# Patient Record
Sex: FEMALE | Race: BLACK OR AFRICAN AMERICAN | NOT HISPANIC OR LATINO | Employment: FULL TIME | URBAN - METROPOLITAN AREA
[De-identification: names, ages, dates, MRNs, and addresses within clinical notes are randomized per-mention and may not be internally consistent; named-entity substitution may affect disease eponyms.]

---

## 2017-04-27 ENCOUNTER — OFFICE VISIT (OUTPATIENT)
Dept: BARIATRICS | Facility: CLINIC | Age: 47
End: 2017-04-27
Payer: COMMERCIAL

## 2017-04-27 VITALS
SYSTOLIC BLOOD PRESSURE: 128 MMHG | HEIGHT: 64 IN | WEIGHT: 221.56 LBS | BODY MASS INDEX: 37.83 KG/M2 | DIASTOLIC BLOOD PRESSURE: 74 MMHG | HEART RATE: 76 BPM

## 2017-04-27 DIAGNOSIS — K21.9 GASTROESOPHAGEAL REFLUX DISEASE, ESOPHAGITIS PRESENCE NOT SPECIFIED: ICD-10-CM

## 2017-04-27 DIAGNOSIS — Z98.84 S/P LAPAROSCOPIC SLEEVE GASTRECTOMY: ICD-10-CM

## 2017-04-27 DIAGNOSIS — E66.01 SEVERE OBESITY (BMI 35.0-39.9) WITH COMORBIDITY: ICD-10-CM

## 2017-04-27 DIAGNOSIS — I10 ESSENTIAL HYPERTENSION: Primary | ICD-10-CM

## 2017-04-27 DIAGNOSIS — E51.9 VITAMIN B1 DEFICIENCY: ICD-10-CM

## 2017-04-27 PROCEDURE — 99215 OFFICE O/P EST HI 40 MIN: CPT | Mod: S$GLB,,, | Performed by: INTERNAL MEDICINE

## 2017-04-27 PROCEDURE — 99999 PR PBB SHADOW E&M-EST. PATIENT-LVL III: CPT | Mod: PBBFAC,,, | Performed by: INTERNAL MEDICINE

## 2017-04-27 RX ORDER — DIETHYLPROPION HYDROCHLORIDE 75 MG/1
75 TABLET, EXTENDED RELEASE ORAL DAILY
Qty: 31 TABLET | Refills: 0 | Status: SHIPPED | OUTPATIENT
Start: 2017-04-27 | End: 2017-05-26 | Stop reason: SDUPTHER

## 2017-04-27 NOTE — PROGRESS NOTES
Subjective:       Patient ID: Renea Mac is a 47 y.o. female.    Chief Complaint: Consult    HPI Comments: Current attempts at weight loss: New pt to me with Patient Active Problem List:     Acute pain of right knee     Chronic right shoulder pain     Tail bone pain     S/P gastric surgery- [facility].      Essential hypertension     Osteoarthritis of right knee     Incomplete tear of right rotator cuff     Sciatica (2002), HTN, syncope (06/2016), and GERD.  PSH includes gastric sleeve (11/2014), hysterectomy (04/2011), and hernia repair.    Has gained over 20 lbs in a little less than a year. Is walking 1.5-2 miles x 3 days. Trying to increase. Some exercise for shoulder labs.         Heaviest weight before surgery: 241#    Lightest weight since surgery: 197# with in 1 year.     Goal weight: 175#    History of medication for loss: Phentermine in 90s. States did well.         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  trying to lose weight.   Breakfast: ensure (8g prot, 26 g sugar)    Lunch: Leftovers    Dinner: Red beans and rice, chicken. Roast with rice and gravy. Spaghetti with meat sauce.     Snacks: Chips, Belvita cookies,     Beverages: Wine, martini- flavored- 3-4 times a week x 2/day. Lemon water, Arnold romero. Does not drink while eating. Smooth move tea, ACV with honey  Willingness to change: 7/10        Review of Systems   Constitutional: Negative for chills and fever.   Respiratory: Negative for apnea and shortness of breath.    Cardiovascular: Negative for chest pain and leg swelling.   Gastrointestinal: Positive for constipation. Negative for diarrhea.        + GERD   Genitourinary: Negative for difficulty urinating.        S/p hyst   Musculoskeletal: Positive for arthralgias and back pain.   Neurological: Negative for dizziness and light-headedness.   Psychiatric/Behavioral: Negative for dysphoric mood. The patient is not nervous/anxious.       "  Objective:     /74  Pulse 76  Ht 5' 4" (1.626 m)  Wt 100.5 kg (221 lb 9 oz)  BMI 38.03 kg/m2    Physical Exam   Constitutional: She is oriented to person, place, and time. She appears well-developed and well-nourished. No distress.   Obese     HENT:   Head: Normocephalic and atraumatic.   Eyes: EOM are normal. Pupils are equal, round, and reactive to light. No scleral icterus.   Neck: Normal range of motion. Neck supple. No thyromegaly present.   Cardiovascular: Normal rate and normal heart sounds.  Exam reveals no gallop and no friction rub.    No murmur heard.  Pulmonary/Chest: Effort normal and breath sounds normal. No respiratory distress. She has no wheezes.   Abdominal: Soft. Bowel sounds are normal. She exhibits no distension. There is no tenderness.   Musculoskeletal: Normal range of motion. She exhibits no edema.   Neurological: She is alert and oriented to person, place, and time. No cranial nerve deficit.   Skin: Skin is warm and dry. No erythema.   Psychiatric: She has a normal mood and affect. Her behavior is normal. Judgment normal.   Vitals reviewed.      Assessment:       1. Essential hypertension    2. Gastroesophageal reflux disease, esophagitis presence not specified    3. S/P laparoscopic sleeve gastrectomy    4. Severe obesity (BMI 35.0-39.9) with comorbidity    5. Vitamin B1 deficiency        Plan:         1. Essential hypertension  The current medical regimen is effective;  continue present plan and medications.   - TSH; Future    2. Gastroesophageal reflux disease, esophagitis presence not specified  Expect improvement with weight loss  Attempt to wean PPI once 10% TBW lost.       3. S/P laparoscopic sleeve gastrectomy  Update labs. DB updated. Nutrition hand book reviewed, If she is not making progress at follow-up with refer to RD  - CBC auto differential; Future  - Comprehensive metabolic panel; Future  - Iron and TIBC; Future  - Vitamin B1; Future  - Vitamin B12; Future  - " Vitamin B6; Future  - Vitamin D; Future  - Ambulatory consult to Ophthalmology    4. Severe obesity (BMI 35.0-39.9) with comorbidity    - diethylpropion 75 mg TbSR; Take 75 mg by mouth once daily.  Dispense: 31 tablet; Refill: 0    5. Vitamin B1 deficiency    - Ambulatory consult to Ophthalmology      Patient warned of common side effects of diethylpropion including anxiety, insomnia, palpitations and increased blood pressure. It was also explained that it is for short-term usage along with diet and exercise, and that stopping the medication without making lifestyle changes will result in regain of weight. Patient states understanding.    Return in about 4 weeks (around 5/25/2017).

## 2017-04-27 NOTE — MR AVS SNAPSHOT
[facility]  [address]  [facility] [address]  Phone: [phone]  Fax: [phone]                  Renea Mac   2017 11:15 AM   Office Visit    Description:  Female : 1970   Provider:  Kimberlee Washburn MD   Department:  [facility]           Reason for Visit     Consult           Diagnoses this Visit        Comments    Essential hypertension    -  Primary     Gastroesophageal reflux disease, esophagitis presence not specified         S/P laparoscopic sleeve gastrectomy         Severe obesity (BMI 35.0-39.9) with comorbidity         Vitamin B1 deficiency                To Do List           Goals (5 Years of Data)     None      Follow-Up and Disposition     Return in about 4 weeks (around 2017).       These Medications        Disp Refills Start End    diethylpropion 75 mg TbSR 31 tablet 0 2017     Take 75 mg by mouth once daily. - Oral    Pharmacy: [facility] [address] [place] [address] [place] [address] [address] AT [facility] [place] Ph #: [phone]         [facility]     [facility] On Call Nurse Care Line -  Assistance  Unless otherwise directed by your provider, please contact [facility], our nurse care line that is available for  assistance.     Registered nurses in the [facility] provide: appointment scheduling, clinical advisement, health education, and other advisory services.  Call: [phone] (toll free)               Medications           Message regarding Medications     Verify the changes and/or additions to your medication regime listed below are the same as discussed with your clinician today.  If any of these changes or additions are incorrect, please notify your healthcare provider.        START taking these NEW medications        Refills    diethylpropion 75 mg TbSR 0    Sig: Take 75 mg by mouth once daily.    Class: Print    Route: Oral           Verify that  "the below list of medications is an accurate representation of the medications you are currently taking.  If none reported, the list may be blank. If incorrect, please contact your healthcare provider. Carry this list with you in case of emergency.           Current Medications     hydrochlorothiazide (HYDRODIURIL) 25 MG tablet Take 12.5 mg by mouth.    losartan (COZAAR) 100 MG tablet     omeprazole (PRILOSEC) 40 MG capsule Take 1 capsule (40 mg total) by mouth once daily.    diethylpropion 75 mg TbSR Take 75 mg by mouth once daily.           Clinical Reference Information           Your Vitals Were     BP Pulse Height Weight BMI    128/74 76 5' 4" (1.626 m) 100.5 kg (221 lb 9 oz) 38.03 kg/m2      Blood Pressure          Most Recent Value    BP  128/74      Allergies as of 4/27/2017     No Known Allergies      Immunizations Administered on Date of Encounter - 4/27/2017     None      Orders Placed During Today's Visit      Normal Orders This Visit    Ambulatory consult to Ophthalmology     Future Labs/Procedures Expected by Expires    CBC auto differential  4/27/2017 6/26/2018    Comprehensive metabolic panel  4/27/2017 6/26/2018    Iron and TIBC  4/27/2017 6/26/2018    TSH  4/27/2017 6/26/2018    Vitamin B12  4/27/2017 6/26/2018    Vitamin B1  4/27/2017 6/26/2018    Vitamin B6  4/27/2017 6/26/2018    Vitamin D  4/27/2017 6/26/2018      Instructions        Sample meal plan  80-120g protein; 2150-8338 calories  Protein drinks and bars: 0-4 grams sugar  Drink lots of water throughout the day and exercise!  MENU # 1  Breakfast: 2 eggs, 1 turkey sausage tere  Lunch: 2-3 roll-ups (sliced turkey, low-fat slice cheese), baby carrots dipped in 1 Tbsp natural peanut butter  Mid-Day Snack: ¼ cup unsalted almonds, ½ cup fruit  Supper: 1 chicken thigh simmered in tomato sauce + 2 Tbsp mozzarella cheese, ½ cup black beans, 1/2 cup steamed veggies  Evening Snack: 1/2 cup grapes with 4 cubes low-fat cheddar cheese "   ___________________________________________________  MENU # 2  Breakfast: protein drink  Mid-morning snack : ¼ cup unsalted nuts  Lunch: 1 cup tuna or chicken salad made with light herron, over salad.   Supper: hamburger tere, slice of cheese, 1 cup steamed veggies.   Snack: light yogurt      Menu #3  Breakfast: 6oz plain Greek yogurt + fruit (½ banana, ½ cup fruit - pineapple, blueberries, strawberries, peach), may add Splenda to manny.  Lunch: ½  chicken breast w/ slice pepper ana maria cheese, 1/2 cup steamed veggies and small salad with Salad Spritzer.    Mid-Day snack: protein drink   Supper: 4oz Grilled fish, grilled veggie kabob ( mushrooms, onion, bell pepper, yellow squash, zucchini, cherry tomatoes)  Evening Snack: fudgsicle no-sugar-added    Menu # 4  Breakfast: vanilla iced coffee: 1 scoop vanilla protein powder + 4oz skim milk + 4oz coffee   Snack: protein bar  Lunch: 2 Lettuce tacos: ¼ cup seasoned ground turkey wrapped in a Waldemar lettuce leaf with 1 Tbsp shredded cheese and dollop low-fat sour cream  Dinner: Shrimp omelet: 2 eggs, ½ cup shrimp, green onions, and shredded cheese        Menu #5  Breakfast: 1 cup low-fat cottage cheese, ½ cup peaches (no sugar added)  Lunch: 2 oz baked pork chop, 1 cup okra and tomato stew  Snack: 1 cup black beans + salsa + dollop sour cream  Dinner: Caprese chicken salad: 2 oz chicken, 1oz fresh mozzarella, sliced tomato, 1 Tbsp olive oil, basil  Snack: sugar-free pudding cup      Menu #6  Breakfast: ½ cup part-skim ricotta cheese, 2 Tbsp sugar-free strawberry preserves, ¼ cup slivered almonds  Lunch: 2 oz canned chicken, 1oz shredded cheddar cheese, ½ cup black beans, 2 Tbsp salsa  Snack: Protein drink  Dinner: 4 oz grilled salmon steak, over mixed green salad with light dressing      Bariatric Diet Grocery List      High in Protein:   ? Canned tuna or chicken (packed in water)  ? Lean ground turkey breast or ground round  ? Turkey or chicken (no skin)  ? Lean pork or  beef   ? Scrambled, poached, or boiled eggs  ? Baked or broiled fish and seafood (not fried!)  ? Beans and lentils  ? Low fat deli meats: turkey, chicken, ham, roast beef  ? 1% or Skim Milk, Lactaid, or Soymilk  ? Low-fat cheese, cottage cheese, mozzarella string cheese, ricotta  ? Light yogurt, FF/SF frozen yogurt, custard, SF pudding  ? Protein drinks and protein bars with 0-4 grams sugar     Fruits, Vegetables and Snacks   - Green beans, broccoli, cauliflower, spinach, asparagus, carrots, lima beans, yellow squash, zucchini, etc.  - Apple, pineapple, peach, grapes, banana, watermelon, oranges, etc.  - Fruit canned in its own juice or in water (not in syrup)  - Raw veggies  - Lettuce: dark greens like spinach and Waldemar  - Unsalted Nuts  - Light or Air-popped Popcorn  - Shaun Links beef jerky     Fluids:   Skim/1% milk, Lactaid, Soymilk  Sugar-free beverages  (decaf and non-carbonated)  Decaf coffee & decaf tea   Water       Low Sodium Snack Ideas: (less than 170 mg sodium)    - 1 low-fat cheese stick with 8 cherry tomatoes or 1 serving fresh fruit  - 1 oz low-sodium turkey breast with 1 serv fresh fruit  - 1/4 cup unsalted nuts with ½ piece of fruit  - 6-oz container Dannon Light n Fit vanilla yogurt, topped with ½ oz unsalted nuts   - ½ apple (peeled if necessary) with 1 Tbsp natural peanut butter or almond butter   - Pop Secrets microwave light popcorn   - [place] Chocolate Mint snack bar, 2 Tablespoons Cool Whip Free  - Slimfast low-carb snack bar: coconut almond      Takeout Options: No more than twice/week  Deli - Salads (no pasta or rice), meats, cheeses. Roasted chicken. Lox (salmon)  Mexican - Platters which don't include tortillas, chips, or rice. Go easy on the beans. Example: Fajitas without the tortillas. Ask the  not to bring chips to the table if they are too tempting.  Greek - Meat or fish and vegetable, but no bread or rice. Including hummus, baba ganoush, etc, is OK. Most sit-down  Yi restaurants can provide you with cucumber slices for dipping instead of maria luisa bread.  Fast Food (Avoid as much as possible) - Salads (no croutons and limit salad dressing to 2 tbsp), grilled chicken sandwich without the bun and ask for no herron. Keys low fat chili or Taco Bell pintos and cheese.  BBQ - The meats are fine if you ask for sauces on the side, but most of the traditional side dishes are loaded with carbs. Efraín slaw, baked beans and BBQ sauce are typically made with sugar.  Chinese - Nothing deep-fried, no rice or noodles. Many Chinese sauces have starch and sugar in them, so you'll have to use your judgement. If you find that these sauces trigger cravings, or cause Dumping, you can ask for the sauce to be made without sugar or just use soy sauce.    Meal Ideas for Regular Bariatric Diet  *Recipes and products available at www.bariatriceating.com      Breakfast: (15-20g protein)    - Egg white omelet: 2 egg whites or ½ cup Egg Beaters. (Optional proteins: cheese, shrimp, black beans, chicken, sliced turkey) (Optional veggies: tomatoes, salsa, spinach, mushrooms, onions, green peppers, or small slice avocado)     - Egg and sausage: 1 egg or ¼ cup Egg Beaters (any variety), with 1 tere or 2 links of Turkey sausage or Veggie breakfast sausage ([employer] or [employer])    - Crust-less breakfast quiche: To make a glass pie dish, mix 4oz part skim Ricotta, 1 cup skim milk, and 2 eggs as your base. Add protein: shredded cheese, sliced lean ham or turkey, turkey reeder/sausage. Add veggies: tomato, onion, green onion, mushroom, green pepper, spinach, etc.    - Yogurt parfait: Mix 1 - 6oz container Dannon Light N Fit vanilla yogurt, with ¼ cup Kashi Go Lean cereal    - Cottage cheese and fruit: ½ cup part-skim cottage cheese or ricotta cheese topped with fresh fruit or sugar free preserves     - Brittnee Zelaya's Vanilla Egg custard* (add 2 Tbsp instant coffee granules to make Cappuccino Custard*)    -  Hi-Protein café latte (skim milk, decaf coffee, 1 scoop protein powder). Optional to add Sugar free syrup or extract flavoring.    Lunch: (20-30g protein)    - ½ cup Black bean soup (Homemade or Progresso), with ¼ cup shredded low-fat cheese. Top with chopped tomato or fresh salsa.     - Lean deli turkey breast and low-fat sliced cheese, mustard or light herron to moisten, rolled up together, or wrapped in a Waldemar lettuce leaf    - Chicken salad made from dinner leftovers, moisten with low-fat salad dressing or light herron. Also try leftover salmon, shrimp, tuna or boiled eggs. Serve ½ cup over dark green salad    - Fat-free canned refried beans, topped with ¼ cup shredded low-fat cheese. Top with chopped tomato or fresh salsa.     - Greek salad: Top mixed greens with 1-2oz grilled chicken, tomatoes, red onions, 2-3 kalamata olives, and sprinkle lightly with feta cheese. Spritz with Balsamic vinegar to taste.     - Crust-less lunch quiche: To make a glass pie dish, mix 4oz part skim Ricotta, 1 cup skim milk, and 2 eggs as your base. Add protein: shredded cheese, sliced lean ham or turkey, shrimp, chicken. Add veggies: tomato, onion, green onion, mushroom, green pepper, spinach, artichoke, broccoli, etc.    - Pizza bake: tomato sauce, low-fat shredded mozzarella and turkey pepperoni or Wilbarger reeder. Add any veggies.    - Cucumber crab bites: Spread ¼ cup crab dip (lump crabmeat + light cream cheese and green onions) over sliced cucumber.     - Chicken with light spinach and artichoke dip*: Puree in : 6oz cooked and drained spinach, 2 cloves garlic, 1 can cannelloni beans, ½ cup chopped green onions, 1 can drained artichoke hearts (not marinated in oil), lemon juice and basil. Mix in 2oz chopped up chicken.    Supper: (20-30g protein)    - Serve grilled fish over dark green salad tossed with low-fat dressing, served with grilled asparagus berry     - Rotisserie chicken salad: served with sliced  strawberries, walnuts, fat-free feta cheese crumbles and 1 tbsp Zambranos Own Light Raspberry Norman Vinaigrette    - Shrimp cocktail: Dip cold boiled shrimp in homemade low-sugar cocktail sauce (1/2 cup Brandie One Carb ketchup, 2 tbsp horseradish, 1/4 tsp hot sauce, 1 tsp Worcestershire sauce, 1 tbsp freshly-squeezed lemon juice). Serve with dark green salad, walnuts, and crumbled blue cheese drizzled with olive oil and Balsamic vinegar    - Tuna Melt: Spread tuna salad onto 2 thick slices of tomato. Top with low-fat cheese and broil until cheese is melted. May also be made with chicken salad of shrimp salad. [place] with different types of cheeses.    - Homemade low-fat Chili using extra lean ground beef or ground turkey. Top with shredded cheese and salsa as desired. May add dollop fat-free sour cream if desired    - Dinner Omelet with shrimp or chicken and onion, green peppers and chives.    - No noodle lasagna: Use sliced zucchini or eggplant in place of noodles.  Layer with part skim ricotta cheese and low sugar meat sauce (use very lean ground beef or ground turkey).    - Mexican chicken bake: Bake chunks of chicken breast or thigh with taco seasoning, Pace brand enchilada sauce, green onions and low-fat cheese. Serve with ¼ cup black beans or fat free refried beans topped with chopped tomatoes or salsa.    - Vin frozen meatballs, simmered in Classico Marinara sauce. Different flavors of salsa or spaghetti sauce create different dishes! Sprinkle with parmesan cheese. Serve with grilled or steamed veggies, or a dark green salad.    - Simmer boneless skinless chicken thigh chunks in Classico Marinara sauce or roasted salsa until tender with chopped onion, bell pepper, garlic, mushrooms, spinach, etc.     - Hamburger, without the bun, dressed the way you like. Served with grilled or steamed veggies.    - Eggplant parmesan: Bake slices of eggplant at 350 degrees for 15 minutes. Layer tomato sauce, sliced  eggplant and low-fat mozzarella cheese in a baking dish and cover with foil. Bake 30-40 more minutes or until bubbly. Uncover and bake at 400 degrees for about 15 more minutes, or until top is slightly crisp.    - Fish tacos: grilled/baked white fish, wrapped in Waldemar lettuce leaf, topped with salsa, shredded low-fat cheese, and light coleslaw.    Snacks: (100-200 calories; >5g protein)    - 1 low-fat cheese stick with 8 cherry tomatoes or 1 serving fresh fruit  - 4 thin slices fat-free turkey breast and 1 slice low-fat cheese  - 4 thin slices fat-free honey ham with wedge of melon  - 1/4 cup unsalted nuts with ½ cup fruit  - 6-oz container Dannon Light n Fit vanilla yogurt, topped with 1oz unsalted nuts         - apple, celery or baby carrots spread with 2 Tbsp natural peanut butter or almond butter   - apple slices with 1 oz slice low-fat cheese  - celery, cucumber, bell pepper or baby carrots dipped in ¼ cup hummus bean spread or light spinach and artichoke dip (*recipe in lunch section)  - 100 calorie bag microwave light popcorn with 3 tbsp grated parmesan cheese  - Shaun Links Beef Steak - 14g protein! (similar to beef jerky)  - 2 wedges Laughing Cow - Light Herb & Garlic Cheese with sliced cucumber or green bell pepper  - 1/2 cup low-fat cottage cheese with ¼ cup fruit or ¼ cup salsa  - RTD Protein drinks: Atkins, Low Carb Slim Fast, EAS light, Muscle Milk Light, etc.  - Homemade Protein drinks: GNC Soy95, Isopure, Nectar, UNJURY, Whey Gourmet, etc. Mix 1 scoop powder with 8oz skim/1% milk or light soymilk.  - Protein bars: Atkins, EAS, Pure Protein, Think Thin, Detour, etc. Must have 0-4 grams sugar - Read the label.    Takeout Options: No more than twice/week  Deli - Salads (no pasta or rice), meats, cheeses. Roasted chicken. Lox (salmon)    Mexican - Platters which don't include tortillas, chips, or rice. Go easy on the beans. Example: Fajitas without the tortillas. Ask the  not to bring chips to  the table if they are too tempting.    Greek - Meat or fish and vegetable, but no bread or rice. Including hummus, baba ganoush, etc, is OK. Most sit-down Greek restaurants can provide you with cucumber slices for dipping instead of maria luisa bread.    Fast Food (Avoid as much as possible) - Salads (no croutons and limit salad dressing to 2 tbsp), grilled chicken sandwich without the bun and ask for no herron. Keys low fat chili or Taco Bell pintos and cheese.    BBQ - The meats are fine if you ask for sauces on the side, but most of the traditional side dishes are loaded with carbs. Efraín slaw, baked beans and BBQ sauce are typically made with sugar.    Chinese - Nothing deep-fried, no rice or noodles. Many Chinese sauces have starch and sugar in them, so you'll have to use your judgement. If you find that these sauces trigger cravings, or cause Dumping, you can ask for the sauce to be made without sugar or just use soy sauce.      Patient warned of common side effects of diethylpropion including anxiety, insomnia, palpitations and increased blood pressure. It was also explained that it is for short-term usage along with diet and exercise, and that stopping the medication without making lifestyle changes will result in regain of weight. Patient states understanding.    Continue to increase exercise.     Weight loss medications are controlled substances.  They require routine follow up. Prescription or pills that are lost or destroyed will not be replaced.     Return in about 4 weeks (around 5/25/2017).         Language Assistance Services     ATTENTION: Language assistance services are available, free of charge. Please call [phone].      ATENCIÓN: Si sussy gill, tiene a barnes disposición servicios gratuitos de asistencia lingüística. Llame al [phone].     DAYNA Ý: N?u b?n nói Ti?ng Vi?t, có các d?ch v? h? tr? ngôn ng? mi?n phí dành cho b?n. G?i s? [phone].         [facility] complies  with applicable Federal civil rights laws and does not discriminate on the basis of race, color, national origin, age, disability, or sex.

## 2017-04-27 NOTE — PATIENT INSTRUCTIONS
Sample meal plan  80-120g protein; 4177-6124 calories  Protein drinks and bars: 0-4 grams sugar  Drink lots of water throughout the day and exercise!  MENU # 1  Breakfast: 2 eggs, 1 turkey sausage tere  Lunch: 2-3 roll-ups (sliced turkey, low-fat slice cheese), baby carrots dipped in 1 Tbsp natural peanut butter  Mid-Day Snack: ¼ cup unsalted almonds, ½ cup fruit  Supper: 1 chicken thigh simmered in tomato sauce + 2 Tbsp mozzarella cheese, ½ cup black beans, 1/2 cup steamed veggies  Evening Snack: 1/2 cup grapes with 4 cubes low-fat cheddar cheese   ___________________________________________________  MENU # 2  Breakfast: protein drink  Mid-morning snack : ¼ cup unsalted nuts  Lunch: 1 cup tuna or chicken salad made with light herron, over salad.   Supper: hamburger tere, slice of cheese, 1 cup steamed veggies.   Snack: light yogurt      Menu #3  Breakfast: 6oz plain Greek yogurt + fruit (½ banana, ½ cup fruit - pineapple, blueberries, strawberries, peach), may add Splenda to manny.  Lunch: ½  chicken breast w/ slice pepper ana maria cheese, 1/2 cup steamed veggies and small salad with Salad Spritzer.    Mid-Day snack: protein drink   Supper: 4oz Grilled fish, grilled veggie kabob ( mushrooms, onion, bell pepper, yellow squash, zucchini, cherry tomatoes)  Evening Snack: fudgsicle no-sugar-added    Menu # 4  Breakfast: vanilla iced coffee: 1 scoop vanilla protein powder + 4oz skim milk + 4oz coffee   Snack: protein bar  Lunch: 2 Lettuce tacos: ¼ cup seasoned ground turkey wrapped in a Waldemar lettuce leaf with 1 Tbsp shredded cheese and dollop low-fat sour cream  Dinner: Shrimp omelet: 2 eggs, ½ cup shrimp, green onions, and shredded cheese        Menu #5  Breakfast: 1 cup low-fat cottage cheese, ½ cup peaches (no sugar added)  Lunch: 2 oz baked pork chop, 1 cup okra and tomato stew  Snack: 1 cup black beans + salsa + dollop sour cream  Dinner: Caprese chicken salad: 2 oz chicken, 1oz fresh mozzarella, sliced  tomato, 1 Tbsp olive oil, basil  Snack: sugar-free pudding cup      Menu #6  Breakfast: ½ cup part-skim ricotta cheese, 2 Tbsp sugar-free strawberry preserves, ¼ cup slivered almonds  Lunch: 2 oz canned chicken, 1oz shredded cheddar cheese, ½ cup black beans, 2 Tbsp salsa  Snack: Protein drink  Dinner: 4 oz grilled salmon steak, over mixed green salad with light dressing      Bariatric Diet Grocery List      High in Protein:   ? Canned tuna or chicken (packed in water)  ? Lean ground turkey breast or ground round  ? Turkey or chicken (no skin)  ? Lean pork or beef   ? Scrambled, poached, or boiled eggs  ? Baked or broiled fish and seafood (not fried!)  ? Beans and lentils  ? Low fat deli meats: turkey, chicken, ham, roast beef  ? 1% or Skim Milk, Lactaid, or Soymilk  ? Low-fat cheese, cottage cheese, mozzarella string cheese, ricotta  ? Light yogurt, FF/SF frozen yogurt, custard, SF pudding  ? Protein drinks and protein bars with 0-4 grams sugar     Fruits, Vegetables and Snacks   - Green beans, broccoli, cauliflower, spinach, asparagus, carrots, lima beans, yellow squash, zucchini, etc.  - Apple, pineapple, peach, grapes, banana, watermelon, oranges, etc.  - Fruit canned in its own juice or in water (not in syrup)  - Raw veggies  - Lettuce: dark greens like spinach and Waldemar  - Unsalted Nuts  - Light or Air-popped Popcorn  - Shaun Links beef jerky     Fluids:   Skim/1% milk, Lactaid, Soymilk  Sugar-free beverages  (decaf and non-carbonated)  Decaf coffee & decaf tea   Water       Low Sodium Snack Ideas: (less than 170 mg sodium)    - 1 low-fat cheese stick with 8 cherry tomatoes or 1 serving fresh fruit  - 1 oz low-sodium turkey breast with 1 serv fresh fruit  - 1/4 cup unsalted nuts with ½ piece of fruit  - 6-oz container Dannon Light n Fit vanilla yogurt, topped with ½ oz unsalted nuts   - ½ apple (peeled if necessary) with 1 Tbsp natural peanut butter or almond butter   - Pop Secrets microwave light popcorn    - [place] Chocolate Mint snack bar, 2 Tablespoons Cool Whip Free  - Slimfast low-carb snack bar: coconut almond      Takeout Options: No more than twice/week  Deli - Salads (no pasta or rice), meats, cheeses. Roasted chicken. Lox (salmon)  Mexican - Platters which don't include tortillas, chips, or rice. Go easy on the beans. Example: Fajitas without the tortillas. Ask the  not to bring chips to the table if they are too tempting.  Greek - Meat or fish and vegetable, but no bread or rice. Including hummus, baba ganoush, etc, is OK. Most sit-down Greek restaurants can provide you with cucumber slices for dipping instead of maria luisa bread.  Fast Food (Avoid as much as possible) - Salads (no croutons and limit salad dressing to 2 tbsp), grilled chicken sandwich without the bun and ask for no herron. Keys low fat chili or Taco Bell pintos and cheese.  BBQ - The meats are fine if you ask for sauces on the side, but most of the traditional side dishes are loaded with carbs. Efraín slaw, baked beans and BBQ sauce are typically made with sugar.  Chinese - Nothing deep-fried, no rice or noodles. Many Chinese sauces have starch and sugar in them, so you'll have to use your judgement. If you find that these sauces trigger cravings, or cause Dumping, you can ask for the sauce to be made without sugar or just use soy sauce.    Meal Ideas for Regular Bariatric Diet  *Recipes and products available at [email or web address]      Breakfast: (15-20g protein)    - Egg white omelet: 2 egg whites or ½ cup Egg Beaters. (Optional proteins: cheese, shrimp, black beans, chicken, sliced turkey) (Optional veggies: tomatoes, salsa, spinach, mushrooms, onions, green peppers, or small slice avocado)     - Egg and sausage: 1 egg or ¼ cup Egg Beaters (any variety), with 1 tere or 2 links of Turkey sausage or Veggie breakfast sausage (Mailpile or Boca)    - Crust-less breakfast quiche: To make a glass pie dish, mix 4oz part skim  Ricotta, 1 cup skim milk, and 2 eggs as your base. Add protein: shredded cheese, sliced lean ham or turkey, turkey reeder/sausage. Add veggies: tomato, onion, green onion, mushroom, green pepper, spinach, etc.    - Yogurt parfait: Mix 1 - 6oz container Dannon Light N Fit vanilla yogurt, with ¼ cup Kashi Go Lean cereal    - Cottage cheese and fruit: ½ cup part-skim cottage cheese or ricotta cheese topped with fresh fruit or sugar free preserves     - Brittnee Zelaya's Vanilla Egg custard* (add 2 Tbsp instant coffee granules to make Cappuccino Custard*)    - Hi-Protein café latte (skim milk, decaf coffee, 1 scoop protein powder). Optional to add Sugar free syrup or extract flavoring.    Lunch: (20-30g protein)    - ½ cup Black bean soup (Homemade or Progresso), with ¼ cup shredded low-fat cheese. Top with chopped tomato or fresh salsa.     - Lean deli turkey breast and low-fat sliced cheese, mustard or light herron to moisten, rolled up together, or wrapped in a Waldemar lettuce leaf    - Chicken salad made from dinner leftovers, moisten with low-fat salad dressing or light herron. Also try leftover salmon, shrimp, tuna or boiled eggs. Serve ½ cup over dark green salad    - Fat-free canned refried beans, topped with ¼ cup shredded low-fat cheese. Top with chopped tomato or fresh salsa.     - Greek salad: Top mixed greens with 1-2oz grilled chicken, tomatoes, red onions, 2-3 kalamata olives, and sprinkle lightly with feta cheese. Spritz with Balsamic vinegar to taste.     - Crust-less lunch quiche: To make a glass pie dish, mix 4oz part skim Ricotta, 1 cup skim milk, and 2 eggs as your base. Add protein: shredded cheese, sliced lean ham or turkey, shrimp, chicken. Add veggies: tomato, onion, green onion, mushroom, green pepper, spinach, artichoke, broccoli, etc.    - Pizza bake: tomato sauce, low-fat shredded mozzarella and turkey pepperoni or Maybee reeder. Add any veggies.    - Cucumber crab bites: Spread ¼ cup crab dip  (lump crabmeat + light cream cheese and green onions) over sliced cucumber.     - Chicken with light spinach and artichoke dip*: Puree in : 6oz cooked and drained spinach, 2 cloves garlic, 1 can cannelloni beans, ½ cup chopped green onions, 1 can drained artichoke hearts (not marinated in oil), lemon juice and basil. Mix in 2oz chopped up chicken.    Supper: (20-30g protein)    - Serve grilled fish over dark green salad tossed with low-fat dressing, served with grilled asparagus berry     - Rotisserie chicken salad: served with sliced strawberries, walnuts, fat-free feta cheese crumbles and 1 tbsp Zambranos Own Light Raspberry [place] Vinaigrette    - Shrimp cocktail: Dip cold boiled shrimp in homemade low-sugar cocktail sauce (1/2 cup Brandie One Carb ketchup, 2 tbsp horseradish, 1/4 tsp hot sauce, 1 tsp Worcestershire sauce, 1 tbsp freshly-squeezed lemon juice). Serve with dark green salad, walnuts, and crumbled blue cheese drizzled with olive oil and Balsamic vinegar    - Tuna Melt: Spread tuna salad onto 2 thick slices of tomato. Top with low-fat cheese and broil until cheese is melted. May also be made with chicken salad of shrimp salad. [place] with different types of cheeses.    - Homemade low-fat Chili using extra lean ground beef or ground turkey. Top with shredded cheese and salsa as desired. May add dollop fat-free sour cream if desired    - Dinner Omelet with shrimp or chicken and onion, green peppers and chives.    - No noodle lasagna: Use sliced zucchini or eggplant in place of noodles.  Layer with part skim ricotta cheese and low sugar meat sauce (use very lean ground beef or ground turkey).    - Mexican chicken bake: Bake chunks of chicken breast or thigh with taco seasoning, Pace brand enchilada sauce, green onions and low-fat cheese. Serve with ¼ cup black beans or fat free refried beans topped with chopped tomatoes or salsa.    - Vin frozen meatballs, simmered in [place]  Marinara sauce. Different flavors of salsa or spaghetti sauce create different dishes! Sprinkle with parmesan cheese. Serve with grilled or steamed veggies, or a dark green salad.    - Simmer boneless skinless chicken thigh chunks in Classico Marinara sauce or roasted salsa until tender with chopped onion, bell pepper, garlic, mushrooms, spinach, etc.     - Hamburger, without the bun, dressed the way you like. Served with grilled or steamed veggies.    - Eggplant parmesan: Bake slices of eggplant at 350 degrees for 15 minutes. Layer tomato sauce, sliced eggplant and low-fat mozzarella cheese in a baking dish and cover with foil. Bake 30-40 more minutes or until bubbly. Uncover and bake at 400 degrees for about 15 more minutes, or until top is slightly crisp.    - Fish tacos: grilled/baked white fish, wrapped in Waldemar lettuce leaf, topped with salsa, shredded low-fat cheese, and light coleslaw.    Snacks: (100-200 calories; >5g protein)    - 1 low-fat cheese stick with 8 cherry tomatoes or 1 serving fresh fruit  - 4 thin slices fat-free turkey breast and 1 slice low-fat cheese  - 4 thin slices fat-free honey ham with wedge of melon  - 1/4 cup unsalted nuts with ½ cup fruit  - 6-oz container Dannon Light n Fit vanilla yogurt, topped with 1oz unsalted nuts         - apple, celery or baby carrots spread with 2 Tbsp natural peanut butter or almond butter   - apple slices with 1 oz slice low-fat cheese  - celery, cucumber, bell pepper or baby carrots dipped in ¼ cup hummus bean spread or light spinach and artichoke dip (*recipe in lunch section)  - 100 calorie bag microwave light popcorn with 3 tbsp grated parmesan cheese  - Shaun Links Beef Steak - 14g protein! (similar to beef jerky)  - 2 wedges [place] - Light Herb & Garlic Cheese with sliced cucumber or green bell pepper  - 1/2 cup low-fat cottage cheese with ¼ cup fruit or ¼ cup salsa  - RTD Protein drinks: Atkins, Low Carb Slim Fast, EAS light, Muscle Milk  Light, etc.  - Homemade Protein drinks: [facility] Soy95, Isopure, Nectar, UNJURY, Whey Gourmet, etc. Mix 1 scoop powder with 8oz skim/1% milk or light soymilk.  - Protein bars: Atkins, EAS, Pure Protein, Think Thin, Detour, etc. Must have 0-4 grams sugar - Read the label.    Takeout Options: No more than twice/week  Deli - Salads (no pasta or rice), meats, cheeses. Roasted chicken. Lox (salmon)    Mexican - Platters which don't include tortillas, chips, or rice. Go easy on the beans. Example: Fajitas without the tortillas. Ask the  not to bring chips to the table if they are too tempting.    Greek - Meat or fish and vegetable, but no bread or rice. Including hummus, baba ganoush, etc, is OK. Most sit-down Greek restaurants can provide you with cucumber slices for dipping instead of maria luisa bread.    Fast Food (Avoid as much as possible) - Salads (no croutons and limit salad dressing to 2 tbsp), grilled chicken sandwich without the bun and ask for no herron. Keys low fat chili or Taco Bell pintos and cheese.    BBQ - The meats are fine if you ask for sauces on the side, but most of the traditional side dishes are loaded with carbs. Efraín slaw, baked beans and BBQ sauce are typically made with sugar.    Chinese - Nothing deep-fried, no rice or noodles. Many Chinese sauces have starch and sugar in them, so you'll have to use your judgement. If you find that these sauces trigger cravings, or cause Dumping, you can ask for the sauce to be made without sugar or just use soy sauce.      Patient warned of common side effects of diethylpropion including anxiety, insomnia, palpitations and increased blood pressure. It was also explained that it is for short-term usage along with diet and exercise, and that stopping the medication without making lifestyle changes will result in regain of weight. Patient states understanding.    Continue to increase exercise.     Weight loss medications are controlled substances.  They require  routine follow up. Prescription or pills that are lost or destroyed will not be replaced.     Return in about 4 weeks (around 5/25/2017).

## 2017-05-26 ENCOUNTER — OFFICE VISIT (OUTPATIENT)
Dept: BARIATRICS | Facility: CLINIC | Age: 47
End: 2017-05-26
Payer: COMMERCIAL

## 2017-05-26 VITALS
SYSTOLIC BLOOD PRESSURE: 118 MMHG | HEART RATE: 89 BPM | DIASTOLIC BLOOD PRESSURE: 76 MMHG | HEIGHT: 64 IN | BODY MASS INDEX: 36.99 KG/M2 | WEIGHT: 216.69 LBS

## 2017-05-26 DIAGNOSIS — E66.01 SEVERE OBESITY (BMI 35.0-39.9) WITH COMORBIDITY: Primary | ICD-10-CM

## 2017-05-26 DIAGNOSIS — I10 ESSENTIAL HYPERTENSION: ICD-10-CM

## 2017-05-26 PROCEDURE — 99999 PR PBB SHADOW E&M-EST. PATIENT-LVL III: CPT | Mod: PBBFAC,,, | Performed by: INTERNAL MEDICINE

## 2017-05-26 PROCEDURE — 99213 OFFICE O/P EST LOW 20 MIN: CPT | Mod: PBBFAC | Performed by: INTERNAL MEDICINE

## 2017-05-26 PROCEDURE — 99213 OFFICE O/P EST LOW 20 MIN: CPT | Mod: S$GLB,,, | Performed by: INTERNAL MEDICINE

## 2017-05-26 RX ORDER — DIETHYLPROPION HYDROCHLORIDE 75 MG/1
75 TABLET, EXTENDED RELEASE ORAL DAILY
Qty: 31 TABLET | Refills: 0 | Status: SHIPPED | OUTPATIENT
Start: 2017-05-26 | End: 2017-06-19

## 2017-05-26 NOTE — PROGRESS NOTES
"Subjective:       Patient ID: Renea Mac is a 47 y.o. female.    Chief Complaint: Follow-up    Pt here today for follow-up. Has lost 5 lbs. She had been doing well up until the past week.  Has been on diethylpropion. Has some dry mouth. She has had some constipation. Took miralax a couple of times.  Has been a little off with exercise.         Review of Systems   Constitutional: Negative for chills and fever.   Respiratory: Negative for apnea and shortness of breath.    Cardiovascular: Negative for chest pain and leg swelling.   Gastrointestinal: Positive for constipation. Negative for diarrhea.        + GERD   Genitourinary: Negative for difficulty urinating.        S/p hyst   Musculoskeletal: Positive for arthralgias and back pain.   Neurological: Negative for dizziness and light-headedness.   Psychiatric/Behavioral: Negative for dysphoric mood. The patient is not nervous/anxious.        Objective:     /76   Pulse 89   Ht 5' 4" (1.626 m)   Wt 98.3 kg (216 lb 11.4 oz)   BMI 37.20 kg/m²     Physical Exam   Constitutional: She is oriented to person, place, and time. She appears well-developed and well-nourished. No distress.   Obese     HENT:   Head: Normocephalic and atraumatic.   Eyes: EOM are normal. Pupils are equal, round, and reactive to light. No scleral icterus.   Neck: Normal range of motion. Neck supple. No thyromegaly present.   Cardiovascular: Normal rate and normal heart sounds.  Exam reveals no gallop and no friction rub.    No murmur heard.  Pulmonary/Chest: Effort normal and breath sounds normal. No respiratory distress. She has no wheezes.   Abdominal: Soft. Bowel sounds are normal. She exhibits no distension. There is no tenderness.   Musculoskeletal: Normal range of motion. She exhibits no edema.   Neurological: She is alert and oriented to person, place, and time. No cranial nerve deficit.   Skin: Skin is warm and dry. No erythema.   Psychiatric: She has a normal mood and affect. " Her behavior is normal. Judgment normal.   Vitals reviewed.      Assessment:       1. Severe obesity (BMI 35.0-39.9) with comorbidity    2. Essential hypertension        Plan:           1. Severe obesity (BMI 35.0-39.9) with comorbidity  Discussed a few things she can improve upon.   - diethylpropion 75 mg TbSR; Take 75 mg by mouth once daily.  Dispense: 31 tablet; Refill: 0   meal ideas given.    2. Essential hypertension   The current medical regimen is effective;  continue present plan and medications.       Patient warned of common side effects of diethylpropion including anxiety, insomnia, palpitations and increased blood pressure. It was also explained that it is for short-term usage along with diet and exercise, and that stopping the medication without making lifestyle changes will result in regain of weight. Patient states understanding.    No Follow-up on file.

## 2017-05-26 NOTE — PATIENT INSTRUCTIONS
Patient warned of common side effects of diethylpropion including anxiety, insomnia, palpitations and increased blood pressure. It was also explained that it is for short-term usage along with diet and exercise, and that stopping the medication without making lifestyle changes will result in regain of weight. Patient states understanding.      Meal Ideas for Regular Bariatric Diet  *Recipes and products available at [email or web address]      Breakfast: (15-20g protein)    - Egg white omelet: 2 egg whites or ½ cup Egg Beaters. (Optional proteins: cheese, shrimp, black beans, chicken, sliced turkey) (Optional veggies: tomatoes, salsa, spinach, mushrooms, onions, green peppers, or small slice avocado)     - Egg and sausage: 1 egg or ¼ cup Egg Beaters (any variety), with 1 tere or 2 links of Turkey sausage or Veggie breakfast sausage ([employer] or Datal[employer]ix)    - Crust-less breakfast quiche: To make a glass pie dish, mix 4oz part skim Ricotta, 1 cup skim milk, and 2 eggs as your base. Add protein: shredded cheese, sliced lean ham or turkey, turkey reeder/sausage. Add veggies: tomato, onion, green onion, mushroom, green pepper, spinach, etc.    - Yogurt parfait: Mix 1 - 6oz container Dannon Light N Fit vanilla yogurt, with ¼ cup Kashi Go Lean cereal    - Cottage cheese and fruit: ½ cup part-skim cottage cheese or ricotta cheese topped with fresh fruit or sugar free preserves     - Brittnee Zelaya's Vanilla Egg custard* (add 2 Tbsp instant coffee granules to make Cappuccino Custard*)    - Hi-Protein café latte (skim milk, decaf coffee, 1 scoop protein powder). Optional to add Sugar free syrup or extract flavoring.    Lunch: (20-30g protein)    - ½ cup Black bean soup (Homemade or Progresso), with ¼ cup shredded low-fat cheese. Top with chopped tomato or fresh salsa.     - Lean deli turkey breast and low-fat sliced cheese, mustard or light herron to moisten, rolled up together, or wrapped in a Waldemar lettuce leaf    -  Chicken salad made from dinner leftovers, moisten with low-fat salad dressing or light herron. Also try leftover salmon, shrimp, tuna or boiled eggs. Serve ½ cup over dark green salad    - Fat-free canned refried beans, topped with ¼ cup shredded low-fat cheese. Top with chopped tomato or fresh salsa.     - Greek salad: Top mixed greens with 1-2oz grilled chicken, tomatoes, red onions, 2-3 kalamata olives, and sprinkle lightly with feta cheese. Spritz with Balsamic vinegar to taste.     - Crust-less lunch quiche: To make a glass pie dish, mix 4oz part skim Ricotta, 1 cup skim milk, and 2 eggs as your base. Add protein: shredded cheese, sliced lean ham or turkey, shrimp, chicken. Add veggies: tomato, onion, green onion, mushroom, green pepper, spinach, artichoke, broccoli, etc.    - Pizza bake: tomato sauce, low-fat shredded mozzarella and turkey pepperoni or Qatari reeder. Add any veggies.    - Cucumber crab bites: Spread ¼ cup crab dip (lump crabmeat + light cream cheese and green onions) over sliced cucumber.     - Chicken with light spinach and artichoke dip*: Puree in : 6oz cooked and drained spinach, 2 cloves garlic, 1 can cannelloni beans, ½ cup chopped green onions, 1 can drained artichoke hearts (not marinated in oil), lemon juice and basil. Mix in 2oz chopped up chicken.    Supper: (20-30g protein)    - Serve grilled fish over dark green salad tossed with low-fat dressing, served with grilled asparagus berry     - Rotisserie chicken salad: served with sliced strawberries, walnuts, fat-free feta cheese crumbles and 1 tbsp Zambranos Own Light Raspberry French Gulch Vinaigrette    - Shrimp cocktail: Dip cold boiled shrimp in homemade low-sugar cocktail sauce (1/2 cup Brandie One Carb ketchup, 2 tbsp horseradish, 1/4 tsp hot sauce, 1 tsp Worcestershire sauce, 1 tbsp freshly-squeezed lemon juice). Serve with dark green salad, walnuts, and crumbled blue cheese drizzled with olive oil and Balsamic  vinegar    - Tuna Melt: Spread tuna salad onto 2 thick slices of tomato. Top with low-fat cheese and broil until cheese is melted. May also be made with chicken salad of shrimp salad. Alba with different types of cheeses.    - Homemade low-fat Chili using extra lean ground beef or ground turkey. Top with shredded cheese and salsa as desired. May add dollop fat-free sour cream if desired    - Dinner Omelet with shrimp or chicken and onion, green peppers and chives.    - No noodle lasagna: Use sliced zucchini or eggplant in place of noodles.  Layer with part skim ricotta cheese and low sugar meat sauce (use very lean ground beef or ground turkey).    - Mexican chicken bake: Bake chunks of chicken breast or thigh with taco seasoning, Pace brand enchilada sauce, green onions and low-fat cheese. Serve with ¼ cup black beans or fat free refried beans topped with chopped tomatoes or salsa.    - Vin frozen meatballs, simmered in Classico Marinara sauce. Different flavors of salsa or spaghetti sauce create different dishes! Sprinkle with parmesan cheese. Serve with grilled or steamed veggies, or a dark green salad.    - Simmer boneless skinless chicken thigh chunks in Classico Marinara sauce or roasted salsa until tender with chopped onion, bell pepper, garlic, mushrooms, spinach, etc.     - Hamburger, without the bun, dressed the way you like. Served with grilled or steamed veggies.    - Eggplant parmesan: Bake slices of eggplant at 350 degrees for 15 minutes. Layer tomato sauce, sliced eggplant and low-fat mozzarella cheese in a baking dish and cover with foil. Bake 30-40 more minutes or until bubbly. Uncover and bake at 400 degrees for about 15 more minutes, or until top is slightly crisp.    - Fish tacos: grilled/baked white fish, wrapped in Waldemar lettuce leaf, topped with salsa, shredded low-fat cheese, and light coleslaw.    Snacks: (100-200 calories; >5g protein)    - 1 low-fat cheese stick with 8 cherry  tomatoes or 1 serving fresh fruit  - 4 thin slices fat-free turkey breast and 1 slice low-fat cheese  - 4 thin slices fat-free honey ham with wedge of melon  - 1/4 cup unsalted nuts with ½ cup fruit  - 6-oz container Dannon Light n Fit vanilla yogurt, topped with 1oz unsalted nuts         - apple, celery or baby carrots spread with 2 Tbsp natural peanut butter or almond butter   - apple slices with 1 oz slice low-fat cheese  - celery, cucumber, bell pepper or baby carrots dipped in ¼ cup hummus bean spread or light spinach and artichoke dip (*recipe in lunch section)  - 100 calorie bag microwave light popcorn with 3 tbsp grated parmesan cheese  - Shaun Links Beef Steak - 14g protein! (similar to beef jerky)  - 2 wedges Laughing Cow - Light Herb & Garlic Cheese with sliced cucumber or green bell pepper  - 1/2 cup low-fat cottage cheese with ¼ cup fruit or ¼ cup salsa  - RTD Protein drinks: Atkins, Low Carb Slim Fast, EAS light, Muscle Milk Light, etc.  - Homemade Protein drinks: GNC Soy95, Isopure, Nectar, UNJURY, Whey Gourmet, etc. Mix 1 scoop powder with 8oz skim/1% milk or light soymilk.  - Protein bars: Atkins, EAS, Pure Protein, Think Thin, Detour, etc. Must have 0-4 grams sugar - Read the label.    Takeout Options: No more than twice/week  Deli - Salads (no pasta or rice), meats, cheeses. Roasted chicken. Lox (salmon)    Mexican - Platters which don't include tortillas, chips, or rice. Go easy on the beans. Example: Fajitas without the tortillas. Ask the  not to bring chips to the table if they are too tempting.    Greek - Meat or fish and vegetable, but no bread or rice. Including hummus, baba ganoush, etc, is OK. Most sit-down Greek restaurants can provide you with cucumber slices for dipping instead of maria luisa bread.    Fast Food (Avoid as much as possible) - Salads (no croutons and limit salad dressing to 2 tbsp), grilled chicken sandwich without the bun and ask for no herron. Keys low fat chili or  Taco Bell pintos and cheese.    BBQ - The meats are fine if you ask for sauces on the side, but most of the traditional side dishes are loaded with carbs. Efraín slaw, baked beans and BBQ sauce are typically made with sugar.    Chinese - Nothing deep-fried, no rice or noodles. Many Chinese sauces have starch and sugar in them, so you'll have to use your judgement. If you find that these sauces trigger cravings, or cause Dumping, you can ask for the sauce to be made without sugar or just use soy sauce.

## 2017-06-01 ENCOUNTER — LAB VISIT (OUTPATIENT)
Dept: LAB | Facility: HOSPITAL | Age: 47
End: 2017-06-01
Attending: INTERNAL MEDICINE
Payer: COMMERCIAL

## 2017-06-01 DIAGNOSIS — I10 ESSENTIAL HYPERTENSION: ICD-10-CM

## 2017-06-01 DIAGNOSIS — Z98.84 S/P LAPAROSCOPIC SLEEVE GASTRECTOMY: ICD-10-CM

## 2017-06-01 LAB
25(OH)D3+25(OH)D2 SERPL-MCNC: 33 NG/ML
ALBUMIN SERPL BCP-MCNC: 3.2 G/DL
ALP SERPL-CCNC: 76 U/L
ALT SERPL W/O P-5'-P-CCNC: 8 U/L
ANION GAP SERPL CALC-SCNC: 9 MMOL/L
AST SERPL-CCNC: 12 U/L
BASOPHILS # BLD AUTO: 0.03 K/UL
BASOPHILS NFR BLD: 0.4 %
BILIRUB SERPL-MCNC: 0.3 MG/DL
BUN SERPL-MCNC: 16 MG/DL
CALCIUM SERPL-MCNC: 9.4 MG/DL
CHLORIDE SERPL-SCNC: 104 MMOL/L
CO2 SERPL-SCNC: 27 MMOL/L
CREAT SERPL-MCNC: 0.8 MG/DL
DIFFERENTIAL METHOD: ABNORMAL
EOSINOPHIL # BLD AUTO: 0.1 K/UL
EOSINOPHIL NFR BLD: 1.3 %
ERYTHROCYTE [DISTWIDTH] IN BLOOD BY AUTOMATED COUNT: 13.4 %
EST. GFR  (AFRICAN AMERICAN): >60 ML/MIN/1.73 M^2
EST. GFR  (NON AFRICAN AMERICAN): >60 ML/MIN/1.73 M^2
GLUCOSE SERPL-MCNC: 91 MG/DL
HCT VFR BLD AUTO: 40.1 %
HGB BLD-MCNC: 13.2 G/DL
IRON SERPL-MCNC: 41 UG/DL
LYMPHOCYTES # BLD AUTO: 3.7 K/UL
LYMPHOCYTES NFR BLD: 48.5 %
MCH RBC QN AUTO: 26.7 PG
MCHC RBC AUTO-ENTMCNC: 32.9 %
MCV RBC AUTO: 81 FL
MONOCYTES # BLD AUTO: 0.4 K/UL
MONOCYTES NFR BLD: 5.3 %
NEUTROPHILS # BLD AUTO: 3.3 K/UL
NEUTROPHILS NFR BLD: 44.4 %
PLATELET # BLD AUTO: 211 K/UL
PMV BLD AUTO: 11.6 FL
POTASSIUM SERPL-SCNC: 3.4 MMOL/L
PROT SERPL-MCNC: 7.3 G/DL
RBC # BLD AUTO: 4.94 M/UL
SATURATED IRON: 14 %
SODIUM SERPL-SCNC: 140 MMOL/L
TOTAL IRON BINDING CAPACITY: 293 UG/DL
TRANSFERRIN SERPL-MCNC: 198 MG/DL
TSH SERPL DL<=0.005 MIU/L-ACNC: 3.01 UIU/ML
VIT B12 SERPL-MCNC: 1834 PG/ML
WBC # BLD AUTO: 7.52 K/UL

## 2017-06-01 PROCEDURE — 84466 ASSAY OF TRANSFERRIN: CPT

## 2017-06-01 PROCEDURE — 84207 ASSAY OF VITAMIN B-6: CPT

## 2017-06-01 PROCEDURE — 82607 VITAMIN B-12: CPT

## 2017-06-01 PROCEDURE — 83540 ASSAY OF IRON: CPT

## 2017-06-01 PROCEDURE — 36415 COLL VENOUS BLD VENIPUNCTURE: CPT

## 2017-06-01 PROCEDURE — 85025 COMPLETE CBC W/AUTO DIFF WBC: CPT

## 2017-06-01 PROCEDURE — 84443 ASSAY THYROID STIM HORMONE: CPT

## 2017-06-01 PROCEDURE — 84425 ASSAY OF VITAMIN B-1: CPT

## 2017-06-01 PROCEDURE — 80053 COMPREHEN METABOLIC PANEL: CPT

## 2017-06-01 PROCEDURE — 82306 VITAMIN D 25 HYDROXY: CPT

## 2017-06-07 LAB — VIT B1 SERPL-MCNC: 68 UG/L (ref 38–122)

## 2017-06-08 ENCOUNTER — PATIENT MESSAGE (OUTPATIENT)
Dept: BARIATRICS | Facility: CLINIC | Age: 47
End: 2017-06-08

## 2017-06-08 LAB — PYRIDOXAL SERPL-MCNC: 84 UG/L (ref 5–50)

## 2017-06-13 ENCOUNTER — HOSPITAL ENCOUNTER (OUTPATIENT)
Facility: OTHER | Age: 47
Discharge: HOME | End: 2017-06-14
Attending: EMERGENCY MEDICINE
Payer: COMMERCIAL

## 2017-06-13 DIAGNOSIS — Z98.890 S/P GASTRIC SURGERY: ICD-10-CM

## 2017-06-13 DIAGNOSIS — R32 URINARY INCONTINENCE, UNSPECIFIED TYPE: Primary | ICD-10-CM

## 2017-06-13 DIAGNOSIS — R41.82 ALTERED MENTAL STATUS, UNSPECIFIED ALTERED MENTAL STATUS TYPE: ICD-10-CM

## 2017-06-13 DIAGNOSIS — R55 SYNCOPE: ICD-10-CM

## 2017-06-13 DIAGNOSIS — R55 SYNCOPE, UNSPECIFIED SYNCOPE TYPE: ICD-10-CM

## 2017-06-13 DIAGNOSIS — I10 ESSENTIAL HYPERTENSION: ICD-10-CM

## 2017-06-13 DIAGNOSIS — E87.6 HYPOKALEMIA: ICD-10-CM

## 2017-06-13 DIAGNOSIS — R40.20 LOSS OF CONSCIOUSNESS: ICD-10-CM

## 2017-06-13 LAB
ALBUMIN SERPL BCP-MCNC: 3.5 G/DL
ALP SERPL-CCNC: 77 U/L
ALT SERPL W/O P-5'-P-CCNC: 10 U/L
ANION GAP SERPL CALC-SCNC: 13 MMOL/L
AST SERPL-CCNC: 15 U/L
B-HCG UR QL: NEGATIVE
BASOPHILS # BLD AUTO: 0.01 K/UL
BASOPHILS NFR BLD: 0.1 %
BILIRUB SERPL-MCNC: 0.3 MG/DL
BUN SERPL-MCNC: 15 MG/DL
CALCIUM SERPL-MCNC: 9.2 MG/DL
CHLORIDE SERPL-SCNC: 105 MMOL/L
CO2 SERPL-SCNC: 21 MMOL/L
CREAT SERPL-MCNC: 0.8 MG/DL
CTP QC/QA: YES
DIFFERENTIAL METHOD: ABNORMAL
EOSINOPHIL # BLD AUTO: 0.1 K/UL
EOSINOPHIL NFR BLD: 0.7 %
ERYTHROCYTE [DISTWIDTH] IN BLOOD BY AUTOMATED COUNT: 13.5 %
EST. GFR  (AFRICAN AMERICAN): >60 ML/MIN/1.73 M^2
EST. GFR  (NON AFRICAN AMERICAN): >60 ML/MIN/1.73 M^2
GLUCOSE SERPL-MCNC: 129 MG/DL
HCT VFR BLD AUTO: 37.9 %
HGB BLD-MCNC: 12.5 G/DL
LYMPHOCYTES # BLD AUTO: 3.7 K/UL
LYMPHOCYTES NFR BLD: 40.5 %
MCH RBC QN AUTO: 26.5 PG
MCHC RBC AUTO-ENTMCNC: 33 %
MCV RBC AUTO: 81 FL
MONOCYTES # BLD AUTO: 0.6 K/UL
MONOCYTES NFR BLD: 6.6 %
NEUTROPHILS # BLD AUTO: 4.7 K/UL
NEUTROPHILS NFR BLD: 51.9 %
PLATELET # BLD AUTO: 203 K/UL
PMV BLD AUTO: 12 FL
POTASSIUM SERPL-SCNC: 2.9 MMOL/L
PROT SERPL-MCNC: 7.4 G/DL
RBC # BLD AUTO: 4.71 M/UL
SODIUM SERPL-SCNC: 139 MMOL/L
TSH SERPL DL<=0.005 MIU/L-ACNC: 3.57 UIU/ML
WBC # BLD AUTO: 9.06 K/UL

## 2017-06-13 PROCEDURE — 81025 URINE PREGNANCY TEST: CPT | Performed by: EMERGENCY MEDICINE

## 2017-06-13 PROCEDURE — 96365 THER/PROPH/DIAG IV INF INIT: CPT

## 2017-06-13 PROCEDURE — 80053 COMPREHEN METABOLIC PANEL: CPT

## 2017-06-13 PROCEDURE — 96361 HYDRATE IV INFUSION ADD-ON: CPT

## 2017-06-13 PROCEDURE — 93005 ELECTROCARDIOGRAM TRACING: CPT

## 2017-06-13 PROCEDURE — 96366 THER/PROPH/DIAG IV INF ADDON: CPT

## 2017-06-13 PROCEDURE — 84443 ASSAY THYROID STIM HORMONE: CPT

## 2017-06-13 PROCEDURE — 93010 ELECTROCARDIOGRAM REPORT: CPT | Mod: ,,, | Performed by: INTERNAL MEDICINE

## 2017-06-13 PROCEDURE — 25000003 PHARM REV CODE 250: Performed by: EMERGENCY MEDICINE

## 2017-06-13 PROCEDURE — 99285 EMERGENCY DEPT VISIT HI MDM: CPT | Mod: 25

## 2017-06-13 PROCEDURE — 81003 URINALYSIS AUTO W/O SCOPE: CPT

## 2017-06-13 PROCEDURE — 85025 COMPLETE CBC W/AUTO DIFF WBC: CPT

## 2017-06-13 RX ORDER — SODIUM CHLORIDE 9 MG/ML
500 INJECTION, SOLUTION INTRAVENOUS
Status: COMPLETED | OUTPATIENT
Start: 2017-06-13 | End: 2017-06-13

## 2017-06-13 RX ADMIN — SODIUM CHLORIDE 500 ML: 0.9 INJECTION, SOLUTION INTRAVENOUS at 10:06

## 2017-06-14 VITALS
TEMPERATURE: 98 F | HEART RATE: 68 BPM | HEIGHT: 64 IN | SYSTOLIC BLOOD PRESSURE: 129 MMHG | RESPIRATION RATE: 16 BRPM | OXYGEN SATURATION: 96 % | DIASTOLIC BLOOD PRESSURE: 76 MMHG | BODY MASS INDEX: 35.85 KG/M2 | WEIGHT: 210 LBS

## 2017-06-14 PROBLEM — R32 URINARY INCONTINENCE: Status: RESOLVED | Noted: 2017-06-14 | Resolved: 2017-06-14

## 2017-06-14 PROBLEM — R40.20 LOSS OF CONSCIOUSNESS: Status: ACTIVE | Noted: 2017-06-14

## 2017-06-14 PROBLEM — R55 SYNCOPE: Status: ACTIVE | Noted: 2017-06-14

## 2017-06-14 PROBLEM — E87.6 HYPOKALEMIA: Status: ACTIVE | Noted: 2017-06-14

## 2017-06-14 PROBLEM — R32 URINARY INCONTINENCE: Status: ACTIVE | Noted: 2017-06-14

## 2017-06-14 LAB
ANION GAP SERPL CALC-SCNC: 11 MMOL/L
BILIRUB UR QL STRIP: NEGATIVE
BUN SERPL-MCNC: 11 MG/DL
CALCIUM SERPL-MCNC: 8.9 MG/DL
CHLORIDE SERPL-SCNC: 107 MMOL/L
CLARITY UR: CLEAR
CO2 SERPL-SCNC: 22 MMOL/L
COLOR UR: YELLOW
CREAT SERPL-MCNC: 0.7 MG/DL
EST. GFR  (AFRICAN AMERICAN): >60 ML/MIN/1.73 M^2
EST. GFR  (NON AFRICAN AMERICAN): >60 ML/MIN/1.73 M^2
GLUCOSE SERPL-MCNC: 81 MG/DL
GLUCOSE UR QL STRIP: NEGATIVE
HGB UR QL STRIP: NEGATIVE
KETONES UR QL STRIP: NEGATIVE
LEUKOCYTE ESTERASE UR QL STRIP: NEGATIVE
NITRITE UR QL STRIP: NEGATIVE
PH UR STRIP: 5 [PH] (ref 5–8)
POTASSIUM SERPL-SCNC: 3.5 MMOL/L
PROT UR QL STRIP: NEGATIVE
SODIUM SERPL-SCNC: 140 MMOL/L
SP GR UR STRIP: <=1.005 (ref 1–1.03)
URN SPEC COLLECT METH UR: ABNORMAL
UROBILINOGEN UR STRIP-ACNC: NEGATIVE EU/DL

## 2017-06-14 PROCEDURE — 80048 BASIC METABOLIC PNL TOTAL CA: CPT

## 2017-06-14 PROCEDURE — 25500020 PHARM REV CODE 255: Performed by: INTERNAL MEDICINE

## 2017-06-14 PROCEDURE — 25000003 PHARM REV CODE 250: Performed by: INTERNAL MEDICINE

## 2017-06-14 PROCEDURE — 25000003 PHARM REV CODE 250: Performed by: EMERGENCY MEDICINE

## 2017-06-14 PROCEDURE — A9585 GADOBUTROL INJECTION: HCPCS | Performed by: INTERNAL MEDICINE

## 2017-06-14 PROCEDURE — 93306 TTE W/DOPPLER COMPLETE: CPT

## 2017-06-14 PROCEDURE — G0378 HOSPITAL OBSERVATION PER HR: HCPCS

## 2017-06-14 PROCEDURE — 99223 1ST HOSP IP/OBS HIGH 75: CPT | Mod: ,,, | Performed by: PSYCHIATRY & NEUROLOGY

## 2017-06-14 PROCEDURE — 99220 PR INITIAL OBSERVATION CARE,LEVL III: CPT | Mod: ,,, | Performed by: INTERNAL MEDICINE

## 2017-06-14 RX ORDER — ACETAMINOPHEN 325 MG/1
650 TABLET ORAL
Status: COMPLETED | OUTPATIENT
Start: 2017-06-14 | End: 2017-06-14

## 2017-06-14 RX ORDER — GLUCAGON 1 MG
1 KIT INJECTION
Status: DISCONTINUED | OUTPATIENT
Start: 2017-06-14 | End: 2017-06-14 | Stop reason: HOSPADM

## 2017-06-14 RX ORDER — IBUPROFEN 200 MG
16 TABLET ORAL
Status: DISCONTINUED | OUTPATIENT
Start: 2017-06-14 | End: 2017-06-14 | Stop reason: HOSPADM

## 2017-06-14 RX ORDER — ACETAMINOPHEN 500 MG
500 TABLET ORAL EVERY 6 HOURS PRN
COMMUNITY
End: 2021-10-10

## 2017-06-14 RX ORDER — POTASSIUM CHLORIDE 20 MEQ/15ML
60 SOLUTION ORAL
Status: DISCONTINUED | OUTPATIENT
Start: 2017-06-14 | End: 2017-06-14 | Stop reason: HOSPADM

## 2017-06-14 RX ORDER — ENOXAPARIN SODIUM 100 MG/ML
40 INJECTION SUBCUTANEOUS EVERY 24 HOURS
Status: DISCONTINUED | OUTPATIENT
Start: 2017-06-14 | End: 2017-06-14 | Stop reason: HOSPADM

## 2017-06-14 RX ORDER — IBUPROFEN 200 MG
24 TABLET ORAL
Status: DISCONTINUED | OUTPATIENT
Start: 2017-06-14 | End: 2017-06-14 | Stop reason: HOSPADM

## 2017-06-14 RX ORDER — PANTOPRAZOLE SODIUM 40 MG/1
40 TABLET, DELAYED RELEASE ORAL DAILY
Status: DISCONTINUED | OUTPATIENT
Start: 2017-06-14 | End: 2017-06-14 | Stop reason: HOSPADM

## 2017-06-14 RX ORDER — LOSARTAN POTASSIUM 50 MG/1
100 TABLET ORAL DAILY
Status: DISCONTINUED | OUTPATIENT
Start: 2017-06-14 | End: 2017-06-14 | Stop reason: HOSPADM

## 2017-06-14 RX ORDER — POTASSIUM CHLORIDE 20 MEQ/1
60 TABLET, EXTENDED RELEASE ORAL
Status: COMPLETED | OUTPATIENT
Start: 2017-06-14 | End: 2017-06-14

## 2017-06-14 RX ORDER — GADOBUTROL 604.72 MG/ML
9.5 INJECTION INTRAVENOUS
Status: COMPLETED | OUTPATIENT
Start: 2017-06-14 | End: 2017-06-14

## 2017-06-14 RX ADMIN — POTASSIUM CHLORIDE 60 MEQ: 1500 TABLET, EXTENDED RELEASE ORAL at 12:06

## 2017-06-14 RX ADMIN — ACETAMINOPHEN 650 MG: 325 TABLET ORAL at 02:06

## 2017-06-14 RX ADMIN — LOSARTAN POTASSIUM 100 MG: 50 TABLET, FILM COATED ORAL at 08:06

## 2017-06-14 RX ADMIN — GADOBUTROL 9.5 ML: 604.72 INJECTION INTRAVENOUS at 11:06

## 2017-06-14 RX ADMIN — PANTOPRAZOLE SODIUM 40 MG: 40 TABLET, DELAYED RELEASE ORAL at 08:06

## 2017-06-14 NOTE — SUBJECTIVE & OBJECTIVE
Past Medical History:   Diagnosis Date    Arthritis     GERD (gastroesophageal reflux disease)     Hypertension     Rotator cuff tear     Sciatica     Syncope 2016       Past Surgical History:   Procedure Laterality Date     SECTION      HERNIA REPAIR      HYSTERECTOMY      SLEEVE GASTROPLASTY  2014    TUBAL LIGATION         Review of patient's allergies indicates:  No Known Allergies    No current facility-administered medications on file prior to encounter.      Current Outpatient Prescriptions on File Prior to Encounter   Medication Sig    diethylpropion 75 mg TbSR Take 75 mg by mouth once daily.    hydrochlorothiazide (HYDRODIURIL) 25 MG tablet Take 12.5 mg by mouth.    losartan (COZAAR) 100 MG tablet     omeprazole (PRILOSEC) 40 MG capsule Take 1 capsule (40 mg total) by mouth once daily. (Patient taking differently: Take 40 mg by mouth daily as needed. )     Family History     Problem Relation (Age of Onset)    Cancer Mother, Brother    Heart disease Mother, Father    Hypertension Mother, Father, Sister, Brother    No Known Problems Daughter, Son    Stroke Father        Social History Main Topics    Smoking status: Never Smoker    Smokeless tobacco: Not on file    Alcohol use Yes    Drug use: No    Sexual activity: Yes     Partners: Male     Review of Systems   Constitutional: Negative for activity change, appetite change and fever.   HENT: Negative.    Eyes: Negative.    Respiratory: Negative for chest tightness, shortness of breath and wheezing.    Cardiovascular: Negative for chest pain, palpitations and leg swelling.   Gastrointestinal: Negative for abdominal distention, abdominal pain, blood in stool, diarrhea and vomiting.   Genitourinary: Negative for dysuria and hematuria.   Neurological: Positive for dizziness and numbness. Negative for headaches.   Hematological: Negative for adenopathy.   Psychiatric/Behavioral: Negative for confusion.     Objective:     Vital Signs  (Most Recent):  Temp: 98.2 °F (36.8 °C) (06/13/17 2207)  Pulse: 76 (06/13/17 2314)  Resp: 18 (06/13/17 2245)  BP: 116/86 (06/13/17 2314)  SpO2: 99 % (06/13/17 2314) Vital Signs (24h Range):  Temp:  [98.2 °F (36.8 °C)] 98.2 °F (36.8 °C)  Pulse:  [76-86] 76  Resp:  [18] 18  SpO2:  [95 %-99 %] 99 %  BP: (116-140)/(65-86) 116/86     Weight: 95.3 kg (210 lb)  Body mass index is 36.05 kg/m².    Physical Exam   Constitutional: She is oriented to person, place, and time. She appears well-developed and well-nourished. No distress.   HENT:   Head: Normocephalic and atraumatic.   Eyes: Pupils are equal, round, and reactive to light.   Neck: Neck supple. No thyromegaly present.   Cardiovascular: Normal rate and regular rhythm.  Exam reveals no gallop and no friction rub.    No murmur heard.  Pulmonary/Chest: Effort normal and breath sounds normal. No respiratory distress. She has no wheezes.   Abdominal: Soft. Bowel sounds are normal. She exhibits no distension. There is no tenderness. There is no guarding.   Musculoskeletal: Normal range of motion. She exhibits no edema.   Neurological: She is alert and oriented to person, place, and time.   Skin: Skin is warm and dry. No erythema.   Psychiatric: She has a normal mood and affect.        Significant Labs:   CBC:   Recent Labs  Lab 06/13/17 2241   WBC 9.06   HGB 12.5   HCT 37.9          Significant Imaging: I have reviewed all pertinent imaging results/findings within the past 24 hours.

## 2017-06-14 NOTE — H&P
[facility] Hospital Medicine  History & Physical    Patient Name: Renea Mac  MRN: [id number]  Admission Date: 2017  Attending Physician: Jerzy Galvan MD   Primary Care Provider: Primary Doctor No         Patient information was obtained from patient and ER records.     Subjective:     Principal Problem:<principal problem not specified>    Chief Complaint:   Chief Complaint   Patient presents with    Fatigue     generalized weakness and dizziness x 30 minutes        HPI: This is a 47 year old female with past medical history of bariatric surgery and HTN who while eating today had an episode of dizziness followed by a loss of consciousness and urinary incontinence.  She states that while she had an episode of dizziness around one year ago, something like this has never happened.  She states that after she regained consciousness (which occurred within seconds) she did have numbness in her bilateral hands.  She denies any recent illness, chest pain, shortness of breath, headache, peripheral weakness, or any other associated complaints.  She denies any new medications other that a diet pill, diethylproprion.  No other complaints and she remains HD stable. Of note, CT done shows a focus concerning for possible vascular vs. Demyelinating process.     Past Medical History:   Diagnosis Date    Arthritis     GERD (gastroesophageal reflux disease)     Hypertension     Rotator cuff tear     Sciatica     Syncope 2016       Past Surgical History:   Procedure Laterality Date     SECTION      HERNIA REPAIR      HYSTERECTOMY      SLEEVE GASTROPLASTY  2014    TUBAL LIGATION         Review of patient's allergies indicates:  No Known Allergies    No current facility-administered medications on file prior to encounter.      Current Outpatient Prescriptions on File Prior to Encounter   Medication Sig    diethylpropion 75 mg TbSR Take 75 mg by mouth once daily.     hydrochlorothiazide (HYDRODIURIL) 25 MG tablet Take 12.5 mg by mouth.    losartan (COZAAR) 100 MG tablet     omeprazole (PRILOSEC) 40 MG capsule Take 1 capsule (40 mg total) by mouth once daily. (Patient taking differently: Take 40 mg by mouth daily as needed. )     Family History     Problem Relation (Age of Onset)    Cancer Mother, Brother    Heart disease Mother, Father    Hypertension Mother, Father, Sister, Brother    No Known Problems Daughter, Son    Stroke Father        Social History Main Topics    Smoking status: Never Smoker    Smokeless tobacco: Not on file    Alcohol use Yes    Drug use: No    Sexual activity: Yes     Partners: Male     Review of Systems   Constitutional: Negative for activity change, appetite change and fever.   HENT: Negative.    Eyes: Negative.    Respiratory: Negative for chest tightness, shortness of breath and wheezing.    Cardiovascular: Negative for chest pain, palpitations and leg swelling.   Gastrointestinal: Negative for abdominal distention, abdominal pain, blood in stool, diarrhea and vomiting.   Genitourinary: Negative for dysuria and hematuria.   Neurological: Positive for dizziness and numbness. Negative for headaches.   Hematological: Negative for adenopathy.   Psychiatric/Behavioral: Negative for confusion.     Objective:     Vital Signs (Most Recent):  Temp: 98.2 °F (36.8 °C) (06/13/17 2207)  Pulse: 76 (06/13/17 2314)  Resp: 18 (06/13/17 2245)  BP: 116/86 (06/13/17 2314)  SpO2: 99 % (06/13/17 2314) Vital Signs (24h Range):  Temp:  [98.2 °F (36.8 °C)] 98.2 °F (36.8 °C)  Pulse:  [76-86] 76  Resp:  [18] 18  SpO2:  [95 %-99 %] 99 %  BP: (116-140)/(65-86) 116/86     Weight: 95.3 kg (210 lb)  Body mass index is 36.05 kg/m².    Physical Exam   Constitutional: She is oriented to person, place, and time. She appears well-developed and well-nourished. No distress.   HENT:   Head: Normocephalic and atraumatic.   Eyes: Pupils are equal, round, and reactive to light.    Neck: Neck supple. No thyromegaly present.   Cardiovascular: Normal rate and regular rhythm.  Exam reveals no gallop and no friction rub.    No murmur heard.  Pulmonary/Chest: Effort normal and breath sounds normal. No respiratory distress. She has no wheezes.   Abdominal: Soft. Bowel sounds are normal. She exhibits no distension. There is no tenderness. There is no guarding.   Musculoskeletal: Normal range of motion. She exhibits no edema.   Neurological: She is alert and oriented to person, place, and time.   Skin: Skin is warm and dry. No erythema.   Psychiatric: She has a normal mood and affect.        Significant Labs:   CBC:   Recent Labs  Lab 06/13/17  2241   WBC 9.06   HGB 12.5   HCT 37.9          Significant Imaging: I have reviewed all pertinent imaging results/findings within the past 24 hours.    Assessment/Plan:     Hypokalemia    - replace          Loss of consciousness    - unclear etiology  - no concern for CVA but vasculitis vs. Demyelinating condition cannot be excluded, especially considering CT findings  - MRI in the morning  - ECHO          Essential hypertension    - continue losartan  - hold HCTZ          S/P gastric surgery    - no issues but on new diet pill given by MD at [facility].  - unlikely to be a culprit although dizziness is listed as an adverse reaction            VTE Risk Mitigation         Ordered     enoxaparin injection 40 mg  Daily     Route:  Subcutaneous        06/14/17 0042     Medium Risk of VTE  Once      06/14/17 0042     Place sequential compression device  Until discontinued      06/14/17 0042        Piotr Stern MD  Department of Hospital Medicine   [facility]

## 2017-06-14 NOTE — PLAN OF CARE
** CASE MANAGEMENT/ DISCHARGE NEEDS **     ** D/C ASSESSMENT **  Assessment complete. No discharge needs present at this time. No discharge needs verbalized by patient SPOUSE. All questions and concerns with admission were answered. Informed patient that case management would assist with any needs that arise.    CASE MANAGEMENT: PLEASE F/U AND REMAIN SUPPORTIVE IF NEEDS ARISE.        06/14/17 1100   Discharge Assessment   Assessment Type Discharge Planning Assessment   Confirmed/corrected address and phone number on facesheet? Yes   Assessment information obtained from? Caregiver  (patient in ECHO and MRI )   Expected Length of Stay (days) 1   Communicated expected length of stay with patient/caregiver yes   Prior to hospitilization cognitive status: Alert/Oriented   Prior to hospitalization functional status: Independent   Current cognitive status: Alert/Oriented   Current Functional Status: Independent   Arrived From home or self-care   Lives With spouse   Able to Return to Prior Arrangements yes   Is patient able to care for self after discharge? Yes   How many people do you have in your home that can help with your care after discharge? 1   Patient's perception of discharge disposition home or selfcare   Readmission Within The Last 30 Days no previous admission in last 30 days   Patient currently being followed by outpatient case management? No   Patient currently receives home health services? No   Does the patient currently use HME? No   Patient currently receives private duty nursing? No   Patient currently receives any other outside agency services? No   Equipment Currently Used at Home none   Do you have any problems affording any of your prescribed medications? No   Is the patient taking medications as prescribed? yes   Do you have any financial concerns preventing you from receiving the healthcare you need? No   Does the patient have transportation to healthcare appointments? Yes   Transportation  Available family or friend will provide   On Dialysis? No   Does the patient receive services at the [facility] Clinic? No   Are there any open cases? No   Discharge Plan A Home with family;Home   Patient/Family In Agreement With Plan yes

## 2017-06-14 NOTE — HPI
This is a 47 year old female with past medical history of bariatric surgery and HTN who while eating today had an episode of dizziness followed by a loss of consciousness and urinary incontinence.  She states that while she had an episode of dizziness around one year ago, something like this has never happened.  She states that after she regained consciousness (which occurred within seconds) she did have numbness in her bilateral hands.  She denies any recent illness, chest pain, shortness of breath, headache, peripheral weakness, or any other associated complaints.  She denies any new medications other that a diet pill, diethylproprion.  No other complaints and she remains HD stable. Of note, CT done shows a focus concerning for possible vascular vs. Demyelinating process.

## 2017-06-14 NOTE — ASSESSMENT & PLAN NOTE
The patient's symptoms of transient alteration in consciousness most likely represent a near syncope/syncope, most likely related to orthostatic hypotension.  Her neurological examination is otherwise intact and MRI scan of the brain done was normal.      Recommendations: Discussed with patient and her  regarding the MRI scan results.  She is neurologically stable and is advised follow-up with her primary care physician in a week or 2.  She may be discharged to home if medically stable.

## 2017-06-14 NOTE — ED NOTES
Pt resting comfortably on stretcher, respirations even and unlabored, in no acute distress. Bed locked in lowest position, side rails up x2, call light within reach.  at bedside. Will continue to monitor.

## 2017-06-14 NOTE — ED NOTES
Pt at iv site . No  swelling or redness to site. Iv removed and site changes. Pt aaox3 skin warm and dry.  equal.  at bedside.

## 2017-06-14 NOTE — SUBJECTIVE & OBJECTIVE
Past Medical History:   Diagnosis Date    Arthritis     GERD (gastroesophageal reflux disease)     Hypertension     Rotator cuff tear     Sciatica     Syncope 2016       Past Surgical History:   Procedure Laterality Date     SECTION      HERNIA REPAIR      HYSTERECTOMY      SLEEVE GASTROPLASTY  2014    TUBAL LIGATION         Review of patient's allergies indicates:  No Known Allergies    Current Neurological Medications: none     No current facility-administered medications on file prior to encounter.      Current Outpatient Prescriptions on File Prior to Encounter   Medication Sig    diethylpropion 75 mg TbSR Take 75 mg by mouth once daily.    hydrochlorothiazide (HYDRODIURIL) 25 MG tablet Take 12.5 mg by mouth once daily.     losartan (COZAAR) 100 MG tablet Take 100 mg by mouth once daily.     omeprazole (PRILOSEC) 40 MG capsule Take 1 capsule (40 mg total) by mouth once daily. (Patient taking differently: Take 40 mg by mouth daily as needed. )     Family History     Problem Relation (Age of Onset)    Cancer Mother, Brother    Heart disease Mother, Father    Hypertension Mother, Father, Sister, Brother    No Known Problems Daughter, Son    Stroke Father        Social History Main Topics    Smoking status: Never Smoker    Smokeless tobacco: Not on file    Alcohol use Yes    Drug use: No    Sexual activity: Yes     Partners: Male     Review of Systems   Constitutional: Negative.    HENT: Negative.  Negative for hearing loss.    Eyes: Negative.  Negative for visual disturbance.   Respiratory: Negative.  Negative for shortness of breath.    Cardiovascular: Negative.  Negative for chest pain and palpitations.   Gastrointestinal: Negative.    Genitourinary: Negative.    Musculoskeletal: Negative.  Negative for back pain, gait problem and neck pain.   Skin: Negative.    Neurological: Positive for syncope (Near-syncope). Negative for tremors, seizures, speech difficulty, weakness,  numbness and headaches.   Psychiatric/Behavioral: Negative.  Negative for confusion and decreased concentration.     Objective:     Vital Signs (Most Recent):  Temp: 98.1 °F (36.7 °C) (06/14/17 1300)  Pulse: 68 (06/14/17 1450)  Resp: 16 (06/14/17 1300)  BP: 129/76 (06/14/17 1450)  SpO2: 96 % (06/14/17 1450) Vital Signs (24h Range):  Temp:  [98.1 °F (36.7 °C)-98.3 °F (36.8 °C)] 98.1 °F (36.7 °C)  Pulse:  [62-86] 68  Resp:  [16-18] 16  SpO2:  [95 %-99 %] 96 %  BP: (114-140)/(59-86) 129/76     Weight: 95.3 kg (210 lb)  Body mass index is 36.05 kg/m².    Physical Exam   Constitutional: She appears well-developed and well-nourished.   HENT:   Head: Normocephalic and atraumatic.   Right Ear: Hearing normal.   Left Ear: Hearing normal.   Eyes: EOM are normal. Pupils are equal, round, and reactive to light.   Fundus examination showed sharp disc margins.   Neck: Normal range of motion. Neck supple. Carotid bruit is not present.   Neurological: She is alert. She has normal strength and normal reflexes. She displays no atrophy. No cranial nerve deficit (Visual fields at bedside testing essentially normal.  No facial asymmetry noted with facial movements and sensory exam being normal/symmetrical.  Corneals/gag reflexes normal.  Tongue & palate movements normal.  Shoulder shrug was normal.) or sensory deficit. She exhibits normal muscle tone. Coordination and gait normal. She displays no Babinski's sign on the right side. She displays no Babinski's sign on the left side.   Reflex Scores:       Tricep reflexes are 2+ on the right side and 2+ on the left side.       Bicep reflexes are 2+ on the right side and 2+ on the left side.       Brachioradialis reflexes are 2+ on the right side and 2+ on the left side.       Patellar reflexes are 2+ on the right side and 2+ on the left side.       Achilles reflexes are 2+ on the right side and 2+ on the left side.  Mental status examination: Patient is fully oriented and able to give an  adequate history.  Recall of recent and past information is good.  Immediate recall is normal.  Attention span and concentration was normal.  Judgment and insight is normal.  Language functions are intact with no evidence of aphasia or dysarthria.  Comprehension is unimpaired.  Affect is appropriate, mood was even.  No thought disorder is noted.   Vitals reviewed.      NEUROLOGICAL EXAMINATION:     CRANIAL NERVES     CN III, IV, VI   Pupils are equal, round, and reactive to light.  Extraocular motions are normal.     MOTOR EXAM     Strength   Strength 5/5 throughout.     REFLEXES     Reflexes   Right brachioradialis: 2+  Left brachioradialis: 2+  Right biceps: 2+  Left biceps: 2+  Right triceps: 2+  Left triceps: 2+  Right patellar: 2+  Left patellar: 2+  Right achilles: 2+  Left achilles: 2+      Significant Labs: All pertinent lab results from the past 24 hours have been reviewed.    Significant Imaging: I have reviewed all pertinent imaging results/findings within the past 24 hours.

## 2017-06-14 NOTE — DISCHARGE SUMMARY
Discharge Summary  Hospital Medicine      Admit Date: 6/13/2017    Discharge Date and Time: 6/14/2017 2:24 PM    Discharge Attending Physician: Sanjay Srivastava MD     Diagnoses:  Active Hospital Problems    Diagnosis  POA    *Loss of consciousness [R40.20]  No    Hypokalemia [E87.6]  No    S/P gastric surgery [Z98.890]  Not Applicable    Essential hypertension [I10]  Yes      Resolved Hospital Problems    Diagnosis Date Resolved POA    Urinary incontinence [R32] 06/14/2017 Yes       Discharged Condition: stable    Hospital Course: Renea Mac is a nice 48 yo lady with a history of gastric sleeve surgery and HTN who presents with an episode of near fainting. She was sitting kitchen table after a birthday party during which she had sweets and cake. She suddenly felt dizzy and called her . He reports she slumped over on the kitchen table and was non responsive for about 2-3 minutes. She lost bladder control and vomited. She never went limb or had any seizure like activity. Once the episode resolved the pt. Was responsive and oriented.   EMS was called and the pt. Brought to the ED.     1: Near Syncope  - etiology unclear   - vasovagal vs. Dumping syndrome possibility (less likely with gastric sleeve).    - CT showed abnormal findings, however MRI of the brain did not confirm and is normal.    - Pt. Sx have resolved and are stable since admission   - pt. Safe to discharge home   - have suggested to schedule follow up with Dr. Alonso and she will make an appt.     Consults: None    Significant Diagnostic Studies:   CBC:     Recent Labs  Lab 06/13/17  2241   WBC 9.06   RBC 4.71   HGB 12.5   HCT 37.9      MCV 81*   MCH 26.5*   MCHC 33.0       CMP:     Recent Labs  Lab 06/13/17  2241 06/14/17  0527   * 81   CALCIUM 9.2 8.9   ALBUMIN 3.5  --    PROT 7.4  --     140   K 2.9* 3.5   CO2 21* 22*    107   BUN 15 11   CREATININE 0.8 0.7   ALKPHOS 77  --    ALT 10  --    AST 15  --     BILITOT 0.3  --        Special Treatments/Procedures: none    Disposition: Home or Self Care    Diet: low carb    Activity: as tolerated    Patient Instructions:   Reconciled Home Medications:   Current Discharge Medication List      CONTINUE these medications which have NOT CHANGED    Details   acetaminophen (TYLENOL) 500 MG tablet Take 500 mg by mouth every 6 (six) hours as needed for Pain.      diethylpropion 75 mg TbSR Take 75 mg by mouth once daily.  Qty: 31 tablet, Refills: 0    Associated Diagnoses: Severe obesity (BMI 35.0-39.9) with comorbidity      hydrochlorothiazide (HYDRODIURIL) 25 MG tablet Take 12.5 mg by mouth once daily.       losartan (COZAAR) 100 MG tablet Take 100 mg by mouth once daily.       omeprazole (PRILOSEC) 40 MG capsule Take 1 capsule (40 mg total) by mouth once daily.  Qty: 30 capsule, Refills: 2               Discharge Procedure Orders  Diet general     Activity as tolerated     Call MD for:  temperature >100.4     Call MD for:  severe uncontrolled pain     Call MD for:  difficulty breathing or increased cough     Call MD for:  severe persistent headache     Call MD for:  increased confusion or weakness     No dressing needed         Follow-up Information     Primary Doctor No.    Why:  make an appointment with a PCP ASAP           Schedule an appointment as soon as possible for a visit with Sterling Alonso MD.    Specialty:  Neurology  Why:  As needed  Contact information:  [address]  [address]  [facility] [address] [phone]

## 2017-06-14 NOTE — CONSULTS
[facility]  Neurology  Consult Note    Patient Name: Renea Mac  MRN: [id number]  Admission Date: 2017  Hospital Length of Stay: 0 days  Code Status: Full Code   Attending Provider: Sanjay Srivastava MD   Consulting Provider: Sterling Alonso MD  Primary Care Physician: Primary Doctor No  Principal Problem:Loss of consciousness    Consults   Subjective:     Chief Complaint:  Transient loss of consciousness     HPI:   This is a 47-year-old -American female who presented to the emergency room after having had an episode of transient loss of consciousness last night.  The patients  was present today.  It is reported that she was seated at the kitchen table and then stood up and became briefly unresponsive as if in a daze, though she did not fall to the ground.  She had incontinence of urine and then subsequently she threw up following which she recovered spontaneously and was back to her usual baseline.  Initially when seen at the emergency room she was complaining of tingling affecting all fingers but this has resolved.  She denies any abdominal pain, chest pain, headache or visual difficulties.  She does admit being under stress.  The patient is status post a gastric sleeve surgery in  and did report having had a syncopal episode about a year ago related to low blood pressure.  At this time she has no neurological symptoms.  An MRI scan of the brain done following admission was normal.       Past Medical History:   Diagnosis Date    Arthritis     GERD (gastroesophageal reflux disease)     Hypertension     Rotator cuff tear     Sciatica     Syncope 2016       Past Surgical History:   Procedure Laterality Date     SECTION      HERNIA REPAIR      HYSTERECTOMY      SLEEVE GASTROPLASTY  2014    TUBAL LIGATION         Review of patient's allergies indicates:  No Known Allergies    Current Neurological Medications: none     No current  facility-administered medications on file prior to encounter.      Current Outpatient Prescriptions on File Prior to Encounter   Medication Sig    diethylpropion 75 mg TbSR Take 75 mg by mouth once daily.    hydrochlorothiazide (HYDRODIURIL) 25 MG tablet Take 12.5 mg by mouth once daily.     losartan (COZAAR) 100 MG tablet Take 100 mg by mouth once daily.     omeprazole (PRILOSEC) 40 MG capsule Take 1 capsule (40 mg total) by mouth once daily. (Patient taking differently: Take 40 mg by mouth daily as needed. )     Family History     Problem Relation (Age of Onset)    Cancer Mother, Brother    Heart disease Mother, Father    Hypertension Mother, Father, Sister, Brother    No Known Problems Daughter, Son    Stroke Father        Social History Main Topics    Smoking status: Never Smoker    Smokeless tobacco: Not on file    Alcohol use Yes    Drug use: No    Sexual activity: Yes     Partners: Male     Review of Systems   Constitutional: Negative.    HENT: Negative.  Negative for hearing loss.    Eyes: Negative.  Negative for visual disturbance.   Respiratory: Negative.  Negative for shortness of breath.    Cardiovascular: Negative.  Negative for chest pain and palpitations.   Gastrointestinal: Negative.    Genitourinary: Negative.    Musculoskeletal: Negative.  Negative for back pain, gait problem and neck pain.   Skin: Negative.    Neurological: Positive for syncope (Near-syncope). Negative for tremors, seizures, speech difficulty, weakness, numbness and headaches.   Psychiatric/Behavioral: Negative.  Negative for confusion and decreased concentration.     Objective:     Vital Signs (Most Recent):  Temp: 98.1 °F (36.7 °C) (06/14/17 1300)  Pulse: 68 (06/14/17 1450)  Resp: 16 (06/14/17 1300)  BP: 129/76 (06/14/17 1450)  SpO2: 96 % (06/14/17 1450) Vital Signs (24h Range):  Temp:  [98.1 °F (36.7 °C)-98.3 °F (36.8 °C)] 98.1 °F (36.7 °C)  Pulse:  [62-86] 68  Resp:  [16-18] 16  SpO2:  [95 %-99 %] 96 %  BP:  (114-140)/(59-86) 129/76     Weight: 95.3 kg (210 lb)  Body mass index is 36.05 kg/m².    Physical Exam   Constitutional: She appears well-developed and well-nourished.   HENT:   Head: Normocephalic and atraumatic.   Right Ear: Hearing normal.   Left Ear: Hearing normal.   Eyes: EOM are normal. Pupils are equal, round, and reactive to light.   Fundus examination showed sharp disc margins.   Neck: Normal range of motion. Neck supple. Carotid bruit is not present.   Neurological: She is alert. She has normal strength and normal reflexes. She displays no atrophy. No cranial nerve deficit (Visual fields at bedside testing essentially normal.  No facial asymmetry noted with facial movements and sensory exam being normal/symmetrical.  Corneals/gag reflexes normal.  Tongue & palate movements normal.  Shoulder shrug was normal.) or sensory deficit. She exhibits normal muscle tone. Coordination and gait normal. She displays no Babinski's sign on the right side. She displays no Babinski's sign on the left side.   Reflex Scores:       Tricep reflexes are 2+ on the right side and 2+ on the left side.       Bicep reflexes are 2+ on the right side and 2+ on the left side.       Brachioradialis reflexes are 2+ on the right side and 2+ on the left side.       Patellar reflexes are 2+ on the right side and 2+ on the left side.       Achilles reflexes are 2+ on the right side and 2+ on the left side.  Mental status examination: Patient is fully oriented and able to give an adequate history.  Recall of recent and past information is good.  Immediate recall is normal.  Attention span and concentration was normal.  Judgment and insight is normal.  Language functions are intact with no evidence of aphasia or dysarthria.  Comprehension is unimpaired.  Affect is appropriate, mood was even.  No thought disorder is noted.   Vitals reviewed.      NEUROLOGICAL EXAMINATION:     CRANIAL NERVES     CN III, IV, VI   Pupils are equal, round, and  reactive to light.  Extraocular motions are normal.     MOTOR EXAM     Strength   Strength 5/5 throughout.     REFLEXES     Reflexes   Right brachioradialis: 2+  Left brachioradialis: 2+  Right biceps: 2+  Left biceps: 2+  Right triceps: 2+  Left triceps: 2+  Right patellar: 2+  Left patellar: 2+  Right achilles: 2+  Left achilles: 2+      Significant Labs: All pertinent lab results from the past 24 hours have been reviewed.    Significant Imaging: I have reviewed all pertinent imaging results/findings within the past 24 hours.    Assessment and Plan:     * Loss of consciousness    The patient's symptoms of transient alteration in consciousness most likely represent a near syncope/syncope, most likely related to orthostatic hypotension.  Her neurological examination is otherwise intact and MRI scan of the brain done was normal.      Recommendations: Discussed with patient and her  regarding the MRI scan results.  She is neurologically stable and is advised follow-up with her primary care physician in a week or 2.  She may be discharged to home if medically stable.                VTE Risk Mitigation         Ordered     enoxaparin injection 40 mg  Daily     Route:  Subcutaneous        06/14/17 0042     Medium Risk of VTE  Once      06/14/17 0042     Place sequential compression device  Until discontinued      06/14/17 0042          Thank you for your consult. I will sign off. Please contact us if you have any additional questions.    Sterling Alonso MD  Neurology  [facility]

## 2017-06-14 NOTE — HOSPITAL COURSE
Since being in the emergency room she has been asymptomatic and is back to her usual baseline.  Results of the MRI scan were reviewed with patient and .

## 2017-06-14 NOTE — ED TRIAGE NOTES
"Pt presents to ED with c/o episode of dizziness, n/v, "zone out" PTA and  reports pt was not responding to him and incontinent of urine.  denies LOC, states "she was just zoned out". Pt reports Hx of HTN and GERD. Pt reports she was started on weight loss medication aprox 2 months, Hx of gastric sleeve. Pt reports pt has similar situation when her BP was low. Pt AAO x3, answering questions appropriately.   "

## 2017-06-14 NOTE — PLAN OF CARE
06/14/17 1631   Final Note   Assessment Type Final Discharge Note   Discharge Disposition Home     PATIENT CLEAR FOR D/C PER NEURO AND HOSPITAL MEDICINE MD.

## 2017-06-14 NOTE — ASSESSMENT & PLAN NOTE
- no issues but on new diet pill given by MD at main [place].  - unlikely to be a culprit although dizziness is listed as an adverse reaction

## 2017-06-14 NOTE — ED NOTES
Pt back from echo and mri. Pt states iv  Sore.  No redness swelling noted. Pt will try iv fluid for few min.  To see if  Clears.

## 2017-06-14 NOTE — HPI
This is a 47-year-old -American female who presented to the emergency room after having had an episode of transient loss of consciousness last night.  The patients  was present today.  It is reported that she was seated at the kitchen table and then stood up and became briefly unresponsive as if in a daze, though she did not fall to the ground.  She had incontinence of urine and then subsequently she threw up following which she recovered spontaneously and was back to her usual baseline.  Initially when seen at the emergency room she was complaining of tingling affecting all fingers but this has resolved.  She denies any abdominal pain, chest pain, headache or visual difficulties.  She does admit being under stress.  The patient is status post a gastric sleeve surgery in 2014 and did report having had a syncopal episode about a year ago related to low blood pressure.  At this time she has no neurological symptoms.  An MRI scan of the brain done following admission was normal.

## 2017-06-14 NOTE — ED PROVIDER NOTES
"Encounter Date: 2017    SCRIBE #1 NOTE: I, Blanca Patiño , am scribing for, and in the presence of, Dr. Galvan.       History     Chief Complaint   Patient presents with    Fatigue     generalized weakness and dizziness x 30 minutes     Review of patient's allergies indicates:  No Known Allergies  Time seen by provider: 10:19 PM    This is a 47 y.o. female, with history of HTN and GERD, who presents with complaint of near-syncope. Symptoms began PTA. Abrupt onset of dizziness occurred while the patient was sitting at a table. She experienced urinary incontinence and emesis shortly after. Spouse notes the patient "stared blankly" and was unable to move during the episode. He denies LOC. She currently complains of fatigue and "tingling" that affects all fingers, but denies fever, chills, nausea, abdominal pain, chest pain, head trauma, SOB, myalgias, or headache. She reports an increased amount of stress. Pt is compliant with HTN medication, and denies recent medication changes. She compares symptoms as similar to an syncopal episode that occurred one year ago. She denies use of alcohol or illicit drugs.       The history is provided by the patient and the spouse.     Past Medical History:   Diagnosis Date    Arthritis     GERD (gastroesophageal reflux disease)     Hypertension     Rotator cuff tear     Sciatica     Syncope 2016     Past Surgical History:   Procedure Laterality Date     SECTION      HERNIA REPAIR      HYSTERECTOMY      SLEEVE GASTROPLASTY  2014    TUBAL LIGATION       Family History   Problem Relation Age of Onset    Cancer Mother     Hypertension Mother     Heart disease Mother     Hypertension Father     Heart disease Father     Stroke Father     Hypertension Sister     Hypertension Brother     Cancer Brother     No Known Problems Daughter     No Known Problems Son      Social History   Substance Use Topics    Smoking status: Never Smoker    Smokeless " "tobacco: Not on file    Alcohol use Yes     Review of Systems   Constitutional: Positive for fatigue. Negative for chills and fever.   HENT: Negative for sore throat.    Respiratory: Negative for shortness of breath.    Cardiovascular: Negative for chest pain.   Gastrointestinal: Positive for vomiting. Negative for abdominal pain and nausea.   Genitourinary: Negative for dysuria.        Positive for incontinence.    Musculoskeletal: Negative for back pain and myalgias.        Negative for head trauma.    Skin: Negative for rash.   Neurological: Positive for dizziness. Negative for weakness and headaches.        Positive for near-syncope. Positive for "tingling" sensation that affects all fingers.    Hematological: Does not bruise/bleed easily.       Physical Exam     Initial Vitals [06/13/17 2207]   BP Pulse Resp Temp SpO2   (!) 140/65 86 18 98.2 °F (36.8 °C) 95 %     Physical Exam    Nursing note and vitals reviewed.  Constitutional: She appears well-developed and well-nourished. She is not diaphoretic. No distress.   HENT:   Head: Normocephalic and atraumatic.   Right Ear: External ear normal.   Left Ear: External ear normal.   Eyes: EOM are normal. Pupils are equal, round, and reactive to light. Right eye exhibits no discharge. Left eye exhibits no discharge.   Neck: Normal range of motion.   Cardiovascular: Normal rate, regular rhythm and normal heart sounds. Exam reveals no gallop and no friction rub.    No murmur heard.  Pulmonary/Chest: Breath sounds normal. No respiratory distress. She has no wheezes. She has no rhonchi. She has no rales.   Abdominal: Soft. There is no tenderness. There is no rebound and no guarding.   Musculoskeletal: Normal range of motion. She exhibits no edema or tenderness.   Neurological: She is alert and oriented to person, place, and time.   Cranial nerves II through XII grossly intact.  5/5 motor strength all 4 extremities.  Sensation is normal.  Finger to nose normal.  Gait " normal.  Speech and cognition is normal.  No focal neurologic deficit.   Skin: Skin is warm and dry. No rash and no abscess noted. No erythema. No pallor.   Psychiatric: She has a normal mood and affect. Her behavior is normal. Judgment and thought content normal.         ED Course   Procedures  Labs Reviewed   COMPREHENSIVE METABOLIC PANEL - Abnormal; Notable for the following:        Result Value    Potassium 2.9 (*)     CO2 21 (*)     Glucose 129 (*)     All other components within normal limits   CBC W/ AUTO DIFFERENTIAL - Abnormal; Notable for the following:     MCV 81 (*)     MCH 26.5 (*)     All other components within normal limits   URINALYSIS - Abnormal; Notable for the following:     Specific Gravity, UA <=1.005 (*)     All other components within normal limits   TSH   BASIC METABOLIC PANEL   POCT URINE PREGNANCY     Imaging Results          CT Head Without Contrast (Final result)  Result time 06/13/17 23:33:08    Final result by Karyn Hoskins MD (06/13/17 23:33:08)                 Impression:        No acute large vascular territory infarct or intracranial hemorrhage identified.      Right frontal lobe subcortical white matter nonspecific small low attenuation focus, with differential considerations above.      Electronically signed by: KARYN HOSKINS MD, MD  Date:     06/13/17  Time:    23:33              Narrative:    COMPARISON: None    FINDINGS: The brain appears normally formed without evidence of hemorrhage, mass, midline shift or acute major vascular territory infarct.  Gray-white interface appears intact.  Small focus of nonspecific low attenuation within the inferior right frontal lobe subcortical white matter. Differential considerations could include sequela of demyelinating disease, chronic migraines, vasculitis, infection including Lyme disease, or less likely chronic small vessel ischemic change in this age group. The ventricular system is normal in size for age and demonstrates no  distortion by mass effect.      No extra-axial hemorrhage or mass. The extracranial structures are within normal limits.  No displaced calvarial fracture.  Imaged portions of the paranasal sinuses and mastoid air cells are clear. Imaged portions of the orbits are within normal limits.                              EKG Readings: (Independently Interpreted)   Initial Reading: No STEMI.   Normal sinus rhythm at a rate of 69 bpm.           Medical Decision Making:   Clinical Tests:   Lab Tests: Ordered and Reviewed  Radiological Study: Ordered and Reviewed  Medical Tests: Ordered and Reviewed  ED Management:  Patient with abnormal altered mental status event including symptoms of urinary incontinence.  No obvious inciting factors.  Reports one prior syncopal event, but this was different.  EKG has no signs of arrhythmia, no long QT, no WPW, no CMT, no ischemia, no Brugada.  Blood work reveals significant hypokalemia at 2.9, however this does not appear consistent with a periodic paralysis hypokalemia event.  I will replace with by mouth potassium here.  Patient's CAT scan is not entirely normal, has a focus, concerning for possible demyelinating disease.  Presentation is not entirely fit MS, however the urinary symptoms, visual disturbances are concerning.  I will admit for further workup to include MRI.    BASSEM Galvan M.D.  06/14/2017  4:24 AM      11:49 PM- Discussed and consulted case with mary jane, who will admit the patient.             Scribe Attestation:   Scribe #1: I performed the above scribed service and the documentation accurately describes the services I performed. I attest to the accuracy of the note.    Attending Attestation:           Physician Attestation for Scribe:  Physician Attestation Statement for Scribe #1: I, Dr. Galvan, reviewed documentation, as scribed by Blanca Patiño  in my presence, and it is both accurate and complete.                 ED Course   Value Comment By Time    Potassium: (!) 2.9 (Reviewed) Jerzy Galvan MD 06/13 5021     Clinical Impression:     1. Urinary incontinence, unspecified type    2. Altered mental status, unspecified altered mental status type    3. Syncope                Jerzy Galvan MD  06/14/17 1765

## 2017-06-15 ENCOUNTER — OFFICE VISIT (OUTPATIENT)
Dept: INTERNAL MEDICINE | Facility: CLINIC | Age: 47
End: 2017-06-15
Payer: COMMERCIAL

## 2017-06-15 VITALS
HEIGHT: 64 IN | DIASTOLIC BLOOD PRESSURE: 66 MMHG | SYSTOLIC BLOOD PRESSURE: 110 MMHG | HEART RATE: 83 BPM | BODY MASS INDEX: 36.55 KG/M2 | WEIGHT: 214.06 LBS | OXYGEN SATURATION: 96 %

## 2017-06-15 DIAGNOSIS — R55 SYNCOPE, UNSPECIFIED SYNCOPE TYPE: Primary | ICD-10-CM

## 2017-06-15 DIAGNOSIS — Z98.890 S/P GASTRIC SURGERY: ICD-10-CM

## 2017-06-15 DIAGNOSIS — I10 ESSENTIAL HYPERTENSION: ICD-10-CM

## 2017-06-15 DIAGNOSIS — E87.6 HYPOKALEMIA: ICD-10-CM

## 2017-06-15 PROCEDURE — 99214 OFFICE O/P EST MOD 30 MIN: CPT | Mod: S$GLB,,, | Performed by: NURSE PRACTITIONER

## 2017-06-15 PROCEDURE — 99999 PR PBB SHADOW E&M-EST. PATIENT-LVL V: CPT | Mod: PBBFAC,,, | Performed by: NURSE PRACTITIONER

## 2017-06-15 NOTE — PROGRESS NOTES
"INTERNAL MEDICINE PROGRESS/URGENT CARE NOTE    CHIEF COMPLAINT     Chief Complaint   Patient presents with    Hospital Follow Up     ED visit 6/13; dizzy, bladder incont., vomitting       HPI     Renea Mac is a 47 y.o. female who presents for an ER follow up visit today.    Pt had an episode on Tuesday evening (2 days ago) where she was sitting at a table with family when she had sudden onset of blurry vision, dizziness, muffled hearing, hot flash, and loss of consciousness. Family reports that during this time her eyes remained open, but she was unresponsive and rigidly sitting at the table. The patient then began to urinate and vomited. Upon vomiting, she regained consciousness and realized she was urinating but was unable to stop it. After the episode she had tingling in both of her hands. She denies dizziness upon regaining consciousness and denies fatigue but does endorse feeling weak. Reports eating cake and ice cream prior to episode. Denies change in dietary habits. Had taken her appetite suppressant Tuesday morning and not since. Patient reports having a similar episode approximately one year ago but without the urination and emesis, she was not taking appetite suppressant at the time but did have a change in diet. Patient has history of gastric sleeve surgery in 2014.     Patient was taken to the ER where she had a thorough work up. EKG was normal, ECHO is still pending, CT scan had shown one small area of concern which was ruled out by neurology after MRI was normal.     Was dx home and told to f/u with PCP     Since d/c pt reports fatigue and "weird feeling". Not presyncopal. No chest pain heart palpitations. No dizziness or lightheadedness.     Past Medical History:  Past Medical History:   Diagnosis Date    Arthritis     GERD (gastroesophageal reflux disease)     Hypertension     Rotator cuff tear     Sciatica     Syncope 06/2016       Home Medications:  Prior to Admission medications  " "  Medication Sig Start Date End Date Taking? Authorizing Provider   hydrochlorothiazide (HYDRODIURIL) 25 MG tablet Take 12.5 mg by mouth once daily.    Yes Historical Provider, MD   losartan (COZAAR) 100 MG tablet Take 100 mg by mouth once daily.  9/4/16  Yes Historical Provider, MD   acetaminophen (TYLENOL) 500 MG tablet Take 500 mg by mouth every 6 (six) hours as needed for Pain.    Historical Provider, MD   diethylpropion 75 mg TbSR Take 75 mg by mouth once daily. 5/26/17   Kimberlee Washburn MD   omeprazole (PRILOSEC) 40 MG capsule Take 1 capsule (40 mg total) by mouth once daily.  Patient taking differently: Take 40 mg by mouth daily as needed.  11/4/16 6/14/17  Quintin Rodgers MD       Review of Systems:  Review of Systems   Constitutional: Positive for fatigue. Negative for chills, diaphoresis and fever.        A small increase in fatigue   HENT: Negative for congestion, facial swelling and sinus pressure.    Eyes: Negative for pain and visual disturbance.   Respiratory: Negative for chest tightness, shortness of breath and wheezing.    Cardiovascular: Negative for chest pain and palpitations.   Gastrointestinal: Negative for nausea.   Endocrine: Positive for cold intolerance.   Genitourinary: Negative for difficulty urinating.   Musculoskeletal: Negative for gait problem.   Neurological: Positive for dizziness. Negative for numbness.        Slight dizziness, feels "off"   Psychiatric/Behavioral: Negative for confusion.    Health Maintainence:   Immunizations:  Health Maintenance       Date Due Completion Date    Lipid Panel 1972 ---    TETANUS VACCINE 03/28/1990 ---    Mammogram 03/28/2012 ---    Influenza Vaccine 08/01/2017 ---           PHYSICAL EXAM     /66 (BP Location: Right arm, Patient Position: Standing, BP Method: Manual)   Pulse 83   Ht 5' 4" (1.626 m)   Wt 97.1 kg (214 lb 1.1 oz)   SpO2 96%   BMI 36.74 kg/m²     Physical Exam   Constitutional: She is oriented to person, " place, and time. She appears well-developed and well-nourished. No distress.   HENT:   Head: Normocephalic.   Right Ear: Hearing, tympanic membrane and external ear normal.   Left Ear: Hearing, tympanic membrane and external ear normal.   Mouth/Throat: Oropharynx is clear and moist and mucous membranes are normal.   Eyes: Conjunctivae, EOM and lids are normal. Pupils are equal, round, and reactive to light.   Neck: Normal range of motion. Neck supple. No JVD present.   Cardiovascular: Normal rate, regular rhythm, normal heart sounds and intact distal pulses.    Pulses:       Carotid pulses are 2+ on the right side, and 2+ on the left side.       Radial pulses are 2+ on the right side, and 2+ on the left side.        Dorsalis pedis pulses are 2+ on the right side, and 2+ on the left side.   Pulmonary/Chest: Effort normal and breath sounds normal.   Abdominal: Soft. Bowel sounds are normal.   Musculoskeletal: Normal range of motion.   Lymphadenopathy:     She has no cervical adenopathy.   Neurological: She is alert and oriented to person, place, and time. She has normal strength. No cranial nerve deficit or sensory deficit. She displays a negative Romberg sign.   Skin: Skin is warm and dry. Capillary refill takes less than 2 seconds. She is not diaphoretic.   Psychiatric: She has a normal mood and affect. Her behavior is normal. Thought content normal.     Orthostatics WNL, in chart     LABS     No results found for: LABA1C, HGBA1C  CMP  Sodium   Date Value Ref Range Status   06/14/2017 140 136 - 145 mmol/L Final     Potassium   Date Value Ref Range Status   06/14/2017 3.5 3.5 - 5.1 mmol/L Final     Chloride   Date Value Ref Range Status   06/14/2017 107 95 - 110 mmol/L Final     CO2   Date Value Ref Range Status   06/14/2017 22 (L) 23 - 29 mmol/L Final     Glucose   Date Value Ref Range Status   06/14/2017 81 70 - 110 mg/dL Final     BUN, Bld   Date Value Ref Range Status   06/14/2017 11 6 - 20 mg/dL Final      Creatinine   Date Value Ref Range Status   06/14/2017 0.7 0.5 - 1.4 mg/dL Final     Calcium   Date Value Ref Range Status   06/14/2017 8.9 8.7 - 10.5 mg/dL Final     Total Protein   Date Value Ref Range Status   06/13/2017 7.4 6.0 - 8.4 g/dL Final     Albumin   Date Value Ref Range Status   06/13/2017 3.5 3.5 - 5.2 g/dL Final     Total Bilirubin   Date Value Ref Range Status   06/13/2017 0.3 0.1 - 1.0 mg/dL Final     Comment:     For infants and newborns, interpretation of results should be based  on gestational age, weight and in agreement with clinical  observations.  Premature Infant recommended reference ranges:  Up to 24 hours.............<8.0 mg/dL  Up to 48 hours............<12.0 mg/dL  3-5 days..................<15.0 mg/dL  6-29 days.................<15.0 mg/dL       Alkaline Phosphatase   Date Value Ref Range Status   06/13/2017 77 55 - 135 U/L Final     AST   Date Value Ref Range Status   06/13/2017 15 10 - 40 U/L Final     ALT   Date Value Ref Range Status   06/13/2017 10 10 - 44 U/L Final     Anion Gap   Date Value Ref Range Status   06/14/2017 11 8 - 16 mmol/L Final     eGFR if    Date Value Ref Range Status   06/14/2017 >60 >60 mL/min/1.73 m^2 Final     eGFR if non    Date Value Ref Range Status   06/14/2017 >60 >60 mL/min/1.73 m^2 Final     Comment:     Calculation used to obtain the estimated glomerular filtration  rate (eGFR) is the CKD-EPI equation. Since race is unknown   in our information system, the eGFR values for   -American and Non--American patients are given   for each creatinine result.       Lab Results   Component Value Date    WBC 9.06 06/13/2017    HGB 12.5 06/13/2017    HCT 37.9 06/13/2017    MCV 81 (L) 06/13/2017     06/13/2017     No results found for: CHOL  No results found for: HDL  No results found for: LDLCALC  No results found for: TRIG  No results found for: CHOLHDL  Lab Results   Component Value Date    TSH 3.573  "06/13/2017       ASSESSMENT/PLAN     Renea Mac is a 47 y.o. female with  Past Medical History:   Diagnosis Date    Arthritis     GERD (gastroesophageal reflux disease)     Hypertension     Rotator cuff tear     Sciatica     Syncope 06/2016     Syncope, unspecified syncope type- Awaiting echo results. ECG normal. Orthostatics normal. MRI and CT normal. Will refer to cardiology for possible holter monitor. Check Mag and K+ today. Refer to neuro for possible EEG to r/o absence seizure. Stop diethylpropion.   Advised to eat small protein rich meals.   -     Magnesium; Future; Expected date: 06/15/2017  -     Ambulatory Referral to Cardiology  -     Ambulatory Referral to Neurology    Hypokalemia- Recheck K+ today   -     Basic metabolic panel; Future; Expected date: 06/15/2017  -     Magnesium; Future; Expected date: 06/15/2017  -     Ambulatory Referral to Cardiology    S/P gastric surgery- f/u with bariatrics. Stop stimulant. Small frequent protein rich meals.     Essential hypertension- at goal. Cont losartan and HCTZ.     At end of visit reports increased stress with new job. Feels like she is failing and unable to "get it". This is causing considerable stress, with greatest stress on Tuesday (the day of the incident)       Follow up as needed.     Patient education provided from [facility]. Patient was counseled on when and how to seek emergent care.       Magdalena HERNANDEZ, AKIRA, FNP-c   Department of Internal Medicine - [facility]  4:22 PM  "

## 2017-06-15 NOTE — LETTER
Denise 15, 2017      [facility]  [address]  [facility] [address]  Phone: [phone]  Fax: [phone]       Patient: Renea Mac   YOB: 1970  Date of Visit: 06/15/2017    To Whom It May Concern:    Renea Ricketts was at [facility] on 06/15/2017. She may return to work on Monday, June 19, 2017 with no restrictions. If you have any questions or concerns, or if I can be of further assistance, please do not hesitate to contact me.    Sincerely,    Magdalena Davila NP

## 2017-06-15 NOTE — MEDICAL/APP STUDENT
Subjective:       Patient ID: Renea Mac is a 47 y.o. female.    Chief Complaint: Hospital Follow Up (ED visit 6/13; dizzy, bladder incont., vomitting)    Ms. Mac present to the clinic for an ED follow up after an episode on Tuesday evening (2 days ago) where she was sitting at a table with family when she had sudden onset of blurry vision, dizziness, muffled hearing, heat flash, and loss of consciousness. Family reports that during this time her eyes remained open, but she was unresponsive and rigidly sitting at the table. The patient then began to urinate and vomited. Upon vomiting, she regained consciousness and realized she was urinating but was unable to stop it. After the episode she had tingling in both of her hands. She denies dizziness upon regaining consciousness and denies fatigue but does endorse feeling weak. Reports eating cake and ice cream prior to episode. Denies change in dietary habits. Had taken her appetite suppressant Tuesday morning and not since. Patient reports having a similar episode approximately one year ago but without the urination and emesis, she was not taking appetite suppressant at the time but did have a change in diet. Patient has history of gastric sleeve surgery in 2014.    Patient was taken to the ER where she had a thorough work up. EKG was normal, ECHO is still pending, CT scan had shown one small area of concern which was ruled out by neurology after MRI was normal.       Review of Systems   Constitutional: Positive for fatigue. Negative for chills, diaphoresis and fever.        A small increase in fatigue   HENT: Negative for congestion, facial swelling and sinus pressure.    Eyes: Negative for pain and visual disturbance.   Respiratory: Negative for chest tightness, shortness of breath and wheezing.    Cardiovascular: Negative for chest pain and palpitations.   Gastrointestinal: Negative for nausea.   Endocrine: Positive for cold intolerance.   Genitourinary:  "Negative for difficulty urinating.   Musculoskeletal: Negative for gait problem.   Neurological: Positive for dizziness. Negative for numbness.        Slight dizziness, feels "off"   Psychiatric/Behavioral: Negative for confusion.       Objective:      Physical Exam   Constitutional: She is oriented to person, place, and time. She appears well-developed and well-nourished. No distress.   HENT:   Head: Normocephalic.   Right Ear: Hearing, tympanic membrane and external ear normal.   Left Ear: Hearing, tympanic membrane and external ear normal.   Mouth/Throat: Oropharynx is clear and moist and mucous membranes are normal.   Eyes: Conjunctivae, EOM and lids are normal. Pupils are equal, round, and reactive to light.   Neck: Normal range of motion. Neck supple. No JVD present.   Cardiovascular: Normal rate, regular rhythm, normal heart sounds and intact distal pulses.    Pulses:       Carotid pulses are 2+ on the right side, and 2+ on the left side.       Radial pulses are 2+ on the right side, and 2+ on the left side.        Dorsalis pedis pulses are 2+ on the right side, and 2+ on the left side.   Pulmonary/Chest: Effort normal and breath sounds normal.   Abdominal: Soft. Bowel sounds are normal.   Musculoskeletal: Normal range of motion.   Lymphadenopathy:     She has no cervical adenopathy.   Neurological: She is alert and oriented to person, place, and time. She has normal strength. No cranial nerve deficit or sensory deficit. She displays a negative Romberg sign.   Skin: Skin is warm and dry. Capillary refill takes less than 2 seconds. She is not diaphoretic.   Psychiatric: She has a normal mood and affect. Her behavior is normal. Thought content normal.       Assessment/Plan:       Renea was seen today for hospital follow up.    Diagnoses and all orders for this visit:    S/P gastric surgery  - hold diethylpropion until further notice  - Small, frequent, bland meals    Essential hypertension  - Continue to take " medication as directed    Syncope, unspecified syncope type  -     Magnesium; Future  -     Ambulatory Referral to Cardiology  -     Ambulatory Referral to Neurology    Hypokalemia  -     Basic metabolic panel; Future  -     Magnesium; Future  -     Ambulatory Referral to Cardiology    Return to clinic as needed. Will notify with results of blood work and follow ECHO results. Educated on reasons to seek emergency care. Advised not to drive or return to work until cleared by cardiology and neurology.

## 2017-06-16 ENCOUNTER — OFFICE VISIT (OUTPATIENT)
Dept: CARDIOLOGY | Facility: CLINIC | Age: 47
End: 2017-06-16
Payer: COMMERCIAL

## 2017-06-16 ENCOUNTER — TELEPHONE (OUTPATIENT)
Dept: INTERNAL MEDICINE | Facility: CLINIC | Age: 47
End: 2017-06-16

## 2017-06-16 VITALS
SYSTOLIC BLOOD PRESSURE: 124 MMHG | WEIGHT: 214.94 LBS | HEART RATE: 79 BPM | BODY MASS INDEX: 36.7 KG/M2 | DIASTOLIC BLOOD PRESSURE: 79 MMHG | HEIGHT: 64 IN

## 2017-06-16 DIAGNOSIS — R55 SYNCOPE, UNSPECIFIED SYNCOPE TYPE: Primary | ICD-10-CM

## 2017-06-16 DIAGNOSIS — I10 ESSENTIAL HYPERTENSION: ICD-10-CM

## 2017-06-16 LAB
DIASTOLIC DYSFUNCTION: NO
RETIRED EF AND QEF - SEE NOTES: 60 (ref 55–65)

## 2017-06-16 PROCEDURE — 99203 OFFICE O/P NEW LOW 30 MIN: CPT | Mod: S$GLB,,, | Performed by: INTERNAL MEDICINE

## 2017-06-16 PROCEDURE — 99999 PR PBB SHADOW E&M-EST. PATIENT-LVL III: CPT | Mod: PBBFAC,,, | Performed by: INTERNAL MEDICINE

## 2017-06-16 NOTE — LETTER
June 16, 2017      Magdalena Davila, NP  [address]  [facility] [address]           [facility]  [address]  [facility] [address]  Phone: [phone]          Patient: Renea Mac   MR Number: [id number]   YOB: 1970   Date of Visit: 6/16/2017       Dear Magdalena Davila:    Thank you for referring Renea Mac to me for evaluation. Attached you will find relevant portions of my assessment and plan of care.    If you have questions, please do not hesitate to call me. I look forward to following Renea Mac along with you.    Sincerely,    Delta Card MD    Enclosure  CC:  No Recipients    If you would like to receive this communication electronically, please contact [email or web address] or [phone] to request more information on [employer] access.    For providers and/or their staff who would like to refer a patient to [facility], please contact us through our one-stop-shop provider referral line, Melina Guerra, at [phone].    If you feel you have received this communication in error or would no longer like to receive these types of communications, please e-mail [email or web address]

## 2017-06-16 NOTE — PROGRESS NOTES
"Chart has been dictated using voice recognition software.  It is not been reviewed carefully for any transcriptional errors due to this technology.   Subjective:   Patient ID:  Renea Mac is a 47 y.o. female who presents for evaluation of Syncope, unspecified syncope type and Hypokalemia      HPI: patient with history of syncope associated with urinary incontinence and vomiting and clonus presents for further cardiac evaluation.  Three nights ago was sitting at table and felt hot, "dizzy", blurring, decrease hearing, became clonic, not responsive.  Woke up and started vomiting.  Prior to that had urinary incontinence. When became responsive was still in a fog with tingling fingertips.  Became incontinent a second time, but was awake with that episode.  No palpitations or chest discomfort, no real dyspnea. Patient denies any chest discomfort on exertion or at rest.  Patient denies any dyspnea at rest or on exertion, orthopnea, PND, or edema.  Had "fluttering" in chest years ago but not recently.  Had a syncopal episode 1 year ago and fell over into son's lap at restaurant.  BP was very low at that time.    Preliminary echo results from 2017 at [facility] show normal cardiac size and function and normal valvular anatomy and function.    Cardiac risk factors: hypertension, obesity, positive family history    Past Medical History:   Diagnosis Date    Arthritis     GERD (gastroesophageal reflux disease)     Hypertension     Rotator cuff tear     Sciatica     Syncope 2016       Past Surgical History:   Procedure Laterality Date     SECTION      HERNIA REPAIR      HYSTERECTOMY      SLEEVE GASTROPLASTY  2014    TUBAL LIGATION         Social History   Substance Use Topics    Smoking status: Never Smoker    Smokeless tobacco: Not on file    Alcohol use Yes       Outpatient Medications Prior to Visit   Medication Sig Dispense Refill    acetaminophen (TYLENOL) 500 MG tablet Take " "500 mg by mouth every 6 (six) hours as needed for Pain.      diethylpropion 75 mg TbSR Take 75 mg by mouth once daily. 31 tablet 0    hydrochlorothiazide (HYDRODIURIL) 25 MG tablet Take 12.5 mg by mouth once daily.       losartan (COZAAR) 100 MG tablet Take 100 mg by mouth once daily.       omeprazole (PRILOSEC) 40 MG capsule Take 1 capsule (40 mg total) by mouth once daily. (Patient taking differently: Take 40 mg by mouth daily as needed. ) 30 capsule 2     No facility-administered medications prior to visit.        Review of patient's allergies indicates:  No Known Allergies    Review of Systems   Constitution: Negative for weight gain and weight loss.   HENT: Negative for nosebleeds.    Eyes: Negative for vision loss in left eye and vision loss in right eye.   Cardiovascular: Negative for claudication.        As above   Respiratory: Negative for hemoptysis, shortness of breath, snoring, sputum production and wheezing.    Endocrine: Negative for polydipsia and polyuria.   Hematologic/Lymphatic: Does not bruise/bleed easily.   Musculoskeletal: Negative for myalgias.   Gastrointestinal: Negative for change in bowel habit, hematemesis, hematochezia, melena, nausea and vomiting.   Genitourinary: Negative for hematuria.   Neurological: Negative for focal weakness and numbness.     Objective:   Physical Exam   Constitutional: She is oriented to person, place, and time. She appears well-developed and well-nourished.   Mild to moderate obesity  /79 (BP Location: Left arm, Patient Position: Sitting, BP Method: Automatic)   Pulse 79   Ht 5' 4" (1.626 m)   Wt 97.5 kg (214 lb 15.2 oz)   BMI 36.90 kg/m²   Orthostatic vital signs:  Lying: HR 64; /84; no symptoms  Standing 1 minute: HR 64; /82; no symptoms  Standing 3 minutes: HR 76; /80; no symptoms     Neck: Neck supple. No JVD present. Carotid bruit is not present.   Cardiovascular: Normal rate, regular rhythm, normal heart sounds and intact " distal pulses.  Exam reveals no gallop and no friction rub.    No murmur heard.  Pulmonary/Chest: Effort normal and breath sounds normal. She has no wheezes. She has no rales.   Abdominal: Soft. Bowel sounds are normal. She exhibits no abdominal bruit. There is no hepatomegaly. There is no tenderness.   Post examination, patient began to c/o abdominal pain.  Examination att that time showed normal bowel sounds, sot abdomen, tenderness to palpation in epigatrstric and LUQ, mild rebound tenderness in same area   Musculoskeletal: She exhibits no edema.   Neurological: She is alert and oriented to person, place, and time. She has normal strength.   Skin: No cyanosis. Nails show no clubbing.       Lab Results   Component Value Date    WBC 9.06 06/13/2017    HGB 12.5 06/13/2017    HCT 37.9 06/13/2017    MCV 81 (L) 06/13/2017     06/13/2017       Lab Results   Component Value Date     06/15/2017    K 4.1 06/15/2017    BUN 18 06/15/2017    CREATININE 0.8 06/15/2017    GLU 81 06/15/2017    HGB 12.5 06/13/2017    HCT 37.9 06/13/2017     06/13/2017       Assessment:     1. Syncope, unspecified syncope type    2. Essential hypertension      The patient has syncope of unclear etiology.  Her symptoms seem more compatible with a seizure and was a cardiac problem.  Her echocardiogram is normal with normal atrial and ventricular size and function.  This makes ventricular tachycardia or even supraventricular tachycardia less likely as a cause for his syncope.  Additionally, the patient had no palpitations when she had awareness of her other problems.  Given the infrequency of these episodes (2 episodes  by a year), it is unlikely that an arrhythmia workup would easily year old a cause for these events.  Discussed with patient.  No further testing or additional treatment at this time.  A full workup for seizures should be entailed.  However, if further workup does not provide any etiology to the syncopal  episodes, then an arrhythmia workup initially starting with 48 hour Holter and progressing through 30 day event monitoring and subsequent loop recorder implantation, if needed.  At this time, the patient will return as indicated by her referring physician or the patient.    Plan:     Renea was seen today for syncope, unspecified syncope type and hypokalemia.    Diagnoses and all orders for this visit:    Syncope, unspecified syncope type    Essential hypertension

## 2017-06-19 ENCOUNTER — OFFICE VISIT (OUTPATIENT)
Dept: BARIATRICS | Facility: CLINIC | Age: 47
End: 2017-06-19
Payer: COMMERCIAL

## 2017-06-19 VITALS
HEART RATE: 78 BPM | WEIGHT: 212.94 LBS | DIASTOLIC BLOOD PRESSURE: 76 MMHG | SYSTOLIC BLOOD PRESSURE: 108 MMHG | HEIGHT: 64 IN | BODY MASS INDEX: 36.35 KG/M2

## 2017-06-19 DIAGNOSIS — Z98.84 S/P LAPAROSCOPIC SLEEVE GASTRECTOMY: Primary | ICD-10-CM

## 2017-06-19 DIAGNOSIS — Z12.39 SCREENING FOR BREAST CANCER: ICD-10-CM

## 2017-06-19 DIAGNOSIS — D50.8 IRON DEFICIENCY ANEMIA SECONDARY TO INADEQUATE DIETARY IRON INTAKE: ICD-10-CM

## 2017-06-19 DIAGNOSIS — K91.1 DUMPING SYNDROME: ICD-10-CM

## 2017-06-19 PROCEDURE — 99999 PR PBB SHADOW E&M-EST. PATIENT-LVL III: CPT | Mod: PBBFAC,,, | Performed by: INTERNAL MEDICINE

## 2017-06-19 PROCEDURE — 99213 OFFICE O/P EST LOW 20 MIN: CPT | Mod: S$GLB,,, | Performed by: INTERNAL MEDICINE

## 2017-06-19 NOTE — PROGRESS NOTES
"Subjective:       Patient ID: Renea Mac is a 47 y.o. female.    Chief Complaint: Follow-up    Pt here today for follow-up. Has lost 4 lbs. Last week she had eaten some cake and ice cream. She suddenly started to feel bad. Her family tried to help her out of her chair and she passed out. In the course of this she also urinated on herself. She has been off any sugar or sweets in the past several weeks before this. She was seen in ER and all studies there were ok. Pt regained consciousness quickly and was back to base line. Her cousin is here with her today and was present when it happened. Pt has appt with neurology. Has seen cards.   Does not have PCP and needs mammo order.   Labs showed low iron levels. She is taking a MVI once a day.       Review of Systems   Constitutional: Negative for chills and fever.   Respiratory: Negative for apnea and shortness of breath.    Cardiovascular: Negative for chest pain and leg swelling.   Gastrointestinal: Positive for constipation. Negative for diarrhea.        + GERD   Genitourinary: Negative for difficulty urinating.        S/p hyst   Musculoskeletal: Positive for arthralgias and back pain.   Neurological: Positive for dizziness and syncope. Negative for light-headedness.   Psychiatric/Behavioral: Negative for dysphoric mood. The patient is not nervous/anxious.        Objective:     /76   Pulse 78   Ht 5' 4" (1.626 m)   Wt 96.6 kg (212 lb 15.4 oz)   BMI 36.56 kg/m²     Physical Exam   Constitutional: She is oriented to person, place, and time. She appears well-developed and well-nourished. No distress.   Obese     HENT:   Head: Normocephalic and atraumatic.   Eyes: EOM are normal. Pupils are equal, round, and reactive to light. No scleral icterus.   Neck: Normal range of motion. Neck supple.   Cardiovascular: Normal rate and normal heart sounds.    Pulmonary/Chest: Effort normal and breath sounds normal.   Musculoskeletal: Normal range of motion. She " exhibits no edema.   Neurological: She is alert and oriented to person, place, and time. No cranial nerve deficit.   Skin: Skin is warm and dry. No erythema.   Psychiatric: She has a normal mood and affect. Her behavior is normal. Judgment normal.   Vitals reviewed.      Assessment:       1. S/P laparoscopic sleeve gastrectomy    2. Dumping syndrome    3. Iron deficiency anemia secondary to inadequate dietary iron intake    4. Screening for breast cancer        Plan:                 1. S/P laparoscopic sleeve gastrectomy  Bariatric diet for life    2. Dumping syndrome  It sounds like she had an episode of dumping syndrome. I do agree that other problems should be ruled out. We will hold off on any new meds until neuro eval complete    3. Iron deficiency anemia secondary to inadequate dietary iron intake  She is only taking her MVI once day. Increase to BID and we will recheck labs.     4. Screening for breast cancer    - Mammo Digital Screening Bilat with CAD; Future    .

## 2017-06-19 NOTE — PATIENT INSTRUCTIONS
"Increase multivitamin to 2 times a day.     Try lactose free protein shakes. (See nutrition handbook)      Spring Recipes:    Mandeep Shake:     Ingredients  ½ cup low fat cottage cheese  ¼ cup vanilla protein powder  1/8 tsp mint extract  2-3 packets of stevia or artificial sweetener of choice  5-10 ice cubes (more or less depending on how thick you would like it)  4 oz of water  2-3 drops of green food coloring OR a small handful of spinach to make it green    Put all ingredients in  and  until desired consistency.      "Unstuffed" Cabbage Rolls:    Ingredients:  1 1/2 to 2 pounds lean ground beef or turkey  1 large onion, chopped  1 clove garlic, minced  1 small cabbage, chopped  2 cans (14.5 ounces each) diced tomatoes  1 can (8 ounces) tomato sauce  ¼ - ½ cup water depending on desired consistency  1 teaspoon ground black pepper, salt to taste    Spray large skillet with nonstick cookspray. Heat pan on medium heat. Sauté the onions until tender, and then add the ground beef (or turkey) and cook until the meat is browned.    Add the garlic, cook an additional minute before adding the remaining ingredients. Cover, reduce the heat and simmer about 25 minutes (or until the cabbage is  fork tender)        Not so Olivo's Pie:    Ingredients  2 (or more) pounds of lean ground beef, or turkey  2 heads of cauliflower or 3 bags of frozen cauliflower, chopped  1 bag of frozen mixed veggies          (NO Corn, Peas or Potatoes!)  1-2 onions  1 egg  1 teaspoon each of basil, garlic powder, pepper, oregano and a little cayenne  4-5 sprays of I cant believe its not butter spray  4 ounces of fat free cream cheese (optional)  Low fat Cheese to top (optional)    Roast cauliflower in oven on 350* F for about 15-20 minutes, until browned and fork tender. (begin martinez meat while cauliflower is cooking). Place cooked cauliflower in . Add 4 ounces of fat free cream cheese, 4-5 sprays of I cant believe " its not butter SPRAY, and spices and puree until creamy.     While cauliflower is roasting, brown meat in large skillet and season to taste. Set aside. Sauté diced onion in skillet until somewhat soft. Set aside with meat. Pour mixed veggies in the skillet to heat on low heat.     Mix the meat, onions, mixed veggies, raw egg and any additional seasonings and put in bottom of 9x13 baking dish. Spread mashed cauliflower mixture over it until smooth.    Bake at 350 for approximately 30 minutes. Add cheese and bake 5 additional minutes (optional). Serve warm (or reheat later).    Crock Pot Balsamic Pork Tenderloin:    Ingredients:  1 tsp dried thyme  1 tsp ground pepper  ½ tsp salt  3 tbsp dried minced onion  2 tsp dried basil  ¼ cup low sodium broth  ½ cup balsamic vinegar  2 cloves garlic, minced  2 lbs Pork Tenderloin    Mix first 5 ingredients together. Rub pork tenderloin with dry seasoning mixture.  In a large sauté pan, heat ¼ cup balsamic vinegar and garlic on medium heat. Add pork to sauté spence and sear, martinez all sides. Add low sodium broth, 1 tbsp at a time to keep tenderloin from sticking or use nonstick cookspray.     Transfer meat to crock pot. Pour remaining balsamic vinegar into sauté pan and continue  to stir for a few minutes to deglaze the pan. Pour juices over the top of the tenderloin in crockpot. Cook on high for 3 hours or until meat thermometer says 170*. Let rest 5-10 minutes and then slice.       Side Dish: Steamed carrots w/ garlic and mary grace    Ingredients:  2 garlic cloves, minced  1 lb baby carrots  5-6 sprays of I cant believe its not butter SPRAY  1 tsp minced peeled fresh mary grace  1 tbsp chopped fresh cilantro  ½ tsp grated lime rind  1 tbsp fresh lime juice   ¼ tsp salt    Prepare garlic; let stand 10 minutes. Steam carrots, covered in pot, about 10 minutes or until tender.     In a nonstick skillet over medium heat, spray pan w/ I cant believe its not butter SPRAY. Add garlic and  mary grace and cook 1 minute, stirring constantly. Remove from  heat; stir in carrots, cilantro and remaining ingredients.         Side Dish: Green Bean Almondine    Ingredients:  1 pound fresh green beans, trimmed  1 tbsp ana vinegar  1 ½ tsp water  1 tsp William Mustard  ¼ tsp salt  ¼ tsp freshly ground black pepper  1 tbsp sliced almonds, toasted    Cook green beans in boiling water for 4 minutes or until crisp-tender. Drain and plunge beans into ice water. Drain well. Pat beans dry w/ paper towel.     Combine vinegar, water, mustard, salt and pepper in a medium bowl. Add beans to vinegar mixture; toss to coat well. Sprinkle with toasted almonds.      How to Cook the Perfect Hard Boiled Egg:    Steam in water: Fill a large pot with 1 inch of water. Place steamer insert inside, cover, and bring to a boil over high heat. Add eggs to steamer basket, cover, and continue cooking 12 minute. If serving cold, immediately place eggs in a bowl of ice water and allow to cool for at least 15 minutes before peeling under cool running water. Store in the refrigerator for up to 5 days.    OR    Purchase Dash Go Rapid Egg Boiler: available @ [employer], [employer], [employer], [employer] for ~$15-$20      Classic Egg Salad Recipe:    Ingredients:  8 hard cooked eggs, peeled and coarsely chopped  4-6 tbsp of low fat or fat free herron  2 tbsp celery, chopped  2 tsp william mustard  Dash of cayenne pepper (for spice)  Black pepper, salt to taste    In a medium bowl, combine herron, celery, mustard and cayenne pepper with chopped eggs. Season w/ pepper and salt. Serve over Lettuce leaves.     Get Creative! Add chopped turkey reeder and tomato and serve on lettuce leaves for an egg-cellent BLT egg salad or mix lean diced ham, low fat cheddar and tomatoes for  egg salad    Tuna Deviled Eggs:    Ingredients:  12 hard boiled eggs  1 can of tuna packed in water  1 rib celery, diced  ¼ cup low fat herron  1 tsp mustard  Garlic powder, black  pepper to taste  Dash of paprika (for finish)    Cut eggs in half lengthwise. Remove yolk and mash in bowl. In separate bowl, mix tuna, celery, low fat herron, mustard and seasonings.  Stir in egg yolks until blended. Stuff eggs and sprinkle dash of paprika      Reeder Jalapeno Deviled Eggs:    Ingredients:  12 hard boiled eggs, peeled  ½ cup low fat herron  1 ½ tsp rice vinegar  ¾ tsp ground mustard  ½ packet splenda  2 jalapenos, seeded and chopped, 6 pieces turkey reeder, cooked crisp and crumbled  Dash of paprika (for finish)    Cut eggs in half lengthwise. Remove yolk and mash in bowl. Add herron, vinegar, spices and Splenda until well combined. Mix in jalapenos and reeder. Stuff eggs and sprinkle w/ dash of paprika      Sugar-Free High Protein Brownie Recipe:    Ingredients:  2 cups of pureed zucchini  4 oz fat free cream cheese  1 large egg  2 scoops chocolate protein powder  1 tbsp pure vanilla extract  1/3 cup unsweetened cocoa powder    ¼ tsp salt  2 tbsp chopped nuts  *8-9 packets of splenda or artificial sweetener of choice    Preheat oven to 350* F.     Line an 8x8 pan w/ parchment paper or spray with nonstick cookspray.     In a medium bowl, blend the fat free cream cheese with egg until creamy. Add chocolate protein powder and cocoa powder until creamy. Add vanilla extract. Stir in pureed zucchini and salt.     The batter will be thick, but not dry. If batter seems dry, add 1-2 tbsp water. Fold in Nuts. Pour batter in prepared pan.     Bake for 30 minutes. Allow to cool completely. Cut into squares and chill to semi-set.     *best if eaten within 2 days but may last 3-4 days in fridge.         Lemon Whip:    Ingredients:  Small box of sugar-free vanilla instant pudding mix  1 small packet of crystal light lemonade mix  2 cups skim milk or fat free fairlife milk  Sugar-free, fat free cool whip     Make sugar-free pudding using 2 cups skim milk according to package. Stir in 1 small packet of crystal light  lemonade mix.  Fold in a dollop of sugar-free, fat free cool whip and stir to combine.     Home-made Sugar-free Pudding Pops:    Ingredients:  1 small pkg of sugar-free instant pudding mix (flavor of choice!)  2 cups fat free milk     Beat ingredients with whisk for 2 minutes. Pour into 6 paper or plastic cups or into a popsicle mold. Insert wooden pop stick in center of each cup.     Freeze for 5 hours or until firm. Peel off paper or pull out of popsicle mold.     Variations: add sugar-free syrups to change up the flavor, such as sugar-free chocolate pudding + sugar free raspberry syrup = chocolate raspberry fudgesicle.

## 2017-06-27 ENCOUNTER — OFFICE VISIT (OUTPATIENT)
Dept: NEUROLOGY | Facility: CLINIC | Age: 47
End: 2017-06-27
Payer: COMMERCIAL

## 2017-06-27 VITALS
BODY MASS INDEX: 36.9 KG/M2 | DIASTOLIC BLOOD PRESSURE: 86 MMHG | HEART RATE: 76 BPM | SYSTOLIC BLOOD PRESSURE: 122 MMHG | WEIGHT: 214.94 LBS

## 2017-06-27 DIAGNOSIS — R55 SYNCOPE, UNSPECIFIED SYNCOPE TYPE: ICD-10-CM

## 2017-06-27 DIAGNOSIS — R55 SYNCOPE AND COLLAPSE: Primary | ICD-10-CM

## 2017-06-27 PROCEDURE — 99999 PR PBB SHADOW E&M-EST. PATIENT-LVL III: CPT | Mod: PBBFAC,,, | Performed by: PSYCHIATRY & NEUROLOGY

## 2017-06-27 PROCEDURE — 99214 OFFICE O/P EST MOD 30 MIN: CPT | Mod: S$GLB,,, | Performed by: PSYCHIATRY & NEUROLOGY

## 2017-06-27 NOTE — PATIENT INSTRUCTIONS
Causes of Syncope  Syncope (fainting) has many causes. Sometimes it is not serious. In other cases, syncope is a sign of a heart problem. But treatment can help    When syncope is not serious  Your healthcare provider may call your problem vasovagal syncope, reflex syncope, or orthostatic hypotension. These types of syncope are generally not serious. They can be caused by:  · Strong feelings, such as anxiety or fear. A nerve signal may briefly change your heart rate and lower your blood pressure too much.  · Standing for too long. Standing may cause blood to pool in your legs. When this happens, your brain may not receive all the blood it needs.  · Standing up too quickly. Your blood pressure may not adjust fast enough to changes in posture and may drop too low. Certain medicines can also cause this problem. Examples of medicines that can cause a drop in blood pressure include diuretics, blood pressure medicines, and medicines for chest pain. Your pharmacist or healthcare provider can discuss these with you.  · Reaction to normal body functions. When you go to the bathroom, have gastrointestinal discomfort, nausea, or pain, your heart may have a natural reflex to slow down and lower blood pressure. This can result in syncope. This may also follow exercise, eating, laughter, weight lifting, or playing musical instruments like the trumpet or trombone.  When heart trouble causes syncope  A heart problem can decrease the amount of oxygen-rich blood that reaches the brain. Heart trouble can be serious and even life threatening if not treated:  · A slow heart rate. Electrical signals tell the chambers of the heart when to pump. But the signals may be slowed or blocked (heart block) as they travel on the hearts electrical pathways. This can be caused by aging, scarred heart tissue, or damage from heart disease. When the heart rate slows, not enough blood is pumped.  · A fast heart rate. Certain problems can make the  heart race. For instance, after a heart attack, also known as acute myocardial infarction, or AMI, abnormal electrical signals may be created. These signals can make the heart suddenly beat very fast. The heart pumps before the chambers can fill with blood. So less blood reaches the brain and other parts of the body. Illegal drugs, certain medicines, heart disease, or an inherited condition can also cause this.  · A heart valve problem. Blood travels through the chambers of the heart as it is pumped. Heart valves open and close to help move blood in the right direction. But a valve may not open or close fully, if its hardened or scarred. As a result, less blood is pumped through the heart to the brain and body. Most often, syncope occurs when a person's aortic valve is critically narrowed and he or she participates in  a strenuous activity.  · A heart muscle problem. Some people develop a thickened heart muscle that blocks blood flow out of the heart to the body. This is called hypertrophic cardiomyopathy. Being dehydrated and having hypertrophic cardiomyopathy can increase the risk for syncope.  Whatever the cause of syncope, it is important to be evaluated by your healthcare provider. You may need to be seen by a cardiologist, neurologist, or an ear, nose, and throat specialist. Do not drive, operate heavy machinery, or participate in activities in which you would be at risk for falls and injury if you have syncope and have not been evaluated.  Date Last Reviewed: 5/1/2016  © 0994-3671 [employer]. [address] [place] [address], [place] [address]. All rights reserved. This information is not intended as a substitute for professional medical care. Always follow your healthcare professional's instructions.

## 2017-06-27 NOTE — PROGRESS NOTES
"[facility] - NEUROLOGY EPILEPSY  [facility] [place]    Date: June 27, 2017   Patient Name: Renea Mac   MRN: [id number]   PCP: Magdalena Davila  Referring Provider: Magdalena Davila,*    Assessment:   Renea Mac is a 47 y.o. female presenting with a likely episode of syncope.  Given that the patient has had recurrent episodes of syncope in the past year, will obtain baseline routine EEG. Per event description, feel seizure is very unlikely. Patient has followed up with cardiology (Have reviewed their notes) and has undergone MRI Brain (personally reviewed, WNL).     Plan:     Problem List Items Addressed This Visit        Neuro    Syncope    Current Assessment & Plan     -- obtaining routine EEG           Other Visit Diagnoses     Syncope and collapse    -  Primary    Relevant Orders    EEG,w/awake & asleep record        Devaughn Bertrand MD  [facility]   Department of Neurology    Patient note was created using Dragon Dictation.  Any errors in syntax or even information may not have been identified and edited on initial review prior to signing this note.  Subjective:   Patient seen in consultation at the request of Dr. Davila for the evaluation of . A copy of this note will be sent to the referring physician.      HPI:   Ms. Renea Mac is a 47 y.o. female who presents with a chief complaint of an episode of loss of consciousness.  The patient reports that on June 13, the patient was at home and had recently celebrated a family member's birthday.  She ate a fatty meal including cake, ice cream, and sausage and states that she had been sitting at the kitchen table with a family member when she began to feel lightheaded.  She states that her vision began to gray out and became blurry in the periphery in her hearing became muffled.  She states that she felt as though she may "pass out" and yelled for her .  Her  into the " kitchen and attempted to take her blood pressure but couldn't get a reading.  She states that the family member and her  stood her up when she froze in position with her eyes open and didn't respond to them for several seconds.  She was incontinent of urine but rapidly regained consciousness.  She states that she continued to feel lightheaded until she vomited and her hearing and vision instantly came back.  She states the entire episode lasted maybe a full minute.  She was at her baseline immediately.  She states that she has had one prior episode of syncope approximately a year ago again after eating a fatty meal and in the setting of being extremely fatigued after travel.  He denies any abnormal movements or seizure-like activity associated with the episode.  She denies any tongue biting with the episode.     PAST MEDICAL HISTORY:  Past Medical History:   Diagnosis Date    Arthritis     GERD (gastroesophageal reflux disease)     Hypertension     Rotator cuff tear     Sciatica     Syncope 2016     PAST SURGICAL HISTORY:  Past Surgical History:   Procedure Laterality Date     SECTION      HERNIA REPAIR      HYSTERECTOMY      SLEEVE GASTROPLASTY  2014    TUBAL LIGATION       CURRENT MEDS:  Current Outpatient Prescriptions   Medication Sig Dispense Refill    acetaminophen (TYLENOL) 500 MG tablet Take 500 mg by mouth every 6 (six) hours as needed for Pain.      hydrochlorothiazide (HYDRODIURIL) 25 MG tablet Take 12.5 mg by mouth once daily.       losartan (COZAAR) 100 MG tablet Take 100 mg by mouth once daily.       omeprazole (PRILOSEC) 40 MG capsule Take 1 capsule (40 mg total) by mouth once daily. (Patient taking differently: Take 40 mg by mouth daily as needed. ) 30 capsule 2     No current facility-administered medications for this visit.      ALLERGIES:  Review of patient's allergies indicates:  No Known Allergies    FAMILY HISTORY:  Family History   Problem Relation Age of  Onset    Cancer Mother     Hypertension Mother     Heart disease Mother     Heart attack Mother     Hypertension Father     Heart disease Father     Stroke Father     Heart attack Father     Hypertension Sister     Hypertension Brother     Cancer Brother     No Known Problems Daughter     No Known Problems Son      SOCIAL HISTORY:  Social History   Substance Use Topics    Smoking status: Never Smoker    Smokeless tobacco: Not on file    Alcohol use Yes     Review of Systems:  12 review of systems is negative except for the symptoms mentioned in HPI.      Objective:     Vitals:    06/27/17 1345   BP: 122/86   Pulse: 76   Weight: 97.5 kg (214 lb 15.2 oz)     General: NAD, well nourished   Eyes: no tearing, discharge, no erythema   ENT: moist mucous membranes of the oral cavity, nares patent    Neck: Supple, full range of motion  Cardiovascular: Warm and well perfused, pulses equal and symmetrical  Lungs: Normal work of breathing, normal chest wall excursions  Skin: No rash, lesions, or breakdown on exposed skin  Psychiatry: Mood and affect are appropriate   Abdomen: soft, non tender, non distended  Extremeties: No cyanosis, clubbing or edema.    Neurological   MENTAL STATUS: Alert and oriented to person, place, and time. Attention and concentration within normal limits. Speech without dysarthria, able to name and repeat without difficulty. Recent and remote memory within normal limits   CRANIAL NERVES: Visual fields intact. PERRL. EOMI. Facial sensation intact. Face symmetrical. Hearing grossly intact. Full shoulder shrug bilaterally. Tongue protrudes midline   SENSORY: Sensation is intact to light touch throughout.  MOTOR: Normal bulk and tone.  5/5 deltoid, biceps, triceps, interosseous, hand  bilaterally. 5/5 iliopsoas, knee extension/flexion, foot dorsi/plantarflexion bilaterally.    REFLEXES: Symmetric and 2+ throughout.   CEREBELLAR/COORDINATION/GAIT: Gait steady with normal arm swing and  stride length. Finger to nose intact. Normal rapid alternating movements.

## 2017-06-27 NOTE — LETTER
June 27, 2017      Magdalena Davila, NP  [address]  [facility] [address]           [facility] - Neurology Epilepsy  [address]  [facility] [address]  Phone: [phone]  Fax: [phone]          Patient: Renea Mac   MR Number: [id number]   YOB: 1970   Date of Visit: 6/27/2017       Dear Magdalena Davila:    Thank you for referring Renea Mac to me for evaluation. Attached you will find relevant portions of my assessment and plan of care.    If you have questions, please do not hesitate to call me. I look forward to following Renea Mac along with you.    Sincerely,    Devaughn Bertrand MD    Enclosure  CC:  No Recipients    If you would like to receive this communication electronically, please contact [email or web address] or [phone] to request more information on [employer] Link access.    For providers and/or their staff who would like to refer a patient to [facility], please contact us through our one-stop-shop provider referral line, [facility], at [phone].    If you feel you have received this communication in error or would no longer like to receive these types of communications, please e-mail [email or web address]

## 2017-07-05 ENCOUNTER — HOSPITAL ENCOUNTER (OUTPATIENT)
Dept: RADIOLOGY | Facility: HOSPITAL | Age: 47
Discharge: HOME | End: 2017-07-05
Attending: INTERNAL MEDICINE
Payer: COMMERCIAL

## 2017-07-05 VITALS — WEIGHT: 214 LBS | BODY MASS INDEX: 36.54 KG/M2 | HEIGHT: 64 IN

## 2017-07-05 DIAGNOSIS — Z12.31 VISIT FOR SCREENING MAMMOGRAM: ICD-10-CM

## 2017-07-05 DIAGNOSIS — Z12.39 SCREENING FOR BREAST CANCER: ICD-10-CM

## 2017-07-05 PROCEDURE — 77063 BREAST TOMOSYNTHESIS BI: CPT | Mod: 26,,, | Performed by: RADIOLOGY

## 2017-07-05 PROCEDURE — 77067 SCR MAMMO BI INCL CAD: CPT | Mod: 26,,, | Performed by: RADIOLOGY

## 2017-07-05 PROCEDURE — 77067 SCR MAMMO BI INCL CAD: CPT | Mod: TC

## 2017-07-10 RX ORDER — DIETHYLPROPION HYDROCHLORIDE 75 MG/1
75 TABLET, EXTENDED RELEASE ORAL DAILY
Qty: 30 TABLET | Refills: 0 | Status: SHIPPED | OUTPATIENT
Start: 2017-07-10 | End: 2017-08-22 | Stop reason: SDUPTHER

## 2017-07-11 ENCOUNTER — LAB VISIT (OUTPATIENT)
Dept: LAB | Facility: HOSPITAL | Age: 47
End: 2017-07-11
Payer: COMMERCIAL

## 2017-07-11 ENCOUNTER — OFFICE VISIT (OUTPATIENT)
Dept: INTERNAL MEDICINE | Facility: CLINIC | Age: 47
End: 2017-07-11
Payer: COMMERCIAL

## 2017-07-11 ENCOUNTER — TELEPHONE (OUTPATIENT)
Dept: INTERNAL MEDICINE | Facility: CLINIC | Age: 47
End: 2017-07-11

## 2017-07-11 VITALS
OXYGEN SATURATION: 97 % | SYSTOLIC BLOOD PRESSURE: 122 MMHG | HEART RATE: 68 BPM | WEIGHT: 210.75 LBS | DIASTOLIC BLOOD PRESSURE: 82 MMHG | HEIGHT: 64 IN | BODY MASS INDEX: 35.98 KG/M2

## 2017-07-11 DIAGNOSIS — Z00.00 HEALTH CARE MAINTENANCE: ICD-10-CM

## 2017-07-11 DIAGNOSIS — Z13.6 SCREENING FOR HEART DISEASE: ICD-10-CM

## 2017-07-11 DIAGNOSIS — R55 SYNCOPE, UNSPECIFIED SYNCOPE TYPE: ICD-10-CM

## 2017-07-11 DIAGNOSIS — I10 ESSENTIAL HYPERTENSION: Primary | ICD-10-CM

## 2017-07-11 DIAGNOSIS — E87.6 HYPOKALEMIA: ICD-10-CM

## 2017-07-11 LAB
CHOLEST/HDLC SERPL: 2.4 {RATIO}
HDL/CHOLESTEROL RATIO: 41.8 %
HDLC SERPL-MCNC: 153 MG/DL
HDLC SERPL-MCNC: 64 MG/DL
LDLC SERPL CALC-MCNC: 81 MG/DL
NONHDLC SERPL-MCNC: 89 MG/DL
TRIGL SERPL-MCNC: 40 MG/DL

## 2017-07-11 PROCEDURE — 36415 COLL VENOUS BLD VENIPUNCTURE: CPT

## 2017-07-11 PROCEDURE — 99999 PR PBB SHADOW E&M-EST. PATIENT-LVL III: CPT | Mod: PBBFAC,,, | Performed by: NURSE PRACTITIONER

## 2017-07-11 PROCEDURE — 99213 OFFICE O/P EST LOW 20 MIN: CPT | Mod: S$GLB,,, | Performed by: NURSE PRACTITIONER

## 2017-07-11 PROCEDURE — 80061 LIPID PANEL: CPT

## 2017-07-11 RX ORDER — LOSARTAN POTASSIUM 100 MG/1
100 TABLET ORAL DAILY
Qty: 30 TABLET | Refills: 11 | Status: SHIPPED | OUTPATIENT
Start: 2017-07-11 | End: 2017-10-17 | Stop reason: SDUPTHER

## 2017-07-11 RX ORDER — HYDROCHLOROTHIAZIDE 25 MG/1
12.5 TABLET ORAL DAILY
Qty: 30 TABLET | Refills: 11 | Status: SHIPPED | OUTPATIENT
Start: 2017-07-11 | End: 2019-01-18 | Stop reason: SDUPTHER

## 2017-07-11 NOTE — PROGRESS NOTES
INTERNAL MEDICINE PROGRESS/URGENT CARE NOTE    CHIEF COMPLAINT     Chief Complaint   Patient presents with    Medication Refill     Bp medications       HPI     Renea Mac is a 47 y.o. female who presents for a follow up visit today.    Was seen by me following an ED visit for syncope.     Cardiology feels that arrhythmia is unlikely given normal Echo and ECGs and lack of palpations. However, if neuro work up negative will return for arrhythmia testing.     Neuro has appointment for EEG tomorrow. MRI normal.     Reports 1 episode of dizziness after drinking 2 sips of red wine, felt hot and dizzy.   Another episode in [place] after trying on clothing, felt dizzy and weak.     HTN- compliant with hctz 12.5 and cozaar 100mg. BP was mildly elevated last week x 2 days after eating crabs.  No vision changes, no chest pain or sob. +dizziness - x 2 episodes see above.     Answers for HPI/ROS submitted by the patient on 7/5/2017   Hypertension  Chronicity: chronic  Onset: more than 1 year ago  Progression since onset: gradually improving  Condition status: controlled  anxiety: No  blurred vision: Yes  malaise/fatigue: No  orthopnea: No  peripheral edema: No  PND: No  sweats: No  Agents associated with hypertension: anorectics, NSAIDs  CAD risks: family history, obesity  Compliance problems: exercise  Past treatments: diuretics, lifestyle changes  Improvement on treatment: moderate      Past Medical History:  Past Medical History:   Diagnosis Date    Arthritis     GERD (gastroesophageal reflux disease)     Hypertension     Rotator cuff tear     Sciatica     Syncope 06/2016       Home Medications:  Prior to Admission medications    Medication Sig Start Date End Date Taking? Authorizing Provider   diethylpropion 75 mg TbSR Take 75 mg by mouth once daily. 7/10/17  Yes Kimberlee Washburn MD   hydrochlorothiazide (HYDRODIURIL) 25 MG tablet Take 12.5 mg by mouth once daily.    Yes Historical Provider, MD   losartan  "(COZAAR) 100 MG tablet Take 100 mg by mouth once daily.  9/4/16  Yes Historical Provider, MD   acetaminophen (TYLENOL) 500 MG tablet Take 500 mg by mouth every 6 (six) hours as needed for Pain.    Historical Provider, MD   omeprazole (PRILOSEC) 40 MG capsule Take 1 capsule (40 mg total) by mouth once daily.  Patient taking differently: Take 40 mg by mouth daily as needed.  11/4/16 6/16/17  Quintin Rodgers MD       Review of Systems:  Review of Systems   Constitutional: Negative for chills, diaphoresis, fatigue, fever and unexpected weight change.   HENT: Negative for congestion, hearing loss, sore throat and tinnitus.    Eyes: Negative for visual disturbance.   Respiratory: Negative for cough, choking, chest tightness and shortness of breath.    Cardiovascular: Negative for chest pain, palpitations and leg swelling.   Gastrointestinal: Negative for abdominal pain, constipation, diarrhea, nausea and vomiting.   Musculoskeletal: Negative for arthralgias, joint swelling, myalgias and neck pain.   Skin: Negative for rash.   Neurological: Positive for dizziness and light-headedness. Negative for syncope and headaches.       Health Maintainence:   Immunizations:  Health Maintenance       Date Due Completion Date    Lipid Panel 1970 ---    TETANUS VACCINE 03/28/1990 ---    Influenza Vaccine 08/01/2017 ---    Mammogram 07/05/2019 7/5/2017           PHYSICAL EXAM     /82 (BP Location: Left arm, Patient Position: Sitting, BP Method: Manual)   Pulse 68   Ht 5' 4" (1.626 m)   Wt 95.6 kg (210 lb 12.2 oz)   SpO2 97%   BMI 36.18 kg/m²     Physical Exam   Constitutional: She is oriented to person, place, and time. She appears well-developed and well-nourished.   HENT:   Head: Normocephalic.   Right Ear: External ear normal.   Left Ear: External ear normal.   Nose: Nose normal.   Mouth/Throat: Oropharynx is clear and moist. No oropharyngeal exudate.   Eyes: Pupils are equal, round, and reactive to light. "   Neck: Neck supple. No JVD present. No tracheal deviation present. No thyromegaly present.   Cardiovascular: Normal rate, regular rhythm, normal heart sounds and intact distal pulses.  Exam reveals no gallop and no friction rub.    No murmur heard.  Pulmonary/Chest: Effort normal and breath sounds normal. No respiratory distress. She has no wheezes. She has no rales.   Abdominal: Soft. Bowel sounds are normal. She exhibits no distension. There is no tenderness.   Musculoskeletal: Normal range of motion. She exhibits no edema or tenderness.   Lymphadenopathy:     She has no cervical adenopathy.   Neurological: She is alert and oriented to person, place, and time. She has normal strength and normal reflexes. No cranial nerve deficit or sensory deficit. She displays a negative Romberg sign.   Skin: Skin is warm and dry. No rash noted.   Psychiatric: She has a normal mood and affect. Her behavior is normal.   Vitals reviewed.      LABS     No results found for: LABA1C, HGBA1C  CMP  Sodium   Date Value Ref Range Status   06/15/2017 140 136 - 145 mmol/L Final     Potassium   Date Value Ref Range Status   06/15/2017 4.1 3.5 - 5.1 mmol/L Final     Chloride   Date Value Ref Range Status   06/15/2017 107 95 - 110 mmol/L Final     CO2   Date Value Ref Range Status   06/15/2017 27 23 - 29 mmol/L Final     Glucose   Date Value Ref Range Status   06/15/2017 81 70 - 110 mg/dL Final     BUN, Bld   Date Value Ref Range Status   06/15/2017 18 6 - 20 mg/dL Final     Creatinine   Date Value Ref Range Status   06/15/2017 0.8 0.5 - 1.4 mg/dL Final     Calcium   Date Value Ref Range Status   06/15/2017 9.1 8.7 - 10.5 mg/dL Final     Total Protein   Date Value Ref Range Status   06/13/2017 7.4 6.0 - 8.4 g/dL Final     Albumin   Date Value Ref Range Status   06/13/2017 3.5 3.5 - 5.2 g/dL Final     Total Bilirubin   Date Value Ref Range Status   06/13/2017 0.3 0.1 - 1.0 mg/dL Final     Comment:     For infants and newborns, interpretation  of results should be based  on gestational age, weight and in agreement with clinical  observations.  Premature Infant recommended reference ranges:  Up to 24 hours.............<8.0 mg/dL  Up to 48 hours............<12.0 mg/dL  3-5 days..................<15.0 mg/dL  6-29 days.................<15.0 mg/dL       Alkaline Phosphatase   Date Value Ref Range Status   06/13/2017 77 55 - 135 U/L Final     AST   Date Value Ref Range Status   06/13/2017 15 10 - 40 U/L Final     ALT   Date Value Ref Range Status   06/13/2017 10 10 - 44 U/L Final     Anion Gap   Date Value Ref Range Status   06/15/2017 6 (L) 8 - 16 mmol/L Final     eGFR if    Date Value Ref Range Status   06/15/2017 >60.0 >60 mL/min/1.73 m^2 Final     eGFR if non    Date Value Ref Range Status   06/15/2017 >60.0 >60 mL/min/1.73 m^2 Final     Comment:     Calculation used to obtain the estimated glomerular filtration  rate (eGFR) is the CKD-EPI equation. Since race is unknown   in our information system, the eGFR values for   -American and Non--American patients are given   for each creatinine result.       Lab Results   Component Value Date    WBC 9.06 06/13/2017    HGB 12.5 06/13/2017    HCT 37.9 06/13/2017    MCV 81 (L) 06/13/2017     06/13/2017     No results found for: CHOL  No results found for: HDL  No results found for: LDLCALC  No results found for: TRIG  No results found for: CHOLHDL  Lab Results   Component Value Date    TSH 3.573 06/13/2017       ASSESSMENT/PLAN     Renea Mac is a 47 y.o. female with  Past Medical History:   Diagnosis Date    Arthritis     GERD (gastroesophageal reflux disease)     Hypertension     Rotator cuff tear     Sciatica     Syncope 06/2016     Essential hypertension- at goal. Cont current meds. CMP stable.   -     hydrochlorothiazide (HYDRODIURIL) 25 MG tablet; Take 0.5 tablets (12.5 mg total) by mouth once daily.  Dispense: 30 tablet; Refill: 11  -      losartan (COZAAR) 100 MG tablet; Take 1 tablet (100 mg total) by mouth once daily.  Dispense: 30 tablet; Refill 11    Hypokalemia- at goal on last check.     Syncope, unspecified syncope type- f/u with Neuro for EEG tomorrow. Will return to cards if neuro w/u negative. Advised to avoid simple carbs. If eating simple carbs must have protein as well.     Screening for heart disease- FLP today             Follow up with PCP in 1 year for annual or b/f in needed     Patient education provided from [place]. Patient was counseled on when and how to seek emergent care.       Magdalena HERNANDEZ, AKIRA, FNP-c   Department of Internal Medicine - [facility]  9:09 AM

## 2017-07-12 ENCOUNTER — HOSPITAL ENCOUNTER (OUTPATIENT)
Dept: NEUROLOGY | Facility: CLINIC | Age: 47
Discharge: HOME | End: 2017-07-12
Payer: COMMERCIAL

## 2017-07-12 DIAGNOSIS — R55 SYNCOPE AND COLLAPSE: ICD-10-CM

## 2017-07-12 PROCEDURE — 95819 EEG AWAKE AND ASLEEP: CPT | Mod: S$GLB,,, | Performed by: PSYCHIATRY & NEUROLOGY

## 2017-07-12 PROCEDURE — 95819 PR EEG,W/AWAKE & ASLEEP RECORD: ICD-10-PCS | Mod: S$GLB,,, | Performed by: PSYCHIATRY & NEUROLOGY

## 2017-07-14 NOTE — PROCEDURES
ROUTINE ELECTROENCEPHALOGRAM REPORT      Renea Mac  [id number]  1970    DATE OF SERVICE: 7/12/17    REQUESTING PROVIDER: Devaughn Bertrand MD    REASON FOR CONSULT: 46yo F with recurrent episodes of syncope.    PRIOR EEG: none    MEDICATIONS:   Current Outpatient Prescriptions   Medication Sig    acetaminophen (TYLENOL) 500 MG tablet Take 500 mg by mouth every 6 (six) hours as needed for Pain.    diethylpropion 75 mg TbSR Take 75 mg by mouth once daily.    hydrochlorothiazide (HYDRODIURIL) 25 MG tablet Take 0.5 tablets (12.5 mg total) by mouth once daily.    losartan (COZAAR) 100 MG tablet Take 1 tablet (100 mg total) by mouth once daily.    omeprazole (PRILOSEC) 40 MG capsule Take 1 capsule (40 mg total) by mouth once daily. (Patient taking differently: Take 40 mg by mouth daily as needed. )     No current facility-administered medications for this encounter.      METHODOLOGY   Electroencephalographic (EEG) recording is with electrodes placed according to the International 10-20 placement system.  Thirty two (32) channels of digital signal (sampling rate of 512/sec) including T1 and T2 was simultaneously recorded from the scalp and may include  EKG, EMG, and/or eye monitors.  Recording band pass was 0.1 to 512 hz.  Digital video recording of the patient is simultaneously recorded with the EEG.  The patient is instructed report clinical symptoms which may occur during the recording session.  EEG and video recording is stored and archived in digital format. Activation procedures which include photic stimulation, hyperventilation and instructing patients to perform simple task are done in selected patients.    The EEG is displayed on a monitor screen and can be reviewed using different montages.  Computer assisted analysis is employed to detect spike and electrographic seizure activity.   The entire record is submitted for computer analysis.  The entire recording is visually reviewed and the times identified  by computer analysis as being spikes or seizures are reviewed again.  Compresses spectral analysis (CSA) is also performed on the activity recorded from each individual channel.  This is displayed as a power display of frequencies from 0 to 30 Hz over time.   The CSA is reviewed looking for asymmetries in power between homologous areas of the scalp and then compared with the original EEG recording.     [employer] software was also utilized in the review of this study.  This software suite analyzes the EEG recording in multiple domains.  Coherence and rhythmicity is computed to identify EEG sections which may contain organized seizures.  Each channel undergoes analysis to detect presence of spike and sharp waves which have special and morphological characteristic of epileptic activity.  The routine EEG recording is converted from spacial into frequency domain.  This is then displayed comparing homologous areas to identify areas of significant asymmetry.  Algorithm to identify non-cortically generated artifact is used to separate eye movement, EMG and other artifact from the EEG    EEG FINGINGS  Background activity:   The background rhythm was characterized by theta-alpha (7-9 Hz) activity with a 11 Hz posterior dominant alpha rhythm at 30-70 microvolts.   Symmetry and continuity: The background was continuous and symmetric    Sleep:   Normal sleep transients including sleep spindles, K complexes, vertex waves and POSTS were seen.    Activation procedures:   Hyperventilation was performed with no abnormalities seen  Photic stimulation was performed with no abnormalities seen    Abnormal activity:   No epileptiform discharges, periodic discharges, lateralized rhythmic delta activity or electrographic seizures were seen.    IMPRESSION:   This is a normal awake and asleep routine EEG.    A normal EEG does not rule out seizures/epilepsy.  CLINICAL CORRELATION IS RECOMMENDED.    Kamilla Shelley MD, ERLIN(),  FACNS.  Neurology-Epilepsy.

## 2017-08-22 ENCOUNTER — OFFICE VISIT (OUTPATIENT)
Dept: BARIATRICS | Facility: CLINIC | Age: 47
End: 2017-08-22
Payer: COMMERCIAL

## 2017-08-22 VITALS
HEART RATE: 76 BPM | BODY MASS INDEX: 34.92 KG/M2 | DIASTOLIC BLOOD PRESSURE: 72 MMHG | HEIGHT: 64 IN | SYSTOLIC BLOOD PRESSURE: 110 MMHG | WEIGHT: 204.56 LBS

## 2017-08-22 DIAGNOSIS — Z98.84 S/P LAPAROSCOPIC SLEEVE GASTRECTOMY: ICD-10-CM

## 2017-08-22 DIAGNOSIS — E66.01 SEVERE OBESITY (BMI 35.0-39.9) WITH COMORBIDITY: Primary | ICD-10-CM

## 2017-08-22 PROCEDURE — 3008F BODY MASS INDEX DOCD: CPT | Mod: S$GLB,,, | Performed by: INTERNAL MEDICINE

## 2017-08-22 PROCEDURE — 99999 PR PBB SHADOW E&M-EST. PATIENT-LVL III: CPT | Mod: PBBFAC,,, | Performed by: INTERNAL MEDICINE

## 2017-08-22 PROCEDURE — 3074F SYST BP LT 130 MM HG: CPT | Mod: S$GLB,,, | Performed by: INTERNAL MEDICINE

## 2017-08-22 PROCEDURE — 3078F DIAST BP <80 MM HG: CPT | Mod: S$GLB,,, | Performed by: INTERNAL MEDICINE

## 2017-08-22 PROCEDURE — 99213 OFFICE O/P EST LOW 20 MIN: CPT | Mod: S$GLB,,, | Performed by: INTERNAL MEDICINE

## 2017-08-22 RX ORDER — DIETHYLPROPION HYDROCHLORIDE 75 MG/1
75 TABLET, EXTENDED RELEASE ORAL DAILY
Qty: 30 TABLET | Refills: 0 | Status: SHIPPED | OUTPATIENT
Start: 2017-08-22 | End: 2017-11-07 | Stop reason: SDUPTHER

## 2017-08-22 NOTE — PATIENT INSTRUCTIONS
Patient warned of common side effects of diethylpropion including anxiety, insomnia, palpitations and increased blood pressure. It was also explained that it is for short-term usage along with diet and exercise, and that stopping the medication without making lifestyle changes will result in regain of weight. Patient states understanding.    Weight loss medications are controlled substances.  They require routine follow up. Prescription or pills that are lost or destroyed will not be replaced.        Exercise 30 min 3 days a week this week.       Sample meal plan  80-120g protein; 1185-2427 calories  Protein drinks and bars: 0-4 grams sugar  Drink lots of water throughout the day and exercise!  MENU # 1  Breakfast: 2 eggs, 1 turkey sausage tere  Lunch: 2-3 roll-ups (sliced turkey, low-fat slice cheese), baby carrots dipped in 1 Tbsp natural peanut butter  Mid-Day Snack: ¼ cup unsalted almonds, ½ cup fruit  Supper: 1 chicken thigh simmered in tomato sauce + 2 Tbsp mozzarella cheese, ½ cup black beans, 1/2 cup steamed veggies  Evening Snack: 1/2 cup grapes with 4 cubes low-fat cheddar cheese   ___________________________________________________  MENU # 2  Breakfast: protein drink  Mid-morning snack : ¼ cup unsalted nuts  Lunch: 1 cup tuna or chicken salad made with light herron, over salad.   Supper: hamburger tere, slice of cheese, 1 cup steamed veggies.   Snack: light yogurt      Menu #3  Breakfast: 6oz plain Greek yogurt + fruit (½ banana, ½ cup fruit - pineapple, blueberries, strawberries, peach), may add Splenda to manny.  Lunch: ½  chicken breast w/ slice pepper ana maria cheese, 1/2 cup steamed veggies and small salad with Salad Spritzer.    Mid-Day snack: protein drink   Supper: 4oz Grilled fish, grilled veggie kabob ( mushrooms, onion, bell pepper, yellow squash, zucchini, cherry tomatoes)  Evening Snack: fudgsicle no-sugar-added    Menu # 4  Breakfast: vanilla iced coffee: 1 scoop vanilla protein powder + 4oz  skim milk + 4oz coffee   Snack: protein bar  Lunch: 2 Lettuce tacos: ¼ cup seasoned ground turkey wrapped in a Waldemar lettuce leaf with 1 Tbsp shredded cheese and dollop low-fat sour cream  Dinner: Shrimp omelet: 2 eggs, ½ cup shrimp, green onions, and shredded cheese        Menu #5  Breakfast: 1 cup low-fat cottage cheese, ½ cup peaches (no sugar added)  Lunch: 2 oz baked pork chop, 1 cup okra and tomato stew  Snack: 1 cup black beans + salsa + dollop sour cream  Dinner: Caprese chicken salad: 2 oz chicken, 1oz fresh mozzarella, sliced tomato, 1 Tbsp olive oil, basil  Snack: sugar-free pudding cup      Menu #6  Breakfast: ½ cup part-skim ricotta cheese, 2 Tbsp sugar-free strawberry preserves, ¼ cup slivered almonds  Lunch: 2 oz canned chicken, 1oz shredded cheddar cheese, ½ cup black beans, 2 Tbsp salsa  Snack: Protein drink  Dinner: 4 oz grilled salmon steak, over mixed green salad with light dressing          Lower Carb Comfort Food Dupes      Skinny Bell Pepper Willie Boats  Yields: 18 boats  Servin boats  Calories: 145  Total Fat: 9g  Saturated Fat: 4g  Trans Fat: 0g  Cholesterol: 50mg  Sodium: 293mg  Carbohydrates: 4g  Fiber: 1g  Sugars: 2g  Protein: 13g  SmartPoints: 4     Ingredients   1 pound lean ground turkey   1 teaspoons chili powder   1 teaspoon cumin   1/2 teaspoon black pepper   1/4 teaspoon kosher or sea salt   3/4 cup salsa, no sugar added   1 cup grated cheddar cheese, reduced-fat   3 bell peppers  Directions  Remove seeds, core, and membrane from bell peppers then slice each one into 6 verticle pieces where they dip down. Set sliced bell peppers aside.  Cook ground turkey over medium-high heat, breaking up as it cooks. Cook until the turkey loses it's pink color and is cooked through. Drain off any fat.  Preheat oven to 375 degrees.  Combine cooked turkey with spices and salsa. Evenly distribute mixture into the bell pepper boats, top with cheese. Bake on a parchment  lined baking sheet for 10 minutes or until cheese is melted and peppers are hot.  NOTE: If you prefer much softer bell peppers, add a few tablespoons water to the bottom of a large casserole dish, add filled nachos, cover tightly with foil and bake 15 minutes.  Remove from the oven and add additional toppings, If desired.  Optional ingredients: sliced Jalepeno peppers, diced avocado, fat-free Greek yogurt or sour cream, or sliced green onions.    [place] Chicken Spaghetti Squash  Yields: 4 servings  Calories: 457  Total Fat: 23g  Saturated Fat: 8g  Trans Fat: 0g  Cholesterol: 201mg  Sodium: 1146mg  Carbohydrates: 19g  Fiber: 4g  Sugar: 9g  Protein: 44g  SmartPoints: 13    Ingredients   1 large spaghetti squash   1 small onion, diced   2 medium carrots, diced   2 celery stalks, diced   1 pound cooked chicken, shredded   1/2 cup hot sauce   1/4 cup Homemade Ranch dressing   1/2 teaspoon garlic powder   salt and pepper to taste   1 cup low-fat shredded cheddar cheese   2 eggs   1/4 cup green onion, chopped  Directions  Preheat oven to 400 degrees F and spray a baking sheet with cooking spray.  Slice your spaghetti squash in half lengthwise and scoop out the seeds, then spray the cut side of the squash with a little olive oil cooking spray and place cut side down on the baking pan.  Roast spaghetti squash for 30-45 minutes or until it is tender.  While the squash is cooking, sauté the onion, celery, and carrots until softened and mostly cooked through.  In a large bowl, combine the sauteed vegetables, chicken, hot sauce, ranch dressing, garlic powder, salt, pepper, eggs, and cheese.  Once the squash is cooked through, allow to cool slightly then use a fork to scrape the insides into your chicken mixture, making sure not to tear the skins.  Make sure that the spaghetti squash is well incorporated with the other ingredients, then divide the mixture between the spaghetti squash halves.  Bake at 350  degrees for 30-35 minutes, or until hot and bubbly.  Remove from the oven and garnish with the green onions and additional ranch or hot sauce if desired.    Camacho Shelleychini Boats  Servings: 8 zucchini boats  Ingredients   Report this ad    4 medium zucchini (2 1/2 lbs), sliced into halves through the length*   1 cup (8.6 oz) part-skim ricotta cheese   1 large egg   1 1/2 Tbsp chopped fresh parsley , plus more for garnish   1 1/4 cups (5 oz) shredded mozzarella cheese   1/2 cup (2 oz) finely shredded parmesan cheese   8 oz 93% lean ground beef or lean ground turkey   4 tsp olive oil , divided   Salt and freshly ground black pepper   1 3/4 cup roasted garlic marinara sauce (low sugar)   1 Tbsp chopped fresh basil , plus more for garnish  Instructions  1. Preheat oven to 400 degrees. Using a spoon, scoop centers from zucchini while leaving a 1/4-inch rim to create boats. Set aside.  2. In a mixing bowl stir together ricotta cheese, egg and 1 1/2 Tbsp of the parsley. Season lightly with salt and pepper. Stir in 1/2 cup of the mozzarella cheese and the parmesan cheese. Set aside.  3. Heat 2 tsp of the olive oil in a large non-stick skillet over medium-high heat. Crumble beef into pan, season with salt and pepper and cook, stirring occasionally and breaking up beef when stirring, until browned (there shouldn't be any excess fat but if you happened to use a fattier beef then just drain excess rendered fat). Stir in marinara sauce and 1 Tbsp of the basil, remove from heat.  4. To assemble boats, brush both sides of of zucchini lightly with remaining 2 tsp olive oil and place in two baking pans (I used a 13 by 9 and a 9 by 9). Divide cheese mixture among zucchini spooning about 2 1/2 Tbsp into each, then spread cheese mixture into and even layer. Divide sauce among zucchini adding a few heaping spoonfuls to each. Cover baking dishes with foil and place in oven side by side and bake in preheated oven 30 minutes.  Remove from oven, sprinkle tops with remaining 3/4 cup mozzarella, return to oven and bake until cheese has melted and zucchini is tender, about 5 minutes. Sprinkle tops with with fresh basil and parsley and serve warm.  5. *Look for zucchini that is wider and more uniform in width. The skinnier zucchini won't fit much filling.  6. Recipe source: Cook[employer]    Chicken Avocado Lime Soup  Prep Time: 15 minutes  Cook Time: 20 minutes  Ingredients   Report this ad    1 1/2 lbs boneless skinless chicken breasts*   1 Tbsp olive oil   1 cup chopped green onions (including whites, mince the whites)   2 jalapeños , seeded and minced (leave seeds if you want soup spicy, omit if you don't like heat)   2 cloves garlic , minced   4 (14.5 oz) cans low-sodium chicken broth   2 Madonna tomatoes , seeded and diced   1/2 tsp ground cumin   Salt and freshly ground black pepper   1/3 cup chopped cilantro   3 Tbsp fresh lime juice   3 medium avocados , peeled, cored and diced   Tortilla chips , len ana maria cheese, sour cream for serving (optional)    Instructions  1. In a large pot heat 1 Tbsp olive oil over medium heat. Once hot, add green onions and jalapenos and saute until tender, about 2 minutes, adding garlic during last 30 seconds of sauteing. Add chicken broth, tomatoes, cumin, season with salt and pepper to taste and add chicken breasts. Bring mixture to a boil over medium-high heat. Then reduce heat to medium, cover with lid and allow to cook, stirring occasionally, until chicken has cooked through 10 - 15 minutes (cook time will vary based on thickness of chicken breasts). Reduce burner to warm heat, remove chicken from pan and let rest on a cutting board 5 minutes, then shred chicken and return to soup. Stir in cilantro and lime juice. Add avocados to soup just before serving (if you don't plan on serving the soup right away, I would recommend adding the avocados to each bowl individually, about 1/2 an  "avocado per serving). Serve with tortilla chips, cheese and sour cream if desired.  2. *For thicker chicken breasts, cut breasts in half through the length (thickness) of the breasts, they will cook faster and more evenly.  3. Recipe Source: adapted slightly from [facility]        CAULIFLOWER "MAC" AND CHEESE    INGREDIENTS   1 small head cauliflower cut into small florets about 5-6 cups   1/2 small onion, diced   1 teaspoon olive oil   Kosher salt   Freshly ground black pepper   2 tablespoons parsley   1 teaspoon paprika   2 tablespoons butter   2 tablespoons flour   1 1/4 cups milk, (whole milk or coconut is best)   1/2 teaspoon granulated garlic   1 teaspoon mustard (optional)   1/2 teaspoon paprika   2 1/2 cups grated extra sharp cheddar cheese (reserve 1/2 cup)     PREPARATION  1. Preheat oven to 400°F. Place cauliflower florets on a baking sheet, mix with diced onion and oil, sprinkle with salt and pepper. Roast for 20-25 minutes tossing half way through until browned.  2. Warm the milk in the microwave, so it is just heated through (this helps prevent the cheese sauce from clumping). Heat a medium saucepan over medium med-high heat. Add 2 tablespoons butter and flour then whisk for 2 minutes (until a smooth lokesh is made), add milk and continue whisking until sauce thickens. Once smooth add salt and pepper and other spices. Then add the cheese reserving 1/2 cup. Mix with spatula and add cauliflower, stir gently. Now add the reserved cheese, mix just enough to evenly distribute.   4. Place mixture into a 8x8 inch greased casserole dish and cover with paprika and parsley. Bake in oven for 20 minutes until toasty and bubbly.    "

## 2017-08-22 NOTE — PROGRESS NOTES
"Subjective:       Patient ID: Renea Mac is a 47 y.o. female.    Chief Complaint: Follow-up    Pt here today for follow-up. Has lost 8 more lbs. Has had neuro consult and we resumed diethylpropion x one consecutive month.  EEG was normal. She has not been exercising. She states she has just been making. Last week she did have one episode of getting flushed/lightheaded after eating. She does feel like the medication is helping.         Review of Systems   Constitutional: Negative for chills and fever.   Respiratory: Negative for apnea and shortness of breath.    Cardiovascular: Negative for chest pain and leg swelling.   Gastrointestinal: Positive for constipation. Negative for diarrhea.        + GERD   Genitourinary: Negative for difficulty urinating.        S/p hyst   Musculoskeletal: Positive for arthralgias and back pain.   Neurological: Positive for dizziness and syncope. Negative for light-headedness.   Psychiatric/Behavioral: Negative for dysphoric mood. The patient is not nervous/anxious.        Objective:     /72   Pulse 76   Ht 5' 4" (1.626 m)   Wt 92.8 kg (204 lb 9.4 oz)   BMI 35.12 kg/m²     Physical Exam   Constitutional: She is oriented to person, place, and time. She appears well-developed and well-nourished. No distress.   Obese     HENT:   Head: Normocephalic and atraumatic.   Eyes: EOM are normal. Pupils are equal, round, and reactive to light. No scleral icterus.   Neck: Normal range of motion. Neck supple.   Cardiovascular: Normal rate and normal heart sounds.    Pulmonary/Chest: Effort normal and breath sounds normal.   Musculoskeletal: Normal range of motion. She exhibits no edema.   Neurological: She is alert and oriented to person, place, and time. No cranial nerve deficit.   Skin: Skin is warm and dry. No erythema.   Psychiatric: She has a normal mood and affect. Her behavior is normal. Judgment normal.   Vitals reviewed.      Assessment:       1. Severe obesity (BMI " 35.0-39.9) with comorbidity    2. S/P laparoscopic sleeve gastrectomy        Plan:           1. Severe obesity (BMI 35.0-39.9) with comorbidity  Patient warned of common side effects of diethylpropion including anxiety, insomnia, palpitations and increased blood pressure. It was also explained that it is for short-term usage along with diet and exercise, and that stopping the medication without making lifestyle changes will result in regain of weight. Patient states understanding.    Weight loss medications are controlled substances.  They require routine follow up. Prescription or pills that are lost or destroyed will not be replaced.        Exercise 30 min 3 days a week this week.     2. S/P laparoscopic sleeve gastrectomy  [id number] angela menu and meal ideas given.           .

## 2017-10-17 ENCOUNTER — OFFICE VISIT (OUTPATIENT)
Dept: INTERNAL MEDICINE | Facility: CLINIC | Age: 47
End: 2017-10-17
Payer: COMMERCIAL

## 2017-10-17 VITALS
BODY MASS INDEX: 35.53 KG/M2 | OXYGEN SATURATION: 94 % | HEIGHT: 64 IN | WEIGHT: 208.13 LBS | SYSTOLIC BLOOD PRESSURE: 118 MMHG | HEART RATE: 84 BPM | DIASTOLIC BLOOD PRESSURE: 72 MMHG

## 2017-10-17 DIAGNOSIS — I95.2 HYPOTENSION DUE TO DRUGS: ICD-10-CM

## 2017-10-17 DIAGNOSIS — I10 ESSENTIAL HYPERTENSION: Primary | ICD-10-CM

## 2017-10-17 PROCEDURE — 99999 PR PBB SHADOW E&M-EST. PATIENT-LVL III: CPT | Mod: PBBFAC,,, | Performed by: NURSE PRACTITIONER

## 2017-10-17 PROCEDURE — 99213 OFFICE O/P EST LOW 20 MIN: CPT | Mod: S$GLB,,, | Performed by: NURSE PRACTITIONER

## 2017-10-17 RX ORDER — LOSARTAN POTASSIUM 50 MG/1
50 TABLET ORAL DAILY
Qty: 30 TABLET | Refills: 11 | Status: SHIPPED | OUTPATIENT
Start: 2017-10-17 | End: 2017-11-07

## 2017-10-17 NOTE — PROGRESS NOTES
INTERNAL MEDICINE URGENT CARE NOTE    CHIEF COMPLAINT     Chief Complaint   Patient presents with    Follow-up     low bp, possibly change medication        HPI     Renea Mac is a 47 y.o. female who presents for an urgent visit today.    Hypotensive Episode- 4 pm in the afternoon, pt was home and started feeling  lightheaded, dizzy, no syncope. Checked Bp 97/69- had a peppermint and laid down and it was 104/72 and then 10 minutes later 122/78.   Taking HCTZ 12.5mg daily x 1 year. Compliant with losartan 100mg daily.       Having episodes about 1-2 every two weeks. No syncope    Past Medical History:  Past Medical History:   Diagnosis Date    Arthritis     GERD (gastroesophageal reflux disease)     Hypertension     Rotator cuff tear     Sciatica     Syncope 06/2016       Home Medications:  Prior to Admission medications    Medication Sig Start Date End Date Taking? Authorizing Provider   acetaminophen (TYLENOL) 500 MG tablet Take 500 mg by mouth every 6 (six) hours as needed for Pain.   Yes Historical Provider, MD   diethylpropion 75 mg TbSR Take 75 mg by mouth once daily. 8/22/17  Yes Kimberlee Washburn MD   hydrochlorothiazide (HYDRODIURIL) 25 MG tablet Take 0.5 tablets (12.5 mg total) by mouth once daily. 7/11/17  Yes Magdalena Davila NP   losartan (COZAAR) 100 MG tablet Take 1 tablet (100 mg total) by mouth once daily. 7/11/17  Yes Magdalena Davila NP   omeprazole (PRILOSEC) 40 MG capsule Take 1 capsule (40 mg total) by mouth once daily.  Patient taking differently: Take 40 mg by mouth daily as needed.  11/4/16 6/16/17  Quintin Rodgers MD       Review of Systems:  Review of Systems   Constitutional: Negative for chills, fatigue, fever and unexpected weight change.   HENT: Negative for congestion, hearing loss, rhinorrhea and sinus pressure.    Eyes: Negative for pain, redness and visual disturbance.   Respiratory: Negative for cough and shortness of breath.    Cardiovascular:  "Negative for chest pain and palpitations.   Gastrointestinal: Negative for abdominal distention, abdominal pain, constipation, diarrhea, nausea and vomiting.   Endocrine: Negative for polydipsia, polyphagia and polyuria.   Genitourinary: Negative for dysuria, frequency, urgency and vaginal discharge.   Musculoskeletal: Negative for arthralgias, gait problem and myalgias.   Skin: Negative for color change and rash.   Allergic/Immunologic: Negative for environmental allergies and immunocompromised state.   Neurological: Positive for dizziness and light-headedness. Negative for weakness and headaches.   Hematological: Negative for adenopathy. Does not bruise/bleed easily.   Psychiatric/Behavioral: Negative for confusion and sleep disturbance. The patient is not nervous/anxious.        Health Maintainence:   Immunizations:  Health Maintenance       Date Due Completion Date    TETANUS VACCINE 03/28/1990 ---    Influenza Vaccine 08/01/2017 ---    Mammogram 07/05/2019 7/5/2017    Lipid Panel 07/11/2022 7/11/2017           PHYSICAL EXAM     /72 (BP Location: Left arm, Patient Position: Sitting, BP Method: Large (Manual))   Pulse 84   Ht 5' 4" (1.626 m)   Wt 94.4 kg (208 lb 1.8 oz)   SpO2 (!) 94%   BMI 35.72 kg/m²     Physical Exam   Constitutional: She is oriented to person, place, and time. She appears well-developed and well-nourished.   HENT:   Head: Normocephalic.   Right Ear: External ear normal.   Left Ear: External ear normal.   Nose: Nose normal.   Mouth/Throat: Oropharynx is clear and moist. No oropharyngeal exudate.   Eyes: Pupils are equal, round, and reactive to light.   Neck: Neck supple. No JVD present. No tracheal deviation present. No thyromegaly present.   Cardiovascular: Normal rate, regular rhythm, normal heart sounds and intact distal pulses.  Exam reveals no gallop and no friction rub.    No murmur heard.  Pulmonary/Chest: Effort normal and breath sounds normal. No respiratory distress. She " has no wheezes. She has no rales.   Abdominal: Soft. Bowel sounds are normal. She exhibits no distension. There is no tenderness.   Musculoskeletal: Normal range of motion. She exhibits no edema or tenderness.   Lymphadenopathy:     She has no cervical adenopathy.   Neurological: She is alert and oriented to person, place, and time.   Skin: Skin is warm and dry. No rash noted.   Psychiatric: She has a normal mood and affect. Her behavior is normal.   Vitals reviewed.      LABS     No results found for: LABA1C, HGBA1C  CMP  Sodium   Date Value Ref Range Status   06/15/2017 140 136 - 145 mmol/L Final     Potassium   Date Value Ref Range Status   06/15/2017 4.1 3.5 - 5.1 mmol/L Final     Chloride   Date Value Ref Range Status   06/15/2017 107 95 - 110 mmol/L Final     CO2   Date Value Ref Range Status   06/15/2017 27 23 - 29 mmol/L Final     Glucose   Date Value Ref Range Status   06/15/2017 81 70 - 110 mg/dL Final     BUN, Bld   Date Value Ref Range Status   06/15/2017 18 6 - 20 mg/dL Final     Creatinine   Date Value Ref Range Status   06/15/2017 0.8 0.5 - 1.4 mg/dL Final     Calcium   Date Value Ref Range Status   06/15/2017 9.1 8.7 - 10.5 mg/dL Final     Total Protein   Date Value Ref Range Status   06/13/2017 7.4 6.0 - 8.4 g/dL Final     Albumin   Date Value Ref Range Status   06/13/2017 3.5 3.5 - 5.2 g/dL Final     Total Bilirubin   Date Value Ref Range Status   06/13/2017 0.3 0.1 - 1.0 mg/dL Final     Comment:     For infants and newborns, interpretation of results should be based  on gestational age, weight and in agreement with clinical  observations.  Premature Infant recommended reference ranges:  Up to 24 hours.............<8.0 mg/dL  Up to 48 hours............<12.0 mg/dL  3-5 days..................<15.0 mg/dL  6-29 days.................<15.0 mg/dL       Alkaline Phosphatase   Date Value Ref Range Status   06/13/2017 77 55 - 135 U/L Final     AST   Date Value Ref Range Status   06/13/2017 15 10 - 40 U/L  Final     ALT   Date Value Ref Range Status   06/13/2017 10 10 - 44 U/L Final     Anion Gap   Date Value Ref Range Status   06/15/2017 6 (L) 8 - 16 mmol/L Final     eGFR if    Date Value Ref Range Status   06/15/2017 >60.0 >60 mL/min/1.73 m^2 Final     eGFR if non    Date Value Ref Range Status   06/15/2017 >60.0 >60 mL/min/1.73 m^2 Final     Comment:     Calculation used to obtain the estimated glomerular filtration  rate (eGFR) is the CKD-EPI equation. Since race is unknown   in our information system, the eGFR values for   -American and Non--American patients are given   for each creatinine result.       Lab Results   Component Value Date    WBC 9.06 06/13/2017    HGB 12.5 06/13/2017    HCT 37.9 06/13/2017    MCV 81 (L) 06/13/2017     06/13/2017     Lab Results   Component Value Date    CHOL 153 07/11/2017     Lab Results   Component Value Date    HDL 64 07/11/2017     Lab Results   Component Value Date    LDLCALC 81.0 07/11/2017     Lab Results   Component Value Date    TRIG 40 07/11/2017     Lab Results   Component Value Date    CHOLHDL 41.8 07/11/2017     Lab Results   Component Value Date    TSH 3.573 06/13/2017       ASSESSMENT/PLAN     Renea Mac is a 47 y.o. female with  Past Medical History:   Diagnosis Date    Arthritis     GERD (gastroesophageal reflux disease)     Hypertension     Rotator cuff tear     Sciatica     Syncope 06/2016     Essential hypertension  Hypotension due to drugs- will decrease losartan to 50 mg daily. Cont hctz. Low Na diet   -     losartan (COZAAR) 50 MG tablet; Take 1 tablet (50 mg total) by mouth once daily.  Dispense: 30 tablet; Refill: 11          Follow up with PCP in 3 months     Patient education provided from [facility]. Patient was counseled on when and how to seek emergent care.       Magdalena HERNANDEZ, APRN, FNP-c   Department of Internal Medicine - [facility]  3:13 PM

## 2017-11-06 ENCOUNTER — TELEPHONE (OUTPATIENT)
Dept: BARIATRICS | Facility: CLINIC | Age: 47
End: 2017-11-06

## 2017-11-07 ENCOUNTER — OFFICE VISIT (OUTPATIENT)
Dept: BARIATRICS | Facility: CLINIC | Age: 47
End: 2017-11-07
Payer: COMMERCIAL

## 2017-11-07 VITALS
HEIGHT: 64 IN | BODY MASS INDEX: 34.74 KG/M2 | DIASTOLIC BLOOD PRESSURE: 68 MMHG | WEIGHT: 203.5 LBS | HEART RATE: 109 BPM | SYSTOLIC BLOOD PRESSURE: 104 MMHG

## 2017-11-07 DIAGNOSIS — I10 ESSENTIAL HYPERTENSION: Primary | ICD-10-CM

## 2017-11-07 DIAGNOSIS — Z98.84 S/P LAPAROSCOPIC SLEEVE GASTRECTOMY: ICD-10-CM

## 2017-11-07 DIAGNOSIS — E66.9 OBESITY (BMI 30.0-34.9): ICD-10-CM

## 2017-11-07 PROCEDURE — 99999 PR PBB SHADOW E&M-EST. PATIENT-LVL III: CPT | Mod: PBBFAC,,, | Performed by: INTERNAL MEDICINE

## 2017-11-07 PROCEDURE — 99213 OFFICE O/P EST LOW 20 MIN: CPT | Mod: S$GLB,,, | Performed by: INTERNAL MEDICINE

## 2017-11-07 RX ORDER — DIETHYLPROPION HYDROCHLORIDE 75 MG/1
75 TABLET, EXTENDED RELEASE ORAL DAILY
Qty: 30 TABLET | Refills: 2 | Status: SHIPPED | OUTPATIENT
Start: 2017-11-07 | End: 2018-01-30

## 2017-11-07 NOTE — PATIENT INSTRUCTIONS
- To lose weight you want to cut 100% starchy carbohydrates out of your diet (bread, rice, pasta, potatoes, granola, flour, corn, peas, oatmeal, grits, tortillas, crackers, chips) and get  grams of protein.  Aim for 100 grams of protein daily.    - No soda, sweet tea, juices, sports drinks or lemonade     -Exercise daily    - Premier Protein (Chocolate, Bananas & Cream, Strawberries & Cream, Vanilla) [place] or [employer]co    - Syntrax [place] from [employer], www.bariatricadvantage.com, www.bariatricchoice.com. (LACTOSE FREE)    - Atkins Lift - [employer] & [employer] (LACTOSE FREE)    - Veggetti Pro from [employer], [employer], Bed Bath & Beyond    - www.pinterest.com (cauliflower, cloud bread, quest bar cookies, eggplant, zucchini, zucchini noodles, crustless quiche, no carb meals, taco lettuce boats)    - http://thewkraigaccordingtoeggface.Worldscape.MabLyte/    Helpful tips to survive the holidays:  - Dont save yourself for the meal: Make sure you continue to eat high protein small meals every 3-4 hours to ensure to do not become over-hungry. Avoid temptation by not showing up to a holiday party or gathering hungry.   - Plan ahead. Bring a dish to a party if you know there may not be an appropriate option.   - Choose sugar-free drinks: Stick to water or other sugar-free beverages and make sure you are getting 6-8 cups of fluid each day (but not with meals!). Avoid alcohol, carbonated beverages, and high-fat/high-sugar beverages like hot chocolate and eggnog. Try sugar-free hot cocoa made with skim milk or water, or sugar-free spiced tea to add some holiday flair to your beverage (see sugar-free mulled cider recipe below)  - Take your time: Eat mindfully. Dont graze on food throughout the day. Sit down to enjoy your small meals. Chew slowly and thoroughly. Cut your food into small pieces before eating.  - Listen to your body: Stop eating as soon as you feel full. Do not feel pressured to try certain (or all) foods or to eat all  of the food on your plate. Listen to your hunger cues.   - REMEMBER: Make your holidays about spending time with family and friends instead of focusing gatherings around food.  - Keep up your exercise routine: Make sure you continue to get regular exercise throughout the holiday season. Encourage friends and family to be active by taking a walk together after a meal, to look at holiday lights, or to window-shop.    Good Holiday Meal Options:  - Roasted Turkey, NO skin. Use low sodium broth instead of gravy.   - Stuffed Bell Peppers made WITHOUT bread crumbs or Rice. Try using parmesan cheese instead  - Gumbo, NO rice. Try picking out mostly the meat/seafood and vegetables with little broth.   - Green Bean Casserole made with 98% fat free cream of mushroom soup and crushed almonds/pecans instead of fried onions  - Side salad w/ low fat dressing. Try a different kind of salad maybe use Kale or spinach.   - Roasted non-starchy vegetables like brussel sprouts, broccoli, green beans, zucchini, butternut squash, cauliflower  - Cauliflower Mash (steam or roast cauliflower, puree w/ low fat cheese, dash of fat free milk and 2-3 sprays of I cant believe its not butter spray. Add garlic powder and black pepper to season). Use Low sodium broth instead of gravy.   - Try Loaded Cauliflower Mash (Make cauliflower like above cauliflower mash. Top with diced turkey reeder, ¼ cup low fat cheddar cheese and bake @ 350* F for 5-10 minutes, until cheese is melted. Top with minced chives, black pepper and garlic to taste).   - Homemade cranberry sauce using Splenda or another alternative sweetener. Boil fresh cranberries and add splenda to taste. Boil until cranberries break open and then simmer until it reaches the consistency you want (less time for more watery sauce and simmer for longer to create a thicker sauce).   - Deviled eggs: make using low fat herron, mustard, DILL relish (not sweet relish).   - Vegetable tray w/ Greek yogurt  Ranch Dip. Mix 1 packet of hidden valley ranch dip mix w/ 16 oz low fat plain greek yogurt.     Good Holiday Dessert Options:  - High protein Pumpkin Cheesecake (see recipe below)  - Pumpkin Whip (see recipe below)  - Quest Apple Pie or Cinnamon Roll flavored protein bar (warm in microwave for 10-15 seconds)  - Eggnog Protein shake (see recipe below)  - Fresh fruit w/ low fat cheese  - Sugar-free Jello Parfaits. Layer Red and Green sugar-free jello in cups and top w/ 2 tbsp Sugar-free cool-whip    Pumpkin Cheesecake    8 ounces fat free cream cheese, softened   2 scoops of vanilla protein powder (<4 g sugar per serving)   ¼ tsp Fine salt   2 eggs, at room temperature   1/3 cup fat free sour cream  1/3 cup fat free half and half  1 15 -ounce can pure pumpkin puree   1 tablespoon pumpkin pie spice, plus more for dusting   Unsalted nuts, crushed  *Add splenda to taste    Directions     1. Preheat the oven to 300 degrees F. Line 18 muffin cups with paper liners. Sprinkle 1 tsp crushed unsalted nuts at the bottom of each of muffin cup liner.     2. In a large bowl, beat the cream cheese, vanilla protein powder and 1/4 teaspoon fine salt on medium-high speed until smooth and creamy, 2 to 3 minutes. Scrape down the sides, reduce speed to low and beat in the eggs, 1 at a time, until combined. Beat in 1/3 cup fat free sour cream and fat free half and half. Stir in the pumpkin puree and pumpkin pie spice until smooth. Divide evenly among cookie-lined paper cups, filling almost all the way to the top.     3. Bake until the filling is just set, 40 to 45 minutes. A sharp knife inserted into the center will come out moist, but clean. Cool completely in tins on a wire rack. Refrigerate until cold, 4 hours, or overnight. Top with a dusting of pumpkin pie spice.    Recipe altered from the following recipe: http://www.[employer].TaCerto.com/recipes/food-network-tiff/mini-pumpkin-cheesecakes-recipe.print.html?oc=linkback    Pumpkin  Whip    Box of sugar-free vanilla pudding  Can of pumpkin puree  Pumpkin Pie spice (sprinkle to taste)  ½ cup of sugar-free Cool Whip    Directions:  Make sugar-free pudding according to package directions using fat free or 1% milk. Stir in pumpkin and cool whip. Add pumpkin pie spice to taste.     Egg Nog Protein shake    8 oz skim or 1% milk  1 scoop vanilla protein powder  1 tbsp sugar-free vanilla pudding mix  ½ tsp butter flavor extract  ½ tsp rum extract  ½ tsp cinnamon     Shake together or blend with ice and serve.     Sugar-Free Mulled Cider    3 oz diet cran-apple juice  6 oz water  1 packet sugar-free apple cider mix  ½ tsp apple pie spice  ½ tsp butter flavor extract  1 tbsp Sugar-free Syrup    Mix together. Warm if needed and serve w/ orange wedge and cinnamon stick.

## 2017-11-07 NOTE — PROGRESS NOTES
"Subjective:       Patient ID: Renea Mac is a 47 y.o. female.    Chief Complaint: Follow-up    Pt here today for follow-up. Has lost 1 more lbs. Her weight was up for a while, but now going back down. Was on a cruise.  diethylpropion last filled in Sept .  Her BP meds have been reduced, but her reading is still low today. She has had 2 episodes of syncope in the past several months.       Review of Systems   Constitutional: Negative for chills and fever.   Respiratory: Negative for apnea and shortness of breath.    Cardiovascular: Negative for chest pain and leg swelling.   Gastrointestinal: Positive for constipation. Negative for diarrhea.        + GERD   Genitourinary: Negative for difficulty urinating.        S/p hyst   Musculoskeletal: Positive for arthralgias and back pain.   Neurological: Positive for dizziness and syncope. Negative for light-headedness.   Psychiatric/Behavioral: Negative for dysphoric mood. The patient is not nervous/anxious.        Objective:     /68   Pulse 109   Ht 5' 4" (1.626 m)   Wt 92.3 kg (203 lb 7.8 oz)   BMI 34.93 kg/m²     Physical Exam   Constitutional: She is oriented to person, place, and time. She appears well-developed and well-nourished. No distress.   Obese     HENT:   Head: Normocephalic and atraumatic.   Eyes: EOM are normal. Pupils are equal, round, and reactive to light. No scleral icterus.   Neck: Normal range of motion. Neck supple.   Cardiovascular: Normal rate and normal heart sounds.    Pulmonary/Chest: Effort normal and breath sounds normal.   Musculoskeletal: Normal range of motion. She exhibits no edema.   Neurological: She is alert and oriented to person, place, and time. No cranial nerve deficit.   Skin: Skin is warm and dry. No erythema.   Psychiatric: She has a normal mood and affect. Her behavior is normal. Judgment normal.   Vitals reviewed.      Assessment:       1. Essential hypertension    2. Obesity (BMI 30.0-34.9)    3. S/P " laparoscopic sleeve gastrectomy        Plan:                 .    1. Essential hypertension  Stop losartan. Check BP at home. Notify PCP if above 130/80 consistently.     2. Obesity (BMI 30.0-34.9)  Doing well  - diethylpropion 75 mg TbSR; Take 75 mg by mouth once daily.  Dispense: 30 tablet; Refill: 2    3. S/P laparoscopic sleeve gastrectomy  Continue bariatric diet, exercise and vitamins. Holiday tips given.

## 2018-01-30 ENCOUNTER — OFFICE VISIT (OUTPATIENT)
Dept: BARIATRICS | Facility: CLINIC | Age: 48
End: 2018-01-30
Payer: COMMERCIAL

## 2018-01-30 VITALS
HEIGHT: 64 IN | DIASTOLIC BLOOD PRESSURE: 70 MMHG | SYSTOLIC BLOOD PRESSURE: 110 MMHG | WEIGHT: 204.56 LBS | BODY MASS INDEX: 34.92 KG/M2 | HEART RATE: 76 BPM

## 2018-01-30 DIAGNOSIS — E66.01 SEVERE OBESITY (BMI 35.0-35.9 WITH COMORBIDITY): Primary | ICD-10-CM

## 2018-01-30 DIAGNOSIS — Z98.84 S/P LAPAROSCOPIC SLEEVE GASTRECTOMY: ICD-10-CM

## 2018-01-30 PROCEDURE — 99999 PR PBB SHADOW E&M-EST. PATIENT-LVL III: CPT | Mod: PBBFAC,,, | Performed by: INTERNAL MEDICINE

## 2018-01-30 PROCEDURE — 99213 OFFICE O/P EST LOW 20 MIN: CPT | Mod: S$GLB,,, | Performed by: INTERNAL MEDICINE

## 2018-01-30 NOTE — PROGRESS NOTES
"Subjective:       Patient ID: Renea Mac is a 47 y.o. female.    Chief Complaint: Follow-up    Pt here today for follow-up. Has gained a lb. Her weight was up for a while, but now going back down. Was on a cruise.  diethylpropion last filled in Sept .  Her BP meds have been reduced, but her reading is still low today. She has had 2 episodes of syncope in the past several months. Taking half tab HCTZ for BP currently. Has been in 110s/70s. Has been drinking a Boost shake with 20 g sugar and 360 calories. Has not been exercising currently.       Review of Systems   Constitutional: Negative for chills and fever.   Respiratory: Negative for apnea and shortness of breath.    Cardiovascular: Negative for chest pain and leg swelling.   Gastrointestinal: Positive for constipation. Negative for diarrhea.        + GERD   Genitourinary: Negative for difficulty urinating.        S/p hyst   Musculoskeletal: Positive for arthralgias and back pain.   Neurological: Positive for dizziness and syncope. Negative for light-headedness.   Psychiatric/Behavioral: Negative for dysphoric mood. The patient is not nervous/anxious.        Objective:     /70   Pulse 76   Ht 5' 4" (1.626 m)   Wt 92.8 kg (204 lb 9.4 oz)   BMI 35.12 kg/m²     Physical Exam   Constitutional: She is oriented to person, place, and time. She appears well-developed and well-nourished. No distress.   Obese     HENT:   Head: Normocephalic and atraumatic.   Eyes: EOM are normal. Pupils are equal, round, and reactive to light. No scleral icterus.   Neck: Normal range of motion. Neck supple.   Cardiovascular: Normal rate and normal heart sounds.    Pulmonary/Chest: Effort normal and breath sounds normal.   Musculoskeletal: Normal range of motion. She exhibits no edema.   Neurological: She is alert and oriented to person, place, and time. No cranial nerve deficit.   Skin: Skin is warm and dry. No erythema.   Psychiatric: She has a normal mood and affect. Her " behavior is normal. Judgment normal.   Vitals reviewed.      Assessment:       1. Severe obesity (BMI 35.0-35.9 with comorbidity)    2. S/P laparoscopic sleeve gastrectomy        Plan:               1. Severe obesity (BMI 35.0-35.9 with comorbidity)  Resources for appropriate protein drinks given. Advised to always read labels.   - phentermine (LOMAIRA) 8 mg Tab; Take 1 tablet by mouth 3 (three) times daily as needed. For appetite supression  Dispense: 90 each; Refill: 1    2. S/P laparoscopic sleeve gastrectomy  See above. Resume exercise.

## 2018-01-30 NOTE — PATIENT INSTRUCTIONS
Patient warned of common side effects of phentermine including anxiety, insomnia, palpitations and increased blood pressure. It was also explained that it is for short-term usage along with diet and exercise, and that stopping the medication without making lifestyle changes will result in regain of weight. Patient states understanding.     Weight loss medications are controlled substances.  They require routine follow up. Prescription or pills that are lost or destroyed will not be replaced.     - To lose weight you want to cut 100% starchy carbohydrates out of your diet (bread, rice, pasta, potatoes, granola, flour, corn, peas, oatmeal, grits, tortillas, crackers, chips) and get  grams of protein.  Aim for 100 grams of protein daily.    - No soda, sweet tea, juices, sports drinks or lemonade     -Exercise daily    - Premier Protein (Chocolate, Bananas & Cream, Strawberries & Cream, Vanilla) [place] or [employer]    - Syntrax [place] from [employer], www.bariatricad1010dataage.com, www.bariatricchoice.com. (LACTOSE FREE)    - Atkins Lift - [employer] & [place] (LACTOSE FREE)    - Veggetti Pro from [employer], [employer], Bed Bath & Beyond    - www.pinterest.com (cauliflower, cloud bread, quest bar cookies, eggplant, zucchini, zucchini noodles, crustless quiche, no carb meals, taco lettuce boats)    - http://thewkraigaccordingtoeggface.Knip.com/      Snacks: (100-200 calories; >5g protein)    - 1 low-fat cheese stick with 8 cherry tomatoes or 1 serving fresh fruit  - 4 thin slices fat-free turkey breast and 1 slice low-fat cheese  - 4 thin slices fat-free honey ham with wedge of melon  - 2 slices of turkey reeder  - Boiled eggs (can buy at costco already boiled w/ shell removed)  - for convenience,  [place] read, snack, go (deli meat and cheese rolls)  - P3 packets (Protein packs w/ cheese, nuts, lean deli meat)  - [facility] Fit and Lean Protein Pudding (find at [employer] - per 1 cup serving = 100 calories, 15 g protein, 0 g  "sugar)  - 6-8 edamame pods (equivalent to about 1/4 cup edamame without pods).   - 1/4 cup unsalted nuts with ½ cup fruit  - 6-oz container Dannon Light n Fit vanilla yogurt, topped with 1oz unsalted nuts         - apple, celery or baby carrots spread with 2 Tbsp PB2  - apple slices with 1 oz slice low-fat cheese  - Apple slices dipped in 2 Tbsp of PB2  - 2 Tbsp PB2 mixed in light or greek yogurt or sugar-free pudding  - celery, cucumber, bell pepper or baby carrots dipped in ¼ cup hummus bean spread   - celery, cucumber, baby carrots dipped in high protein greek yogurt (Mix 16 oz plain greek yogurt + 1 packet of hidden valley ranch dip mix)  - Shaun Links Beef Steak - 14g protein! (similar to beef jerky but very lean)  - 2 wedges Laughing Cow - Light Herb & Garlic Cheese with sliced cucumber or green bell pepper  - 1/2 cup low-fat cottage cheese with ¼ cup fruit or ¼ cup salsa  - 1/2 cup low fat cottage cheese with 10-15 cherry tomatoes  - 8 oz glass of FAIRLIFE fat free milk (13 g protein)  - 8 oz glass of FAIRLIFE fat free milk + 1 packet of sugar-free hot cocoa  - Add Atkins advantage Cafe Caramel shake to decaf coffee. Serve hot or blend with ice for "frappaccino" like drink  - RTD Protein drinks: Atkins, Low Carb Slim Fast, EAS light, Muscle Milk Light, etc.  - Homemade Protein drinks: GNC Soy95, Isopure, Nectar, UNJURY, Whey Gourmet, etc. Mix 1 scoop powder with 8oz skim/1% milk or light soymilk.  - Protein bars: Atkins, EAS, Pure Protein,  Quest, Think Thin, Detour, etc. Must have 0-4 grams sugar - Read the label.        "

## 2018-01-31 ENCOUNTER — TELEPHONE (OUTPATIENT)
Dept: INTERNAL MEDICINE | Facility: CLINIC | Age: 48
End: 2018-01-31

## 2018-03-01 ENCOUNTER — TELEPHONE (OUTPATIENT)
Dept: OBSTETRICS AND GYNECOLOGY | Facility: CLINIC | Age: 48
End: 2018-03-01

## 2018-03-01 ENCOUNTER — OFFICE VISIT (OUTPATIENT)
Dept: OBSTETRICS AND GYNECOLOGY | Facility: CLINIC | Age: 48
End: 2018-03-01
Payer: COMMERCIAL

## 2018-03-01 VITALS
SYSTOLIC BLOOD PRESSURE: 118 MMHG | BODY MASS INDEX: 34.77 KG/M2 | WEIGHT: 203.69 LBS | DIASTOLIC BLOOD PRESSURE: 74 MMHG | HEIGHT: 64 IN

## 2018-03-01 DIAGNOSIS — Z01.419 WELL WOMAN EXAM WITH ROUTINE GYNECOLOGICAL EXAM: Primary | ICD-10-CM

## 2018-03-01 DIAGNOSIS — Z90.710 POST HYSTERECTOMY MENOPAUSE: ICD-10-CM

## 2018-03-01 DIAGNOSIS — E89.40 POST HYSTERECTOMY MENOPAUSE: ICD-10-CM

## 2018-03-01 PROCEDURE — 99999 PR PBB SHADOW E&M-EST. PATIENT-LVL III: CPT | Mod: PBBFAC,,, | Performed by: NURSE PRACTITIONER

## 2018-03-01 PROCEDURE — 99386 PREV VISIT NEW AGE 40-64: CPT | Mod: S$GLB,,, | Performed by: NURSE PRACTITIONER

## 2018-03-01 NOTE — PROGRESS NOTES
HISTORY OF PRESENT ILLNESS:    Renea Mac is a 47 y.o. female, , No LMP recorded. Patient has had a hysterectomy.,  presents for a routine exam and has no gyn complaints.  -Here to get established for care.   -Denies vasomotor symptoms, vaginal dryness.    Past Medical History:   Diagnosis Date    Arthritis     GERD (gastroesophageal reflux disease)     Hypertension     Rotator cuff tear     Sciatica     Syncope 2016       Past Surgical History:   Procedure Laterality Date     SECTION      x1    HERNIA REPAIR      HYSTERECTOMY      YANG, ovaries remain (fibroids0    SLEEVE GASTROPLASTY  2014    TUBAL LIGATION         MEDICATIONS AND ALLERGIES:      Current Outpatient Prescriptions:     acetaminophen (TYLENOL) 500 MG tablet, Take 500 mg by mouth every 6 (six) hours as needed for Pain., Disp: , Rfl:     CYCLOBENZAPRINE HCL (FLEXERIL ORAL), Take by mouth., Disp: , Rfl:     hydrochlorothiazide (HYDRODIURIL) 25 MG tablet, Take 0.5 tablets (12.5 mg total) by mouth once daily., Disp: 30 tablet, Rfl: 11    phentermine (LOMAIRA) 8 mg Tab, Take 1 tablet by mouth 3 (three) times daily as needed. For appetite supression, Disp: 90 each, Rfl: 1    Review of patient's allergies indicates:  No Known Allergies    Family History   Problem Relation Age of Onset    Hypertension Mother     Heart disease Mother     Heart attack Mother     Rectal cancer Mother     Hypertension Father     Heart disease Father     Stroke Father     Heart attack Father     Hypertension Sister     Hypertension Brother     Cancer Brother     No Known Problems Daughter     No Known Problems Son        Social History     Social History    Marital status:      Spouse name: N/A    Number of children: N/A    Years of education: N/A     Occupational History    Not on file.     Social History Main Topics    Smoking status: Never Smoker    Smokeless tobacco: Never Used    Alcohol use Yes    Drug  "use: No    Sexual activity: Yes     Partners: Male     Other Topics Concern    Not on file     Social History Narrative    No narrative on file       Number of Vaginal deliveries:2 Number of C/S:1    COMPREHENSIVE GYN HISTORY:  PAP History: Denies abnormal Paps.  Infection History: Denies STDs. Denies PID.  Benign History: Reports uterine fibroids. Denies ovarian cysts. Denies endometriosis. Denies other conditions.  Cancer History: Denies cervical cancer. Denies uterine cancer or hyperplasia. Denies ovarian cancer. Denies vulvar cancer or pre-cancer. Denies vaginal cancer or pre-cancer. Denies breast cancer. Denies colon cancer.  Sexual Activity History: Reports currently being sexually active  Menstrual History: Denies menses. Pt is : (Hyst 2011) not on ERT.    ROS:  GENERAL: + INTENTIONAL WT LOSS. No swelling. No fatigue. No fever.  CARDIOVASCULAR: No chest pain. No shortness of breath. No leg cramps.   NEUROLOGICAL: No headaches. No vision changes.  BREASTS: No pain. No lumps. No discharge.  ABDOMEN: No pain. No nausea. No vomiting. No diarrhea. + CONSTIPATION.  REPRODUCTIVE: No abnormal bleeding.   VULVA: No pain. No lesions. No itching.  VAGINA: No relaxation. No itching. No odor. No discharge. No lesions.  URINARY: No incontinence. No nocturia. No frequency. No dysuria.    /74   Ht 5' 4" (1.626 m)   Wt 92.4 kg (203 lb 11.3 oz)   BMI 34.97 kg/m²     PE:  APPEARANCE: Well nourished, well developed, in no acute distress.  AFFECT: WNL, alert and oriented x 3.  SKIN: No acne or hirsutism.  NECK: Neck symmetric, without masses or thyromegaly.  NODES: No inguinal, cervical, axillary or femoral lymph node enlargement.  CHEST: Good respiratory effort.   ABDOMEN: Soft. No tenderness or masses. OBESE.  BREASTS: Symmetrical, no skin changes, visible lesions, palpable masses or nipple discharge bilaterally.  PELVIC: External female genitalia without lesions.  Female hair distribution. Adequate perineal " body, Normal urethral meatus. Vagina moist and well rugated without lesions or discharge.  No significant cystocele or rectocele present. BIMANUAL EXAM SHOWS CERVIX and UTERUS to be SURGICALLY ABSENT. Adnexa without masses or tenderness.  EXTREMITIES: No edema    DIAGNOSIS:  1. Well woman exam with routine gynecological exam    2. Post hysterectomy menopause        PLAN:    LABS AND TESTS ORDERED:  Up to date on mammogram    MEDICATIONS PRESCRIBED:  None    COUNSELING:  The patient was counseled today on:  -A.C.S. Pap and pelvic exam guidelines (no pap) with recommendation for yearly exam as her uterus, cervix was removed for benign reasons, ovaries remain;   -recomendations for yearly mammogram;  -to follow up with her PCP for other health maintenance.    FOLLOW-UP annually.

## 2018-04-09 ENCOUNTER — OFFICE VISIT (OUTPATIENT)
Dept: ORTHOPEDICS | Facility: CLINIC | Age: 48
End: 2018-04-09
Payer: COMMERCIAL

## 2018-04-09 ENCOUNTER — HOSPITAL ENCOUNTER (OUTPATIENT)
Dept: RADIOLOGY | Facility: HOSPITAL | Age: 48
Discharge: HOME | End: 2018-04-09
Attending: PHYSICIAN ASSISTANT
Payer: COMMERCIAL

## 2018-04-09 VITALS — BODY MASS INDEX: 34.85 KG/M2 | WEIGHT: 204.13 LBS | HEIGHT: 64 IN

## 2018-04-09 DIAGNOSIS — M75.111 INCOMPLETE TEAR OF RIGHT ROTATOR CUFF: ICD-10-CM

## 2018-04-09 DIAGNOSIS — M25.511 RIGHT SHOULDER PAIN, UNSPECIFIED CHRONICITY: ICD-10-CM

## 2018-04-09 DIAGNOSIS — M25.511 RIGHT SHOULDER PAIN, UNSPECIFIED CHRONICITY: Primary | ICD-10-CM

## 2018-04-09 PROCEDURE — 73030 X-RAY EXAM OF SHOULDER: CPT | Mod: TC,RT

## 2018-04-09 PROCEDURE — 99999 PR PBB SHADOW E&M-EST. PATIENT-LVL III: CPT | Mod: PBBFAC,,, | Performed by: PHYSICIAN ASSISTANT

## 2018-04-09 PROCEDURE — 73030 X-RAY EXAM OF SHOULDER: CPT | Mod: 26,RT,, | Performed by: RADIOLOGY

## 2018-04-09 PROCEDURE — 99213 OFFICE O/P EST LOW 20 MIN: CPT | Mod: 25,S$GLB,, | Performed by: PHYSICIAN ASSISTANT

## 2018-04-09 PROCEDURE — 20610 DRAIN/INJ JOINT/BURSA W/O US: CPT | Mod: RT,S$GLB,, | Performed by: PHYSICIAN ASSISTANT

## 2018-04-09 RX ORDER — METHYLPREDNISOLONE ACETATE 80 MG/ML
80 INJECTION, SUSPENSION INTRA-ARTICULAR; INTRALESIONAL; INTRAMUSCULAR; SOFT TISSUE
Status: COMPLETED | OUTPATIENT
Start: 2018-04-09 | End: 2018-04-09

## 2018-04-09 RX ADMIN — METHYLPREDNISOLONE ACETATE 80 MG: 80 INJECTION, SUSPENSION INTRA-ARTICULAR; INTRALESIONAL; INTRAMUSCULAR; SOFT TISSUE at 03:04

## 2018-04-09 NOTE — PROGRESS NOTES
Subjective:      Patient ID: Renea Mac is a 48 y.o. female.    Chief Complaint: No chief complaint on file.    HPI  48 year old female presents with chief complaint of right shoulder pain x 2 days. She says an elevator door hit her forearm. She has h/o intermittent shoulder pain. She was seen in 2016 and received a cortisone injection that gave her good relief. She also did PT in the past with good relief. She does not continue HEP. She has pain when laying on her shoulder. She takes tylenol and flexeril with mild relief. She cannot take nsaids due to h/o gastric sleeve sx.   Review of Systems   Constitution: Negative for chills, fever and night sweats.   Cardiovascular: Negative for chest pain.   Respiratory: Negative for cough and shortness of breath.    Hematologic/Lymphatic: Does not bruise/bleed easily.   Skin: Negative for color change.   Gastrointestinal: Negative for heartburn.   Genitourinary: Negative for dysuria.   Neurological: Negative for numbness and paresthesias.   Psychiatric/Behavioral: Negative for altered mental status.   Allergic/Immunologic: Negative for persistent infections.         Objective:            General    Vitals reviewed.  Constitutional: She is oriented to person, place, and time. She appears well-developed and well-nourished.   Cardiovascular: Normal rate.    Neurological: She is alert and oriented to person, place, and time.         Right Shoulder Exam     Range of Motion   Active Abduction:  150 abnormal   Forward Flexion:  160 abnormal   External Rotation 0 degrees: abnormal   Internal Rotation 0 degrees: abnormal     Tests & Signs   Hawkin's test: negative  Impingement: positive  Speed's Test: negative    Other   Sensation: normal    Comments:  Positive empty can test.     Muscle Strength   Right Upper Extremity   Shoulder Abduction: 4/5   Supraspinatus: 4/5/5   Biceps: 5/5/5     Vascular Exam     Right Pulses      Radial:                    2+          X-ray: ordered  and reviewed by myself. No fx or dislocation.         Assessment:       Encounter Diagnoses   Name Primary?    Right shoulder pain, unspecified chronicity Yes    Incomplete tear of right rotator cuff           Plan:       Discussed treatment options with patient. She would like a cortisone injection. HEP given. RTC if symptoms worsen or do not improve.     PROCEDURE:  I have explained the risks, benefits, and alternatives of the procedure in detail.  The patient voices understanding and all questions have been answered.  The patient agrees to proceed as planned. So after I performed a sterile prep of the skin in the normal fashion the right shoulder is injected from the anterior approach using a 22 gauge needle with a combination of 4cc 1% plain lidocaine and 80 mg of depo medrol. The patient is cautioned and immediate relief of pain is secondary to the local anesthetic and will be temporary.  After the anesthetic wears off there may be a increase in pain that may last for a few hours or a few days and they should use ice to help alleviate this flair up of pain.

## 2018-05-10 ENCOUNTER — OFFICE VISIT (OUTPATIENT)
Dept: BARIATRICS | Facility: CLINIC | Age: 48
End: 2018-05-10
Payer: OTHER GOVERNMENT

## 2018-05-10 VITALS
WEIGHT: 198.63 LBS | DIASTOLIC BLOOD PRESSURE: 66 MMHG | HEART RATE: 90 BPM | SYSTOLIC BLOOD PRESSURE: 110 MMHG | BODY MASS INDEX: 33.91 KG/M2 | HEIGHT: 64 IN

## 2018-05-10 DIAGNOSIS — E66.01 SEVERE OBESITY (BMI 35.0-35.9 WITH COMORBIDITY): ICD-10-CM

## 2018-05-10 PROCEDURE — 99999 PR PBB SHADOW E&M-EST. PATIENT-LVL III: CPT | Mod: PBBFAC,,, | Performed by: INTERNAL MEDICINE

## 2018-05-10 PROCEDURE — 99213 OFFICE O/P EST LOW 20 MIN: CPT | Mod: PBBFAC | Performed by: INTERNAL MEDICINE

## 2018-05-10 PROCEDURE — 99213 OFFICE O/P EST LOW 20 MIN: CPT | Mod: S$PBB,,, | Performed by: INTERNAL MEDICINE

## 2018-05-10 NOTE — PATIENT INSTRUCTIONS
Patient warned of common side effects of phentermine including anxiety, insomnia, palpitations and increased blood pressure. It was also explained that it is for short-term usage along with diet and exercise, and that stopping the medication without making lifestyle changes will result in regain of weight. Patient states understanding.     Weight loss medications are controlled substances.  They require routine follow up. Prescription or pills that are lost or destroyed will not be replaced.       Eating well to be healthy and lose weight does not have to be hard. It also does not have to be time consuming or expensive. There a lots of ways you can work in healthy choices into your day. Many of these are easy, quick and even family friendly!    Homemade hazelnut au lait  Brew your favorite brand of hazelnut flavored coffee ([employer] makes a good one). Microwave 1/2 cup of milk that fits your eating plan (whole, skim or sugar-free almond milk can all work). Add half to 1 oz sugar free hazelnut syrup.     Quick and easy breakfast  1-2 boiled eggs or mini-frittatas with a tangerine. The boiled eggs and mini-frittatas can both be made ahead and last for up to 4 days in the refrigerator. Bonus if you portion them out in ready to go containers or zipper bags.     Breakfast Egg Muffins with Reeder and Spinach  Makes 12 muffins  Ingredients    6 eggs  ¼ cup milk  ¼ teaspoon salt  2 cups grated cheddar cheese  3/4 cup spinach, cooked and drained (about 8 oz fresh spinach)  6 reeder slices, cooked, drained of fat, and chopped  1/2 cup grated Parmesan cheese (optional)    Instructions      Preheat oven to 350 degrees. Use a regular 12-cup muffin pan. Spray the muffin pan with non-stick cooking spray.  In a large bowl, beat eggs until smooth. Add milk, salt, Cheddar cheese and mix. Stir spinach, cooked reeder into the egg mixture. Ladle the egg mixture into greased muffin cups ¾ full.  Top each muffin cup with grated Parmesan  cheese.  Bake for 25 minutes. Remove from the oven, let the muffins cool for 30 minutes before removing them from the pan.      Be a brown bagger! When you make dinner, plan for an extra helping. When you serve your plate for dinner, serve an additional helping into a container that you can take with you the next day. If you don't have a refrigerator available during the day, an insulated lunch bag and ice packs will help you safely store you lunch.     Cold Brewed Iced tea. Fill a pitcher with 64 oz filtered water. Add either 4 regular tea bags of your choice or a large iced tea bag. Refrigerate over night then remove the tea bags. The tea will not be bitter and is super flavorful. Get creative! Try combinations like green tea and hibiscus tea or black tea with lemon zinger. Add orange or lemon slices for even more flavor.     Snack wisely. Protein filled snacks will fill you up, allowing you to get by with fewer calories. String cheese, pork skins (chicharrones), turkey pepperoni, or celery with cream cheese will all fit the bill.       Ditch the take out. Turkey tacos (with or without a low carb tortilla), burgers (without the bun), or fun stir fries are all quick and easy. The whole family will be happy, and you can save calories and money.      Orange Chicken Stir chahal with asparagus   Makes 6 servings  Ingredients:    1.5 lbs boneless skinless chicken breast/tenders, diced into 1-inch pieces  1 Tbsp extra virgin olive or avocado oil, divided  2 lb asparagus, end portions trimmed and remainder diced into 1 1/2-inch pieces  1 small yellow onion, sliced into thin strips  8 oz button mushrooms, sliced  1 Tbsp peeled and finely grated fresh mary grace  4 cloves garlic, minced  1/2 cup low-sodium chicken broth  Juice of 2 fresh oranges  2 Tbsp low sodium soy sauce  2 Tbsp cornstarch  Sea salt and freshly ground black pepper    Directions:    In a 12-inch non-stick wok, heat 1/2 oil over moderately high heat. Once oil is  hot, add diced chicken and season lightly with salt and pepper. Sauté until cooked through, tossing occasionally, about 5-6 minutes.  Place chicken on a large plate and set aside. Return wok, reduce to medium-high heat, add remaining oil.  Once oil is hot, add asparagus, yellow onion and mushrooms, and sauté until tender-crisp, about 4 - 5 minutes, adding in garlic and mary grace during the last 1 minute of sautéing.  Meanwhile, in a mixing bowl whisk together chicken broth, orange juice, soy sauce and cornstarch until well blended.  Pour chicken broth mixture into skillet with veggies, season with salt and pepper to taste, and bring mixture to a light boil, stirring constantly. Allow mixture to gently boil, stirring constantly, until thickened, about 1 minute.  Toss chicken into mixture and serve immediately over cauliflower rice or Shirataki noodles.      Skinny Chicken Tortilla Soup  Makes 7 servings    2 teaspoons olive oil  1 cup onion, chopped (about 1 small)  2 cups celery, sliced (about 4 medium stalks)  4 garlic cloves, minced  4 medium tomatoes, chopped  2 cups water  4 cups low-sodium organic chicken broth  3 cups chopped and/or shredded rotisserie chicken, skinless  2 cups sliced carrots (about 3 medium)  1 teaspoon dried oregano leaves  2 teaspoons chili powder  1 teaspoon garlic powder  2 teaspoons cumin  ½ teaspoon cayenne pepper (add less or omit, if you don't want a spicy soup)  ½ teaspoon sea salt + more to taste  ½ teaspoon pepper + more to taste    Directions:   Put all ingredients into a large crock pot. Cook on low for 5-6 hours.     Optional garnish with chopped avocado, chopped fresh cilantro, crumbled Cotija cheese, sour cream, Greek yogurt, your favorite hot sauce.           Vegan Avocado Banana Chocolate Pudding  Makes 4 servings  Ingredients    1 1/2 ripe avocados  2 ripe bananas  6 tbsp raw cacao powder or unsweetened cocoa powder  2-3 tbsp maple syrup (or calorie free sweetener)  1/4 cup  almond milk  Instructions    Blend everything together in a  until the consistency is smooth and velvety. Taste and see if more sweetener is needed and stir to make sure everything is evenly mixed. Blend a second time if needed.  Top with banana slices, raw cacao nibs, almond butter, or any other toppings and enjoy!

## 2018-05-10 NOTE — PROGRESS NOTES
"Subjective:       Patient ID: Renea Mac is a 48 y.o. female.    Chief Complaint: Follow-up    Pt here today for follow-up. Has lost 6 lb. Has been taking lomaira prn. Last filled 4/8/18. Feb prior to that. Usually takes once or twice a day. She has been working on the diet and exercise, but feels she is not where she needs to be. She is doing PT for shoulder injury. Stopped drinking boost;      Review of Systems   Constitutional: Negative for chills and fever.   Respiratory: Negative for apnea and shortness of breath.    Cardiovascular: Negative for chest pain and leg swelling.   Gastrointestinal: Positive for constipation. Negative for diarrhea.        + GERD   Genitourinary: Negative for difficulty urinating.        S/p hyst   Musculoskeletal: Positive for arthralgias and back pain.   Neurological: Positive for dizziness and syncope. Negative for light-headedness.   Psychiatric/Behavioral: Negative for dysphoric mood. The patient is not nervous/anxious.        Objective:     /66   Pulse 90   Ht 5' 4" (1.626 m)   Wt 90.1 kg (198 lb 10.2 oz)   BMI 34.10 kg/m²     Physical Exam   Constitutional: She is oriented to person, place, and time. She appears well-developed and well-nourished. No distress.   Obese     HENT:   Head: Normocephalic and atraumatic.   Eyes: EOM are normal. Pupils are equal, round, and reactive to light. No scleral icterus.   Neck: Normal range of motion. Neck supple.   Cardiovascular: Normal rate and normal heart sounds.    Pulmonary/Chest: Effort normal and breath sounds normal.   Musculoskeletal: Normal range of motion. She exhibits no edema.   Neurological: She is alert and oriented to person, place, and time. No cranial nerve deficit.   Skin: Skin is warm and dry. No erythema.   Psychiatric: She has a normal mood and affect. Her behavior is normal. Judgment normal.   Vitals reviewed.      Assessment:       1. Severe obesity (BMI 35.0-35.9 with comorbidity)        Plan:        "        1. Severe obesity (BMI 35.0-35.9 with comorbidity)    - phentermine (LOMAIRA) 8 mg Tab; Take 1 tablet by mouth 3 (three) times daily as needed. For appetite supression  Dispense: 90 each; Refill: 1     Doing well. Healthy all day tips given

## 2018-06-05 DIAGNOSIS — E66.01 SEVERE OBESITY (BMI 35.0-35.9 WITH COMORBIDITY): ICD-10-CM

## 2018-12-03 ENCOUNTER — OFFICE VISIT (OUTPATIENT)
Dept: URGENT CARE | Facility: CLINIC | Age: 48
End: 2018-12-03
Payer: COMMERCIAL

## 2018-12-03 VITALS
OXYGEN SATURATION: 98 % | TEMPERATURE: 97 F | DIASTOLIC BLOOD PRESSURE: 93 MMHG | RESPIRATION RATE: 18 BRPM | SYSTOLIC BLOOD PRESSURE: 159 MMHG | HEART RATE: 69 BPM | BODY MASS INDEX: 34.15 KG/M2 | WEIGHT: 200 LBS | HEIGHT: 64 IN

## 2018-12-03 DIAGNOSIS — K52.9 GASTROENTERITIS, ACUTE: Primary | ICD-10-CM

## 2018-12-03 PROCEDURE — 99214 OFFICE O/P EST MOD 30 MIN: CPT | Mod: S$GLB,,, | Performed by: INTERNAL MEDICINE

## 2018-12-03 PROCEDURE — 3077F SYST BP >= 140 MM HG: CPT | Mod: CPTII,S$GLB,, | Performed by: INTERNAL MEDICINE

## 2018-12-03 PROCEDURE — 3008F BODY MASS INDEX DOCD: CPT | Mod: CPTII,S$GLB,, | Performed by: INTERNAL MEDICINE

## 2018-12-03 PROCEDURE — 3080F DIAST BP >= 90 MM HG: CPT | Mod: CPTII,S$GLB,, | Performed by: INTERNAL MEDICINE

## 2018-12-03 RX ORDER — CIPROFLOXACIN 500 MG/1
500 TABLET ORAL 2 TIMES DAILY
Qty: 14 TABLET | Refills: 0 | Status: SHIPPED | OUTPATIENT
Start: 2018-12-03 | End: 2018-12-10

## 2018-12-03 RX ORDER — CIPROFLOXACIN 500 MG/1
500 TABLET ORAL 2 TIMES DAILY
Qty: 14 TABLET | Refills: 0 | Status: SHIPPED | OUTPATIENT
Start: 2018-12-03 | End: 2018-12-03

## 2018-12-04 NOTE — PROGRESS NOTES
"Subjective:       Patient ID: Renea Mac is a 48 y.o. female.    Vitals:  height is 5' 4" (1.626 m) and weight is 90.7 kg (200 lb). Her temperature is 97.2 °F (36.2 °C). Her blood pressure is 159/93 (abnormal) and her pulse is 69. Her respiration is 18 and oxygen saturation is 98%.     Chief Complaint: Abdominal Pain    Mucus and blood in her stool, pt feels gassy, can go a little bit when she does have a bowel movement       Abdominal Pain   This is a new problem. The current episode started in the past 7 days. The pain is located in the RLQ. The quality of the pain is cramping, sharp and a sensation of fullness. Associated symptoms include diarrhea and hematochezia. Pertinent negatives include no constipation, dysuria, fever, nausea or vomiting. The pain is aggravated by bowel movement. Treatments tried: immodium. The treatment provided no relief. There is no history of abdominal surgery.       Constitution: Negative for appetite change, chills, sweating and fever.   HENT: Negative for trouble swallowing.    Cardiovascular: Negative for chest pain.   Respiratory: Negative for shortness of breath.    Gastrointestinal: Positive for abdominal pain, diarrhea and bright red blood in stool. Negative for abdominal trauma, abdominal bloating, history of abdominal surgery, nausea, vomiting, constipation, dark colored stools and heartburn.   Genitourinary: Negative for dysuria, missed menses and pelvic pain.   Musculoskeletal: Negative for back pain.       Objective:      Physical Exam   Constitutional: She appears well-developed and well-nourished.   HENT:   Head: Normocephalic and atraumatic.   Eyes: Conjunctivae and EOM are normal. Pupils are equal, round, and reactive to light.   Neck: Normal range of motion. Neck supple.   Abdominal: Soft. She exhibits no distension and no mass. There is tenderness (LLQ). There is no rebound and no guarding. No hernia.   Nursing note and vitals reviewed.      Assessment:     "   1. Gastroenteritis, acute        Plan:         Gastroenteritis, acute  -     ciprofloxacin HCl (CIPRO) 500 MG tablet; Take 1 tablet (500 mg total) by mouth 2 (two) times daily. for 7 days  Dispense: 14 tablet; Refill: 0

## 2018-12-05 ENCOUNTER — OFFICE VISIT (OUTPATIENT)
Dept: INTERNAL MEDICINE | Facility: CLINIC | Age: 48
End: 2018-12-05
Payer: COMMERCIAL

## 2018-12-05 ENCOUNTER — TELEPHONE (OUTPATIENT)
Dept: INTERNAL MEDICINE | Facility: CLINIC | Age: 48
End: 2018-12-05

## 2018-12-05 VITALS
DIASTOLIC BLOOD PRESSURE: 86 MMHG | BODY MASS INDEX: 35.26 KG/M2 | WEIGHT: 206.56 LBS | OXYGEN SATURATION: 98 % | SYSTOLIC BLOOD PRESSURE: 128 MMHG | TEMPERATURE: 98 F | HEART RATE: 72 BPM | HEIGHT: 64 IN

## 2018-12-05 DIAGNOSIS — K52.9 COLITIS: Primary | ICD-10-CM

## 2018-12-05 DIAGNOSIS — K92.1 SYMPTOM OF BLOOD IN STOOL: ICD-10-CM

## 2018-12-05 PROCEDURE — 99214 OFFICE O/P EST MOD 30 MIN: CPT | Mod: S$GLB,,, | Performed by: NURSE PRACTITIONER

## 2018-12-05 PROCEDURE — 99999 PR PBB SHADOW E&M-EST. PATIENT-LVL V: CPT | Mod: PBBFAC,,, | Performed by: NURSE PRACTITIONER

## 2018-12-05 PROCEDURE — 3079F DIAST BP 80-89 MM HG: CPT | Mod: CPTII,S$GLB,, | Performed by: NURSE PRACTITIONER

## 2018-12-05 PROCEDURE — 3074F SYST BP LT 130 MM HG: CPT | Mod: CPTII,S$GLB,, | Performed by: NURSE PRACTITIONER

## 2018-12-05 PROCEDURE — 3008F BODY MASS INDEX DOCD: CPT | Mod: CPTII,S$GLB,, | Performed by: NURSE PRACTITIONER

## 2018-12-05 NOTE — PROGRESS NOTES
Subjective:       Patient ID: Renea Mac is a 48 y.o. female.    Chief Complaint: Rectal Bleeding    Disclaimer: This note has been generated using voice-recognition software. There may be typographical errors that have been missed during proof-reading    Patient here for follow-up from Urgent Care.  Patient was urgent care Monday with bloody diarrhea.  Patient was started on Cipro.  Patient states that she is now constipated.  Patient states that she still had some left lower quadrant abdominal pain but is actually better.  Patient denies any vomiting or nausea.  Patient denies any fever.  Patient is drinking fluids.  Patient patient denies any recent exposure to fresh water, foreign travel, or raw seafood intake      Rectal Bleeding   Pertinent negatives include no abdominal pain, arthralgias, chills, congestion, coughing, diaphoresis, fatigue, fever, headaches, myalgias, nausea, neck pain, rash or vomiting.     Review of Systems   Constitutional: Negative for activity change, appetite change, chills, diaphoresis, fatigue, fever and unexpected weight change.   HENT: Negative for congestion and trouble swallowing.    Respiratory: Negative for cough.    Gastrointestinal: Positive for blood in stool, constipation, diarrhea and hematochezia. Negative for abdominal distention, abdominal pain, anal bleeding, nausea, rectal pain and vomiting.   Genitourinary: Negative for difficulty urinating.   Musculoskeletal: Negative for arthralgias, myalgias and neck pain.   Skin: Negative for rash.   Neurological: Negative for dizziness and headaches.   Psychiatric/Behavioral: Negative for sleep disturbance.         Past Medical History:   Diagnosis Date    Arthritis     GERD (gastroesophageal reflux disease)     Hypertension     Rotator cuff tear     Sciatica     Syncope 2016     Past Surgical History:   Procedure Laterality Date     SECTION      x1    HERNIA REPAIR      HYSTERECTOMY      [facility],  ovaries remain (fibroids0    SLEEVE GASTROPLASTY  11/2014    TUBAL LIGATION       Social History     Social History Narrative    Not on file     Family History   Problem Relation Age of Onset    Hypertension Mother     Heart disease Mother     Heart attack Mother     Rectal cancer Mother     Hypertension Father     Heart disease Father     Stroke Father     Heart attack Father     Hypertension Sister     Hypertension Brother     Cancer Brother     No Known Problems Daughter     No Known Problems Son      Outpatient Encounter Medications as of 12/5/2018   Medication Sig Dispense Refill    acetaminophen (TYLENOL) 500 MG tablet Take 500 mg by mouth every 6 (six) hours as needed for Pain.      ciprofloxacin HCl (CIPRO) 500 MG tablet Take 1 tablet (500 mg total) by mouth 2 (two) times daily. for 7 days 14 tablet 0    CYCLOBENZAPRINE HCL (FLEXERIL ORAL) Take by mouth.      hydroCHLOROthiazide (HYDRODIURIL) 25 MG tablet Take 0.5 tablets (12.5 mg total) by mouth once daily. 30 tablet 11    amoxicillin (AMOXIL) 500 MG capsule Take 1 capsule (500 mg total) by mouth 4 (four) times daily until all taken. 28 capsule 0    fluconazole (DIFLUCAN) 100 MG tablet Take 2 tablets (200 mg total) by mouth on the first day, then 1 one tablet by mouth every day after 15 tablet 0    methocarbamol (ROBAXIN) 500 MG Tab Take 1 tablet 4 times daily as needed for 5 days 20 tablet 0    phenazopyridine (PYRIDIUM) 100 MG tablet Take 1 tablet by mouth three times daily for 2 days. 6 tablet 0    phentermine (LOMAIRA) 8 mg Tab Take 1 tablet by mouth 3 (three) times daily as needed. For appetite supression 90 each 1     No facility-administered encounter medications on file as of 12/5/2018.      Last 3 sets of Vitals  Vitals - 1 value per visit 5/10/2018 12/3/2018 12/5/2018   SYSTOLIC 110 159 128   DIASTOLIC 66 93 86   PULSE 90 69 72   TEMPERATURE - 97.2 98.4   RESPIRATIONS - 18 -   SPO2 - 98 98   Weight (lb) 198.63 200 206.57  "  Weight (kg) 90.1 90.719 93.7   HEIGHT 5' 4" 5' 4" 5' 4"   BODY MASS INDEX 34.1 34.33 35.46   VISIT REPORT - - -   Pain Score  4 - 0         Objective:      Physical Exam   Constitutional: She is oriented to person, place, and time. She appears well-developed and well-nourished. No distress.   47 yo female non toxic appearing in NAD  Active, alert     HENT:   Head: Normocephalic and atraumatic.   Eyes: Conjunctivae are normal. Pupils are equal, round, and reactive to light. No scleral icterus.   Neck: Normal range of motion. Neck supple.   Pulmonary/Chest: Effort normal.   Abdominal: Soft. Bowel sounds are normal. She exhibits no distension and no mass. There is no tenderness. There is no rebound and no guarding. No hernia.   Neurological: She is alert and oriented to person, place, and time.   Skin: Skin is warm and dry. Capillary refill takes less than 2 seconds. No rash noted. She is not diaphoretic. No erythema. No pallor.   Good skin turgor     Psychiatric: She has a normal mood and affect. Her behavior is normal. Judgment and thought content normal.   Nursing note and vitals reviewed.          Lab Results   Component Value Date    WBC 9.06 06/13/2017    RBC 4.71 06/13/2017    HGB 12.5 06/13/2017    HCT 37.9 06/13/2017    MCV 81 (L) 06/13/2017    MCH 26.5 (L) 06/13/2017    MCHC 33.0 06/13/2017    RDW 13.5 06/13/2017     06/13/2017    MPV 12.0 06/13/2017    GRAN 4.7 06/13/2017    GRAN 51.9 06/13/2017    LYMPH 3.7 06/13/2017    LYMPH 40.5 06/13/2017    MONO 0.6 06/13/2017    MONO 6.6 06/13/2017    EOS 0.1 06/13/2017    BASO 0.01 06/13/2017    EOSINOPHIL 0.7 06/13/2017    BASOPHIL 0.1 06/13/2017     Lab Results   Component Value Date    WBC 9.06 06/13/2017    HGB 12.5 06/13/2017    HCT 37.9 06/13/2017     06/13/2017    CHOL 153 07/11/2017    TRIG 40 07/11/2017    HDL 64 07/11/2017    ALT 10 06/13/2017    AST 15 06/13/2017     06/15/2017    K 4.1 06/15/2017     06/15/2017    CREATININE " 0.8 06/15/2017    BUN 18 06/15/2017    CO2 27 06/15/2017    TSH 3.573 06/13/2017       Assessment:       1. Colitis    2. Symptom of blood in stool        Plan:       ER precautions discussed      Renea was seen today for rectal bleeding.    Diagnoses and all orders for this visit:    Colitis  -     Ambulatory consult to Gastroenterology    Symptom of blood in stool  -     Ambulatory consult to Gastroenterology      Patient Instructions     Viral Gastroenteritis (Adult)    Gastroenteritis is commonly called the stomach flu. It is most often caused by a virus that affects the stomach and intestinal tract and usually lasts from 2 to 7 days. Common viruses causing gastroenteritis include norovirus, rotavirus, and hepatitis A. Non-viral causes of gastroenteritis include bacteria, parasites, and toxins.  The danger from repeated vomiting or diarrhea is dehydration. This is the loss of too much fluid from the body. When this occurs, body fluids must be replaced. Antibiotics do not help with this illness because it is usually viral.Simple home treatment will be helpful.  Symptoms of viral gastroenteritis may include:  · Watery, loose stools  · Stomach pain or abdominal cramps  · Fever and chills  · Nausea and vomiting  · Loss of bowel control  · Headache  Home care  Gastroenteritis is transmitted by contact with the stool or vomit of an infected person. This can occur from person to person or from contact with a contaminated surface.  Follow these guidelines when caring for yourself at home:  · If symptoms are severe, rest at home for the next 24 hours or until you are feeling better.  · Wash your hands with soap and water or use alcohol-based  to prevent the spread of infection. Wash your hands after touching anyone who is sick.  · Wash your hands or use alcohol-based  after using the toilet and before meals. Clean the toilet after each use.  Remember these tips when preparing food:  · People with  diarrhea should not prepare or serve food to others. When preparing foods, wash your hands before and after.  · Wash your hands after using cutting boards, countertops, knives, or utensils that have been in contact with raw food.  · Keep uncooked meats away from cooked and ready-to-eat foods.  Medicine  You may use acetaminophen or NSAID medicines like ibuprofen or naproxen to control fever unless another medicine was given. If you have chronic liver or kidney disease, talk with your healthcare provider before using these medicines. Also talk with your provider if you've had a stomach ulcer or gastrointestinal bleeding. Don't give aspirin to anyone under 18 years of age who is ill with a fever. It may cause severe liver damage. Don't use NSAIDS is you are already taking one for another condition (like arthritis) or are on aspirin (such as for heart disease or after a stroke).  If medicine for vomiting or diarrhea are prescribed, take these only as directed. Do not take over-the-counter medicines for vomiting or diarrhea unless instructed by your healthcare provider.  Diet  Follow these guidelines for food:  · Water and liquids are important so you don't get dehydrated. Drink a small amount at a time or suck on ice chips if you are vomiting.  · If you eat, avoid fatty, greasy, spicy, or fried foods.  · Don't eat dairy if you have diarrhea. This can make diarrhea worse.  · Avoid tobacco, alcohol, and caffeine which may worsen symptoms.  During the first 24 hours (the first full day), follow the diet below:  · Beverages. Sports drinks, soft drinks without caffeine, ginger ale, mineral water (plain or flavored), decaffeinated tea and coffee. If you are very dehydrated, sports drinks aren't a good choice. They have too much sugar and not enough electrolytes. In this case, commercially available products called oral rehydration solutions, are best.  · Soups. Eat clear broth, consommé, and bouillon.  · Desserts. Eat  gelatin, popsicles, and fruit juice bars.  During the next 24 hours (the second day), you may add the following to the above:  · Hot cereal, plain toast, bread, rolls, and crackers  · Plain noodles, rice, mashed potatoes, chicken noodle or rice soup  · Unsweetened canned fruit (avoid pineapple), bananas  · Limit fat intake to less than 15 grams per day. Do this by avoiding margarine, butter, oils, mayonnaise, sauces, gravies, fried foods, peanut butter, meat, poultry, and fish.  · Limit fiber and avoid raw or cooked vegetables, fresh fruits (except bananas), and bran cereals.  · Limit caffeine and chocolate. Don't use spices or seasonings other than salt.  · Limit dairy products.  · Avoid alcohol.  During the next 24 hours:  · Gradually resume a normal diet as you feel better and your symptoms improve.  · If at any time it starts getting worse again, go back to clear liquids until you feel better.  Follow-up care  Follow up with your healthcare provider, or as advised. Call your provider if you don't get better within 24 hours or if diarrhea lasts more than a week. Also follow up if you are unable to keep down liquids and get dehydrated. If a stool (diarrhea) sample was taken, call as directed for the results.  Call 911  Call 911 if any of these occur:  · Trouble breathing  · Chest pain  · Confused  · Severe drowsiness or trouble awakening  · Fainting or loss of consciousness  · Rapid heart rate  · Seizure  · Stiff neck  When to seek medical advice  Call your healthcare provider right away if any of these occur:  · Abdominal pain that gets worse  · Continued vomiting (unable to keep liquids down)  · Frequent diarrhea (more than 5 times a day)  · Blood in vomit or stool (black or red color)  · Dark urine, reduced urine output, or extreme thirst  · Weakness or dizziness  · Drowsiness  · Fever of 100.4°F (38°C) oral or higher that does not get better with fever medicine  · New rash  Date Last Reviewed: 1/3/2016  ©  9482-6719 [employer]. [address] [place] [address], [place] [address]. All rights reserved. This information is not intended as a substitute for professional medical care. Always follow your healthcare professional's instructions.

## 2018-12-05 NOTE — PATIENT INSTRUCTIONS

## 2018-12-05 NOTE — TELEPHONE ENCOUNTER
Spoke to pt providing [facility] number for pt to call and check on the approval of referral. Keep us updated

## 2018-12-05 NOTE — TELEPHONE ENCOUNTER
----- Message from Sabrina Chao sent at 12/5/2018 11:32 AM CST -----  Contact: Patient [phone]  Pt states that she spoke with Jarod Khalil MA in regards to a referral. She states that she has some information for him in regards to the referral. Please call back and advise.        Thanks

## 2018-12-06 ENCOUNTER — OFFICE VISIT (OUTPATIENT)
Dept: GASTROENTEROLOGY | Facility: CLINIC | Age: 48
End: 2018-12-06
Payer: COMMERCIAL

## 2018-12-06 ENCOUNTER — LAB VISIT (OUTPATIENT)
Dept: LAB | Facility: HOSPITAL | Age: 48
End: 2018-12-06
Payer: COMMERCIAL

## 2018-12-06 VITALS
DIASTOLIC BLOOD PRESSURE: 84 MMHG | BODY MASS INDEX: 35 KG/M2 | HEART RATE: 61 BPM | SYSTOLIC BLOOD PRESSURE: 132 MMHG | TEMPERATURE: 98 F | HEIGHT: 64 IN | WEIGHT: 205 LBS

## 2018-12-06 DIAGNOSIS — R10.814 LEFT LOWER QUADRANT ABDOMINAL TENDERNESS WITHOUT REBOUND TENDERNESS: ICD-10-CM

## 2018-12-06 DIAGNOSIS — R10.84 GENERALIZED ABDOMINAL PAIN: ICD-10-CM

## 2018-12-06 DIAGNOSIS — K62.5 RECTAL BLEEDING: Primary | ICD-10-CM

## 2018-12-06 DIAGNOSIS — R14.3 EXCESSIVE GAS: ICD-10-CM

## 2018-12-06 DIAGNOSIS — K59.00 ACUTE CONSTIPATION: ICD-10-CM

## 2018-12-06 LAB
ALBUMIN SERPL BCP-MCNC: 3.4 G/DL
ALP SERPL-CCNC: 92 U/L
ALT SERPL W/O P-5'-P-CCNC: 9 U/L
ANION GAP SERPL CALC-SCNC: 6 MMOL/L
AST SERPL-CCNC: 13 U/L
BASOPHILS # BLD AUTO: 0.05 K/UL
BASOPHILS NFR BLD: 0.7 %
BILIRUB SERPL-MCNC: 0.3 MG/DL
BUN SERPL-MCNC: 14 MG/DL
CALCIUM SERPL-MCNC: 9.5 MG/DL
CHLORIDE SERPL-SCNC: 106 MMOL/L
CO2 SERPL-SCNC: 30 MMOL/L
CREAT SERPL-MCNC: 0.7 MG/DL
DIFFERENTIAL METHOD: ABNORMAL
EOSINOPHIL # BLD AUTO: 0.1 K/UL
EOSINOPHIL NFR BLD: 1 %
ERYTHROCYTE [DISTWIDTH] IN BLOOD BY AUTOMATED COUNT: 13.6 %
EST. GFR  (AFRICAN AMERICAN): >60 ML/MIN/1.73 M^2
EST. GFR  (NON AFRICAN AMERICAN): >60 ML/MIN/1.73 M^2
GLUCOSE SERPL-MCNC: 81 MG/DL
HCT VFR BLD AUTO: 43.4 %
HGB BLD-MCNC: 13.1 G/DL
IMM GRANULOCYTES # BLD AUTO: 0.02 K/UL
IMM GRANULOCYTES NFR BLD AUTO: 0.3 %
LYMPHOCYTES # BLD AUTO: 3.4 K/UL
LYMPHOCYTES NFR BLD: 48.8 %
MCH RBC QN AUTO: 25.7 PG
MCHC RBC AUTO-ENTMCNC: 30.2 G/DL
MCV RBC AUTO: 85 FL
MONOCYTES # BLD AUTO: 0.4 K/UL
MONOCYTES NFR BLD: 5.4 %
NEUTROPHILS # BLD AUTO: 3 K/UL
NEUTROPHILS NFR BLD: 43.8 %
NRBC BLD-RTO: 0 /100 WBC
PLATELET # BLD AUTO: 222 K/UL
PMV BLD AUTO: 12.2 FL
POTASSIUM SERPL-SCNC: 4.1 MMOL/L
PROT SERPL-MCNC: 7.7 G/DL
RBC # BLD AUTO: 5.09 M/UL
SODIUM SERPL-SCNC: 142 MMOL/L
WBC # BLD AUTO: 6.88 K/UL

## 2018-12-06 PROCEDURE — 99999 PR PBB SHADOW E&M-EST. PATIENT-LVL IV: CPT | Mod: PBBFAC,,, | Performed by: PHYSICIAN ASSISTANT

## 2018-12-06 PROCEDURE — 3075F SYST BP GE 130 - 139MM HG: CPT | Mod: CPTII,S$GLB,, | Performed by: PHYSICIAN ASSISTANT

## 2018-12-06 PROCEDURE — 3008F BODY MASS INDEX DOCD: CPT | Mod: CPTII,S$GLB,, | Performed by: PHYSICIAN ASSISTANT

## 2018-12-06 PROCEDURE — 99204 OFFICE O/P NEW MOD 45 MIN: CPT | Mod: S$GLB,,, | Performed by: PHYSICIAN ASSISTANT

## 2018-12-06 PROCEDURE — 80053 COMPREHEN METABOLIC PANEL: CPT

## 2018-12-06 PROCEDURE — 36415 COLL VENOUS BLD VENIPUNCTURE: CPT

## 2018-12-06 PROCEDURE — 85025 COMPLETE CBC W/AUTO DIFF WBC: CPT

## 2018-12-06 PROCEDURE — 3079F DIAST BP 80-89 MM HG: CPT | Mod: CPTII,S$GLB,, | Performed by: PHYSICIAN ASSISTANT

## 2018-12-06 RX ORDER — POLYETHYLENE GLYCOL 3350 17 G/17G
17 POWDER, FOR SOLUTION ORAL DAILY
Qty: 527 G | Refills: 11 | Status: SHIPPED | OUTPATIENT
Start: 2018-12-06 | End: 2021-03-29

## 2018-12-06 NOTE — PROGRESS NOTES
"    [facility] Consultation Note    Reason for Consult:  The primary encounter diagnosis was Rectal bleeding. Diagnoses of Left lower quadrant abdominal tenderness without rebound tenderness, Generalized abdominal pain, Acute constipation, and Excessive gas were also pertinent to this visit.    PCP:   Magdalena Davila   [address] [facility] [place] [address]    Referring MD:  Otilia Phipps, Fnp-c  [address] [place] [address]    HPI:  This is a 48 y.o. female here for evaluation of abdominal pain    7 days ago developed abdominal cramping, diarrhea, saw BRB and mucus with wiping, lasted few days   Diarrhea resolved 4 days ago after taking imodium   Felt like "hot bubbles in her stomach"   Seen in urgent care had LLQ tenderness, Dx with cipro  Eats plenty of nuts  Today still having gas and pressure in the rectum  No BM for 5 days    Last colonoscopy at 43yo for mom hx of rectal cancer    S/p gastric sleeve  Denies GERD    Hernandez screening- Family Hx of:  Colon cancer-mom  Uterine cancer-no  Small bowel cancer-no  Ureter cancer-no  Renal pelvis cancer-no    ROS:  Constitutional: No fevers, chills, No weight loss  ENT: No allergies  CV: No chest pain  Pulm: No cough, No shortness of breath  Ophtho: No vision changes  GI: see HPI  Derm: No rash  Heme: No lymphadenopathy, No bruising  MSK: No arthritis  : No dysuria, No hematuria  Endo: No hot or cold intolerance  Neuro: No syncope, No seizure  Psych: No anxiety, No depression    Medical History:  has a past medical history of Arthritis, GERD (gastroesophageal reflux disease), Hypertension, Rotator cuff tear, Sciatica, and Syncope (2016).    Surgical History:  has a past surgical history that includes Tubal ligation; Hernia repair; Sleeve Gastroplasty (2014); Hysterectomy (); and  section.    Family History: family history includes Cancer in her brother; Colon cancer in her mother; Heart attack in her " "father and mother; Heart disease in her father and mother; Hypertension in her brother, father, mother, and sister; No Known Problems in her daughter and son; Rectal cancer in her mother; Stroke in her father..     Social History:  reports that  has never smoked. she has never used smokeless tobacco. She reports that she drinks alcohol. She reports that she does not use drugs.    Review of patient's allergies indicates:  No Known Allergies    Current Outpatient Medications on File Prior to Visit   Medication Sig Dispense Refill    acetaminophen (TYLENOL) 500 MG tablet Take 500 mg by mouth every 6 (six) hours as needed for Pain.      ciprofloxacin HCl (CIPRO) 500 MG tablet Take 1 tablet (500 mg total) by mouth 2 (two) times daily. for 7 days 14 tablet 0    CYCLOBENZAPRINE HCL (FLEXERIL ORAL) Take by mouth.      hydroCHLOROthiazide (HYDRODIURIL) 25 MG tablet Take 0.5 tablets (12.5 mg total) by mouth once daily. 30 tablet 11    amoxicillin (AMOXIL) 500 MG capsule Take 1 capsule (500 mg total) by mouth 4 (four) times daily until all taken. 28 capsule 0    fluconazole (DIFLUCAN) 100 MG tablet Take 2 tablets (200 mg total) by mouth on the first day, then 1 one tablet by mouth every day after 15 tablet 0    methocarbamol (ROBAXIN) 500 MG Tab Take 1 tablet 4 times daily as needed for 5 days 20 tablet 0    phenazopyridine (PYRIDIUM) 100 MG tablet Take 1 tablet by mouth three times daily for 2 days. 6 tablet 0    phentermine (LOMAIRA) 8 mg Tab Take 1 tablet by mouth 3 (three) times daily as needed. For appetite supression 90 each 1     No current facility-administered medications on file prior to visit.          Objective Findings:    Vital Signs:  /84   Pulse 61   Temp 98.1 °F (36.7 °C) (Oral)   Ht 5' 4" (1.626 m)   Wt 93 kg (205 lb)   BMI 35.19 kg/m²   Body mass index is 35.19 kg/m².    Physical Exam:  General Appearance: Well appearing in no acute distress  Head:   Normocephalic, without obvious " abnormality  Eyes:    No scleral icterus  ENT: Neck supple, Lips, mucosa, and tongue normal  Lungs: CTA bilaterally in anterior and posterior fields, no wheezes, no crackles.  Heart:  Regular rate and rhythm, S1, S2 normal, no murmurs heard  Abdomen: Soft, LUQ and LLQ tenderness with mild guarding, non distended with positive bowel sounds in all four quadrants.   Extremities: no edema  Skin: No rash  Neurologic: AAO x 3      Labs:  Lab Results   Component Value Date    WBC 6.88 12/06/2018    HGB 13.1 12/06/2018    HCT 43.4 12/06/2018     12/06/2018    CHOL 153 07/11/2017    TRIG 40 07/11/2017    HDL 64 07/11/2017    ALT 9 (L) 12/06/2018    AST 13 12/06/2018     12/06/2018    K 4.1 12/06/2018     12/06/2018    CREATININE 0.7 12/06/2018    BUN 14 12/06/2018    CO2 30 (H) 12/06/2018    TSH 3.573 06/13/2017       Imaging:    Endoscopy:    Colon 6yrs ago- normal  Assessment:  1. Rectal bleeding    2. Left lower quadrant abdominal tenderness without rebound tenderness    3. Generalized abdominal pain    4. Acute constipation    5. Excessive gas      47yo F with FHx of colon cancer presents after acute onset of abdominal pain and blood in the stool. Treated with antibiotics. Now constipated. Still having LLQ tenderness on exam today. Need to rule out diverticulitis and malignancies    Recommendations:  1. CT abd/pelvis to rule out diverticulitis    2. Colonoscopy to rule out malignancies    3. Mag citrate then miralax nightly    4. Labs to rule out elevated WBC, renal function    5. probiotic    Follow-up in about 3 months (around 3/6/2019). for constipation      Order summary:  Orders Placed This Encounter    CT Abdomen Pelvis With Contrast    CBC auto differential    Comprehensive metabolic panel    polyethylene glycol (MIRALAX) 17 gram/dose powder    Case request GI: COLONOSCOPY         Thank you so much for allowing me to participate in the care of Renea Lopez,  SHEREE

## 2018-12-06 NOTE — LETTER
December 6, 2018      Otilia Phipps, P-C  [address]  [place] [address]           [facility] - Gastroenterology  [address]  [facility] [address]  Phone: [phone]  Fax: [phone]          Patient: Renea Mac   MR Number: [id number]   YOB: 1970   Date of Visit: 12/6/2018       Dear Otilia Phipps:    Thank you for referring Renea Mac to me for evaluation. Attached you will find relevant portions of my assessment and plan of care.    If you have questions, please do not hesitate to call me. I look forward to following Renea Mac along with you.    Sincerely,    Brynn Lopez PA-C    Enclosure  CC:  No Recipients    If you would like to receive this communication electronically, please contact [email or web address] or [phone] to request more information on [employer] access.    For providers and/or their staff who would like to refer a patient to [facility], please contact us through our one-stop-shop provider referral line, [facility], at [phone].    If you feel you have received this communication in error or would no longer like to receive these types of communications, please e-mail [email or web address]

## 2018-12-06 NOTE — PATIENT INSTRUCTIONS
Drink a bottle of magnesium citrate then start taking miralax 17g in 10oz of water 6pm     Avoid nuts and seeds for now    Start taking a probiotic pill twice daily on antibiotics, then once daily therafter    HIGH FIBER KEY POINTS:  1. Goal of 20-25 grams of fiber each day for women, 30-35 grams each day for men. Slowly build up to this goal as you may experience gas and bloating at first.  2. Take a fiber supplement to help reach this goal: Metamucil, Citrucel, Fibercon, Konsyl, or Psyllium  3. For Constipation: Finely ground psyllium husk (with or without flavoring or                                              Additives)   - To start: 1 teaspoon once a day in the morning with 8 oz of liquid    followed by an additional 8 ounce glass of water   - After a week: add a second teaspoon in the middle of the day   - After two weeks: add a third teaspoon at bedtime   - Follow each dose with another glass of water. Can add a splash of juice   or lemonade for taste.  3. Drink 6-8 glasses of water a day.  4. Try to do plant fiber (fruits and vegetables) over processed fibers (cereals and breads)     HIGH FIBER DIET  Fiber is present in all fruits, vegetables, cereals and grains. Fiber passes through the body undigested. A high fiber diet helps food move through the intestinal tract. The added bulk is helpful in preventing constipation. In persons with diverticulosis it serves to clean out the pouches along the colon wall while preventing new ones from forming. A high fiber diet may reduce the risk of colon cancer, decreases blood cholesterol and prevents high blood sugar in diabetics.    The foods listed below are high in fiber and should be included in your diet. If you are not used to high fiber foods, start with 1 or 2 foods from this list. Every 3-4 days add a new one to your diet until you are eating 4 high fiber foods per day. This should give you 20-35 Gm of fiber/day. It is also important to drink a lot of water  when you are on this diet (6-8 glasses a day). Water causes the fiber to swell and increases the benefit.  Add Fiber to Your Diet   Adding fiber to your diet can help relieve constipation by making stools softer and easier to pass. To increase your fiber intake, your doctor may recommend a bulking agent, such as psyllium. This is a high-fiber supplement available at most grocery and drugstores. Eating more fiber-rich foods will also help. There are two types of fiber:   Insoluble fiber is the main ingredient in bulking agents. Its also found in foods such as wheat bran, whole-grain breads, fresh fruits, and vegetables.   Soluble fiber is found in foods such as oat bran. Although soluble fiber is good for you, it may not ease constipation as much as foods high in insoluble fiber.    FOODS HIGH IN DIETARY FIBER:  BREADS: Made with 100% whole wheat flour; Moise, wheat or rye crackers; tortillas, bran muffins. Whole grains, such as wheat bran, corn bran, and brown rice.  CEREALS: Whole grain cereal with bran (Chex, Raisin Bran, Corn Bran), oatmeal, rolled oats, granola, wheat flakes, brown rice  NUTS: Any nuts  FRUITS: All fresh fruits along with edible skins, (bananas, citrus fruit, mangoes, pears, prunes, raisins, apples, pineapple, apricot, melon, jams and marmalades), fruit juices (especially prune juice)  VEGETABLES: All types, preferably raw or lightly cooked: especially, celery, eggplant, potatoes,spinach, broccoli, brussel sprouts, winter squash, carrots, cauliflower, soybeans, lentils, fresh and dried beans of all kinds  OTHER: Popcorn, any spices.   Nuts and legumes, especially peanuts, lentils, and kidney beans.  Easy Ways to Add Fiber   The tips below offer some simple ways to add more high-fiber foods to your meals.   Start your day with a high-fiber breakfast. Eat a wheat bran cereal along with a sliced banana. Or, try peanut butter on whole-wheat toast.   Eat carrot sticks for snacks. Theyre easy to  prepare, taste great, and are low in calories.   Use whole-grain breads instead of white bread for sandwiches.   Eat fruits for treats. Try an apple and some raisins instead of a candy bar.  Vegetables  Artichoke, cooked 10.3  Asparagus - 2.8  Beans - 2  Broccoli, boiled 1 cup - 5.1  O'Neals sprouts, cooked 1 cup - 4.1  Carrots - boiled 2.5, raw 4  Celery - 1  Corn - 4  Corn on the Cob - 5.9  Lettuce - 1  Peas (canned) - 4  Peas (dried) - 7.9  Green peas (cooked) - 8.8  Potato, with skin, baked - 3.0  Spinach - 4  Sweet potato - 3.4  Turnip greens, boiled 1 cup - 5.0  Sweet corn, cooked  1 cup  4.0  Tomato paste -1/4 cup -2.7  Yam - 2.7  Legumes, Nuts, and Seeds  Split peas, cooked  1 cup  16.3  Lentils, cooked 1 cup 15.6  Black beans, cooked 1 cup 15.0  Black eyed peas (1/2 cup) 4.2  Chick peas (1/2 cup) 5  Lima beans, cooked 1 cup  13.2  Baked beans, vegetarian, canned, cooked 1 cup 10.4  Sunflower seed kernels 1/4 cup 3.9  Almonds 1 ounce (23 nuts)  3.5  Pistachio nuts 1 ounce (49 nuts) 2.9  Pecans 1 ounce (19 halves) 2.7  Beans (lima,kidney,baked) - 10 (1/2 cup)  Refried beans -12 (1cup)  Lentils - 8  Soybeans (1/2 cup) 5  Fruit  Raspberries  1 cup  8.0  Pear, with skin - 5.5  Apple, with skin 4.4 (Juice = 0)  Banana - 3.1  Orange - 3.1  Strawberries (halves) 1 cup - 3.0  Figs, dried 2 medium - 1.6  Raisins 1 ounce (60 raisins) -1.0  Grapefruit - 1.4  Peach - 2  Kiwi - 5  Herbert - 3.7  Pineapple - 2  Cereal, Grains, and Pasta  Spaghetti, whole-wheat, cooked 1 cup 6.3  Barley, pearled, cooked 1 cup 6.0  Bran flakes 3/4 cup 5.3  Oat bran muffin 1 medium 5.2  Oatmeal, instant, cooked 1 cup 4.0  Popcorn, air-popped 3 cups 3.5  Brown rice, cooked  1 cup  3.5  Bread, rye 1 slice 1.9, pumpernickel - 2.1  Bagel - 1.6  Bread, whole-wheat or multigrain  1 slice 1.9  Fiber One - 14  100% Bran - 13  Raisin Bran - 3.5  All Bran Extra Fiber - 13

## 2018-12-07 ENCOUNTER — HOSPITAL ENCOUNTER (OUTPATIENT)
Dept: RADIOLOGY | Facility: HOSPITAL | Age: 48
Discharge: HOME | End: 2018-12-07
Attending: PHYSICIAN ASSISTANT
Payer: COMMERCIAL

## 2018-12-07 DIAGNOSIS — K62.5 RECTAL BLEEDING: ICD-10-CM

## 2018-12-07 DIAGNOSIS — R10.84 GENERALIZED ABDOMINAL PAIN: ICD-10-CM

## 2018-12-07 DIAGNOSIS — R10.814 LEFT LOWER QUADRANT ABDOMINAL TENDERNESS WITHOUT REBOUND TENDERNESS: ICD-10-CM

## 2018-12-07 PROCEDURE — 74177 CT ABD & PELVIS W/CONTRAST: CPT | Mod: 26,,, | Performed by: RADIOLOGY

## 2018-12-07 PROCEDURE — 74177 CT ABD & PELVIS W/CONTRAST: CPT | Mod: TC

## 2018-12-07 PROCEDURE — 25500020 PHARM REV CODE 255: Performed by: PHYSICIAN ASSISTANT

## 2018-12-07 RX ADMIN — IOHEXOL 15 ML: 350 INJECTION, SOLUTION INTRAVENOUS at 06:12

## 2018-12-07 RX ADMIN — IOHEXOL 100 ML: 350 INJECTION, SOLUTION INTRAVENOUS at 07:12

## 2018-12-10 ENCOUNTER — TELEPHONE (OUTPATIENT)
Dept: GASTROENTEROLOGY | Facility: CLINIC | Age: 48
End: 2018-12-10

## 2018-12-10 ENCOUNTER — TELEPHONE (OUTPATIENT)
Dept: OBSTETRICS AND GYNECOLOGY | Facility: CLINIC | Age: 48
End: 2018-12-10

## 2018-12-10 ENCOUNTER — TELEPHONE (OUTPATIENT)
Dept: INTERNAL MEDICINE | Facility: CLINIC | Age: 48
End: 2018-12-10

## 2018-12-10 DIAGNOSIS — R19.00 PELVIC MASS: Primary | ICD-10-CM

## 2018-12-10 NOTE — TELEPHONE ENCOUNTER
I received this report from the Gastro nurse practitioner who ordered the CT scan    She sent this to Magdalena who is obviously out of the clinic    I am not sure who is the PCP for this patient but at any rate, this patient has a very abnormal CT scan and needs to be seen by any provider this week to help coordinate care, please schedule as soon as possible.

## 2018-12-10 NOTE — TELEPHONE ENCOUNTER
----- Message from Brynn Lopez PA-C sent at 12/10/2018  3:06 PM CST -----  Regarding: abnormal CT scan  NP Avalos,    We have a mutual pt I am seeing in the GI clinic. She recently had a CT that revealed some concerning findings. Please contact the pt to schedule a pelvic US.    CT findings:   1. Solid, heterogeneously enhancing mass within the midline lower abdomen that is of uncertain origin but concerning for primary or metastatic neoplasm.  Surgical consultation recommended.    2. Suspected 2.2 cm low-attenuation lesion within the left adnexa.  Recommend correlation with pelvic ultrasound.    3. 0.4 cm left lower lobe solid pulmonary nodule.  For a solid nodule <6 mm, Fleischner Society 2017 guidelines recommend no routine follow up for a low risk patient, or follow-up with non-contrast chest CT at 12 months in a high risk patient.      Done GH.

## 2018-12-11 DIAGNOSIS — K62.5 RECTAL BLEEDING: Primary | ICD-10-CM

## 2018-12-11 RX ORDER — SODIUM, POTASSIUM,MAG SULFATES 17.5-3.13G
SOLUTION, RECONSTITUTED, ORAL ORAL
Qty: 354 ML | Refills: 0 | Status: SHIPPED | OUTPATIENT
Start: 2018-12-11 | End: 2019-01-28 | Stop reason: ALTCHOICE

## 2018-12-12 ENCOUNTER — HOSPITAL ENCOUNTER (OUTPATIENT)
Dept: RADIOLOGY | Facility: HOSPITAL | Age: 48
Discharge: HOME | End: 2018-12-12
Attending: NURSE PRACTITIONER
Payer: COMMERCIAL

## 2018-12-12 ENCOUNTER — PATIENT MESSAGE (OUTPATIENT)
Dept: GASTROENTEROLOGY | Facility: CLINIC | Age: 48
End: 2018-12-12

## 2018-12-12 DIAGNOSIS — R19.00 PELVIC MASS: ICD-10-CM

## 2018-12-12 PROBLEM — R55 SYNCOPE AND COLLAPSE: Status: RESOLVED | Noted: 2017-06-14 | Resolved: 2018-12-12

## 2018-12-12 PROBLEM — E87.6 HYPOKALEMIA: Status: RESOLVED | Noted: 2017-06-14 | Resolved: 2018-12-12

## 2018-12-12 PROCEDURE — 76856 US EXAM PELVIC COMPLETE: CPT | Mod: TC

## 2018-12-12 PROCEDURE — 76856 US EXAM PELVIC COMPLETE: CPT | Mod: 26,,, | Performed by: RADIOLOGY

## 2018-12-13 ENCOUNTER — TELEPHONE (OUTPATIENT)
Dept: OBSTETRICS AND GYNECOLOGY | Facility: CLINIC | Age: 48
End: 2018-12-13

## 2018-12-13 ENCOUNTER — OFFICE VISIT (OUTPATIENT)
Dept: INTERNAL MEDICINE | Facility: CLINIC | Age: 48
End: 2018-12-13
Payer: COMMERCIAL

## 2018-12-13 VITALS
SYSTOLIC BLOOD PRESSURE: 126 MMHG | BODY MASS INDEX: 35.68 KG/M2 | OXYGEN SATURATION: 97 % | HEIGHT: 64 IN | DIASTOLIC BLOOD PRESSURE: 72 MMHG | WEIGHT: 209 LBS | TEMPERATURE: 98 F | HEART RATE: 90 BPM

## 2018-12-13 DIAGNOSIS — R19.00 PELVIC MASS: Primary | ICD-10-CM

## 2018-12-13 DIAGNOSIS — R19.00 ABDOMINAL MASS, UNSPECIFIED ABDOMINAL LOCATION: Primary | ICD-10-CM

## 2018-12-13 PROCEDURE — 99999 PR PBB SHADOW E&M-EST. PATIENT-LVL IV: CPT | Mod: PBBFAC,,, | Performed by: INTERNAL MEDICINE

## 2018-12-13 PROCEDURE — 99214 OFFICE O/P EST MOD 30 MIN: CPT | Mod: S$GLB,,, | Performed by: INTERNAL MEDICINE

## 2018-12-13 PROCEDURE — 3008F BODY MASS INDEX DOCD: CPT | Mod: CPTII,S$GLB,, | Performed by: INTERNAL MEDICINE

## 2018-12-13 PROCEDURE — 3074F SYST BP LT 130 MM HG: CPT | Mod: CPTII,S$GLB,, | Performed by: INTERNAL MEDICINE

## 2018-12-13 PROCEDURE — 3078F DIAST BP <80 MM HG: CPT | Mod: CPTII,S$GLB,, | Performed by: INTERNAL MEDICINE

## 2018-12-13 NOTE — TELEPHONE ENCOUNTER
PHONE CALL: Advised pt of :    Redemonstration of a 5.3 cm heterogeneous mixed minimally cystic and solid mass, while this could represent a fibroma or thecoma,  malignant etiology cannot be excluded.  Surgical/gynecologic consultation recommended.    Appt with Dr HERMANN Porter in Gyn Oncology set up for consult. Faby Avalos NP

## 2018-12-13 NOTE — PROGRESS NOTES
INTERNAL MEDICINE CLINIC - SAME DAY APPOINTMENT  Progress Note    PRESENTING HISTORY     PCP: Magdalena Davila NP  Chief Complaint/Reason for Visit:     Chief Complaint   Patient presents with    Abnormal Ct Scan     History of Present Illness & ROS : Ms. Renea Mac is a 48 y.o. female.      Was seen by GI 12-6-18.  CT scan ordered:  The uterus is surgically absent.  There is mild apparent enlargement of the left adnexa with a suspected 2.2 cm low-attenuation lesion.  Right adnexa is not well evaluated.    There is a 5 x 4.9 x 5.5 cm solid, heterogeneously enhancing mass within the midline lower abdomen that abuts multiple adjacent loops of small bowel and adjacent anterior abdominal wall.    Small bowel is normal in caliber.  There is moderate retained fecal material throughout the visualized colon.  The appendix is normal.  No obstruction or inflammatory changes.    US 12-12-18  Status post hysterectomy.    Redemonstration of a 5.3 cm heterogeneous mixed minimally cystic and solid mass, while this could represent a fibroma or thecoma,  malignant etiology cannot be excluded.  Surgical/gynecologic consultation recommended.    Answers for HPI/ROS submitted by the patient on 12/12/2018   Abdominal pain  Chronicity: recurrent  Onset: 1 to 4 weeks ago  Onset quality: undetermined  Frequency: intermittently  Episode duration: 2 days  Progression since onset: unchanged  Pain location: LLQ, left flank, rectum  Pain - numeric: 2/10  anorexia: No  arthralgias: No  belching: No  constipation: Yes  diarrhea: Yes  dysuria: Yes  fever: No  flatus: Yes  frequency: No  headaches: Yes  hematochezia: No  hematuria: No  melena: No  myalgias: No  nausea: No  weight loss: No  vomiting: No  Aggravated by: bowel movement, certain positions, palpation  Relieved by: bowel movements, certain positions, passing flatus  Diagnostic workup: CT scan, ultrasound  Pain severity: mild  Treatments tried: acetaminophen, antacids,  antibiotics  Improvement on treatment: mild  abdominal surgery: Yes  colon cancer: No  Crohn's disease: No  gallstones: No  GERD: Yes  irritable bowel syndrome: No  kidney stones: No  pancreatitis: No  PUD: No  ulcerative colitis: No  UTI: Yes    Today:    She feels pressure in the lower abdomen. No pain.  She had fibroid in the past. Hysterectomy .    PAST HISTORY:     Past Medical History:   Diagnosis Date    Acute pain of right knee 2016    Chronic right shoulder pain 2016    Essential hypertension 2016    GERD (gastroesophageal reflux disease)     Hypokalemia 2017    Incomplete tear of right rotator cuff 2016    Osteoarthritis of right knee 2016    Rotator cuff tear     S/P laparoscopic sleeve gastrectomy 2016    Syncope and collapse 2017    Tail bone pain 2016       Past Surgical History:   Procedure Laterality Date     SECTION      x1    HERNIA REPAIR      HYSTERECTOMY      YANG, ovaries remain (fibroids0    SLEEVE GASTROPLASTY  2014    TUBAL LIGATION         Family History   Problem Relation Age of Onset    Hypertension Mother     Heart disease Mother     Heart attack Mother     Rectal cancer Mother     Colon cancer Mother     Hypertension Father     Heart disease Father     Stroke Father     Heart attack Father     Hypertension Sister     Hypertension Brother     Cancer Brother     No Known Problems Daughter     No Known Problems Son     Esophageal cancer Neg Hx        Social History     Socioeconomic History    Marital status:      Spouse name: Not on file    Number of children: Not on file    Years of education: Not on file    Highest education level: Not on file   Social Needs    Financial resource strain: Not on file    Food insecurity - worry: Not on file    Food insecurity - inability: Not on file    Transportation needs - medical: Not on file    Transportation needs - non-medical: Not on  file   Occupational History    Not on file   Tobacco Use    Smoking status: Never Smoker    Smokeless tobacco: Never Used   Substance and Sexual Activity    Alcohol use: Yes    Drug use: No    Sexual activity: Yes     Partners: Male   Other Topics Concern    Not on file   Social History Narrative    Not on file       MEDICATIONS & ALLERGIES:     Current Outpatient Medications on File Prior to Visit   Medication Sig Dispense Refill    acetaminophen (TYLENOL) 500 MG tablet Take 500 mg by mouth every 6 (six) hours as needed for Pain.      hydroCHLOROthiazide (HYDRODIURIL) 25 MG tablet Take 0.5 tablets (12.5 mg total) by mouth once daily. 30 tablet 11    amoxicillin (AMOXIL) 500 MG capsule Take 1 capsule (500 mg total) by mouth 4 (four) times daily until all taken. 28 capsule 0    CYCLOBENZAPRINE HCL (FLEXERIL ORAL) Take by mouth.      fluconazole (DIFLUCAN) 100 MG tablet Take 2 tablets (200 mg total) by mouth on the first day, then 1 one tablet by mouth every day after 15 tablet 0    methocarbamol (ROBAXIN) 500 MG Tab Take 1 tablet 4 times daily as needed for 5 days 20 tablet 0    phenazopyridine (PYRIDIUM) 100 MG tablet Take 1 tablet by mouth three times daily for 2 days. 6 tablet 0    phentermine (LOMAIRA) 8 mg Tab Take 1 tablet by mouth 3 (three) times daily as needed. For appetite supression 90 each 1    polyethylene glycol (MIRALAX) 17 gram/dose powder Mix 1 capful (17 grams total) with a liquid and take by mouth once daily. 527 g 11    sodium,potassium,mag sulfates (SUPREP BOWEL PREP KIT) 17.5-3.13-1.6 gram SolR take As directed-dispense 1 kit 354 mL 0     No current facility-administered medications on file prior to visit.         Review of patient's allergies indicates:  No Known Allergies    Medications Reconciliation:   I have reconciled the patient's home medications with the patient/family. I have updated all changes.  Refer to After-Visit Medication List.    OBJECTIVE:     Vital  Signs:  Vitals:    12/13/18 1219   BP: 126/72   Pulse: 90   Temp: 98.3 °F (36.8 °C)     Wt Readings from Last 1 Encounters:   12/13/18 1219 94.8 kg (208 lb 15.9 oz)     Body mass index is 35.87 kg/m².     Physical Exam:  General: Well developed, well nourished. No distress. Tearful because of abnormal CT  HEENT: Head is normocephalic, atraumatic   Eyes: Clear conjunctiva. No redness.  Neck: Supple, symmetrical neck; trachea midline.  Lungs: Clear to auscultation bilaterally.  No wheezing. Normal respiratory effort.  Cardiovascular: Heart with regular rate and rhythm.  S1 S2.  Extremities: No LE edema. Pulses 2+ and symmetric.   Abdomen: Abdomen is soft, non-tender non-distended with normal bowel sounds.  Psychiatric: Normal affect. Alert and oriented X 3.    Laboratory  Lab Results   Component Value Date    WBC 6.88 12/06/2018    HGB 13.1 12/06/2018    HCT 43.4 12/06/2018     12/06/2018    CHOL 153 07/11/2017    TRIG 40 07/11/2017    HDL 64 07/11/2017    ALT 9 (L) 12/06/2018    AST 13 12/06/2018     12/06/2018    K 4.1 12/06/2018     12/06/2018    CREATININE 0.7 12/06/2018    BUN 14 12/06/2018    CO2 30 (H) 12/06/2018    TSH 3.573 06/13/2017       ASSESSMENT & PLAN:     Abdominal mass, unspecified abdominal location  - Discussed with the patient. Mass in lower abdomen.     She has appointment to see Gyn Onc on Jan 14.    She has no pain, just pressure.    Constipation is helped by Miralax.    Scheduled Follow-up :  Future Appointments   Date Time Provider Department Center   1/14/2019 11:15 AM Petrona Porter MD [facility] GYN ONC [facility]   2/7/2019  8:30 AM Brynn Lopez PA-C [facility] GASTRO [facility]       After Visit Medication List :     Medication List           Accurate as of 12/13/18 12:34 PM. If you have any questions, ask your nurse or doctor.               CONTINUE taking these medications    acetaminophen 500 MG tablet  Commonly known as:  TYLENOL     FLEXERIL ORAL     hydroCHLOROthiazide  25 MG tablet  Commonly known as:  HYDRODIURIL  Take 0.5 tablets (12.5 mg total) by mouth once daily.     methocarbamol 500 MG Tab  Commonly known as:  ROBAXIN  Take 1 tablet 4 times daily as needed for 5 days     polyethylene glycol 17 gram/dose powder  Commonly known as:  GLYCOLAX  Mix 1 capful (17 grams total) with a liquid and take by mouth once daily.     sodium,potassium,mag sulfates 17.5-3.13-1.6 gram Solr  Commonly known as:  SUPREP BOWEL PREP KIT  take As directed-dispense 1 kit        STOP taking these medications    amoxicillin 500 MG capsule  Commonly known as:  AMOXIL  Stopped by:  Quintin Gross MD     fluconazole 100 MG tablet  Commonly known as:  DIFLUCAN  Stopped by:  Quintin Gross MD     LOMAIRA 8 mg Tab  Generic drug:  phentermine  Stopped by:  Quintin Gross MD     phenazopyridine 100 MG tablet  Commonly known as:  PYRIDIUM  Stopped by:  Quintin Gross MD            Signing Physician:  Quintin Gross MD

## 2019-01-11 ENCOUNTER — TELEPHONE (OUTPATIENT)
Dept: GYNECOLOGIC ONCOLOGY | Facility: CLINIC | Age: 49
End: 2019-01-11

## 2019-01-14 ENCOUNTER — INITIAL CONSULT (OUTPATIENT)
Dept: GYNECOLOGIC ONCOLOGY | Facility: CLINIC | Age: 49
End: 2019-01-14
Payer: COMMERCIAL

## 2019-01-14 ENCOUNTER — LAB VISIT (OUTPATIENT)
Dept: LAB | Facility: HOSPITAL | Age: 49
End: 2019-01-14
Attending: OBSTETRICS & GYNECOLOGY
Payer: COMMERCIAL

## 2019-01-14 VITALS
BODY MASS INDEX: 35.76 KG/M2 | DIASTOLIC BLOOD PRESSURE: 78 MMHG | SYSTOLIC BLOOD PRESSURE: 139 MMHG | WEIGHT: 209.44 LBS | HEIGHT: 64 IN | HEART RATE: 78 BPM

## 2019-01-14 DIAGNOSIS — R19.00 PELVIC MASS: Primary | ICD-10-CM

## 2019-01-14 DIAGNOSIS — R19.00 PELVIC MASS: ICD-10-CM

## 2019-01-14 LAB
CANCER AG125 SERPL-ACNC: 14 U/ML
CEA SERPL-MCNC: <1 NG/ML

## 2019-01-14 PROCEDURE — 86304 IMMUNOASSAY TUMOR CA 125: CPT

## 2019-01-14 PROCEDURE — 36415 COLL VENOUS BLD VENIPUNCTURE: CPT

## 2019-01-14 PROCEDURE — 82378 CARCINOEMBRYONIC ANTIGEN: CPT

## 2019-01-14 PROCEDURE — 99244 OFF/OP CNSLTJ NEW/EST MOD 40: CPT | Mod: S$GLB,,, | Performed by: OBSTETRICS & GYNECOLOGY

## 2019-01-14 PROCEDURE — 99999 PR PBB SHADOW E&M-EST. PATIENT-LVL III: CPT | Mod: PBBFAC,,, | Performed by: OBSTETRICS & GYNECOLOGY

## 2019-01-14 PROCEDURE — 99999 PR PBB SHADOW E&M-EST. PATIENT-LVL III: ICD-10-PCS | Mod: PBBFAC,,, | Performed by: OBSTETRICS & GYNECOLOGY

## 2019-01-14 PROCEDURE — 99244 PR OFFICE CONSULTATION,LEVEL IV: ICD-10-PCS | Mod: S$GLB,,, | Performed by: OBSTETRICS & GYNECOLOGY

## 2019-01-14 NOTE — PROGRESS NOTES
Subjective:      Patient ID: Renea Mac is a 48 y.o. female.    Chief Complaint: Consult      HPI  Referred for pelvic mass.     She reported an episode of diarrhea and then bright red blood from rectum for approximately 3 days. No current bleeding. And she is asymptomatic at this time.   [facility] reviewed.   CT A&P 12/7/18  Impression     1. Solid, heterogeneously enhancing mass within the midline lower abdomen that is of uncertain origin but concerning for primary or metastatic neoplasm.  Surgical consultation recommended.  2. Suspected 2.2 cm low-attenuation lesion within the left adnexa.  Recommend correlation with pelvic ultrasound.  3. 0.4 cm left lower lobe solid pulmonary nodule.  For a solid nodule <6 mm, Fleischner Society 2017 guidelines recommend no routine follow up for a low risk patient, or follow-up with non-contrast chest CT at 12 months in a high risk patient.  4. Left renal cyst.  5. Postsurgical changes of sleeve gastrectomy.  6. Fat containing supraumbilical anterior abdominal wall hernia.      Pelvic US 12/14/18  FINDINGS:  Uterus: Patient is status post hysterectomy.  Right ovary: Size: 2.9 x 2.0 x 2.3 cm, 0.9 x 1.0 x 0.9 minimally complex cyst.  Left ovary: Size: 2.9 x 2.1 x 2.6 cm, 1.4 x 1.4 x 0.9 cm minimally complex cyst.  Free Fluid: None.  There is a 5.3 x 4.5 x 4.9 cm heterogeneous mixed minimally cystic and solid mass in the pelvis without obvious attachment to surrounding structures as seen on CT 12/07/2018.    Reports colonoscopy 6 yrs ago normal. Scheduled for c-scope on 1/21/19.     Prior abdominal surgeries include laparoscopic hysterectomy due to leiomyoma 2011 (ovaries remain in situ), laparoscopic gastric sleeve, and c/s x 1 via pfannenstiel.     Family history significant for mother with colon cancer, paternal GM with malignancy of unknown type, Maternal aunt with breast cancer, and maternal aunt with lung cancer.     Review of Systems   Constitutional: Negative for  appetite change, chills, fatigue and fever.   HENT: Negative for mouth sores.    Respiratory: Negative for cough and shortness of breath.    Cardiovascular: Negative for leg swelling.   Gastrointestinal: Negative for abdominal pain, blood in stool, constipation and diarrhea.   Endocrine: Negative for cold intolerance.   Genitourinary: Negative for dysuria and vaginal bleeding.   Musculoskeletal: Negative for myalgias.   Skin: Negative for rash.   Allergic/Immunologic: Negative.    Neurological: Negative for weakness and numbness.   Hematological: Negative for adenopathy. Does not bruise/bleed easily.   Psychiatric/Behavioral: Negative for confusion.       Past Medical History:   Diagnosis Date    Acute pain of right knee 2016    Chronic right shoulder pain 2016    Essential hypertension 2016    GERD (gastroesophageal reflux disease)     Hypokalemia 2017    Incomplete tear of right rotator cuff 2016    Osteoarthritis of right knee 2016    Rotator cuff tear     S/P laparoscopic sleeve gastrectomy 2016    Syncope and collapse 2017    Tail bone pain 2016     Past Surgical History:   Procedure Laterality Date     SECTION      x1    HERNIA REPAIR      HYSTERECTOMY      YANG, ovaries remain (fibroids0    SLEEVE GASTROPLASTY  2014    TUBAL LIGATION       Family History   Problem Relation Age of Onset    Hypertension Mother     Heart disease Mother     Heart attack Mother     Rectal cancer Mother     Colon cancer Mother     Hypertension Father     Heart disease Father     Stroke Father     Heart attack Father     Hypertension Sister     Hypertension Brother     Cancer Brother     No Known Problems Daughter     No Known Problems Son     Esophageal cancer Neg Hx      Social History     Socioeconomic History    Marital status:      Spouse name: Not on file    Number of children: Not on file    Years of education: Not on file     Highest education level: Not on file   Social Needs    Financial resource strain: Not on file    Food insecurity - worry: Not on file    Food insecurity - inability: Not on file    Transportation needs - medical: Not on file    Transportation needs - non-medical: Not on file   Occupational History    Not on file   Tobacco Use    Smoking status: Never Smoker    Smokeless tobacco: Never Used   Substance and Sexual Activity    Alcohol use: Yes    Drug use: No    Sexual activity: Yes     Partners: Male   Other Topics Concern    Not on file   Social History Narrative    Not on file     Current Outpatient Medications   Medication Sig    acetaminophen (TYLENOL) 500 MG tablet Take 500 mg by mouth every 6 (six) hours as needed for Pain.    hydroCHLOROthiazide (HYDRODIURIL) 25 MG tablet Take 0.5 tablets (12.5 mg total) by mouth once daily.    CYCLOBENZAPRINE HCL (FLEXERIL ORAL) Take by mouth.    methocarbamol (ROBAXIN) 500 MG Tab Take 1 tablet 4 times daily as needed for 5 days    polyethylene glycol (MIRALAX) 17 gram/dose powder Mix 1 capful (17 grams total) with a liquid and take by mouth once daily.    sodium,potassium,mag sulfates (SUPREP BOWEL PREP KIT) 17.5-3.13-1.6 gram SolR take As directed-dispense 1 kit     No current facility-administered medications for this visit.      Review of patient's allergies indicates:  No Known Allergies    Objective:   Physical Exam:   Constitutional: She is oriented to person, place, and time. She appears well-developed and well-nourished.    HENT:   Head: Normocephalic and atraumatic.    Eyes: EOM are normal. Pupils are equal, round, and reactive to light.    Neck: Normal range of motion. Neck supple. No thyromegaly present.    Cardiovascular: Normal rate, regular rhythm and intact distal pulses.     Pulmonary/Chest: Effort normal and breath sounds normal. No respiratory distress. She has no wheezes.        Abdominal: Soft. Bowel sounds are normal. She exhibits  no distension, no ascites and no mass. There is no tenderness.     Genitourinary: Rectum normal and vagina normal. Pelvic exam was performed with patient supine. There is no lesion on the right labia. There is no lesion on the left labia. Uterus is absent. Right adnexum displays no mass. Left adnexum displays no mass. Vaginal cuff normal.Cervix exhibits absence.   Genitourinary Comments: Mass is not appreciable on pelvic exam           Musculoskeletal: Normal range of motion and moves all extremeties.      Lymphadenopathy:     She has no cervical adenopathy.        Right: No inguinal and no supraclavicular adenopathy present.        Left: No inguinal and no supraclavicular adenopathy present.    Neurological: She is alert and oriented to person, place, and time.    Skin: Skin is warm and dry. No rash noted.    Psychiatric: She has a normal mood and affect.       Assessment:     1. Pelvic mass        Plan:     Orders Placed This Encounter   Procedures        CEA       Discussion with the patient and her family the differential diagnosis of pelvic mass including benign and malignant process. It is of uncertain etiology. Appears distinct from ovaries on imaging. She is scheduled for colonoscopy 1/21/19 and I have asked her to keep this appointment. Will obtain CEA and  as well. I will review imaging with surg onc. Will consider surgical excision and likely removal of tubes and ovaries concurrently if deemed separate from this lesion at the time of surgery. The patient and her family were allowed to ask questions and all were answered to her satisfaction.

## 2019-01-14 NOTE — LETTER
January 14, 2019      ANNALISA Avalos, [place]  [address] [facility] [address] [address]           [facility] - GYN Oncology  [address]  [facility] [address]  Phone: [phone]          Patient: Renae Mac   MR Number: [id number]   YOB: 1970   Date of Visit: 1/14/2019       Dear ANNALISA Avalos:    Thank you for referring Renea Mac to me for evaluation. Attached you will find relevant portions of my assessment and plan of care.    If you have questions, please do not hesitate to call me. I look forward to following Renea Mac along with you.    Sincerely,    Petrona Porter MD    Enclosure  CC:  No Recipients    If you would like to receive this communication electronically, please contact [email or web address] or [phone] to request more information on [employer] ([employer]) Link access.    For providers and/or their staff who would like to refer a patient to [facility], please contact us through our one-stop-shop provider referral line, [facility], at [phone].    If you feel you have received this communication in error or would no longer like to receive these types of communications, please e-mail [email or web address]

## 2019-01-18 ENCOUNTER — TELEPHONE (OUTPATIENT)
Dept: INTERNAL MEDICINE | Facility: CLINIC | Age: 49
End: 2019-01-18

## 2019-01-18 RX ORDER — HYDROCHLOROTHIAZIDE 25 MG/1
12.5 TABLET ORAL DAILY
Qty: 15 TABLET | Refills: 0 | Status: SHIPPED | OUTPATIENT
Start: 2019-01-18 | End: 2019-05-02 | Stop reason: SDUPTHER

## 2019-01-18 NOTE — TELEPHONE ENCOUNTER
Magdalena saw this patient over 1 year ago.  Patient needs PCP.  Please call patient to schedule.  30 day supply of BP med sent to pharmacy.

## 2019-01-20 ENCOUNTER — ANESTHESIA EVENT (OUTPATIENT)
Dept: ENDOSCOPY | Facility: HOSPITAL | Age: 49
End: 2019-01-20
Payer: COMMERCIAL

## 2019-01-21 ENCOUNTER — HOSPITAL ENCOUNTER (OUTPATIENT)
Facility: HOSPITAL | Age: 49
Discharge: HOME | End: 2019-01-21
Attending: COLON & RECTAL SURGERY | Admitting: COLON & RECTAL SURGERY
Payer: COMMERCIAL

## 2019-01-21 ENCOUNTER — ANESTHESIA (OUTPATIENT)
Dept: ENDOSCOPY | Facility: HOSPITAL | Age: 49
End: 2019-01-21
Payer: COMMERCIAL

## 2019-01-21 VITALS
HEIGHT: 64 IN | SYSTOLIC BLOOD PRESSURE: 137 MMHG | HEART RATE: 66 BPM | WEIGHT: 200 LBS | DIASTOLIC BLOOD PRESSURE: 80 MMHG | TEMPERATURE: 99 F | OXYGEN SATURATION: 98 % | RESPIRATION RATE: 12 BRPM | BODY MASS INDEX: 34.15 KG/M2

## 2019-01-21 DIAGNOSIS — Z12.11 SCREENING FOR COLON CANCER: ICD-10-CM

## 2019-01-21 PROCEDURE — 63600175 PHARM REV CODE 636 W HCPCS: Performed by: NURSE ANESTHETIST, CERTIFIED REGISTERED

## 2019-01-21 PROCEDURE — 45378 DIAGNOSTIC COLONOSCOPY: CPT | Performed by: COLON & RECTAL SURGERY

## 2019-01-21 PROCEDURE — 45378 PR COLONOSCOPY,DIAGNOSTIC: ICD-10-PCS | Mod: ,,, | Performed by: COLON & RECTAL SURGERY

## 2019-01-21 PROCEDURE — 00811 ANES LWR INTST NDSC NOS: CPT | Performed by: COLON & RECTAL SURGERY

## 2019-01-21 PROCEDURE — 37000009 HC ANESTHESIA EA ADD 15 MINS: Performed by: COLON & RECTAL SURGERY

## 2019-01-21 PROCEDURE — 45378 DIAGNOSTIC COLONOSCOPY: CPT | Mod: ,,, | Performed by: COLON & RECTAL SURGERY

## 2019-01-21 PROCEDURE — 37000008 HC ANESTHESIA 1ST 15 MINUTES: Performed by: COLON & RECTAL SURGERY

## 2019-01-21 PROCEDURE — 25000003 PHARM REV CODE 250: Performed by: NURSE PRACTITIONER

## 2019-01-21 PROCEDURE — E9220 PRA ENDO ANESTHESIA: ICD-10-PCS | Mod: ,,, | Performed by: NURSE ANESTHETIST, CERTIFIED REGISTERED

## 2019-01-21 PROCEDURE — E9220 PRA ENDO ANESTHESIA: HCPCS | Mod: ,,, | Performed by: NURSE ANESTHETIST, CERTIFIED REGISTERED

## 2019-01-21 RX ORDER — SODIUM CHLORIDE 0.9 % (FLUSH) 0.9 %
3 SYRINGE (ML) INJECTION
Status: DISCONTINUED | OUTPATIENT
Start: 2019-01-21 | End: 2019-01-21 | Stop reason: HOSPADM

## 2019-01-21 RX ORDER — LIDOCAINE HCL/PF 100 MG/5ML
SYRINGE (ML) INTRAVENOUS
Status: DISCONTINUED | OUTPATIENT
Start: 2019-01-21 | End: 2019-01-21

## 2019-01-21 RX ORDER — SODIUM CHLORIDE 9 MG/ML
INJECTION, SOLUTION INTRAVENOUS CONTINUOUS
Status: DISCONTINUED | OUTPATIENT
Start: 2019-01-21 | End: 2019-01-21 | Stop reason: HOSPADM

## 2019-01-21 RX ORDER — PROPOFOL 10 MG/ML
INJECTION, EMULSION INTRAVENOUS CONTINUOUS PRN
Status: DISCONTINUED | OUTPATIENT
Start: 2019-01-21 | End: 2019-01-21

## 2019-01-21 RX ORDER — PROPOFOL 10 MG/ML
INJECTION, EMULSION INTRAVENOUS
Status: DISCONTINUED | OUTPATIENT
Start: 2019-01-21 | End: 2019-01-21

## 2019-01-21 RX ADMIN — SODIUM CHLORIDE: 0.9 INJECTION, SOLUTION INTRAVENOUS at 10:01

## 2019-01-21 RX ADMIN — PROPOFOL 200 MCG/KG/MIN: 10 INJECTION, EMULSION INTRAVENOUS at 11:01

## 2019-01-21 RX ADMIN — LIDOCAINE HYDROCHLORIDE 100 MG: 20 INJECTION, SOLUTION INTRAVENOUS at 11:01

## 2019-01-21 RX ADMIN — PROPOFOL 80 MG: 10 INJECTION, EMULSION INTRAVENOUS at 11:01

## 2019-01-21 NOTE — TRANSFER OF CARE
"Anesthesia Transfer of Care Note    Patient: Reena Mac    Procedure(s) Performed: Procedure(s) (LRB):  COLONOSCOPY (N/A)    Patient location: GI    Anesthesia Type: general    Transport from OR: Transported from OR on room air with adequate spontaneous ventilation    Post pain: adequate analgesia    Post assessment: no apparent anesthetic complications and tolerated procedure well    Post vital signs: stable    Level of consciousness: awake, alert and oriented    Nausea/Vomiting: no nausea/vomiting    Complications: none    Transfer of care protocol was followed      Last vitals:   Visit Vitals  /67 (BP Location: Left arm, Patient Position: Lying)   Pulse 81   Temp 36.6 °C (97.9 °F) (Temporal)   Resp 16   Ht 5' 4" (1.626 m)   Wt 90.7 kg (200 lb)   SpO2 98%   Breastfeeding? No   BMI 34.33 kg/m²     "

## 2019-01-21 NOTE — ADDENDUM NOTE
Addendum  created 01/21/19 1408 by Harmony Ballesteros CRNA    Intraprocedure Attestations filed

## 2019-01-21 NOTE — [employer] PATIENT INSTRUCTIONS
Discharge Summary/Instructions after an Endoscopic Procedure  Patient Name: Renea Mac  Patient MRN: [id number]  Patient YOB: 1970  Monday, January 21, 2019  Chris Bennett MD  RESTRICTIONS:  During your procedure today, you received medications for sedation.  These   medications may affect your judgment, balance and coordination.  Therefore,   for 24 hours, you have the following restrictions:   - DO NOT drive a car, operate machinery, make legal/financial decisions,   sign important papers or drink alcohol.    ACTIVITY:  Today: no heavy lifting, straining or running due to procedural   sedation/anesthesia.  The following day: return to full activity including work.  DIET:  Eat and drink normally unless instructed otherwise.     TREATMENT FOR COMMON SIDE EFFECTS:  - Mild abdominal pain, nausea, belching, bloating or excessive gas:  rest,   eat lightly and use a heating pad.  - Sore Throat: treat with throat lozenges and/or gargle with warm salt   water.  - Because air was used during the procedure, expelling large amounts of air   from your rectum or belching is normal.  - If a bowel prep was taken, you may not have a bowel movement for 1-3 days.    This is normal.  SYMPTOMS TO WATCH FOR AND REPORT TO YOUR PHYSICIAN:  1. Abdominal pain or bloating, other than gas cramps.  2. Chest pain.  3. Back pain.  4. Signs of infection such as: chills or fever occurring within 24 hours   after the procedure.  5. Rectal bleeding, which would show as bright red, maroon, or black stools.   (A tablespoon of blood from the rectum is not serious, especially if   hemorrhoids are present.)  6. Vomiting.  7. Weakness or dizziness.  GO DIRECTLY TO THE NEAREST EMERGENCY ROOM IF YOU HAVE ANY OF THE FOLLOWING:      Difficulty breathing              Chills and/or fever over 101 F   Persistent vomiting and/or vomiting blood   Severe abdominal pain   Severe chest pain   Black, tarry stools   Bleeding- more than one  tablespoon   Any other symptom or condition that you feel may need urgent attention  Your doctor recommends these additional instructions:  If any biopsies were taken, your doctors clinic will contact you in 1 to 2   weeks with any results.  - Patient has a contact number available for emergencies.  The signs and   symptoms of potential delayed complications were discussed with the   patient.  Return to normal activities tomorrow.  Written discharge   instructions were provided to the patient.   - Discharge patient to home (ambulatory).   - Resume regular diet indefinitely.   - Continue present medications.   - Repeat colonoscopy in 10 years for screening purposes.  For questions, problems or results please call your physician - Chris Bennett MD at Work:  [phone].  [facility], EMERGENCY ROOM PHONE NUMBER: [phone]  IF A COMPLICATION OR EMERGENCY SITUATION ARISES AND YOU ARE UNABLE TO REACH   YOUR PHYSICIAN - GO DIRECTLY TO THE EMERGENCY ROOM.  Chris Bennett MD  1/21/2019 12:06:44 PM  This report has been verified and signed electronically.  [employer]

## 2019-01-21 NOTE — ANESTHESIA PREPROCEDURE EVALUATION
01/21/2019  Renea Mac is a 48 y.o., female.    Anesthesia Evaluation    I have reviewed the Patient Summary Reports.    I have reviewed the Nursing Notes.   I have reviewed the Medications.     Review of Systems  Anesthesia Hx:  No problems with previous Anesthesia   Denies Personal Hx of Anesthesia complications.   Hematology/Oncology:  Hematology Normal   Oncology Normal     EENT/Dental:EENT/Dental Normal   Cardiovascular:   Exercise tolerance: good Hypertension, well controlled    Pulmonary:  Pulmonary Normal    Renal/:  Renal/ Normal     Hepatic/GI:   Bowel Prep. GERD, well controlled    Musculoskeletal:   Arthritis     Neurological:  Neurology Normal    Endocrine:  Endocrine Normal    Dermatological:  Skin Normal    Psych:  Psychiatric Normal           Physical Exam  General:  Well nourished    Airway/Jaw/Neck:  Airway Findings: Mouth Opening: Normal Mallampati: II  TM Distance: Normal, at least 6 cm  Jaw/Neck Findings:  Neck ROM: Normal ROM      Dental:  Dental Findings: In tact   Chest/Lungs:  Chest/Lungs Findings: Clear to auscultation, Normal Respiratory Rate     Heart/Vascular:  Heart Findings: Rate: Normal  Rhythm: Regular Rhythm  Sounds: Normal     Abdomen:  Abdomen Findings:  Normal, Soft, Nontender       Mental Status:  Mental Status Findings:  Alert and Oriented, Cooperative         Anesthesia Plan  Type of Anesthesia, risks & benefits discussed:  Anesthesia Type:  general  Patient's Preference:   Intra-op Monitoring Plan: standard ASA monitors  Intra-op Monitoring Plan Comments:   Post Op Pain Control Plan: IV/PO Opioids PRN  Post Op Pain Control Plan Comments:   Induction:   IV  Beta Blocker:  Patient is not currently on a Beta-Blocker (No further documentation required).       Informed Consent: Patient understands risks and agrees with Anesthesia plan.  Questions answered.  Anesthesia consent signed with patient.  ASA Score: 2     Day of Surgery Review of History & Physical:    H&P update referred to the surgeon.         Ready For Surgery From Anesthesia Perspective.

## 2019-01-21 NOTE — DISCHARGE INSTRUCTIONS
Colonoscopy     A camera attached to a flexible tube with a viewing lens is used to take video pictures.     Colonoscopy is a test to view the inside of your lower digestive tract (colon and rectum). Sometimes it can show the last part of the small intestine (ileum). During the test, small pieces of tissue may be removed for testing. This is called a biopsy. Small growths, such as polyps, may also be removed.   Why is colonoscopy done?  The test is done to help look for colon cancer. And it can help find the source of abdominal pain, bleeding, and changes in bowel habits. It may be needed once a year, depending on factors such as your:  · Age  · Health history  · Family health history  · Symptoms  · Results from any prior colonoscopy  Risks and possible complications  These include:  · Bleeding               · A puncture or tear in the colon   · Risks of anesthesia  · A cancer lesion not being seen  Getting ready   To prepare for the test:  · Talk with your healthcare provider about the risks of the test (see below). Also ask your healthcare provider about alternatives to the test.  · Tell your healthcare provider about any medicines you take. Also tell him or her about any health conditions you may have.  · Make sure your rectum and colon are empty for the test. Follow the diet and bowel prep instructions exactly. If you dont, the test may need to be rescheduled.  · Plan for a friend or family member to drive you home after the test.     Colonoscopy provides an inside view of the entire colon.     You may discuss the results with your doctor right away or at a future visit.  During the test   The test is usually done in the hospital on an outpatient basis. This means you go home the same day. The procedure takes about 30 minutes. During that time:  · You are given relaxing (sedating) medicine through an IV line. You may be drowsy, or fully asleep.  · The healthcare provider will first give you a physical exam to  check for anal and rectal problems.  · Then the anus is lubricated and the scope inserted.  · If you are awake, you may have a feeling similar to needing to have a bowel movement. You may also feel pressure as air is pumped into the colon. Its OK to pass gas during the procedure.  · Biopsy, polyp removal, or other treatments may be done during the test.  After the test   You may have gas right after the test. It can help to try to pass it to help prevent later bloating. Your healthcare provider may discuss the results with you right away. Or you may need to schedule a follow-up visit to talk about the results. After the test, you can go back to your normal eating and other activities. You may be tired from the sedation and need to rest for a few hours.  Date Last Reviewed: 11/1/2016 © 2000-2017 [employer]. [address] [place] [address], [place] [address]. All rights reserved. This information is not intended as a substitute for professional medical care. Always follow your healthcare professional's instructions.

## 2019-01-21 NOTE — ANESTHESIA POSTPROCEDURE EVALUATION
"Anesthesia Post Evaluation    Patient: Renea Mac    Procedure(s) Performed: Procedure(s) (LRB):  COLONOSCOPY (N/A)    Final Anesthesia Type: general  Patient location during evaluation: PACU  Patient participation: Yes- Able to Participate  Level of consciousness: awake and alert and oriented  Post-procedure vital signs: reviewed and stable  Pain management: adequate  Airway patency: patent  PONV status at discharge: No PONV  Anesthetic complications: no      Cardiovascular status: blood pressure returned to baseline  Respiratory status: unassisted  Hydration status: euvolemic  Follow-up not needed.        Visit Vitals  /80 (BP Location: Left arm, Patient Position: Sitting)   Pulse 66   Temp 37.1 °C (98.7 °F) (Oral)   Resp 12   Ht 5' 4" (1.626 m)   Wt 90.7 kg (200 lb)   SpO2 98%   Breastfeeding? No   BMI 34.33 kg/m²       Pain/Noemí Score: Noemí Score: 10 (1/21/2019 12:10 PM)  Modified Noemí Score: 20 (1/21/2019 12:10 PM)        "

## 2019-01-21 NOTE — H&P
COLONOSCOPY HISTORY AND PE    Procedure : Colonoscopy      INDICATIONS: Abnormal CT    Family Hx of CRC: none    Last Colonoscopy:  none  Findings: na       Past Medical History:   Diagnosis Date    Acute pain of right knee 11/4/2016    Chronic right shoulder pain 11/4/2016    Essential hypertension 11/4/2016    GERD (gastroesophageal reflux disease)     Hypokalemia 6/14/2017    Incomplete tear of right rotator cuff 11/30/2016    Osteoarthritis of right knee 11/30/2016    Rotator cuff tear     S/P laparoscopic sleeve gastrectomy 11/4/2016    Syncope and collapse 6/14/2017    Tail bone pain 11/4/2016     Sedation Problems: NO  Family History   Problem Relation Age of Onset    Hypertension Mother     Heart disease Mother     Heart attack Mother     Rectal cancer Mother     Colon cancer Mother     Hypertension Father     Heart disease Father     Stroke Father     Heart attack Father     Hypertension Sister     Hypertension Brother     Cancer Brother     No Known Problems Daughter     No Known Problems Son     Esophageal cancer Neg Hx      Fam Hx of Sedation Problems: NO  Social History     Socioeconomic History    Marital status:      Spouse name: Not on file    Number of children: Not on file    Years of education: Not on file    Highest education level: Not on file   Social Needs    Financial resource strain: Not on file    Food insecurity - worry: Not on file    Food insecurity - inability: Not on file    Transportation needs - medical: Not on file    Transportation needs - non-medical: Not on file   Occupational History    Not on file   Tobacco Use    Smoking status: Never Smoker    Smokeless tobacco: Never Used   Substance and Sexual Activity    Alcohol use: Yes     Comment: occasionally    Drug use: No    Sexual activity: Yes     Partners: Male   Other Topics Concern    Not on file   Social History Narrative    Not on file       Review of Systems - Negative except    Respiratory ROS: no dyspnea  Cardiovascular ROS: no exertional chest pain  Gastrointestinal ROS: NO abdominal discomfort,  NO rectal bleeding  Musculoskeletal ROS: no muscular pain  Neurological ROS: no recent stroke    Physical Exam:  Breastfeeding? No   General: no distress  Head: normocephalic  Mallampati Score   Neck: supple, symmetrical, trachea midline  Lungs:  normal respiratory effort  Heart: regular rate and rhythm  Abdomen: soft, non-tender non-distented; bowel sounds normal; no masses,  no organomegaly  Extremities: no cyanosis or edema, or clubbing    ASA:  II    PLAN  COLONOSCOPY.    SedationPlan :MAC    The details of the procedure, the possible need for biopsy or polypectomy and the potential risks including bleeding, perforation, missed polyps were discussed in detail.

## 2019-01-23 ENCOUNTER — TELEPHONE (OUTPATIENT)
Dept: GYNECOLOGIC ONCOLOGY | Facility: CLINIC | Age: 49
End: 2019-01-23

## 2019-01-23 ENCOUNTER — ANESTHESIA EVENT (OUTPATIENT)
Dept: SURGERY | Facility: HOSPITAL | Age: 49
DRG: 983 | End: 2019-01-23
Payer: COMMERCIAL

## 2019-01-23 ENCOUNTER — TELEPHONE (OUTPATIENT)
Dept: INTERNAL MEDICINE | Facility: CLINIC | Age: 49
End: 2019-01-23

## 2019-01-23 ENCOUNTER — TELEPHONE (OUTPATIENT)
Dept: PREADMISSION TESTING | Facility: HOSPITAL | Age: 49
End: 2019-01-23

## 2019-01-23 DIAGNOSIS — R19.00 PELVIC MASS: Primary | ICD-10-CM

## 2019-01-23 DIAGNOSIS — Z01.818 PREOPERATIVE TESTING: Primary | ICD-10-CM

## 2019-01-23 NOTE — TELEPHONE ENCOUNTER
----- Message from Petrona Porter MD sent at 1/23/2019  7:06 AM CST -----  Regarding: surgery scheduling  I would like to schedule this patient for surgery on 1/31. Please call her to see if this date is acceptable.     Is so please bring her in Monday to sign consents.     Please put in case request for xlap/BSO Thurs 1/31/19 [facility]. Also include Dr. Juan Noel in the surgeon panel. 7a start time.

## 2019-01-23 NOTE — TELEPHONE ENCOUNTER
----- Message from Scarlet Buckley RN sent at 1/23/2019  2:40 PM CST -----  Surgery date:  1/31/2019  PreOp appt: 1/28/2019    Please call Pt and schedule the following preop appts:    POC  Lab    Thank you!  Scarlet

## 2019-01-23 NOTE — PRE ADMISSION SCREENING
Anesthesia Assessment: Preoperative EQUATION    Planned Procedure: Procedure(s) (LRB):  LAPAROTOMY, EXPLORATORY (N/A)  SALPINGO-OOPHORECTOMY, BILATERAL (Bilateral)  EXCISION, MASS, PELVIS (N/A)  Requested Anesthesia Type:General  Surgeon: Petrona Porter MD  Service: OB/GYN  Known or anticipated Date of Surgery:1/31/2019    Surgeon notes: reviewed    Previous anesthesia records:GETA and No problems  1/21/2019 Colonoscopy     Airway/Jaw/Neck:  Airway Findings: Mouth Opening: Normal Mallampati: II  TM Distance: Normal, at least 6 cm  Jaw/Neck Findings:  Neck ROM: Normal ROM        Last PCP note: within 3 months , within [facility]  Dr. Gross  Subspecialty notes: Gastroenterology, Neurology, Ortho, GYN/ONC    Other important co-morbidities:  PER Epic: HTN, GERD, Hypokalemia, obesity     Tests already available:  Available tests,  within 1 month , within [facility] . 12/2018 CMP, CBC; 6/2017 EKG, 2D echo            Instructions given. (See in Nurse's note)    Optimization:  Anesthesia Preop Clinic Assessment  Indicated.    Medical Opinion Indicated. Request clearance from PCP Dr. Gross           Plan:    Testing:  T&S   Pre-anesthesia  visit       Visit focus: concerns in complex and/or prolonged anesthesia, position other than supine (possible lithotomy)     Consultation:Patient's PCP for a statement of optimization      Patient  has previously scheduled Medical Appointment: 1/28/2019    Navigation: Tests Scheduled.              Consults scheduled.             Results will be tracked by Preop Clinic.

## 2019-01-23 NOTE — TELEPHONE ENCOUNTER
----- Message from Petrona Porter MD sent at 1/23/2019  7:06 AM CST -----  Regarding: surgery scheduling  I would like to schedule this patient for surgery on 1/31. Please call her to see if this date is acceptable.     Is so please bring her in Monday to sign consents.     Please put in case request for xlap/BSO Thurs 1/31/19 [facility]. Also include Dr. Juan Noel in the surgeon panel. 7a start time.

## 2019-01-23 NOTE — ANESTHESIA PREPROCEDURE EVALUATION
Scarlet Buckley, RN   Registered Nurse      Pre Admission Screening   Signed                     Anesthesia Assessment: Preoperative EQUATION     Planned Procedure: Procedure(s) (LRB):  LAPAROTOMY, EXPLORATORY (N/A)  SALPINGO-OOPHORECTOMY, BILATERAL (Bilateral)  EXCISION, MASS, PELVIS (N/A)  Requested Anesthesia Type:General  Surgeon: Petrona Porter MD  Service: OB/GYN  Known or anticipated Date of Surgery:1/31/2019     Surgeon notes: reviewed     Previous anesthesia records:GETA and No problems  1/21/2019 Colonoscopy      Airway/Jaw/Neck:  Airway Findings: Mouth Opening: Normal Mallampati: II  TM Distance: Normal, at least 6 cm  Jaw/Neck Findings:  Neck ROM: Normal ROM         Last PCP note: within 3 months , within [facility]  Dr. Gross  Subspecialty notes: Gastroenterology, Neurology, Ortho, GYN/ONC     Other important co-morbidities:  PER Epic: HTN, GERD, Hypokalemia, obesity     Tests already available:  Available tests,  within 1 month , within [facility] . 12/2018 CMP, CBC; 6/2017 EKG, 2D echo                            Instructions given. (See in Nurse's note)     Optimization:  Anesthesia Preop Clinic Assessment  Indicated.    Medical Opinion Indicated. Request clearance from PCP Dr. Gross                                        Plan:    Testing:  T&S   Pre-anesthesia  visit                                        Visit focus: concerns in complex and/or prolonged anesthesia, position other than supine (possible lithotomy)                           Consultation:Patient's PCP for a statement of optimization                            Patient  has previously scheduled Medical Appointment: 1/28/2019     Navigation: Tests Scheduled.                         Consults scheduled.                        Results will be tracked by Preop Clinic.                                                                                                                           01/23/2019  Renea Mac is a 48 y.o.,  female.    Anesthesia Evaluation    I have reviewed the Patient Summary Reports.    I have reviewed the Nursing Notes.   I have reviewed the Medications.   Steroids Taken In Past Year:     Review of Systems  Anesthesia Hx:  No problems with previous Anesthesia Denies Hx of Anesthetic complications  History of prior surgery of interest to airway management or planning: Previous anesthesia: MAC  1/21/19: Colonscopy with MAC.  Denies Family Hx of Anesthesia complications.   Denies Personal Hx of Anesthesia complications.   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 Denies Tobacco Use. Alcohol Use: Pt consumes occasional,    EENT/Dental:   Denies Throat Symptoms  Jaw Problems:  Clicking (TMJ)   Cardiovascular:   Exercise tolerance: good Hypertension, well controlled Hx Syncope  Hypokalemia: addressed by PCP Functional Capacity good / => 4 METS, can walk up two flight of stairs: denies CP/SOB  Denies Coronary Artery Disease.  Denies Deep Venous Thrombosis (DVT)  Hypertension , Recent typical clinic B/P of 141/85 @ POC visit    Pulmonary:   Solid pulmonary nodule- left lower lobe per CT 12/2018 Denies Asthma.  Denies Chronic Obstructive Pulmonary Disease (COPD).    Renal/:  Renal Symptoms/Infections/Stones: incontinence.    Hepatic/GI:   Denies GERD.  Esophageal / Stomach Disorders Gerd (no meds) Controlled by s/p Gastric Surgery.  Denies Liver Disease    Musculoskeletal:  Joint Disease:  Arthritis, Osteoarthritis    Neurological:  Osteoarthritis  Denies Seizure Disorder  Denies CVA - Cerebrovasular Accident  Denies TIA - Transient Ischemic Attack    Endocrine:  Denies Diabetes  Denies Thyroid Disease        Physical Exam  General:  Obesity    Airway/Jaw/Neck:  Airway Findings: Mouth Opening: Normal General Airway Assessment: Adult  Jaw/Neck Findings:  Neck ROM: Normal ROM      Dental:  Dental Findings: In tact   Chest/Lungs:  Chest/Lungs Findings: Clear to auscultation, Normal Respiratory Rate      Heart/Vascular:  Heart Findings: Rate: Normal  Rhythm: Regular Rhythm  Vascular Findings: Normal       Mental Status:  Mental Status Findings:  Cooperative, Alert and Oriented         Anesthesia Plan  Type of Anesthesia, risks & benefits discussed:  Anesthesia Type:  general  Patient's Preference:   Intra-op Monitoring Plan: standard ASA monitors  Intra-op Monitoring Plan Comments:   Post Op Pain Control Plan: multimodal analgesia  Post Op Pain Control Plan Comments:   Induction:   IV  Beta Blocker:  Patient is not currently on a Beta-Blocker (No further documentation required).       Informed Consent: Patient understands risks and agrees with Anesthesia plan.  Questions answered. Anesthesia consent signed with patient.  ASA Score: 2     Day of Surgery Review of History & Physical:    H&P update referred to the surgeon.         Ready For Surgery From Anesthesia Perspective.   Cleared per PCP (LINWOOD Phipps) 1/24/19    Juno Llanes RN

## 2019-01-23 NOTE — TELEPHONE ENCOUNTER
----- Message from Scarlet Buckley RN sent at 1/23/2019  2:44 PM CST -----  Pt scheduled for Ex Lap, BSO and Pelvic Mass Excision 1/31/2019 with Dr. Porter and Dr. Noel.  (300 minutes, General Anesthesia). Anesthesia review in progress.      Pt optimized/cleared for surgery?  Please advise.    Thank you,  Scarlet Buckley RN  [facility]

## 2019-01-24 ENCOUNTER — HOSPITAL ENCOUNTER (OUTPATIENT)
Dept: PREADMISSION TESTING | Facility: HOSPITAL | Age: 49
Discharge: HOME | End: 2019-01-24
Attending: ANESTHESIOLOGY
Payer: COMMERCIAL

## 2019-01-24 ENCOUNTER — HOSPITAL ENCOUNTER (OUTPATIENT)
Dept: RADIOLOGY | Facility: HOSPITAL | Age: 49
Discharge: HOME | End: 2019-01-24
Attending: NURSE PRACTITIONER
Payer: COMMERCIAL

## 2019-01-24 ENCOUNTER — OFFICE VISIT (OUTPATIENT)
Dept: INTERNAL MEDICINE | Facility: CLINIC | Age: 49
End: 2019-01-24
Payer: COMMERCIAL

## 2019-01-24 ENCOUNTER — TELEPHONE (OUTPATIENT)
Dept: GYNECOLOGIC ONCOLOGY | Facility: CLINIC | Age: 49
End: 2019-01-24

## 2019-01-24 VITALS
TEMPERATURE: 99 F | WEIGHT: 207.88 LBS | HEART RATE: 75 BPM | DIASTOLIC BLOOD PRESSURE: 76 MMHG | HEIGHT: 64 IN | SYSTOLIC BLOOD PRESSURE: 110 MMHG | OXYGEN SATURATION: 99 % | BODY MASS INDEX: 35.49 KG/M2

## 2019-01-24 VITALS
RESPIRATION RATE: 16 BRPM | BODY MASS INDEX: 35.34 KG/M2 | WEIGHT: 207 LBS | HEIGHT: 64 IN | OXYGEN SATURATION: 98 % | DIASTOLIC BLOOD PRESSURE: 85 MMHG | HEART RATE: 75 BPM | SYSTOLIC BLOOD PRESSURE: 141 MMHG | TEMPERATURE: 98 F

## 2019-01-24 DIAGNOSIS — Z01.818 PRE-OPERATIVE CLEARANCE: Primary | ICD-10-CM

## 2019-01-24 DIAGNOSIS — Z01.818 PRE-OPERATIVE CLEARANCE: ICD-10-CM

## 2019-01-24 LAB
ALBUMIN/CREAT UR: 8.4 UG/MG
CREAT UR-MCNC: 191 MG/DL
MICROALBUMIN UR DL<=1MG/L-MCNC: 16 UG/ML

## 2019-01-24 PROCEDURE — 71046 XR CHEST PA AND LATERAL: ICD-10-PCS | Mod: 26,,, | Performed by: RADIOLOGY

## 2019-01-24 PROCEDURE — 3008F BODY MASS INDEX DOCD: CPT | Mod: CPTII,S$GLB,, | Performed by: NURSE PRACTITIONER

## 2019-01-24 PROCEDURE — 99999 PR PBB SHADOW E&M-EST. PATIENT-LVL V: CPT | Mod: PBBFAC,,, | Performed by: NURSE PRACTITIONER

## 2019-01-24 PROCEDURE — 99214 OFFICE O/P EST MOD 30 MIN: CPT | Mod: S$GLB,,, | Performed by: NURSE PRACTITIONER

## 2019-01-24 PROCEDURE — 3078F DIAST BP <80 MM HG: CPT | Mod: CPTII,S$GLB,, | Performed by: NURSE PRACTITIONER

## 2019-01-24 PROCEDURE — 93000 EKG 12-LEAD: ICD-10-PCS | Mod: S$GLB,,, | Performed by: INTERNAL MEDICINE

## 2019-01-24 PROCEDURE — 3074F SYST BP LT 130 MM HG: CPT | Mod: CPTII,S$GLB,, | Performed by: NURSE PRACTITIONER

## 2019-01-24 PROCEDURE — 71046 X-RAY EXAM CHEST 2 VIEWS: CPT | Mod: TC

## 2019-01-24 PROCEDURE — 3074F PR MOST RECENT SYSTOLIC BLOOD PRESSURE < 130 MM HG: ICD-10-PCS | Mod: CPTII,S$GLB,, | Performed by: NURSE PRACTITIONER

## 2019-01-24 PROCEDURE — 82043 UR ALBUMIN QUANTITATIVE: CPT

## 2019-01-24 PROCEDURE — 99999 PR PBB SHADOW E&M-EST. PATIENT-LVL V: ICD-10-PCS | Mod: PBBFAC,,, | Performed by: NURSE PRACTITIONER

## 2019-01-24 PROCEDURE — 93000 ELECTROCARDIOGRAM COMPLETE: CPT | Mod: S$GLB,,, | Performed by: INTERNAL MEDICINE

## 2019-01-24 PROCEDURE — 71046 X-RAY EXAM CHEST 2 VIEWS: CPT | Mod: 26,,, | Performed by: RADIOLOGY

## 2019-01-24 PROCEDURE — 3078F PR MOST RECENT DIASTOLIC BLOOD PRESSURE < 80 MM HG: ICD-10-PCS | Mod: CPTII,S$GLB,, | Performed by: NURSE PRACTITIONER

## 2019-01-24 PROCEDURE — 93005 EKG 12-LEAD: ICD-10-PCS | Mod: S$GLB,,, | Performed by: NURSE PRACTITIONER

## 2019-01-24 PROCEDURE — 3008F PR BODY MASS INDEX (BMI) DOCUMENTED: ICD-10-PCS | Mod: CPTII,S$GLB,, | Performed by: NURSE PRACTITIONER

## 2019-01-24 PROCEDURE — 99214 PR OFFICE/OUTPT VISIT, EST, LEVL IV, 30-39 MIN: ICD-10-PCS | Mod: S$GLB,,, | Performed by: NURSE PRACTITIONER

## 2019-01-24 PROCEDURE — 93005 ELECTROCARDIOGRAM TRACING: CPT | Mod: S$GLB,,, | Performed by: NURSE PRACTITIONER

## 2019-01-24 RX ORDER — VIT C/E/ZN/COPPR/LUTEIN/ZEAXAN 250MG-90MG
1000 CAPSULE ORAL DAILY
COMMUNITY
End: 2022-10-07

## 2019-01-24 NOTE — PROGRESS NOTES
Subjective:       Patient ID: Renea Mac is a 48 y.o. female.    Chief Complaint: Pre-op Exam    Disclaimer: This note has been generated using voice-recognition software. There may be typographical errors that have been missed during proof-reading  Pt here fpr pre op clearance. Pt scheduled for Ex Lap, BSO and Pelvic Mass Excision 2019 with Dr. Porter and Dr. Noel.  (300 minutes, General Anesthesia). Anesthesia review in progress.    Pt last seen by me in December for a UC visit for rectal bleeding  Pt states that she walk up a flight of stairs without getting short of breath having chest pain.      Review of Systems   Constitutional: Negative for activity change and unexpected weight change.   HENT: Negative for hearing loss, rhinorrhea and trouble swallowing.    Eyes: Negative for discharge and visual disturbance.   Respiratory: Negative for chest tightness and wheezing.    Cardiovascular: Negative for chest pain and palpitations.   Gastrointestinal: Negative for blood in stool, constipation, diarrhea and vomiting.   Endocrine: Negative for polydipsia and polyuria.   Genitourinary: Negative for difficulty urinating, dysuria, hematuria and menstrual problem.   Musculoskeletal: Negative for arthralgias, joint swelling and neck pain.   Neurological: Negative for weakness and headaches.   Psychiatric/Behavioral: Negative for confusion and dysphoric mood.         Past Medical History:   Diagnosis Date    Acute pain of right knee 2016    Chronic right shoulder pain 2016    Essential hypertension 2016    GERD (gastroesophageal reflux disease)     Hypokalemia 2017    Incomplete tear of right rotator cuff 2016    Osteoarthritis of right knee 2016    Rotator cuff tear     S/P laparoscopic sleeve gastrectomy 2016    Syncope and collapse 2017    Tail bone pain 2016     Past Surgical History:   Procedure Laterality Date     SECTION      x1     "COLONOSCOPY N/A 1/21/2019    [facility])    COLPOSCOPY      HERNIA REPAIR      HYSTERECTOMY  2011    YANG, ovaries remain (fibroids0    SLEEVE GASTROPLASTY  11/2014    TUBAL LIGATION       Social History     Social History Narrative    Not on file     Family History   Problem Relation Age of Onset    Hypertension Mother     Heart disease Mother     Heart attack Mother     Rectal cancer Mother     Colon cancer Mother     Hypertension Father     Heart disease Father     Stroke Father     Heart attack Father     Hypertension Sister     Hypertension Brother     Cancer Brother     No Known Problems Daughter     No Known Problems Son     Esophageal cancer Neg Hx      Outpatient Encounter Medications as of 1/24/2019   Medication Sig Dispense Refill    acetaminophen (TYLENOL) 500 MG tablet Take 500 mg by mouth every 6 (six) hours as needed for Pain.      hydroCHLOROthiazide (HYDRODIURIL) 25 MG tablet Take ½ (12.5 mg total) by mouth once daily. 15 tablet 0    methocarbamol (ROBAXIN) 500 MG Tab Take 1 tablet 4 times daily as needed for 5 days 20 tablet 0    polyethylene glycol (MIRALAX) 17 gram/dose powder Mix 1 capful (17 grams total) with a liquid and take by mouth once daily. 527 g 11    CYCLOBENZAPRINE HCL (FLEXERIL ORAL) Take by mouth.      sodium,potassium,mag sulfates (SUPREP BOWEL PREP KIT) 17.5-3.13-1.6 gram SolR take As directed-dispense 1 kit 354 mL 0     No facility-administered encounter medications on file as of 1/24/2019.      Last 3 sets of Vitals  Vitals - 1 value per visit 1/14/2019 1/21/2019 1/24/2019   SYSTOLIC 139 137 110   DIASTOLIC 78 80 76   PULSE 78 66 75   TEMPERATURE - 98.7 98.5   RESPIRATIONS - 12 -   SPO2 - 98 99   Weight (lb) 209.44 200 207.89   Weight (kg) 95 90.719 94.3   HEIGHT 5' 4" 5' 4" 5' 4"   BODY MASS INDEX 35.95 34.33 35.68   VISIT REPORT - - -   Pain Score  0 - 0         Objective:      Physical Exam "   Constitutional: She is oriented to person, place, and time. She appears well-developed and well-nourished. No distress.   HENT:   Head: Normocephalic and atraumatic.   Eyes: Conjunctivae are normal. Pupils are equal, round, and reactive to light. No scleral icterus.   Neck: Normal range of motion. Neck supple.   Cardiovascular: Normal rate, regular rhythm, normal heart sounds and intact distal pulses. Exam reveals no gallop and no friction rub.   No murmur heard.  Pulmonary/Chest: Effort normal and breath sounds normal. No stridor. No respiratory distress. She has no wheezes. She has no rales. She exhibits no tenderness.   Abdominal: Soft.   Neurological: She is alert and oriented to person, place, and time.   Skin: Skin is warm and dry. Capillary refill takes less than 2 seconds. She is not diaphoretic.   Psychiatric: She has a normal mood and affect. Her behavior is normal. Judgment and thought content normal.   Nursing note and vitals reviewed.          Lab Results   Component Value Date    WBC 6.88 12/06/2018    RBC 5.09 12/06/2018    HGB 13.1 12/06/2018    HCT 43.4 12/06/2018    MCV 85 12/06/2018    MCH 25.7 (L) 12/06/2018    MCHC 30.2 (L) 12/06/2018    RDW 13.6 12/06/2018     12/06/2018    MPV 12.2 12/06/2018    GRAN 3.0 12/06/2018    GRAN 43.8 12/06/2018    LYMPH 3.4 12/06/2018    LYMPH 48.8 (H) 12/06/2018    MONO 0.4 12/06/2018    MONO 5.4 12/06/2018    EOS 0.1 12/06/2018    BASO 0.05 12/06/2018    EOSINOPHIL 1.0 12/06/2018    BASOPHIL 0.7 12/06/2018     Lab Results   Component Value Date    WBC 6.88 12/06/2018    HGB 13.1 12/06/2018    HCT 43.4 12/06/2018     12/06/2018    CHOL 153 07/11/2017    TRIG 40 07/11/2017    HDL 64 07/11/2017    ALT 9 (L) 12/06/2018    AST 13 12/06/2018     12/06/2018    K 4.1 12/06/2018     12/06/2018    CREATININE 0.7 12/06/2018    BUN 14 12/06/2018    CO2 30 (H) 12/06/2018    TSH 3.573 06/13/2017       Assessment:       1. Pre-operative clearance         Plan:       Per the Smith cardiac risk index for noncardiac surgery patient is considered class 1 and when his 1% chance of complications intraoperatively of a cardiac event.      Patient advised if she develops cough, runny nose, nasal congestion or fever prior to her surgery date she is call her surgeon and have her surgery postponed.    Renea was seen today for pre-op exam.    Diagnoses and all orders for this visit:    Pre-operative clearance  Comments:  pt cleared for intended procedure    Orders:  -     CBC auto differential; Future  -     Comprehensive metabolic panel; Future  -     MICROALBUMIN / CREATININE RATIO URINE  -     X-Ray Chest PA And Lateral; Future  -     EKG 12-lead      There are no Patient Instructions on file for this visit.

## 2019-01-24 NOTE — PATIENT INSTRUCTIONS
You are cleared for your procedure    Call your surgeon if you develop any cough, runny nose or fever prior to your surgery date

## 2019-01-24 NOTE — DISCHARGE INSTRUCTIONS
Your surgery has been scheduled for:__________________________________________    You should report to:  ____[facility], located on the [place] side of the first floor of the           [facility] ([phone])  ____[facility], located on the [facility] side of the            Second floor of the [facility] [phone])  ____3rd Floor [facility] located on the [facility] side of the [facility] [phone]  Please Note   - Tell your doctor if you take Aspirin, products containing Aspirin, herbal medications  or blood thinners, such as Coumadin, Ticlid, or Plavix.  (Consult your provider regarding holding or stopping before surgery).  - Arrange for someone to drive you home following surgery.  You will not be allowed to leave the surgical facility alone or drive yourself home following sedation and anesthesia.  Before Surgery  - Stop taking all herbal medications 14days prior to surgery  - No Motrin/Advil (Ibuprofen) 7 days before surgery  - No Aleve (Naproxen) 7 days before surgery  - Stop Taking Asprin, products containing Asprin _____days before surgery  - Stop taking blood thinners_______days before surgery  - No Goody's/BC  Powder 7 days before surgery  - Refrain from drinking alcoholic beverages for 24hours before and after surgery  - Stop or limit smoking _________days before surgery  - You may take Tylenol for pain  Night before Surgery  Stop ALL solid food, gum, candy (including vitamins) 8 hours before arrival time.  (Please note: If your surgeon gives you different eating and drinking instructions, please follow surgeon's directions.)  Stop all CLOUDY liquids: coffee with creamer, formula, tube feeds, cloudy juices, non-human milk and breast milk with additives, 6 hours prior to arrival time.  Stop plain breast milk 4 hours prior to arrival time.  The patient should be ENCOURAGED to drink carbohydrate-rich  clear liquids (sports drinks, clear juices) until 2 hours prior to arrival time.  CLEAR liquids include only water, black coffee NO creamer, clear oral rehydration drinks, clear sports drinks or clear fruit juices (no orange juice, no pulpy juices, no apple cider). Advise patients if they can read newsprint through the liquid, it qualifies as clear liquid.   IF IN DOUBT, drink water instead.   - Take a shower or bath (shower is recommended).  Bathe with Hibiclens soap or an antibacterial soap from the neck down.  If not supplied by your surgeon, hibiclens soap will need to be purchased over the counter in pharmacy.  Rinse soap off thoroughly.  - Shampoo your hair with your regular shampoo  The Day of Surgery  · NOTHING TO  DRINK 2 hours before arrival time. If you are told to take medication on the morning of surgery, it may be taken with a sip of water.   - Take another bath or shower with hibiclens or any antibacterial soap, to reduce the chance of infection.  - Take heart and blood pressure medications with a small sip of water, as advised by the perioperative team.  - Do not take fluid pills  - You may brush your teeth and rinse your mouth, but do not swall any additional water.   - Do not apply perfumes, powder, body lotions or deodorant on the day of surgery.  - Nail polish should be removed.  - Do not wear makeup or moisturizer  - Wear comfortable clothes, such as a button front shirt and loose fitting pants.  - Leave all jewelry, including body piercings, and valuables at home.    - Bring any devices you will neeed after surgery such as crutches or canes.  - If you have sleep apnea, please bring your CPAP machine  In the event that your physical condition changes including the onset of a cold or respiratory illness, or if you have to delay or cancel your surgery, please notify your surgeon.  Anesthesia: General Anesthesia     You are watched continuously during your procedure by your anesthesia provider.      Youre due to have surgery. During surgery, youll be given medicine called anesthesia or anesthetic. This will keep you comfortable and pain-free. Your anesthesia provider will use general anesthesia.  What is general anesthesia?  General anesthesia puts you into a state like deep sleep. It goes into the bloodstream (IV anesthetics), into the lungs (gas anesthetics), or both. You feel nothing during the procedure. You will not remember it. During the procedure, the anesthesia provider monitors you continuously. He or she checks your heart rate and rhythm, blood pressure, breathing, and blood oxygen.  · IV anesthetics. IV anesthetics are given through an IV line in your arm. Theyre often given first. This is so you are asleep before a gas anesthetic is started. Some kinds of IV anesthetics relieve pain. Others relax you. Your doctor will decide which kind is best in your case.  · Gas anesthetics. Gas anesthetics are breathed into the lungs. They are often used to keep you asleep. They can be given through a facemask or a tube placed in your larynx or trachea (breathing tube).  ? If you have a facemask, your anesthesia provider will most likely place it over your nose and mouth while youre still awake. Youll breathe oxygen through the mask as your IV anesthetic is started. Gas anesthetic may be added through the mask.  ? If you have a tube in the larynx or trachea, it will be inserted into your throat after youre asleep.  Anesthesia tools and medicines  You will likely have:  · IV anesthetics. These are put into an IV line into your bloodstream.  · Gas anesthetics. You breathe these anesthetics into your lungs, where they pass into your bloodstream.  · Pulse oximeter. This is a small clip that is attached to the end of your finger. This measures your blood oxygen level.  · Electrocardiography leads (electrodes). These are small sticky pads that are placed on your chest. They record your heart rate and  rhythm.  · Blood pressure cuff. This reads your blood pressure.  Risks and possible complications  General anesthesia has some risks. These include:  · Breathing problems  · Nausea and vomiting  · Sore throat or hoarseness (usually temporary)  · Allergic reaction to the anesthetic  · Irregular heartbeat (rare)  · Cardiac arrest (rare)   Anesthesia safety  · Follow all instructions you are given for how long not to eat or drink before your procedure.  · Be sure your doctor knows what medicines and drugs you take. This includes over-the-counter medicines, herbs, supplements, alcohol or other drugs. You will be asked when those were last taken.  · Have an adult family member or friend drive you home after the procedure.  · For the first 24 hours after your surgery:  ? Do not drive or use heavy equipment.  ? Do not make important decisions or sign legal documents. If important decisions or signing legal documents is necessary during the first 24 hours after surgery, have a trusted family member or spouse act on your behalf.  ? Avoid alcohol.  ? Have a responsible adult stay with you. He or she can watch for problems and help keep you safe.  Date Last Reviewed: 12/1/2016  © 6111-3095 [employer]. [address] [place] [address], [place] [address]. All rights reserved. This information is not intended as a substitute for professional medical care. Always follow your healthcare professional's instructions

## 2019-01-28 ENCOUNTER — TELEPHONE (OUTPATIENT)
Dept: ENDOSCOPY | Facility: HOSPITAL | Age: 49
End: 2019-01-28

## 2019-01-28 ENCOUNTER — OFFICE VISIT (OUTPATIENT)
Dept: GYNECOLOGIC ONCOLOGY | Facility: CLINIC | Age: 49
End: 2019-01-28
Payer: COMMERCIAL

## 2019-01-28 VITALS
BODY MASS INDEX: 35.49 KG/M2 | DIASTOLIC BLOOD PRESSURE: 85 MMHG | HEART RATE: 75 BPM | SYSTOLIC BLOOD PRESSURE: 134 MMHG | WEIGHT: 207.88 LBS | HEIGHT: 64 IN

## 2019-01-28 DIAGNOSIS — R19.00 ABDOMINAL MASS, UNSPECIFIED ABDOMINAL LOCATION: Primary | ICD-10-CM

## 2019-01-28 PROCEDURE — 3075F PR MOST RECENT SYSTOLIC BLOOD PRESS GE 130-139MM HG: ICD-10-PCS | Mod: CPTII,S$GLB,, | Performed by: OBSTETRICS & GYNECOLOGY

## 2019-01-28 PROCEDURE — 99999 PR PBB SHADOW E&M-EST. PATIENT-LVL III: CPT | Mod: PBBFAC,,, | Performed by: OBSTETRICS & GYNECOLOGY

## 2019-01-28 PROCEDURE — 99213 OFFICE O/P EST LOW 20 MIN: CPT | Mod: S$GLB,,, | Performed by: OBSTETRICS & GYNECOLOGY

## 2019-01-28 PROCEDURE — 99999 PR PBB SHADOW E&M-EST. PATIENT-LVL III: ICD-10-PCS | Mod: PBBFAC,,, | Performed by: OBSTETRICS & GYNECOLOGY

## 2019-01-28 PROCEDURE — 3079F DIAST BP 80-89 MM HG: CPT | Mod: CPTII,S$GLB,, | Performed by: OBSTETRICS & GYNECOLOGY

## 2019-01-28 PROCEDURE — 99213 PR OFFICE/OUTPT VISIT, EST, LEVL III, 20-29 MIN: ICD-10-PCS | Mod: S$GLB,,, | Performed by: OBSTETRICS & GYNECOLOGY

## 2019-01-28 PROCEDURE — 3008F PR BODY MASS INDEX (BMI) DOCUMENTED: ICD-10-PCS | Mod: CPTII,S$GLB,, | Performed by: OBSTETRICS & GYNECOLOGY

## 2019-01-28 PROCEDURE — 3008F BODY MASS INDEX DOCD: CPT | Mod: CPTII,S$GLB,, | Performed by: OBSTETRICS & GYNECOLOGY

## 2019-01-28 PROCEDURE — 3075F SYST BP GE 130 - 139MM HG: CPT | Mod: CPTII,S$GLB,, | Performed by: OBSTETRICS & GYNECOLOGY

## 2019-01-28 PROCEDURE — 3079F PR MOST RECENT DIASTOLIC BLOOD PRESSURE 80-89 MM HG: ICD-10-PCS | Mod: CPTII,S$GLB,, | Performed by: OBSTETRICS & GYNECOLOGY

## 2019-01-29 DIAGNOSIS — R19.00 PELVIC MASS: Primary | ICD-10-CM

## 2019-01-29 RX ORDER — SODIUM CHLORIDE 9 MG/ML
INJECTION, SOLUTION INTRAVENOUS CONTINUOUS
Status: CANCELLED | OUTPATIENT
Start: 2019-01-29

## 2019-01-29 RX ORDER — LIDOCAINE HYDROCHLORIDE 10 MG/ML
1 INJECTION, SOLUTION EPIDURAL; INFILTRATION; INTRACAUDAL; PERINEURAL ONCE
Status: CANCELLED | OUTPATIENT
Start: 2019-01-29 | End: 2019-01-29

## 2019-01-29 NOTE — PROGRESS NOTES
Subjective:      Patient ID: Renea Mac is a 48 y.o. female.    Chief Complaint: Follow-up (Sign Surgery consents )      HPI  Referred for pelvic mass.      She reported an episode of diarrhea and then bright red blood from rectum for approximately 3 days. No current bleeding. And she is asymptomatic at this time.   Imagine reviewed.   CT A&P 12/7/18  Impression       1. Solid, heterogeneously enhancing mass within the midline lower abdomen that is of uncertain origin but concerning for primary or metastatic neoplasm.  Surgical consultation recommended.  2. Suspected 2.2 cm low-attenuation lesion within the left adnexa.  Recommend correlation with pelvic ultrasound.  3. 0.4 cm left lower lobe solid pulmonary nodule.  For a solid nodule <6 mm, Fleischner Society 2017 guidelines recommend no routine follow up for a low risk patient, or follow-up with non-contrast chest CT at 12 months in a high risk patient.  4. Left renal cyst.  5. Postsurgical changes of sleeve gastrectomy.  6. Fat containing supraumbilical anterior abdominal wall hernia.       Pelvic US 12/14/18  FINDINGS:  Uterus: Patient is status post hysterectomy.  Right ovary: Size: 2.9 x 2.0 x 2.3 cm, 0.9 x 1.0 x 0.9 minimally complex cyst.  Left ovary: Size: 2.9 x 2.1 x 2.6 cm, 1.4 x 1.4 x 0.9 cm minimally complex cyst.  Free Fluid: None.  There is a 5.3 x 4.5 x 4.9 cm heterogeneous mixed minimally cystic and solid mass in the pelvis without obvious attachment to surrounding structures as seen on CT 12/07/2018.     Reports colonoscopy 6 yrs ago normal. Scheduled for c-scope on 1/21/19.      Prior abdominal surgeries include laparoscopic hysterectomy due to leiomyoma 2011 (ovaries remain in situ), laparoscopic gastric sleeve, and c/s x 1 via pfannenstiel.      Family history significant for mother with colon cancer, paternal GM with malignancy of unknown type, Maternal aunt with breast cancer, and maternal aunt with lung cancer.       Colonoscopy  1/21/19 unremarkable. CEA <1.0  14    Presents today for further treatment planning.     Review of Systems   Constitutional: Negative for appetite change, chills, fatigue and fever.   HENT: Negative for mouth sores.    Respiratory: Negative for cough and shortness of breath.    Cardiovascular: Negative for leg swelling.   Gastrointestinal: Negative for abdominal pain, blood in stool, constipation and diarrhea.   Endocrine: Negative for cold intolerance.   Genitourinary: Negative for dysuria and vaginal bleeding.   Musculoskeletal: Negative for myalgias.   Skin: Negative for rash.   Allergic/Immunologic: Negative.    Neurological: Negative for weakness and numbness.   Hematological: Negative for adenopathy. Does not bruise/bleed easily.   Psychiatric/Behavioral: Negative for confusion.       Objective:   Physical Exam:   Constitutional: She is oriented to person, place, and time. She appears well-developed and well-nourished.    HENT:   Head: Normocephalic and atraumatic.    Eyes: EOM are normal. Pupils are equal, round, and reactive to light.    Neck: Normal range of motion. Neck supple. No thyromegaly present.    Cardiovascular: Normal rate, regular rhythm and intact distal pulses.     Pulmonary/Chest: Effort normal and breath sounds normal. No respiratory distress. She has no wheezes.        Abdominal: Soft. Bowel sounds are normal. She exhibits no distension, no ascites and no mass. There is no tenderness.             Musculoskeletal: Normal range of motion and moves all extremeties.      Lymphadenopathy:     She has no cervical adenopathy.        Right: No supraclavicular adenopathy present.        Left: No supraclavicular adenopathy present.    Neurological: She is alert and oriented to person, place, and time.    Skin: Skin is warm and dry. No rash noted.    Psychiatric: She has a normal mood and affect.       Assessment:     1. Abdominal mass, unspecified abdominal location        Plan:   No orders of  the defined types were placed in this encounter.    Midline lower abdominal mass of uncertain etiology or significance. I have discussed the case with Dr. Noel with surg onc. Our recommendations are for surgical excision. Will plan for xlap/BSO/pelvic mass resection/any other indicted procedures. She desires to proceed. The risks, benefits, and indications of the procedure were discussed with the patient and her family members if present.  These included bleeding, transfusion, infection, damage to surrounding tissues (bowel, bladder, ureter), wound separation, perioperative cardiac events, VTE, pneumonia, and possible death.  She voiced understanding, all questions were answered and consents were signed.    Surgery 1/31/19 [facility].

## 2019-01-29 NOTE — H&P (VIEW-ONLY)
Subjective:      Patient ID: Renea Mac is a 48 y.o. female.    Chief Complaint: Follow-up (Sign Surgery consents )      HPI  Referred for pelvic mass.      She reported an episode of diarrhea and then bright red blood from rectum for approximately 3 days. No current bleeding. And she is asymptomatic at this time.   [facility] reviewed.   CT A&P 12/7/18  Impression       1. Solid, heterogeneously enhancing mass within the midline lower abdomen that is of uncertain origin but concerning for primary or metastatic neoplasm.  Surgical consultation recommended.  2. Suspected 2.2 cm low-attenuation lesion within the left adnexa.  Recommend correlation with pelvic ultrasound.  3. 0.4 cm left lower lobe solid pulmonary nodule.  For a solid nodule <6 mm, Fleischner Society 2017 guidelines recommend no routine follow up for a low risk patient, or follow-up with non-contrast chest CT at 12 months in a high risk patient.  4. Left renal cyst.  5. Postsurgical changes of sleeve gastrectomy.  6. Fat containing supraumbilical anterior abdominal wall hernia.       Pelvic US 12/14/18  FINDINGS:  Uterus: Patient is status post hysterectomy.  Right ovary: Size: 2.9 x 2.0 x 2.3 cm, 0.9 x 1.0 x 0.9 minimally complex cyst.  Left ovary: Size: 2.9 x 2.1 x 2.6 cm, 1.4 x 1.4 x 0.9 cm minimally complex cyst.  Free Fluid: None.  There is a 5.3 x 4.5 x 4.9 cm heterogeneous mixed minimally cystic and solid mass in the pelvis without obvious attachment to surrounding structures as seen on CT 12/07/2018.     Reports colonoscopy 6 yrs ago normal. Scheduled for c-scope on 1/21/19.      Prior abdominal surgeries include laparoscopic hysterectomy due to leiomyoma 2011 (ovaries remain in situ), laparoscopic gastric sleeve, and c/s x 1 via pfannenstiel.      Family history significant for mother with colon cancer, paternal GM with malignancy of unknown type, Maternal aunt with breast cancer, and maternal aunt with lung cancer.       Colonoscopy  1/21/19 unremarkable. CEA <1.0  14    Presents today for further treatment planning.     Review of Systems   Constitutional: Negative for appetite change, chills, fatigue and fever.   HENT: Negative for mouth sores.    Respiratory: Negative for cough and shortness of breath.    Cardiovascular: Negative for leg swelling.   Gastrointestinal: Negative for abdominal pain, blood in stool, constipation and diarrhea.   Endocrine: Negative for cold intolerance.   Genitourinary: Negative for dysuria and vaginal bleeding.   Musculoskeletal: Negative for myalgias.   Skin: Negative for rash.   Allergic/Immunologic: Negative.    Neurological: Negative for weakness and numbness.   Hematological: Negative for adenopathy. Does not bruise/bleed easily.   Psychiatric/Behavioral: Negative for confusion.       Objective:   Physical Exam:   Constitutional: She is oriented to person, place, and time. She appears well-developed and well-nourished.    HENT:   Head: Normocephalic and atraumatic.    Eyes: EOM are normal. Pupils are equal, round, and reactive to light.    Neck: Normal range of motion. Neck supple. No thyromegaly present.    Cardiovascular: Normal rate, regular rhythm and intact distal pulses.     Pulmonary/Chest: Effort normal and breath sounds normal. No respiratory distress. She has no wheezes.        Abdominal: Soft. Bowel sounds are normal. She exhibits no distension, no ascites and no mass. There is no tenderness.             Musculoskeletal: Normal range of motion and moves all extremeties.      Lymphadenopathy:     She has no cervical adenopathy.        Right: No supraclavicular adenopathy present.        Left: No supraclavicular adenopathy present.    Neurological: She is alert and oriented to person, place, and time.    Skin: Skin is warm and dry. No rash noted.    Psychiatric: She has a normal mood and affect.       Assessment:     1. Abdominal mass, unspecified abdominal location        Plan:   No orders of  the defined types were placed in this encounter.    Midline lower abdominal mass of uncertain etiology or significance. I have discussed the case with Dr. Noel with surg onc. Our recommendations are for surgical excision. Will plan for xlap/BSO/pelvic mass resection/any other indicted procedures. She desires to proceed. The risks, benefits, and indications of the procedure were discussed with the patient and her family members if present.  These included bleeding, transfusion, infection, damage to surrounding tissues (bowel, bladder, ureter), wound separation, perioperative cardiac events, VTE, pneumonia, and possible death.  She voiced understanding, all questions were answered and consents were signed.    Surgery 1/31/19 [facility].

## 2019-01-30 ENCOUNTER — TELEPHONE (OUTPATIENT)
Dept: GYNECOLOGIC ONCOLOGY | Facility: CLINIC | Age: 49
End: 2019-01-30

## 2019-01-30 NOTE — TELEPHONE ENCOUNTER
----- Message from Reshma Gonzalez sent at 1/30/2019  3:02 PM CST -----  Contact: SUE CHILDRESS [[id number]]  Name of Who is Calling:SUE CHILDRESS [[id number]]        What is the request in detail: Pt requesting arrival time regarding tomorrow's procedure. Advise at your earliest convenience.  Thanks-          Can the clinic reply by [facility]: Y    What Number to Call Back if not in [facility]: [phone]

## 2019-01-30 NOTE — TELEPHONE ENCOUNTER
Spoke with pt. Pt confirmed surgery arrival time and location. Pt informed she will need to go to the second floor surgery center in the main hospital Thursday 1/31/19 for 7:30. Pt voiced understanding

## 2019-01-31 ENCOUNTER — HOSPITAL ENCOUNTER (INPATIENT)
Facility: HOSPITAL | Age: 49
LOS: 5 days | Discharge: HOME | DRG: 983 | End: 2019-02-05
Attending: OBSTETRICS & GYNECOLOGY | Admitting: OBSTETRICS & GYNECOLOGY
Payer: COMMERCIAL

## 2019-01-31 ENCOUNTER — ANESTHESIA (OUTPATIENT)
Dept: SURGERY | Facility: HOSPITAL | Age: 49
DRG: 983 | End: 2019-01-31
Payer: COMMERCIAL

## 2019-01-31 DIAGNOSIS — Z98.890 S/P EXPLORATORY LAPAROTOMY: Primary | ICD-10-CM

## 2019-01-31 DIAGNOSIS — R19.00 PELVIC MASS: ICD-10-CM

## 2019-01-31 DIAGNOSIS — K56.7 ILEUS: ICD-10-CM

## 2019-01-31 PROCEDURE — 71000039 HC RECOVERY, EACH ADD'L HOUR: Performed by: OBSTETRICS & GYNECOLOGY

## 2019-01-31 PROCEDURE — 88307 TISSUE SPECIMEN TO PATHOLOGY - SURGERY: ICD-10-PCS | Mod: 26,,, | Performed by: PATHOLOGY

## 2019-01-31 PROCEDURE — 58720 PR REMOVAL OF OVARY/TUBE(S): ICD-10-PCS | Mod: 51,,, | Performed by: OBSTETRICS & GYNECOLOGY

## 2019-01-31 PROCEDURE — 25000003 PHARM REV CODE 250: Performed by: STUDENT IN AN ORGANIZED HEALTH CARE EDUCATION/TRAINING PROGRAM

## 2019-01-31 PROCEDURE — 88331 PATH CONSLTJ SURG 1 BLK 1SPC: CPT | Mod: 26,,, | Performed by: PATHOLOGY

## 2019-01-31 PROCEDURE — 63600175 PHARM REV CODE 636 W HCPCS: Performed by: OBSTETRICS & GYNECOLOGY

## 2019-01-31 PROCEDURE — 49204 PR EXCISION/DESTRUCTION OPEN ABDOMINAL TUMORS 5.1-10.0 CM: ICD-10-PCS | Mod: ,,, | Performed by: OBSTETRICS & GYNECOLOGY

## 2019-01-31 PROCEDURE — 88305 TISSUE SPECIMEN TO PATHOLOGY - SURGERY: ICD-10-PCS | Mod: 26,,, | Performed by: PATHOLOGY

## 2019-01-31 PROCEDURE — 63600175 PHARM REV CODE 636 W HCPCS: Performed by: NURSE ANESTHETIST, CERTIFIED REGISTERED

## 2019-01-31 PROCEDURE — 88342 IMHCHEM/IMCYTCHM 1ST ANTB: CPT | Mod: 26,,, | Performed by: PATHOLOGY

## 2019-01-31 PROCEDURE — C9290 INJ, BUPIVACAINE LIPOSOME: HCPCS | Performed by: OBSTETRICS & GYNECOLOGY

## 2019-01-31 PROCEDURE — 36000709 HC OR TIME LEV III EA ADD 15 MIN: Performed by: OBSTETRICS & GYNECOLOGY

## 2019-01-31 PROCEDURE — 88307 TISSUE EXAM BY PATHOLOGIST: CPT | Mod: 26,,, | Performed by: PATHOLOGY

## 2019-01-31 PROCEDURE — 36000708 HC OR TIME LEV III 1ST 15 MIN: Performed by: OBSTETRICS & GYNECOLOGY

## 2019-01-31 PROCEDURE — 63600175 PHARM REV CODE 636 W HCPCS: Performed by: STUDENT IN AN ORGANIZED HEALTH CARE EDUCATION/TRAINING PROGRAM

## 2019-01-31 PROCEDURE — 88331 TISSUE SPECIMEN TO PATHOLOGY - SURGERY: ICD-10-PCS | Mod: 26,,, | Performed by: PATHOLOGY

## 2019-01-31 PROCEDURE — D9220A PRA ANESTHESIA: Mod: CRNA,,, | Performed by: NURSE ANESTHETIST, CERTIFIED REGISTERED

## 2019-01-31 PROCEDURE — 88305 TISSUE EXAM BY PATHOLOGIST: CPT | Performed by: PATHOLOGY

## 2019-01-31 PROCEDURE — 71000033 HC RECOVERY, INTIAL HOUR: Performed by: OBSTETRICS & GYNECOLOGY

## 2019-01-31 PROCEDURE — 88305 TISSUE EXAM BY PATHOLOGIST: CPT | Mod: 26,,, | Performed by: PATHOLOGY

## 2019-01-31 PROCEDURE — 37000009 HC ANESTHESIA EA ADD 15 MINS: Performed by: OBSTETRICS & GYNECOLOGY

## 2019-01-31 PROCEDURE — D9220A PRA ANESTHESIA: Mod: ANES,,, | Performed by: ANESTHESIOLOGY

## 2019-01-31 PROCEDURE — 27000221 HC OXYGEN, UP TO 24 HOURS

## 2019-01-31 PROCEDURE — D9220A PRA ANESTHESIA: ICD-10-PCS | Mod: CRNA,,, | Performed by: NURSE ANESTHETIST, CERTIFIED REGISTERED

## 2019-01-31 PROCEDURE — 25000003 PHARM REV CODE 250: Performed by: NURSE ANESTHETIST, CERTIFIED REGISTERED

## 2019-01-31 PROCEDURE — 58720 REMOVAL OF OVARY/TUBE(S): CPT | Mod: 51,,, | Performed by: OBSTETRICS & GYNECOLOGY

## 2019-01-31 PROCEDURE — 49204 PR EXCISION/DESTRUCTION OPEN ABDOMINAL TUMORS 5.1-10.0 CM: CPT | Mod: ,,, | Performed by: OBSTETRICS & GYNECOLOGY

## 2019-01-31 PROCEDURE — 37000008 HC ANESTHESIA 1ST 15 MINUTES: Performed by: OBSTETRICS & GYNECOLOGY

## 2019-01-31 PROCEDURE — 63600175 PHARM REV CODE 636 W HCPCS

## 2019-01-31 PROCEDURE — 11000001 HC ACUTE MED/SURG PRIVATE ROOM

## 2019-01-31 PROCEDURE — 63600175 PHARM REV CODE 636 W HCPCS: Performed by: ANESTHESIOLOGY

## 2019-01-31 PROCEDURE — S0020 INJECTION, BUPIVICAINE HYDRO: HCPCS | Performed by: OBSTETRICS & GYNECOLOGY

## 2019-01-31 PROCEDURE — 25000003 PHARM REV CODE 250: Performed by: OBSTETRICS & GYNECOLOGY

## 2019-01-31 PROCEDURE — 94799 UNLISTED PULMONARY SVC/PX: CPT

## 2019-01-31 PROCEDURE — D9220A PRA ANESTHESIA: ICD-10-PCS | Mod: ANES,,, | Performed by: ANESTHESIOLOGY

## 2019-01-31 PROCEDURE — 88342 TISSUE SPECIMEN TO PATHOLOGY - SURGERY: ICD-10-PCS | Mod: 26,,, | Performed by: PATHOLOGY

## 2019-01-31 RX ORDER — HYDROCODONE BITARTRATE AND ACETAMINOPHEN 5; 325 MG/1; MG/1
1 TABLET ORAL EVERY 4 HOURS PRN
Status: DISCONTINUED | OUTPATIENT
Start: 2019-01-31 | End: 2019-01-31

## 2019-01-31 RX ORDER — ACETAMINOPHEN 10 MG/ML
1000 INJECTION, SOLUTION INTRAVENOUS EVERY 6 HOURS
Status: CANCELLED | OUTPATIENT
Start: 2019-01-31 | End: 2019-02-01

## 2019-01-31 RX ORDER — LIDOCAINE HYDROCHLORIDE 10 MG/ML
1 INJECTION, SOLUTION EPIDURAL; INFILTRATION; INTRACAUDAL; PERINEURAL ONCE
Status: COMPLETED | OUTPATIENT
Start: 2019-01-31 | End: 2019-01-31

## 2019-01-31 RX ORDER — ACETAMINOPHEN 10 MG/ML
INJECTION, SOLUTION INTRAVENOUS
Status: DISCONTINUED | OUTPATIENT
Start: 2019-01-31 | End: 2019-01-31

## 2019-01-31 RX ORDER — DEXTROSE MONOHYDRATE, SODIUM CHLORIDE, AND POTASSIUM CHLORIDE 50; 1.49; 9 G/1000ML; G/1000ML; G/1000ML
INJECTION, SOLUTION INTRAVENOUS CONTINUOUS
Status: DISCONTINUED | OUTPATIENT
Start: 2019-01-31 | End: 2019-02-01

## 2019-01-31 RX ORDER — OXYCODONE HYDROCHLORIDE 5 MG/1
5 TABLET ORAL EVERY 6 HOURS PRN
Status: CANCELLED | OUTPATIENT
Start: 2019-01-31

## 2019-01-31 RX ORDER — HYDROMORPHONE HYDROCHLORIDE 1 MG/ML
0.5 INJECTION, SOLUTION INTRAMUSCULAR; INTRAVENOUS; SUBCUTANEOUS
Status: CANCELLED | OUTPATIENT
Start: 2019-01-31

## 2019-01-31 RX ORDER — ACETAMINOPHEN 10 MG/ML
1000 INJECTION, SOLUTION INTRAVENOUS EVERY 6 HOURS
Status: DISCONTINUED | OUTPATIENT
Start: 2019-01-31 | End: 2019-01-31 | Stop reason: HOSPADM

## 2019-01-31 RX ORDER — OXYCODONE HYDROCHLORIDE 5 MG/1
5 TABLET ORAL EVERY 6 HOURS PRN
Status: DISCONTINUED | OUTPATIENT
Start: 2019-01-31 | End: 2019-02-05 | Stop reason: HOSPADM

## 2019-01-31 RX ORDER — DEXAMETHASONE SODIUM PHOSPHATE 4 MG/ML
INJECTION, SOLUTION INTRA-ARTICULAR; INTRALESIONAL; INTRAMUSCULAR; INTRAVENOUS; SOFT TISSUE
Status: DISCONTINUED | OUTPATIENT
Start: 2019-01-31 | End: 2019-01-31

## 2019-01-31 RX ORDER — SODIUM CHLORIDE 9 MG/ML
INJECTION, SOLUTION INTRAVENOUS CONTINUOUS
Status: DISCONTINUED | OUTPATIENT
Start: 2019-01-31 | End: 2019-01-31

## 2019-01-31 RX ORDER — MUPIROCIN 20 MG/G
1 OINTMENT TOPICAL 2 TIMES DAILY
Status: DISPENSED | OUTPATIENT
Start: 2019-01-31 | End: 2019-02-05

## 2019-01-31 RX ORDER — HYDROMORPHONE HYDROCHLORIDE 1 MG/ML
INJECTION, SOLUTION INTRAMUSCULAR; INTRAVENOUS; SUBCUTANEOUS
Status: COMPLETED
Start: 2019-01-31 | End: 2019-01-31

## 2019-01-31 RX ORDER — PROPOFOL 10 MG/ML
VIAL (ML) INTRAVENOUS
Status: DISCONTINUED | OUTPATIENT
Start: 2019-01-31 | End: 2019-01-31

## 2019-01-31 RX ORDER — HYDROCODONE BITARTRATE AND ACETAMINOPHEN 10; 325 MG/1; MG/1
1 TABLET ORAL EVERY 4 HOURS PRN
Status: DISCONTINUED | OUTPATIENT
Start: 2019-01-31 | End: 2019-01-31

## 2019-01-31 RX ORDER — PROCHLORPERAZINE MALEATE 5 MG
5 TABLET ORAL 3 TIMES DAILY PRN
Status: DISCONTINUED | OUTPATIENT
Start: 2019-01-31 | End: 2019-02-05 | Stop reason: HOSPADM

## 2019-01-31 RX ORDER — OXYCODONE HYDROCHLORIDE 10 MG/1
10 TABLET ORAL EVERY 6 HOURS PRN
Status: DISCONTINUED | OUTPATIENT
Start: 2019-01-31 | End: 2019-02-05 | Stop reason: HOSPADM

## 2019-01-31 RX ORDER — HYDROMORPHONE HYDROCHLORIDE 1 MG/ML
0.5 INJECTION, SOLUTION INTRAMUSCULAR; INTRAVENOUS; SUBCUTANEOUS
Status: DISCONTINUED | OUTPATIENT
Start: 2019-01-31 | End: 2019-01-31

## 2019-01-31 RX ORDER — HYDRALAZINE HYDROCHLORIDE 20 MG/ML
10 INJECTION INTRAMUSCULAR; INTRAVENOUS EVERY 4 HOURS PRN
Status: DISCONTINUED | OUTPATIENT
Start: 2019-01-31 | End: 2019-02-05 | Stop reason: HOSPADM

## 2019-01-31 RX ORDER — ONDANSETRON 2 MG/ML
INJECTION INTRAMUSCULAR; INTRAVENOUS
Status: DISCONTINUED | OUTPATIENT
Start: 2019-01-31 | End: 2019-01-31

## 2019-01-31 RX ORDER — MIDAZOLAM HYDROCHLORIDE 1 MG/ML
INJECTION, SOLUTION INTRAMUSCULAR; INTRAVENOUS
Status: DISCONTINUED | OUTPATIENT
Start: 2019-01-31 | End: 2019-01-31

## 2019-01-31 RX ORDER — HYDROMORPHONE HYDROCHLORIDE 1 MG/ML
0.5 INJECTION, SOLUTION INTRAMUSCULAR; INTRAVENOUS; SUBCUTANEOUS
Status: DISCONTINUED | OUTPATIENT
Start: 2019-01-31 | End: 2019-02-05 | Stop reason: HOSPADM

## 2019-01-31 RX ORDER — HYDROMORPHONE HYDROCHLORIDE 1 MG/ML
0.2 INJECTION, SOLUTION INTRAMUSCULAR; INTRAVENOUS; SUBCUTANEOUS EVERY 5 MIN PRN
Status: COMPLETED | OUTPATIENT
Start: 2019-01-31 | End: 2019-01-31

## 2019-01-31 RX ORDER — LIDOCAINE HCL/PF 100 MG/5ML
SYRINGE (ML) INTRAVENOUS
Status: DISCONTINUED | OUTPATIENT
Start: 2019-01-31 | End: 2019-01-31

## 2019-01-31 RX ORDER — BUPIVACAINE HYDROCHLORIDE 5 MG/ML
INJECTION, SOLUTION EPIDURAL; INTRACAUDAL
Status: DISCONTINUED | OUTPATIENT
Start: 2019-01-31 | End: 2019-01-31 | Stop reason: HOSPADM

## 2019-01-31 RX ORDER — SODIUM CHLORIDE 0.9 % (FLUSH) 0.9 %
3 SYRINGE (ML) INJECTION
Status: DISCONTINUED | OUTPATIENT
Start: 2019-01-31 | End: 2019-01-31 | Stop reason: HOSPADM

## 2019-01-31 RX ORDER — OXYCODONE HYDROCHLORIDE 5 MG/1
10 TABLET ORAL EVERY 6 HOURS PRN
Status: CANCELLED | OUTPATIENT
Start: 2019-01-31

## 2019-01-31 RX ORDER — CEFAZOLIN SODIUM 1 G/3ML
2 INJECTION, POWDER, FOR SOLUTION INTRAMUSCULAR; INTRAVENOUS
Status: COMPLETED | OUTPATIENT
Start: 2019-01-31 | End: 2019-01-31

## 2019-01-31 RX ORDER — ONDANSETRON 8 MG/1
8 TABLET, ORALLY DISINTEGRATING ORAL EVERY 8 HOURS PRN
Status: DISCONTINUED | OUTPATIENT
Start: 2019-01-31 | End: 2019-02-05 | Stop reason: HOSPADM

## 2019-01-31 RX ORDER — FENTANYL CITRATE 50 UG/ML
INJECTION, SOLUTION INTRAMUSCULAR; INTRAVENOUS
Status: DISCONTINUED | OUTPATIENT
Start: 2019-01-31 | End: 2019-01-31

## 2019-01-31 RX ORDER — GLYCOPYRROLATE 0.2 MG/ML
INJECTION INTRAMUSCULAR; INTRAVENOUS
Status: DISCONTINUED | OUTPATIENT
Start: 2019-01-31 | End: 2019-01-31

## 2019-01-31 RX ORDER — KETAMINE HCL IN 0.9 % NACL 50 MG/5 ML
SYRINGE (ML) INTRAVENOUS
Status: DISCONTINUED | OUTPATIENT
Start: 2019-01-31 | End: 2019-01-31

## 2019-01-31 RX ORDER — NEOSTIGMINE METHYLSULFATE 1 MG/ML
INJECTION, SOLUTION INTRAVENOUS
Status: DISCONTINUED | OUTPATIENT
Start: 2019-01-31 | End: 2019-01-31

## 2019-01-31 RX ORDER — ACETAMINOPHEN 500 MG
1000 TABLET ORAL EVERY 6 HOURS
Status: COMPLETED | OUTPATIENT
Start: 2019-01-31 | End: 2019-02-01

## 2019-01-31 RX ORDER — ROCURONIUM BROMIDE 10 MG/ML
INJECTION, SOLUTION INTRAVENOUS
Status: DISCONTINUED | OUTPATIENT
Start: 2019-01-31 | End: 2019-01-31

## 2019-01-31 RX ADMIN — LIDOCAINE HYDROCHLORIDE 100 MG: 20 INJECTION, SOLUTION INTRAVENOUS at 08:01

## 2019-01-31 RX ADMIN — HYDROMORPHONE HYDROCHLORIDE 0.2 MG: 1 INJECTION, SOLUTION INTRAMUSCULAR; INTRAVENOUS; SUBCUTANEOUS at 12:01

## 2019-01-31 RX ADMIN — DEXAMETHASONE SODIUM PHOSPHATE 4 MG: 4 INJECTION, SOLUTION INTRAMUSCULAR; INTRAVENOUS at 09:01

## 2019-01-31 RX ADMIN — PROPOFOL 20 MG: 10 INJECTION, EMULSION INTRAVENOUS at 10:01

## 2019-01-31 RX ADMIN — FENTANYL CITRATE 50 MCG: 50 INJECTION, SOLUTION INTRAMUSCULAR; INTRAVENOUS at 09:01

## 2019-01-31 RX ADMIN — ACETAMINOPHEN 1000 MG: 10 INJECTION, SOLUTION INTRAVENOUS at 09:01

## 2019-01-31 RX ADMIN — ROCURONIUM BROMIDE 50 MG: 10 INJECTION, SOLUTION INTRAVENOUS at 08:01

## 2019-01-31 RX ADMIN — ONDANSETRON 4 MG: 2 INJECTION INTRAMUSCULAR; INTRAVENOUS at 10:01

## 2019-01-31 RX ADMIN — FENTANYL CITRATE 100 MCG: 50 INJECTION, SOLUTION INTRAMUSCULAR; INTRAVENOUS at 08:01

## 2019-01-31 RX ADMIN — PROPOFOL 40 MG: 10 INJECTION, EMULSION INTRAVENOUS at 10:01

## 2019-01-31 RX ADMIN — HYDROMORPHONE HYDROCHLORIDE 0.2 MG: 1 INJECTION, SOLUTION INTRAMUSCULAR; INTRAVENOUS; SUBCUTANEOUS at 01:01

## 2019-01-31 RX ADMIN — OXYCODONE HYDROCHLORIDE 10 MG: 10 TABLET ORAL at 03:01

## 2019-01-31 RX ADMIN — OXYCODONE HYDROCHLORIDE 10 MG: 10 TABLET ORAL at 11:01

## 2019-01-31 RX ADMIN — CEFAZOLIN 2 G: 330 INJECTION, POWDER, FOR SOLUTION INTRAMUSCULAR; INTRAVENOUS at 09:01

## 2019-01-31 RX ADMIN — SODIUM CHLORIDE, SODIUM GLUCONATE, SODIUM ACETATE, POTASSIUM CHLORIDE, MAGNESIUM CHLORIDE, SODIUM PHOSPHATE, DIBASIC, AND POTASSIUM PHOSPHATE: .53; .5; .37; .037; .03; .012; .00082 INJECTION, SOLUTION INTRAVENOUS at 08:01

## 2019-01-31 RX ADMIN — FENTANYL CITRATE 25 MCG: 50 INJECTION, SOLUTION INTRAMUSCULAR; INTRAVENOUS at 10:01

## 2019-01-31 RX ADMIN — ROCURONIUM BROMIDE 20 MG: 10 INJECTION, SOLUTION INTRAVENOUS at 09:01

## 2019-01-31 RX ADMIN — MIDAZOLAM HYDROCHLORIDE 2 MG: 1 INJECTION, SOLUTION INTRAMUSCULAR; INTRAVENOUS at 08:01

## 2019-01-31 RX ADMIN — PROPOFOL 30 MG: 10 INJECTION, EMULSION INTRAVENOUS at 10:01

## 2019-01-31 RX ADMIN — ACETAMINOPHEN 1000 MG: 500 TABLET ORAL at 11:01

## 2019-01-31 RX ADMIN — NEOSTIGMINE METHYLSULFATE 4 MG: 1 INJECTION INTRAVENOUS at 10:01

## 2019-01-31 RX ADMIN — ONDANSETRON 8 MG: 8 TABLET, ORALLY DISINTEGRATING ORAL at 04:01

## 2019-01-31 RX ADMIN — POTASSIUM CHLORIDE, DEXTROSE MONOHYDRATE AND SODIUM CHLORIDE: 150; 5; 900 INJECTION, SOLUTION INTRAVENOUS at 11:01

## 2019-01-31 RX ADMIN — SODIUM CHLORIDE: 0.9 INJECTION, SOLUTION INTRAVENOUS at 08:01

## 2019-01-31 RX ADMIN — LIDOCAINE HYDROCHLORIDE 0.1 MG: 10 INJECTION, SOLUTION EPIDURAL; INFILTRATION; INTRACAUDAL; PERINEURAL at 08:01

## 2019-01-31 RX ADMIN — HYDROMORPHONE HYDROCHLORIDE 0.5 MG: 1 INJECTION, SOLUTION INTRAMUSCULAR; INTRAVENOUS; SUBCUTANEOUS at 08:01

## 2019-01-31 RX ADMIN — ONDANSETRON 8 MG: 8 TABLET, ORALLY DISINTEGRATING ORAL at 11:01

## 2019-01-31 RX ADMIN — GLYCOPYRROLATE 0.1 MG: 0.2 INJECTION, SOLUTION INTRAMUSCULAR; INTRAVENOUS at 09:01

## 2019-01-31 RX ADMIN — PROPOFOL 150 MG: 10 INJECTION, EMULSION INTRAVENOUS at 08:01

## 2019-01-31 RX ADMIN — GLYCOPYRROLATE 0.4 MG: 0.2 INJECTION, SOLUTION INTRAMUSCULAR; INTRAVENOUS at 10:01

## 2019-01-31 RX ADMIN — Medication 30 MG: at 09:01

## 2019-01-31 RX ADMIN — HYDROCODONE BITARTRATE AND ACETAMINOPHEN 1 TABLET: 10; 325 TABLET ORAL at 11:01

## 2019-01-31 RX ADMIN — ACETAMINOPHEN 1000 MG: 500 TABLET ORAL at 04:01

## 2019-01-31 RX ADMIN — MUPIROCIN 1 G: 20 OINTMENT TOPICAL at 09:01

## 2019-01-31 NOTE — OP NOTE
DATE OF PROCEDURE:  1/31/19     SURGEON:  Petrona Porter M.D.     ASSISTANTS:  Vicky Morris MD (RES)      PREOPERATIVE DIAGNOSES:  1. Pelvic mass  2. Prior hysterectomy for uterine leiomyoma     POSTOPERATIVE DIAGNOSES:  1. Pelvic mass  2. Prior hysterectomy for uterine leiomyoma    PROCEDURES PERFORMED: Exploratory laparotomy, pelvic mass resection, bilateral salpingo-oophorectomy     ANESTHESIA:  General endotracheal anesthesia.     SPECIMENS REMOVED:  1.  Left fallopian tube and ovary  2.  Right fallopian tube and ovary  3.  Pelvic mass    ESTIMATED BLOOD LOSS:  25 mL.     COMPLICATIONS:  None.     FINDINGS:   Evidence of prior hysterectomy. Normal right and left fallopian tubes and ovaries. 6cm smooth, well-circumscribed mass attached to the anterior abdominal wall peritoneum. Frozen section benign appearing leiomyoma. Normal appendix. No ascites, no evidence of intraperitoneal disease.          PROCEDURE IN DETAIL:  The patient was taken to the Operating Room.  Informed consent had been obtained.  She underwent general endotracheal anesthesia  without difficulty and was prepped and draped in the normal sterile fashion in the dorsal lithotomy position.  Timeout was performed.  All parties agreed to the planned procedure. Perioperative antibiotics were administered.  Sin catheter was placed under sterile conditions.  Midline vertical skin incision was made with a scalpel and carried down to the underlying layer of fascia. The fascia was incised in the midline and extended superiorly and inferiorly.  The rectus muscles were divided in the midline.  The peritoneum was identified, tented up with two tonsil clamps, and entered sharply with Metzenbaum scissors. The peritoneal incision was extended superiorly and inferiorly.     Survey of the abdomen and pelvis revealed the above findings. We identified the well circumscribed mass attached to the anterior abdominal in the midline, we released the attachments with  bovie cautery. Frozen section returned benign appearing uterine leiomyoma. We then turned our attention to removal of the tubes and ovaries.       Bookwalter retractor was assembled and bowel was packed away with moist laparotomy sponges. We then identified the remnant of the round ligament on the left, cauterized and transected it. This facilitated access to the retroperitoneum.  The retroperitoneum was then opened. The left IP ligament was then skeletonized, doubly clamped with Renato hysterectomy clamps, transected, and doubly suture ligated with good visualization of the ureter beneath. The remaining peritoneal attachments of the left ovary and fallopian tube were released and the adnexa was handed off to pathology. Similarly the remnant of the round ligament of the right was identified, cauterized and transected. The retroperitoneum was then opened. The right IP ligament was then skeletonized, doubly clamped with Renato hysterectomy clamps, transected, and doubly suture ligated with good visualization of the ureter beneath. The remainder of the peritoneal attachments of the right fallopian tube and ovary were released and the adnexa was handed off to pathology. Ureters were reinspected and both noted to be vermiculating equally and briskly.       At this point the procedure was deemed complete. Laparotomy sponges were removed from the abdomen.  Bookwalter retractor was taken down.  Exparel was placed superiorly and inferiorly to the fascia.  The fascia was reapproximated with a #1 looped PDS in a running fashion.  Subcutaneous tissue was irrigated, cleared of all clot and debris, and rendered hemostatic. Subcutaneous adipose tissue was reapproximated with 2-0 vicryl suture in a running fashion. Skin incision was closed with 4-0 Monocryl in a subcuticular fashion and topped with sterile dressing. The patient tolerated the procedure well.  Sponge, lap, needle, and instrument counts were correct x2 as reported by  the circulating nurse. She was awakened from anesthesia and taken to recovery in stable condition.

## 2019-01-31 NOTE — BRIEF OP NOTE
[facility]  Gynecology/Oncology  Brief Operative Note    SUMMARY     Surgery Date: 1/31/2019     Surgeon(s) and Role:     * Petrona Porter MD - Primary    Assisting Surgeon: Vicky Morris    Pre-op Diagnosis:  Pelvic mass [R19.00]    Post-op Diagnosis:  Post-Op Diagnosis Codes:     * Pelvic mass [R19.00]     * S/P Exploratory Laparotomy     * S/P Bilateral Salpingo-Oophorectomy     Procedure(s) (LRB):  LAPAROTOMY, EXPLORATORY (N/A)  SALPINGO-OOPHORECTOMY, BILATERAL (Bilateral)  EXCISION, MASS, PELVIS (N/A)    Anesthesia: General    Description of Procedure: Exploratory laparotomy revealing mass of anterior abdominal wall which was successfully surgically incised and sent for frozen section. Additionally an uncomplicated bilateral salping-oophorectomy was performed without complication.    Description of the findings of the procedure:   1. Anterior abdominal wall mass  2. Pelvic findings consistent with history of prior TLH. Uterus surgically absent.   3. Normal bilateral ovaries without obvious anatomical abnormality  4. Excellent hemostasis at conclusion of case    Estimated Blood Loss: 25 mL         Specimens:   Specimen (12h ago, onward)    Start     Ordered    01/31/19 1025  Specimen to Pathology - Surgery  Once     Comments:  1. Abdominal Wall Mass- Frozen2. Left Tube and Ovary- Permanent3. Right Tube and Ovary- Permanent     Start Status   01/31/19 1025 Collected (01/31/19 1027)       01/31/19 1025    01/31/19 0947  Specimen to Pathology - Surgery  Once     Comments:  1.Abdominal Wall Mass-Frozen     Start Status   01/31/19 0947 Collected (01/31/19 0947)       01/31/19 0947          Vicky Morris M.D.  PGY2 OB/GYN

## 2019-01-31 NOTE — PROGRESS NOTES
Spoke to Dr. Morris, resident for OBGYN/Onc regarding need for H&P, everything is ready otherwise.

## 2019-01-31 NOTE — PROGRESS NOTES
Pt ready for surgery, anesthesia at bedside, need H&P, waiting for OBGN/oncology, pgd resident on call.

## 2019-01-31 NOTE — PLAN OF CARE
POC reviewed with pt, acknowledged understanding. Pt AAO x 4. Pt remains free of falls/injuries. Pt on telemetry remains SR. Pt tolerating clear liquid diet. Pt pain controlled with prescribed meds. Pt wearing SCDs. Pt voids per catheter, output recorded. No acute events throughout shift. No distress noted, will continue to monitor.

## 2019-01-31 NOTE — NURSING TRANSFER
Nursing Transfer Note      1/31/2019     Transfer 502 A    Transfer via stretcher    Transfer with IV pole, IV fluids, SCDs    Transported by transport    Medicines sent: IV fluids    Chart send with patient: yes    Notified: Nimesh, RN    Patient reassessed at: upon arrival to the unit

## 2019-01-31 NOTE — INTERVAL H&P NOTE
The patient has been examined and the H&P has been reviewed:    I concur with the findings and no changes have occurred since H&P was written.    Anesthesia/Surgery risks, benefits and alternative options discussed and understood by patient/family.          Active Hospital Problems    Diagnosis  POA    Pelvic mass [R19.00]  Yes      Resolved Hospital Problems   No resolved problems to display.

## 2019-01-31 NOTE — TRANSFER OF CARE
"Anesthesia Transfer of Care Note    Patient: Renea Mac    Procedure(s) Performed: Procedure(s) (LRB):  LAPAROTOMY, EXPLORATORY (N/A)  SALPINGO-OOPHORECTOMY, BILATERAL (Bilateral)  EXCISION, MASS, PELVIS (N/A)    Patient location: PACU    Anesthesia Type: general    Transport from OR: Transported from OR on 6-10 L/min O2 by face mask with adequate spontaneous ventilation    Post pain: adequate analgesia    Post assessment: tolerated procedure well and no apparent anesthetic complications    Post vital signs: stable    Level of consciousness: awake, alert and oriented    Nausea/Vomiting: no nausea/vomiting    Complications: none    Transfer of care protocol was followed      Last vitals:   Visit Vitals  /73   Pulse 87   Temp 36.6 °C (97.8 °F) (Oral)   Resp 18   Ht 5' 4" (1.626 m)   Wt 93.4 kg (206 lb)   SpO2 100%   Breastfeeding? No   BMI 35.36 kg/m²     "

## 2019-02-01 LAB
ANION GAP SERPL CALC-SCNC: 7 MMOL/L
BASOPHILS # BLD AUTO: 0.03 K/UL
BASOPHILS NFR BLD: 0.3 %
BUN SERPL-MCNC: 7 MG/DL
CALCIUM SERPL-MCNC: 8.6 MG/DL
CHLORIDE SERPL-SCNC: 108 MMOL/L
CO2 SERPL-SCNC: 22 MMOL/L
CREAT SERPL-MCNC: 0.6 MG/DL
DIFFERENTIAL METHOD: ABNORMAL
EOSINOPHIL # BLD AUTO: 0 K/UL
EOSINOPHIL NFR BLD: 0.1 %
ERYTHROCYTE [DISTWIDTH] IN BLOOD BY AUTOMATED COUNT: 14 %
EST. GFR  (AFRICAN AMERICAN): >60 ML/MIN/1.73 M^2
EST. GFR  (NON AFRICAN AMERICAN): >60 ML/MIN/1.73 M^2
GLUCOSE SERPL-MCNC: 94 MG/DL
HCT VFR BLD AUTO: 35.3 %
HGB BLD-MCNC: 11.3 G/DL
IMM GRANULOCYTES # BLD AUTO: 0.03 K/UL
IMM GRANULOCYTES NFR BLD AUTO: 0.3 %
LYMPHOCYTES # BLD AUTO: 3.2 K/UL
LYMPHOCYTES NFR BLD: 27.6 %
MCH RBC QN AUTO: 26.8 PG
MCHC RBC AUTO-ENTMCNC: 32 G/DL
MCV RBC AUTO: 84 FL
MONOCYTES # BLD AUTO: 0.8 K/UL
MONOCYTES NFR BLD: 6.6 %
NEUTROPHILS # BLD AUTO: 7.6 K/UL
NEUTROPHILS NFR BLD: 65.1 %
NRBC BLD-RTO: 0 /100 WBC
PLATELET # BLD AUTO: 170 K/UL
PMV BLD AUTO: 12.5 FL
POTASSIUM SERPL-SCNC: 3.5 MMOL/L
RBC # BLD AUTO: 4.22 M/UL
SODIUM SERPL-SCNC: 137 MMOL/L
WBC # BLD AUTO: 11.57 K/UL

## 2019-02-01 PROCEDURE — 11000001 HC ACUTE MED/SURG PRIVATE ROOM

## 2019-02-01 PROCEDURE — 63600175 PHARM REV CODE 636 W HCPCS: Performed by: STUDENT IN AN ORGANIZED HEALTH CARE EDUCATION/TRAINING PROGRAM

## 2019-02-01 PROCEDURE — 99024 POSTOP FOLLOW-UP VISIT: CPT | Mod: ,,, | Performed by: OBSTETRICS & GYNECOLOGY

## 2019-02-01 PROCEDURE — 99024 PR POST-OP FOLLOW-UP VISIT: ICD-10-PCS | Mod: ,,, | Performed by: OBSTETRICS & GYNECOLOGY

## 2019-02-01 PROCEDURE — 36415 COLL VENOUS BLD VENIPUNCTURE: CPT

## 2019-02-01 PROCEDURE — 80048 BASIC METABOLIC PNL TOTAL CA: CPT

## 2019-02-01 PROCEDURE — 25000003 PHARM REV CODE 250: Performed by: STUDENT IN AN ORGANIZED HEALTH CARE EDUCATION/TRAINING PROGRAM

## 2019-02-01 PROCEDURE — 85025 COMPLETE CBC W/AUTO DIFF WBC: CPT

## 2019-02-01 RX ORDER — ACETAMINOPHEN 325 MG/1
650 TABLET ORAL EVERY 6 HOURS PRN
Status: DISCONTINUED | OUTPATIENT
Start: 2019-02-01 | End: 2019-02-05 | Stop reason: HOSPADM

## 2019-02-01 RX ADMIN — OXYCODONE HYDROCHLORIDE 10 MG: 10 TABLET ORAL at 10:02

## 2019-02-01 RX ADMIN — OXYCODONE HYDROCHLORIDE 10 MG: 10 TABLET ORAL at 05:02

## 2019-02-01 RX ADMIN — ACETAMINOPHEN 1000 MG: 500 TABLET ORAL at 06:02

## 2019-02-01 RX ADMIN — ACETAMINOPHEN 1000 MG: 500 TABLET ORAL at 11:02

## 2019-02-01 RX ADMIN — HYDROMORPHONE HYDROCHLORIDE 0.5 MG: 1 INJECTION, SOLUTION INTRAMUSCULAR; INTRAVENOUS; SUBCUTANEOUS at 09:02

## 2019-02-01 RX ADMIN — MUPIROCIN 1 G: 20 OINTMENT TOPICAL at 08:02

## 2019-02-01 RX ADMIN — OXYCODONE HYDROCHLORIDE 10 MG: 10 TABLET ORAL at 02:02

## 2019-02-01 RX ADMIN — HYDROMORPHONE HYDROCHLORIDE 0.5 MG: 1 INJECTION, SOLUTION INTRAMUSCULAR; INTRAVENOUS; SUBCUTANEOUS at 08:02

## 2019-02-01 NOTE — HPI
Ms. Mac is a 48 yr old female who presents for a scheduled exploratory laparotomy secondary to an identified pelvic mass.  Her work-up is as follows:     Imaging reviewed.     CT A&P 12/7/18  Impression       1. Solid, heterogeneously enhancing mass within the midline lower abdomen that is of uncertain origin but concerning for primary or metastatic neoplasm.  Surgical consultation recommended.  2. Suspected 2.2 cm low-attenuation lesion within the left adnexa.  Recommend correlation with pelvic ultrasound.  3. 0.4 cm left lower lobe solid pulmonary nodule.  For a solid nodule <6 mm, Fleischner Society 2017 guidelines recommend no routine follow up for a low risk patient, or follow-up with non-contrast chest CT at 12 months in a high risk patient.  4. Left renal cyst.  5. Postsurgical changes of sleeve gastrectomy.  6. Fat containing supraumbilical anterior abdominal wall hernia.      Pelvic US 12/14/18  FINDINGS:  Uterus: Patient is status post hysterectomy.  Right ovary: Size: 2.9 x 2.0 x 2.3 cm, 0.9 x 1.0 x 0.9 minimally complex cyst.  Left ovary: Size: 2.9 x 2.1 x 2.6 cm, 1.4 x 1.4 x 0.9 cm minimally complex cyst.  Free Fluid: None.  There is a 5.3 x 4.5 x 4.9 cm heterogeneous mixed minimally cystic and solid mass in the pelvis without obvious attachment to surrounding structures as seen on CT 12/07/2018.     Reports colonoscopy 6 yrs ago normal. Scheduled for c-scope on 1/21/19.      Prior abdominal surgeries include laparoscopic hysterectomy due to leiomyoma 2011 (ovaries remain in situ), laparoscopic gastric sleeve, and c/s x 1 via pfannenstiel.      Family history significant for mother with colon cancer, paternal GM with malignancy of unknown type, Maternal aunt with breast cancer, and maternal aunt with lung cancer.        Colonoscopy 1/21/19 unremarkable. CEA <1.0  14

## 2019-02-01 NOTE — PROGRESS NOTES
[facility]  Gynecologic Oncology  Progress Note      Patient Name: Renea Mac  MRN: [id number]  Admission Date: 1/31/2019  Hospital Length of Stay: 1 days  Attending Provider: Petrona Porter MD  Primary Care Provider: Magdalena Davila NP  Principal Problem: <principal problem not specified>    Follow-up For: Procedure(s) (LRB):  LAPAROTOMY, EXPLORATORY (N/A)  SALPINGO-OOPHORECTOMY, BILATERAL (Bilateral)  EXCISION, MASS, PELVIS (N/A)  Post-Operative Day: 1 Day Post-Op  Subjective:      History of Present Illness:  Ms. Mac is a 48 yr old female who presents for a scheduled exploratory laparotomy secondary to an identified pelvic mass.  Her work-up is as follows:     Imaging reviewed.     CT A&P 12/7/18  Impression       1. Solid, heterogeneously enhancing mass within the midline lower abdomen that is of uncertain origin but concerning for primary or metastatic neoplasm.  Surgical consultation recommended.  2. Suspected 2.2 cm low-attenuation lesion within the left adnexa.  Recommend correlation with pelvic ultrasound.  3. 0.4 cm left lower lobe solid pulmonary nodule.  For a solid nodule <6 mm, Fleischner Society 2017 guidelines recommend no routine follow up for a low risk patient, or follow-up with non-contrast chest CT at 12 months in a high risk patient.  4. Left renal cyst.  5. Postsurgical changes of sleeve gastrectomy.  6. Fat containing supraumbilical anterior abdominal wall hernia.      Pelvic US 12/14/18  FINDINGS:  Uterus: Patient is status post hysterectomy.  Right ovary: Size: 2.9 x 2.0 x 2.3 cm, 0.9 x 1.0 x 0.9 minimally complex cyst.  Left ovary: Size: 2.9 x 2.1 x 2.6 cm, 1.4 x 1.4 x 0.9 cm minimally complex cyst.  Free Fluid: None.  There is a 5.3 x 4.5 x 4.9 cm heterogeneous mixed minimally cystic and solid mass in the pelvis without obvious attachment to surrounding structures as seen on CT 12/07/2018.     Reports colonoscopy 6 yrs ago normal. Scheduled for  c-scope on 1/21/19.      Prior abdominal surgeries include laparoscopic hysterectomy due to leiomyoma 2011 (ovaries remain in situ), laparoscopic gastric sleeve, and c/s x 1 via pfannenstiel.      Family history significant for mother with colon cancer, paternal GM with malignancy of unknown type, Maternal aunt with breast cancer, and maternal aunt with lung cancer.        Colonoscopy 1/21/19 unremarkable. CEA <1.0  14    Hospital Course:  01/31/2019 - Uncomplicated exploratory laparotomy demonstrating leiomyoma of anterior abdominal wall on frozen section. Additional bilateral salpingo-oophorectomy performed.   02/01/2019 - POD # 1 s/p EX Lap/BSO. Patient starting to meet post-op goals.  Sin out, has not voided spontaneously. Nausea with broth, minimal intake. Pain controlled. Has not ambulated.     Interval History: Patient doing well this AM. Pain controlled. Nausea with broth overnight. Has not voided spontaneously or ambulated. Belching but no flatus.     Scheduled Meds:   acetaminophen  1,000 mg Oral Q6H    mupirocin  1 g Nasal BID     Continuous Infusions:   dextrose 5 % and 0.9 % NaCl with KCl 20 mEq 125 mL/hr at 01/31/19 1149     PRN Meds:hydrALAZINE, HYDROmorphone, ondansetron, oxyCODONE, oxyCODONE, prochlorperazine    Review of patient's allergies indicates:  No Known Allergies    Objective:     Vital Signs (Most Recent):  Temp: 97.4 °F (36.3 °C) (02/01/19 0728)  Pulse: 61 (02/01/19 0728)  Resp: 16 (02/01/19 0728)  BP: 129/72 (02/01/19 0728)  SpO2: 96 % (02/01/19 0728) Vital Signs (24h Range):  Temp:  [96.5 °F (35.8 °C)-98.3 °F (36.8 °C)] 97.4 °F (36.3 °C)  Pulse:  [55-77] 61  Resp:  [10-19] 16  SpO2:  [95 %-100 %] 96 %  BP: (108-137)/(56-77) 129/72     Weight: 93.4 kg (206 lb)  Body mass index is 35.36 kg/m².    Intake/Output - Last 3 Shifts       01/30 0700 - 01/31 0659 01/31 0700 - 02/01 0659 02/01 0700 - 02/02 0659    P.O.  560     I.V. (mL/kg)  1100 (11.8)     Total Intake(mL/kg)  1660  (17.8)     Urine (mL/kg/hr)  1500 350 (1.1)    Blood  25     Total Output  1525 350    Net  +135 -350                    Physical Exam:   Constitutional: She is oriented to person, place, and time. She appears well-developed and well-nourished. No distress.    HENT:   Head: Normocephalic and atraumatic.    Eyes: Conjunctivae are normal.    Neck: Neck supple.    Cardiovascular: Normal rate, regular rhythm and intact distal pulses.     Pulmonary/Chest: Effort normal. No respiratory distress.        Abdominal: Soft. She exhibits abdominal incision (Infra-umbilical incision with island dressing with small amount of shadowing. No signs of infection or inflammation. ). She exhibits no distension. There is tenderness (Mild. Appropriate for post-op period. ). There is no rebound and no guarding.     Genitourinary:   Genitourinary Comments: No kinney in place.            Musculoskeletal: Normal range of motion and moves all extremeties.       Neurological: She is alert and oriented to person, place, and time.    Skin: Skin is warm and dry. She is not diaphoretic.    Psychiatric: She has a normal mood and affect. Her behavior is normal. Judgment and thought content normal.       Lines/Drains/Airways     Peripheral Intravenous Line                 Peripheral IV - Single Lumen 01/21/19 1044 Right Antecubital 10 days         Peripheral IV - Single Lumen 01/31/19 0800 Left Hand 1 day         Peripheral IV - Single Lumen 01/31/19 0854 Right Hand 1 day                Laboratory:  CBC:   Recent Labs   Lab 02/01/19  0452   WBC 11.57   HGB 11.3*   HCT 35.3*       and All pertinent labs from the last 24 hours have been reviewed.      Assessment/Plan:     S/P Exploratory Laparotomy/Mass Resection/BSO    - POD # 1, starting to meet post-operative goals  - VSS  - Kinney pulled this AM > Void Trial  - Pain controlled   - AM CBC 11.3/35.3 > Good Urine Output  - Clear diet. Advance as tolerated. Nausea last night.   - Will D/C fluids to  75  - I.S.  - Encourage ambulation  - SCDs       VTE Risk Mitigation (From admission, onward)    None          Was kinney catheter removed? Yes    Vicky Morris MD  Gynecologic Oncology  [facility]

## 2019-02-01 NOTE — SUBJECTIVE & OBJECTIVE
Interval History: Patient doing well this AM. Pain controlled. Nausea with broth overnight. Has not voided spontaneously or ambulated. Belching but no flatus.     Scheduled Meds:   acetaminophen  1,000 mg Oral Q6H    mupirocin  1 g Nasal BID     Continuous Infusions:   dextrose 5 % and 0.9 % NaCl with KCl 20 mEq 125 mL/hr at 01/31/19 1149     PRN Meds:hydrALAZINE, HYDROmorphone, ondansetron, oxyCODONE, oxyCODONE, prochlorperazine    Review of patient's allergies indicates:  No Known Allergies    Objective:     Vital Signs (Most Recent):  Temp: 97.4 °F (36.3 °C) (02/01/19 0728)  Pulse: 61 (02/01/19 0728)  Resp: 16 (02/01/19 0728)  BP: 129/72 (02/01/19 0728)  SpO2: 96 % (02/01/19 0728) Vital Signs (24h Range):  Temp:  [96.5 °F (35.8 °C)-98.3 °F (36.8 °C)] 97.4 °F (36.3 °C)  Pulse:  [55-77] 61  Resp:  [10-19] 16  SpO2:  [95 %-100 %] 96 %  BP: (108-137)/(56-77) 129/72     Weight: 93.4 kg (206 lb)  Body mass index is 35.36 kg/m².    Intake/Output - Last 3 Shifts       01/30 0700 - 01/31 0659 01/31 0700 - 02/01 0659 02/01 0700 - 02/02 0659    P.O.  560     I.V. (mL/kg)  1100 (11.8)     Total Intake(mL/kg)  1660 (17.8)     Urine (mL/kg/hr)  1500 350 (1.1)    Blood  25     Total Output  1525 350    Net  +135 -350                    Physical Exam:   Constitutional: She is oriented to person, place, and time. She appears well-developed and well-nourished. No distress.    HENT:   Head: Normocephalic and atraumatic.    Eyes: Conjunctivae are normal.    Neck: Neck supple.    Cardiovascular: Normal rate, regular rhythm and intact distal pulses.     Pulmonary/Chest: Effort normal. No respiratory distress.        Abdominal: Soft. She exhibits abdominal incision (Infra-umbilical incision with island dressing with small amount of shadowing. No signs of infection or inflammation. ). She exhibits no distension. There is tenderness (Mild. Appropriate for post-op period. ). There is no rebound and no guarding.     Genitourinary:    Genitourinary Comments: No kinney in place.            Musculoskeletal: Normal range of motion and moves all extremeties.       Neurological: She is alert and oriented to person, place, and time.    Skin: Skin is warm and dry. She is not diaphoretic.    Psychiatric: She has a normal mood and affect. Her behavior is normal. Judgment and thought content normal.       Lines/Drains/Airways     Peripheral Intravenous Line                 Peripheral IV - Single Lumen 01/21/19 1044 Right Antecubital 10 days         Peripheral IV - Single Lumen 01/31/19 0800 Left Hand 1 day         Peripheral IV - Single Lumen 01/31/19 0854 Right Hand 1 day                Laboratory:  CBC:   Recent Labs   Lab 02/01/19  0452   WBC 11.57   HGB 11.3*   HCT 35.3*       and All pertinent labs from the last 24 hours have been reviewed.

## 2019-02-01 NOTE — ASSESSMENT & PLAN NOTE
- POD # 1, starting to meet post-operative goals  - VSS  - Sin pulled this AM > Void Trial  - Pain controlled   - AM CBC 11.3/35.3 > Good Urine Output  - Clear diet. Advance as tolerated. Nausea last night.   - Will D/C fluids to 75  - I.S.  - Encourage ambulation  - SCDs

## 2019-02-01 NOTE — ANESTHESIA POSTPROCEDURE EVALUATION
"Anesthesia Post Evaluation    Patient: Renea Mac    Procedure(s) Performed: Procedure(s) (LRB):  LAPAROTOMY, EXPLORATORY (N/A)  SALPINGO-OOPHORECTOMY, BILATERAL (Bilateral)  EXCISION, MASS, PELVIS (N/A)    Final Anesthesia Type: general  Patient location during evaluation: PACU  Patient participation: Yes- Able to Participate  Level of consciousness: awake and alert, awake and oriented  Post-procedure vital signs: reviewed and stable  Pain management: adequate  Airway patency: patent  PONV status at discharge: No PONV  Anesthetic complications: no      Cardiovascular status: blood pressure returned to baseline, stable and hemodynamically stable  Respiratory status: unassisted, spontaneous ventilation, room air and nasal cannula  Hydration status: euvolemic  Follow-up not needed.        Visit Vitals  /72   Pulse 61   Temp 36.3 °C (97.4 °F)   Resp 16   Ht 5' 4" (1.626 m)   Wt 93.4 kg (206 lb)   SpO2 96%   Breastfeeding? No   BMI 35.36 kg/m²       Pain/Noemí Score: Pain Rating Prior to Med Admin: 6 (2/1/2019  6:00 AM)  Noemí Score: 10 (1/31/2019  1:31 PM)        "

## 2019-02-01 NOTE — PLAN OF CARE
POD 1 s/p Xlap/BSO/pelvic mass excision. Patient has a history of a YANG in 2011 (leiomyoma). Patient CT (on 12/7/18) results show solid mass in the pelvis. Colonoscopy unremarkable on 1/21/19. CM discussed patient discharge plan with the Gyn Onc resident Dr. Morris. Plans for patient to discharge home tomorrow with no needs. VSS. Patient is afebrile. Patient receiving IVF: dextrose 5% and 0.9% NaCl with KCl 20 meq IV at 75 mL/her continuous. Patient currently on a clear liquid diet; plans to advance as tolerated. Sin discontinued. Patient encouraged to ambulate. CM discussed patient status with the SW. CM to continue to follow with the team.    Shahrzad Torres, RN, BSN, CM  [facility]  Nurse - Med Onc/Gyn Onc  [phone]

## 2019-02-01 NOTE — HOSPITAL COURSE
01/31/2019 - Uncomplicated exploratory laparotomy demonstrating leiomyoma of anterior abdominal wall on frozen section. Additional bilateral salpingo-oophorectomy performed.   02/01/2019 - POD # 1 s/p EX Lap/BSO. Patient starting to meet post-op goals.  Sin out, has not voided spontaneously. Nausea with broth, minimal intake. Pain controlled. Has not ambulated.   02/02/2019 - POD # 2 s/p EX Lap/BSO. Patient continues to progress towards post-operative goals.  Awaiting bowel function. Likely stable for discharge once flatus is noted.   02/03/2019 - POD # 3 s/p EX Lap/BSO. Awaiting bowel function, otherwise meeting all post-op goals.   02/04/2019 - POD#4, only small amount of watery stool, no regular bowel function.  2/5/2019 - POD#5, positive flatus x5, patient reports relief of gas pain. Ready for discharge and discharged home.

## 2019-02-02 PROCEDURE — 11000001 HC ACUTE MED/SURG PRIVATE ROOM

## 2019-02-02 PROCEDURE — 63600175 PHARM REV CODE 636 W HCPCS: Performed by: STUDENT IN AN ORGANIZED HEALTH CARE EDUCATION/TRAINING PROGRAM

## 2019-02-02 PROCEDURE — 25000003 PHARM REV CODE 250: Performed by: STUDENT IN AN ORGANIZED HEALTH CARE EDUCATION/TRAINING PROGRAM

## 2019-02-02 RX ORDER — SIMETHICONE 80 MG
1 TABLET,CHEWABLE ORAL
Status: DISCONTINUED | OUTPATIENT
Start: 2019-02-02 | End: 2019-02-05 | Stop reason: HOSPADM

## 2019-02-02 RX ORDER — OXYCODONE AND ACETAMINOPHEN 5; 325 MG/1; MG/1
1 TABLET ORAL EVERY 4 HOURS PRN
Qty: 30 TABLET | Refills: 0 | Status: SHIPPED | OUTPATIENT
Start: 2019-02-02 | End: 2019-02-18

## 2019-02-02 RX ADMIN — DOCUSATE SODIUM 50 MG: 50 CAPSULE, LIQUID FILLED ORAL at 10:02

## 2019-02-02 RX ADMIN — SIMETHICONE CHEW TAB 80 MG 80 MG: 80 TABLET ORAL at 06:02

## 2019-02-02 RX ADMIN — ACETAMINOPHEN 650 MG: 325 TABLET, FILM COATED ORAL at 07:02

## 2019-02-02 RX ADMIN — ACETAMINOPHEN 650 MG: 325 TABLET, FILM COATED ORAL at 12:02

## 2019-02-02 RX ADMIN — HYDROMORPHONE HYDROCHLORIDE 0.5 MG: 1 INJECTION, SOLUTION INTRAMUSCULAR; INTRAVENOUS; SUBCUTANEOUS at 02:02

## 2019-02-02 RX ADMIN — SIMETHICONE CHEW TAB 80 MG 80 MG: 80 TABLET ORAL at 02:02

## 2019-02-02 RX ADMIN — MUPIROCIN 1 G: 20 OINTMENT TOPICAL at 09:02

## 2019-02-02 RX ADMIN — OXYCODONE HYDROCHLORIDE 10 MG: 10 TABLET ORAL at 04:02

## 2019-02-02 RX ADMIN — OXYCODONE HYDROCHLORIDE 10 MG: 10 TABLET ORAL at 11:02

## 2019-02-02 RX ADMIN — OXYCODONE HYDROCHLORIDE 10 MG: 10 TABLET ORAL at 09:02

## 2019-02-02 NOTE — PROGRESS NOTES
[facility]  Gynecologic Oncology  Progress Note      Patient Name: Renea Mac  MRN: [id number]  Admission Date: 1/31/2019  Hospital Length of Stay: 2 days  Attending Provider: Petrona Porter MD  Primary Care Provider: Magdalena Davila NP  Principal Problem: <principal problem not specified>    Follow-up For: Procedure(s) (LRB):  LAPAROTOMY, EXPLORATORY (N/A)  SALPINGO-OOPHORECTOMY, BILATERAL (Bilateral)  EXCISION, MASS, PELVIS (N/A)  Post-Operative Day: 2 Days Post-Op  Subjective:      History of Present Illness:  Ms. Mac is a 48 yr old female who presents for a scheduled exploratory laparotomy secondary to an identified pelvic mass.  Her work-up is as follows:     Imaging reviewed.     CT A&P 12/7/18  Impression       1. Solid, heterogeneously enhancing mass within the midline lower abdomen that is of uncertain origin but concerning for primary or metastatic neoplasm.  Surgical consultation recommended.  2. Suspected 2.2 cm low-attenuation lesion within the left adnexa.  Recommend correlation with pelvic ultrasound.  3. 0.4 cm left lower lobe solid pulmonary nodule.  For a solid nodule <6 mm, Fleischner Society 2017 guidelines recommend no routine follow up for a low risk patient, or follow-up with non-contrast chest CT at 12 months in a high risk patient.  4. Left renal cyst.  5. Postsurgical changes of sleeve gastrectomy.  6. Fat containing supraumbilical anterior abdominal wall hernia.      Pelvic US 12/14/18  FINDINGS:  Uterus: Patient is status post hysterectomy.  Right ovary: Size: 2.9 x 2.0 x 2.3 cm, 0.9 x 1.0 x 0.9 minimally complex cyst.  Left ovary: Size: 2.9 x 2.1 x 2.6 cm, 1.4 x 1.4 x 0.9 cm minimally complex cyst.  Free Fluid: None.  There is a 5.3 x 4.5 x 4.9 cm heterogeneous mixed minimally cystic and solid mass in the pelvis without obvious attachment to surrounding structures as seen on CT 12/07/2018.     Reports colonoscopy 6 yrs ago normal. Scheduled for  c-scope on 1/21/19.      Prior abdominal surgeries include laparoscopic hysterectomy due to leiomyoma 2011 (ovaries remain in situ), laparoscopic gastric sleeve, and c/s x 1 via pfannenstiel.      Family history significant for mother with colon cancer, paternal GM with malignancy of unknown type, Maternal aunt with breast cancer, and maternal aunt with lung cancer.        Colonoscopy 1/21/19 unremarkable. CEA <1.0  14    Hospital Course:  01/31/2019 - Uncomplicated exploratory laparotomy demonstrating leiomyoma of anterior abdominal wall on frozen section. Additional bilateral salpingo-oophorectomy performed.   02/01/2019 - POD # 1 s/p EX Lap/BSO. Patient starting to meet post-op goals.  Sin out, has not voided spontaneously. Nausea with broth, minimal intake. Pain controlled. Has not ambulated.   02/02/2019 - POD # 2 s/p EX Lap/BSO. Patient continues to progress towards post-operative goals.  Awaiting bowel function. Likely stable for discharge once flatus is noted.     Interval History: Patient again doing well this AM. Pain controlled. Tolerated advnaced diet. Passed void trial and ambulating in halls. Again, belching but no flatus.     Scheduled Meds:   docusate sodium  50 mg Oral Daily    mupirocin  1 g Nasal BID    simethicone  1 tablet Oral TID PC     Continuous Infusions:    PRN Meds:acetaminophen, hydrALAZINE, HYDROmorphone, ondansetron, oxyCODONE, oxyCODONE, prochlorperazine    Review of patient's allergies indicates:  No Known Allergies    Objective:     Vital Signs (Most Recent):  Temp: 98.6 °F (37 °C) (02/02/19 0749)  Pulse: 63 (02/02/19 0749)  Resp: 18 (02/02/19 0749)  BP: 118/70 (02/02/19 0749)  SpO2: 97 % (02/02/19 0749) Vital Signs (24h Range):  Temp:  [96.2 °F (35.7 °C)-99 °F (37.2 °C)] 98.6 °F (37 °C)  Pulse:  [60-84] 63  Resp:  [16-18] 18  SpO2:  [93 %-97 %] 97 %  BP: (115-144)/(60-76) 118/70     Weight: 93.4 kg (206 lb)  Body mass index is 35.36 kg/m².    Intake/Output - Last 3 Shifts        01/31 0700 - 02/01 0659 02/01 0700 - 02/02 0659 02/02 0700 - 02/03 0659    P.O. 560      I.V. (mL/kg) 1100 (11.8)      Total Intake(mL/kg) 1660 (17.8)      Urine (mL/kg/hr) 1500 350 (0.2)     Blood 25      Total Output 1525 350     Net +135 -350            Urine Occurrence  5 x              Physical Exam:   Constitutional: She is oriented to person, place, and time. She appears well-developed and well-nourished. No distress.    HENT:   Head: Normocephalic and atraumatic.    Eyes: Conjunctivae are normal.    Neck: Neck supple.    Cardiovascular: Normal rate, regular rhythm and intact distal pulses.     Pulmonary/Chest: Effort normal. No respiratory distress.        Abdominal: Soft. She exhibits abdominal incision (Infra-umbilical incision with island dressing with small amount of shadowing. No signs of infection or inflammation. ). She exhibits no distension. There is tenderness (Mild. Appropriate for post-op period. ). There is no rebound and no guarding.     Genitourinary:   Genitourinary Comments: No kinney in place.            Musculoskeletal: Normal range of motion and moves all extremeties.       Neurological: She is alert and oriented to person, place, and time.    Skin: Skin is warm and dry. She is not diaphoretic.    Psychiatric: She has a normal mood and affect. Her behavior is normal. Judgment and thought content normal.       Lines/Drains/Airways     Peripheral Intravenous Line                 Peripheral IV - Single Lumen 01/21/19 1044 Right Antecubital 11 days         Peripheral IV - Single Lumen 01/31/19 0800 Left Hand 2 days         Peripheral IV - Single Lumen 01/31/19 0854 Right Hand 2 days                Laboratory:  CBC:   Recent Labs   Lab 02/01/19  0452   WBC 11.57   HGB 11.3*   HCT 35.3*       and All pertinent labs from the last 24 hours have been reviewed.      Assessment/Plan:     S/P Exploratory Laparotomy/Mass Resection/BSO    - POD # 2, meeting post-operative goals, awaiting  bowel function  - VSS  - Passed Void Trial  - Pain controlled   - AM CBC 11.3/35.3 > Good Urine Output  - Tolerated advanced diet   - D/C'd fluids  - I.S.  - Encourage ambulation  - SCDs       VTE Risk Mitigation (From admission, onward)    None          Vicky Morris MD  Gynecologic Oncology  [facility]

## 2019-02-02 NOTE — PLAN OF CARE
Problem: Adult Inpatient Plan of Care  Goal: Plan of Care Review  Outcome: Ongoing (interventions implemented as appropriate)  Pt lying in bed comfortably, AAOx4, VSS, IV intact, no accu, no tele, room air, skin integrity maintained, pt independent with frequent position changes, complaints of pain managed with PRN medication, progressing towards goals, fall precautions maintained with no falls noted during shift, bed in low locked position, call light within reach, ID band on, personal items in reach, will continue to monitor and follow plan of care.

## 2019-02-02 NOTE — SUBJECTIVE & OBJECTIVE
Interval History: Patient again doing well this AM. Pain controlled. Tolerated advnaced diet. Passed void trial and ambulating in halls. Again, belching but no flatus.     Scheduled Meds:   docusate sodium  50 mg Oral Daily    mupirocin  1 g Nasal BID    simethicone  1 tablet Oral TID PC     Continuous Infusions:    PRN Meds:acetaminophen, hydrALAZINE, HYDROmorphone, ondansetron, oxyCODONE, oxyCODONE, prochlorperazine    Review of patient's allergies indicates:  No Known Allergies    Objective:     Vital Signs (Most Recent):  Temp: 98.6 °F (37 °C) (02/02/19 0749)  Pulse: 63 (02/02/19 0749)  Resp: 18 (02/02/19 0749)  BP: 118/70 (02/02/19 0749)  SpO2: 97 % (02/02/19 0749) Vital Signs (24h Range):  Temp:  [96.2 °F (35.7 °C)-99 °F (37.2 °C)] 98.6 °F (37 °C)  Pulse:  [60-84] 63  Resp:  [16-18] 18  SpO2:  [93 %-97 %] 97 %  BP: (115-144)/(60-76) 118/70     Weight: 93.4 kg (206 lb)  Body mass index is 35.36 kg/m².    Intake/Output - Last 3 Shifts       01/31 0700 - 02/01 0659 02/01 0700 - 02/02 0659 02/02 0700 - 02/03 0659    P.O. 560      I.V. (mL/kg) 1100 (11.8)      Total Intake(mL/kg) 1660 (17.8)      Urine (mL/kg/hr) 1500 350 (0.2)     Blood 25      Total Output 1525 350     Net +135 -350            Urine Occurrence  5 x              Physical Exam:   Constitutional: She is oriented to person, place, and time. She appears well-developed and well-nourished. No distress.    HENT:   Head: Normocephalic and atraumatic.    Eyes: Conjunctivae are normal.    Neck: Neck supple.    Cardiovascular: Normal rate, regular rhythm and intact distal pulses.     Pulmonary/Chest: Effort normal. No respiratory distress.        Abdominal: Soft. She exhibits abdominal incision (Infra-umbilical incision with island dressing with small amount of shadowing. No signs of infection or inflammation. ). She exhibits no distension. There is tenderness (Mild. Appropriate for post-op period. ). There is no rebound and no guarding.      Genitourinary:   Genitourinary Comments: No kinney in place.            Musculoskeletal: Normal range of motion and moves all extremeties.       Neurological: She is alert and oriented to person, place, and time.    Skin: Skin is warm and dry. She is not diaphoretic.    Psychiatric: She has a normal mood and affect. Her behavior is normal. Judgment and thought content normal.       Lines/Drains/Airways     Peripheral Intravenous Line                 Peripheral IV - Single Lumen 01/21/19 1044 Right Antecubital 11 days         Peripheral IV - Single Lumen 01/31/19 0800 Left Hand 2 days         Peripheral IV - Single Lumen 01/31/19 0854 Right Hand 2 days                Laboratory:  CBC:   Recent Labs   Lab 02/01/19  0452   WBC 11.57   HGB 11.3*   HCT 35.3*       and All pertinent labs from the last 24 hours have been reviewed.

## 2019-02-02 NOTE — ASSESSMENT & PLAN NOTE
- POD # 2, meeting post-operative goals, awaiting bowel function  - VSS  - Passed Void Trial  - Pain controlled   - AM CBC 11.3/35.3 > Good Urine Output  - Tolerated advanced diet   - D/C'd fluids  - I.S.  - Encourage ambulation  - SCDs

## 2019-02-03 PROCEDURE — 25000003 PHARM REV CODE 250: Performed by: STUDENT IN AN ORGANIZED HEALTH CARE EDUCATION/TRAINING PROGRAM

## 2019-02-03 PROCEDURE — 11000001 HC ACUTE MED/SURG PRIVATE ROOM

## 2019-02-03 RX ORDER — BISACODYL 10 MG
10 SUPPOSITORY, RECTAL RECTAL DAILY PRN
Status: DISCONTINUED | OUTPATIENT
Start: 2019-02-03 | End: 2019-02-05 | Stop reason: HOSPADM

## 2019-02-03 RX ADMIN — SIMETHICONE CHEW TAB 80 MG 80 MG: 80 TABLET ORAL at 02:02

## 2019-02-03 RX ADMIN — DOCUSATE SODIUM 50 MG: 50 CAPSULE, LIQUID FILLED ORAL at 08:02

## 2019-02-03 RX ADMIN — ACETAMINOPHEN 650 MG: 325 TABLET, FILM COATED ORAL at 05:02

## 2019-02-03 RX ADMIN — OXYCODONE HYDROCHLORIDE 10 MG: 10 TABLET ORAL at 06:02

## 2019-02-03 RX ADMIN — OXYCODONE HYDROCHLORIDE 10 MG: 10 TABLET ORAL at 05:02

## 2019-02-03 RX ADMIN — SIMETHICONE CHEW TAB 80 MG 80 MG: 80 TABLET ORAL at 08:02

## 2019-02-03 RX ADMIN — SIMETHICONE CHEW TAB 80 MG 80 MG: 80 TABLET ORAL at 06:02

## 2019-02-03 RX ADMIN — BISACODYL 10 MG: 10 SUPPOSITORY RECTAL at 10:02

## 2019-02-03 RX ADMIN — OXYCODONE HYDROCHLORIDE 10 MG: 10 TABLET ORAL at 12:02

## 2019-02-03 RX ADMIN — MUPIROCIN 1 G: 20 OINTMENT TOPICAL at 09:02

## 2019-02-03 NOTE — SUBJECTIVE & OBJECTIVE
Interval History: Patient continues to do well this AM. Pain controlled. Tolerated advanced diet. Passed void trial and ambulating in halls. Again, belching but no flatus.     Scheduled Meds:   docusate sodium  50 mg Oral Daily    mupirocin  1 g Nasal BID    simethicone  1 tablet Oral TID PC     Continuous Infusions:    PRN Meds:acetaminophen, hydrALAZINE, HYDROmorphone, ondansetron, oxyCODONE, oxyCODONE, prochlorperazine    Review of patient's allergies indicates:  No Known Allergies    Objective:     Vital Signs (Most Recent):  Temp: 97.9 °F (36.6 °C) (02/03/19 0447)  Pulse: 67 (02/03/19 0447)  Resp: 16 (02/03/19 0447)  BP: 129/83 (02/03/19 0447)  SpO2: (!) 94 % (02/03/19 0447) Vital Signs (24h Range):  Temp:  [96.7 °F (35.9 °C)-98.6 °F (37 °C)] 97.9 °F (36.6 °C)  Pulse:  [] 67  Resp:  [16-18] 16  SpO2:  [94 %-98 %] 94 %  BP: (116-134)/(56-83) 129/83     Weight: 93.4 kg (206 lb)  Body mass index is 35.36 kg/m².    Intake/Output - Last 3 Shifts       02/01 0700 - 02/02 0659 02/02 0700 - 02/03 0659 02/03 0700 - 02/04 0659    P.O.       I.V. (mL/kg)       Total Intake(mL/kg)       Urine (mL/kg/hr) 350 (0.2)      Blood       Total Output 350      Net -350             Urine Occurrence 5 x               Physical Exam:   Constitutional: She is oriented to person, place, and time. She appears well-developed and well-nourished. No distress.    HENT:   Head: Normocephalic and atraumatic.    Eyes: Conjunctivae are normal.    Neck: Neck supple.    Cardiovascular: Normal rate, regular rhythm and intact distal pulses.     Pulmonary/Chest: Effort normal. No respiratory distress.        Abdominal: Soft. Bowel sounds are normal. She exhibits abdominal incision (Infra-umbilical incision with island dressing with small amount of shadowing. No signs of infection or inflammation. ). She exhibits no distension. There is tenderness (Mild. Appropriate for post-op period. ). There is no rebound and no guarding.     Genitourinary:    Genitourinary Comments: No kinney in place.            Musculoskeletal: Normal range of motion and moves all extremeties.       Neurological: She is alert and oriented to person, place, and time.    Skin: Skin is warm and dry. She is not diaphoretic.    Psychiatric: She has a normal mood and affect. Her behavior is normal. Judgment and thought content normal.       Lines/Drains/Airways     Peripheral Intravenous Line                 Peripheral IV - Single Lumen 01/21/19 1044 Right Antecubital 12 days         Peripheral IV - Single Lumen 01/31/19 0800 Left Hand 2 days         Peripheral IV - Single Lumen 01/31/19 0854 Right Hand 2 days                Laboratory:  CBC:   No results for input(s): WBC, HGB, HCT, PLT in the last 48 hours. and All pertinent labs from the last 24 hours have been reviewed.

## 2019-02-03 NOTE — PLAN OF CARE
Problem: Adult Inpatient Plan of Care  Goal: Plan of Care Review  Outcome: Ongoing (interventions implemented as appropriate)  Pt lying in bed comfortably, AAOx4, VSS, IV intact and sl, no accu, no tele, room air, skin integrity maintained, pt independent with frequent position changes, complaints of pain managed with PRN medication, no BM as of 2/3/19 1558, progressing towards goals, fall precautions maintained with no falls noted during shift, bed in low locked position, call light within reach, ID band on, personal items in reach, will continue to monitor and follow plan of care.

## 2019-02-03 NOTE — ASSESSMENT & PLAN NOTE
- POD # 3, meeting post-operative goals, awaiting bowel function   - VSS  - Passed Void Trial  - Pain controlled   - AM CBC 11.3/35.3 > Good Urine Output  - Tolerated advanced diet   - D/C'd fluids  - I.S.  - Encourage ambulation  - SCDs

## 2019-02-03 NOTE — PROGRESS NOTES
[facility]  Gynecologic Oncology  Progress Note      Patient Name: Renea Mac  MRN: [id number]  Admission Date: 1/31/2019  Hospital Length of Stay: 3 days  Attending Provider: Petrona Porter MD  Primary Care Provider: Magdalena Davila NP  Principal Problem: S/P exploratory laparotomy    Follow-up For: Procedure(s) (LRB):  LAPAROTOMY, EXPLORATORY (N/A)  SALPINGO-OOPHORECTOMY, BILATERAL (Bilateral)  EXCISION, MASS, PELVIS (N/A)  Post-Operative Day: 3 Days Post-Op  Subjective:      History of Present Illness:  Ms. Mac is a 48 yr old female who presents for a scheduled exploratory laparotomy secondary to an identified pelvic mass.  Her work-up is as follows:     Imaging reviewed.     CT A&P 12/7/18  Impression       1. Solid, heterogeneously enhancing mass within the midline lower abdomen that is of uncertain origin but concerning for primary or metastatic neoplasm.  Surgical consultation recommended.  2. Suspected 2.2 cm low-attenuation lesion within the left adnexa.  Recommend correlation with pelvic ultrasound.  3. 0.4 cm left lower lobe solid pulmonary nodule.  For a solid nodule <6 mm, Fleischner Society 2017 guidelines recommend no routine follow up for a low risk patient, or follow-up with non-contrast chest CT at 12 months in a high risk patient.  4. Left renal cyst.  5. Postsurgical changes of sleeve gastrectomy.  6. Fat containing supraumbilical anterior abdominal wall hernia.      Pelvic US 12/14/18  FINDINGS:  Uterus: Patient is status post hysterectomy.  Right ovary: Size: 2.9 x 2.0 x 2.3 cm, 0.9 x 1.0 x 0.9 minimally complex cyst.  Left ovary: Size: 2.9 x 2.1 x 2.6 cm, 1.4 x 1.4 x 0.9 cm minimally complex cyst.  Free Fluid: None.  There is a 5.3 x 4.5 x 4.9 cm heterogeneous mixed minimally cystic and solid mass in the pelvis without obvious attachment to surrounding structures as seen on CT 12/07/2018.     Reports colonoscopy 6 yrs ago normal. Scheduled for c-scope on  1/21/19.      Prior abdominal surgeries include laparoscopic hysterectomy due to leiomyoma 2011 (ovaries remain in situ), laparoscopic gastric sleeve, and c/s x 1 via pfannenstiel.      Family history significant for mother with colon cancer, paternal GM with malignancy of unknown type, Maternal aunt with breast cancer, and maternal aunt with lung cancer.        Colonoscopy 1/21/19 unremarkable. CEA <1.0  14    Hospital Course:  01/31/2019 - Uncomplicated exploratory laparotomy demonstrating leiomyoma of anterior abdominal wall on frozen section. Additional bilateral salpingo-oophorectomy performed.   02/01/2019 - POD # 1 s/p EX Lap/BSO. Patient starting to meet post-op goals.  Sin out, has not voided spontaneously. Nausea with broth, minimal intake. Pain controlled. Has not ambulated.   02/02/2019 - POD # 2 s/p EX Lap/BSO. Patient continues to progress towards post-operative goals.  Awaiting bowel function. Likely stable for discharge once flatus is noted.   02/03/2019 - POD # 3 s/p EX Lap/BSO. Awaiting bowel function, otherwise meeting all post-op goals.     Interval History: Patient continues to do well this AM. Pain controlled. Tolerated advanced diet. Passed void trial and ambulating in halls. Again, belching but no flatus.     Scheduled Meds:   docusate sodium  50 mg Oral Daily    mupirocin  1 g Nasal BID    simethicone  1 tablet Oral TID PC     Continuous Infusions:    PRN Meds:acetaminophen, hydrALAZINE, HYDROmorphone, ondansetron, oxyCODONE, oxyCODONE, prochlorperazine    Review of patient's allergies indicates:  No Known Allergies    Objective:     Vital Signs (Most Recent):  Temp: 97.9 °F (36.6 °C) (02/03/19 0447)  Pulse: 67 (02/03/19 0447)  Resp: 16 (02/03/19 0447)  BP: 129/83 (02/03/19 0447)  SpO2: (!) 94 % (02/03/19 0447) Vital Signs (24h Range):  Temp:  [96.7 °F (35.9 °C)-98.6 °F (37 °C)] 97.9 °F (36.6 °C)  Pulse:  [] 67  Resp:  [16-18] 16  SpO2:  [94 %-98 %] 94 %  BP:  (116-134)/(56-83) 129/83     Weight: 93.4 kg (206 lb)  Body mass index is 35.36 kg/m².    Intake/Output - Last 3 Shifts       02/01 0700 - 02/02 0659 02/02 0700 - 02/03 0659 02/03 0700 - 02/04 0659    P.O.       I.V. (mL/kg)       Total Intake(mL/kg)       Urine (mL/kg/hr) 350 (0.2)      Blood       Total Output 350      Net -350             Urine Occurrence 5 x               Physical Exam:   Constitutional: She is oriented to person, place, and time. She appears well-developed and well-nourished. No distress.    HENT:   Head: Normocephalic and atraumatic.    Eyes: Conjunctivae are normal.    Neck: Neck supple.    Cardiovascular: Normal rate, regular rhythm and intact distal pulses.     Pulmonary/Chest: Effort normal. No respiratory distress.        Abdominal: Soft. Bowel sounds are normal. She exhibits abdominal incision (Infra-umbilical incision with island dressing with small amount of shadowing. No signs of infection or inflammation. ). She exhibits no distension. There is tenderness (Mild. Appropriate for post-op period. ). There is no rebound and no guarding.     Genitourinary:   Genitourinary Comments: No kinney in place.            Musculoskeletal: Normal range of motion and moves all extremeties.       Neurological: She is alert and oriented to person, place, and time.    Skin: Skin is warm and dry. She is not diaphoretic.    Psychiatric: She has a normal mood and affect. Her behavior is normal. Judgment and thought content normal.       Lines/Drains/Airways     Peripheral Intravenous Line                 Peripheral IV - Single Lumen 01/21/19 1044 Right Antecubital 12 days         Peripheral IV - Single Lumen 01/31/19 0800 Left Hand 2 days         Peripheral IV - Single Lumen 01/31/19 0854 Right Hand 2 days                Laboratory:  CBC:   No results for input(s): WBC, HGB, HCT, PLT in the last 48 hours. and All pertinent labs from the last 24 hours have been reviewed.      Assessment/Plan:     * S/P  Exploratory Laparotomy/Mass Resection/BSO    - POD # 3, meeting post-operative goals, awaiting bowel function   - VSS  - Passed Void Trial  - Pain controlled   - AM CBC 11.3/35.3 > Good Urine Output  - Tolerated advanced diet   - D/C'd fluids  - I.S.  - Encourage ambulation  - SCDs       VTE Risk Mitigation (From admission, onward)    None        Disposition: Patient stable for discharge with return of bowel function.     Vicky Morris MD  Gynecologic Oncology  [facility]

## 2019-02-04 ENCOUNTER — TELEPHONE (OUTPATIENT)
Dept: GYNECOLOGIC ONCOLOGY | Facility: CLINIC | Age: 49
End: 2019-02-04

## 2019-02-04 PROCEDURE — 25000003 PHARM REV CODE 250: Performed by: OBSTETRICS & GYNECOLOGY

## 2019-02-04 PROCEDURE — 99024 POSTOP FOLLOW-UP VISIT: CPT | Mod: ,,, | Performed by: OBSTETRICS & GYNECOLOGY

## 2019-02-04 PROCEDURE — 99024 PR POST-OP FOLLOW-UP VISIT: ICD-10-PCS | Mod: ,,, | Performed by: OBSTETRICS & GYNECOLOGY

## 2019-02-04 PROCEDURE — 11000001 HC ACUTE MED/SURG PRIVATE ROOM

## 2019-02-04 PROCEDURE — 25000003 PHARM REV CODE 250: Performed by: STUDENT IN AN ORGANIZED HEALTH CARE EDUCATION/TRAINING PROGRAM

## 2019-02-04 RX ORDER — POLYETHYLENE GLYCOL 3350 17 G/17G
17 POWDER, FOR SOLUTION ORAL DAILY
Status: DISCONTINUED | OUTPATIENT
Start: 2019-02-04 | End: 2019-02-05 | Stop reason: HOSPADM

## 2019-02-04 RX ADMIN — OXYCODONE HYDROCHLORIDE 10 MG: 10 TABLET ORAL at 08:02

## 2019-02-04 RX ADMIN — SIMETHICONE CHEW TAB 80 MG 80 MG: 80 TABLET ORAL at 08:02

## 2019-02-04 RX ADMIN — ACETAMINOPHEN 650 MG: 325 TABLET, FILM COATED ORAL at 10:02

## 2019-02-04 RX ADMIN — MUPIROCIN 1 G: 20 OINTMENT TOPICAL at 08:02

## 2019-02-04 RX ADMIN — SIMETHICONE CHEW TAB 80 MG 80 MG: 80 TABLET ORAL at 02:02

## 2019-02-04 RX ADMIN — OXYCODONE HYDROCHLORIDE 10 MG: 10 TABLET ORAL at 11:02

## 2019-02-04 RX ADMIN — DOCUSATE SODIUM 50 MG: 50 CAPSULE, LIQUID FILLED ORAL at 08:02

## 2019-02-04 RX ADMIN — ACETAMINOPHEN 650 MG: 325 TABLET, FILM COATED ORAL at 04:02

## 2019-02-04 RX ADMIN — OXYCODONE HYDROCHLORIDE 10 MG: 10 TABLET ORAL at 01:02

## 2019-02-04 RX ADMIN — POLYETHYLENE GLYCOL 3350 17 G: 17 POWDER, FOR SOLUTION ORAL at 11:02

## 2019-02-04 NOTE — PLAN OF CARE
POD 4 s/p Xlap/BSO/pelvic mass excision. VSS. Patient is afebrile. Labs stable. Pain controlled. Tolerating diet; no nausea or vomiting. Patient ambulating without difficulty. Discharge pending return of bowel function. No discharge needs identified. CM to continue to follow with the team. CM discussed patient status with the SW.    Shahrzad Torres, RN, BSN, CM  [facility]  Nurse - Med Onc/Gyn Onc  [phone]

## 2019-02-04 NOTE — ASSESSMENT & PLAN NOTE
- POD # 4, meeting post-operative goals, awaiting bowel function, only small episode of watery stool  - VSS  - Passed Void Trial  - Pain controlled   - Tolerating diet  - I.S.  - Encourage ambulation  - SCDs  - Bowel function   - absent flatus, only small amount of watery stool, s/p dulcolax suppository yesterday   - encourage ambulation

## 2019-02-04 NOTE — PLAN OF CARE
Problem: Adult Inpatient Plan of Care  Goal: Plan of Care Review  Outcome: Ongoing (interventions implemented as appropriate)  Pt lying in bed comfortably, AAOx4, VSS, IV intact and sl, no accu, no tele, room air, skin integrity maintained, pt independent with frequent position changes, complaints of pain managed with PRN medication, no BM no passing flatus, fall precautions maintained with no falls noted during shift, bed in low locked position, call light within reach, ID band on, personal items in reach, will continue to monitor and follow plan of care.

## 2019-02-04 NOTE — PROGRESS NOTES
[facility]  Gynecologic Oncology  Progress Note      Patient Name: Renea Mac  MRN: [id number]  Admission Date: 1/31/2019  Hospital Length of Stay: 4 days  Attending Provider: Petrona Porter MD  Primary Care Provider: Magdalena Davila NP  Principal Problem: S/P exploratory laparotomy    Follow-up For: Procedure(s) (LRB):  LAPAROTOMY, EXPLORATORY (N/A)  SALPINGO-OOPHORECTOMY, BILATERAL (Bilateral)  EXCISION, MASS, PELVIS (N/A)  Post-Operative Day: 4 Days Post-Op  Subjective:      History of Present Illness:  Ms. Mac is a 48 yr old female who presents for a scheduled exploratory laparotomy secondary to an identified pelvic mass.  Her work-up is as follows:     Imaging reviewed.     CT A&P 12/7/18  Impression       1. Solid, heterogeneously enhancing mass within the midline lower abdomen that is of uncertain origin but concerning for primary or metastatic neoplasm.  Surgical consultation recommended.  2. Suspected 2.2 cm low-attenuation lesion within the left adnexa.  Recommend correlation with pelvic ultrasound.  3. 0.4 cm left lower lobe solid pulmonary nodule.  For a solid nodule <6 mm, Fleischner Society 2017 guidelines recommend no routine follow up for a low risk patient, or follow-up with non-contrast chest CT at 12 months in a high risk patient.  4. Left renal cyst.  5. Postsurgical changes of sleeve gastrectomy.  6. Fat containing supraumbilical anterior abdominal wall hernia.      Pelvic US 12/14/18  FINDINGS:  Uterus: Patient is status post hysterectomy.  Right ovary: Size: 2.9 x 2.0 x 2.3 cm, 0.9 x 1.0 x 0.9 minimally complex cyst.  Left ovary: Size: 2.9 x 2.1 x 2.6 cm, 1.4 x 1.4 x 0.9 cm minimally complex cyst.  Free Fluid: None.  There is a 5.3 x 4.5 x 4.9 cm heterogeneous mixed minimally cystic and solid mass in the pelvis without obvious attachment to surrounding structures as seen on CT 12/07/2018.     Reports colonoscopy 6 yrs ago normal. Scheduled for c-scope on  1/21/19.      Prior abdominal surgeries include laparoscopic hysterectomy due to leiomyoma 2011 (ovaries remain in situ), laparoscopic gastric sleeve, and c/s x 1 via pfannenstiel.      Family history significant for mother with colon cancer, paternal GM with malignancy of unknown type, Maternal aunt with breast cancer, and maternal aunt with lung cancer.        Colonoscopy 1/21/19 unremarkable. CEA <1.0  14    Hospital Course:  01/31/2019 - Uncomplicated exploratory laparotomy demonstrating leiomyoma of anterior abdominal wall on frozen section. Additional bilateral salpingo-oophorectomy performed.   02/01/2019 - POD # 1 s/p EX Lap/BSO. Patient starting to meet post-op goals.  Sin out, has not voided spontaneously. Nausea with broth, minimal intake. Pain controlled. Has not ambulated.   02/02/2019 - POD # 2 s/p EX Lap/BSO. Patient continues to progress towards post-operative goals.  Awaiting bowel function. Likely stable for discharge once flatus is noted.   02/03/2019 - POD # 3 s/p EX Lap/BSO. Awaiting bowel function, otherwise meeting all post-op goals.     Interval History: Patient is without complaint this morning. Reports bowel movement however it was only a small amount, and watery with only scant flecks of stool. Denies flatus. Otherwise doing well.    Scheduled Meds:   docusate sodium  50 mg Oral Daily    mupirocin  1 g Nasal BID    simethicone  1 tablet Oral TID PC     Continuous Infusions:    PRN Meds:acetaminophen, bisacodyl, hydrALAZINE, HYDROmorphone, ondansetron, oxyCODONE, oxyCODONE, prochlorperazine    Review of patient's allergies indicates:  No Known Allergies    Objective:     Vital Signs (Most Recent):  Temp: 96.8 °F (36 °C) (02/04/19 0534)  Pulse: 72 (02/04/19 0534)  Resp: 18 (02/04/19 0534)  BP: 111/86 (02/04/19 0534)  SpO2: 95 % (02/04/19 0534) Vital Signs (24h Range):  Temp:  [96.7 °F (35.9 °C)-97.4 °F (36.3 °C)] 96.8 °F (36 °C)  Pulse:  [63-72] 72  Resp:  [16-18] 18  SpO2:  [95  %-98 %] 95 %  BP: (111-136)/(74-86) 111/86     Weight: 93.4 kg (206 lb)  Body mass index is 35.36 kg/m².    Intake/Output - Last 3 Shifts     None             Physical Exam:   Constitutional: She is oriented to person, place, and time. She appears well-developed and well-nourished. No distress.    HENT:   Head: Normocephalic and atraumatic.    Eyes: Conjunctivae are normal.    Neck: Neck supple.    Cardiovascular: Normal rate, regular rhythm and intact distal pulses.     Pulmonary/Chest: Effort normal. No respiratory distress.        Abdominal: Soft. Bowel sounds are normal. She exhibits abdominal incision (Isteri strips in place, C\D\I). She exhibits no distension. Tenderness: Mild. Appropriate for post-op period.  There is no rebound and no guarding.             Musculoskeletal: Normal range of motion and moves all extremeties.       Neurological: She is alert and oriented to person, place, and time.    Skin: Skin is warm and dry. She is not diaphoretic.    Psychiatric: She has a normal mood and affect. Her behavior is normal. Judgment and thought content normal.       Lines/Drains/Airways     Peripheral Intravenous Line                 Peripheral IV - Single Lumen 01/21/19 1044 Right Antecubital 13 days         Peripheral IV - Single Lumen 01/31/19 0800 Left Hand 3 days         Peripheral IV - Single Lumen 01/31/19 0854 Right Hand 3 days                Laboratory:  CBC:   No results for input(s): WBC, HGB, HCT, PLT in the last 48 hours. and All pertinent labs from the last 24 hours have been reviewed.      Assessment/Plan:     * S/P Exploratory Laparotomy/Mass Resection/BSO    - POD # 4, meeting post-operative goals, awaiting bowel function, only small episode of watery stool  - VSS  - Passed Void Trial  - Pain controlled   - Tolerating diet  - I.S.  - Encourage ambulation  - SCDs  - Bowel function   - absent flatus, only small amount of watery stool, s/p dulcolax suppository yesterday   - encourage ambulation        VTE Risk Mitigation (From admission, onward)    None            B Scott Valles MD  Gynecologic Oncology  [facility]

## 2019-02-04 NOTE — TELEPHONE ENCOUNTER
----- Message from Breanne Cobb sent at 2/4/2019  1:04 PM CST -----  Contact: Kristel   Name of Who is Calling: Kristel       What is the request in detail: Kristel is requesting a call from staff in regards to the patient's short term disability information. Please contact to further discuss and advise      Can the clinic reply by [facility]: No       What Number to Call Back if not [facility]: [phone] [facility] [id number]

## 2019-02-04 NOTE — SUBJECTIVE & OBJECTIVE
Interval History: Patient is without complaint this morning. Reports bowel movement however it was only a small amount, and watery with only scant flecks of stool. Denies flatus. Otherwise doing well.    Scheduled Meds:   docusate sodium  50 mg Oral Daily    mupirocin  1 g Nasal BID    simethicone  1 tablet Oral TID PC     Continuous Infusions:    PRN Meds:acetaminophen, bisacodyl, hydrALAZINE, HYDROmorphone, ondansetron, oxyCODONE, oxyCODONE, prochlorperazine    Review of patient's allergies indicates:  No Known Allergies    Objective:     Vital Signs (Most Recent):  Temp: 96.8 °F (36 °C) (02/04/19 0534)  Pulse: 72 (02/04/19 0534)  Resp: 18 (02/04/19 0534)  BP: 111/86 (02/04/19 0534)  SpO2: 95 % (02/04/19 0534) Vital Signs (24h Range):  Temp:  [96.7 °F (35.9 °C)-97.4 °F (36.3 °C)] 96.8 °F (36 °C)  Pulse:  [63-72] 72  Resp:  [16-18] 18  SpO2:  [95 %-98 %] 95 %  BP: (111-136)/(74-86) 111/86     Weight: 93.4 kg (206 lb)  Body mass index is 35.36 kg/m².    Intake/Output - Last 3 Shifts     None             Physical Exam:   Constitutional: She is oriented to person, place, and time. She appears well-developed and well-nourished. No distress.    HENT:   Head: Normocephalic and atraumatic.    Eyes: Conjunctivae are normal.    Neck: Neck supple.    Cardiovascular: Normal rate, regular rhythm and intact distal pulses.     Pulmonary/Chest: Effort normal. No respiratory distress.        Abdominal: Soft. Bowel sounds are normal. She exhibits abdominal incision (Isteri strips in place, C\D\I). She exhibits no distension. Tenderness: Mild. Appropriate for post-op period.  There is no rebound and no guarding.             Musculoskeletal: Normal range of motion and moves all extremeties.       Neurological: She is alert and oriented to person, place, and time.    Skin: Skin is warm and dry. She is not diaphoretic.    Psychiatric: She has a normal mood and affect. Her behavior is normal. Judgment and thought content normal.        Lines/Drains/Airways     Peripheral Intravenous Line                 Peripheral IV - Single Lumen 01/21/19 1044 Right Antecubital 13 days         Peripheral IV - Single Lumen 01/31/19 0800 Left Hand 3 days         Peripheral IV - Single Lumen 01/31/19 0854 Right Hand 3 days                Laboratory:  CBC:   No results for input(s): WBC, HGB, HCT, PLT in the last 48 hours. and All pertinent labs from the last 24 hours have been reviewed.

## 2019-02-05 VITALS
HEART RATE: 70 BPM | OXYGEN SATURATION: 96 % | HEIGHT: 64 IN | SYSTOLIC BLOOD PRESSURE: 138 MMHG | WEIGHT: 206 LBS | DIASTOLIC BLOOD PRESSURE: 80 MMHG | RESPIRATION RATE: 18 BRPM | TEMPERATURE: 99 F | BODY MASS INDEX: 35.17 KG/M2

## 2019-02-05 PROBLEM — K56.7 ILEUS: Status: ACTIVE | Noted: 2019-02-05

## 2019-02-05 LAB
ANION GAP SERPL CALC-SCNC: 6 MMOL/L
BASOPHILS # BLD AUTO: 0.02 K/UL
BASOPHILS NFR BLD: 0.2 %
BUN SERPL-MCNC: 8 MG/DL
CALCIUM SERPL-MCNC: 9.8 MG/DL
CHLORIDE SERPL-SCNC: 104 MMOL/L
CO2 SERPL-SCNC: 28 MMOL/L
CREAT SERPL-MCNC: 0.7 MG/DL
DIFFERENTIAL METHOD: ABNORMAL
EOSINOPHIL # BLD AUTO: 0.3 K/UL
EOSINOPHIL NFR BLD: 3.5 %
ERYTHROCYTE [DISTWIDTH] IN BLOOD BY AUTOMATED COUNT: 13.5 %
EST. GFR  (AFRICAN AMERICAN): >60 ML/MIN/1.73 M^2
EST. GFR  (NON AFRICAN AMERICAN): >60 ML/MIN/1.73 M^2
GLUCOSE SERPL-MCNC: 94 MG/DL
HCT VFR BLD AUTO: 41.9 %
HGB BLD-MCNC: 13.4 G/DL
IMM GRANULOCYTES # BLD AUTO: 0.02 K/UL
IMM GRANULOCYTES NFR BLD AUTO: 0.2 %
LYMPHOCYTES # BLD AUTO: 2.8 K/UL
LYMPHOCYTES NFR BLD: 29.8 %
MCH RBC QN AUTO: 26.5 PG
MCHC RBC AUTO-ENTMCNC: 32 G/DL
MCV RBC AUTO: 83 FL
MONOCYTES # BLD AUTO: 0.5 K/UL
MONOCYTES NFR BLD: 5.2 %
NEUTROPHILS # BLD AUTO: 5.8 K/UL
NEUTROPHILS NFR BLD: 61.1 %
NRBC BLD-RTO: 0 /100 WBC
PLATELET # BLD AUTO: 261 K/UL
PMV BLD AUTO: 11.9 FL
POTASSIUM SERPL-SCNC: 3.7 MMOL/L
RBC # BLD AUTO: 5.05 M/UL
SODIUM SERPL-SCNC: 138 MMOL/L
WBC # BLD AUTO: 9.45 K/UL

## 2019-02-05 PROCEDURE — 25000003 PHARM REV CODE 250: Performed by: STUDENT IN AN ORGANIZED HEALTH CARE EDUCATION/TRAINING PROGRAM

## 2019-02-05 PROCEDURE — 85025 COMPLETE CBC W/AUTO DIFF WBC: CPT

## 2019-02-05 PROCEDURE — 36415 COLL VENOUS BLD VENIPUNCTURE: CPT

## 2019-02-05 PROCEDURE — 25000003 PHARM REV CODE 250: Performed by: OBSTETRICS & GYNECOLOGY

## 2019-02-05 PROCEDURE — 63600175 PHARM REV CODE 636 W HCPCS: Performed by: OBSTETRICS & GYNECOLOGY

## 2019-02-05 PROCEDURE — 80048 BASIC METABOLIC PNL TOTAL CA: CPT

## 2019-02-05 PROCEDURE — 99024 PR POST-OP FOLLOW-UP VISIT: ICD-10-PCS | Mod: ,,, | Performed by: OBSTETRICS & GYNECOLOGY

## 2019-02-05 PROCEDURE — 99024 POSTOP FOLLOW-UP VISIT: CPT | Mod: ,,, | Performed by: OBSTETRICS & GYNECOLOGY

## 2019-02-05 RX ADMIN — SIMETHICONE CHEW TAB 80 MG 80 MG: 80 TABLET ORAL at 03:02

## 2019-02-05 RX ADMIN — SIMETHICONE CHEW TAB 80 MG 80 MG: 80 TABLET ORAL at 08:02

## 2019-02-05 RX ADMIN — METHYLNALTREXONE BROMIDE 12 MG: 12 INJECTION, SOLUTION SUBCUTANEOUS at 10:02

## 2019-02-05 RX ADMIN — POLYETHYLENE GLYCOL 3350 17 G: 17 POWDER, FOR SOLUTION ORAL at 08:02

## 2019-02-05 RX ADMIN — BISACODYL 10 MG: 10 SUPPOSITORY RECTAL at 03:02

## 2019-02-05 RX ADMIN — DOCUSATE SODIUM 50 MG: 50 CAPSULE, LIQUID FILLED ORAL at 08:02

## 2019-02-05 RX ADMIN — ONDANSETRON 8 MG: 8 TABLET, ORALLY DISINTEGRATING ORAL at 05:02

## 2019-02-05 RX ADMIN — ACETAMINOPHEN 650 MG: 325 TABLET, FILM COATED ORAL at 03:02

## 2019-02-05 NOTE — ASSESSMENT & PLAN NOTE
- POD # 5, meeting post-operative goals, awaiting bowel function  - VSS  - Passed Void Trial  - Pain controlled   - Tolerating diet  - I.S.  - Encourage ambulation  - SCDs  - Bowel function   - continue to encourage ambulation, awaiting bowel function, tolerating PO

## 2019-02-05 NOTE — PROGRESS NOTES
[facility]  Gynecologic Oncology  Progress Note      Patient Name: Renea Mac  MRN: [id number]  Admission Date: 1/31/2019  Hospital Length of Stay: 5 days  Attending Provider: Petrona Porter MD  Primary Care Provider: Magdalena Davila NP  Principal Problem: S/P exploratory laparotomy    Follow-up For: Procedure(s) (LRB):  LAPAROTOMY, EXPLORATORY (N/A)  SALPINGO-OOPHORECTOMY, BILATERAL (Bilateral)  EXCISION, MASS, PELVIS (N/A)  Post-Operative Day: 5 Days Post-Op  Subjective:      History of Present Illness:  Ms. Mac is a 48 yr old female who presents for a scheduled exploratory laparotomy secondary to an identified pelvic mass.  Her work-up is as follows:     Imaging reviewed.     CT A&P 12/7/18  Impression       1. Solid, heterogeneously enhancing mass within the midline lower abdomen that is of uncertain origin but concerning for primary or metastatic neoplasm.  Surgical consultation recommended.  2. Suspected 2.2 cm low-attenuation lesion within the left adnexa.  Recommend correlation with pelvic ultrasound.  3. 0.4 cm left lower lobe solid pulmonary nodule.  For a solid nodule <6 mm, Fleischner Society 2017 guidelines recommend no routine follow up for a low risk patient, or follow-up with non-contrast chest CT at 12 months in a high risk patient.  4. Left renal cyst.  5. Postsurgical changes of sleeve gastrectomy.  6. Fat containing supraumbilical anterior abdominal wall hernia.      Pelvic US 12/14/18  FINDINGS:  Uterus: Patient is status post hysterectomy.  Right ovary: Size: 2.9 x 2.0 x 2.3 cm, 0.9 x 1.0 x 0.9 minimally complex cyst.  Left ovary: Size: 2.9 x 2.1 x 2.6 cm, 1.4 x 1.4 x 0.9 cm minimally complex cyst.  Free Fluid: None.  There is a 5.3 x 4.5 x 4.9 cm heterogeneous mixed minimally cystic and solid mass in the pelvis without obvious attachment to surrounding structures as seen on CT 12/07/2018.     Reports colonoscopy 6 yrs ago normal. Scheduled for c-scope on  1/21/19.      Prior abdominal surgeries include laparoscopic hysterectomy due to leiomyoma 2011 (ovaries remain in situ), laparoscopic gastric sleeve, and c/s x 1 via pfannenstiel.      Family history significant for mother with colon cancer, paternal GM with malignancy of unknown type, Maternal aunt with breast cancer, and maternal aunt with lung cancer.        Colonoscopy 1/21/19 unremarkable. CEA <1.0  14    Hospital Course:  01/31/2019 - Uncomplicated exploratory laparotomy demonstrating leiomyoma of anterior abdominal wall on frozen section. Additional bilateral salpingo-oophorectomy performed.   02/01/2019 - POD # 1 s/p EX Lap/BSO. Patient starting to meet post-op goals.  Sin out, has not voided spontaneously. Nausea with broth, minimal intake. Pain controlled. Has not ambulated.   02/02/2019 - POD # 2 s/p EX Lap/BSO. Patient continues to progress towards post-operative goals.  Awaiting bowel function. Likely stable for discharge once flatus is noted.   02/03/2019 - POD # 3 s/p EX Lap/BSO. Awaiting bowel function, otherwise meeting all post-op goals.   02/04/2019 - POD#4, only small amount of watery stool, no regular bowel function.      Interval History: Patient is without complaint this morning. Reports no issues with ambulating, still denies flatus, or bowel movements. Maintains that appetite is still present and tolerating PO without nausea or emesis.    Scheduled Meds:   docusate sodium  50 mg Oral Daily    mupirocin  1 g Nasal BID    polyethylene glycol  17 g Oral Daily    simethicone  1 tablet Oral TID PC     Continuous Infusions:    PRN Meds:acetaminophen, bisacodyl, hydrALAZINE, HYDROmorphone, ondansetron, oxyCODONE, oxyCODONE, prochlorperazine    Review of patient's allergies indicates:  No Known Allergies    Objective:     Vital Signs (Most Recent):  Temp: 97.4 °F (36.3 °C) (02/05/19 0509)  Pulse: 73 (02/05/19 0509)  Resp: 18 (02/05/19 0509)  BP: 119/66 (02/05/19 0509)  SpO2: 95 %  (02/05/19 1937) Vital Signs (24h Range):  Temp:  [96.1 °F (35.6 °C)-98.4 °F (36.9 °C)] 97.4 °F (36.3 °C)  Pulse:  [64-87] 73  Resp:  [18] 18  SpO2:  [95 %-97 %] 95 %  BP: (115-148)/(66-92) 119/66     Weight: 93.4 kg (206 lb)  Body mass index is 35.36 kg/m².    Intake/Output - Last 3 Shifts     None             Physical Exam:   Constitutional: She is oriented to person, place, and time. She appears well-developed and well-nourished. No distress.    HENT:   Head: Normocephalic and atraumatic.    Eyes: Conjunctivae are normal.    Neck: Neck supple.    Cardiovascular: Normal rate, regular rhythm and intact distal pulses.     Pulmonary/Chest: Effort normal. No respiratory distress.        Abdominal: Soft. Bowel sounds are normal. She exhibits abdominal incision (Isteri strips in place, C\D\I). She exhibits no distension. Tenderness: Mild. Appropriate for post-op period.  There is no rebound and no guarding.             Musculoskeletal: Normal range of motion and moves all extremeties.       Neurological: She is alert and oriented to person, place, and time.    Skin: Skin is warm and dry. She is not diaphoretic.    Psychiatric: She has a normal mood and affect. Her behavior is normal. Judgment and thought content normal.       Lines/Drains/Airways     Peripheral Intravenous Line                 Peripheral IV - Single Lumen 01/21/19 1044 Right Antecubital 14 days         Peripheral IV - Single Lumen 01/31/19 0800 Left Hand 4 days                Laboratory:  CBC:   No results for input(s): WBC, HGB, HCT, PLT in the last 48 hours. and All pertinent labs from the last 24 hours have been reviewed.      Assessment/Plan:     * S/P Exploratory Laparotomy/Mass Resection/BSO    - POD # 5, meeting post-operative goals, awaiting bowel function  - VSS  - Passed Void Trial  - Pain controlled   - Tolerating diet  - I.S.  - Encourage ambulation  - SCDs  - Bowel function   - continue to encourage ambulation, awaiting bowel function,  tolerating PO       VTE Risk Mitigation (From admission, onward)    None            B Scott Valles MD  Gynecologic Oncology  [facility]

## 2019-02-05 NOTE — NURSING
Discharged to home. Condition stable. IV removed. No redness or swelling noted to site. Verbalizes understanding of discharge insructions

## 2019-02-05 NOTE — DISCHARGE SUMMARY
[facility]  Gynecologic Oncology  Discharge Summary    Patient Name: Renea Mac  MRN: [id number]  Admission Date: 1/31/2019  Hospital Length of Stay: 5 days  Discharge Date and Time:  02/05/2019 3:55 PM  Attending Physician: Petrona Porter MD   Discharging Provider: NEGRO Valles MD  Primary Care Provider: Magdalena Davila NP    HPI:   Ms. Mac is a 48 yr old female who presents for a scheduled exploratory laparotomy secondary to an identified pelvic mass.  Her work-up is as follows:     Imaging reviewed.     CT A&P 12/7/18  Impression       1. Solid, heterogeneously enhancing mass within the midline lower abdomen that is of uncertain origin but concerning for primary or metastatic neoplasm.  Surgical consultation recommended.  2. Suspected 2.2 cm low-attenuation lesion within the left adnexa.  Recommend correlation with pelvic ultrasound.  3. 0.4 cm left lower lobe solid pulmonary nodule.  For a solid nodule <6 mm, Fleischner Society 2017 guidelines recommend no routine follow up for a low risk patient, or follow-up with non-contrast chest CT at 12 months in a high risk patient.  4. Left renal cyst.  5. Postsurgical changes of sleeve gastrectomy.  6. Fat containing supraumbilical anterior abdominal wall hernia.      Pelvic US 12/14/18  FINDINGS:  Uterus: Patient is status post hysterectomy.  Right ovary: Size: 2.9 x 2.0 x 2.3 cm, 0.9 x 1.0 x 0.9 minimally complex cyst.  Left ovary: Size: 2.9 x 2.1 x 2.6 cm, 1.4 x 1.4 x 0.9 cm minimally complex cyst.  Free Fluid: None.  There is a 5.3 x 4.5 x 4.9 cm heterogeneous mixed minimally cystic and solid mass in the pelvis without obvious attachment to surrounding structures as seen on CT 12/07/2018.     Reports colonoscopy 6 yrs ago normal. Scheduled for c-scope on 1/21/19.      Prior abdominal surgeries include laparoscopic hysterectomy due to leiomyoma 2011 (ovaries remain in situ), laparoscopic gastric sleeve, and c/s x 1 via  pfannenstiel.      Family history significant for mother with colon cancer, paternal GM with malignancy of unknown type, Maternal aunt with breast cancer, and maternal aunt with lung cancer.        Colonoscopy 1/21/19 unremarkable. CEA <1.0  14    Hospital Course:  01/31/2019 - Uncomplicated exploratory laparotomy demonstrating leiomyoma of anterior abdominal wall on frozen section. Additional bilateral salpingo-oophorectomy performed.   02/01/2019 - POD # 1 s/p EX Lap/BSO. Patient starting to meet post-op goals.  Sin out, has not voided spontaneously. Nausea with broth, minimal intake. Pain controlled. Has not ambulated.   02/02/2019 - POD # 2 s/p EX Lap/BSO. Patient continues to progress towards post-operative goals.  Awaiting bowel function. Likely stable for discharge once flatus is noted.   02/03/2019 - POD # 3 s/p EX Lap/BSO. Awaiting bowel function, otherwise meeting all post-op goals.   02/04/2019 - POD#4, only small amount of watery stool, no regular bowel function.  2/5/2019 - POD#5, positive flatus x5, patient reports relief of gas pain. Ready for discharge and discharged home.      Procedure(s) (LRB):  LAPAROTOMY, EXPLORATORY (N/A)  SALPINGO-OOPHORECTOMY, BILATERAL (Bilateral)  EXCISION, MASS, PELVIS (N/A)         Pending Diagnostic Studies:     None        Final Active Diagnoses:    Diagnosis Date Noted POA    PRINCIPAL PROBLEM:  S/P Exploratory Laparotomy/Mass Resection/BSO [Z98.890] 01/31/2019 Not Applicable    Pelvic mass [R19.00] 01/31/2019 Yes      Problems Resolved During this Admission:        Does this patient meet criteria for extended DVT prophylaxis? No, because not indicated    Discharged Condition: good    Disposition: Home or Self Care    Follow Up:  Follow-up Information     Petrona Porter MD. Go on 2/18/2019.    Specialty:  Gynecologic Oncology  Why:  Follow-Up Appointment at 9:30 am  Contact information:  [address]  [facility] [address]  [phone]                  Patient Instructions:      No driving until:   Order Comments: No longer requiring Narcotics for pain management. Able to stand on the brake without abdominal pain.     Notify your health care provider if you experience any of the following:   Order Comments: Vaginal Bleeding greater than a pad per hour.     Notify your health care provider if you experience any of the following:  increased confusion or weakness     Notify your health care provider if you experience any of the following:  persistent dizziness, light-headedness, or visual disturbances     Notify your health care provider if you experience any of the following:  worsening rash     Notify your health care provider if you experience any of the following:  severe persistent headache     Notify your health care provider if you experience any of the following:  difficulty breathing or increased cough     Notify your health care provider if you experience any of the following:  redness, tenderness, or signs of infection (pain, swelling, redness, odor or green/yellow discharge around incision site)     Notify your health care provider if you experience any of the following:  severe uncontrolled pain     Notify your health care provider if you experience any of the following:  persistent nausea and vomiting or diarrhea     Notify your health care provider if you experience any of the following:  temperature >100.4     Call MD for:  temperature >100.4     Call MD for:  persistent nausea and vomiting or diarrhea     Call MD for:  severe uncontrolled pain     Call MD for:  redness, tenderness, or signs of infection (pain, swelling, redness, odor or green/yellow discharge around incision site)     Call MD for:  difficulty breathing or increased cough     Call MD for:  severe persistent headache     Call MD for:  increased confusion or weakness     Activity as tolerated     Medications:  Reconciled Home Medications:      Medication List      START taking these  medications    docusate sodium 50 MG capsule  Commonly known as:  COLACE  Take 2 capsules (100 mg total) by mouth 2 (two) times daily.     oxyCODONE-acetaminophen 5-325 mg per tablet  Commonly known as:  PERCOCET  Take 1 tablet by mouth every 4 (four) hours as needed for Pain.        CONTINUE taking these medications    acetaminophen 500 MG tablet  Commonly known as:  TYLENOL  Take 500 mg by mouth every 6 (six) hours as needed for Pain.     cholecalciferol (vitamin D3) 1,000 unit capsule  Commonly known as:  VITAMIN D3  Take 1,000 Units by mouth once daily.     hydroCHLOROthiazide 25 MG tablet  Commonly known as:  HYDRODIURIL  Take ½ (12.5 mg total) by mouth once daily.     methocarbamol 500 MG Tab  Commonly known as:  ROBAXIN  Take 1 tablet 4 times daily as needed for 5 days     polyethylene glycol 17 gram/dose powder  Commonly known as:  GLYCOLAX  Mix 1 capful (17 grams total) with a liquid and take by mouth once daily.     PYRIDOXINE (VITAMIN B6) ORAL  Take by mouth.     VITAMIN B-1 ORAL  Take by mouth.     VITAMIN D-3 ORAL  Take by mouth.            NEGRO Valles MD  Gynecologic Oncology  [facility]

## 2019-02-05 NOTE — SUBJECTIVE & OBJECTIVE
Interval History: Patient is without complaint this morning. Reports no issues with ambulating, still denies flatus, or bowel movements. Maintains that appetite is still present and tolerating PO without nausea or emesis.    Scheduled Meds:   docusate sodium  50 mg Oral Daily    mupirocin  1 g Nasal BID    polyethylene glycol  17 g Oral Daily    simethicone  1 tablet Oral TID PC     Continuous Infusions:    PRN Meds:acetaminophen, bisacodyl, hydrALAZINE, HYDROmorphone, ondansetron, oxyCODONE, oxyCODONE, prochlorperazine    Review of patient's allergies indicates:  No Known Allergies    Objective:     Vital Signs (Most Recent):  Temp: 97.4 °F (36.3 °C) (02/05/19 0509)  Pulse: 73 (02/05/19 0509)  Resp: 18 (02/05/19 0509)  BP: 119/66 (02/05/19 0509)  SpO2: 95 % (02/05/19 0509) Vital Signs (24h Range):  Temp:  [96.1 °F (35.6 °C)-98.4 °F (36.9 °C)] 97.4 °F (36.3 °C)  Pulse:  [64-87] 73  Resp:  [18] 18  SpO2:  [95 %-97 %] 95 %  BP: (115-148)/(66-92) 119/66     Weight: 93.4 kg (206 lb)  Body mass index is 35.36 kg/m².    Intake/Output - Last 3 Shifts     None             Physical Exam:   Constitutional: She is oriented to person, place, and time. She appears well-developed and well-nourished. No distress.    HENT:   Head: Normocephalic and atraumatic.    Eyes: Conjunctivae are normal.    Neck: Neck supple.    Cardiovascular: Normal rate, regular rhythm and intact distal pulses.     Pulmonary/Chest: Effort normal. No respiratory distress.        Abdominal: Soft. Bowel sounds are normal. She exhibits abdominal incision (Isteri strips in place, C\D\I). She exhibits no distension. Tenderness: Mild. Appropriate for post-op period.  There is no rebound and no guarding.             Musculoskeletal: Normal range of motion and moves all extremeties.       Neurological: She is alert and oriented to person, place, and time.    Skin: Skin is warm and dry. She is not diaphoretic.    Psychiatric: She has a normal mood and affect.  Her behavior is normal. Judgment and thought content normal.       Lines/Drains/Airways     Peripheral Intravenous Line                 Peripheral IV - Single Lumen 01/21/19 1044 Right Antecubital 14 days         Peripheral IV - Single Lumen 01/31/19 0800 Left Hand 4 days                Laboratory:  CBC:   No results for input(s): WBC, HGB, HCT, PLT in the last 48 hours. and All pertinent labs from the last 24 hours have been reviewed.

## 2019-02-06 NOTE — PLAN OF CARE
POD#5 s/p exlap/BSO. VSS. Labs stable. Plans for patient to discharge home today. No discharge needs identified. Discharge and follow-up instructions to be completed by the bedside nurse.    Future Appointments   Date Time Provider Department Center   2/8/2019  9:00 AM Brynn Lopez PA-C [facility] GASTRO [place]   2/18/2019  9:30 AM Petrona Porter MD [facility] GYN ONC [place] [facility]        02/05/19 1600   Final Note   Assessment Type Final Discharge Note   Anticipated Discharge Disposition Home   What phone number can be called within the next 1-3 days to see how you are doing after discharge? ([phone])   Hospital Follow Up  Appt(s) scheduled? Yes   Discharge plans and expectations educations in teach back method with documentation complete? Yes  (per staff)

## 2019-02-14 ENCOUNTER — TELEPHONE (OUTPATIENT)
Dept: GYNECOLOGIC ONCOLOGY | Facility: CLINIC | Age: 49
End: 2019-02-14

## 2019-02-15 ENCOUNTER — TELEPHONE (OUTPATIENT)
Dept: GYNECOLOGIC ONCOLOGY | Facility: CLINIC | Age: 49
End: 2019-02-15

## 2019-02-15 NOTE — TELEPHONE ENCOUNTER
Called to check on patient after surgery and review benign pathology. No answer. Left voicemail. She has a post op appointment with me next week.

## 2019-02-18 ENCOUNTER — OFFICE VISIT (OUTPATIENT)
Dept: GASTROENTEROLOGY | Facility: CLINIC | Age: 49
End: 2019-02-18
Payer: COMMERCIAL

## 2019-02-18 ENCOUNTER — OFFICE VISIT (OUTPATIENT)
Dept: GYNECOLOGIC ONCOLOGY | Facility: CLINIC | Age: 49
End: 2019-02-18
Payer: COMMERCIAL

## 2019-02-18 VITALS
HEIGHT: 64 IN | DIASTOLIC BLOOD PRESSURE: 84 MMHG | SYSTOLIC BLOOD PRESSURE: 125 MMHG | WEIGHT: 203.25 LBS | BODY MASS INDEX: 34.7 KG/M2 | HEART RATE: 68 BPM

## 2019-02-18 VITALS
DIASTOLIC BLOOD PRESSURE: 76 MMHG | HEART RATE: 79 BPM | WEIGHT: 204.56 LBS | HEIGHT: 64 IN | BODY MASS INDEX: 34.92 KG/M2 | SYSTOLIC BLOOD PRESSURE: 120 MMHG

## 2019-02-18 DIAGNOSIS — Z98.890 S/P EXPLORATORY LAPAROTOMY: Primary | ICD-10-CM

## 2019-02-18 DIAGNOSIS — K59.04 CHRONIC IDIOPATHIC CONSTIPATION: Primary | ICD-10-CM

## 2019-02-18 PROCEDURE — 3008F BODY MASS INDEX DOCD: CPT | Mod: CPTII,S$GLB,, | Performed by: PHYSICIAN ASSISTANT

## 2019-02-18 PROCEDURE — 99024 POSTOP FOLLOW-UP VISIT: CPT | Mod: S$GLB,,, | Performed by: OBSTETRICS & GYNECOLOGY

## 2019-02-18 PROCEDURE — 99213 PR OFFICE/OUTPT VISIT, EST, LEVL III, 20-29 MIN: ICD-10-PCS | Mod: S$GLB,,, | Performed by: PHYSICIAN ASSISTANT

## 2019-02-18 PROCEDURE — 3078F PR MOST RECENT DIASTOLIC BLOOD PRESSURE < 80 MM HG: ICD-10-PCS | Mod: CPTII,S$GLB,, | Performed by: PHYSICIAN ASSISTANT

## 2019-02-18 PROCEDURE — 99999 PR PBB SHADOW E&M-EST. PATIENT-LVL IV: CPT | Mod: PBBFAC,,, | Performed by: PHYSICIAN ASSISTANT

## 2019-02-18 PROCEDURE — 99024 PR POST-OP FOLLOW-UP VISIT: ICD-10-PCS | Mod: S$GLB,,, | Performed by: OBSTETRICS & GYNECOLOGY

## 2019-02-18 PROCEDURE — 99999 PR PBB SHADOW E&M-EST. PATIENT-LVL IV: ICD-10-PCS | Mod: PBBFAC,,, | Performed by: PHYSICIAN ASSISTANT

## 2019-02-18 PROCEDURE — 3074F PR MOST RECENT SYSTOLIC BLOOD PRESSURE < 130 MM HG: ICD-10-PCS | Mod: CPTII,S$GLB,, | Performed by: PHYSICIAN ASSISTANT

## 2019-02-18 PROCEDURE — 3008F PR BODY MASS INDEX (BMI) DOCUMENTED: ICD-10-PCS | Mod: CPTII,S$GLB,, | Performed by: PHYSICIAN ASSISTANT

## 2019-02-18 PROCEDURE — 3074F SYST BP LT 130 MM HG: CPT | Mod: CPTII,S$GLB,, | Performed by: PHYSICIAN ASSISTANT

## 2019-02-18 PROCEDURE — 99999 PR PBB SHADOW E&M-EST. PATIENT-LVL III: ICD-10-PCS | Mod: PBBFAC,,, | Performed by: OBSTETRICS & GYNECOLOGY

## 2019-02-18 PROCEDURE — 99213 OFFICE O/P EST LOW 20 MIN: CPT | Mod: S$GLB,,, | Performed by: PHYSICIAN ASSISTANT

## 2019-02-18 PROCEDURE — 3078F DIAST BP <80 MM HG: CPT | Mod: CPTII,S$GLB,, | Performed by: PHYSICIAN ASSISTANT

## 2019-02-18 PROCEDURE — 99999 PR PBB SHADOW E&M-EST. PATIENT-LVL III: CPT | Mod: PBBFAC,,, | Performed by: OBSTETRICS & GYNECOLOGY

## 2019-02-18 NOTE — PROGRESS NOTES
Subjective:      Patient ID: Renea Mac is a 48 y.o. female.    Chief Complaint: Post-op Evaluation (2 wk post op/EX-Lap/BSO/Pelvic Mass Excision)      HPI   S/p xlap/anterior abdominal wall mass resection/BSO. Uncomplicated post op course.   Final pathology reviewed and benign leiomyoma and bilateral rubes/ovaries.     Presents today for post operative visit. Recovering appropriately from surgery. Up and about, eating, +BM.    History:  Referred for pelvic mass.      She reported an episode of diarrhea and then bright red blood from rectum for approximately 3 days. No current bleeding. And she is asymptomatic at this time.   Imagine reviewed.   CT A&P 12/7/18  Impression       1. Solid, heterogeneously enhancing mass within the midline lower abdomen that is of uncertain origin but concerning for primary or metastatic neoplasm.  Surgical consultation recommended.  2. Suspected 2.2 cm low-attenuation lesion within the left adnexa.  Recommend correlation with pelvic ultrasound.  3. 0.4 cm left lower lobe solid pulmonary nodule.  For a solid nodule <6 mm, Fleischner Society 2017 guidelines recommend no routine follow up for a low risk patient, or follow-up with non-contrast chest CT at 12 months in a high risk patient.  4. Left renal cyst.  5. Postsurgical changes of sleeve gastrectomy.  6. Fat containing supraumbilical anterior abdominal wall hernia.       Pelvic US 12/14/18  FINDINGS:  Uterus: Patient is status post hysterectomy.  Right ovary: Size: 2.9 x 2.0 x 2.3 cm, 0.9 x 1.0 x 0.9 minimally complex cyst.  Left ovary: Size: 2.9 x 2.1 x 2.6 cm, 1.4 x 1.4 x 0.9 cm minimally complex cyst.  Free Fluid: None.  There is a 5.3 x 4.5 x 4.9 cm heterogeneous mixed minimally cystic and solid mass in the pelvis without obvious attachment to surrounding structures as seen on CT 12/07/2018.     Reports colonoscopy 6 yrs ago normal. Scheduled for c-scope on 1/21/19.      Prior abdominal surgeries include laparoscopic  hysterectomy due to leiomyoma 2011 (ovaries remain in situ), laparoscopic gastric sleeve, and c/s x 1 via pfannenstiel.      Family history significant for mother with colon cancer, paternal GM with malignancy of unknown type, Maternal aunt with breast cancer, and maternal aunt with lung cancer.        Colonoscopy 1/21/19 unremarkable. CEA <1.0  14     Review of Systems   Constitutional: Negative for appetite change, chills, fatigue and fever.   HENT: Negative for mouth sores.    Respiratory: Negative for cough and shortness of breath.    Cardiovascular: Negative for leg swelling.   Gastrointestinal: Negative for abdominal pain, blood in stool, constipation and diarrhea.   Endocrine: Negative for cold intolerance.   Genitourinary: Negative for dysuria and vaginal bleeding.   Musculoskeletal: Negative for myalgias.   Skin: Negative for rash.   Allergic/Immunologic: Negative.    Neurological: Negative for weakness and numbness.   Hematological: Negative for adenopathy. Does not bruise/bleed easily.   Psychiatric/Behavioral: Negative for confusion.       Objective:   Physical Exam:   Constitutional: She is oriented to person, place, and time. She appears well-developed and well-nourished.    HENT:   Head: Normocephalic and atraumatic.    Eyes: EOM are normal. Pupils are equal, round, and reactive to light.    Neck: Normal range of motion. Neck supple. No thyromegaly present.    Cardiovascular: Normal rate, regular rhythm and intact distal pulses.     Pulmonary/Chest: Effort normal and breath sounds normal. No respiratory distress. She has no wheezes.        Abdominal: Soft. Bowel sounds are normal. She exhibits abdominal incision. She exhibits no distension, no ascites and no mass. There is no tenderness.             Musculoskeletal: Normal range of motion and moves all extremeties.      Lymphadenopathy:     She has no cervical adenopathy.        Right: No supraclavicular adenopathy present.        Left: No  supraclavicular adenopathy present.    Neurological: She is alert and oriented to person, place, and time.    Skin: Skin is warm and dry. No rash noted.    Psychiatric: She has a normal mood and affect.       Assessment:     1. S/P Exploratory Laparotomy/Mass Resection/BSO        Plan:   No orders of the defined types were placed in this encounter.    Recovering well post op.   Benign pathology.   RTC 4 weeks for follow up post op visit.

## 2019-02-18 NOTE — PHYSICIAN QUERY
PT Name: Renea Mac  MR #: [id number]    Physician Query Form - Pathology Findings Clarification     CDS/: KAT Jacques,RNC-[email or web address]          Contact information:[email or web address]  This form is a permanent document in the medical record.     Query Date: February 18, 2019      By submitting this query, we are merely seeking further clarification of documentation.  Please utilize your independent clinical judgment when addressing the question(s) below.      The medical record contains the following:     Findings Supporting Clinical Information Location in Medical Record   FINAL PATHOLOGIC DIAGNOSIS  1. Soft tissue, abdominal wall, mass, resection:  - Benign leiomyoma, 6.5 cm in greatest dimension, see comment.  2. Ovary and fallopian tube, left, left salpingo-oophorectomy:  - Benign ovary with cystic corpus luteum, 2.7 cm in greatest dimension.  - Benign fallopian tube with no significant histologic abnormality.  3. Fallopian tube and ovary, right, right salpingo-oophorectomy:  - Benign ovary with no significant histologic abnormality.  - Benign fallopian tube with no significant histologic abnormality.                                     POSTOPERATIVE DIAGNOSES:  1. Pelvic mass  2. Prior hysterectomy for uterine leiomyoma     PROCEDURES PERFORMED: Exploratory laparotomy, pelvic mass resection, bilateral salpingo-oophorectomy    FINDINGS:   Evidence of prior hysterectomy. Normal right and left fallopian tubes and ovaries. 6cm smooth, well-circumscribed mass attached to the anterior abdominal wall peritoneum. Frozen section benign appearing leiomyoma. Normal appendix. No ascites, no evidence of intraperitoneal disease.  Pathology report 1/31                                  Op note 1/31     Please document the clinical significance of the Pathologists findings of   1. Soft tissue, abdominal wall, mass, resection:  - Benign leiomyoma, 6.5 cm in greatest dimension, see comment.  2. Ovary and  fallopian tube, left, left salpingo-oophorectomy:  - Benign ovary with cystic corpus luteum, 2.7 cm in greatest dimension.  - Benign fallopian tube with no significant histologic abnormality.  3. Fallopian tube and ovary, right, right salpingo-oophorectomy:  - Benign ovary with no significant histologic abnormality.  - Benign fallopian tube with no significant histologic abnormality..    [  X ] I agree with the Pathology Findings   [   ] I do not agree with the Pathology Findings   [   ] Other/Clarification of Findings:   [   ] Clinically Insignificant   [  ] Clinically Undetermined       Please document in your progress notes daily for the duration of treatment until resolved and include in your discharge summary.

## 2019-02-18 NOTE — LETTER
February 19, 2019      Magdalena Davila, NP  [address]  [facility] [address]           [facility]  [address]  [facility] [address]  Phone: [phone]  Fax: [phone]          Patient: Renea Mac   MR Number: [id number]   YOB: 1970   Date of Visit: 2/18/2019       Dear Magdalena Davila:    Thank you for referring Renea Mac to me for evaluation. Attached you will find relevant portions of my assessment and plan of care.    If you have questions, please do not hesitate to call me. I look forward to following Renea Mac along with you.    Sincerely,    Brynn Lopez PA-C    Enclosure  CC:  No Recipients    If you would like to receive this communication electronically, please contact [email or web address] or [phone] to request more information on [facility] [facility] [facility] access.    For providers and/or their staff who would like to refer a patient to [facility], please contact us through our one-stop-shop provider referral line, [facility], at [phone].    If you feel you have received this communication in error or would no longer like to receive these types of communications, please e-mail [email or web address]

## 2019-02-18 NOTE — PROGRESS NOTES
"    [facility] Consultation Note    Reason for Consult:  The encounter diagnosis was Chronic idiopathic constipation.    PCP:   Magdalena Gong   [address] / [place] [address]    Referring MD:  Magdalena Gong, Np  [address] [place] [address]    HPI:  This is a 48 y.o. female here to follow up on her abdominal pain  Since her last visit she had CT scan that revealed a mass in the abdomen  She underwent explore lap with removal of mass and b/l oophrectomy. Biopsies were benign    Was in the hospital 1/13/19 x 5 days  Was on pain medicine and experience intestinal ileus and constipation  Her bowels moved well after discharge / multiple laxative in the hospital  Now back to her typical constipation  She does report relief with miralax and colase  No further blood in the stool  Still having post-op soreness    12/2018 visit notes  7 days ago developed abdominal cramping, diarrhea, saw BRB and mucus with wiping, lasted few days   Diarrhea resolved 4 days ago after taking imodium   Felt like "hot bubbles in her stomach"   Seen in urgent care had LLQ tenderness, Dx with cipro  Eats plenty of nuts  Today still having gas and pressure in the rectum  No BM for 5 days    Last colonoscopy at 41yo for mom hx of rectal cancer    S/p gastric sleeve  Denies GERD    Hernandez screening- Family Hx of:  Colon cancer-mom  Uterine cancer-no  Small bowel cancer-no  Ureter cancer-no  Renal pelvis cancer-no    ROS:  Constitutional: No fevers, chills, No weight loss  ENT: No allergies  CV: No chest pain  Pulm: No cough, No shortness of breath  Ophtho: No vision changes  GI: see HPI  Derm: No rash  Heme: No lymphadenopathy, No bruising  MSK: No arthritis  : No dysuria, No hematuria  Endo: No hot or cold intolerance  Neuro: No syncope, No seizure  Psych: No anxiety, No depression    Medical History:  has a past medical history of Acute pain of right knee (11/4/2016), Chronic right " shoulder pain (2016), Essential hypertension (2016), GERD (gastroesophageal reflux disease), Hypokalemia (2017), Incomplete tear of right rotator cuff (2016), Osteoarthritis of right knee (2016), Rotator cuff tear, S/P laparoscopic sleeve gastrectomy (2016), Syncope and collapse (2017), and Tail bone pain (2016).    Surgical History:  has a past surgical history that includes Tubal ligation; Hernia repair; Sleeve Gastroplasty (2014); Hysterectomy ();  section; Colposcopy; Colonoscopy (N/A, 2019); LAPAROTOMY, EXPLORATORY (N/A, 2019); Bilateral salpingo-oophorectomy (BSO) (Bilateral, 2019); and Excision of pelvic mass (N/A, 2019).    Family History: family history includes Cancer in her brother; Colon cancer in her mother; Heart attack in her father and mother; Heart disease in her father and mother; Hypertension in her brother, father, mother, and sister; No Known Problems in her daughter and son; Rectal cancer in her mother; Stroke in her father..     Social History:  reports that  has never smoked. she has never used smokeless tobacco. She reports that she drinks alcohol. She reports that she does not use drugs.    Review of patient's allergies indicates:  No Known Allergies    Current Outpatient Medications on File Prior to Visit   Medication Sig Dispense Refill    acetaminophen (TYLENOL) 500 MG tablet Take 500 mg by mouth every 6 (six) hours as needed for Pain.      calcium carbonate/vitamin D3 (VITAMIN D-3 ORAL) Take by mouth.      cholecalciferol, vitamin D3, (VITAMIN D3) 1,000 unit capsule Take 1,000 Units by mouth once daily.      docusate sodium (COLACE) 50 MG capsule Take 2 capsules (100 mg total) by mouth 2 (two) times daily. (Patient taking differently: Take 100 mg by mouth as needed. ) 60 capsule 0    hydroCHLOROthiazide (HYDRODIURIL) 25 MG tablet Take ½ (12.5 mg total) by mouth once daily. 15 tablet 0    methocarbamol  "(ROBAXIN) 500 MG Tab Take 1 tablet 4 times daily as needed for 5 days 20 tablet 0    polyethylene glycol (MIRALAX) 17 gram/dose powder Mix 1 capful (17 grams total) with a liquid and take by mouth once daily. 527 g 11    pyridoxine HCl, vitamin B6, (PYRIDOXINE, VITAMIN B6, ORAL) Take by mouth.      thiamine HCl (VITAMIN B-1 ORAL) Take by mouth.       No current facility-administered medications on file prior to visit.          Objective Findings:    Vital Signs:  /76   Pulse 79   Ht 5' 4" (1.626 m)   Wt 92.8 kg (204 lb 9.4 oz)   BMI 35.12 kg/m²   Body mass index is 35.12 kg/m².    Physical Exam:  General Appearance: Well appearing in no acute distress  Head:   Normocephalic, without obvious abnormality  Eyes:    No scleral icterus  ENT: Neck supple, Lips, mucosa, and tongue normal  Lungs: CTA bilaterally in anterior and posterior fields, no wheezes, no crackles.  Heart:  Regular rate and rhythm, S1, S2 normal, no murmurs heard  Abdomen: Soft, lower abdominal diffuse tenderness post-op, non distended with positive bowel sounds in all four quadrants.   Extremities: no edema  Skin: No rash  Neurologic: AAO x 3      Labs:  Lab Results   Component Value Date    WBC 9.45 02/05/2019    HGB 13.4 02/05/2019    HCT 41.9 02/05/2019     02/05/2019    CHOL 153 07/11/2017    TRIG 40 07/11/2017    HDL 64 07/11/2017    ALT 12 01/24/2019    AST 15 01/24/2019     02/05/2019    K 3.7 02/05/2019     02/05/2019    CREATININE 0.7 02/05/2019    BUN 8 02/05/2019    CO2 28 02/05/2019    TSH 3.573 06/13/2017       Imaging:    Endoscopy:    Colon 6yrs ago- normal  Assessment:  1. Chronic idiopathic constipation      47yo F with constipation. Doing well on miralax    Recommendations:  1. Ok to titrate miralax as needed  2. High fiber diet  Follow-up if symptoms worsen or fail to improve.       Order summary:         Thank you so much for allowing me to participate in the care of Renea BERTRAND Estefania Jolleynuno LING" SHEREE Lopez

## 2019-02-18 NOTE — PATIENT INSTRUCTIONS
Ok to titrate the miralax. Ok to take miralax daily or every or half a dose    64 oz of water daily    HIGH FIBER KEY POINTS:  1. Goal of 20-25 grams of fiber each day for women, 30-35 grams each day for men. Slowly build up to this goal as you may experience gas and bloating at first.  2. Take a fiber supplement to help reach this goal: Metamucil, Citrucel, Fibercon, Konsyl, or Psyllium  3. For Constipation: Finely ground psyllium husk (with or without flavoring or                                              Additives)   - To start: 1 teaspoon once a day in the morning with 8 oz of liquid    followed by an additional 8 ounce glass of water   - After a week: add a second teaspoon in the middle of the day   - After two weeks: add a third teaspoon at bedtime   - Follow each dose with another glass of water. Can add a splash of juice   or lemonade for taste.  3. Drink 6-8 glasses of water a day.  4. Try to do plant fiber (fruits and vegetables) over processed fibers (cereals and breads)     HIGH FIBER DIET  Fiber is present in all fruits, vegetables, cereals and grains. Fiber passes through the body undigested. A high fiber diet helps food move through the intestinal tract. The added bulk is helpful in preventing constipation. In persons with diverticulosis it serves to clean out the pouches along the colon wall while preventing new ones from forming. A high fiber diet may reduce the risk of colon cancer, decreases blood cholesterol and prevents high blood sugar in diabetics.    The foods listed below are high in fiber and should be included in your diet. If you are not used to high fiber foods, start with 1 or 2 foods from this list. Every 3-4 days add a new one to your diet until you are eating 4 high fiber foods per day. This should give you 20-35 Gm of fiber/day. It is also important to drink a lot of water when you are on this diet (6-8 glasses a day). Water causes the fiber to swell and increases the  benefit.  Add Fiber to Your Diet   Adding fiber to your diet can help relieve constipation by making stools softer and easier to pass. To increase your fiber intake, your doctor may recommend a bulking agent, such as psyllium. This is a high-fiber supplement available at most grocery and drugstores. Eating more fiber-rich foods will also help. There are two types of fiber:   Insoluble fiber is the main ingredient in bulking agents. Its also found in foods such as wheat bran, whole-grain breads, fresh fruits, and vegetables.   Soluble fiber is found in foods such as oat bran. Although soluble fiber is good for you, it may not ease constipation as much as foods high in insoluble fiber.    FOODS HIGH IN DIETARY FIBER:  BREADS: Made with 100% whole wheat flour; Moise, wheat or rye crackers; tortillas, bran muffins. Whole grains, such as wheat bran, corn bran, and brown rice.  CEREALS: Whole grain cereal with bran (Chex, Raisin Bran, Corn Bran), oatmeal, rolled oats, granola, wheat flakes, brown rice  NUTS: Any nuts  FRUITS: All fresh fruits along with edible skins, (bananas, citrus fruit, mangoes, pears, prunes, raisins, apples, pineapple, apricot, melon, jams and marmalades), fruit juices (especially prune juice)  VEGETABLES: All types, preferably raw or lightly cooked: especially, celery, eggplant, potatoes,spinach, broccoli, brussel sprouts, winter squash, carrots, cauliflower, soybeans, lentils, fresh and dried beans of all kinds  OTHER: Popcorn, any spices.   Nuts and legumes, especially peanuts, lentils, and kidney beans.  Easy Ways to Add Fiber   The tips below offer some simple ways to add more high-fiber foods to your meals.   Start your day with a high-fiber breakfast. Eat a wheat bran cereal along with a sliced banana. Or, try peanut butter on whole-wheat toast.   Eat carrot sticks for snacks. Theyre easy to prepare, taste great, and are low in calories.   Use whole-grain breads instead of white bread for  sandwiches.   Eat fruits for treats. Try an apple and some raisins instead of a candy bar.  Vegetables  Artichoke, cooked 10.3  Asparagus - 2.8  Beans - 2  Broccoli, boiled 1 cup - 5.1  [place] sprouts, cooked 1 cup - 4.1  Carrots - boiled 2.5, raw 4  Celery - 1  Corn - 4  Corn on the Cob - 5.9  Lettuce - 1  Peas (canned) - 4  Peas (dried) - 7.9  Green peas (cooked) - 8.8  Potato, with skin, baked - 3.0  Spinach - 4  Sweet potato - 3.4  Turnip greens, boiled 1 cup - 5.0  Sweet corn, cooked  1 cup  4.0  Tomato paste -1/4 cup -2.7  Yam - 2.7  Legumes, Nuts, and Seeds  Split peas, cooked  1 cup  16.3  Lentils, cooked 1 cup 15.6  Black beans, cooked 1 cup 15.0  Black eyed peas (1/2 cup) 4.2  Chick peas (1/2 cup) 5  Lima beans, cooked 1 cup  13.2  Baked beans, vegetarian, canned, cooked 1 cup 10.4  Sunflower seed kernels 1/4 cup 3.9  Almonds 1 ounce (23 nuts)  3.5  Pistachio nuts 1 ounce (49 nuts) 2.9  Pecans 1 ounce (19 halves) 2.7  Beans (lima,kidney,baked) - 10 (1/2 cup)  Refried beans -12 (1cup)  Lentils - 8  Soybeans (1/2 cup) 5  Fruit  Raspberries  1 cup  8.0  Pear, with skin - 5.5  Apple, with skin 4.4 (Juice = 0)  Banana - 3.1  Orange - 3.1  Strawberries (halves) 1 cup - 3.0  Figs, dried 2 medium - 1.6  Raisins 1 ounce (60 raisins) -1.0  Grapefruit - 1.4  Peach - 2  Kiwi - 5  [place] - 3.7  Pineapple - 2  Cereal, Grains, and Pasta  Spaghetti, whole-wheat, cooked 1 cup 6.3  Barley, pearled, cooked 1 cup 6.0  Bran flakes 3/4 cup 5.3  Oat bran muffin 1 medium 5.2  Oatmeal, instant, cooked 1 cup 4.0  Popcorn, air-popped 3 cups 3.5  Brown rice, cooked  1 cup  3.5  Bread, rye 1 slice 1.9, pumpernickel - 2.1  Bagel - 1.6  Bread, whole-wheat or multigrain  1 slice 1.9  Fiber One - 14  100% Bran - 13  Raisin Bran - 3.5  All Bran Extra Fiber - 13

## 2019-02-19 PROBLEM — K56.7 ILEUS: Status: RESOLVED | Noted: 2019-02-05 | Resolved: 2019-02-19

## 2019-03-13 ENCOUNTER — OFFICE VISIT (OUTPATIENT)
Dept: INTERNAL MEDICINE | Facility: CLINIC | Age: 49
End: 2019-03-13
Payer: COMMERCIAL

## 2019-03-13 ENCOUNTER — HOSPITAL ENCOUNTER (OUTPATIENT)
Dept: RADIOLOGY | Facility: HOSPITAL | Age: 49
Discharge: HOME | End: 2019-03-13
Attending: NURSE PRACTITIONER
Payer: COMMERCIAL

## 2019-03-13 VITALS
HEIGHT: 64 IN | WEIGHT: 206.38 LBS | SYSTOLIC BLOOD PRESSURE: 126 MMHG | DIASTOLIC BLOOD PRESSURE: 84 MMHG | BODY MASS INDEX: 35.23 KG/M2 | OXYGEN SATURATION: 98 % | HEART RATE: 67 BPM

## 2019-03-13 DIAGNOSIS — M79.18 MUSCLE ACHE OF EXTREMITY: ICD-10-CM

## 2019-03-13 DIAGNOSIS — I10 ESSENTIAL HYPERTENSION: ICD-10-CM

## 2019-03-13 DIAGNOSIS — R19.00 ABDOMINAL MASS, UNSPECIFIED ABDOMINAL LOCATION: ICD-10-CM

## 2019-03-13 DIAGNOSIS — Z98.890 S/P GASTRIC SURGERY: ICD-10-CM

## 2019-03-13 DIAGNOSIS — Z12.31 SCREENING MAMMOGRAM, ENCOUNTER FOR: ICD-10-CM

## 2019-03-13 DIAGNOSIS — Z98.890 S/P EXPLORATORY LAPAROTOMY: ICD-10-CM

## 2019-03-13 DIAGNOSIS — Z00.00 ANNUAL PHYSICAL EXAM: Primary | ICD-10-CM

## 2019-03-13 DIAGNOSIS — Z98.84 S/P LAPAROSCOPIC SLEEVE GASTRECTOMY: ICD-10-CM

## 2019-03-13 PROCEDURE — 3074F PR MOST RECENT SYSTOLIC BLOOD PRESSURE < 130 MM HG: ICD-10-PCS | Mod: CPTII,S$GLB,, | Performed by: NURSE PRACTITIONER

## 2019-03-13 PROCEDURE — 99396 PREV VISIT EST AGE 40-64: CPT | Mod: S$GLB,,, | Performed by: NURSE PRACTITIONER

## 2019-03-13 PROCEDURE — 3074F SYST BP LT 130 MM HG: CPT | Mod: CPTII,S$GLB,, | Performed by: NURSE PRACTITIONER

## 2019-03-13 PROCEDURE — 77063 BREAST TOMOSYNTHESIS BI: CPT | Mod: 26,,, | Performed by: RADIOLOGY

## 2019-03-13 PROCEDURE — 77067 SCR MAMMO BI INCL CAD: CPT | Mod: 26,,, | Performed by: RADIOLOGY

## 2019-03-13 PROCEDURE — 3079F PR MOST RECENT DIASTOLIC BLOOD PRESSURE 80-89 MM HG: ICD-10-PCS | Mod: CPTII,S$GLB,, | Performed by: NURSE PRACTITIONER

## 2019-03-13 PROCEDURE — 99396 PR PREVENTIVE VISIT,EST,40-64: ICD-10-PCS | Mod: S$GLB,,, | Performed by: NURSE PRACTITIONER

## 2019-03-13 PROCEDURE — 77067 SCR MAMMO BI INCL CAD: CPT | Mod: TC

## 2019-03-13 PROCEDURE — 99999 PR PBB SHADOW E&M-EST. PATIENT-LVL IV: ICD-10-PCS | Mod: PBBFAC,,, | Performed by: NURSE PRACTITIONER

## 2019-03-13 PROCEDURE — 99999 PR PBB SHADOW E&M-EST. PATIENT-LVL IV: CPT | Mod: PBBFAC,,, | Performed by: NURSE PRACTITIONER

## 2019-03-13 PROCEDURE — 77063 MAMMO DIGITAL SCREENING BILAT WITH TOMOSYNTHESIS_CAD: ICD-10-PCS | Mod: 26,,, | Performed by: RADIOLOGY

## 2019-03-13 PROCEDURE — 77067 MAMMO DIGITAL SCREENING BILAT WITH TOMOSYNTHESIS_CAD: ICD-10-PCS | Mod: 26,,, | Performed by: RADIOLOGY

## 2019-03-13 PROCEDURE — 3079F DIAST BP 80-89 MM HG: CPT | Mod: CPTII,S$GLB,, | Performed by: NURSE PRACTITIONER

## 2019-03-13 NOTE — PROGRESS NOTES
Internal Medicine Annual Exam       CHIEF COMPLAINT     The patient, Renea Mac, who is a 48 y.o. female presents for an annual exam.    HPI   Here for annual physical.     In December 2018 - had abd pain and rectal bleeding. Ct scan of the abd pelvis found 1. Solid, heterogeneously enhancing mass within the midline lower abdomen that is of uncertain origin but concerning for primary or metastatic neoplasm.  Surgical consultation recommended.  2. Suspected 2.2 cm low-attenuation lesion within the left adnexa.  Recommend correlation with pelvic ultrasound.  3. 0.4 cm left lower lobe solid pulmonary nodule.  For a solid nodule <6 mm, Fleischner Society 2017 guidelines recommend no routine follow up for a low risk patient, or follow-up with non-contrast chest CT at 12 months in a high risk patient.  4. Left renal cyst.  5. Postsurgical changes of sleeve gastrectomy.  6. Fat containing supraumbilical anterior abdominal wall hernia.    S/p exploratory lap 1/31/2019 with removal of mass and b/l oophorectomy. Bx were benign. Complicated by intestinal ileus.   Was treating with colace and miralax daily- with diarrhea. Stopped miralx. Uses colace as needed     Colonscopy-  1/21/2019 normal repeat 10 years     HTN- has not been taking HCTZ. Did not take in hospital and was not restarted. Reports occasional headaches. No lower ext edema, no     S/p gastric sleeve- 2014 in [place]. Seen by bariatric medicine in past for follow up. No longer taking supplements.     HM-   mmg- 7/2017 needs repeat   Flu- 8/1/2018  Tdap- doesn't recall       Past Medical History:  Past Medical History:   Diagnosis Date    Acute pain of right knee 11/4/2016    Chronic right shoulder pain 11/4/2016    Essential hypertension 11/4/2016    GERD (gastroesophageal reflux disease)     Hypokalemia 6/14/2017    Incomplete tear of right rotator cuff 11/30/2016    Osteoarthritis of right knee 11/30/2016    Rotator cuff tear     S/P laparoscopic  sleeve gastrectomy 2016    Syncope and collapse 2017    Tail bone pain 2016       Past Surgical History:   Procedure Laterality Date     SECTION      x1    COLONOSCOPY N/A 2019    Performed by [facility] at [facility] ENDO (4TH FLR)    COLPOSCOPY      EXCISION, MASS, PELVIS N/A 2019    Performed by Petrona Porter [facility] at [facility] OR 2ND FLR    HERNIA REPAIR      HYSTERECTOMY      YANG, ovaries remain (fibroids0    LAPAROTOMY, EXPLORATORY N/A 2019    Performed [facility] at [facility] OR 2ND FLR    SALPINGO-OOPHORECTOMY, BILATERAL Bilateral 2019    [facility] OR 2ND FLR    SLEEVE GASTROPLASTY  2014    TUBAL LIGATION          Family History   Problem Relation Age of Onset    Hypertension Mother     Heart disease Mother     Heart attack Mother     Rectal cancer Mother     Colon cancer Mother     Hypertension Father     Heart disease Father     Stroke Father     Heart attack Father     Hypertension Sister     Hypertension Brother     Cancer Brother     No Known Problems Daughter     No Known Problems Son     Esophageal cancer Neg Hx         Social History     Socioeconomic History    Marital status:      Spouse name: Not on file    Number of children: Not on file    Years of education: Not on file    Highest education level: Not on file   Social Needs    Financial resource strain: Not on file    Food insecurity - worry: Not on file    Food insecurity - inability: Not on file    Transportation needs - medical: Not on file    Transportation needs - non-medical: Not on file   Occupational History    Not on file   Tobacco Use    Smoking status: Never Smoker    Smokeless tobacco: Never Used   Substance and Sexual Activity    Alcohol use: Yes     Comment: occasionally    Drug use: No    Sexual activity: Yes     Partners: Male   Other Topics Concern    Not on file   Social History Narrative     Not on file        Social History     Tobacco Use   Smoking Status Never Smoker   Smokeless Tobacco Never Used        Allergies as of 03/13/2019    (No Known Allergies)          Home Medications:  Prior to Admission medications    Medication Sig Start Date End Date Taking? Authorizing Provider   acetaminophen (TYLENOL) 500 MG tablet Take 500 mg by mouth every 6 (six) hours as needed for Pain.    Historical Provider, MD   calcium carbonate/vitamin D3 (VITAMIN D-3 ORAL) Take by mouth.    Historical Provider, MD   cholecalciferol, vitamin D3, (VITAMIN D3) 1,000 unit capsule Take 1,000 Units by mouth once daily.    Historical Provider, MD   docusate sodium (COLACE) 50 MG capsule Take 2 capsules (100 mg total) by mouth 2 (two) times daily.  Patient taking differently: Take 100 mg by mouth as needed.  2/5/19   BASSEM Valles MD   hydroCHLOROthiazide (HYDRODIURIL) 25 MG tablet Take ½ (12.5 mg total) by mouth once daily. 1/18/19   Nicci Conte MD   methocarbamol (ROBAXIN) 500 MG Tab Take 1 tablet 4 times daily as needed for 5 days 11/4/18   Daksha Gray MD   polyethylene glycol (MIRALAX) 17 gram/dose powder Mix 1 capful (17 grams total) with a liquid and take by mouth once daily. 12/6/18   Brynn Lopez PA-C   pyridoxine HCl, vitamin B6, (PYRIDOXINE, VITAMIN B6, ORAL) Take by mouth.    Historical Provider, MD   thiamine HCl (VITAMIN B-1 ORAL) Take by mouth.    Historical Provider, MD       Review of Systems:  Review of Systems   Constitutional: Positive for diaphoresis (since oophrectomy ). Negative for chills and fever.   HENT: Negative.    Eyes: Negative for visual disturbance.   Respiratory: Negative for cough, shortness of breath and wheezing.    Cardiovascular: Negative for chest pain, palpitations and leg swelling.   Gastrointestinal: Positive for constipation. Negative for abdominal pain, blood in stool, diarrhea, nausea and vomiting.   Genitourinary: Negative.    Musculoskeletal:  "Positive for arthralgias (fingers worse at night - described as tigt and stiffness ) and myalgias (muscle aches at night when laying on side. relieved when laying on back and flexing knees. ). Negative for neck pain.   Neurological: Positive for headaches. Negative for dizziness, light-headedness and numbness.       Health Maintainence:   Immunizations:  Health Maintenance       Date Due Completion Date    TETANUS VACCINE 03/28/1990 ---    Influenza Vaccine 08/01/2018 10/30/2017 (Done)    Override on 10/30/2017: Done (at [facility] on [address]ne rojas)    Mammogram 07/05/2019 7/5/2017    Lipid Panel 07/11/2022 7/11/2017           PHYSICAL EXAM     /84 (BP Location: Left arm, Patient Position: Sitting, BP Method: Large (Manual))   Pulse 67   Ht 5' 4" (1.626 m)   Wt 93.6 kg (206 lb 5.6 oz)   SpO2 98%   BMI 35.42 kg/m²  Body mass index is 35.42 kg/m².    Physical Exam   Constitutional: She is oriented to person, place, and time. She appears well-developed and well-nourished.   HENT:   Head: Normocephalic.   Right Ear: External ear normal.   Left Ear: External ear normal.   Nose: Nose normal.   Mouth/Throat: Oropharynx is clear and moist. No oropharyngeal exudate.   Eyes: Pupils are equal, round, and reactive to light.   Neck: Neck supple. No JVD present. No tracheal deviation present. No thyromegaly present.   Cardiovascular: Normal rate, regular rhythm, normal heart sounds and intact distal pulses. Exam reveals no gallop and no friction rub.   No murmur heard.  Pulmonary/Chest: Effort normal and breath sounds normal. No respiratory distress. She has no wheezes. She has no rales.   Abdominal: Soft. Bowel sounds are normal. She exhibits no distension. There is no tenderness.   Musculoskeletal: Normal range of motion. She exhibits no edema or tenderness.   Lymphadenopathy:     She has no cervical adenopathy.   Neurological: She is alert and oriented to person, place, and time.   Skin: Skin is warm and dry. No rash " noted.        Psychiatric: She has a normal mood and affect. Her behavior is normal.   Vitals reviewed.      LABS     No results found for: LABA1C, HGBA1C  CMP  Sodium   Date Value Ref Range Status   02/05/2019 138 136 - 145 mmol/L Final     Potassium   Date Value Ref Range Status   02/05/2019 3.7 3.5 - 5.1 mmol/L Final     Chloride   Date Value Ref Range Status   02/05/2019 104 95 - 110 mmol/L Final     CO2   Date Value Ref Range Status   02/05/2019 28 23 - 29 mmol/L Final     Glucose   Date Value Ref Range Status   02/05/2019 94 70 - 110 mg/dL Final     BUN, Bld   Date Value Ref Range Status   02/05/2019 8 6 - 20 mg/dL Final     Creatinine   Date Value Ref Range Status   02/05/2019 0.7 0.5 - 1.4 mg/dL Final     Calcium   Date Value Ref Range Status   02/05/2019 9.8 8.7 - 10.5 mg/dL Final     Total Protein   Date Value Ref Range Status   01/24/2019 8.1 6.0 - 8.4 g/dL Final     Albumin   Date Value Ref Range Status   01/24/2019 3.7 3.5 - 5.2 g/dL Final     Total Bilirubin   Date Value Ref Range Status   01/24/2019 0.2 0.1 - 1.0 mg/dL Final     Comment:     For infants and newborns, interpretation of results should be based  on gestational age, weight and in agreement with clinical  observations.  Premature Infant recommended reference ranges:  Up to 24 hours.............<8.0 mg/dL  Up to 48 hours............<12.0 mg/dL  3-5 days..................<15.0 mg/dL  6-29 days.................<15.0 mg/dL       Alkaline Phosphatase   Date Value Ref Range Status   01/24/2019 82 55 - 135 U/L Final     AST   Date Value Ref Range Status   01/24/2019 15 10 - 40 U/L Final     ALT   Date Value Ref Range Status   01/24/2019 12 10 - 44 U/L Final     Anion Gap   Date Value Ref Range Status   02/05/2019 6 (L) 8 - 16 mmol/L Final     eGFR if    Date Value Ref Range Status   02/05/2019 >60.0 >60 mL/min/1.73 m^2 Final     eGFR if non    Date Value Ref Range Status   02/05/2019 >60.0 >60 mL/min/1.73 m^2 Final      Comment:     Calculation used to obtain the estimated glomerular filtration  rate (eGFR) is the CKD-EPI equation.        Lab Results   Component Value Date    WBC 9.45 02/05/2019    HGB 13.4 02/05/2019    HCT 41.9 02/05/2019    MCV 83 02/05/2019     02/05/2019     Lab Results   Component Value Date    CHOL 153 07/11/2017     Lab Results   Component Value Date    HDL 64 07/11/2017     Lab Results   Component Value Date    LDLCALC 81.0 07/11/2017     Lab Results   Component Value Date    TRIG 40 07/11/2017     Lab Results   Component Value Date    CHOLHDL 41.8 07/11/2017     Lab Results   Component Value Date    TSH 3.573 06/13/2017       ASSESSMENT/PLAN     Renea Mac is a 48 y.o. female with  Past Medical History:   Diagnosis Date    Acute pain of right knee 11/4/2016    Chronic right shoulder pain 11/4/2016    Essential hypertension 11/4/2016    GERD (gastroesophageal reflux disease)     Hypokalemia 6/14/2017    Incomplete tear of right rotator cuff 11/30/2016    Osteoarthritis of right knee 11/30/2016    Rotator cuff tear     S/P laparoscopic sleeve gastrectomy 11/4/2016    Syncope and collapse 6/14/2017    Tail bone pain 11/4/2016     Annual physical exam- all age and gender related screenings discussed     Screening mammogram, encounter for- MMG ordered   -     Cancel: Mammo Digital Screening Bilat; Future; Expected date: 03/13/2019    S/P gastric surgery/S/P laparoscopic sleeve gastrectomy- will check vitamin levels - pt has not taken vitamins since surgery   -     CBC auto differential; Future; Expected date: 03/13/2019  -     Vitamin B12 Deficiency Panel; Future; Expected date: 03/13/2019  -     Comprehensive metabolic panel; Future; Expected date: 03/13/2019  -     Vitamin D; Future; Expected date: 03/13/2019  -     VITAMIN B1; Future; Expected date: 03/13/2019  -     VITAMIN B6; Future; Expected date: 03/13/2019  -     VITAMIN A; Future; Expected date: 03/13/2019    Abdominal  mass, unspecified abdominal location- resolved. Benign in nature.     S/P Exploratory Laparotomy/Mass Resection/BSO- resolved. Continue to lean incision with soap and water daily.     Muscle ache of extremity- check labs.   -     TSH; Future; Expected date: 03/13/2019  -     T4, free; Future; Expected date: 03/13/2019    Essential hypertension- at goal w/o meds. Will monitor BP and return to clinic if >140 or >90     Follow up as needed and with PCP     Magdalena HERNANDEZ, AKIRA, FNP-c   Department of Internal Medicine - [facility]  3:31 PM  Answers for HPI/ROS submitted by the patient on 3/12/2019   Hypertension  Onset: more than 1 year ago  Progression since onset: resolved  Condition status: controlled  anxiety: No  blurred vision: No  malaise/fatigue: No  orthopnea: No  peripheral edema: No  PND: No  sweats: No  Agents associated with hypertension: NSAIDs  CAD risks: family history, obesity  Compliance problems: exercise  Past treatments: diuretics, lifestyle changes  Improvement on treatment: significant

## 2019-03-15 ENCOUNTER — TELEPHONE (OUTPATIENT)
Dept: GYNECOLOGIC ONCOLOGY | Facility: CLINIC | Age: 49
End: 2019-03-15

## 2019-03-18 ENCOUNTER — OFFICE VISIT (OUTPATIENT)
Dept: GYNECOLOGIC ONCOLOGY | Facility: CLINIC | Age: 49
End: 2019-03-18
Payer: COMMERCIAL

## 2019-03-18 VITALS
SYSTOLIC BLOOD PRESSURE: 130 MMHG | WEIGHT: 205.69 LBS | DIASTOLIC BLOOD PRESSURE: 83 MMHG | HEART RATE: 80 BPM | BODY MASS INDEX: 35.12 KG/M2 | HEIGHT: 64 IN

## 2019-03-18 DIAGNOSIS — Z98.890 S/P EXPLORATORY LAPAROTOMY: ICD-10-CM

## 2019-03-18 DIAGNOSIS — R19.00 PELVIC MASS: Primary | ICD-10-CM

## 2019-03-18 PROCEDURE — 99024 POSTOP FOLLOW-UP VISIT: CPT | Mod: S$GLB,,, | Performed by: OBSTETRICS & GYNECOLOGY

## 2019-03-18 PROCEDURE — 99999 PR PBB SHADOW E&M-EST. PATIENT-LVL III: ICD-10-PCS | Mod: PBBFAC,,, | Performed by: OBSTETRICS & GYNECOLOGY

## 2019-03-18 PROCEDURE — 99024 PR POST-OP FOLLOW-UP VISIT: ICD-10-PCS | Mod: S$GLB,,, | Performed by: OBSTETRICS & GYNECOLOGY

## 2019-03-18 PROCEDURE — 99999 PR PBB SHADOW E&M-EST. PATIENT-LVL III: CPT | Mod: PBBFAC,,, | Performed by: OBSTETRICS & GYNECOLOGY

## 2019-03-18 NOTE — PROGRESS NOTES
Subjective:      Patient ID: Renea Mac is a 48 y.o. female.    Chief Complaint: Post-op Evaluation (4 weeks)      HPI  S/p xlap/anterior abdominal wall mass resection/BSO. Uncomplicated post op course.   Final pathology reviewed and benign leiomyoma and bilateral rubes/ovaries.      Presents today for follow up post operative visit. Continues to recover appropriately from surgery.     History:  Referred for pelvic mass.      She reported an episode of diarrhea and then bright red blood from rectum for approximately 3 days. No current bleeding. And she is asymptomatic at this time.   Imagine reviewed.   CT A&P 12/7/18  Impression       1. Solid, heterogeneously enhancing mass within the midline lower abdomen that is of uncertain origin but concerning for primary or metastatic neoplasm.  Surgical consultation recommended.  2. Suspected 2.2 cm low-attenuation lesion within the left adnexa.  Recommend correlation with pelvic ultrasound.  3. 0.4 cm left lower lobe solid pulmonary nodule.  For a solid nodule <6 mm, Fleischner Society 2017 guidelines recommend no routine follow up for a low risk patient, or follow-up with non-contrast chest CT at 12 months in a high risk patient.  4. Left renal cyst.  5. Postsurgical changes of sleeve gastrectomy.  6. Fat containing supraumbilical anterior abdominal wall hernia.       Pelvic US 12/14/18  FINDINGS:  Uterus: Patient is status post hysterectomy.  Right ovary: Size: 2.9 x 2.0 x 2.3 cm, 0.9 x 1.0 x 0.9 minimally complex cyst.  Left ovary: Size: 2.9 x 2.1 x 2.6 cm, 1.4 x 1.4 x 0.9 cm minimally complex cyst.  Free Fluid: None.  There is a 5.3 x 4.5 x 4.9 cm heterogeneous mixed minimally cystic and solid mass in the pelvis without obvious attachment to surrounding structures as seen on CT 12/07/2018.     Reports colonoscopy 6 yrs ago normal. Scheduled for c-scope on 1/21/19.      Prior abdominal surgeries include laparoscopic hysterectomy due to leiomyoma 2011 (ovaries  remain in situ), laparoscopic gastric sleeve, and c/s x 1 via pfannenstiel.      Family history significant for mother with colon cancer, paternal GM with malignancy of unknown type, Maternal aunt with breast cancer, and maternal aunt with lung cancer.        Colonoscopy 1/21/19 unremarkable. CEA <1.0  14     Review of Systems   Constitutional: Negative for appetite change, chills, fatigue and fever.   HENT: Negative for mouth sores.    Respiratory: Negative for cough and shortness of breath.    Cardiovascular: Negative for leg swelling.   Gastrointestinal: Negative for abdominal pain, blood in stool, constipation and diarrhea.   Endocrine: Negative for cold intolerance.   Genitourinary: Negative for dysuria and vaginal bleeding.   Musculoskeletal: Negative for myalgias.   Skin: Negative for rash.   Allergic/Immunologic: Negative.    Neurological: Negative for weakness and numbness.   Hematological: Negative for adenopathy. Does not bruise/bleed easily.   Psychiatric/Behavioral: Negative for confusion.       Objective:   Physical Exam:   Constitutional: She is oriented to person, place, and time. She appears well-developed and well-nourished.    HENT:   Head: Normocephalic and atraumatic.    Eyes: EOM are normal. Pupils are equal, round, and reactive to light.    Neck: Normal range of motion. Neck supple. No thyromegaly present.    Cardiovascular: Normal rate, regular rhythm and intact distal pulses.     Pulmonary/Chest: Effort normal and breath sounds normal. No respiratory distress. She has no wheezes.        Abdominal: Soft. Bowel sounds are normal. She exhibits abdominal incision. She exhibits no distension, no ascites and no mass. There is no tenderness.             Musculoskeletal: Normal range of motion and moves all extremeties.      Lymphadenopathy:     She has no cervical adenopathy.        Right: No supraclavicular adenopathy present.        Left: No supraclavicular adenopathy present.     Neurological: She is alert and oriented to person, place, and time.    Skin: Skin is warm and dry. No rash noted.    Psychiatric: She has a normal mood and affect.       Assessment:     1. Pelvic mass    2. S/P Exploratory Laparotomy/Mass Resection/BSO        Plan:   No orders of the defined types were placed in this encounter.    Recovering appropriately from surgery.  Ready to go back to work.   Fortunately benign final pathology.   RTC with me as needed.

## 2019-03-20 ENCOUNTER — TELEPHONE (OUTPATIENT)
Dept: INTERNAL MEDICINE | Facility: CLINIC | Age: 49
End: 2019-03-20

## 2019-03-20 NOTE — TELEPHONE ENCOUNTER
LM for pt to return call regarding labs     Vitamin A - low normal   Vitamin B6 - low normal   Vitamin D- low normal     Vitamin B12 - normal   Vitamin B1 - normal   Cbc, cmp and tft - wnl

## 2019-03-20 NOTE — TELEPHONE ENCOUNTER
Spoke with pt - discussed lab resutls. Will restart multivitamin and vit d supplement.   Started back to work this week, advised to monitor BP and call with results in 2-3 weeks.

## 2019-04-29 ENCOUNTER — OFFICE VISIT (OUTPATIENT)
Dept: URGENT CARE | Facility: CLINIC | Age: 49
End: 2019-04-29
Payer: COMMERCIAL

## 2019-04-29 VITALS
TEMPERATURE: 98 F | RESPIRATION RATE: 19 BRPM | HEART RATE: 99 BPM | OXYGEN SATURATION: 98 % | HEIGHT: 64 IN | DIASTOLIC BLOOD PRESSURE: 84 MMHG | SYSTOLIC BLOOD PRESSURE: 139 MMHG | WEIGHT: 205 LBS | BODY MASS INDEX: 35 KG/M2

## 2019-04-29 DIAGNOSIS — I10 ESSENTIAL HYPERTENSION: Primary | ICD-10-CM

## 2019-04-29 PROCEDURE — 3079F DIAST BP 80-89 MM HG: CPT | Mod: CPTII,S$GLB,, | Performed by: NURSE PRACTITIONER

## 2019-04-29 PROCEDURE — 3075F PR MOST RECENT SYSTOLIC BLOOD PRESS GE 130-139MM HG: ICD-10-PCS | Mod: CPTII,S$GLB,, | Performed by: NURSE PRACTITIONER

## 2019-04-29 PROCEDURE — 3075F SYST BP GE 130 - 139MM HG: CPT | Mod: CPTII,S$GLB,, | Performed by: NURSE PRACTITIONER

## 2019-04-29 PROCEDURE — 3008F PR BODY MASS INDEX (BMI) DOCUMENTED: ICD-10-PCS | Mod: CPTII,S$GLB,, | Performed by: NURSE PRACTITIONER

## 2019-04-29 PROCEDURE — 99214 PR OFFICE/OUTPT VISIT, EST, LEVL IV, 30-39 MIN: ICD-10-PCS | Mod: S$GLB,,, | Performed by: NURSE PRACTITIONER

## 2019-04-29 PROCEDURE — 99214 OFFICE O/P EST MOD 30 MIN: CPT | Mod: S$GLB,,, | Performed by: NURSE PRACTITIONER

## 2019-04-29 PROCEDURE — 3008F BODY MASS INDEX DOCD: CPT | Mod: CPTII,S$GLB,, | Performed by: NURSE PRACTITIONER

## 2019-04-29 PROCEDURE — 3079F PR MOST RECENT DIASTOLIC BLOOD PRESSURE 80-89 MM HG: ICD-10-PCS | Mod: CPTII,S$GLB,, | Performed by: NURSE PRACTITIONER

## 2019-04-29 NOTE — LETTER
April 29, 2019      [facility]  [address]  [place] [address]  Phone: [phone]  Fax: [phone]       Patient: Renea Mac   YOB: 1970  Date of Visit: 04/29/2019    To Whom It May Concern:    Windy Mac  was at [facility] on 04/29/2019. She may return to work/school on 04/30/19 with no restrictions. If you have any questions or concerns, or if I can be of further assistance, please do not hesitate to contact me.    Sincerely,        Flores Gillette, NP

## 2019-04-29 NOTE — PROGRESS NOTES
"Subjective:       Patient ID: Renea Mac is a 49 y.o. female.    Vitals:  height is 5' 4" (1.626 m) and weight is 93 kg (205 lb). Her oral temperature is 98.3 °F (36.8 °C). Her blood pressure is 139/84 and her pulse is 99. Her respiration is 19 and oxygen saturation is 98%.     Chief Complaint: Joint Swelling    Patient sees her primary care physician for HBP and has been given hydrochlorothiazide. She recently has been taken off of it due to a loss of weight and has now developed swelling in hands and feet with her joints locking up. This has been occurring for three weeks. She has contacted her primary care but was unable to make an appointment.   The patient denies any chest pain or shortness of breath.  Claims that she was seen in April and had a full workup all of her labs were normal.  She claims that the hand pain and "locking up" is worse in the morning.  She has been taking Tylenol arthritis with mild relief.  She claims that the swelling is intermittent and is worse at night.  Patient does not have any swelling to the lower extremities in the clinic today.  Blood pressure is normal.    Hand Pain    The incident occurred more than 1 week ago. There was no injury mechanism. The pain is present in the right fingers and left fingers. The quality of the pain is described as aching. The pain does not radiate. The pain is at a severity of 3/10. The pain is mild. The pain has been constant since the incident. Pertinent negatives include no chest pain, muscle weakness, numbness or tingling. The symptoms are aggravated by movement. She has tried nothing for the symptoms. The treatment provided no relief.       Cardiovascular: Negative for chest trauma, chest pain, leg swelling, palpitations, sob on exertion and passing out.   Respiratory: Negative for chest tightness, cough and shortness of breath.    Gastrointestinal: Negative for abdominal pain.   Neurological: Negative for dizziness, history of vertigo, " light-headedness and numbness.       Objective:      Physical Exam   Constitutional: She is oriented to person, place, and time. She appears well-developed and well-nourished. She is cooperative.  Non-toxic appearance. She does not have a sickly appearance. She does not appear ill. No distress.   HENT:   Head: Normocephalic and atraumatic.   Right Ear: Hearing, external ear and ear canal normal. Tympanic membrane is not injected.   Left Ear: Hearing, external ear and ear canal normal. Tympanic membrane is not injected.   Nose: No mucosal edema, rhinorrhea or nasal deformity. No epistaxis. Right sinus exhibits no maxillary sinus tenderness and no frontal sinus tenderness. Left sinus exhibits no maxillary sinus tenderness and no frontal sinus tenderness.   Mouth/Throat: Uvula is midline and mucous membranes are normal. No trismus in the jaw. Normal dentition. No uvula swelling. No posterior oropharyngeal erythema.   Eyes: Conjunctivae and lids are normal. Right eye exhibits no discharge. Left eye exhibits no discharge. No scleral icterus.   Sclera clear bilat   Neck: Trachea normal, normal range of motion, full passive range of motion without pain and phonation normal. Neck supple.   Cardiovascular: Normal rate, regular rhythm, normal heart sounds, intact distal pulses and normal pulses.   Pulses:       Radial pulses are 2+ on the right side, and 2+ on the left side.        Dorsalis pedis pulses are 2+ on the right side, and 2+ on the left side.        Posterior tibial pulses are 2+ on the right side, and 2+ on the left side.    No edema noted to the lower extremities.   Pulmonary/Chest: Effort normal and breath sounds normal. No respiratory distress.   Abdominal: Soft. Normal appearance and bowel sounds are normal. She exhibits no distension, no pulsatile midline mass and no mass. There is no tenderness.   Musculoskeletal: Normal range of motion. She exhibits no edema or deformity.   Neurological: She is alert and  oriented to person, place, and time. She exhibits normal muscle tone. Coordination normal.   Skin: Skin is warm, dry and intact. She is not diaphoretic. No pallor.   Psychiatric: She has a normal mood and affect. Her speech is normal and behavior is normal. Judgment and thought content normal. Cognition and memory are normal.   Nursing note and vitals reviewed.      Assessment:       1. Essential hypertension        Plan:         The clinic  line is contacted and a clinic today and the patient is given an appointment with internal medicine on Thursday morning.  Patient is evaluated today in the clinic today and is not any acute distress.  Blood pressure is normal.  Advised to follow up with internal medicine.  ER precautions and red flag warnings were discussed.  She is also advised to take Tylenol arthritis as needed for the hand pain.  Patient verbalizes understanding and is in agreement.    Essential hypertension  -     Ambulatory referral to Internal Medicine      Patient Instructions       Taking Your Blood Pressure  Blood pressure is the force of blood against the artery wall as it moves from the heart through the blood vessels. You can take your own blood pressure reading using a digital monitor. Take your readings the same each time, using the same arm. Take readings as often as your healthcare provider instructs.  About blood pressure monitors  Blood pressure monitors are designed for certain ages and cases. You can find monitors for older adults, for pregnant women, and for children. Make sure the one you choose is the right one for your age and situation.  The American Heart Association recommends an automatic cuff monitor that fits on your upper arm (bicep). The cuff should fit your arm size. A cuff thats too large or too small will not give an accurate reading. Measure around your upper arm to find your size.  Monitors that attach to your finger or wrist are not as accurate as monitors for  your upper arm.  Ask your healthcare provider for help in choosing a monitor. Bring your monitor to your next provider visit if you need help in using it the correct way.  The steps below are general instructions for using an automatic digital monitor.  Step 1. Relax    · Take your blood pressure at the same time every day, such as in the morning or evening, or at the time your healthcare provider recommends.  · Wait at least a half-hour after smoking, eating, or exercising. Don't drink coffee, tea, soda, or other caffeinated beverages before checking your blood pressure.  · Sit comfortably at a table with both feet on the floor. Do not cross your legs or feet. Place the monitor near you.  · Rest for a few minutes before you begin.  Step 2. Wrap the cuff    · Place your arm on the table, palm up. Your arm should be at the level of your heart. Wrap the cuff around your upper arm, just above your elbow. Its best done on bare skin, not over clothing. Most cuffs will indicate where the brachial artery (the blood vessel in the middle of the arm at the inner side of the elbow) should line up with the cuff. Look in your monitor's instruction booklet for an illustration. You can also bring your cuff to your healthcare provider and have them show you how to correctly place the cuff.  Step 3. Inflate the cuff    · Push the button that starts the pump.  · The cuff will tighten, then loosen.  · The numbers will change. When they stop changing, your blood pressure reading will appear.  · Take 2 or 3 readings one minute apart.  Step 4. Write down the results of each reading    · Write down your blood pressure numbers for each reading. Note the date and time. Keep your results in one place, such as a notebook. Even if your monitor has a built-in memory, keep a hard copy of the readings.  · Remove the cuff from your arm. Turn off the machine.  · Bring your blood pressure records with your healthcare providers at each visit.  · If  you start a new blood pressure medicine, note the day you started the new medicine. Also note the day if you change the dose of your medicine. This information goes on your blood pressure recording sheet. This will help your healthcare provider monitor how well the medicine changes are working.  · Ask your healthcare provider what numbers should prompt you to call him or her. Also ask what numbers should prompt you to get help right away.  Date Last Reviewed: 11/1/2016  © 8623-4759 [employer]. [address] [place] [address]. All rights reserved. This information is not intended as a substitute for professional medical care. Always follow your healthcare professional's instructions.        Discharge Instructions for High Blood Pressure (Hypertension)  You have been diagnosed with high blood pressure (also called hypertension). This means the force of blood against your artery walls is too strong. It also means your heart is working hard to move blood. High blood pressure usually has no symptoms, but over time, it can damage your heart, blood vessels, eyes, kidneys, and other organs. With help from your doctor, you can manage your blood pressure and protect your health.  Taking medicine  · Learn to take your own blood pressure. Keep a record of your results. Ask your doctor which readings mean that you need medical attention.  · Take your blood pressure medicine exactly as directed. Dont skip doses. Missing doses can cause your blood pressure to get out of control.  · If you do miss a dose (or doses) check with your healthcare provider about what to do.  · Avoid medicine that contain heart stimulants, including over-the-counter drugs. Check for warnings about high blood pressure on the label. Ask the pharmacist before purchasing something you haven't used before  · Check with your doctor or pharmacist before taking a decongestant. Some decongestants can worsen high blood pressure.  Lifestyle  "changes  · Maintain a healthy weight. Get help to lose any extra pounds.  · Cut back on salt.  ¨ Limit canned, dried, packaged, and fast foods.  ¨ Dont add salt to your food at the table.  ¨ Season foods with herbs instead of salt when you cook.  ¨ Request no added salt when you go to a restaurant.  ¨ The American Heart Associations (AHA) "ideal" sodium intake recommendation is 1,500 milligrams per day.  However, since American's eat so much salt, the AHA says a positive change can occur by cutting back to even 2,400 milligrams of sodium a day.   · Follow the DASH (Dietary Approaches to Stop Hypertension) eating plan. This plan recommends vegetables, fruits, whole gains, and other heart healthy foods.  · Begin an exercise program. Ask your doctor how to get started. The American Heart Association recommends aerobic exercise 3 to 4 times a week for an average of 40 minutes at a time, with your doctor's approval. Simple activities like walking or gardening can help.  · Break the smoking habit. Enroll in a stop-smoking program to improve your chances of success. Ask your healthcare provider about programs and medicines to help you stop smoking.  · Limit drinks that contain caffeine (coffee, black or green tea, cola) to 2 per day.  · Never take stimulants such as amphetamines or cocaine; these drugs can be deadly for someone with high blood pressure.  · Control your stress. Learn stress-management techniques.  · Limit alcohol to no more than 1 drink a day for women and 2 drinks a day for men.  Follow-up care  Make a follow-up appointment as directed by our staff.     When to seek medical care  Call your doctor immediately or seek emergency care if you have any of the following:  · Chest pain or shortness of breath (call 911)  · Moderate to severe headache  · Weakness in the muscles of your face, arms, or legs  · Trouble speaking  · Extreme drowsiness  · Confusion  · Fainting or dizziness  · Pulsating or rushing sound " in your ears  · Unexplained nosebleed  · Weakness, tingling, or numbness of your face, arms, or legs  · Change in vision  · Blood pressure measured at home that is greater than 180/110   Date Last Reviewed: 4/27/2016  © 1299-7977 [employer]. [address] [place] [id number]. All rights reserved. This information is not intended as a substitute for professional medical care. Always follow your healthcare professional's instructions.        Controlling High Blood Pressure  High blood pressure (hypertension) is often called the silent killer. This is because many people who have it dont know it. High blood pressure is defined as 140/90 mm Hg or higher. Know your blood pressure and remember to check it regularly. Doing so can save your life. Here are some things you can do to help control your blood pressure.    Choose heart-healthy foods  · Select low-salt, low-fat foods. Limit sodium intake to 2,400 mg per day or the amount suggested by your healthcare provider.  · Limit canned, dried, cured, packaged, and fast foods. These can contain a lot of salt.  · Eat 8 to 10 servings of fruits and vegetables every day.  · Choose lean meats, fish, or chicken.  · Eat whole-grain pasta, brown rice, and beans.  · Eat 2 to 3 servings of low-fat or fat-free dairy products.  · Ask your doctor about the DASH eating plan. This plan helps reduce blood pressure.  · When you go to a restaurant, ask that your meal be prepared with no added salt.  Maintain a healthy weight  · Ask your healthcare provider how many calories to eat a day. Then stick to that number.  · Ask your healthcare provider what weight range is healthiest for you. If you are overweight, a weight loss of only 3% to 5% of your body weight can help lower blood pressure. Generally, a good weight loss goal is to lose 10% of your body weight in a year.  · Limit snacks and sweets.  · Get regular exercise.  Get up and get active  · Choose activities you  enjoy. Find ones you can do with friends or family. This includes bicycling, dancing, walking, and jogging.  · Park farther away from building entrances.  · Use stairs instead of the elevator.  · When you can, walk or bike instead of driving.  · Kent leaves, garden, or do household repairs.  · Be active at a moderate to vigorous level of physical activity for at least 40 minutes for a minimum of 3 to 4 days a week.   Manage stress  · Make time to relax and enjoy life. Find time to laugh.  · Communicate your concerns with your loved ones and your healthcare provider.  · Visit with family and friends, and keep up with hobbies.  Limit alcohol and quit smoking  · Men should have no more than 2 drinks per day.  · Women should have no more than 1 drink per day.  · Talk with your healthcare provider about quitting smoking. Smoking significantly increases your risk for heart disease and stroke. Ask your healthcare provider about community smoking cessation programs and other options.  Medicines  If lifestyle changes arent enough, your healthcare provider may prescribe high blood pressure medicine. Take all medicines as prescribed. If you have any questions about your medicines, ask your healthcare provider before stopping or changing them.   Date Last Reviewed: 4/27/2016  © 2480-1650 [employer]. [address] [place] [address], [place] [address]. All rights reserved. This information is not intended as a substitute for professional medical care. Always follow your healthcare professional's instructions.      -Follow up with your primary care doctor.  -See above education on hypertension.  -If you start to experience chest pain or shortness of breath go to the ER.  Please follow up with your Primary care provider within 2-5 days if your signs and symptoms have not resolved or worsen.     If your condition worsens or fails to improve we recommend that you receive another evaluation at the emergency room immediately or  contact your primary medical clinic to discuss your concerns.   You must understand that you have received an Urgent Care treatment only and that you may be released before all of your medical problems are known or treated. You, the patient, will arrange for follow up care as instructed.

## 2019-05-02 ENCOUNTER — OFFICE VISIT (OUTPATIENT)
Dept: INTERNAL MEDICINE | Facility: CLINIC | Age: 49
End: 2019-05-02
Payer: COMMERCIAL

## 2019-05-02 ENCOUNTER — LAB VISIT (OUTPATIENT)
Dept: LAB | Facility: HOSPITAL | Age: 49
End: 2019-05-02
Payer: COMMERCIAL

## 2019-05-02 VITALS
OXYGEN SATURATION: 95 % | WEIGHT: 209.44 LBS | HEART RATE: 82 BPM | HEIGHT: 64 IN | BODY MASS INDEX: 35.76 KG/M2 | DIASTOLIC BLOOD PRESSURE: 96 MMHG | SYSTOLIC BLOOD PRESSURE: 140 MMHG

## 2019-05-02 DIAGNOSIS — M25.541 ARTHRALGIA OF BOTH HANDS: ICD-10-CM

## 2019-05-02 DIAGNOSIS — G44.229 CHRONIC TENSION-TYPE HEADACHE, NOT INTRACTABLE: ICD-10-CM

## 2019-05-02 DIAGNOSIS — M25.542 ARTHRALGIA OF BOTH HANDS: ICD-10-CM

## 2019-05-02 DIAGNOSIS — I10 ESSENTIAL HYPERTENSION: Primary | ICD-10-CM

## 2019-05-02 DIAGNOSIS — Z98.84 S/P LAPAROSCOPIC SLEEVE GASTRECTOMY: ICD-10-CM

## 2019-05-02 LAB
CCP AB SER IA-ACNC: <0.5 U/ML
RHEUMATOID FACT SERPL-ACNC: <10 IU/ML (ref 0–15)

## 2019-05-02 PROCEDURE — 36415 COLL VENOUS BLD VENIPUNCTURE: CPT

## 2019-05-02 PROCEDURE — 99214 OFFICE O/P EST MOD 30 MIN: CPT | Mod: S$GLB,,, | Performed by: NURSE PRACTITIONER

## 2019-05-02 PROCEDURE — 3077F SYST BP >= 140 MM HG: CPT | Mod: CPTII,S$GLB,, | Performed by: NURSE PRACTITIONER

## 2019-05-02 PROCEDURE — 86200 CCP ANTIBODY: CPT

## 2019-05-02 PROCEDURE — 3008F PR BODY MASS INDEX (BMI) DOCUMENTED: ICD-10-PCS | Mod: CPTII,S$GLB,, | Performed by: NURSE PRACTITIONER

## 2019-05-02 PROCEDURE — 86431 RHEUMATOID FACTOR QUANT: CPT

## 2019-05-02 PROCEDURE — 86038 ANTINUCLEAR ANTIBODIES: CPT

## 2019-05-02 PROCEDURE — 3080F DIAST BP >= 90 MM HG: CPT | Mod: CPTII,S$GLB,, | Performed by: NURSE PRACTITIONER

## 2019-05-02 PROCEDURE — 3080F PR MOST RECENT DIASTOLIC BLOOD PRESSURE >= 90 MM HG: ICD-10-PCS | Mod: CPTII,S$GLB,, | Performed by: NURSE PRACTITIONER

## 2019-05-02 PROCEDURE — 99999 PR PBB SHADOW E&M-EST. PATIENT-LVL IV: ICD-10-PCS | Mod: PBBFAC,,, | Performed by: NURSE PRACTITIONER

## 2019-05-02 PROCEDURE — 99214 PR OFFICE/OUTPT VISIT, EST, LEVL IV, 30-39 MIN: ICD-10-PCS | Mod: S$GLB,,, | Performed by: NURSE PRACTITIONER

## 2019-05-02 PROCEDURE — 3077F PR MOST RECENT SYSTOLIC BLOOD PRESSURE >= 140 MM HG: ICD-10-PCS | Mod: CPTII,S$GLB,, | Performed by: NURSE PRACTITIONER

## 2019-05-02 PROCEDURE — 3008F BODY MASS INDEX DOCD: CPT | Mod: CPTII,S$GLB,, | Performed by: NURSE PRACTITIONER

## 2019-05-02 PROCEDURE — 99999 PR PBB SHADOW E&M-EST. PATIENT-LVL IV: CPT | Mod: PBBFAC,,, | Performed by: NURSE PRACTITIONER

## 2019-05-02 RX ORDER — DICLOFENAC SODIUM 10 MG/G
2 GEL TOPICAL 4 TIMES DAILY
Qty: 100 G | Refills: 0 | Status: SHIPPED | OUTPATIENT
Start: 2019-05-02 | End: 2020-04-09 | Stop reason: SDUPTHER

## 2019-05-02 RX ORDER — HYDROCHLOROTHIAZIDE 25 MG/1
12.5 TABLET ORAL DAILY
Qty: 30 TABLET | Refills: 11 | Status: SHIPPED | OUTPATIENT
Start: 2019-05-02 | End: 2020-04-09

## 2019-05-02 NOTE — PROGRESS NOTES
INTERNAL MEDICINE URGENT CARE NOTE    CHIEF COMPLAINT     Chief Complaint   Patient presents with    Headache    Hand Pain    Hypertension       HPI     Renea Mac is a 49 y.o. female HTN who presents for an urgent today.    HTN- at annual physical BP was controlled without taking HCTZ. Has returned to work following ex-lap with removal of mass and b/l oophorectomy. Now BP is elevated last night 140s/90s.    +headaches - see below   Also stressed with purchase of new home.     Hand pain- worse in the morning described as stiffness and resolves through out the day     Headaches- x1 week located to the temporal regions and across forehead. All day worse toward end of the day. Described as mild throbbing. Denies n/v, vision changes, photophobia/phonophobia.     Past Medical History:  Past Medical History:   Diagnosis Date    Acute pain of right knee 11/4/2016    Chronic right shoulder pain 11/4/2016    Essential hypertension 11/4/2016    GERD (gastroesophageal reflux disease)     Hypokalemia 6/14/2017    Incomplete tear of right rotator cuff 11/30/2016    Osteoarthritis of right knee 11/30/2016    Rotator cuff tear     S/P laparoscopic sleeve gastrectomy 11/4/2016    Syncope and collapse 6/14/2017    Tail bone pain 11/4/2016       Home Medications:  Prior to Admission medications    Medication Sig Start Date End Date Taking? Authorizing Provider   acetaminophen (TYLENOL) 500 MG tablet Take 500 mg by mouth every 6 (six) hours as needed for Pain.   Yes Historical Provider, MD   calcium carbonate/vitamin D3 (VITAMIN D-3 ORAL) Take by mouth.   Yes Historical Provider, MD   cholecalciferol, vitamin D3, (VITAMIN D3) 1,000 unit capsule Take 1,000 Units by mouth once daily.   Yes Historical Provider, MD   pyridoxine HCl, vitamin B6, (PYRIDOXINE, VITAMIN B6, ORAL) Take by mouth.   Yes Historical Provider, MD   thiamine HCl (VITAMIN B-1 ORAL) Take by mouth.   Yes Historical Provider, MD   docusate sodium  "(COLACE) 50 MG capsule Take 2 capsules (100 mg total) by mouth 2 (two) times daily.  Patient taking differently: Take 100 mg by mouth as needed.  2/5/19   BASSEM Valles MD   hydroCHLOROthiazide (HYDRODIURIL) 25 MG tablet Take ½ (12.5 mg total) by mouth once daily. 1/18/19   Nicci Conte MD   methocarbamol (ROBAXIN) 500 MG Tab Take 1 tablet 4 times daily as needed for 5 days 11/4/18   Daksha Gray MD   polyethylene glycol (MIRALAX) 17 gram/dose powder Mix 1 capful (17 grams total) with a liquid and take by mouth once daily. 12/6/18   Brynn Lopez PA-C       Review of Systems:  Review of Systems   Constitutional: Negative for chills, fatigue and fever.   Respiratory: Negative for cough, shortness of breath and wheezing.    Cardiovascular: Positive for leg swelling (feet and ankles. ).   Musculoskeletal: Positive for arthralgias (bilateral hands PIP joints. ).   Skin: Negative for rash.   Neurological: Positive for headaches (see hpi ). Negative for dizziness, weakness, light-headedness and numbness.       Health Maintainence:   Immunizations:  Health Maintenance       Date Due Completion Date    TETANUS VACCINE 03/28/1990 ---    Influenza Vaccine 08/01/2019 8/1/2018 (Done)    Override on 8/1/2018: Done    Override on 10/30/2017: Done (at [facility] on Dwayne rojas)    Mammogram 03/13/2021 3/13/2019    Lipid Panel 07/11/2022 7/11/2017           PHYSICAL EXAM     BP (!) 140/96 (BP Location: Right arm, Patient Position: Sitting, BP Method: Large (Manual))   Pulse 82   Ht 5' 4" (1.626 m)   Wt 95 kg (209 lb 7 oz)   LMP 03/01/2011 (Approximate)   SpO2 95%   BMI 35.95 kg/m²     Physical Exam   Constitutional: She is oriented to person, place, and time. She appears well-developed and well-nourished.   HENT:   Head: Normocephalic and atraumatic.   Eyes: Pupils are equal, round, and reactive to light.   Cardiovascular: Normal rate and regular rhythm.   Pulmonary/Chest: Effort normal. "   Musculoskeletal:        Right hand: She exhibits tenderness and bony tenderness. She exhibits normal range of motion, normal two-point discrimination, normal capillary refill, no deformity and no laceration. Normal sensation noted. Normal strength noted.        Left hand: She exhibits tenderness and bony tenderness. She exhibits normal range of motion, normal capillary refill, no deformity and no laceration. Normal sensation noted. Normal strength noted.   Neurological: She is alert and oriented to person, place, and time.   Psychiatric: She has a normal mood and affect.       LABS     No results found for: LABA1C, HGBA1C  CMP  Sodium   Date Value Ref Range Status   03/13/2019 140 136 - 145 mmol/L Final     Potassium   Date Value Ref Range Status   03/13/2019 4.1 3.5 - 5.1 mmol/L Final     Chloride   Date Value Ref Range Status   03/13/2019 106 95 - 110 mmol/L Final     CO2   Date Value Ref Range Status   03/13/2019 24 23 - 29 mmol/L Final     Glucose   Date Value Ref Range Status   03/13/2019 83 70 - 110 mg/dL Final     BUN, Bld   Date Value Ref Range Status   03/13/2019 13 6 - 20 mg/dL Final     Creatinine   Date Value Ref Range Status   03/13/2019 0.9 0.5 - 1.4 mg/dL Final     Calcium   Date Value Ref Range Status   03/13/2019 9.4 8.7 - 10.5 mg/dL Final     Total Protein   Date Value Ref Range Status   03/13/2019 7.7 6.0 - 8.4 g/dL Final     Albumin   Date Value Ref Range Status   03/13/2019 3.6 3.5 - 5.2 g/dL Final     Total Bilirubin   Date Value Ref Range Status   03/13/2019 0.2 0.1 - 1.0 mg/dL Final     Comment:     For infants and newborns, interpretation of results should be based  on gestational age, weight and in agreement with clinical  observations.  Premature Infant recommended reference ranges:  Up to 24 hours.............<8.0 mg/dL  Up to 48 hours............<12.0 mg/dL  3-5 days..................<15.0 mg/dL  6-29 days.................<15.0 mg/dL       Alkaline Phosphatase   Date Value Ref Range  Status   03/13/2019 95 55 - 135 U/L Final     AST   Date Value Ref Range Status   03/13/2019 13 10 - 40 U/L Final     ALT   Date Value Ref Range Status   03/13/2019 10 10 - 44 U/L Final     Anion Gap   Date Value Ref Range Status   03/13/2019 10 8 - 16 mmol/L Final     eGFR if    Date Value Ref Range Status   03/13/2019 >60 >60 mL/min/1.73 m^2 Final     eGFR if non    Date Value Ref Range Status   03/13/2019 >60 >60 mL/min/1.73 m^2 Final     Comment:     Calculation used to obtain the estimated glomerular filtration  rate (eGFR) is the CKD-EPI equation.        Lab Results   Component Value Date    WBC 7.72 03/13/2019    HGB 12.0 03/13/2019    HCT 38.6 03/13/2019    MCV 83 03/13/2019     03/13/2019     Lab Results   Component Value Date    CHOL 153 07/11/2017     Lab Results   Component Value Date    HDL 64 07/11/2017     Lab Results   Component Value Date    LDLCALC 81.0 07/11/2017     Lab Results   Component Value Date    TRIG 40 07/11/2017     Lab Results   Component Value Date    CHOLHDL 41.8 07/11/2017     Lab Results   Component Value Date    TSH 1.540 03/13/2019       ASSESSMENT/PLAN     Renea Mac is a 49 y.o. female with  Past Medical History:   Diagnosis Date    Acute pain of right knee 11/4/2016    Chronic right shoulder pain 11/4/2016    Essential hypertension 11/4/2016    GERD (gastroesophageal reflux disease)     Hypokalemia 6/14/2017    Incomplete tear of right rotator cuff 11/30/2016    Osteoarthritis of right knee 11/30/2016    Rotator cuff tear     S/P laparoscopic sleeve gastrectomy 11/4/2016    Syncope and collapse 6/14/2017    Tail bone pain 11/4/2016       Essential hypertension- poorly controlled off meds. Will restart hctz and rtc in 2 weeks for BP check.   -     hydroCHLOROthiazide (HYDRODIURIL) 25 MG tablet; Take 0.5 tablets (12.5 mg total) by mouth once daily.  Dispense: 30 tablet; Refill: 11    Arthralgia of both hands- labs today to  r/o rheum etiology. Likely arthritic. Will start tylenol arthritis and voltaren gel.   -     OMAR Screen w/Reflex; Future; Expected date: 05/02/2019  -     Rheumatoid factor; Future; Expected date: 05/02/2019  -     CYCLIC CITRUL PEPTIDE ANTIBODY, IGG; Future; Expected date: 05/02/2019  -     diclofenac sodium (VOLTAREN) 1 % Gel; Apply 2 g topically 4 (four) times daily.  Dispense: 100 g; Refill: 0    S/P laparoscopic sleeve gastrectomy- unable to tolerate NSAIDs by mouth     Chronic tension-type headache, not intractable- likely multifactorial. HCTZ for BP and tylenol as needed for tension    Follow up with PCP in 2 weeks for BP check     Patient education provided from [facility]. Patient was counseled on when and how to seek emergent care.       Magdalena HERNANDEZ, AKIRA, FNP-c   Department of Internal Medicine - [facility]  8:29 AM

## 2019-05-02 NOTE — LETTER
May 2, 2019      Flores Gillette, NP  [address] [facility] [address]           [facility] - Internal Medicine  [address]  [place] [address]  Phone: [phone]  Fax: [phone]          Patient: Renea Mac   MR Number: [id number]   YOB: 1970   Date of Visit: 5/2/2019       Dear Flores Gillette:    Thank you for referring Renea Mac to me for evaluation. Attached you will find relevant portions of my assessment and plan of care.    If you have questions, please do not hesitate to call me. I look forward to following Renea Mac along with you.    Sincerely,    Magdalena Davila NP    Enclosure  CC:  No Recipients    If you would like to receive this communication electronically, please contact [email or web address] or [phone] to request more information on [place] [facility] Link access.    For providers and/or their staff who would like to refer a patient to [facility], please contact us through our one-stop-shop provider referral line, [facility], at [phone].    If you feel you have received this communication in error or would no longer like to receive these types of communications, please e-mail [email or web address]

## 2019-05-03 LAB — ANA SER QL IF: NORMAL

## 2019-05-16 ENCOUNTER — OFFICE VISIT (OUTPATIENT)
Dept: INTERNAL MEDICINE | Facility: CLINIC | Age: 49
End: 2019-05-16
Payer: COMMERCIAL

## 2019-05-16 VITALS
HEIGHT: 64 IN | WEIGHT: 209.88 LBS | DIASTOLIC BLOOD PRESSURE: 84 MMHG | BODY MASS INDEX: 35.83 KG/M2 | SYSTOLIC BLOOD PRESSURE: 118 MMHG

## 2019-05-16 DIAGNOSIS — I10 ESSENTIAL HYPERTENSION: Primary | ICD-10-CM

## 2019-05-16 DIAGNOSIS — M19.249 LOCALIZED, SECONDARY OSTEOARTHRITIS OF THE HAND, UNSPECIFIED LATERALITY: ICD-10-CM

## 2019-05-16 PROCEDURE — 99213 PR OFFICE/OUTPT VISIT, EST, LEVL III, 20-29 MIN: ICD-10-PCS | Mod: S$GLB,,, | Performed by: NURSE PRACTITIONER

## 2019-05-16 PROCEDURE — 3079F PR MOST RECENT DIASTOLIC BLOOD PRESSURE 80-89 MM HG: ICD-10-PCS | Mod: CPTII,S$GLB,, | Performed by: NURSE PRACTITIONER

## 2019-05-16 PROCEDURE — 3008F BODY MASS INDEX DOCD: CPT | Mod: CPTII,S$GLB,, | Performed by: NURSE PRACTITIONER

## 2019-05-16 PROCEDURE — 3008F PR BODY MASS INDEX (BMI) DOCUMENTED: ICD-10-PCS | Mod: CPTII,S$GLB,, | Performed by: NURSE PRACTITIONER

## 2019-05-16 PROCEDURE — 99999 PR PBB SHADOW E&M-EST. PATIENT-LVL III: CPT | Mod: PBBFAC,,, | Performed by: NURSE PRACTITIONER

## 2019-05-16 PROCEDURE — 3074F PR MOST RECENT SYSTOLIC BLOOD PRESSURE < 130 MM HG: ICD-10-PCS | Mod: CPTII,S$GLB,, | Performed by: NURSE PRACTITIONER

## 2019-05-16 PROCEDURE — 3079F DIAST BP 80-89 MM HG: CPT | Mod: CPTII,S$GLB,, | Performed by: NURSE PRACTITIONER

## 2019-05-16 PROCEDURE — 3074F SYST BP LT 130 MM HG: CPT | Mod: CPTII,S$GLB,, | Performed by: NURSE PRACTITIONER

## 2019-05-16 PROCEDURE — 99999 PR PBB SHADOW E&M-EST. PATIENT-LVL III: ICD-10-PCS | Mod: PBBFAC,,, | Performed by: NURSE PRACTITIONER

## 2019-05-16 PROCEDURE — 99213 OFFICE O/P EST LOW 20 MIN: CPT | Mod: S$GLB,,, | Performed by: NURSE PRACTITIONER

## 2019-05-16 NOTE — PROGRESS NOTES
INTERNAL MEDICINE PROGRESS NOTE    CHIEF COMPLAINT     Chief Complaint   Patient presents with    Follow-up       HPI     Renea Mac is a 49 y.o. female with HTN who presents for a 2 week BP follow up visit today.    5/2/2019- was restarted on hctz 12.5mg.   Today Bp is controlled. Pt reports headache are resolved with lower blood pressure     At that visit she also c/o arthralgias of bilateral hands - labs normal. Start on Voltaren gel     Past Medical History:  Past Medical History:   Diagnosis Date    Acute pain of right knee 11/4/2016    Chronic right shoulder pain 11/4/2016    Essential hypertension 11/4/2016    GERD (gastroesophageal reflux disease)     Hypokalemia 6/14/2017    Incomplete tear of right rotator cuff 11/30/2016    Osteoarthritis of right knee 11/30/2016    Rotator cuff tear     S/P laparoscopic sleeve gastrectomy 11/4/2016    Syncope and collapse 6/14/2017    Tail bone pain 11/4/2016       Home Medications:  Prior to Admission medications    Medication Sig Start Date End Date Taking? Authorizing Provider   calcium carbonate/vitamin D3 (VITAMIN D-3 ORAL) Take by mouth.   Yes Historical Provider, MD   cholecalciferol, vitamin D3, (VITAMIN D3) 1,000 unit capsule Take 1,000 Units by mouth once daily.   Yes Historical Provider, MD   diclofenac sodium (VOLTAREN) 1 % Gel Apply 2 g topically 4 (four) times daily. 5/2/19  Yes Magdalena Davila NP   docusate sodium (COLACE) 50 MG capsule Take 2 capsules (100 mg total) by mouth 2 (two) times daily.  Patient taking differently: Take 100 mg by mouth as needed.  2/5/19  Yes BASSEM Valles MD   hydroCHLOROthiazide (HYDRODIURIL) 25 MG tablet Take 0.5 tablets (12.5 mg total) by mouth once daily. 5/2/19  Yes Magdalena Davila NP   methocarbamol (ROBAXIN) 500 MG Tab Take 1 tablet 4 times daily as needed for 5 days 11/4/18  Yes Daksha Gray MD   polyethylene glycol (MIRALAX) 17 gram/dose powder Mix 1 capful (17 grams  total) with a liquid and take by mouth once daily. 12/6/18  Yes Brynn Lopez PA-C   pyridoxine HCl, vitamin B6, (PYRIDOXINE, VITAMIN B6, ORAL) Take by mouth.   Yes Historical Provider, MD   thiamine HCl (VITAMIN B-1 ORAL) Take by mouth.   Yes Historical Provider, MD   acetaminophen (TYLENOL) 500 MG tablet Take 500 mg by mouth every 6 (six) hours as needed for Pain.    Historical Provider, MD       Review of Systems:  Review of Systems   Constitutional: Negative for chills, fatigue, fever and unexpected weight change.   HENT: Negative for congestion, hearing loss, rhinorrhea and sinus pressure.    Eyes: Negative for pain, redness and visual disturbance.   Respiratory: Negative for cough and shortness of breath.    Cardiovascular: Negative for chest pain and palpitations.   Gastrointestinal: Negative for abdominal distention, abdominal pain, constipation, diarrhea, nausea and vomiting.   Endocrine: Negative for polydipsia, polyphagia and polyuria.   Genitourinary: Negative for dysuria, frequency, urgency and vaginal discharge.   Musculoskeletal: Positive for arthralgias. Negative for gait problem and myalgias.   Skin: Negative for color change and rash.   Allergic/Immunologic: Negative for environmental allergies and immunocompromised state.   Neurological: Negative for dizziness, weakness, light-headedness and headaches.   Hematological: Negative for adenopathy. Does not bruise/bleed easily.   Psychiatric/Behavioral: Negative for confusion and sleep disturbance. The patient is not nervous/anxious.        Health Maintainence:   Immunizations:  Health Maintenance       Date Due Completion Date    TETANUS VACCINE 03/28/1990 ---    Influenza Vaccine 08/01/2019 8/1/2018 (Done)    Override on 8/1/2018: Done    Override on 10/30/2017: Done (at [facility] on Dwayne [email or web address])    Mammogram 03/13/2021 3/13/2019    Lipid Panel 07/11/2022 7/11/2017           PHYSICAL EXAM     /84 (BP Location: Left arm, Patient Position:  "Sitting, BP Method: Large (Manual))   Ht 5' 4" (1.626 m)   Wt 95.2 kg (209 lb 14.1 oz)   LMP 03/01/2011 (Approximate)   BMI 36.03 kg/m²     Physical Exam   Constitutional: She is oriented to person, place, and time. She appears well-developed and well-nourished.   HENT:   Head: Normocephalic and atraumatic.   Eyes: Pupils are equal, round, and reactive to light.   Cardiovascular: Normal rate and regular rhythm.   Pulmonary/Chest: Effort normal.   Neurological: She is alert and oriented to person, place, and time.   Psychiatric: She has a normal mood and affect.       LABS     No results found for: LABA1C, HGBA1C  CMP  Sodium   Date Value Ref Range Status   03/13/2019 140 136 - 145 mmol/L Final     Potassium   Date Value Ref Range Status   03/13/2019 4.1 3.5 - 5.1 mmol/L Final     Chloride   Date Value Ref Range Status   03/13/2019 106 95 - 110 mmol/L Final     CO2   Date Value Ref Range Status   03/13/2019 24 23 - 29 mmol/L Final     Glucose   Date Value Ref Range Status   03/13/2019 83 70 - 110 mg/dL Final     BUN, Bld   Date Value Ref Range Status   03/13/2019 13 6 - 20 mg/dL Final     Creatinine   Date Value Ref Range Status   03/13/2019 0.9 0.5 - 1.4 mg/dL Final     Calcium   Date Value Ref Range Status   03/13/2019 9.4 8.7 - 10.5 mg/dL Final     Total Protein   Date Value Ref Range Status   03/13/2019 7.7 6.0 - 8.4 g/dL Final     Albumin   Date Value Ref Range Status   03/13/2019 3.6 3.5 - 5.2 g/dL Final     Total Bilirubin   Date Value Ref Range Status   03/13/2019 0.2 0.1 - 1.0 mg/dL Final     Comment:     For infants and newborns, interpretation of results should be based  on gestational age, weight and in agreement with clinical  observations.  Premature Infant recommended reference ranges:  Up to 24 hours.............<8.0 mg/dL  Up to 48 hours............<12.0 mg/dL  3-5 days..................<15.0 mg/dL  6-29 days.................<15.0 mg/dL       Alkaline Phosphatase   Date Value Ref Range Status "   03/13/2019 95 55 - 135 U/L Final     AST   Date Value Ref Range Status   03/13/2019 13 10 - 40 U/L Final     ALT   Date Value Ref Range Status   03/13/2019 10 10 - 44 U/L Final     Anion Gap   Date Value Ref Range Status   03/13/2019 10 8 - 16 mmol/L Final     eGFR if    Date Value Ref Range Status   03/13/2019 >60 >60 mL/min/1.73 m^2 Final     eGFR if non    Date Value Ref Range Status   03/13/2019 >60 >60 mL/min/1.73 m^2 Final     Comment:     Calculation used to obtain the estimated glomerular filtration  rate (eGFR) is the CKD-EPI equation.        Lab Results   Component Value Date    WBC 7.72 03/13/2019    HGB 12.0 03/13/2019    HCT 38.6 03/13/2019    MCV 83 03/13/2019     03/13/2019     Lab Results   Component Value Date    CHOL 153 07/11/2017     Lab Results   Component Value Date    HDL 64 07/11/2017     Lab Results   Component Value Date    LDLCALC 81.0 07/11/2017     Lab Results   Component Value Date    TRIG 40 07/11/2017     Lab Results   Component Value Date    CHOLHDL 41.8 07/11/2017     Lab Results   Component Value Date    TSH 1.540 03/13/2019       ASSESSMENT/PLAN     Renea Mac is a 49 y.o. female with  Past Medical History:   Diagnosis Date    Acute pain of right knee 11/4/2016    Chronic right shoulder pain 11/4/2016    Essential hypertension 11/4/2016    GERD (gastroesophageal reflux disease)     Hypokalemia 6/14/2017    Incomplete tear of right rotator cuff 11/30/2016    Osteoarthritis of right knee 11/30/2016    Rotator cuff tear     S/P laparoscopic sleeve gastrectomy 11/4/2016    Syncope and collapse 6/14/2017    Tail bone pain 11/4/2016       Essential hypertension- at goal. Cont current meds. Low na diet     Localized, secondary osteoarthritis of the hand, unspecified laterality- may use voltaren as needed. Tumeric as needed     Follow up as needed and with PCP     Patient education provided from [facility]. Patient was counseled on  when and how to seek emergent care.       Magdalena HERNANDEZ, AKIRA, FNP-c   Department of Internal Medicine - [facility]  10:30 AM

## 2019-06-24 ENCOUNTER — OFFICE VISIT (OUTPATIENT)
Dept: ORTHOPEDICS | Facility: CLINIC | Age: 49
End: 2019-06-24
Payer: COMMERCIAL

## 2019-06-24 ENCOUNTER — HOSPITAL ENCOUNTER (OUTPATIENT)
Dept: RADIOLOGY | Facility: HOSPITAL | Age: 49
Discharge: HOME | End: 2019-06-24
Attending: PHYSICIAN ASSISTANT
Payer: COMMERCIAL

## 2019-06-24 VITALS — BODY MASS INDEX: 35.83 KG/M2 | WEIGHT: 209.88 LBS | HEIGHT: 64 IN

## 2019-06-24 DIAGNOSIS — M25.511 RIGHT SHOULDER PAIN, UNSPECIFIED CHRONICITY: ICD-10-CM

## 2019-06-24 DIAGNOSIS — M25.511 RIGHT SHOULDER PAIN, UNSPECIFIED CHRONICITY: Primary | ICD-10-CM

## 2019-06-24 PROCEDURE — 99213 OFFICE O/P EST LOW 20 MIN: CPT | Mod: S$GLB,,, | Performed by: PHYSICIAN ASSISTANT

## 2019-06-24 PROCEDURE — 3008F BODY MASS INDEX DOCD: CPT | Mod: CPTII,S$GLB,, | Performed by: PHYSICIAN ASSISTANT

## 2019-06-24 PROCEDURE — 73030 X-RAY EXAM OF SHOULDER: CPT | Mod: 26,RT,, | Performed by: RADIOLOGY

## 2019-06-24 PROCEDURE — 73030 XR SHOULDER COMPLETE 2 OR MORE VIEWS RIGHT: ICD-10-PCS | Mod: 26,RT,, | Performed by: RADIOLOGY

## 2019-06-24 PROCEDURE — 73030 X-RAY EXAM OF SHOULDER: CPT | Mod: TC,RT

## 2019-06-24 PROCEDURE — 99999 PR PBB SHADOW E&M-EST. PATIENT-LVL III: CPT | Mod: PBBFAC,,, | Performed by: PHYSICIAN ASSISTANT

## 2019-06-24 PROCEDURE — 99213 PR OFFICE/OUTPT VISIT, EST, LEVL III, 20-29 MIN: ICD-10-PCS | Mod: S$GLB,,, | Performed by: PHYSICIAN ASSISTANT

## 2019-06-24 PROCEDURE — 99999 PR PBB SHADOW E&M-EST. PATIENT-LVL III: ICD-10-PCS | Mod: PBBFAC,,, | Performed by: PHYSICIAN ASSISTANT

## 2019-06-24 PROCEDURE — 3008F PR BODY MASS INDEX (BMI) DOCUMENTED: ICD-10-PCS | Mod: CPTII,S$GLB,, | Performed by: PHYSICIAN ASSISTANT

## 2019-06-24 RX ORDER — METHYLPREDNISOLONE 4 MG/1
TABLET ORAL
Qty: 21 TABLET | Refills: 0 | Status: SHIPPED | OUTPATIENT
Start: 2019-06-24 | End: 2019-07-03

## 2019-06-25 NOTE — PROGRESS NOTES
Subjective:      Patient ID: Renea Mac is a 49 y.o. female.    Chief Complaint: Pain of the Right Shoulder    HPI  49 year old female presents with chief complaint of right shoulder pain x 1 month. She denies trauma. Pain is constant. It radiates to her neck and down her back and across her chest. It also radiates down her whole arm. Pain feels worse when her arm hangs down and when she lays on the opposite shoulder. It felt better when she laid on the right shoulder. She says she can raise the arm without much problem. Tylenol and voltaren gel give some relief. She cannot take nsaids due to h/o gastric sleeve sx. She has h/o right partial RCT that has been treated with cortisone injections and PT. She does not do HEP. Her last injection only gave relief for a couple of months.   Review of Systems   Constitution: Negative for chills, fever and night sweats.   Cardiovascular: Negative for chest pain.   Respiratory: Negative for cough and shortness of breath.    Hematologic/Lymphatic: Does not bruise/bleed easily.   Skin: Negative for color change.   Gastrointestinal: Negative for heartburn.   Genitourinary: Negative for dysuria.   Neurological: Negative for numbness and paresthesias.   Psychiatric/Behavioral: Negative for altered mental status.   Allergic/Immunologic: Negative for persistent infections.         Objective:            General    Vitals reviewed.  Constitutional: She is oriented to person, place, and time. She appears well-developed and well-nourished.   Cardiovascular: Normal rate.    Neurological: She is alert and oriented to person, place, and time.         Right Shoulder Exam     Range of Motion   Active abduction:  150 abnormal   Forward Flexion:  160 abnormal   External Rotation 0 degrees: abnormal   Internal rotation 0 degrees: abnormal     Tests & Signs   Ramos test: negative  Impingement: negative  Speed's Test: positive    Other   Sensation: normal    Comments:  Mild pain with empty  can test. Not much pain with shoulder ROM.     Vascular Exam     Right Pulses      Radial:                    2+          X-ray: ordered and reviewed by myself. No significant abnormality identified.  No significant detrimental interval change since 04/09/2018.        Assessment:       Encounter Diagnosis   Name Primary?    Right shoulder pain, unspecified chronicity Yes          Plan:       Discussed treatment options with patient. Explained to patient that most of her pain may be coming from her neck. Medrol dose pack sent to pharmacy. Will schedule a f/u appt with back/spine clinic to eval her neck to determine if neck pathology is present. RTC prn.

## 2019-06-27 ENCOUNTER — TELEPHONE (OUTPATIENT)
Dept: SPINE | Facility: CLINIC | Age: 49
End: 2019-06-27

## 2019-06-27 DIAGNOSIS — M54.2 CERVICALGIA: Primary | ICD-10-CM

## 2019-07-03 ENCOUNTER — OFFICE VISIT (OUTPATIENT)
Dept: SPINE | Facility: CLINIC | Age: 49
End: 2019-07-03
Payer: COMMERCIAL

## 2019-07-03 ENCOUNTER — HOSPITAL ENCOUNTER (OUTPATIENT)
Dept: RADIOLOGY | Facility: OTHER | Age: 49
Discharge: HOME | End: 2019-07-03
Attending: PHYSICIAN ASSISTANT
Payer: OTHER GOVERNMENT

## 2019-07-03 VITALS
BODY MASS INDEX: 35.68 KG/M2 | HEART RATE: 69 BPM | HEIGHT: 64 IN | WEIGHT: 209 LBS | DIASTOLIC BLOOD PRESSURE: 92 MMHG | SYSTOLIC BLOOD PRESSURE: 136 MMHG

## 2019-07-03 DIAGNOSIS — M54.2 CERVICALGIA: ICD-10-CM

## 2019-07-03 DIAGNOSIS — M54.12 CERVICAL RADICULOPATHY: ICD-10-CM

## 2019-07-03 DIAGNOSIS — M54.2 NECK PAIN: Primary | ICD-10-CM

## 2019-07-03 PROCEDURE — 3008F PR BODY MASS INDEX (BMI) DOCUMENTED: ICD-10-PCS | Mod: CPTII,S$GLB,, | Performed by: PHYSICIAN ASSISTANT

## 2019-07-03 PROCEDURE — 72050 X-RAY EXAM NECK SPINE 4/5VWS: CPT | Mod: 26,,, | Performed by: RADIOLOGY

## 2019-07-03 PROCEDURE — 72050 X-RAY EXAM NECK SPINE 4/5VWS: CPT | Mod: TC,FY

## 2019-07-03 PROCEDURE — 3075F SYST BP GE 130 - 139MM HG: CPT | Mod: CPTII,S$GLB,, | Performed by: PHYSICIAN ASSISTANT

## 2019-07-03 PROCEDURE — 99999 PR PBB SHADOW E&M-EST. PATIENT-LVL IV: CPT | Mod: PBBFAC,,, | Performed by: PHYSICIAN ASSISTANT

## 2019-07-03 PROCEDURE — 3080F DIAST BP >= 90 MM HG: CPT | Mod: CPTII,S$GLB,, | Performed by: PHYSICIAN ASSISTANT

## 2019-07-03 PROCEDURE — 3008F BODY MASS INDEX DOCD: CPT | Mod: CPTII,S$GLB,, | Performed by: PHYSICIAN ASSISTANT

## 2019-07-03 PROCEDURE — 99204 PR OFFICE/OUTPT VISIT, NEW, LEVL IV, 45-59 MIN: ICD-10-PCS | Mod: S$GLB,,, | Performed by: PHYSICIAN ASSISTANT

## 2019-07-03 PROCEDURE — 72050 XR CERVICAL SPINE AP LAT WITH FLEX EXTEN: ICD-10-PCS | Mod: 26,,, | Performed by: RADIOLOGY

## 2019-07-03 PROCEDURE — 3075F PR MOST RECENT SYSTOLIC BLOOD PRESS GE 130-139MM HG: ICD-10-PCS | Mod: CPTII,S$GLB,, | Performed by: PHYSICIAN ASSISTANT

## 2019-07-03 PROCEDURE — 99204 OFFICE O/P NEW MOD 45 MIN: CPT | Mod: S$GLB,,, | Performed by: PHYSICIAN ASSISTANT

## 2019-07-03 PROCEDURE — 99999 PR PBB SHADOW E&M-EST. PATIENT-LVL IV: ICD-10-PCS | Mod: PBBFAC,,, | Performed by: PHYSICIAN ASSISTANT

## 2019-07-03 PROCEDURE — 3080F PR MOST RECENT DIASTOLIC BLOOD PRESSURE >= 90 MM HG: ICD-10-PCS | Mod: CPTII,S$GLB,, | Performed by: PHYSICIAN ASSISTANT

## 2019-07-03 RX ORDER — METHOCARBAMOL 750 MG/1
750 TABLET, FILM COATED ORAL 3 TIMES DAILY PRN
Qty: 270 TABLET | Refills: 0 | Status: SHIPPED | OUTPATIENT
Start: 2019-07-03 | End: 2019-10-01

## 2019-07-03 RX ORDER — GABAPENTIN 300 MG/1
CAPSULE ORAL
Qty: 270 CAPSULE | Refills: 0 | Status: SHIPPED | OUTPATIENT
Start: 2019-07-03 | End: 2020-07-20

## 2019-07-03 NOTE — PROGRESS NOTES
Subjective:     Patient ID:  Renea Mac is a 49 y.o. female.    [facility]    Chief Complaint: Neck and right arm pain    HPI    Renea Mac is a 49 y.o. female who presents with the above CC.  Patient started to have symptoms about one month ago that have gotten worse.  Pain in the right side of the neck rated 8/10 with radiation down the right arm to the forearm rated 6/10 and some pain in the right upper chest.  Pain in the neck is constant and right arm pain and right chest pain comes and goes.  No left arm pain.    The neck pain is worse than the right arm pain.    Patient has not had PT or ESIs.  No spine surgery.  Patient is currently taking tylenol arthritis.  Medrol pack helped some.  She cannot take NSAIDs.    Patient denies any recent accidents or trauma, no saddle anesthesias, and no bowel or bladder incontinence.    Patient denies any difficulty with balance or gait, no difficulty tying shoes or buttoning clothes, is occasionally dropping things, does not have difficulty opening containers, and has had no change in handwriting.      Review of Systems:    Review of Systems   Constitutional: Positive for malaise/fatigue. Negative for chills, diaphoresis, fever and weight loss.   HENT: Positive for ear pain. Negative for congestion, ear discharge, hearing loss, nosebleeds, sinus pain, sore throat and tinnitus.    Eyes: Negative for blurred vision, double vision, photophobia, pain, discharge and redness.   Respiratory: Negative for cough, hemoptysis, sputum production, shortness of breath, wheezing and stridor.    Cardiovascular: Positive for chest pain. Negative for palpitations, orthopnea, leg swelling and PND.   Gastrointestinal: Negative for abdominal pain, blood in stool, constipation, diarrhea, heartburn, melena, nausea and vomiting.   Genitourinary: Negative for dysuria, flank pain, frequency, hematuria and urgency.   Musculoskeletal: Positive for back pain and neck pain. Negative for  falls, joint pain and myalgias.   Skin: Negative for itching and rash.   Neurological: Positive for headaches. Negative for dizziness, tingling, tremors, sensory change, speech change, seizures, loss of consciousness and weakness.   Endo/Heme/Allergies: Negative for environmental allergies and polydipsia. Bruises/bleeds easily.   Psychiatric/Behavioral: Negative for depression, hallucinations, memory loss and substance abuse. The patient is not nervous/anxious and does not have insomnia.        Past Medical History:   Diagnosis Date    Acute pain of right knee 2016    Chronic right shoulder pain 2016    Essential hypertension 2016    GERD (gastroesophageal reflux disease)     Hypokalemia 2017    Incomplete tear of right rotator cuff 2016    Osteoarthritis of right knee 2016    Rotator cuff tear     S/P laparoscopic sleeve gastrectomy 2016    Syncope and collapse 2017    Tail bone pain 2016     Past Surgical History:   Procedure Laterality Date     SECTION      x1    COLONOSCOPY N/A 2019    Performed by [facility] at [facility] ENDO (4TH FLR)    COLPOSCOPY      EXCISION, MASS, PELVIS N/A 2019    Performed by Petrona Porter [facility] at [facility] OR 2ND FLR    HERNIA REPAIR      HYSTERECTOMY      YANG, ovaries remain (fibroids0    LAPAROTOMY, EXPLORATORY N/A 2019    [facility] at [facility] OR 2ND FLR    OOPHORECTOMY Bilateral 2019    SALPINGO-OOPHORECTOMY, BILATERAL Bilateral 2019    [facility] OR 2ND FLR    SLEEVE GASTROPLASTY  2014    TUBAL LIGATION       Current Outpatient Medications on File Prior to Visit   Medication Sig Dispense Refill    acetaminophen (TYLENOL) 500 MG tablet Take 500 mg by mouth every 6 (six) hours as needed for Pain.      calcium carbonate/vitamin D3 (VITAMIN D-3 ORAL) Take by mouth.      cholecalciferol, vitamin D3, (VITAMIN D3) 1,000 unit  capsule Take 1,000 Units by mouth once daily.      diclofenac sodium (VOLTAREN) 1 % Gel Apply 2 g topically 4 (four) times daily. 100 g 0    docusate sodium (COLACE) 50 MG capsule Take 2 capsules (100 mg total) by mouth 2 (two) times daily. (Patient taking differently: Take 100 mg by mouth as needed. ) 60 capsule 0    hydroCHLOROthiazide (HYDRODIURIL) 25 MG tablet Take 0.5 tablets (12.5 mg total) by mouth once daily. 30 tablet 11    polyethylene glycol (MIRALAX) 17 gram/dose powder Mix 1 capful (17 grams total) with a liquid and take by mouth once daily. 527 g 11    pyridoxine HCl, vitamin B6, (PYRIDOXINE, VITAMIN B6, ORAL) Take by mouth.      thiamine HCl (VITAMIN B-1 ORAL) Take by mouth.      [DISCONTINUED] methocarbamol (ROBAXIN) 500 MG Tab Take 1 tablet 4 times daily as needed for 5 days 20 tablet 0    [DISCONTINUED] methylPREDNISolone (MEDROL DOSEPACK) 4 mg tablet use as directed 21 tablet 0     No current facility-administered medications on file prior to visit.      Review of patient's allergies indicates:  No Known Allergies  Social History     Socioeconomic History    Marital status:      Spouse name: Not on file    Number of children: Not on file    Years of education: Not on file    Highest education level: Not on file   Occupational History    Not on file   Social Needs    Financial resource strain: Not on file    Food insecurity:     Worry: Not on file     Inability: Not on file    Transportation needs:     Medical: Not on file     Non-medical: Not on file   Tobacco Use    Smoking status: Never Smoker    Smokeless tobacco: Never Used   Substance and Sexual Activity    Alcohol use: Yes     Comment: occasionally    Drug use: No    Sexual activity: Yes     Partners: Male   Lifestyle    Physical activity:     Days per week: Not on file     Minutes per session: Not on file    Stress: Not on file   Relationships    Social connections:     Talks on phone: Not on file     Gets  "together: Not on file     Attends Sikhism service: Not on file     Active member of club or organization: Not on file     Attends meetings of clubs or organizations: Not on file     Relationship status: Not on file   Other Topics Concern    Not on file   Social History Narrative    Not on file     Family History   Problem Relation Age of Onset    Hypertension Mother     Heart disease Mother     Heart attack Mother     Rectal cancer Mother     Colon cancer Mother     Hypertension Father     Heart disease Father     Stroke Father     Heart attack Father     Hypertension Sister     Hypertension Brother     Cancer Brother     No Known Problems Daughter     No Known Problems Son     Esophageal cancer Neg Hx        Objective:      Vitals:    07/03/19 0733   BP: (!) 136/92   Pulse: 69   Weight: 94.8 kg (209 lb)   Height: 5' 4" (1.626 m)   PainSc:   8   PainLoc: Neck         Physical Exam:    General:  Renea Mac is well-developed, well-nourished, appears stated age, in no acute distress, alert and oriented to person, place, and time.    Pulmonary/Chest:  Respiratory effort normal  Abdominal: Exhibits no distension  Psychiatric:  Normal mood and affect.  Behavior is normal.  Judgement and thought content normal    Musculoskeletal:    Patient arises from a sitting to standing position without difficulty.  Patient walks to the door without evidence of limp, pain, or abnormality of gait. Patient is able to walk heel to toe without difficulty.    Cervical ROM:   Pain in cervical spine flexion, right lateral bending, left lateral bending, right rotation, and left rotation.  No pain in extension.    Cervical Spine Inspection:  Normal with no surgical scars with no visible rashes.    Cervical Spine Palpation:  No tenderness to cervical spine palpation.    Neurological: Alert and oriented to person, place, and time    Muscle strength against resistance:     Right Left   Deltoid  5 / 5 5 / 5   Biceps  5 / 5 " 5 / 5   Triceps 5 / 5 5 / 5   Wrist flexion  5 / 5 5 / 5   Wrist extension 5 / 5 5 / 5   Finger abduction 5 / 5 5 / 5   Thumb opposition 4 / 5 4 / 5   Handgrip 5 / 5 5 / 5   Hip flexion  5 / 5 5 / 5   Hip extension 5 / 5 5 / 5   Hip abduction 5 / 5 5 / 5   Hip adduction 5/ 5 5 / 5   Knee extension  5 / 5 5 / 5   Knee flexion  5 / 5 5 / 5   Dorsiflexion  5 / 5 5 / 5   EHL  5 / 5 5 / 5   Plantar flexion  5 / 5 5 / 5   Inversion of the feet 5 / 5 5 / 5   Eversion of the feet 5 / 5 5 / 5     Reflexes:     Right Left   Triceps 2+ 2+   Biceps 2+ 2+   Brachioradialis 2+ 2+   Patellar 2+ 2+   Achilles 2+ 2+     Babinski: Negative bilaterally  Clonus:  Negative bilaterally  Ortega: Negative bilaterally  Negative right spurlings maneuver    On gross examination of the bilateral lower extremities, patient has full painfree ROM with no signs of clubbing, cyanosis, edema, and weakness.     XRAY Interpretation:     Cervical spine ap/lateral/flexion/extension xrays were personally reviewed today.  No fractures.  No movement on flexion and extension.    Assessment:          1. Neck pain    2. Cervical radiculopathy            Plan:          Orders Placed This Encounter    Ambulatory Referral to Physical/Occupational Therapy    gabapentin (NEURONTIN) 300 MG capsule    methocarbamol (ROBAXIN) 750 MG Tab       Patient with acute right cervical radiculopathy    -PT through [facility]  -Neurontin now  -Robaxin PRN now  -FU in three months and if no improvement will get MRI cervical spine    Follow-Up:  Follow up in about 3 months (around 10/3/2019). If there are any questions prior to this, the patient was instructed to contact the office.       Yakelin Hamilton, [facility], PA-C  Neurosurgery  [facility]  [facility]

## 2019-07-03 NOTE — LETTER
July 3, 2019      Meg Tijerina PA-C  [address]  [facility] [address]           [facility] [address]  [address], [address]  [facility] [address]  Phone: [phone]  Fax: [phone]          Patient: Renea Mac   MR Number: [id number]   YOB: 1970   Date of Visit: 7/3/2019       Dear Meg Tijerina:    Thank you for referring Renea Mac to me for evaluation. Attached you will find relevant portions of my assessment and plan of care.    If you have questions, please do not hesitate to call me. I look forward to following Renea Mac along with you.    Sincerely,    Yakelin Hamilton PA-C    Enclosure  CC:  No Recipients    If you would like to receive this communication electronically, please contact [email or web address] or [phone] to request more information on etouches Link access.    For providers and/or their staff who would like to refer a patient to [facility], please contact us through our one-stop-shop provider referral line, [facility], at [phone].    If you feel you have received this communication in error or would no longer like to receive these types of communications, please e-mail [email or web address]

## 2019-07-10 ENCOUNTER — TELEPHONE (OUTPATIENT)
Dept: SPINE | Facility: CLINIC | Age: 49
End: 2019-07-10

## 2019-07-10 NOTE — TELEPHONE ENCOUNTER
----- Message from Devon Cruz sent at 7/10/2019 10:22 AM CDT -----  Contact: SUE CHILDRESS [[id number]]  Name of Who is Calling: SUE CHILDRESS [[id number]]      What is the request in detail: Would like to speak with staff in regards to wanting to taper off of  gabapentin (NEURONTIN) 300 MG capsule. States she has been taking it for 7 days but it makes her feel groggy all day. Only taking around 8:30/bedtime. Please advise      Can the clinic reply by [facility]: no      What [facility] Back [facility]: [phone]

## 2019-07-10 NOTE — TELEPHONE ENCOUNTER
Called and informed patient that she can stop the Gabapentin since she was doing only one at night,  She stated that shse will use the muscle relaxer and she will be starting physical therapy.  She will call with her progress.

## 2019-07-23 ENCOUNTER — CLINICAL SUPPORT (OUTPATIENT)
Dept: REHABILITATION | Facility: HOSPITAL | Age: 49
End: 2019-07-23

## 2019-07-23 DIAGNOSIS — M54.2 NECK PAIN: ICD-10-CM

## 2019-07-23 DIAGNOSIS — M25.511 ACUTE PAIN OF RIGHT SHOULDER: ICD-10-CM

## 2019-07-23 PROCEDURE — 97140 MANUAL THERAPY 1/> REGIONS: CPT | Mod: PN

## 2019-07-23 PROCEDURE — 97161 PT EVAL LOW COMPLEX 20 MIN: CPT | Mod: PN

## 2019-07-23 NOTE — PLAN OF CARE
[facility]  Physical Therapy Initial Evaluation    Name: Renea Mac  Clinic Number: [id number]    Therapy Diagnosis:   Encounter Diagnoses   Name Primary?    Neck pain     Acute pain of right shoulder      Physician: Yakelin Hamilton, *    Physician Orders: PT Eval and Treat;    2-3 times a week/ 6-8 weeks  Cervical protocol with home exercises. ROM, stretching, strengthening, traction, and E-stim, massage, ultrasound, tens and modalities.        Medical Diagnosis:   M54.2 (ICD-10-CM) - Neck pain   M54.12 (ICD-10-CM) - Cervical radiculopathy     Authorization Period: 12/31/2019  Evaluation Date: 7/23/2019  Plan of Care Certification Period: 7/23/2019 to 10/4/2019  Visit # / Visits authorized: 1/ 30    Time In: 0810  Time Out: 0900  Total Billable Time: 50 minutes (1 LCE, 1 MT)  Precautions: Standard    Subjective    Renea reports primary complaint of R neck/shoulder pain that started Memorial Day weekend.  Progressive worsening since. Radiating into her arm, chest, and up her neck into her R jaw area occasionally. R hand dominant. History of R shoulder issues since 2016; diagnosed with RTC tear in 2016.  Has undergone 4-5 injections since 2016 for her R shoulder pain. Other conservative measures including Voltaran gel, Gabapentin (unable to tolerate), and rest.   Currently experiencing night pain but it feels better to sleep on her right side. Exacerbated by shoulder movements more than neck movements.      Past Medical History:   Diagnosis Date    Acute pain of right knee 11/4/2016    Chronic right shoulder pain 11/4/2016    Essential hypertension 11/4/2016    GERD (gastroesophageal reflux disease)     Hypokalemia 6/14/2017    Incomplete tear of right rotator cuff 11/30/2016    Osteoarthritis of right knee 11/30/2016    Rotator cuff tear     S/P laparoscopic sleeve gastrectomy 11/4/2016    Syncope and collapse 6/14/2017    Tail bone pain 11/4/2016     Renea BERTRAND  Estefania  has a past surgical history that includes Tubal ligation; Hernia repair; Sleeve Gastroplasty (2014); Hysterectomy ();  section; Colposcopy; Colonoscopy (N/A, 2019); LAPAROTOMY, EXPLORATORY (N/A, 2019); Bilateral salpingo-oophorectomy (BSO) (Bilateral, 2019); Excision of pelvic mass (N/A, 2019); and Oophorectomy (Bilateral, 2019).    Renea has a current medication list which includes the following prescription(s): acetaminophen, calcium carbonate/vitamin d3, cholecalciferol (vitamin d3), diclofenac sodium, docusate sodium, gabapentin, hydrochlorothiazide, methocarbamol, polyethylene glycol, pyridoxine hcl (vitamin b6), and thiamine hcl.    Review of patient's allergies indicates:  No Known Allergies     Imaging: Cervical and shoulder imaging performed; see reports in 'Imaging' section.  Prior Therapy: yes; for her right shoulder.   Social History: Lives at home with her family.    Occupation: /organizer on the side.  Primary job is  at [employer]  Prior Level of Function: no limitations with reaching with RUE.   Current Level of Function: unable to reach behind her back or across her body without R shoulder pain.     Pain:  Current 4/10, worst 8/10, best 3/10   Location: right neck  and shoulder pain  Description: Deep and Sharp    Pts goals: to be able to use her right arm without pain.     Objective     Palpation: TTP along R supraclavicular area grossly, R inter-scapular area, and R infraspinous area.  TTP with palpation of R sternoclavicular joint  Posture:  Holding right arm against her body.  Mild forward head posturing.  Rounded shoulders.    Gross Movement:  -Shoulder flexion: slow, deliberate R shoulder flexion with compensatory hiking and limited ROM.   -Cervical quadrant test: negative B    Cervicothoracic Range of Motion:    Degrees Pain/Dysfunction   Flexion WNL NP   Extension WNL NP   Right Rotation WNL NP   Left  Rotation 55 Pain R UT area   Right Side Bending WNL NP   Left Side Bending WNL Pain R UT area      UE Range of Motion:   Shoulder Left Right Pain/Dysfunction   Shoulder Flexion WNL P: 150   A: 110  Pain superoposterior R shoulder   Shoulder Abduction WNL P: 150  A: 110 Pain superoposterior R shoulder   Shoulder ER WNL 80  Pain posterior R shoulder   Shoulder IR WNL WNL    Elbow WNL WNL    Wrist WNL WNL      Cervical Strength:  Cervical flexor endurance test: 30 second hold ability     Upper Extremity Strength:  LUE  RUE    Shoulder flexion: 5/5 Shoulder flexion: 4/5 pain   Shoulder Abduction: 5/5 Shoulder abduction: 5/5   Shoulder ER 5/5 Shoulder ER 3+/5 pain posteriorly   Shoulder IR 5/5 Shoulder IR 4/5 pain   Elbow flexion: 5/5 Elbow flexion: 4/5 mild pain   Elbow extension: 5/5 Elbow extension: 5/5   Wrist flexion: 5/5 Wrist flexion: 5/5   Wrist extension: 5/5 Wrist extension: 5/5    5/5 : 5/5     Special Tests:  -Cervical distraction: negative   -Spurling's test: negative B  - Ramos-Mehran: mild R shoulder pain  - Zayda's test: (+) mild pain but good strength  - Franks's test: with ER (-) but (+) pain with IR  - Belly press sign: (-)  - Biceps Load II test: (-)    Joint Mobility: guarded with R shoulder joint accessory motion assessment.  Pain with SC joint assessment  Flexibility: tight latissimus dorsi B  Sensation: Normal light touch sensation C4-T2 throughout BUE      CMS Impairment/Limitation/Restriction for FOTO Neck Survey    Therapist reviewed FOTO scores for Renea Mac on 7/23/2019.   FOTO documents entered into [employer] - see Media section.    Limitation Score: 46%  Predicted Limitation Score: 32%       TREATMENT   Treatment Time In: 0845  Treatment Time Out: 0900  Total Treatment time separate from Evaluation time:15      Renea received the following manual therapy techniques: total time 10 minutes  - grade II/III right shoulder joint passive physiological IR and flexion  - STM/MFR to R  scapular elevators     Renea received cold pack for 5 minutes to R shoulder joint complex to decrease circulation, pain, and swelling.    Home Exercises and Patient Education Provided  Education provided re:   - progress towards goals   - role of therapy in multi - disciplinary team, goals for therapy  No spiritual or educational barriers to learning provided    Written Home Exercises Provided: not today; next visit    Assessment   Renea is a 49 y.o. female referred to outpatient Physical Therapy with a medical diagnosis of 1) neck pain and 2) cervical radiculopathy. Pt presents with R supraclavicular pain, R shoulder joint pain including sternoclavicular joint pain, pain limiting R shoulder PROM/AROM especially into flexion/ER with superoposterior joint pain, and  (+) impingement shoulder cluster tests, (-) labral testing.  Normal cervical ROM and negative quadrant tests today; pain with lengthening of scapular elevators. History of R RTC partial tear; cannot r/o.  R cervical/shoulder status complicated by job demands which include repetitive reaching.  Overall, R cervical/shoulder problems are resulting in decreased tolerance to work demands, sleep pattern interruption, decreased independence with dressing and bathing, and overall decreased quality of life.     Pt prognosis is Excellent.   Pt will benefit from skilled outpatient Physical Therapy to address the deficits stated above and in the chart below, provide pt/family education, and to maximize pt's level of independence.     Plan of care discussed with patient: Yes  Pt's spiritual, cultural and educational needs considered and pt agreeable to plan of care and goals as stated below:     Anticipated Barriers for therapy: work demands.    Medical Necessity is demonstrated by the following  History  Co-morbidities and personal factors that may impact the plan of care Co-morbidities:   Arthritis, Back pain, BMI over 30, Headaches, High Blood Pressure, Prior  Surgery    Personal Factors:   lifestyle     moderate   Examination  Body Structures and Functions, activity limitations and participation restrictions that may impact the plan of care Body Regions:   upper extremities    Body Systems:    ROM  strength  transfers  transitions  motor control  motor learning  edema    Participation Restrictions:   See above    Activity limitations:   Learning and applying knowledge  no deficits    General Tasks and Commands  no deficits    Communication  no deficits    Mobility  lifting and carrying objects  fine hand use (grasping/picking up)  driving (bike, car, motorcycle)    Self care  washing oneself (bathing, drying, washing hands)  caring for body parts (brushing teeth, shaving, grooming)  toileting  dressing  eating  drinking  looking after one's health    Domestic Life  shopping  cooking  doing house work (cleaning house, washing dishes, laundry)  assisting others    Interactions/Relationships  no deficits    Life Areas  employment    Community and Social Life  community life  recreation and leisure         moderate   Clinical Presentation stable and uncomplicated low   Decision Making/ Complexity Score: low     Goals:  Short Term Goals: 3-4 weeks:  1. Patient will demonstrate up to 140 degree R shoulder flexion AROM for improved ADL independence.  2. Patient will demonstrate no pain with reaching R arm across her body.   3. Patient will report <3/10 decrease in cervical pain for improved tolerance to ADL performance.    Long Term Goals: 8 - 12 weeks:  1. Patient will demonstrate normal R shoulder AROM without pain.   2. Patient will report ability to manage R shoulder/neck pain with HEP.   3. Patient will report <30% limitation on Neck FOTO survey.   4. Patient will report <3/10 R sided neck/shoulder pain with all work activities.       Plan   Certification Period: 7/23/2019 to 10/4/2019.    Outpatient Physical Therapy 2 times weekly for 10 weeks to include the following  interventions: Cervical/Lumbar Traction, Electrical Stimulation IFC, Manual Therapy, Moist Heat/ Ice, Neuromuscular Re-ed, Patient Education, Self Care, Therapeutic Activites and Therapeutic Exercise.     Jimmie Gonzalez, PT

## 2019-07-24 PROBLEM — M25.511 ACUTE PAIN OF RIGHT SHOULDER: Status: ACTIVE | Noted: 2019-07-24

## 2019-07-24 PROBLEM — M54.2 NECK PAIN: Status: ACTIVE | Noted: 2019-07-24

## 2019-07-30 ENCOUNTER — CLINICAL SUPPORT (OUTPATIENT)
Dept: REHABILITATION | Facility: HOSPITAL | Age: 49
End: 2019-07-30

## 2019-07-30 DIAGNOSIS — M54.2 NECK PAIN: ICD-10-CM

## 2019-07-30 DIAGNOSIS — M25.511 ACUTE PAIN OF RIGHT SHOULDER: ICD-10-CM

## 2019-07-30 PROCEDURE — 97140 MANUAL THERAPY 1/> REGIONS: CPT | Mod: PN

## 2019-07-30 PROCEDURE — 97110 THERAPEUTIC EXERCISES: CPT | Mod: PN

## 2019-08-01 NOTE — PROGRESS NOTES
Physical Therapy Daily Treatment Note     Name: Renea Mac  Clinic Number: [id number]    Therapy Diagnosis:   Encounter Diagnoses   Name Primary?    Neck pain     Acute pain of right shoulder      Physician: Yakelin Hamilton, *    Visit Date: 7/30/2019    Physician Orders: PT Eval and Treat;     2-3 times a week/ 6-8 weeks  Cervical protocol with home exercises. ROM, stretching, strengthening, traction, and E-stim, massage, ultrasound, tens and modalities.         Medical Diagnosis:   M54.2 (ICD-10-CM) - Neck pain   M54.12 (ICD-10-CM) - Cervical radiculopathy      Authorization Period: 12/31/2019  Evaluation Date: 7/23/2019  Plan of Care Certification Period: 7/23/2019 to 10/4/2019  Visit # / Visits authorized: 2/ 30  FOTO: 2/5     Time In: 0900  Time Out: 1000  Total Billable Time: 30 minutes (1 TE, 1 MT)  Precautions: Standard    Subjective     Pt reports: primary complaint of R shoulder pain.   She was compliant with home exercise program.  Response to previous treatment: eval only.   Functional change: none voiced today    Pain: 5/10  Location: right shoulder      Objective   O: Comparable sign: R shoulder flexion PROM = AROM; superoposterior pain    Renea received therapeutic exercises to develop strength, endurance, ROM, flexibility, posture and core stabilization for 20 minutes with PT 1:1 including and 20 minutes under supervision:  - supine wand flexion     2x10 T-bar (pain-free ROM)  - supine chest press     20 reps T-bar  - prone shoulder extension    2x10 with 1# dumbell  - standing lat pull-downs    RTB 3x10  - shoulder doorway isos    10 reps x 5 second holds/each (P! With ER)    Renea received the following manual therapy techniques: total time 10 minutes:  - grade I/II/III R shoulder passive physiological flexion, ER, IR  - lower cervical traction  - STM/MFR R UT muscle    Renea received cold pack for 5 minutes to R supraclavicular area for pain and inflammation control.      Home  Exercises Provided and Patient Education Provided   Education provided:   - ice for pain control     Written Home Exercises Provided: next visit.    Assessment   A: R shoulder moderate tissue irritability.  AROM=PROM.  Pain at R1 with flexion.  Pain at R2 with shoulder ER posteriorly; (+) relocation sign; possible articular-side RTC pathology.    Renea is progressing well towards her goals.   Pt prognosis is Excellent.     Pt will continue to benefit from skilled outpatient physical therapy to address the deficits listed in the problem list box on initial evaluation, provide pt/family education and to maximize pt's level of independence in the home and community environment.     Pt's spiritual, cultural and educational needs considered and pt agreeable to plan of care and goals.     Anticipated barriers to physical therapy: work demands    Goals:   Short Term Goals: 3-4 weeks:  1. Patient will demonstrate up to 140 degree R shoulder flexion AROM for improved ADL independence. Progressing towards; not met  2. Patient will demonstrate no pain with reaching R arm across her body. Progressing towards; not met  3. Patient will report <3/10 decrease in cervical pain for improved tolerance to ADL performance. Progressing towards; not met     Long Term Goals: 8 - 12 weeks:  1. Patient will demonstrate normal R shoulder AROM without pain. Progressing towards; not met  2. Patient will report ability to manage R shoulder/neck pain with HEP. Progressing towards; not met  3. Patient will report <30% limitation on Neck FOTO survey. Progressing towards; not met  4. Patient will report <3/10 R sided neck/shoulder pain with all work activities. Progressing towards; not met       Plan   Phase I/II non-op R shoulder rehab program and neck program.     Jimmie Gonzalez, PT

## 2019-08-05 ENCOUNTER — CLINICAL SUPPORT (OUTPATIENT)
Dept: REHABILITATION | Facility: HOSPITAL | Age: 49
End: 2019-08-05
Payer: COMMERCIAL

## 2019-08-05 DIAGNOSIS — M54.2 NECK PAIN: ICD-10-CM

## 2019-08-05 DIAGNOSIS — M25.511 ACUTE PAIN OF RIGHT SHOULDER: ICD-10-CM

## 2019-08-05 PROCEDURE — 97140 MANUAL THERAPY 1/> REGIONS: CPT | Mod: PN

## 2019-08-05 PROCEDURE — 97110 THERAPEUTIC EXERCISES: CPT | Mod: PN

## 2019-08-05 NOTE — PROGRESS NOTES
Physical Therapy Daily Treatment Note     Name: Renea Mac  Clinic Number: [id number]    Therapy Diagnosis:   Encounter Diagnoses   Name Primary?    Neck pain     Acute pain of right shoulder      Physician: Yakelin Hamilton, *    Visit Date: 8/5/2019    Physician Orders: PT Eval and Treat;     2-3 times a week/ 6-8 weeks  Cervical protocol with home exercises. ROM, stretching, strengthening, traction, and E-stim, massage, ultrasound, tens and modalities.         Medical Diagnosis:   M54.2 (ICD-10-CM) - Neck pain   M54.12 (ICD-10-CM) - Cervical radiculopathy      Authorization Period: 12/31/2019  Evaluation Date: 7/23/2019  Plan of Care Certification Period: 7/23/2019 to 10/4/2019  Visit # / Visits authorized: 3/ 30  FOTO: 3/5     Time In: 0605  Time Out: 0705  Total Billable Time: 55 minutes (3 TE, 1 MT)  Precautions: Standard    Subjective     Pt reports: right shoulder trap pain that will sometimes go down her arm towards her elbow  She was compliant with home exercise program.  Response to previous treatment: eval only.   Functional change: none voiced today    Pain: 4/10  Location: right shoulder      Objective   O: Comparable sign: R shoulder flexion PROM = AROM; superoposterior pain  O: decreased C1-C3 L rotation with R sided neck pain, decreased L cervical sidebending, full cervical AROM with passive shoulder shrug with no pain    Renea received therapeutic exercises to develop strength, endurance, ROM, flexibility, posture and core stabilization for 40 minutes with PT 1:1 including:  - supine wand flexion     2x10 T-bar (pain-free ROM)  - supine chest press     20 reps T-bar  - prone shoulder extension    2x10 with 1# dumbell  - standing lat pull-downs    RTB 3x10  - SL shoulder ER     2x10 R  - SL shoulder abd     10x R to </= 90 degrees (P!)  - shoulder doorway isos    10 reps x 5 second holds/each (P! With ER) - not performed today    Renea received the following manual therapy techniques:  total time 15 minutes:  - grade I/II/III R shoulder passive physiological flexion, ER, IR  - lower cervical traction  - cervical sideglides and physiological extension with PAs  - STM/MFR R UT muscle  - L scapular mobs Grade II-III    Renea received cold pack for 5 minutes to R supraclavicular area for pain and inflammation control.    Home Exercises Provided and Patient Education Provided   Education provided:   - ice for pain control     Written Home Exercises Provided: next visit.    Assessment   A: moderate irritability continues and pain with obtaining end range abd and flexion for right shoulder. Scapula appears depressed and pain with cervical chin tuck    Renea is progressing well towards her goals.   Pt prognosis is Excellent.     Pt will continue to benefit from skilled outpatient physical therapy to address the deficits listed in the problem list box on initial evaluation, provide pt/family education and to maximize pt's level of independence in the home and community environment.     Pt's spiritual, cultural and educational needs considered and pt agreeable to plan of care and goals.     Anticipated barriers to physical therapy: work demands    Goals:   Short Term Goals: 3-4 weeks:  1. Patient will demonstrate up to 140 degree R shoulder flexion AROM for improved ADL independence. Progressing towards; not met  2. Patient will demonstrate no pain with reaching R arm across her body. Progressing towards; not met  3. Patient will report <3/10 decrease in cervical pain for improved tolerance to ADL performance. Progressing towards; not met     Long Term Goals: 8 - 12 weeks:  1. Patient will demonstrate normal R shoulder AROM without pain. Progressing towards; not met  2. Patient will report ability to manage R shoulder/neck pain with HEP. Progressing towards; not met  3. Patient will report <30% limitation on Neck FOTO survey. Progressing towards; not met  4. Patient will report <3/10 R sided neck/shoulder  pain with all work activities. Progressing towards; not met       Plan   Phase I/II non-op R shoulder rehab program and neck program.     Shar Davis, PT

## 2019-08-08 ENCOUNTER — CLINICAL SUPPORT (OUTPATIENT)
Dept: REHABILITATION | Facility: HOSPITAL | Age: 49
End: 2019-08-08
Payer: COMMERCIAL

## 2019-08-08 DIAGNOSIS — M25.511 ACUTE PAIN OF RIGHT SHOULDER: ICD-10-CM

## 2019-08-08 DIAGNOSIS — M54.2 NECK PAIN: ICD-10-CM

## 2019-08-08 PROCEDURE — 97140 MANUAL THERAPY 1/> REGIONS: CPT | Mod: PN

## 2019-08-08 PROCEDURE — 97110 THERAPEUTIC EXERCISES: CPT | Mod: PN

## 2019-08-08 NOTE — PROGRESS NOTES
Physical Therapy Daily Treatment Note     Name: Renea Mac  Clinic Number: [id number]    Therapy Diagnosis:   Encounter Diagnoses   Name Primary?    Neck pain     Acute pain of right shoulder      Physician: Yakelin Hamilton, *    Visit Date: 8/8/2019    Physician Orders: PT Eval and Treat;     2-3 times a week/ 6-8 weeks  Cervical protocol with home exercises. ROM, stretching, strengthening, traction, and E-stim, massage, ultrasound, tens and modalities.         Medical Diagnosis:   M54.2 (ICD-10-CM) - Neck pain   M54.12 (ICD-10-CM) - Cervical radiculopathy      Authorization Period: 12/31/2019  Evaluation Date: 7/23/2019  Plan of Care Certification Period: 7/23/2019 to 10/4/2019  Visit # / Visits authorized: 4/ 30  FOTO: 4/5     Time In: 1:00  Time Out: 2:05  Total Billable Time: 55 minutes (3 TE, 1 MT)  Precautions: Standard    Subjective     Pt reports: less pain today. But had a lot last night with trying to do he daughter's hair causing her to stop.  She was compliant with home exercise program.  Response to previous treatment: eval only.   Functional change: none voiced today    Pain: 4/10  Location: right shoulder      Objective     Renea received therapeutic exercises to develop strength, endurance, ROM, flexibility, posture and core stabilization for 40 minutes including:  - supine wand flexion     2x10 Dowel(pain-free ROM)  - supine chest press     20 reps dowel  -Supine serratus press ups                                        20 reps dowel  - prone shoulder extension    2x10 with 1# dumbell  - standing lat pull-downs    RTB 3x10  - SL shoulder ER     2x10 R  - SL shoulder abd     10x R to </= 90 degrees (in pain free ROM  - shoulder doorway isos    10 reps x 5 second holds/each (P! With ER) - not performed today    Renea received the following manual therapy techniques: total time 15 minutes:  - grade I/II/III R shoulder passive physiological flexion, ER, IR  - lower cervical traction  -  cervical sideglides and physiological extension with PA not performed  - STM/MFR R UT muscle  - L scapular mobs Grade II-III    Renea received cold pack for 10 minutes to R supraclavicular area for pain and inflammation control.    Home Exercises Provided and Patient Education Provided   Education provided:   - ice for pain control     Written Home Exercises Provided: next visit.    Assessment   Tolerated above interventions well. Good pain relief following.    Renea is progressing well towards her goals.   Pt prognosis is Excellent.     Pt will continue to benefit from skilled outpatient physical therapy to address the deficits listed in the problem list box on initial evaluation, provide pt/family education and to maximize pt's level of independence in the home and community environment.     Pt's spiritual, cultural and educational needs considered and pt agreeable to plan of care and goals.     Anticipated barriers to physical therapy: work demands    Goals:   Short Term Goals: 3-4 weeks:  1. Patient will demonstrate up to 140 degree R shoulder flexion AROM for improved ADL independence. Progressing towards; not met  2. Patient will demonstrate no pain with reaching R arm across her body. Progressing towards; not met  3. Patient will report <3/10 decrease in cervical pain for improved tolerance to ADL performance. Progressing towards; not met     Long Term Goals: 8 - 12 weeks:  1. Patient will demonstrate normal R shoulder AROM without pain. Progressing towards; not met  2. Patient will report ability to manage R shoulder/neck pain with HEP. Progressing towards; not met  3. Patient will report <30% limitation on Neck FOTO survey. Progressing towards; not met  4. Patient will report <3/10 R sided neck/shoulder pain with all work activities. Progressing towards; not met       Plan   Phase I/II non-op R shoulder rehab program and neck program.     Delta Correa, PTA

## 2019-08-20 ENCOUNTER — DOCUMENTATION ONLY (OUTPATIENT)
Dept: REHABILITATION | Facility: HOSPITAL | Age: 49
End: 2019-08-20

## 2019-08-20 PROBLEM — M25.511 ACUTE PAIN OF RIGHT SHOULDER: Status: RESOLVED | Noted: 2019-07-24 | Resolved: 2019-08-20

## 2019-08-20 PROBLEM — M54.2 NECK PAIN: Status: RESOLVED | Noted: 2019-07-24 | Resolved: 2019-08-20

## 2019-08-20 NOTE — PROGRESS NOTES
Spoke with Mrs. Mac this morning.  At this time, she would like to discharge from PT due to insurance coverage loss and picking up more hours at work.  Discussed her HEP in detail including pain avoidance with rotator cuff exercises.  Will discharge Mrs. Mac at this time for OPPT.    FREDDY AgrawalT

## 2019-08-26 ENCOUNTER — OFFICE VISIT (OUTPATIENT)
Dept: ORTHOPEDICS | Facility: CLINIC | Age: 49
End: 2019-08-26
Payer: COMMERCIAL

## 2019-08-26 VITALS — BODY MASS INDEX: 36.94 KG/M2 | WEIGHT: 216.38 LBS | HEIGHT: 64 IN

## 2019-08-26 DIAGNOSIS — M25.511 RIGHT SHOULDER PAIN, UNSPECIFIED CHRONICITY: Primary | ICD-10-CM

## 2019-08-26 PROCEDURE — 3008F BODY MASS INDEX DOCD: CPT | Mod: CPTII,S$GLB,, | Performed by: PHYSICIAN ASSISTANT

## 2019-08-26 PROCEDURE — 3008F PR BODY MASS INDEX (BMI) DOCUMENTED: ICD-10-PCS | Mod: CPTII,S$GLB,, | Performed by: PHYSICIAN ASSISTANT

## 2019-08-26 PROCEDURE — 99213 OFFICE O/P EST LOW 20 MIN: CPT | Mod: S$GLB,,, | Performed by: PHYSICIAN ASSISTANT

## 2019-08-26 PROCEDURE — 99213 PR OFFICE/OUTPT VISIT, EST, LEVL III, 20-29 MIN: ICD-10-PCS | Mod: S$GLB,,, | Performed by: PHYSICIAN ASSISTANT

## 2019-08-26 PROCEDURE — 99999 PR PBB SHADOW E&M-EST. PATIENT-LVL III: CPT | Mod: PBBFAC,,, | Performed by: PHYSICIAN ASSISTANT

## 2019-08-26 PROCEDURE — 99999 PR PBB SHADOW E&M-EST. PATIENT-LVL III: ICD-10-PCS | Mod: PBBFAC,,, | Performed by: PHYSICIAN ASSISTANT

## 2019-08-26 NOTE — LETTER
August 27, 2019      Pilar White NP  [address] [place] [address]           [facility]  [address]  [place] [address]  Phone: [phone]  Fax: [phone]          Patient: Renea Mac   MR Number: [id number]   YOB: 1970   Date of Visit: 8/26/2019       Dear Pilar White:    Thank you for referring Renea Mac to me for evaluation. Attached you will find relevant portions of my assessment and plan of care.    If you have questions, please do not hesitate to call me. I look forward to following Renea Mac along with you.    Sincerely,    Meg Tijerina PA-C    Enclosure  CC:  No Recipients    If you would like to receive this communication electronically, please contact [email or web address] or [phone] to request more information on [employer] Link access.    For providers and/or their staff who would like to refer a patient to [facility], please contact us through our one-stop-shop provider referral line, [facility], at [phone].    If you feel you have received this communication in error or would no longer like to receive these types of communications, please e-mail [email or web address]

## 2019-08-27 NOTE — PROGRESS NOTES
Subjective:      Patient ID: Renea Mac is a 49 y.o. female.    Chief Complaint: Pain of the Right Shoulder    HPI  Patient returns regarding her atraumatic right shoulder pain that has been present since May. Pain is constant. She reports pain at the trapezius and shoulder. She was doing PT but it was causing her increased pain and she had to stop going due to financial reasons. She does HEP but some of the exercises cause pain. She cannot take nsaids due to h/o gastric sleeve surgery. She takes robaxin with some relief. Tylenol does not help. She denies neck pain.   Review of Systems   Constitution: Negative for chills, fever and night sweats.   Cardiovascular: Negative for chest pain.   Respiratory: Negative for cough and shortness of breath.    Hematologic/Lymphatic: Does not bruise/bleed easily.   Skin: Negative for color change.   Gastrointestinal: Negative for heartburn.   Genitourinary: Negative for dysuria.   Neurological: Negative for numbness and paresthesias.   Psychiatric/Behavioral: Negative for altered mental status.   Allergic/Immunologic: Negative for persistent infections.         Objective:            General    Vitals reviewed.  Constitutional: She is oriented to person, place, and time. She appears well-developed and well-nourished.   Cardiovascular: Normal rate.    Neurological: She is alert and oriented to person, place, and time.         Right Shoulder Exam     Range of Motion   Active abduction: normal   Forward Flexion: normal   External Rotation 0 degrees: normal   Internal rotation 0 degrees: abnormal     Tests & Signs   Ramos test: negative  Impingement: negative  Rotator Cuff Painful Arc/Range: moderate    Other   Sensation: normal    Comments:  Positive empty can test.     Muscle Strength   Right Upper Extremity   Supraspinatus: 4/5/5     Vascular Exam     Right Pulses      Radial:                    2+          X-ray: reviewed by myself. No significant abnormality identified.           Assessment:       Encounter Diagnosis   Name Primary?    Right shoulder pain, unspecified chronicity Yes          Plan:       Patient does report h/o small RCT years ago. Her shoulder pain has gotten worse and PT has not helped. MRI was ordered to assess RCT. RTC pending results.

## 2019-09-03 ENCOUNTER — HOSPITAL ENCOUNTER (OUTPATIENT)
Dept: RADIOLOGY | Facility: HOSPITAL | Age: 49
Discharge: HOME | End: 2019-09-03
Attending: PHYSICIAN ASSISTANT
Payer: COMMERCIAL

## 2019-09-03 ENCOUNTER — TELEPHONE (OUTPATIENT)
Dept: ORTHOPEDICS | Facility: CLINIC | Age: 49
End: 2019-09-03

## 2019-09-03 ENCOUNTER — OFFICE VISIT (OUTPATIENT)
Dept: ORTHOPEDICS | Facility: CLINIC | Age: 49
End: 2019-09-03
Payer: COMMERCIAL

## 2019-09-03 DIAGNOSIS — M75.81 TENDINITIS OF RIGHT ROTATOR CUFF: Primary | ICD-10-CM

## 2019-09-03 DIAGNOSIS — M25.511 RIGHT SHOULDER PAIN, UNSPECIFIED CHRONICITY: ICD-10-CM

## 2019-09-03 PROCEDURE — 99999 PR PBB SHADOW E&M-EST. PATIENT-LVL II: CPT | Mod: PBBFAC,,, | Performed by: PHYSICIAN ASSISTANT

## 2019-09-03 PROCEDURE — 20610 DRAIN/INJ JOINT/BURSA W/O US: CPT | Mod: RT,S$GLB,, | Performed by: PHYSICIAN ASSISTANT

## 2019-09-03 PROCEDURE — 99213 PR OFFICE/OUTPT VISIT, EST, LEVL III, 20-29 MIN: ICD-10-PCS | Mod: 25,S$GLB,, | Performed by: PHYSICIAN ASSISTANT

## 2019-09-03 PROCEDURE — 99213 OFFICE O/P EST LOW 20 MIN: CPT | Mod: 25,S$GLB,, | Performed by: PHYSICIAN ASSISTANT

## 2019-09-03 PROCEDURE — 20610 PR DRAIN/INJECT LARGE JOINT/BURSA: ICD-10-PCS | Mod: RT,S$GLB,, | Performed by: PHYSICIAN ASSISTANT

## 2019-09-03 PROCEDURE — 73221 MRI JOINT UPR EXTREM W/O DYE: CPT | Mod: TC,RT

## 2019-09-03 PROCEDURE — 73221 MRI JOINT UPR EXTREM W/O DYE: CPT | Mod: 26,RT,, | Performed by: RADIOLOGY

## 2019-09-03 PROCEDURE — 99999 PR PBB SHADOW E&M-EST. PATIENT-LVL II: ICD-10-PCS | Mod: PBBFAC,,, | Performed by: PHYSICIAN ASSISTANT

## 2019-09-03 PROCEDURE — 73221 MRI SHOULDER WITHOUT CONTRAST RIGHT: ICD-10-PCS | Mod: 26,RT,, | Performed by: RADIOLOGY

## 2019-09-03 RX ORDER — METHYLPREDNISOLONE ACETATE 80 MG/ML
80 INJECTION, SUSPENSION INTRA-ARTICULAR; INTRALESIONAL; INTRAMUSCULAR; SOFT TISSUE
Status: COMPLETED | OUTPATIENT
Start: 2019-09-03 | End: 2019-09-03

## 2019-09-03 RX ADMIN — METHYLPREDNISOLONE ACETATE 80 MG: 80 INJECTION, SUSPENSION INTRA-ARTICULAR; INTRALESIONAL; INTRAMUSCULAR; SOFT TISSUE at 06:09

## 2019-09-03 NOTE — TELEPHONE ENCOUNTER
Spoke to patient regarding MRI results. She would like to try an injection. She will f/u today in [facility] for this.

## 2019-09-03 NOTE — PROGRESS NOTES
Subjective:      Patient ID: Renea Mac is a 49 y.o. female.    Chief Complaint: No chief complaint on file.    HPI  Patient returns regarding her right shoulder pain. She recently had an MRI which showed rotator cuff tendinosis with low-grade articular surface fraying, involving the supra and infraspinatus tendons, mild bursitis, and mild AC joint arthropathy. She continues HEP. She is here today for an injection.   Review of Systems   Constitution: Negative for chills, fever and night sweats.   Cardiovascular: Negative for chest pain.   Respiratory: Negative for cough and shortness of breath.    Hematologic/Lymphatic: Does not bruise/bleed easily.   Skin: Negative for color change.   Gastrointestinal: Negative for heartburn.   Genitourinary: Negative for dysuria.   Neurological: Negative for numbness and paresthesias.   Psychiatric/Behavioral: Negative for altered mental status.   Allergic/Immunologic: Negative for persistent infections.         Objective:            General    Vitals reviewed.  Constitutional: She is oriented to person, place, and time. She appears well-developed and well-nourished.   Cardiovascular: Normal rate.    Neurological: She is alert and oriented to person, place, and time.         Right Shoulder Exam     Range of Motion   Active abduction: normal   Forward Flexion: normal   External Rotation 0 degrees: normal   Internal rotation 0 degrees: abnormal     Tests & Signs   Ramos test: negative  Impingement: negative  Rotator Cuff Painful Arc/Range: moderate    Other   Sensation: normal    Comments:  Positive empty can test.     Muscle Strength   Right Upper Extremity   Supraspinatus: 4/5/5     Vascular Exam     Right Pulses      Radial:                    2+        X-ray: reviewed by myself. No significant abnormality identified.          Assessment:       Encounter Diagnosis   Name Primary?    Tendinitis of right rotator cuff Yes          Plan:       Discussed treatment options  with patient. She would like to try an injection. Continue HEP. If pain does not improve, will refer to a shoulder surgeon. RTC prn.     PROCEDURE:  I have explained the risks, benefits, and alternatives of the procedure in detail.  The patient voices understanding and all questions have been answered.  The patient agrees to proceed as planned. So after I performed a sterile prep of the skin in the normal fashion the right shoulder is injected from the posterior approach using a 22 gauge needle with a combination of 4cc 1% plain lidocaine and 80 mg of depo medrol. The patient is cautioned and immediate relief of pain is secondary to the local anesthetic and will be temporary.  After the anesthetic wears off there may be a increase in pain that may last for a few hours or a few days and they should use ice to help alleviate this flair up of pain.

## 2019-10-07 ENCOUNTER — PATIENT OUTREACH (OUTPATIENT)
Dept: ADMINISTRATIVE | Facility: OTHER | Age: 49
End: 2019-10-07

## 2019-10-16 ENCOUNTER — PATIENT OUTREACH (OUTPATIENT)
Dept: ADMINISTRATIVE | Facility: OTHER | Age: 49
End: 2019-10-16

## 2019-10-21 ENCOUNTER — TELEPHONE (OUTPATIENT)
Dept: SPINE | Facility: CLINIC | Age: 49
End: 2019-10-21

## 2019-10-21 NOTE — TELEPHONE ENCOUNTER
Left message on pt vm to call office back to reschedule [facility] app[facility]

## 2019-10-21 NOTE — TELEPHONE ENCOUNTER
----- Message from Ena Moreno RN sent at 10/21/2019  2:00 PM CDT -----  Please reschedule patient's appt with Adalgisa from 10/18. Thanks!

## 2020-01-06 ENCOUNTER — OFFICE VISIT (OUTPATIENT)
Dept: URGENT CARE | Facility: CLINIC | Age: 50
End: 2020-01-06
Payer: COMMERCIAL

## 2020-01-06 VITALS
WEIGHT: 215 LBS | RESPIRATION RATE: 18 BRPM | SYSTOLIC BLOOD PRESSURE: 129 MMHG | HEART RATE: 81 BPM | BODY MASS INDEX: 36.7 KG/M2 | TEMPERATURE: 98 F | DIASTOLIC BLOOD PRESSURE: 90 MMHG | HEIGHT: 64 IN | OXYGEN SATURATION: 98 %

## 2020-01-06 DIAGNOSIS — M54.31 SCIATICA OF RIGHT SIDE: ICD-10-CM

## 2020-01-06 DIAGNOSIS — M79.604 RIGHT LEG PAIN: Primary | ICD-10-CM

## 2020-01-06 PROCEDURE — 99214 OFFICE O/P EST MOD 30 MIN: CPT | Mod: 25,S$GLB,, | Performed by: NURSE PRACTITIONER

## 2020-01-06 PROCEDURE — 96372 PR INJECTION,THERAP/PROPH/DIAG2ST, IM OR SUBCUT: ICD-10-PCS | Mod: S$GLB,,, | Performed by: NURSE PRACTITIONER

## 2020-01-06 PROCEDURE — 96372 THER/PROPH/DIAG INJ SC/IM: CPT | Mod: S$GLB,,, | Performed by: NURSE PRACTITIONER

## 2020-01-06 PROCEDURE — 99214 PR OFFICE/OUTPT VISIT, EST, LEVL IV, 30-39 MIN: ICD-10-PCS | Mod: 25,S$GLB,, | Performed by: NURSE PRACTITIONER

## 2020-01-06 RX ORDER — KETOROLAC TROMETHAMINE 30 MG/ML
30 INJECTION, SOLUTION INTRAMUSCULAR; INTRAVENOUS ONCE
Status: COMPLETED | OUTPATIENT
Start: 2020-01-06 | End: 2020-01-06

## 2020-01-06 RX ORDER — CYCLOBENZAPRINE HCL 10 MG
10 TABLET ORAL 3 TIMES DAILY PRN
Qty: 20 TABLET | Refills: 0 | Status: SHIPPED | OUTPATIENT
Start: 2020-01-06 | End: 2020-01-16

## 2020-01-06 RX ADMIN — KETOROLAC TROMETHAMINE 30 MG: 30 INJECTION, SOLUTION INTRAMUSCULAR; INTRAVENOUS at 10:01

## 2020-01-06 NOTE — LETTER
January 6, 2020      [facility]  [address]  [facility] [address]  Phone: [phone]  Fax: [phone]       Patient: Renea Mac   YOB: 1970  Date of Visit: 01/06/2020    To Whom It May Concern:    Windy Mac  was at [facility] on 01/06/2020. She may return to work/school on 01/08/2019 with no restrictions. If you have any questions or concerns, or if I can be of further assistance, please do not hesitate to contact me.    Sincerely,    Amada Priest MA

## 2020-01-06 NOTE — PROGRESS NOTES
"Subjective:       Patient ID: Renea Mac is a 49 y.o. female.    Vitals:  height is 5' 4" (1.626 m) and weight is 97.5 kg (215 lb). Her temperature is 98.1 °F (36.7 °C). Her blood pressure is 129/90 (abnormal) and her pulse is 81. Her respiration is 18 and oxygen saturation is 98%.     Chief Complaint: Back Pain (right)    Patient presents with lower right  back pain that radiates down her leg that started Saturday. Patient has history of sciatic pain. Pt states she has had sciatica in the past and received a toradol shot for symptoms. Pt request one toradol shot. Risk explained with s/p sleeve gastrectomy, pt verbalized understanding and stated she has had no complications in the past. Pt does not take any other NSaidS.     Back Pain   This is a recurrent problem. The current episode started yesterday. The problem occurs constantly. The problem is unchanged. The pain is present in the sacro-iliac. The quality of the pain is described as shooting. The pain radiates to the right thigh. The pain is at a severity of 6/10. The pain is moderate. The pain is the same all the time. The symptoms are aggravated by bending, lying down, position, sitting, standing and twisting. Stiffness is present all day. Associated symptoms include leg pain. Pertinent negatives include no abdominal pain, bladder incontinence, bowel incontinence, chest pain, dysuria or numbness. Risk factors include obesity. She has tried bed rest, ice and NSAIDs for the symptoms. The treatment provided mild relief.       Constitution: Negative for fatigue.   Cardiovascular: Negative for chest pain.   Gastrointestinal: Negative for abdominal pain and bowel incontinence.   Genitourinary: Negative for dysuria, urgency, bladder incontinence and hematuria.   Musculoskeletal: Positive for pain, joint pain and back pain. Negative for muscle cramps and history of spine disorder.   Skin: Negative for rash.   Neurological: Negative for coordination " disturbances, numbness and tingling.       Objective:      Physical Exam   Constitutional: She is oriented to person, place, and time. She appears well-developed and well-nourished. She is cooperative.  Non-toxic appearance. She does not appear ill. No distress.   HENT:   Head: Normocephalic and atraumatic.   Right Ear: Hearing, tympanic membrane, external ear and ear canal normal.   Left Ear: Hearing, tympanic membrane, external ear and ear canal normal.   Nose: Nose normal. No mucosal edema, rhinorrhea or nasal deformity. No epistaxis. Right sinus exhibits no maxillary sinus tenderness and no frontal sinus tenderness. Left sinus exhibits no maxillary sinus tenderness and no frontal sinus tenderness.   Mouth/Throat: Uvula is midline, oropharynx is clear and moist and mucous membranes are normal. No trismus in the jaw. Normal dentition. No uvula swelling. No posterior oropharyngeal erythema.   Eyes: Conjunctivae and lids are normal. Right eye exhibits no discharge. Left eye exhibits no discharge. No scleral icterus.   Neck: Trachea normal, normal range of motion, full passive range of motion without pain and phonation normal. Neck supple.   Cardiovascular: Normal rate, regular rhythm, normal heart sounds, intact distal pulses and normal pulses.   Pulmonary/Chest: Effort normal and breath sounds normal. No respiratory distress.   Abdominal: Soft. Normal appearance and bowel sounds are normal. She exhibits no distension, no pulsatile midline mass and no mass. There is no tenderness.   Musculoskeletal: Normal range of motion. She exhibits no edema or deformity.        Lumbar back: She exhibits tenderness, pain and spasm.        Right upper leg: She exhibits tenderness.   Neurological: She is alert and oriented to person, place, and time. She exhibits normal muscle tone. Coordination normal.   Skin: Skin is warm, dry, intact, not diaphoretic and not pale.   Psychiatric: She has a normal mood and affect. Her speech is  normal and behavior is normal. Judgment and thought content normal. Cognition and memory are normal.   Nursing note and vitals reviewed.        Assessment:       1. Right leg pain    2. Sciatica of right side        Plan:         Right leg pain    Sciatica of right side  -     ketorolac injection 30 mg  -     cyclobenzaprine (FLEXERIL) 10 MG tablet; Take 1 tablet (10 mg total) by mouth 3 (three) times daily as needed for Muscle spasms.  Dispense: 20 tablet; Refill: 0

## 2020-01-06 NOTE — PATIENT INSTRUCTIONS
RETURN TO CLINIC IF SYMPTOMS WORSEN OR CALL 911 IMMEDIATELY FOR SHORTNESS OF BREATH, CHEST PAIN, DIZZINESS, WORSENING PAIN, NAUSEA AND VOMITING, HEART PALPITATIONS, FEVER AND/OR NECK STIFFNESS. FOLLOW UP WITH PRIMARY CARE PROVIDER IN THE AM.    If you were prescribed a narcotic or controlled medication, do not drive or operate heavy equipment or machinery while taking these medications.  You must understand that you've received an Urgent Care treatment only and that you may be released before all your medical problems are known or treated. You, the patient, will arrange for follow up care as instructed.  Follow up with your PCP or specialty clinic as directed in the next 1-2 weeks if not improved or as needed.  You can call [phone] to schedule an appointment with the appropriate provider.  If your condition worsens we recommend that you receive another evaluation at the emergency room immediately or contact your primary medical clinics after hours call service to discuss your concerns.  Please return here or go to the Emergency Department for any concerns or worsening of condition.      Sciatica    Sciatica is a condition that causes pain in the lower back that spreads down into the buttock, hip, and leg. Sometimes the leg pain can happen without any back pain. Sciatica happens when a spinal nerve is irritated or has pressure put on it as comes out of the spinal canal in the lower back. This most often happens when a bulge or rupture of a nearby spinal disk presses on the nerve. Sciatica can also be caused by a narrowing of the spinal canal (spinal stenosis) or spasm of the muscle in the buttocks that the sciatic nerve passes through (pyriform muscle). Sciatica is also called lumbar radiculopathy.  Sciatica may begin after a sudden twisting or bending force, such as in a car accident. Or it can happen after a simple awkward movement. In either case, muscle spasm often also happens. Muscle spasm makes the pain  worse.  A healthcare provider makes a diagnosis of sciatica from your symptoms and a physical exam. Unless you had an injury from a car accident or fall, you usually wont have X-rays taken at this time. This is because the nerves and disks in your back cant be seen on an X-ray. If the provider sees signs of a compressed nerve, you will need to schedule an MRI scan as an outpatient. Signs of a compressed nerve include loss of strength in a leg.  Most sciatica gets better with medicine, exercise, and physical therapy. If your symptoms continue after at least 3 months of medical treatment, you may need surgery or injections to your lower back.  Home care  Follow these tips when caring for yourself at home:  · You may need to stay in bed the first few days. But as soon as possible, begin sitting up or walking. This will help you avoid problems that come from staying in bed for long periods.  · When in bed, try to find a position that is comfortable. A firm mattress is best. Try lying flat on your back with pillows under your knees. You can also try lying on your side with your knees bent up toward your chest and a pillow between your knees.  · Avoid sitting for long periods. This puts more stress on your lower back than standing or walking.  · Use heat from a hot shower, hot bath, or heating pad to help ease pain. Massage can also help. You can also try using an ice pack. You can make your own ice pack by putting ice cubes in a plastic bag. Wrap the bag in a thin towel. Try both heat and cold to see which works best. Use the method that feels best for 20 minutes several times a day.  · You may use acetaminophen or ibuprofen to ease pain, unless another pain medicine was prescribed. Note: If you have chronic liver or kidney disease, talk with your healthcare provider before taking these medicines. Also talk with your provider if youve had a stomach ulcer or gastrointestinal bleeding.  · Use safe lifting methods. Dont  lift anything heavier than 15 pounds until all of the pain is gone.  Follow-up care  Follow up with your healthcare provider, or as advised. You may need physical therapy or additional tests.  If X-rays were taken, a radiologist will look at them. You will be told of any new findings that may affect your care.  When to seek medical advice  Call your healthcare provider right away if any of these occur:  · Pain gets worse even after taking prescribed medicine  · Weakness or numbness in 1 or both legs or hips  · Numbness in your groin or genital area  · You cant control your bowel or bladder  · Fever  · Redness or swelling over your back or spine   Date Last Reviewed: 8/1/2016  © 8167-8334 [employer]. [address] [place] [address], [place] [address]. All rights reserved. This information is not intended as a substitute for professional medical care. Always follow your healthcare professional's instructions.

## 2020-02-13 ENCOUNTER — PATIENT OUTREACH (OUTPATIENT)
Dept: ADMINISTRATIVE | Facility: OTHER | Age: 50
End: 2020-02-13

## 2020-02-13 ENCOUNTER — HOSPITAL ENCOUNTER (OUTPATIENT)
Dept: RADIOLOGY | Facility: HOSPITAL | Age: 50
Discharge: HOME | End: 2020-02-13
Attending: ORTHOPAEDIC SURGERY
Payer: COMMERCIAL

## 2020-02-13 ENCOUNTER — OFFICE VISIT (OUTPATIENT)
Dept: SPORTS MEDICINE | Facility: CLINIC | Age: 50
End: 2020-02-13
Payer: COMMERCIAL

## 2020-02-13 VITALS
SYSTOLIC BLOOD PRESSURE: 141 MMHG | HEART RATE: 77 BPM | HEIGHT: 64 IN | DIASTOLIC BLOOD PRESSURE: 96 MMHG | BODY MASS INDEX: 36.7 KG/M2 | WEIGHT: 215 LBS

## 2020-02-13 DIAGNOSIS — M25.512 CHRONIC LEFT SHOULDER PAIN: ICD-10-CM

## 2020-02-13 DIAGNOSIS — M75.42 IMPINGEMENT SYNDROME OF LEFT SHOULDER: Primary | ICD-10-CM

## 2020-02-13 DIAGNOSIS — M25.512 ACUTE PAIN OF LEFT SHOULDER: ICD-10-CM

## 2020-02-13 DIAGNOSIS — G89.29 CHRONIC LEFT SHOULDER PAIN: ICD-10-CM

## 2020-02-13 PROCEDURE — 73030 X-RAY EXAM OF SHOULDER: CPT | Mod: TC,LT

## 2020-02-13 PROCEDURE — 3080F PR MOST RECENT DIASTOLIC BLOOD PRESSURE >= 90 MM HG: ICD-10-PCS | Mod: CPTII,S$GLB,, | Performed by: ORTHOPAEDIC SURGERY

## 2020-02-13 PROCEDURE — 3077F PR MOST RECENT SYSTOLIC BLOOD PRESSURE >= 140 MM HG: ICD-10-PCS | Mod: CPTII,S$GLB,, | Performed by: ORTHOPAEDIC SURGERY

## 2020-02-13 PROCEDURE — 99999 PR PBB SHADOW E&M-EST. PATIENT-LVL IV: ICD-10-PCS | Mod: PBBFAC,,, | Performed by: ORTHOPAEDIC SURGERY

## 2020-02-13 PROCEDURE — 99999 PR PBB SHADOW E&M-EST. PATIENT-LVL IV: CPT | Mod: PBBFAC,,, | Performed by: ORTHOPAEDIC SURGERY

## 2020-02-13 PROCEDURE — 73030 X-RAY EXAM OF SHOULDER: CPT | Mod: 26,LT,, | Performed by: RADIOLOGY

## 2020-02-13 PROCEDURE — 99204 OFFICE O/P NEW MOD 45 MIN: CPT | Mod: 25,S$GLB,, | Performed by: ORTHOPAEDIC SURGERY

## 2020-02-13 PROCEDURE — 20610 DRAIN/INJ JOINT/BURSA W/O US: CPT | Mod: LT,S$GLB,, | Performed by: ORTHOPAEDIC SURGERY

## 2020-02-13 PROCEDURE — 73030 XR SHOULDER COMPLETE 2 OR MORE VIEWS LEFT: ICD-10-PCS | Mod: 26,LT,, | Performed by: RADIOLOGY

## 2020-02-13 PROCEDURE — 3077F SYST BP >= 140 MM HG: CPT | Mod: CPTII,S$GLB,, | Performed by: ORTHOPAEDIC SURGERY

## 2020-02-13 PROCEDURE — 3008F PR BODY MASS INDEX (BMI) DOCUMENTED: ICD-10-PCS | Mod: CPTII,S$GLB,, | Performed by: ORTHOPAEDIC SURGERY

## 2020-02-13 PROCEDURE — 3008F BODY MASS INDEX DOCD: CPT | Mod: CPTII,S$GLB,, | Performed by: ORTHOPAEDIC SURGERY

## 2020-02-13 PROCEDURE — 99204 PR OFFICE/OUTPT VISIT, NEW, LEVL IV, 45-59 MIN: ICD-10-PCS | Mod: 25,S$GLB,, | Performed by: ORTHOPAEDIC SURGERY

## 2020-02-13 PROCEDURE — 20610 PR DRAIN/INJECT LARGE JOINT/BURSA: ICD-10-PCS | Mod: LT,S$GLB,, | Performed by: ORTHOPAEDIC SURGERY

## 2020-02-13 PROCEDURE — 3080F DIAST BP >= 90 MM HG: CPT | Mod: CPTII,S$GLB,, | Performed by: ORTHOPAEDIC SURGERY

## 2020-02-13 RX ADMIN — TRIAMCINOLONE ACETONIDE 40 MG: 40 INJECTION, SUSPENSION INTRA-ARTICULAR; INTRAMUSCULAR at 10:02

## 2020-02-13 NOTE — PROGRESS NOTES
CC: Left shoulder pain     49 y.o. Female presents as a new patient to me. 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 LEFT shoulder pain for the past month. Atraumatic onset.  Pain localizes over the bicipital groove and greater tuberosity. Worse with overhead activities and at night. Denies neck pain or radicular symptoms. Prior history of right upper extremity radiculopathy, however she has seen a spine surgeon within the past year, and the symptoms have since resolved.  Treatment thus far has included activity modifications, rest, and tylenol, no anti-inflammatories due to history of gastric sleeve.  Here today to discuss diagnosis and treatment options. She has been treated for the right shoulder for subacromial impingement at [facility] with injections and physical therapy.  She reports that the pain she has in her left shoulder is similar to what she has had on the right.    PMHx notable for HTN, gastric sleeve, hysterectomy.   Negative for tobacco.   Negative for diabetes.     Pain Score:   7    PAST MEDICAL HISTORY:   Past Medical History:   Diagnosis Date    Acute pain of right knee 2016    Chronic right shoulder pain 2016    Essential hypertension 2016    GERD (gastroesophageal reflux disease)     Hypokalemia 2017    Incomplete tear of right rotator cuff 2016    Osteoarthritis of right knee 2016    Rotator cuff tear     S/P laparoscopic sleeve gastrectomy 2016    Syncope and collapse 2017    Tail bone pain 2016     PAST SURGICAL HISTORY:  Past Surgical History:   Procedure Laterality Date    BILATERAL SALPINGO-OOPHORECTOMY (BSO) Bilateral 2019    Procedure: SALPINGO-OOPHORECTOMY, BILATERAL;  Surgeon: Petrona Porter MD;  [facility];  Service: OB/GYN;  Laterality: Bilateral;     SECTION      x1    COLONOSCOPY N/A 2019    Procedure: COLONOSCOPY;  Surgeon: Chris Bennett MD;  Location: [facility] (4TH FLR);   Service: Endoscopy;  Laterality: N/A;  Ton, Justo, or Donald if no availability in Dec. will schedule after CT scan results    COLPOSCOPY      EXCISION OF PELVIC MASS N/A 1/31/2019    Procedure: EXCISION, MASS, PELVIS;  Surgeon: Petrona Porter MD;  Location: [facility] OR [address] [place] [address];  Service: OB/GYN;  Laterality: N/A;    HERNIA REPAIR      HYSTERECTOMY  2011    YANG, ovaries remain (fibroids0    LAPAROTOMY, EXPLORATORY N/A 1/31/2019    Procedure: LAPAROTOMY, EXPLORATORY;  Surgeon: Petrona Porter MD;  Location: [facility] OR [address] [place] [address];  Service: OB/GYN;  Laterality: N/A;    OOPHORECTOMY Bilateral 01/31/2019    SLEEVE GASTROPLASTY  11/2014    TUBAL LIGATION       FAMILY HISTORY:  Family History   Problem Relation Age of Onset    Hypertension Mother     Heart disease Mother     Heart attack Mother     Rectal cancer Mother     Colon cancer Mother     Hypertension Father     Heart disease Father     Stroke Father     Heart attack Father     Hypertension Sister     Hypertension Brother     Cancer Brother     No Known Problems Daughter     No Known Problems Son     Esophageal cancer Neg Hx      MEDICATIONS:    Current Outpatient Medications:     acetaminophen (TYLENOL) 500 MG tablet, Take 500 mg by mouth every 6 (six) hours as needed for Pain., Disp: , Rfl:     calcium carbonate/vitamin D3 (VITAMIN D-3 ORAL), Take by mouth., Disp: , Rfl:     cholecalciferol, vitamin D3, (VITAMIN D3) 1,000 unit capsule, Take 1,000 Units by mouth once daily., Disp: , Rfl:     diclofenac sodium (VOLTAREN) 1 % Gel, Apply 2 g topically 4 (four) times daily. (Patient not taking: Reported on 2/13/2020), Disp: 100 g, Rfl: 0    docusate sodium (COLACE) 50 MG capsule, Take 2 capsules (100 mg total) by mouth 2 (two) times daily. (Patient taking differently: Take 100 mg by mouth as needed. ), Disp: 60 capsule, Rfl: 0    gabapentin (NEURONTIN) 300 MG capsule, Take one capsule by mouth at bedtime for 7 days, then one capsule twice  "daily for the next 7 days, and then take one capsule three times daily and continue (Patient not taking: Reported on 2/13/2020), Disp: 270 capsule, Rfl: 0    hydroCHLOROthiazide (HYDRODIURIL) 25 MG tablet, Take 0.5 tablets (12.5 mg total) by mouth once daily., Disp: 30 tablet, Rfl: 11    polyethylene glycol (MIRALAX) 17 gram/dose powder, Mix 1 capful (17 grams total) with a liquid and take by mouth once daily., Disp: 527 g, Rfl: 11    pyridoxine HCl, vitamin B6, (PYRIDOXINE, VITAMIN B6, ORAL), Take by mouth., Disp: , Rfl:     thiamine HCl (VITAMIN B-1 ORAL), Take by mouth., Disp: , Rfl:     ALLERGIES:  Review of patient's allergies indicates:  No Known Allergies     REVIEW OF SYSTEMS:  Constitution: Negative. Negative for chills, fever and night sweats.    Hematologic/Lymphatic: Negative for bleeding problem. Does not bruise/bleed easily.   Skin: Negative for dry skin, itching and rash.   Musculoskeletal: Negative for falls. Positive for left shoulder pain and  muscle weakness.     All other review of symptoms were reviewed and found to be noncontributory.    PHYSICAL EXAMINATION:  Vitals:  BP (!) 141/96   Pulse 77   Ht 5' 4" (1.626 m)   Wt 97.5 kg (215 lb)   LMP 03/01/2011 (Approximate)   BMI 36.90 kg/m²    General: Well-developed well-nourished 49 y.o. femalein no acute distress   Cardiovascular: Regular rhythm by palpation of distal pulse, normal color and temperature, no concerning varicosities on symptomatic side   Lungs: No labored breathing or wheezing appreciated   Neuro: Alert and oriented ×3   Psychiatric: well oriented to person, place and time, demonstrates normal mood and affect   Skin: No rashes, lesions or ulcers, normal temperature, turgor, and texture on uninvolved extremity    Ortho/SPM Exam  Examination of the left shoulder demonstrates   Active forward elevation to 100, ER with arm at side to 20. IR to T10.  Limitation due to pain. Passive FE to 170, ER to 60-70. Prominent tenderness " along the proximal biceps tendon. Negative AC tenderness. Positive Ramos and Neer impingement maneuvers. 5-/5 resisted supraspinatus testing with limitation due to pain. 5-/5 resisted infraspinatus testing. Negative belly press test. Stable shoulder. No midline neck tenderness. Negative Spurling's maneuver.      IMAGING:  Xrays including AP, Outlet and Axillary Lateral of Left shoulder are ordered / images reviewed by me:   No significant DJD.    ASSESSMENT:      ICD-10-CM ICD-9-CM   1. Impingement syndrome of left shoulder M75.42 726.2   2. Chronic left shoulder pain M25.512 719.41    G89.29 338.29     PLAN:     Findings discussed with the patient. Positive external impingement findings.  Possibly some degree of underlying symptomatic rotator cuff pathology.  Initial conservative treatment is recommended.  -Corticosteroid injection provided left shoulder today  -Physical therapy prescribed [facility] for rotator cuff strengthening scapular stabilizing exercises.  -Avoid anti-inflammatories secondary to gastric sleeve.    Large Joint Aspiration/Injection - Left Subacromial Injection  Performed by: DONAVON Vega MD  Authorized by: DONAVON Vega MD  Date/Time: 2/13/2020 10:30 AM    Indications:  pain  Timeout: Immediately prior to procedure a time out was called to verify the correct patient, procedure, equipment, support staff and site/side marked as required.  Prep:Patient was prepped and draped in the usual sterile fashion.  Procedure site marked: Yes     Anesthesia  Local anesthesia not used        Details:   Needle size: 22 G    Ultrasonic Guidance for needle placement: No   Approach: posterior      Medications: 40 mg triamcinolone acetonide 40 mg/mL  Patient tolerance:  patient tolerated the procedure well with no immediate complications

## 2020-02-23 RX ORDER — TRIAMCINOLONE ACETONIDE 40 MG/ML
40 INJECTION, SUSPENSION INTRA-ARTICULAR; INTRAMUSCULAR
Status: DISCONTINUED | OUTPATIENT
Start: 2020-02-13 | End: 2020-02-23 | Stop reason: HOSPADM

## 2020-03-04 ENCOUNTER — TELEPHONE (OUTPATIENT)
Dept: SPORTS MEDICINE | Facility: CLINIC | Age: 50
End: 2020-03-04

## 2020-03-04 NOTE — TELEPHONE ENCOUNTER
----- Message from Carlos Hall sent at 3/4/2020  8:19 AM CST -----  Contact: Pt  Patient called to inform  that the injection didn't help and she would like proceed w/ being scheduled for the MRI requesting callback      [phone]

## 2020-03-04 NOTE — TELEPHONE ENCOUNTER
LVM for pt letting her know we would like her to do a few sessions of PT and the f/u with us. At that time we will determine if she still needs/wants a MRI. Requested call back with any questions.

## 2020-04-06 ENCOUNTER — PATIENT MESSAGE (OUTPATIENT)
Dept: INTERNAL MEDICINE | Facility: CLINIC | Age: 50
End: 2020-04-06

## 2020-04-09 ENCOUNTER — OFFICE VISIT (OUTPATIENT)
Dept: INTERNAL MEDICINE | Facility: CLINIC | Age: 50
End: 2020-04-09
Payer: COMMERCIAL

## 2020-04-09 VITALS
WEIGHT: 222.69 LBS | OXYGEN SATURATION: 97 % | HEIGHT: 64 IN | SYSTOLIC BLOOD PRESSURE: 126 MMHG | BODY MASS INDEX: 38.02 KG/M2 | HEART RATE: 74 BPM | DIASTOLIC BLOOD PRESSURE: 90 MMHG | TEMPERATURE: 98 F

## 2020-04-09 DIAGNOSIS — R05.9 COUGH: ICD-10-CM

## 2020-04-09 DIAGNOSIS — M25.542 ARTHRALGIA OF BOTH HANDS: ICD-10-CM

## 2020-04-09 DIAGNOSIS — M25.541 ARTHRALGIA OF BOTH HANDS: ICD-10-CM

## 2020-04-09 DIAGNOSIS — I10 ESSENTIAL HYPERTENSION: Primary | ICD-10-CM

## 2020-04-09 PROCEDURE — 99999 PR PBB SHADOW E&M-EST. PATIENT-LVL IV: CPT | Mod: PBBFAC,,, | Performed by: INTERNAL MEDICINE

## 2020-04-09 PROCEDURE — 99214 PR OFFICE/OUTPT VISIT, EST, LEVL IV, 30-39 MIN: ICD-10-PCS | Mod: S$GLB,,, | Performed by: INTERNAL MEDICINE

## 2020-04-09 PROCEDURE — 99214 OFFICE O/P EST MOD 30 MIN: CPT | Mod: S$GLB,,, | Performed by: INTERNAL MEDICINE

## 2020-04-09 PROCEDURE — 3080F DIAST BP >= 90 MM HG: CPT | Mod: CPTII,S$GLB,, | Performed by: INTERNAL MEDICINE

## 2020-04-09 PROCEDURE — 3074F PR MOST RECENT SYSTOLIC BLOOD PRESSURE < 130 MM HG: ICD-10-PCS | Mod: CPTII,S$GLB,, | Performed by: INTERNAL MEDICINE

## 2020-04-09 PROCEDURE — 3074F SYST BP LT 130 MM HG: CPT | Mod: CPTII,S$GLB,, | Performed by: INTERNAL MEDICINE

## 2020-04-09 PROCEDURE — 3008F BODY MASS INDEX DOCD: CPT | Mod: CPTII,S$GLB,, | Performed by: INTERNAL MEDICINE

## 2020-04-09 PROCEDURE — 99999 PR PBB SHADOW E&M-EST. PATIENT-LVL IV: ICD-10-PCS | Mod: PBBFAC,,, | Performed by: INTERNAL MEDICINE

## 2020-04-09 PROCEDURE — 3008F PR BODY MASS INDEX (BMI) DOCUMENTED: ICD-10-PCS | Mod: CPTII,S$GLB,, | Performed by: INTERNAL MEDICINE

## 2020-04-09 PROCEDURE — 3080F PR MOST RECENT DIASTOLIC BLOOD PRESSURE >= 90 MM HG: ICD-10-PCS | Mod: CPTII,S$GLB,, | Performed by: INTERNAL MEDICINE

## 2020-04-09 RX ORDER — HYDROCHLOROTHIAZIDE 25 MG/1
25 TABLET ORAL DAILY
Qty: 90 TABLET | Refills: 3 | Status: SHIPPED | OUTPATIENT
Start: 2020-04-09 | End: 2020-04-24

## 2020-04-09 RX ORDER — AZITHROMYCIN 250 MG/1
TABLET, FILM COATED ORAL
Qty: 6 TABLET | Refills: 0 | Status: SHIPPED | OUTPATIENT
Start: 2020-04-09 | End: 2020-04-15

## 2020-04-09 RX ORDER — DICLOFENAC SODIUM 10 MG/G
2 GEL TOPICAL 4 TIMES DAILY
Qty: 100 G | Refills: 0 | Status: SHIPPED | OUTPATIENT
Start: 2020-04-09 | End: 2020-04-09 | Stop reason: SDUPTHER

## 2020-04-09 RX ORDER — DICLOFENAC SODIUM 10 MG/G
2 GEL TOPICAL 4 TIMES DAILY
Qty: 100 G | Refills: 5 | OUTPATIENT
Start: 2020-04-09 | End: 2021-10-10

## 2020-04-09 NOTE — PROGRESS NOTES
INTERNAL MEDICINE CLINIC - SAME DAY APPOINTMENT  Progress Note    PRESENTING HISTORY     PCP: Magdalena Davila NP  Chief Complaint/Reason for Visit:     Chief Complaint   Patient presents with    Hypertension     elevated    Cough     on going      History of Present Illness & ROS : Ms. Renea Mac is a 50 y.o. female.      She was taking Losartan in the past.  Only on HCTZ 12.5 mg daily.     tested positive on .  She went back to work Monday.  She did not get tested.    She still has cough.  Loss of smell.  No fever.    Review of Systems:  Answers for HPI/ROS submitted by the patient on 2020   Hypertension  Chronicity: recurrent  Onset: more than 1 year ago  Progression since onset: rapidly worsening  Condition status: controlled  anxiety: Yes  headaches: Yes  malaise/fatigue: Yes  sweats: Yes  Agents associated with hypertension: decongestants, NSAIDs  CAD risks: no known risk factors  Compliance problems: diet, exercise  Past treatments: diuretics, lifestyle changes  Improvement on treatment: moderate      PAST HISTORY:     Past Medical History:   Diagnosis Date    Acute pain of right knee 2016    Chronic right shoulder pain 2016    Essential hypertension 2016    GERD (gastroesophageal reflux disease)     Hypokalemia 2017    Incomplete tear of right rotator cuff 2016    Osteoarthritis of right knee 2016    Rotator cuff tear     S/P laparoscopic sleeve gastrectomy 2016    Syncope and collapse 2017    Tail bone pain 2016       Past Surgical History:   Procedure Laterality Date    BILATERAL SALPINGO-OOPHORECTOMY (BSO) Bilateral 2019    Procedure: SALPINGO-OOPHORECTOMY, BILATERAL;  Surgeon: Petrona Porter MD;  Location: [address];  Service: OB/GYN;  Laterality: Bilateral;     SECTION      x1    COLONOSCOPY N/A 2019    Procedure: COLONOSCOPY;  Surgeon: Chris Bennett MD;  Location: [address]  [facility]);  Service: Endoscopy;  Laterality: N/A;  Ton, Justo, or Donald if no availability in Dec. will schedule after CT scan results    COLPOSCOPY      EXCISION OF PELVIC MASS N/A 1/31/2019    Procedure: EXCISION, MASS, PELVIS;  Surgeon: Petrona Porter MD;  [facility];  Service: OB/GYN;  Laterality: N/A;    HERNIA REPAIR      HYSTERECTOMY  2011    YANG, ovaries remain (fibroids0    LAPAROTOMY, EXPLORATORY N/A 1/31/2019    Procedure: LAPAROTOMY, EXPLORATORY;  Surgeon: Petrona Porter MD;  Location: [facility] OR [address] [place] [address];  Service: OB/GYN;  Laterality: N/A;    OOPHORECTOMY Bilateral 01/31/2019    SLEEVE GASTROPLASTY  11/2014    TUBAL LIGATION         Family History   Problem Relation Age of Onset    Hypertension Mother     Heart disease Mother     Heart attack Mother     Rectal cancer Mother     Colon cancer Mother     Hypertension Father     Heart disease Father     Stroke Father     Heart attack Father     Hypertension Sister     Hypertension Brother     Cancer Brother     No Known Problems Daughter     No Known Problems Son     Esophageal cancer Neg Hx        Social History     Socioeconomic History    Marital status:      Spouse name: Not on file    Number of children: Not on file    Years of education: Not on file    Highest education level: Not on file   Occupational History    Not on file   Social Needs    Financial resource strain: Not hard at all    Food insecurity:     Worry: Never true     Inability: Never true    Transportation needs:     Medical: No     Non-medical: No   Tobacco Use    Smoking status: Never Smoker    Smokeless tobacco: Never Used   Substance and Sexual Activity    Alcohol use: Yes     Frequency: Monthly or less     Drinks per session: 1 or 2     Binge frequency: Never     Comment: occasionally    Drug use: No    Sexual activity: Yes     Partners: Male   Lifestyle    Physical activity:     Days per week: 0 days     Minutes per session:  0 min    Stress: To some extent   Relationships    Social connections:     Talks on phone: Three times a week     Gets together: Never     Attends Yarsanism service: Not on file     Active member of club or organization: No     Attends meetings of clubs or organizations: Never     Relationship status:    Other Topics Concern    Not on file   Social History Narrative    Not on file       MEDICATIONS & ALLERGIES:     Current Outpatient Medications on File Prior to Visit   Medication Sig Dispense Refill    acetaminophen (TYLENOL) 500 MG tablet Take 500 mg by mouth every 6 (six) hours as needed for Pain.      calcium carbonate/vitamin D3 (VITAMIN D-3 ORAL) Take by mouth.      cholecalciferol, vitamin D3, (VITAMIN D3) 1,000 unit capsule Take 1,000 Units by mouth once daily.      docusate sodium (COLACE) 50 MG capsule Take 2 capsules (100 mg total) by mouth 2 (two) times daily. (Patient taking differently: Take 100 mg by mouth as needed. ) 60 capsule 0    polyethylene glycol (MIRALAX) 17 gram/dose powder Mix 1 capful (17 grams total) with a liquid and take by mouth once daily. 527 g 11    pyridoxine HCl, vitamin B6, (PYRIDOXINE, VITAMIN B6, ORAL) Take by mouth.      thiamine HCl (VITAMIN B-1 ORAL) Take by mouth.      diclofenac sodium (VOLTAREN) 1 % Gel Apply 2 g topically 4 (four) times daily. 100 g 0     hydroCHLOROthiazide (HYDRODIURIL) 25 MG tablet Take 0.5 tablets (12.5 mg total) by mouth once daily. 30 tablet 11    gabapentin (NEURONTIN) 300 MG capsule Take one capsule by mouth at bedtime for 7 days, then one capsule twice daily for the next 7 days, and then take one capsule three times daily and continue (Patient not taking: Reported on 4/9/2020) 270 capsule 0     No current facility-administered medications on file prior to visit.         Review of patient's allergies indicates:  No Known Allergies    Medications Reconciliation:   I have reconciled the patient's home medications with the  patient/family. I have updated all changes.  Refer to After-Visit Medication List.    OBJECTIVE:     Vital Signs:  Vitals:    04/09/20 1815   BP: (!) 126/90   Pulse: 74   Temp: 98.2 °F (36.8 °C)     Wt Readings from Last 1 Encounters:   04/09/20 1815 101 kg (222 lb 10.6 oz)     Body mass index is 38.22 kg/m².     Physical Exam:  General: Well developed, well nourished. No distress.  HEENT: Head is normocephalic, atraumatic.  Ears: both external ears are normal.    Eyes: Clear conjunctiva bilaterally.  Neck: Supple, symmetrical neck; trachea midline.  Lymph Nodes: No cervical or supraclavicular adenopathy.  Lungs:  normal respiratory effort.  Legs: No edema.    Laboratory  Lab Results   Component Value Date    WBC 7.72 03/13/2019    HGB 12.0 03/13/2019    HCT 38.6 03/13/2019     03/13/2019    CHOL 153 07/11/2017    TRIG 40 07/11/2017    HDL 64 07/11/2017    ALT 10 03/13/2019    AST 13 03/13/2019     03/13/2019    K 4.1 03/13/2019     03/13/2019    CREATININE 0.9 03/13/2019    BUN 13 03/13/2019    CO2 24 03/13/2019    TSH 1.540 03/13/2019       ASSESSMENT & PLAN:     Essential hypertension  - Increase HCTZ from 12.5 mg to 25 mg daily.  -     hydroCHLOROthiazide (HYDRODIURIL) 25 MG tablet; Take 1 tablet (25 mg total) by mouth once daily.  Dispense: 90 tablet; Refill: 3    Cough  -  positive for COVID.    She has headache and cough. No SOB.    -     COVID-19 Routine Screening; Future; Expected date: 04/09/2020    Arthralgia of both hands  -     diclofenac sodium (VOLTAREN) 1 % Gel; Apply 2 g topically 4 (four) times daily.  Dispense: 100 g; Refill:5    After Visit Medication List :     Medication List           Accurate as of April 9, 2020  6:35 PM. If you have any questions, ask your nurse or doctor.               START taking these medications    azithromycin 250 MG tablet  Commonly known as:  Z-CATALINA  Take 2 tablets by mouth on day 1; Take 1 tablet by mouth on days 2-5  Started by:  Quintin DING  MD Renato        CHANGE how you take these medications    docusate sodium 50 MG capsule  Commonly known as:  COLACE  Take 2 capsules (100 mg total) by mouth 2 (two) times daily.  What changed:    · when to take this  · reasons to take this     hydroCHLOROthiazide 25 MG tablet  Commonly known as:  HYDRODIURIL  Take 1 tablet (25 mg total) by mouth once daily.  What changed:  how much to take  Changed by:  Quintin Gross MD        CONTINUE taking these medications    acetaminophen 500 MG tablet  Commonly known as:  TYLENOL     cholecalciferol (vitamin D3) 25 mcg (1,000 unit) capsule  Commonly known as:  VITAMIN D3     diclofenac sodium 1 % Gel  Commonly known as:  VOLTAREN  Apply 2 g topically 4 (four) times daily.     gabapentin 300 MG capsule  Commonly known as:  NEURONTIN  Take one capsule by mouth at bedtime for 7 days, then one capsule twice daily for the next 7 days, and then take one capsule three times daily and continue     polyethylene glycol 17 gram/dose powder  Commonly known as:  GLYCOLAX  Mix 1 capful (17 grams total) with a liquid and take by mouth once daily.     PYRIDOXINE (VITAMIN B6) ORAL     VITAMIN B-1 ORAL     VITAMIN D-3 ORAL           Where to Get Your Medications      These medications were sent to [facility] [address] [address]    Hours:  Mon-Fri 7a-7p, Sat 10a-4p, Sun 10a-4p Phone:  [phone]   · azithromycin 250 MG tablet  · diclofenac sodium 1 % Gel  · hydroCHLOROthiazide 25 MG tablet         Signing Physician:  Quintin Gross MD

## 2020-04-09 NOTE — PATIENT INSTRUCTIONS
Most people with COVID-19 have a mild case (80% of the patients are mild).  As long as you don't have shortness of breath, please continue social distancing and isolation.  Wear mask when going for groceries, gas, etc, where you might encounter other people.    Symptomatic treatment includes:       Use salt water gargles 2-3 times daily for sore throat.       Eat dark chocolates for cough,.       Take Xyzal/Claritin/Zyrtec: 1 tablet daily as needed for nasal congestion/allergy.       You may take warm chicken soup or ginger tea or lemon with honey as needed 1-3 times to alleviate your symptoms.    My Recommendations due COVID-19 Pandemic to help with the immune system:      1. Take Vitamin C 2000 mg daily.    2. Take B comlex or multivitamin daily.    3. Hydrate well.    4. Eat foods high in antioxidants (such as blueberry, raspberry, green tea, etc).    5. Avoid NSAIDs (ibuprofen, aleve, etc). May take Tylenol for pain or fever.    6. Take hot bath for 20 min (if you do not have chronic heart conditions) followed by short cold shower.  It may improve body immunity.    Wash hands frequently (20 sec with soap). Practice social distancing.     We do have telemedicine (video visit), so you might want to schedule a follow up video as needed.  This is now covered by all insurances.    If you have worsening shortness of breath, please go to the Emergency Room.

## 2020-04-10 ENCOUNTER — CLINICAL SUPPORT (OUTPATIENT)
Dept: INTERNAL MEDICINE | Facility: CLINIC | Age: 50
End: 2020-04-10
Payer: COMMERCIAL

## 2020-04-10 ENCOUNTER — PATIENT MESSAGE (OUTPATIENT)
Dept: INTERNAL MEDICINE | Facility: CLINIC | Age: 50
End: 2020-04-10

## 2020-04-10 DIAGNOSIS — U07.1 COVID-19 VIRUS INFECTION: ICD-10-CM

## 2020-04-10 DIAGNOSIS — R05.9 COUGH: ICD-10-CM

## 2020-04-10 LAB — SARS-COV-2 RNA RESP QL NAA+PROBE: DETECTED

## 2020-04-10 PROCEDURE — U0002 COVID-19 LAB TEST NON-CDC: HCPCS

## 2020-04-11 ENCOUNTER — TELEPHONE (OUTPATIENT)
Dept: INTERNAL MEDICINE | Facility: CLINIC | Age: 50
End: 2020-04-11

## 2020-04-11 NOTE — TELEPHONE ENCOUNTER
----- Message from Quintin Gross MD sent at 4/10/2020  9:02 PM CDT -----  Please let patient know that she is COVID-19 positive. Please follow the recommendation I sent to her via [facility].

## 2020-04-11 NOTE — PROGRESS NOTES
Positive COVID-19  Instructions sent to the patient via [facility].  Signed up for COVID symptom monitoring.

## 2020-04-11 NOTE — TELEPHONE ENCOUNTER
Spoke with patient and informed her of her test results and to check her messages via [facility]. Patient stated she understood

## 2020-04-16 ENCOUNTER — NURSE TRIAGE (OUTPATIENT)
Dept: ADMINISTRATIVE | Facility: CLINIC | Age: 50
End: 2020-04-16

## 2020-04-16 ENCOUNTER — OFFICE VISIT (OUTPATIENT)
Dept: INTERNAL MEDICINE | Facility: CLINIC | Age: 50
End: 2020-04-16
Payer: COMMERCIAL

## 2020-04-16 DIAGNOSIS — U07.1 COVID-19 VIRUS INFECTION: Primary | ICD-10-CM

## 2020-04-16 DIAGNOSIS — I10 ESSENTIAL HYPERTENSION: ICD-10-CM

## 2020-04-16 DIAGNOSIS — R05.9 COUGH: ICD-10-CM

## 2020-04-16 PROCEDURE — 99212 PR OFFICE/OUTPT VISIT, EST, LEVL II, 10-19 MIN: ICD-10-PCS | Mod: 95,,, | Performed by: INTERNAL MEDICINE

## 2020-04-16 PROCEDURE — 99212 OFFICE O/P EST SF 10 MIN: CPT | Mod: 95,,, | Performed by: INTERNAL MEDICINE

## 2020-04-16 RX ORDER — CODEINE PHOSPHATE AND GUAIFENESIN 10; 100 MG/5ML; MG/5ML
5 SOLUTION ORAL 3 TIMES DAILY PRN
Qty: 118 ML | Refills: 0 | Status: SHIPPED | OUTPATIENT
Start: 2020-04-16 | End: 2020-04-23

## 2020-04-16 NOTE — PROGRESS NOTES
The patient location is: Home  The chief complaint leading to consultation is: Cough from Covid-19  Visit type: audiovisual  Total time spent with patient: 8 minutes  Each patient to whom he or she provides medical services by telemedicine is:  (1) informed of the relationship between the physician and patient and the respective role of any other health care provider with respect to management of the patient; and (2) notified that he or she may decline to receive medical services by telemedicine and may withdraw from such care at any time.    Notes:    CC:  Cough secondary to COVID-19    HPI:  Patient is a 50-year-old female with hypertension, reflux, status post gastric sleeve who was recently diagnosed with COVID-19.  He reports that she has a cough still.  She has been using over-the-counter medication including Robitussin and NyQuil without much success.  She has used Tessalon Perles in the past which did not help.  She would like something for cough.  He appears to be more at night or if she talks for any length of time.    ROS:  Patient reports overall she is doing better.  She is about 75% better.  No fever chills.  She did lose her sense of smell.  She did have headaches in the past.    Physical exam:  No exam was performed since this was a virtual visit    Assessment:  1.  Cough secondary to COVID-19  2.  Hypertension  3.  Status post gastric sleeve    Plan:  1.  Did discuss with the patient about Robitussin with codeine.  The patient has taken this before without any problems.  It was discussed that this is a narcotic.  Also discussed that this can cause constipation.  She has only use this if needed.  2.  Prescription was sent in to the pharmacy at [facility].

## 2020-04-16 NOTE — TELEPHONE ENCOUNTER
Covid 19 home monitoring follow up  Patient called to report she is feeling better just has a lingering cough x 2 weeks. Pt denies SOB.dificulty breathing fever or chest pain. Pt continues to take otc meds with minimal relief of cough. Will send message to [facility] for virtual visit.  Pt agreed with plan    STOPPING HOME ISOLATION - MUST MEET ALL 3 REQUIREMENTS (CDC):  * Fever gone for at least 72 hours (3 full days) off fever-reducing medicines AND  * Cough and other symptoms must be improved AND  * Symptoms started more than 7 days ago.  * If unsure if it is safe for you to leave isolation, check the [facility] website or call your healthcare provider.  Reason for Disposition   [1] COVID-19 suspected (e.g., cough, fever, shortness of breath) AND [2] public health department recommends testing   [1] Adult has symptoms of COVID-19 (fever, cough, or SOB) AND [2] lab test positive   [1] COVID-19 infection diagnosed or suspected AND [2] mild symptoms (fever, cough) AND [3] no trouble breathing or other complications    Additional Information   Negative: SEVERE difficulty breathing (e.g., struggling for each breath, speaks in single words)   Negative: Difficult to awaken or acting confused (e.g., disoriented, slurred speech)   Negative: Bluish (or gray) lips or face now   Negative: Shock suspected (e.g., cold/pale/clammy skin, too weak to stand, low BP, rapid pulse)   Negative: Sounds like a life-threatening emergency to the triager    Protocols used: CORONAVIRUS (COVID-19) DIAGNOSED OR [id number], CORONAVIRUS (COVID-19) EXPOSURE-A-OH

## 2020-04-24 ENCOUNTER — PATIENT MESSAGE (OUTPATIENT)
Dept: INTERNAL MEDICINE | Facility: CLINIC | Age: 50
End: 2020-04-24

## 2020-04-24 ENCOUNTER — OFFICE VISIT (OUTPATIENT)
Dept: INTERNAL MEDICINE | Facility: CLINIC | Age: 50
End: 2020-04-24
Payer: COMMERCIAL

## 2020-04-24 DIAGNOSIS — U07.1 COVID-19 VIRUS INFECTION: Primary | ICD-10-CM

## 2020-04-24 DIAGNOSIS — I10 ESSENTIAL HYPERTENSION: ICD-10-CM

## 2020-04-24 PROCEDURE — 99214 PR OFFICE/OUTPT VISIT, EST, LEVL IV, 30-39 MIN: ICD-10-PCS | Mod: 95,,, | Performed by: INTERNAL MEDICINE

## 2020-04-24 PROCEDURE — 99214 OFFICE O/P EST MOD 30 MIN: CPT | Mod: 95,,, | Performed by: INTERNAL MEDICINE

## 2020-04-24 RX ORDER — LOSARTAN POTASSIUM 25 MG/1
25 TABLET ORAL DAILY
Qty: 90 TABLET | Refills: 3 | Status: SHIPPED | OUTPATIENT
Start: 2020-04-24 | End: 2021-06-21 | Stop reason: SDUPTHER

## 2020-04-24 RX ORDER — HYDROCHLOROTHIAZIDE 25 MG/1
12.5 TABLET ORAL DAILY
Qty: 90 TABLET | Refills: 3 | Status: SHIPPED | OUTPATIENT
Start: 2020-06-24 | End: 2021-06-24 | Stop reason: SDUPTHER

## 2020-04-24 NOTE — PROGRESS NOTES
INTERNAL MEDICINE CLINIC  TELEMEDICINE ENCOUNTER  Progress Note     PRESENTING HISTORY     PCP: Magdalena Davila NP    Current Chief Complaint/Problem:  COVID follow up.    The patient location is: Home  Visit type: Virtual visit with synchronous audio and video    History of Present Illness & ROS: Ms. Renea Mac is a 50 y.o. female.    She saw me on 4-9-2020.   She had cough.  had COVID.  She tested positive 4-.    Doing okay at home.  Still coughing. Taking cough meds.  No fever.  Smell is back but taste is still funny.  Taking Vitamin C and D and MVI.    She works at [employer].     She has wear mask.      HR 88-89.  /98     Ref. Range 4/10/2020 09:05   SARS-CoV2 (COVID-19) Qualitative PCR Latest Ref Range: Not Detected  Detected (A)       PAST HISTORY:     Past Medical History:   Diagnosis Date    Acute pain of right knee 11/4/2016    Chronic right shoulder pain 11/4/2016    COVID-19 virus infection 4/10/2020    Essential hypertension 11/4/2016    GERD (gastroesophageal reflux disease)     Incomplete tear of right rotator cuff 11/30/2016    Osteoarthritis of right knee 11/30/2016    Rotator cuff tear     S/P Exploratory Laparotomy/Mass Resection/BSO 1/31/2019 1- 1. Soft tissue, abdominal wall, mass, resection: - Benign leiomyoma, 6.5 cm in greatest dimension, see comment. 2. Ovary and fallopian tube, left, left salpingo-oophorectomy: - Benign ovary with cystic corpus luteum, 2.7 cm in greatest dimension. - Benign fallopian tube with no significant histologic abnormality. 3. 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/4/2016    Syncope and collapse 6/14/2017       Past Surgical History:   Procedure Laterality Date    BILATERAL SALPINGO-OOPHORECTOMY (BSO) Bilateral 1/31/2019    Procedure: SALPINGO-OOPHORECTOMY, BILATERAL;  Surgeon: Petrona Porter MD;  Location: [facility] OR [address] [place] [address];   Service: OB/GYN;  Laterality: Bilateral;     SECTION      x1    COLONOSCOPY N/A 2019    Procedure: COLONOSCOPY;  Surgeon: Chris Bennett MD;  Location: [facility] (4TH FLR);  Service: Endoscopy;  Laterality: N/A;  Justo Camilo, or Donald if no availability in Dec. will schedule after CT scan results    COLPOSCOPY      EXCISION OF PELVIC MASS N/A 2019    Procedure: EXCISION, MASS, PELVIS;  Surgeon: Petrona Porter MD;  Location: [facility] OR 2ND FLR;  Service: OB/GYN;  Laterality: N/A;    HERNIA REPAIR      HYSTERECTOMY      YANG, ovaries remain (fibroids0    LAPAROTOMY, EXPLORATORY N/A 2019    Procedure: LAPAROTOMY, EXPLORATORY;  Surgeon: Petrona Porter MD;  Location: [facility] OR 2ND FLR;  Service: OB/GYN;  Laterality: N/A;    OOPHORECTOMY Bilateral 2019    SLEEVE GASTROPLASTY  2014    TUBAL LIGATION         Family History   Problem Relation Age of Onset    Hypertension Mother     Heart disease Mother     Heart attack Mother     Rectal cancer Mother     Colon cancer Mother     Hypertension Father     Heart disease Father     Stroke Father     Heart attack Father     Hypertension Sister     Hypertension Brother     Cancer Brother     No Known Problems Daughter     No Known Problems Son     Esophageal cancer Neg Hx        Social History     Socioeconomic History    Marital status:      Spouse name: Not on file    Number of children: Not on file    Years of education: Not on file    Highest education level: Not on file   Occupational History    Not on file   Social Needs    Financial resource strain: Not hard at all    Food insecurity:     Worry: Never true     Inability: Never true    Transportation needs:     Medical: No     Non-medical: No   Tobacco Use    Smoking status: Never Smoker    Smokeless tobacco: Never Used   Substance and Sexual Activity    Alcohol use: Yes     Frequency: Monthly or less     Drinks per session: 1 or 2     Binge  frequency: Never     Comment: occasionally    Drug use: No    Sexual activity: Yes     Partners: Male   Lifestyle    Physical activity:     Days per week: 0 days     Minutes per session: 0 min    Stress: To some extent   Relationships    Social connections:     Talks on phone: Three times a week     Gets together: Never     Attends Je[employer] witness service: Not on file     Active member of club or organization: No     Attends meetings of clubs or organizations: Never     Relationship status:    Other Topics Concern    Not on file   Social History Narrative    Not on file       MEDICATIONS & ALLERGIES:     Current Outpatient Medications on File Prior to Visit   Medication Sig Dispense Refill    acetaminophen (TYLENOL) 500 MG tablet Take 500 mg by mouth every 6 (six) hours as needed for Pain.      calcium carbonate/vitamin D3 (VITAMIN D-3 ORAL) Take by mouth.      cholecalciferol, vitamin D3, (VITAMIN D3) 1,000 unit capsule Take 1,000 Units by mouth once daily.      diclofenac sodium (VOLTAREN) 1 % Gel Apply 2 g topically 4 (four) times daily. 100 g 5    docusate sodium (COLACE) 50 MG capsule Take 2 capsules (100 mg total) by mouth 2 (two) times daily. (Patient taking differently: Take 100 mg by mouth as needed. ) 60 capsule 0    gabapentin (NEURONTIN) 300 MG capsule Take one capsule by mouth at bedtime for 7 days, then one capsule twice daily for the next 7 days, and then take one capsule three times daily and continue (Patient not taking: Reported on 4/9/2020) 270 capsule 0     guaifenesin-codeine 100-10 mg/5 ml (TUSSI-ORGANIDIN NR)  mg/5 mL syrup Take 5 mLs by mouth 3 (three) times daily as needed for Cough. 118 mL 0    hydroCHLOROthiazide (HYDRODIURIL) 25 MG tablet Take 1 tablet (25 mg total) by mouth once daily. 90 tablet 3    polyethylene glycol (MIRALAX) 17 gram/dose powder Mix 1 capful (17 grams total) with a liquid and take by mouth once daily. 527 g 11    pyridoxine HCl, vitamin B6,  (PYRIDOXINE, VITAMIN B6, ORAL) Take by mouth.      thiamine HCl (VITAMIN B-1 ORAL) Take by mouth.       No current facility-administered medications on file prior to visit.         Review of patient's allergies indicates:  No Known Allergies    Medications Reconciliation:   I have reconciled the patient's home medications and discharge medications with the patient/family. I have updated all changes.  Refer to After-Visit Medication List.    OBJECTIVE:     Previous Weight and BMI:  Wt Readings from Last 1 Encounters:   04/09/20 1815 101 kg (222 lb 10.6 oz)     There is no height or weight on file to calculate BMI.     Physical Exam per Video and/or Audio:  Voice: Normal speech.  General: Well developed, well nourished. No distress.  Eyes: Clear conjunctiva.  Lungs: Normal respiratory effort.  Psychiatric: Normal affect. Alert.    Laboratory  Lab Results   Component Value Date    WBC 7.72 03/13/2019    HGB 12.0 03/13/2019    HCT 38.6 03/13/2019     03/13/2019    CHOL 153 07/11/2017    TRIG 40 07/11/2017    HDL 64 07/11/2017    ALT 10 03/13/2019    AST 13 03/13/2019     03/13/2019    K 4.1 03/13/2019     03/13/2019    CREATININE 0.9 03/13/2019    BUN 13 03/13/2019    CO2 24 03/13/2019    TSH 1.540 03/13/2019       ASSESSMENT & PLAN:     COVID-19 virus infection  - Recovering well.  - Mild cough only.    - Able to return to work 4/25/2019.     Need to wear mask for 14 more days and practice social distancing.  - Letter provided.  Patient will bring form for me to sign.    Essential hypertension  - BP not controlled with HCTZ 25 mg.    Will add Losartan and reduce HCTZ to 12.5 mg daily.    Rx:  -     hydroCHLOROthiazide (HYDRODIURIL) 25 MG tablet; Take 0.5 tablets (12.5 mg total) by mouth once daily.  Dispense: 90 tablet; Refill: 3  -     losartan (COZAAR) 25 MG tablet; Take 1 tablet (25 mg total) by mouth once daily.  Dispense: 90 tablet; Refill: 3    Total time spent with patient:  20 min.  Each  patient to whom he or she provides medical services by telemedicine is:  (1) informed of the relationship between the physician and patient and the respective role of any other health care provider with respect to management of the patient; and (2) notified that he or she may decline to receive medical services by telemedicine and may withdraw from such care at any time.      After Visit Medication List :     Medication List           Accurate as of April 24, 2020 11:12 AM. If you have any questions, ask your nurse or doctor.               START taking these medications    losartan 25 MG tablet  Commonly known as:  COZAAR  Take 1 tablet (25 mg total) by mouth once daily.  Started by:  Quintin Gross MD        CHANGE how you take these medications    docusate sodium 50 MG capsule  Commonly known as:  COLACE  Take 2 capsules (100 mg total) by mouth 2 (two) times daily.  What changed:    · when to take this  · reasons to take this     hydroCHLOROthiazide 25 MG tablet  Commonly known as:  HYDRODIURIL  Take 0.5 tablets (12.5 mg total) by mouth once daily.  Start taking on:  June 24, 2020  What changed:    · how much to take  · These instructions start on June 24, 2020. If you are unsure what to do until then, ask your doctor or other care provider.  Changed by:  Quintin Gross MD        CONTINUE taking these medications    acetaminophen 500 MG tablet  Commonly known as:  TYLENOL     cholecalciferol (vitamin D3) 25 mcg (1,000 unit) capsule  Commonly known as:  VITAMIN D3     diclofenac sodium 1 % Gel  Commonly known as:  VOLTAREN  Apply 2 g topically 4 (four) times daily.     gabapentin 300 MG capsule  Commonly known as:  NEURONTIN  Take one capsule by mouth at bedtime for 7 days, then one capsule twice daily for the next 7 days, and then take one capsule three times daily and continue     polyethylene glycol 17 gram/dose powder  Commonly known as:  GLYCOLAX  Mix 1 capful (17 grams total) with a liquid and take by mouth  once daily.     PYRIDOXINE (VITAMIN B6) ORAL     VITAMIN B-1 ORAL     VITAMIN D-3 ORAL           Where to Get Your Medications      These medications were sent to [facility]  [address] [place] [id number]    Hours:  Mon-Fri, 8a-5:30p Phone:  [phone]   · hydroCHLOROthiazide 25 MG tablet  · losartan 25 MG tablet         Signing Physician:  Quintin Gross MD

## 2020-05-11 ENCOUNTER — PATIENT OUTREACH (OUTPATIENT)
Dept: ADMINISTRATIVE | Facility: OTHER | Age: 50
End: 2020-05-11

## 2020-05-12 ENCOUNTER — OFFICE VISIT (OUTPATIENT)
Dept: URGENT CARE | Facility: CLINIC | Age: 50
End: 2020-05-12
Payer: COMMERCIAL

## 2020-05-12 VITALS
HEIGHT: 64 IN | WEIGHT: 220 LBS | SYSTOLIC BLOOD PRESSURE: 132 MMHG | OXYGEN SATURATION: 99 % | TEMPERATURE: 99 F | DIASTOLIC BLOOD PRESSURE: 92 MMHG | BODY MASS INDEX: 37.56 KG/M2 | HEART RATE: 91 BPM

## 2020-05-12 DIAGNOSIS — S39.012A LUMBAR STRAIN, INITIAL ENCOUNTER: Primary | ICD-10-CM

## 2020-05-12 LAB
BILIRUB UR QL STRIP: NEGATIVE
GLUCOSE UR QL STRIP: NEGATIVE
KETONES UR QL STRIP: NEGATIVE
LEUKOCYTE ESTERASE UR QL STRIP: NEGATIVE
PH, POC UA: 6.5 (ref 5–8)
POC BLOOD, URINE: NEGATIVE
POC NITRATES, URINE: NEGATIVE
PROT UR QL STRIP: NEGATIVE
SP GR UR STRIP: 1.02 (ref 1–1.03)
UROBILINOGEN UR STRIP-ACNC: NORMAL (ref 0.1–1.1)

## 2020-05-12 PROCEDURE — 81003 URINALYSIS AUTO W/O SCOPE: CPT | Mod: QW,S$GLB,, | Performed by: FAMILY MEDICINE

## 2020-05-12 PROCEDURE — 99214 OFFICE O/P EST MOD 30 MIN: CPT | Mod: 25,S$GLB,, | Performed by: FAMILY MEDICINE

## 2020-05-12 PROCEDURE — 96372 PR INJECTION,THERAP/PROPH/DIAG2ST, IM OR SUBCUT: ICD-10-PCS | Mod: S$GLB,,, | Performed by: FAMILY MEDICINE

## 2020-05-12 PROCEDURE — 96372 THER/PROPH/DIAG INJ SC/IM: CPT | Mod: S$GLB,,, | Performed by: FAMILY MEDICINE

## 2020-05-12 PROCEDURE — 81003 POCT URINALYSIS, DIPSTICK, AUTOMATED, W/O SCOPE: ICD-10-PCS | Mod: QW,S$GLB,, | Performed by: FAMILY MEDICINE

## 2020-05-12 PROCEDURE — 99214 PR OFFICE/OUTPT VISIT, EST, LEVL IV, 30-39 MIN: ICD-10-PCS | Mod: 25,S$GLB,, | Performed by: FAMILY MEDICINE

## 2020-05-12 RX ORDER — KETOROLAC TROMETHAMINE 30 MG/ML
30 INJECTION, SOLUTION INTRAMUSCULAR; INTRAVENOUS
Status: COMPLETED | OUTPATIENT
Start: 2020-05-12 | End: 2020-05-12

## 2020-05-12 RX ADMIN — KETOROLAC TROMETHAMINE 30 MG: 30 INJECTION, SOLUTION INTRAMUSCULAR; INTRAVENOUS at 09:05

## 2020-05-12 NOTE — PROGRESS NOTES
"Subjective:       Patient ID: Renea Mac is a 50 y.o. female.    Vitals:  height is 5' 4" (1.626 m) and weight is 99.8 kg (220 lb). Her tympanic temperature is 98.7 °F (37.1 °C). Her blood pressure is 132/92 (abnormal) and her pulse is 91. Her oxygen saturation is 99%.     Chief Complaint: Back Pain    Back Pain   This is a new problem. The current episode started today. Pain location: lower back  The quality of the pain is described as aching and shooting. The pain does not radiate. The pain is at a severity of 8/10. The pain is moderate. The pain is the same all the time. The symptoms are aggravated by standing. Pertinent negatives include no abdominal pain, bladder incontinence, bowel incontinence, chest pain, dysuria, fever, headaches, leg pain, numbness, paresis, paresthesias, pelvic pain, perianal numbness, tingling, weakness or weight loss. Treatments tried: tylenol, biofreeze. The treatment provided no relief.       Constitution: Negative for fatigue and fever.   Cardiovascular: Negative for chest pain.   Gastrointestinal: Negative for abdominal pain and bowel incontinence.   Genitourinary: Negative for dysuria, urgency, bladder incontinence, hematuria and pelvic pain.   Musculoskeletal: Positive for back pain. Negative for muscle cramps and history of spine disorder.   Skin: Negative for rash.   Neurological: Negative for coordination disturbances, headaches, numbness and tingling.       Objective:      Physical Exam   Constitutional: She is oriented to person, place, and time. She appears well-developed and well-nourished.   HENT:   Head: Normocephalic and atraumatic.   Eyes: Pupils are equal, round, and reactive to light. EOM are normal.   Neck: Normal range of motion. Neck supple.   Cardiovascular: Normal rate, regular rhythm and normal heart sounds.   Pulmonary/Chest: Effort normal and breath sounds normal.   Musculoskeletal: She exhibits tenderness (lower lumabr region bilaterally, negative " straight leg raise).   Neurological: She is alert and oriented to person, place, and time. She displays normal reflexes. Coordination normal.   Nursing note and vitals reviewed.        Assessment:       1. Lumbar strain, initial encounter        Plan:         Lumbar strain, initial encounter  -     POCT Urinalysis, Dipstick, Automated, W/O Scope  -     ketorolac injection 30 mg    Heat. Bed rest X 2 days. Then ROM exercises. To take her own supply of flexeril and tylenol. RTC prn worsening symptoms

## 2020-05-12 NOTE — LETTER
May 12, 2020      [facility]  [address]  [place] [address]  Phone: [phone]  Fax: [phone]       Patient: Renea Mac   YOB: 1970  Date of Visit: 05/12/2020    To Whom It May Concern:    Windy Mac  was at [facility] on 05/12/2020. She may return to work/school on 05/14/2020 with no restrictions. If you have any questions or concerns, or if I can be of further assistance, please do not hesitate to contact me.    Sincerely,          Manish Johnson MD

## 2020-05-12 NOTE — PATIENT INSTRUCTIONS
Back Sprain or Strain    Injury to the muscles (strain) or ligaments (sprain) around the spine can be troubling. Injury may occur after a sudden forceful twisting or bending force such as in a car accident, after a simple awkward movement, or after lifting something heavy with poor body positioning. In any case, muscle spasm is often present and adds to the pain.  Thankfully, most people feel better in 1 to 2 weeks, and most of the rest in 1 to 2 months. Most people can remain active. Unless you had a forceful or traumatic physical injury such as a car accident or fall, X-rays may not be ordered for the first evaluation of a back sprain or strain. If pain continues and does not respond to medical treatment, your healthcare provider may then order X-rays and other tests.  Home care  The following guidelines will help you care for your injury at home:  · When in bed, try to find a comfortable position. A firm mattress is best. Try lying flat on your back with pillows under your knees. You can also try lying on your side with your knees bent up toward your chest and a pillow between your knees.  · Don't sit for long periods. Try not to take long car rides or take other trips that have you sitting for a long time. This puts more stress on the lower back than standing or walking.  · During the first 24 to 72 hours after an injury or flare-up, apply an ice pack to the painful area for 20 minutes. Then remove it for 20 minutes. Do this for 60 to 90 minutes, or several times a day. This will reduce swelling and pain. Be sure to wrap the ice pack in a thin towel or plastic to protect your skin.  · You can start with ice, then switch to heat. Heat from a hot shower, hot bath, or heating pad reduces pain and works well for muscle spasms. Put heat on the painful area for 20 minutes, then remove for 20 minutes. Do this for 60 to 90 minutes, or several times a day. Do not use a heating pad while sleeping. It can burn the  skin.  · You can alternate the ice and heat. Talk with your healthcare provider to find out the best treatment or therapy for your back pain.  · Therapeutic massage will help relax the back muscles without stretching them.  · Be aware of safe lifting methods. Do not lift anything over 15 pounds until all of the pain is gone.  Medicines  Talk to your healthcare provider before using medicines, especially if you have other health problems or are taking other medicines.  · You may use acetaminophen or ibuprofen to control pain, unless another pain medicine was prescribed. If you have chronic conditions like diabetes, liver or kidney disease, stomach ulcers, or gastrointestinal bleeding, or are taking blood-thinner medicines, talk with your doctor before taking any medicines.  · Be careful if you are given prescription medicines, narcotics, or medicine for muscle spasm. They can cause drowsiness, and affect your coordination, reflexes, and judgment. Do not drive or operate heavy machinery when taking these types of medicines. Only take pain medicine as prescribed by your healthcare provider.  Follow-up care  Follow up with your healthcare provider, or as advised. You may need physical therapy or more tests if your symptoms get worse.  If you had X-rays your healthcare provider may be checking for any broken bones, breaks, or fractures. Bruises and sprains can sometimes hurt as much as a fracture. These injuries can take time to heal completely. If your symptoms dont improve or they get worse, talk with your healthcare provider. You may need a repeat X-ray or other tests.  Call 911  Call for emergency care if any of the following occur:  · Trouble breathing  · Confused  · Very drowsy or trouble awakening  · Fainting or loss of consciousness  · Rapid or very slow heart rate  · Loss of bowel or bladder control  When to seek medical advice  Call your healthcare provider right away if any of the following occur:  · Pain  gets worse or spreads to your arms or legs  · Weakness or numbness in one or both arms or legs  · Numbness in the groin or genital area  Date Last Reviewed: 6/1/2016  © 3889-9402 [employer]. [address] [place] [address], [place] [address]. All rights reserved. This information is not intended as a substitute for professional medical care. Always follow your healthcare professional's instructions.

## 2020-05-14 ENCOUNTER — OFFICE VISIT (OUTPATIENT)
Dept: ORTHOPEDICS | Facility: CLINIC | Age: 50
End: 2020-05-14
Attending: SURGERY
Payer: COMMERCIAL

## 2020-05-14 VITALS
HEART RATE: 79 BPM | BODY MASS INDEX: 38.93 KG/M2 | HEIGHT: 64 IN | WEIGHT: 228 LBS | DIASTOLIC BLOOD PRESSURE: 94 MMHG | SYSTOLIC BLOOD PRESSURE: 128 MMHG | TEMPERATURE: 98 F

## 2020-05-14 DIAGNOSIS — M25.512 LEFT SHOULDER PAIN, UNSPECIFIED CHRONICITY: Primary | ICD-10-CM

## 2020-05-14 PROCEDURE — 99213 OFFICE O/P EST LOW 20 MIN: CPT | Mod: 25,S$GLB,, | Performed by: PHYSICIAN ASSISTANT

## 2020-05-14 PROCEDURE — 99999 PR PBB SHADOW E&M-EST. PATIENT-LVL IV: ICD-10-PCS | Mod: PBBFAC,,, | Performed by: PHYSICIAN ASSISTANT

## 2020-05-14 PROCEDURE — 3008F PR BODY MASS INDEX (BMI) DOCUMENTED: ICD-10-PCS | Mod: CPTII,S$GLB,, | Performed by: PHYSICIAN ASSISTANT

## 2020-05-14 PROCEDURE — 3074F SYST BP LT 130 MM HG: CPT | Mod: CPTII,S$GLB,, | Performed by: PHYSICIAN ASSISTANT

## 2020-05-14 PROCEDURE — 20610 DRAIN/INJ JOINT/BURSA W/O US: CPT | Mod: LT,S$GLB,, | Performed by: PHYSICIAN ASSISTANT

## 2020-05-14 PROCEDURE — 99999 PR PBB SHADOW E&M-EST. PATIENT-LVL IV: CPT | Mod: PBBFAC,,, | Performed by: PHYSICIAN ASSISTANT

## 2020-05-14 PROCEDURE — 3080F PR MOST RECENT DIASTOLIC BLOOD PRESSURE >= 90 MM HG: ICD-10-PCS | Mod: CPTII,S$GLB,, | Performed by: PHYSICIAN ASSISTANT

## 2020-05-14 PROCEDURE — 3074F PR MOST RECENT SYSTOLIC BLOOD PRESSURE < 130 MM HG: ICD-10-PCS | Mod: CPTII,S$GLB,, | Performed by: PHYSICIAN ASSISTANT

## 2020-05-14 PROCEDURE — 20610 PR DRAIN/INJECT LARGE JOINT/BURSA: ICD-10-PCS | Mod: LT,S$GLB,, | Performed by: PHYSICIAN ASSISTANT

## 2020-05-14 PROCEDURE — 3008F BODY MASS INDEX DOCD: CPT | Mod: CPTII,S$GLB,, | Performed by: PHYSICIAN ASSISTANT

## 2020-05-14 PROCEDURE — 3080F DIAST BP >= 90 MM HG: CPT | Mod: CPTII,S$GLB,, | Performed by: PHYSICIAN ASSISTANT

## 2020-05-14 PROCEDURE — 99213 PR OFFICE/OUTPT VISIT, EST, LEVL III, 20-29 MIN: ICD-10-PCS | Mod: 25,S$GLB,, | Performed by: PHYSICIAN ASSISTANT

## 2020-05-14 RX ORDER — METHYLPREDNISOLONE ACETATE 80 MG/ML
80 INJECTION, SUSPENSION INTRA-ARTICULAR; INTRALESIONAL; INTRAMUSCULAR; SOFT TISSUE
Status: COMPLETED | OUTPATIENT
Start: 2020-05-14 | End: 2020-05-14

## 2020-05-14 RX ADMIN — METHYLPREDNISOLONE ACETATE 80 MG: 80 INJECTION, SUSPENSION INTRA-ARTICULAR; INTRALESIONAL; INTRAMUSCULAR; SOFT TISSUE at 06:05

## 2020-05-14 NOTE — PROGRESS NOTES
Subjective:      Patient ID: Renea Mac is a 50 y.o. female.    Chief Complaint: Pain of the Left Shoulder    HPI  50 year old female presents with chief complaint of left shoulder pain since January. She is RHD and denies trauma. She saw sports medicine in February and received a shoulder injection but it did not help. PT was ordered but she could not attend due to COVID. She has been doing HEP. Pain is worse with reaching behind or crossing over. Voltaren gel gives mild relief. She cannot take oral nsaids due to h/o bariatric surgery. She has h/o right shoulder tendinitis and says this pain feels similar.   Review of Systems   Constitution: Negative for chills, fever and night sweats.   Cardiovascular: Negative for chest pain.   Respiratory: Negative for cough and shortness of breath.    Hematologic/Lymphatic: Does not bruise/bleed easily.   Skin: Negative for color change.   Gastrointestinal: Negative for heartburn.   Genitourinary: Negative for dysuria.   Neurological: Negative for numbness and paresthesias.   Psychiatric/Behavioral: Negative for altered mental status.   Allergic/Immunologic: Negative for persistent infections.         Objective:            General    Vitals reviewed.  Constitutional: She is oriented to person, place, and time. She appears well-developed and well-nourished.   Cardiovascular: Normal rate.    Neurological: She is alert and oriented to person, place, and time.             Left Shoulder Exam     Range of Motion   Active abduction:  90 abnormal   Forward Flexion:  110 abnormal   External Rotation 0 degrees: abnormal   Internal rotation 0 degrees: abnormal     Tests & Signs   Ramos test: positive  Impingement: negative  Speed's Test: negative    Other   Sensation: normal     Comments:  Positive empty can test.      Muscle Strength   Left Upper Extremity  Shoulder Abduction: 4/5   Supraspinatus: 4/5/5     Vascular Exam       Left Pulses      Radial:                     2+          X-ray: reviewed by myself. No fracture, mild spurring about the acromioclavicular articulation.         Assessment:       Encounter Diagnosis   Name Primary?    Left shoulder pain, unspecified chronicity Yes          Plan:       Discussed treatment options with patient. Order placed for PT and sent to [facility] in [place]. She would like to try another injection. RTC if symptoms worsen or do not improve.     PROCEDURE:  I have explained the risks, benefits, and alternatives of the procedure in detail.  The patient voices understanding and all questions have been answered.  The patient agrees to proceed as planned. So after I performed a sterile prep of the skin in the normal fashion the left shoulder is injected from the posterior approach using a 22 gauge needle with a combination of 4cc 1% plain lidocaine and 80 mg of depo medrol. The patient is cautioned and immediate relief of pain is secondary to the local anesthetic and will be temporary.  After the anesthetic wears off there may be a increase in pain that may last for a few hours or a few days and they should use ice to help alleviate this flair up of pain.

## 2020-06-09 ENCOUNTER — OFFICE VISIT (OUTPATIENT)
Dept: URGENT CARE | Facility: CLINIC | Age: 50
End: 2020-06-09
Payer: COMMERCIAL

## 2020-06-09 VITALS
WEIGHT: 228 LBS | DIASTOLIC BLOOD PRESSURE: 80 MMHG | HEART RATE: 79 BPM | HEIGHT: 64 IN | TEMPERATURE: 97 F | RESPIRATION RATE: 10 BRPM | SYSTOLIC BLOOD PRESSURE: 127 MMHG | OXYGEN SATURATION: 98 % | BODY MASS INDEX: 38.93 KG/M2

## 2020-06-09 DIAGNOSIS — R42 VERTIGO: Primary | ICD-10-CM

## 2020-06-09 DIAGNOSIS — R51.9 ACUTE NONINTRACTABLE HEADACHE, UNSPECIFIED HEADACHE TYPE: ICD-10-CM

## 2020-06-09 PROCEDURE — 93005 ELECTROCARDIOGRAM TRACING: CPT | Mod: S$GLB,,, | Performed by: STUDENT IN AN ORGANIZED HEALTH CARE EDUCATION/TRAINING PROGRAM

## 2020-06-09 PROCEDURE — 99214 PR OFFICE/OUTPT VISIT, EST, LEVL IV, 30-39 MIN: ICD-10-PCS | Mod: S$GLB,,, | Performed by: STUDENT IN AN ORGANIZED HEALTH CARE EDUCATION/TRAINING PROGRAM

## 2020-06-09 PROCEDURE — 99214 OFFICE O/P EST MOD 30 MIN: CPT | Mod: S$GLB,,, | Performed by: STUDENT IN AN ORGANIZED HEALTH CARE EDUCATION/TRAINING PROGRAM

## 2020-06-09 PROCEDURE — 93010 ELECTROCARDIOGRAM REPORT: CPT | Mod: S$GLB,,, | Performed by: INTERNAL MEDICINE

## 2020-06-09 PROCEDURE — 93005 EKG 12-LEAD: ICD-10-PCS | Mod: S$GLB,,, | Performed by: STUDENT IN AN ORGANIZED HEALTH CARE EDUCATION/TRAINING PROGRAM

## 2020-06-09 PROCEDURE — 93010 EKG 12-LEAD: ICD-10-PCS | Mod: S$GLB,,, | Performed by: INTERNAL MEDICINE

## 2020-06-09 RX ORDER — MECLIZINE HYDROCHLORIDE 25 MG/1
25 TABLET ORAL 3 TIMES DAILY PRN
Qty: 15 TABLET | Refills: 0 | Status: SHIPPED | OUTPATIENT
Start: 2020-06-09 | End: 2020-09-29

## 2020-06-09 NOTE — PROGRESS NOTES
"Subjective:       Patient ID: Renea Mac is a 50 y.o. female.    Vitals:  height is 5' 4" (1.626 m) and weight is 103.4 kg (228 lb). Her pulse is 87. Her respiration is 10.     Chief Complaint: Headache and Dizziness    Pt presents for dizziness with HA. Reports upon waking this AM began having dizziness with position changes. Later in the AM began having frontal HA and some mild nausea without emesis. Has tried Tylenol with minimal relief. Denied f/c, neck pain, diarrhea, abd pain, CP, SoB, URI sx's, focal deficits, vision changes, slurred speech, syncope.    Headache    This is a new problem. The current episode started today. The problem occurs constantly. The problem has been unchanged. The pain is located in the frontal region. The pain quality is not similar to prior headaches. The quality of the pain is described as throbbing and squeezing. The pain is at a severity of 7/10. The pain is moderate. Associated symptoms include dizziness. Pertinent negatives include no abdominal pain, blurred vision, coughing, ear pain, eye pain, eye redness, fever, hearing loss, muscle aches, nausea, neck pain, numbness, phonophobia, photophobia, rhinorrhea, seizures, sinus pressure, sore throat, tinnitus, visual change, vomiting or weakness. Nothing aggravates the symptoms. She has tried acetaminophen for the symptoms. The treatment provided mild relief. There is no history of migraine headaches, pseudotumor cerebri or recent head traumas.   Dizziness:   Chronicity:  New  Onset:  Today  Progression since onset:  Unchanged  Frequency:  Every few minutes  Pain Scale:  0/10  Severity:  Moderate  Duration:  5 minutes  Dizziness characteristics:  Off-balance   Associated symptoms: headaches and light-headedness.no hearing loss, no ear pain, no fever, no tinnitus, no nausea, no vomiting, no weakness, no visual disturbances, no syncope, no palpitations, no facial weakness, no slurred speech, no numbness in extremities and no " "chest pain.  Aggravated by:  Position changes, getting up and bending  Treatments tried:  Nothing  Improvements on treatment:  No reliefno strokes, no head trauma and no head trauma.      Constitution: Negative for chills, fatigue and fever.   HENT: Negative for ear pain, tinnitus, hearing loss, congestion, sinus pressure and sore throat.    Neck: Negative for neck pain, neck stiffness and painful lymph nodes.   Cardiovascular: Negative for chest pain, leg swelling, palpitations, sob on exertion and passing out.   Eyes: Negative for eye pain, eye redness, photophobia, double vision and blurred vision.   Respiratory: Negative for chest tightness, cough and shortness of breath.    Gastrointestinal: Negative for abdominal pain, nausea, vomiting and diarrhea.   Genitourinary: Negative for dysuria, frequency, urgency and history of kidney stones.   Musculoskeletal: Negative for joint pain, joint swelling, muscle cramps and muscle ache.   Skin: Negative for color change, pale, rash and bruising.   Allergic/Immunologic: Negative for seasonal allergies.   Neurological: Positive for dizziness, light-headedness and headaches. Negative for history of vertigo, passing out, facial drooping, speech difficulty, history of migraines, loss of consciousness, numbness and seizures.   Hematologic/Lymphatic: Negative for swollen lymph nodes.   Psychiatric/Behavioral: Negative for confusion, nervous/anxious, sleep disturbance and depression. The patient is not nervous/anxious.        Objective:       Vitals:    06/09/20 1605 06/09/20 1615 06/09/20 1620 06/09/20 1623   BP: 112/75 128/82 126/81 127/80   BP Location: Right arm Right arm Right arm Right arm   Patient Position: Sitting Lying Sitting Standing   BP Method: Large (Automatic) Large (Automatic) Large (Automatic) Large (Automatic)   Pulse: 87 79 79 79   Resp: 10      Temp: 97 °F (36.1 °C)      TempSrc: Tympanic      SpO2: 98%      Weight: 103.4 kg (228 lb)      Height: 5' 4" (1.626 " m)        Physical Exam   Constitutional: She is oriented to person, place, and time. She appears well-developed and well-nourished. No distress.   HENT:   Head: Normocephalic and atraumatic.   Right Ear: Hearing and external ear normal.   Left Ear: Hearing and external ear normal.   Nose: Nose normal.   Mouth/Throat: Mucous membranes are normal.   Eyes: Pupils are equal, round, and reactive to light. Conjunctivae and lids are normal. Right eye exhibits no discharge. Left eye exhibits no discharge. No scleral icterus. Right eye exhibits nystagmus (minimal). Right eye exhibits normal extraocular motion. Left eye exhibits nystagmus (minimal). Left eye exhibits normal extraocular motion.   Neck: Normal range of motion. Neck supple.   Cardiovascular: Normal rate, regular rhythm and normal heart sounds.   No murmur heard.  Pulmonary/Chest: Effort normal and breath sounds normal. No respiratory distress. She has no wheezes.   Abdominal: Normal appearance.   Musculoskeletal: Normal range of motion. She exhibits no edema or deformity.   Neurological: She is alert and oriented to person, place, and time. No cranial nerve deficit (CN II-XII intact) or sensory deficit. She exhibits normal muscle tone. Coordination normal.   +Dizziness with downward gaze and standing   Skin: Skin is warm, dry, not pale and no rash.   Psychiatric: She has a normal mood and affect. Her speech is normal and behavior is normal. Judgment and thought content normal. Cognition and memory are normal.   Nursing note and vitals reviewed.        EKG: NSR without ectopy and no concerning ST/TW changes, unchanged from prior 01/2019    Assessment:       1. Vertigo    2. Acute nonintractable headache, unspecified headache type        Plan:         Vertigo  -     EKG 12-lead - study independently reviewed and interpreted by  physician and discussed results with patient  - negative orthostatics with stable normotensive BP  -     meclizine (ANTIVERT) 25 mg  tablet; Take 1 tablet (25 mg total) by mouth 3 (three) times daily as needed for Dizziness.  Dispense: 15 tablet; Refill: 0    Acute nonintractable headache, unspecified headache type  - unable to take NSAIDs; counseled on OTC tylenol    Results, medications and diagnosis reviewed with patient, questions answered, and return precautions given    Follow up if symptoms worsen or fail to improve.    Milo Sinclair MD/MPH  [facility]  [facility]

## 2020-06-09 NOTE — PATIENT INSTRUCTIONS
Vertigo (Unknown Cause)    In addition to helping with hearing, the inner ear is part of the balance center of your body. Problems with the inner ear can a false feeling of motion. This is called vertigo. Often, it feels as if you or the room is spinning. A vertigo attack may cause sudden nausea, vomiting and heavy sweating. Severe vertigo causes a loss of balance and can cause you to fall. During vertigo, small head movements and changes in body position will often make the symptoms worse. You may also have ringing in the ears called tinnitus.  An episode of vertigo may last seconds, minutes or hours. Once you are over the first episode, it may never come back. However, symptoms may return off and on.  The cause of your vertigo is not yet known. Possible causes of vertigo include:  · Inflammation of the inner ear  · Disease of the nerves to the inner ear  · Movement of calcium particles in the inner ear  · Poor blood flow to the balance centers of the brain  · Migraine headaches  Home care  · If symptoms are severe, rest quietly in bed. Change positions very slowly. There is usually one position that will feel best, such as lying on one side or lying on your back with your head slightly raised on pillows.  · Do not drive a car or work with dangerous machinery until symptoms have been gone for at least one week.  · Take medicine as prescribed to relieve your symptoms. Unless another medicine was prescribed for symptoms of nausea, vomiting, and dizziness, you may use over-the-counter motion sickness pills. Ask your pharmacist for suggestions.  Follow-up care  Follow up with your healthcare provider or as directed. If you are referred to a specialist or for testing, make the appointment promptly.  When to seek medical advice  Call your healthcare provider if any of the following occur:  · Fever of 100.4°F (38ºC) or higher, or as directed by your healthcare provider  · Vertigo worsens or is not controlled  by prescribed medicine   · Repeated vomiting not relieved by prescribed medicine   · Severe headache  · Confusion  · Weakness of an arm or leg or one side of the face  · Difficulty with speech or vision  · Loss of consciousness   · Seizure  Date Last Reviewed: 8/16/2015 © 2000-2017 [employer]. [address] [place] [id number]. All rights reserved. This information is not intended as a substitute for professional medical care. Always follow your healthcare professional's instructions.

## 2020-06-11 ENCOUNTER — OFFICE VISIT (OUTPATIENT)
Dept: INTERNAL MEDICINE | Facility: CLINIC | Age: 50
End: 2020-06-11
Payer: COMMERCIAL

## 2020-06-11 VITALS
HEIGHT: 64 IN | BODY MASS INDEX: 38.62 KG/M2 | WEIGHT: 226.19 LBS | SYSTOLIC BLOOD PRESSURE: 120 MMHG | HEART RATE: 69 BPM | DIASTOLIC BLOOD PRESSURE: 80 MMHG

## 2020-06-11 DIAGNOSIS — H65.02 NON-RECURRENT ACUTE SEROUS OTITIS MEDIA OF LEFT EAR: ICD-10-CM

## 2020-06-11 DIAGNOSIS — R42 VERTIGO: Primary | ICD-10-CM

## 2020-06-11 DIAGNOSIS — I10 ESSENTIAL HYPERTENSION: ICD-10-CM

## 2020-06-11 DIAGNOSIS — U07.1 COVID-19 VIRUS INFECTION: ICD-10-CM

## 2020-06-11 PROCEDURE — 3008F BODY MASS INDEX DOCD: CPT | Mod: CPTII,S$GLB,, | Performed by: NURSE PRACTITIONER

## 2020-06-11 PROCEDURE — 3074F SYST BP LT 130 MM HG: CPT | Mod: CPTII,S$GLB,, | Performed by: NURSE PRACTITIONER

## 2020-06-11 PROCEDURE — 99999 PR PBB SHADOW E&M-EST. PATIENT-LVL IV: ICD-10-PCS | Mod: PBBFAC,,, | Performed by: NURSE PRACTITIONER

## 2020-06-11 PROCEDURE — 3074F PR MOST RECENT SYSTOLIC BLOOD PRESSURE < 130 MM HG: ICD-10-PCS | Mod: CPTII,S$GLB,, | Performed by: NURSE PRACTITIONER

## 2020-06-11 PROCEDURE — 3079F PR MOST RECENT DIASTOLIC BLOOD PRESSURE 80-89 MM HG: ICD-10-PCS | Mod: CPTII,S$GLB,, | Performed by: NURSE PRACTITIONER

## 2020-06-11 PROCEDURE — 3008F PR BODY MASS INDEX (BMI) DOCUMENTED: ICD-10-PCS | Mod: CPTII,S$GLB,, | Performed by: NURSE PRACTITIONER

## 2020-06-11 PROCEDURE — 99999 PR PBB SHADOW E&M-EST. PATIENT-LVL IV: CPT | Mod: PBBFAC,,, | Performed by: NURSE PRACTITIONER

## 2020-06-11 PROCEDURE — 3079F DIAST BP 80-89 MM HG: CPT | Mod: CPTII,S$GLB,, | Performed by: NURSE PRACTITIONER

## 2020-06-11 PROCEDURE — 99213 PR OFFICE/OUTPT VISIT, EST, LEVL III, 20-29 MIN: ICD-10-PCS | Mod: S$GLB,,, | Performed by: NURSE PRACTITIONER

## 2020-06-11 PROCEDURE — 99213 OFFICE O/P EST LOW 20 MIN: CPT | Mod: S$GLB,,, | Performed by: NURSE PRACTITIONER

## 2020-06-11 RX ORDER — ONDANSETRON 4 MG/1
4 TABLET, ORALLY DISINTEGRATING ORAL EVERY 8 HOURS PRN
Qty: 30 TABLET | Refills: 0 | Status: SHIPPED | OUTPATIENT
Start: 2020-06-11 | End: 2021-02-26

## 2020-06-11 RX ORDER — FLUTICASONE PROPIONATE 50 MCG
1 SPRAY, SUSPENSION (ML) NASAL DAILY
Qty: 16 G | Refills: 0 | OUTPATIENT
Start: 2020-06-11 | End: 2021-10-10

## 2020-06-11 NOTE — PROGRESS NOTES
INTERNAL MEDICINE PROGRESS NOTE    CHIEF COMPLAINT     Chief Complaint   Patient presents with    Dizziness     f/u from  medicine not working    Headache       HPI     Renea Mac is a 50 y.o. female with HTN, OA, s/p gastrectomy sleeve, and h/o COVID 10 infection who presents for an urgent visit today.    Here with continued c/o dizziness and headaches. Seen in  for this 2 days ago.   EKG stable.   Orthostatics stable   Given meclizine.     Today she reports continued rotational dizziness. Worse when moving head or changing positions. Associated with nausea.   Meclizine helped but causing drowsiness.       Past Medical History:  Past Medical History:   Diagnosis Date    Acute pain of right knee 11/4/2016    Chronic right shoulder pain 11/4/2016    COVID-19 virus infection 4/10/2020    Essential hypertension 11/4/2016    GERD (gastroesophageal reflux disease)     Incomplete tear of right rotator cuff 11/30/2016    Osteoarthritis of right knee 11/30/2016    Rotator cuff tear     S/P Exploratory Laparotomy/Mass Resection/BSO 1/31/2019 1- 1. Soft tissue, abdominal wall, mass, resection: - Benign leiomyoma, 6.5 cm in greatest dimension, see comment. 2. Ovary and fallopian tube, left, left salpingo-oophorectomy: - Benign ovary with cystic corpus luteum, 2.7 cm in greatest dimension. - Benign fallopian tube with no significant histologic abnormality. 3. 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/4/2016    Syncope and collapse 6/14/2017       Home Medications:  Prior to Admission medications    Medication Sig Start Date End Date Taking? Authorizing Provider   calcium carbonate/vitamin D3 (VITAMIN D-3 ORAL) Take by mouth.   Yes Historical Provider, MD   cholecalciferol, vitamin D3, (VITAMIN D3) 1,000 unit capsule Take 1,000 Units by mouth once daily.   Yes Historical Provider, MD   diclofenac sodium (VOLTAREN) 1 % Gel  Apply 2 g topically 4 (four) times daily. 4/9/20  Yes Quintin Gross MD   hydroCHLOROthiazide (HYDRODIURIL) 25 MG tablet Take 0.5 tablets (12.5 mg total) by mouth once daily. 6/24/20  Yes Quintin Gross MD   losartan (COZAAR) 25 MG tablet Take 1 tablet (25 mg total) by mouth once daily. 4/24/20 4/24/21 Yes Quintin Gross MD   meclizine (ANTIVERT) 25 mg tablet Take 1 tablet (25 mg total) by mouth 3 (three) times daily as needed for Dizziness. 6/9/20 6/14/20 Yes Milo Sinclair MD   pyridoxine HCl, vitamin B6, (PYRIDOXINE, VITAMIN B6, ORAL) Take by mouth.   Yes Historical Provider, MD   thiamine HCl (VITAMIN B-1 ORAL) Take by mouth.   Yes Historical Provider, MD   acetaminophen (TYLENOL) 500 MG tablet Take 500 mg by mouth every 6 (six) hours as needed for Pain.    Historical Provider, MD   docusate sodium (COLACE) 50 MG capsule Take 2 capsules (100 mg total) by mouth 2 (two) times daily.  Patient not taking: Reported on 6/11/2020 2/5/19   BASSEM Valles MD   gabapentin (NEURONTIN) 300 MG capsule Take one capsule by mouth at bedtime for 7 days, then one capsule twice daily for the next 7 days, and then take one capsule three times daily and continue  Patient not taking: Reported on 6/11/2020 7/3/19   Yakelin Hamilton PA-C   polyethylene glycol (MIRALAX) 17 gram/dose powder Mix 1 capful (17 grams total) with a liquid and take by mouth once daily.  Patient not taking: Reported on 6/11/2020 12/6/18   Brynn Lopez PA-C       Review of Systems:  Review of Systems   HENT: Positive for ear pain (ear fullness not pain ). Negative for congestion, hearing loss, sinus pressure, sinus pain, sneezing, sore throat and tinnitus.    Respiratory: Negative for cough, shortness of breath and wheezing.    Cardiovascular: Negative for chest pain and palpitations.   Gastrointestinal: Positive for nausea. Negative for abdominal pain, constipation, diarrhea and vomiting.   Neurological: Positive for dizziness. Negative for  "speech difficulty, weakness, light-headedness, numbness and headaches.       Health Maintainence:   Immunizations:  Health Maintenance       Date Due Completion Date    HIV Screening 03/28/1987 ---    TETANUS VACCINE 03/28/1990 ---    Mammogram 03/13/2021 3/13/2019    Lipid Panel 07/11/2022 7/11/2017           PHYSICAL EXAM     BP (!) 140/90 (BP Location: Left arm, Patient Position: Sitting, BP Method: Large (Automatic))   Pulse 69   Ht 5' 4" (1.626 m)   Wt 102.6 kg (226 lb 3.1 oz)   LMP 03/01/2011 (Approximate)   BMI 38.83 kg/m²     Physical Exam   Constitutional: She is oriented to person, place, and time. She appears well-developed and well-nourished.   HENT:   Head: Normocephalic.   Right Ear: External ear normal.   Left Ear: External ear normal. A middle ear effusion is present.   Nose: Nose normal.   Mouth/Throat: Oropharynx is clear and moist. No oropharyngeal exudate.   Eyes: Pupils are equal, round, and reactive to light. EOM are normal.   Neck: Neck supple. No JVD present. No tracheal deviation present. No thyromegaly present.   Cardiovascular: Normal rate, regular rhythm, normal heart sounds and intact distal pulses. Exam reveals no gallop and no friction rub.   No murmur heard.  Pulmonary/Chest: Effort normal and breath sounds normal. No respiratory distress. She has no wheezes. She has no rales.   Abdominal: Soft. Bowel sounds are normal. She exhibits no distension. There is no tenderness.   Musculoskeletal: Normal range of motion. She exhibits no edema or tenderness.   Lymphadenopathy:     She has no cervical adenopathy.   Neurological: She is alert and oriented to person, place, and time. She displays normal reflexes (patellar reflex 2+ ). No cranial nerve deficit (III-XII grossly intact ). Coordination normal.   Skin: Skin is warm and dry. Capillary refill takes less than 2 seconds. No rash noted.   Psychiatric: She has a normal mood and affect. Her behavior is normal.   Vitals " reviewed.      LABS     No results found for: LABA1C, HGBA1C  CMP  Sodium   Date Value Ref Range Status   03/13/2019 140 136 - 145 mmol/L Final     Potassium   Date Value Ref Range Status   03/13/2019 4.1 3.5 - 5.1 mmol/L Final     Chloride   Date Value Ref Range Status   03/13/2019 106 95 - 110 mmol/L Final     CO2   Date Value Ref Range Status   03/13/2019 24 23 - 29 mmol/L Final     Glucose   Date Value Ref Range Status   03/13/2019 83 70 - 110 mg/dL Final     BUN, Bld   Date Value Ref Range Status   03/13/2019 13 6 - 20 mg/dL Final     Creatinine   Date Value Ref Range Status   03/13/2019 0.9 0.5 - 1.4 mg/dL Final     Calcium   Date Value Ref Range Status   03/13/2019 9.4 8.7 - 10.5 mg/dL Final     Total Protein   Date Value Ref Range Status   03/13/2019 7.7 6.0 - 8.4 g/dL Final     Albumin   Date Value Ref Range Status   03/13/2019 3.6 3.5 - 5.2 g/dL Final     Total Bilirubin   Date Value Ref Range Status   03/13/2019 0.2 0.1 - 1.0 mg/dL Final     Comment:     For infants and newborns, interpretation of results should be based  on gestational age, weight and in agreement with clinical  observations.  Premature Infant recommended reference ranges:  Up to 24 hours.............<8.0 mg/dL  Up to 48 hours............<12.0 mg/dL  3-5 days..................<15.0 mg/dL  6-29 days.................<15.0 mg/dL       Alkaline Phosphatase   Date Value Ref Range Status   03/13/2019 95 55 - 135 U/L Final     AST   Date Value Ref Range Status   03/13/2019 13 10 - 40 U/L Final     ALT   Date Value Ref Range Status   03/13/2019 10 10 - 44 U/L Final     Anion Gap   Date Value Ref Range Status   03/13/2019 10 8 - 16 mmol/L Final     eGFR if    Date Value Ref Range Status   03/13/2019 >60 >60 mL/min/1.73 m^2 Final     eGFR if non    Date Value Ref Range Status   03/13/2019 >60 >60 mL/min/1.73 m^2 Final     Comment:     Calculation used to obtain the estimated glomerular filtration  rate (eGFR) is the  CKD-EPI equation.        Lab Results   Component Value Date    WBC 7.72 03/13/2019    HGB 12.0 03/13/2019    HCT 38.6 03/13/2019    MCV 83 03/13/2019     03/13/2019     Lab Results   Component Value Date    CHOL 153 07/11/2017     Lab Results   Component Value Date    HDL 64 07/11/2017     Lab Results   Component Value Date    LDLCALC 81.0 07/11/2017     Lab Results   Component Value Date    TRIG 40 07/11/2017     Lab Results   Component Value Date    CHOLHDL 41.8 07/11/2017     Lab Results   Component Value Date    TSH 1.540 03/13/2019       ASSESSMENT/PLAN     Renea Mac is a 50 y.o. female     Vertigo- will cont meclizine and add flonase and zofran. Handout provided on epley maneuvers.   -     fluticasone propionate (FLONASE) 50 mcg/actuation nasal spray; 1 spray (50 mcg total) by Each Nostril route once daily.  Dispense: 16 g; Refill: 0  -     ondansetron (ZOFRAN-ODT) 4 MG TbDL; Take 1 tablet (4 mg total) by mouth every 8 (eight) hours as needed.  Dispense: 30 tablet; Refill: 0    Essential hypertension- at goal.     COVID-19 virus infection- resolved.     Non-recurrent acute serous otitis media of left ear  -     fluticasone propionate (FLONASE) 50 mcg/actuation nasal spray; 1 spray (50 mcg total) by Each Nostril route once daily.  Dispense: 16 g; Refill: 0    Follow up with PCP     Patient education provided from [facility]. Patient was counseled on when and how to seek emergent care.       Magdalena HERNANDEZ, APRN, FNP-c   Department of Internal Medicine - [facility]  2:40 PM

## 2020-06-11 NOTE — LETTER
June 11, 2020      [facility]  [address]  [facility] [address]  Phone: [phone]  Fax: [phone]       Patient: Renea Mac   YOB: 1970  Date of Visit: 06/11/2020    To Whom It May Concern:    Windy Mac  was at [facility] on 06/11/2020. She may return to work on Monday Denise 15, 2020 with no restrictions. If you have any questions or concerns, or if I can be of further assistance, please do not hesitate to contact me.    Sincerely,    Magdalena Davila NP

## 2020-07-14 ENCOUNTER — PATIENT MESSAGE (OUTPATIENT)
Dept: GYNECOLOGIC ONCOLOGY | Facility: CLINIC | Age: 50
End: 2020-07-14

## 2020-07-14 DIAGNOSIS — Z12.31 SCREENING MAMMOGRAM, ENCOUNTER FOR: Primary | ICD-10-CM

## 2020-07-20 ENCOUNTER — OFFICE VISIT (OUTPATIENT)
Dept: URGENT CARE | Facility: CLINIC | Age: 50
End: 2020-07-20
Payer: COMMERCIAL

## 2020-07-20 VITALS
BODY MASS INDEX: 38.93 KG/M2 | HEART RATE: 78 BPM | OXYGEN SATURATION: 96 % | HEIGHT: 64 IN | DIASTOLIC BLOOD PRESSURE: 103 MMHG | TEMPERATURE: 99 F | RESPIRATION RATE: 19 BRPM | SYSTOLIC BLOOD PRESSURE: 139 MMHG | WEIGHT: 228 LBS

## 2020-07-20 DIAGNOSIS — Z71.89 ADVICE GIVEN ABOUT COVID-19 VIRUS INFECTION: Primary | ICD-10-CM

## 2020-07-20 DIAGNOSIS — M25.512 LEFT SHOULDER PAIN, UNSPECIFIED CHRONICITY: Primary | ICD-10-CM

## 2020-07-20 PROCEDURE — 99214 OFFICE O/P EST MOD 30 MIN: CPT | Mod: S$GLB,,, | Performed by: NURSE PRACTITIONER

## 2020-07-20 PROCEDURE — 99214 PR OFFICE/OUTPT VISIT, EST, LEVL IV, 30-39 MIN: ICD-10-PCS | Mod: S$GLB,,, | Performed by: NURSE PRACTITIONER

## 2020-07-20 NOTE — PATIENT INSTRUCTIONS
If your condition worsens or fails to improve we recommend that you receive another evaluation at the ER immediately or contact your PCP to discuss your concerns or return here. You must understand that you've received an urgent care treatment only and that you may be released before all your medical problems are known or treated. You the patient will arrange for followup care as instructed.    Please return here or go to the Emergency Department for any concerns or worsening of condition.  If you were prescribed antibiotics, please take them to completion.  If you were prescribed a narcotic medication, do not drive or operate heavy equipment or machinery while taking these medications.  Please follow up with your primary care doctor or specialist as needed.  If you  smoke, please stop smoking.  Guidelines for General Prevention of COVID-19    o Take steps to protect yourself from COVID-19. Perform hand hygiene frequently. Wash your hands often with soap and water for at least 20 seconds of use and alcohol-based hand , covering all surfaces of your hands and rubbing them together until they feel dry.  o Avoid touching your eyes, nose, and mouth with unwashed hands.  o Avoid close contact with people and stay home if youre sick, except to get medical care.   o Cover coughs and sneezes with a tissue, or use the inside of your elbow. Immediately wash your hands or use hand .     For more information, see CDC link below:    https://www.cdc.gov/coronavirus/2019-ncov/hcp/guidance-prevent-spread.html#precautions

## 2020-07-20 NOTE — PROGRESS NOTES
"Subjective:       Patient ID: Renea Mac is a 50 y.o. female.    Vitals:  height is 5' 4" (1.626 m) and weight is 103.4 kg (228 lb). Her temperature is 99 °F (37.2 °C). Her blood pressure is 139/103 (abnormal) and her pulse is 78. Her respiration is 19 and oxygen saturation is 96%.     Chief Complaint: URI and COVID-19 Concerns    This is a 50 y.o. 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 The current episode started yesterday. The problem has been unchanged. There has been no fever. Associated symptoms include coughing, ear pain (fullness), headaches, sneezing and a sore throat. Pertinent negatives include no abdominal pain, chest pain, congestion, diarrhea, dysuria, joint pain, joint swelling, nausea, neck pain, plugged ear sensation, rash, rhinorrhea, sinus pain, swollen glands, vomiting or wheezing. Treatments tried: robatussin,airborne. The treatment provided mild relief.       Constitution: Negative for chills, sweating, fatigue and fever.   HENT: Positive for ear pain (fullness) and sore throat. Negative for congestion, sinus pain, sinus pressure and voice change.    Neck: Negative for neck pain and painful lymph nodes.   Cardiovascular: Negative for chest pain.   Eyes: Negative for eye redness.   Respiratory: Positive for cough. Negative for chest tightness, sputum production, bloody sputum, COPD, shortness of breath, stridor, wheezing and asthma.    Gastrointestinal: Negative for abdominal pain, nausea, vomiting and diarrhea.   Genitourinary: Negative for dysuria.   Musculoskeletal: Negative for muscle ache.   Skin: Negative for rash.   Allergic/Immunologic: Positive for sneezing. Negative for seasonal allergies and asthma. "   Neurological: Positive for headaches.   Hematologic/Lymphatic: Negative for swollen lymph nodes.       Objective:      Physical Exam Due to the State of Emergency, this was the remote visit as per [facility] protocol.  Counseled patient and answered questions in regards to COVID-19 testing and diagnosis.            As per [facility] protocol patient cannot be retested before 120 days of positive COVID-19 results until she need testing for return to work.  Community testing sites information given to patient and location information printed.  Patient will go to Community testing site today for retest.  Strict precautions given to patient to monitor for worsening signs and symptoms. Advised to follow up with primary.All questions answered. Strict ER precautions given. If your symptoms worsens of fail to improve you should go to the Emergency Room. Patient voiced understanding and in agreement with current treatment plan.        Assessment:       1. Advice Given About Covid-19 Virus Infection        Plan:         Advice Given About Covid-19 Virus Infection      Patient Instructions   If your condition worsens or fails to improve we recommend that you receive another evaluation at the ER immediately or contact your PCP to discuss your concerns or return here. You must understand that you've received an urgent care treatment only and that you may be released before all your medical problems are known or treated. You the patient will arrange for followup care as instructed.    Please return here or go to the Emergency Department for any concerns or worsening of condition.  If you were prescribed antibiotics, please take them to completion.  If you were prescribed a narcotic medication, do not drive or operate heavy equipment or machinery while taking these medications.  Please follow up with your primary care doctor or specialist as needed.  If you  smoke, please stop smoking.  Guidelines for General Prevention of  COVID-19    o Take steps to protect yourself from COVID-19. Perform hand hygiene frequently. Wash your hands often with soap and water for at least 20 seconds of use and alcohol-based hand , covering all surfaces of your hands and rubbing them together until they feel dry.  o Avoid touching your eyes, nose, and mouth with unwashed hands.  o Avoid close contact with people and stay home if youre sick, except to get medical care.   o Cover coughs and sneezes with a tissue, or use the inside of your elbow. Immediately wash your hands or use hand .     For more information, see CDC link below:    https://www.cdc.gov/coronavirus/2019-ncov/hcp/guidance-prevent-spread.html#precautions

## 2020-07-22 ENCOUNTER — HOSPITAL ENCOUNTER (OUTPATIENT)
Dept: RADIOLOGY | Facility: HOSPITAL | Age: 50
Discharge: HOME | End: 2020-07-22
Attending: NURSE PRACTITIONER
Payer: COMMERCIAL

## 2020-07-22 DIAGNOSIS — Z12.31 SCREENING MAMMOGRAM, ENCOUNTER FOR: ICD-10-CM

## 2020-07-22 PROCEDURE — 77063 MAMMO DIGITAL SCREENING BILAT WITH TOMOSYNTHESIS_CAD: ICD-10-PCS | Mod: 26,,, | Performed by: RADIOLOGY

## 2020-07-22 PROCEDURE — 77067 SCR MAMMO BI INCL CAD: CPT | Mod: TC

## 2020-07-22 PROCEDURE — 77067 MAMMO DIGITAL SCREENING BILAT WITH TOMOSYNTHESIS_CAD: ICD-10-PCS | Mod: 26,,, | Performed by: RADIOLOGY

## 2020-07-22 PROCEDURE — 77067 SCR MAMMO BI INCL CAD: CPT | Mod: 26,,, | Performed by: RADIOLOGY

## 2020-07-22 PROCEDURE — 77063 BREAST TOMOSYNTHESIS BI: CPT | Mod: 26,,, | Performed by: RADIOLOGY

## 2020-09-11 ENCOUNTER — OFFICE VISIT (OUTPATIENT)
Dept: INTERNAL MEDICINE | Facility: CLINIC | Age: 50
End: 2020-09-11
Payer: COMMERCIAL

## 2020-09-11 ENCOUNTER — IMMUNIZATION (OUTPATIENT)
Dept: INTERNAL MEDICINE | Facility: CLINIC | Age: 50
End: 2020-09-11
Payer: COMMERCIAL

## 2020-09-11 VITALS
WEIGHT: 226 LBS | SYSTOLIC BLOOD PRESSURE: 130 MMHG | OXYGEN SATURATION: 99 % | HEART RATE: 96 BPM | BODY MASS INDEX: 38.58 KG/M2 | HEIGHT: 64 IN | DIASTOLIC BLOOD PRESSURE: 82 MMHG

## 2020-09-11 DIAGNOSIS — M25.512 CHRONIC LEFT SHOULDER PAIN: ICD-10-CM

## 2020-09-11 DIAGNOSIS — R20.2 PARESTHESIAS: ICD-10-CM

## 2020-09-11 DIAGNOSIS — G89.29 CHRONIC LEFT SHOULDER PAIN: ICD-10-CM

## 2020-09-11 DIAGNOSIS — I10 ESSENTIAL HYPERTENSION: ICD-10-CM

## 2020-09-11 DIAGNOSIS — M25.511 CHRONIC RIGHT SHOULDER PAIN: ICD-10-CM

## 2020-09-11 DIAGNOSIS — Z00.00 ANNUAL PHYSICAL EXAM: Primary | ICD-10-CM

## 2020-09-11 DIAGNOSIS — M75.42 IMPINGEMENT SYNDROME OF LEFT SHOULDER: ICD-10-CM

## 2020-09-11 DIAGNOSIS — Z98.890 S/P GASTRIC SURGERY: ICD-10-CM

## 2020-09-11 DIAGNOSIS — G89.29 CHRONIC RIGHT SHOULDER PAIN: ICD-10-CM

## 2020-09-11 PROCEDURE — 90471 IMMUNIZATION ADMIN: CPT | Mod: S$GLB,,, | Performed by: NURSE PRACTITIONER

## 2020-09-11 PROCEDURE — 99999 PR PBB SHADOW E&M-EST. PATIENT-LVL V: ICD-10-PCS | Mod: PBBFAC,,, | Performed by: NURSE PRACTITIONER

## 2020-09-11 PROCEDURE — 3075F SYST BP GE 130 - 139MM HG: CPT | Mod: CPTII,S$GLB,, | Performed by: NURSE PRACTITIONER

## 2020-09-11 PROCEDURE — 90471 FLU VACCINE (QUAD) GREATER THAN OR EQUAL TO 3YO PRESERVATIVE FREE IM: ICD-10-PCS | Mod: S$GLB,,, | Performed by: NURSE PRACTITIONER

## 2020-09-11 PROCEDURE — 90686 FLU VACCINE (QUAD) GREATER THAN OR EQUAL TO 3YO PRESERVATIVE FREE IM: ICD-10-PCS | Mod: S$GLB,,, | Performed by: NURSE PRACTITIONER

## 2020-09-11 PROCEDURE — 3079F PR MOST RECENT DIASTOLIC BLOOD PRESSURE 80-89 MM HG: ICD-10-PCS | Mod: CPTII,S$GLB,, | Performed by: NURSE PRACTITIONER

## 2020-09-11 PROCEDURE — 99999 PR PBB SHADOW E&M-EST. PATIENT-LVL V: CPT | Mod: PBBFAC,,, | Performed by: NURSE PRACTITIONER

## 2020-09-11 PROCEDURE — 3075F PR MOST RECENT SYSTOLIC BLOOD PRESS GE 130-139MM HG: ICD-10-PCS | Mod: CPTII,S$GLB,, | Performed by: NURSE PRACTITIONER

## 2020-09-11 PROCEDURE — 3008F BODY MASS INDEX DOCD: CPT | Mod: CPTII,S$GLB,, | Performed by: NURSE PRACTITIONER

## 2020-09-11 PROCEDURE — 3008F PR BODY MASS INDEX (BMI) DOCUMENTED: ICD-10-PCS | Mod: CPTII,S$GLB,, | Performed by: NURSE PRACTITIONER

## 2020-09-11 PROCEDURE — 99396 PREV VISIT EST AGE 40-64: CPT | Mod: 25,S$GLB,, | Performed by: NURSE PRACTITIONER

## 2020-09-11 PROCEDURE — 3079F DIAST BP 80-89 MM HG: CPT | Mod: CPTII,S$GLB,, | Performed by: NURSE PRACTITIONER

## 2020-09-11 PROCEDURE — 99396 PR PREVENTIVE VISIT,EST,40-64: ICD-10-PCS | Mod: 25,S$GLB,, | Performed by: NURSE PRACTITIONER

## 2020-09-11 PROCEDURE — 90686 IIV4 VACC NO PRSV 0.5 ML IM: CPT | Mod: S$GLB,,, | Performed by: NURSE PRACTITIONER

## 2020-09-11 RX ORDER — KETOCONAZOLE 20 MG/G
CREAM TOPICAL DAILY
Qty: 30 G | Refills: 0 | OUTPATIENT
Start: 2020-09-11 | End: 2021-10-10

## 2020-09-11 NOTE — PROGRESS NOTES
INTERNAL MEDICINE URGENT CARE NOTE    CHIEF COMPLAINT     Chief Complaint   Patient presents with    Shoulder Pain       HPI     Renea Mac is a 50 y.o. female with HTN, Chronic right shoulder pain, OA of the right knee, h/o covid-19 and s/p lap sleeve who presents for an urgent visit today.    Has h/o right shoulder pain - followed by ortho in 8-9/2019 with MRI right shoulder 9/3/2019  Impression:     1. Rotator cuff tendinosis with low-grade articular surface fraying, involving the supra and infraspinatus tendons, as above.  No retraction identified.  2. Mild subdeltoid fluid, suggestive of bursitis.  3. Mild AC joint arthropathy    Then began with left shoulder pain 2/2020 seen by sport medicine. Received shoulder injection.  Stopped PT 2/2 covid. Was doing HEP. Last follow up with ortho 5/14/2020 repeat left hsoulder injection. Referred to PT but did not get call to start PT.     Also with bilateral burning in feet with sloughing skin. Has not been taking vitamins     HTN- stable. Taking current meds.     Past Medical History:  Past Medical History:   Diagnosis Date    Acute pain of right knee 11/4/2016    Chronic right shoulder pain 11/4/2016    COVID-19 virus infection 4/10/2020    Essential hypertension 11/4/2016    GERD (gastroesophageal reflux disease)     Incomplete tear of right rotator cuff 11/30/2016    Osteoarthritis of right knee 11/30/2016    Rotator cuff tear     S/P Exploratory Laparotomy/Mass Resection/BSO 1/31/2019 1- 1. Soft tissue, abdominal wall, mass, resection: - Benign leiomyoma, 6.5 cm in greatest dimension, see comment. 2. Ovary and fallopian tube, left, left salpingo-oophorectomy: - Benign ovary with cystic corpus luteum, 2.7 cm in greatest dimension. - Benign fallopian tube with no significant histologic abnormality. 3. 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/4/2016    Syncope and  collapse 6/14/2017       Home Medications:  Prior to Admission medications    Medication Sig Start Date End Date Taking? Authorizing Provider   acetaminophen (TYLENOL) 500 MG tablet Take 500 mg by mouth every 6 (six) hours as needed for Pain.   Yes Historical Provider   calcium carbonate/vitamin D3 (VITAMIN D-3 ORAL) Take by mouth.   Yes Historical Provider   cholecalciferol, vitamin D3, (VITAMIN D3) 1,000 unit capsule Take 1,000 Units by mouth once daily.   Yes Historical Provider   diclofenac sodium (VOLTAREN) 1 % Gel Apply 2 g topically 4 (four) times daily. 4/9/20  Yes Quintin Grsos MD   docusate sodium (COLACE) 50 MG capsule Take 2 capsules (100 mg total) by mouth 2 (two) times daily. 2/5/19  Yes BASSEM Valles MD   fluticasone propionate (FLONASE) 50 mcg/actuation nasal spray 1 spray (50 mcg total) by Each Nostril route once daily. 6/11/20  Yes Magdalena Davila NP   hydroCHLOROthiazide (HYDRODIURIL) 25 MG tablet Take 0.5 tablets (12.5 mg total) by mouth once daily. 6/24/20  Yes Quintin Gross MD   losartan (COZAAR) 25 MG tablet Take 1 tablet (25 mg total) by mouth once daily. 4/24/20 4/24/21 Yes Quintin Gross MD   ondansetron (ZOFRAN-ODT) 4 MG TbDL Take 1 tablet (4 mg total) by mouth every 8 (eight) hours as needed. 6/11/20  Yes Magdalena Davila NP   polyethylene glycol (MIRALAX) 17 gram/dose powder Mix 1 capful (17 grams total) with a liquid and take by mouth once daily. 12/6/18  Yes Brynn Lopez PA-C   pyridoxine HCl, vitamin B6, (PYRIDOXINE, VITAMIN B6, ORAL) Take by mouth.   Yes Historical Provider   thiamine HCl (VITAMIN B-1 ORAL) Take by mouth.   Yes Historical Provider   meclizine (ANTIVERT) 25 mg tablet Take 1 tablet (25 mg total) by mouth 3 (three) times daily as needed for Dizziness. 6/9/20 6/14/20  Milo Sinclair MD       Review of Systems:  Review of Systems   Constitutional: Positive for activity change and unexpected weight change.   HENT: Negative for hearing loss,  "rhinorrhea and trouble swallowing.    Eyes: Negative for discharge and visual disturbance.   Respiratory: Negative for chest tightness and wheezing.    Cardiovascular: Negative for chest pain and palpitations.   Gastrointestinal: Negative for blood in stool, constipation, diarrhea and vomiting.   Endocrine: Negative for polydipsia and polyuria.   Genitourinary: Negative for difficulty urinating, dysuria, hematuria and menstrual problem.   Musculoskeletal: Positive for arthralgias and neck pain. Negative for joint swelling.   Neurological: Positive for headaches. Negative for weakness.   Psychiatric/Behavioral: Negative for confusion and dysphoric mood.       Health Maintainence:   Immunizations:  Health Maintenance       Date Due Completion Date    Hepatitis C Screening 1972 ---    HIV Screening 03/28/1987 ---    TETANUS VACCINE 03/28/1990 ---    Influenza Vaccine (1) 08/01/2020 12/6/2019    Lipid Panel 07/11/2022 7/11/2017    Mammogram 07/22/2022 7/22/2020           PHYSICAL EXAM     /82 (BP Location: Left arm, Patient Position: Sitting, BP Method: Large (Manual))   Pulse 96   Ht 5' 4" (1.626 m)   Wt 102.5 kg (225 lb 15.5 oz)   LMP 03/01/2011 (Approximate)   SpO2 99%   BMI 38.79 kg/m²     Physical Exam  Vitals signs reviewed.   Constitutional:       Appearance: She is well-developed.   HENT:      Head: Normocephalic.      Right Ear: External ear normal.      Left Ear: External ear normal.      Nose: Nose normal.      Mouth/Throat:      Pharynx: No oropharyngeal exudate.   Eyes:      Pupils: Pupils are equal, round, and reactive to light.   Neck:      Musculoskeletal: Neck supple.      Thyroid: No thyromegaly.      Vascular: No JVD.      Trachea: No tracheal deviation.   Cardiovascular:      Rate and Rhythm: Normal rate and regular rhythm.      Heart sounds: Normal heart sounds. No murmur. No friction rub. No gallop.    Pulmonary:      Effort: Pulmonary effort is normal. No respiratory distress.    "   Breath sounds: Normal breath sounds. No wheezing or rales.   Abdominal:      General: Bowel sounds are normal. There is no distension.      Palpations: Abdomen is soft.      Tenderness: There is no abdominal tenderness.   Musculoskeletal: Normal range of motion.         General: No tenderness.   Lymphadenopathy:      Cervical: No cervical adenopathy.   Skin:     General: Skin is warm and dry.      Findings: No rash.          Neurological:      Mental Status: She is alert and oriented to person, place, and time.   Psychiatric:         Behavior: Behavior normal.         LABS     No results found for: LABA1C, HGBA1C  CMP  Sodium   Date Value Ref Range Status   03/13/2019 140 136 - 145 mmol/L Final     Potassium   Date Value Ref Range Status   03/13/2019 4.1 3.5 - 5.1 mmol/L Final     Chloride   Date Value Ref Range Status   03/13/2019 106 95 - 110 mmol/L Final     CO2   Date Value Ref Range Status   03/13/2019 24 23 - 29 mmol/L Final     Glucose   Date Value Ref Range Status   03/13/2019 83 70 - 110 mg/dL Final     BUN, Bld   Date Value Ref Range Status   03/13/2019 13 6 - 20 mg/dL Final     Creatinine   Date Value Ref Range Status   03/13/2019 0.9 0.5 - 1.4 mg/dL Final     Calcium   Date Value Ref Range Status   03/13/2019 9.4 8.7 - 10.5 mg/dL Final     Total Protein   Date Value Ref Range Status   03/13/2019 7.7 6.0 - 8.4 g/dL Final     Albumin   Date Value Ref Range Status   03/13/2019 3.6 3.5 - 5.2 g/dL Final     Total Bilirubin   Date Value Ref Range Status   03/13/2019 0.2 0.1 - 1.0 mg/dL Final     Comment:     For infants and newborns, interpretation of results should be based  on gestational age, weight and in agreement with clinical  observations.  Premature Infant recommended reference ranges:  Up to 24 hours.............<8.0 mg/dL  Up to 48 hours............<12.0 mg/dL  3-5 days..................<15.0 mg/dL  6-29 days.................<15.0 mg/dL       Alkaline Phosphatase   Date Value Ref Range Status    03/13/2019 95 55 - 135 U/L Final     AST   Date Value Ref Range Status   03/13/2019 13 10 - 40 U/L Final     ALT   Date Value Ref Range Status   03/13/2019 10 10 - 44 U/L Final     Anion Gap   Date Value Ref Range Status   03/13/2019 10 8 - 16 mmol/L Final     eGFR if    Date Value Ref Range Status   03/13/2019 >60 >60 mL/min/1.73 m^2 Final     eGFR if non    Date Value Ref Range Status   03/13/2019 >60 >60 mL/min/1.73 m^2 Final     Comment:     Calculation used to obtain the estimated glomerular filtration  rate (eGFR) is the CKD-EPI equation.        Lab Results   Component Value Date    WBC 7.72 03/13/2019    HGB 12.0 03/13/2019    HCT 38.6 03/13/2019    MCV 83 03/13/2019     03/13/2019     Lab Results   Component Value Date    CHOL 153 07/11/2017     Lab Results   Component Value Date    HDL 64 07/11/2017     Lab Results   Component Value Date    LDLCALC 81.0 07/11/2017     Lab Results   Component Value Date    TRIG 40 07/11/2017     Lab Results   Component Value Date    CHOLHDL 41.8 07/11/2017     Lab Results   Component Value Date    TSH 1.540 03/13/2019       ASSESSMENT/PLAN     Renea Mac is a 50 y.o. female     Annual physical exam- all age and gender related screenings discussed   -     CBC auto differential; Future; Expected date: 09/11/2020  -     Comprehensive metabolic panel; Future; Expected date: 09/11/2020  -     Lipid Panel; Future; Expected date: 09/11/2020  -     TSH; Future; Expected date: 09/11/2020    Impingement syndrome of left shoulder- will refer to PT. May use tylenol as needed   -     Ambulatory referral/consult to Physical/Occupational Therapy; Future; Expected date: 09/18/2020    Chronic left shoulder pain- refer to PT    Paresthesias- will send for labs - restart vitamins   -     Iron and TIBC; Future; Expected date: 09/11/2020  -     Vitamin D; Future; Expected date: 09/11/2020  -     Vitamin B12 Deficiency Panel; Future; Expected date:  09/11/2020  -     VITAMIN A; Future; Expected date: 09/11/2020  -     VITAMIN B1; Future; Expected date: 09/11/2020  -     VITAMIN B6; Future; Expected date: 09/11/2020  -     Ferritin; Future; Expected date: 09/11/2020    Essential hypertension- at goal. Will cont current meds     S/P gastric surgery  -     Iron and TIBC; Future; Expected date: 09/11/2020  -     Vitamin D; Future; Expected date: 09/11/2020  -     Vitamin B12 Deficiency Panel; Future; Expected date: 09/11/2020  -     VITAMIN A; Future; Expected date: 09/11/2020  -     VITAMIN B1; Future; Expected date: 09/11/2020  -     VITAMIN B6; Future; Expected date: 09/11/2020  -     Ferritin; Future; Expected date: 09/11/2020    Chronic right shoulder pain    Other orders  -     ketoconazole (NIZORAL) 2 % cream; Apply topically once daily.  Dispense: 30 g; Refill: 0    Follow up with PCP     Patient education provided from [facility]. Patient was counseled on when and how to seek emergent care.       Magdalena HERNANDEZ, APRN, FNP-c   Department of Internal Medicine - [facility]  4:22 PM

## 2020-09-17 ENCOUNTER — LAB VISIT (OUTPATIENT)
Dept: LAB | Facility: HOSPITAL | Age: 50
End: 2020-09-17
Payer: COMMERCIAL

## 2020-09-17 DIAGNOSIS — Z00.00 ANNUAL PHYSICAL EXAM: ICD-10-CM

## 2020-09-17 DIAGNOSIS — R20.2 PARESTHESIAS: ICD-10-CM

## 2020-09-17 DIAGNOSIS — Z98.890 S/P GASTRIC SURGERY: ICD-10-CM

## 2020-09-17 LAB
25(OH)D3+25(OH)D2 SERPL-MCNC: 34 NG/ML (ref 30–96)
ALBUMIN SERPL BCP-MCNC: 3.6 G/DL (ref 3.5–5.2)
ALP SERPL-CCNC: 117 U/L (ref 55–135)
ALT SERPL W/O P-5'-P-CCNC: 12 U/L (ref 10–44)
ANION GAP SERPL CALC-SCNC: 9 MMOL/L (ref 8–16)
AST SERPL-CCNC: 16 U/L (ref 10–40)
BASOPHILS # BLD AUTO: 0.02 K/UL (ref 0–0.2)
BASOPHILS NFR BLD: 0.3 % (ref 0–1.9)
BILIRUB SERPL-MCNC: 0.2 MG/DL (ref 0.1–1)
BUN SERPL-MCNC: 18 MG/DL (ref 6–20)
CALCIUM SERPL-MCNC: 9.7 MG/DL (ref 8.7–10.5)
CHLORIDE SERPL-SCNC: 105 MMOL/L (ref 95–110)
CHOLEST SERPL-MCNC: 171 MG/DL (ref 120–199)
CHOLEST/HDLC SERPL: 2.3 {RATIO} (ref 2–5)
CO2 SERPL-SCNC: 28 MMOL/L (ref 23–29)
CREAT SERPL-MCNC: 0.7 MG/DL (ref 0.5–1.4)
DIFFERENTIAL METHOD: ABNORMAL
EOSINOPHIL # BLD AUTO: 0.1 K/UL (ref 0–0.5)
EOSINOPHIL NFR BLD: 1.4 % (ref 0–8)
ERYTHROCYTE [DISTWIDTH] IN BLOOD BY AUTOMATED COUNT: 13.6 % (ref 11.5–14.5)
EST. GFR  (AFRICAN AMERICAN): >60 ML/MIN/1.73 M^2
EST. GFR  (NON AFRICAN AMERICAN): >60 ML/MIN/1.73 M^2
FERRITIN SERPL-MCNC: 85 NG/ML (ref 20–300)
GLUCOSE SERPL-MCNC: 89 MG/DL (ref 70–110)
HCT VFR BLD AUTO: 43.3 % (ref 37–48.5)
HDLC SERPL-MCNC: 76 MG/DL (ref 40–75)
HDLC SERPL: 44.4 % (ref 20–50)
HGB BLD-MCNC: 13.4 G/DL (ref 12–16)
IMM GRANULOCYTES # BLD AUTO: 0.01 K/UL (ref 0–0.04)
IMM GRANULOCYTES NFR BLD AUTO: 0.2 % (ref 0–0.5)
IRON SERPL-MCNC: 61 UG/DL (ref 30–160)
LDLC SERPL CALC-MCNC: 86.6 MG/DL (ref 63–159)
LYMPHOCYTES # BLD AUTO: 3.4 K/UL (ref 1–4.8)
LYMPHOCYTES NFR BLD: 53.4 % (ref 18–48)
MCH RBC QN AUTO: 25.9 PG (ref 27–31)
MCHC RBC AUTO-ENTMCNC: 30.9 G/DL (ref 32–36)
MCV RBC AUTO: 84 FL (ref 82–98)
MONOCYTES # BLD AUTO: 0.3 K/UL (ref 0.3–1)
MONOCYTES NFR BLD: 4.7 % (ref 4–15)
NEUTROPHILS # BLD AUTO: 2.6 K/UL (ref 1.8–7.7)
NEUTROPHILS NFR BLD: 40 % (ref 38–73)
NONHDLC SERPL-MCNC: 95 MG/DL
NRBC BLD-RTO: 0 /100 WBC
PLATELET # BLD AUTO: 206 K/UL (ref 150–350)
PMV BLD AUTO: 12.9 FL (ref 9.2–12.9)
POTASSIUM SERPL-SCNC: 3.7 MMOL/L (ref 3.5–5.1)
PROT SERPL-MCNC: 7.9 G/DL (ref 6–8.4)
RBC # BLD AUTO: 5.17 M/UL (ref 4–5.4)
SATURATED IRON: 18 % (ref 20–50)
SODIUM SERPL-SCNC: 142 MMOL/L (ref 136–145)
TOTAL IRON BINDING CAPACITY: 334 UG/DL (ref 250–450)
TRANSFERRIN SERPL-MCNC: 226 MG/DL (ref 200–375)
TRIGL SERPL-MCNC: 42 MG/DL (ref 30–150)
TSH SERPL DL<=0.005 MIU/L-ACNC: 1.77 UIU/ML (ref 0.4–4)
WBC # BLD AUTO: 6.44 K/UL (ref 3.9–12.7)

## 2020-09-17 PROCEDURE — 82607 VITAMIN B-12: CPT

## 2020-09-17 PROCEDURE — 84590 ASSAY OF VITAMIN A: CPT

## 2020-09-17 PROCEDURE — 84425 ASSAY OF VITAMIN B-1: CPT

## 2020-09-17 PROCEDURE — 84443 ASSAY THYROID STIM HORMONE: CPT

## 2020-09-17 PROCEDURE — 80053 COMPREHEN METABOLIC PANEL: CPT

## 2020-09-17 PROCEDURE — 36415 COLL VENOUS BLD VENIPUNCTURE: CPT

## 2020-09-17 PROCEDURE — 82306 VITAMIN D 25 HYDROXY: CPT

## 2020-09-17 PROCEDURE — 85025 COMPLETE CBC W/AUTO DIFF WBC: CPT

## 2020-09-17 PROCEDURE — 84207 ASSAY OF VITAMIN B-6: CPT

## 2020-09-17 PROCEDURE — 80061 LIPID PANEL: CPT

## 2020-09-17 PROCEDURE — 83540 ASSAY OF IRON: CPT

## 2020-09-17 PROCEDURE — 82728 ASSAY OF FERRITIN: CPT

## 2020-09-21 LAB — VIT B12 SERPL-MCNC: 759 NG/L (ref 180–914)

## 2020-09-22 LAB — PYRIDOXAL SERPL-MCNC: 89 UG/L (ref 5–50)

## 2020-09-23 ENCOUNTER — TELEPHONE (OUTPATIENT)
Dept: INTERNAL MEDICINE | Facility: CLINIC | Age: 50
End: 2020-09-23

## 2020-09-23 LAB
VIT A SERPL-MCNC: 43 UG/DL (ref 38–106)
VIT B1 BLD-MCNC: 47 UG/L (ref 38–122)

## 2020-09-23 NOTE — TELEPHONE ENCOUNTER
Spoke with pt - will restart B1, B6 and A supplements. If still having burning will refer to neurology.   Gave number for bariatrics - [phone]

## 2020-09-28 ENCOUNTER — PATIENT OUTREACH (OUTPATIENT)
Dept: ADMINISTRATIVE | Facility: OTHER | Age: 50
End: 2020-09-28

## 2020-09-28 NOTE — PROGRESS NOTES
LINKS immunization registry not responding  Care Everywhere updated  Health Maintenance updated  Chart reviewed for overdue Proactive [facility] ([facility]) health maintenance testing (CRS, Breast Ca, Diabetic Eye Exam)   Orders entered:N/A

## 2020-09-28 NOTE — PROGRESS NOTES
"Subjective:       Patient ID: Renea Mac is a 50 y.o. female.    Chief Complaint: Follow-up    Pt here today for follow-up. Has gained 30 lb since last seen in 2018. Has been taking lomaira prn.   checked today.  DId well with diethylpropion in the past. Was maintaining at about 210s# until this spring when she had covid and was out of work. Ready to get back on track. Is doing some walking.     gastric sleeve (11/2014). Was nottaking all her vitamins.     Review of Systems   Constitutional: Negative for chills and fever.   Respiratory: Negative for apnea and shortness of breath.    Cardiovascular: Negative for chest pain and leg swelling.   Gastrointestinal: Positive for constipation. Negative for diarrhea.        + GERD   Genitourinary: Negative for difficulty urinating.        S/p hyst   Musculoskeletal: Positive for arthralgias and back pain.   Neurological: Positive for dizziness and syncope. Negative for light-headedness.   Psychiatric/Behavioral: Negative for dysphoric mood. The patient is not nervous/anxious.        Objective:     /74   Pulse 80   Ht 5' 4" (1.626 m)   Wt 103.7 kg (228 lb 9.6 oz)   LMP 03/01/2011 (Approximate)   BMI 39.24 kg/m²     Physical Exam   Constitutional: She is oriented to person, place, and time. She appears well-developed and well-nourished. No distress.   Obese     HENT:   Head: Normocephalic and atraumatic.   Eyes: EOM are normal. Pupils are equal, round, and reactive to light. No scleral icterus.   Neck: Normal range of motion. Neck supple.   Cardiovascular: Normal rate and normal heart sounds.    Pulmonary/Chest: Effort normal and breath sounds normal.   Musculoskeletal: Normal range of motion. She exhibits no edema.   Neurological: She is alert and oriented to person, place, and time. No cranial nerve deficit.   Skin: Skin is warm and dry. No erythema.   Psychiatric: She has a normal mood and affect. Her behavior is normal. Judgment normal.   Vitals " reviewed.      Assessment:       1. Class 2 severe obesity with body mass index (BMI) of 35 to 39.9 with serious comorbidity    2. S/P laparoscopic sleeve gastrectomy        Plan:             Renea was seen today for follow-up.    Diagnoses and all orders for this visit:    Class 2 severe obesity with body mass index (BMI) of 35 to 39.9 with serious comorbidity  -     diethylpropion (TENUATE) 75 mg SR tablet; Take 1 tablet (75 mg total) by mouth once daily.    S/P laparoscopic sleeve gastrectomy        Patient warned of common side effects of diethylpropion including anxiety, insomnia, palpitations and increased blood pressure. It was also explained that it is for short-term usage along with diet and exercise, and that stopping the medication without making lifestyle changes will result in regain of weight. Patient states understanding.    Weight loss medications are controlled substances.  They require routine follow up. Prescription or pills that are lost or destroyed will not be replaced.        Continue bariatric diet for life.     - To lose weight you want to cut 100% starchy carbohydrates out of your diet (bread, rice, pasta, potatoes, granola, flour, corn, peas, oatmeal, grits, tortillas, crackers, chips) and get  grams of protein.  Aim for 100 grams of protein daily.    - No soda, sweet tea, juices, sports drinks or lemonade     -Exercise daily    - Premier Protein (Chocolate, Bananas & Cream, Strawberries & Cream, Vanilla) [place] or [employer]    - Syntrax McIntosh from [employer], www.bariatricadvantage.com, www.bariatricchoice.com. (LACTOSE FREE)    - [employer] - [employer].com (LACTOSE FREE)    - Veggetti Pro from [employer], [employer], [employer]    - www.[employer].com (cauliflower, cloud bread, quest bar cookies, eggplant, zucchini, zucchini noodles, crustless quiche, no carb meals, taco lettuce boats)    - http://roderick.fotobabble.[employer]/    Quarantine tips given.     22 min time. [employer]  reviewed.

## 2020-09-29 ENCOUNTER — OFFICE VISIT (OUTPATIENT)
Dept: BARIATRICS | Facility: CLINIC | Age: 50
End: 2020-09-29
Payer: COMMERCIAL

## 2020-09-29 ENCOUNTER — CLINICAL SUPPORT (OUTPATIENT)
Dept: REHABILITATION | Facility: HOSPITAL | Age: 50
End: 2020-09-29
Payer: COMMERCIAL

## 2020-09-29 VITALS
HEIGHT: 64 IN | BODY MASS INDEX: 39.03 KG/M2 | SYSTOLIC BLOOD PRESSURE: 122 MMHG | WEIGHT: 228.63 LBS | DIASTOLIC BLOOD PRESSURE: 74 MMHG | HEART RATE: 80 BPM

## 2020-09-29 DIAGNOSIS — Z74.1 SELF-CARE DEFICIT FOR DRESSING AND GROOMING: ICD-10-CM

## 2020-09-29 DIAGNOSIS — M75.42 IMPINGEMENT SYNDROME OF LEFT SHOULDER: ICD-10-CM

## 2020-09-29 DIAGNOSIS — R46.0 SELF-CARE DEFICIT FOR BATHING: ICD-10-CM

## 2020-09-29 DIAGNOSIS — M25.60 RANGE OF MOTION DEFICIT: ICD-10-CM

## 2020-09-29 DIAGNOSIS — E66.01 CLASS 2 SEVERE OBESITY WITH BODY MASS INDEX (BMI) OF 35 TO 39.9 WITH SERIOUS COMORBIDITY: Primary | ICD-10-CM

## 2020-09-29 DIAGNOSIS — Z98.84 S/P LAPAROSCOPIC SLEEVE GASTRECTOMY: ICD-10-CM

## 2020-09-29 DIAGNOSIS — R29.898 SHOULDER WEAKNESS: ICD-10-CM

## 2020-09-29 PROCEDURE — 97165 OT EVAL LOW COMPLEX 30 MIN: CPT | Mod: PN

## 2020-09-29 PROCEDURE — 99999 PR PBB SHADOW E&M-EST. PATIENT-LVL IV: CPT | Mod: PBBFAC,,, | Performed by: INTERNAL MEDICINE

## 2020-09-29 PROCEDURE — 99214 PR OFFICE/OUTPT VISIT, EST, LEVL IV, 30-39 MIN: ICD-10-PCS | Mod: S$GLB,,, | Performed by: INTERNAL MEDICINE

## 2020-09-29 PROCEDURE — 99999 PR PBB SHADOW E&M-EST. PATIENT-LVL IV: ICD-10-PCS | Mod: PBBFAC,,, | Performed by: INTERNAL MEDICINE

## 2020-09-29 PROCEDURE — 3078F PR MOST RECENT DIASTOLIC BLOOD PRESSURE < 80 MM HG: ICD-10-PCS | Mod: CPTII,S$GLB,, | Performed by: INTERNAL MEDICINE

## 2020-09-29 PROCEDURE — 3074F PR MOST RECENT SYSTOLIC BLOOD PRESSURE < 130 MM HG: ICD-10-PCS | Mod: CPTII,S$GLB,, | Performed by: INTERNAL MEDICINE

## 2020-09-29 PROCEDURE — 3074F SYST BP LT 130 MM HG: CPT | Mod: CPTII,S$GLB,, | Performed by: INTERNAL MEDICINE

## 2020-09-29 PROCEDURE — 3008F PR BODY MASS INDEX (BMI) DOCUMENTED: ICD-10-PCS | Mod: CPTII,S$GLB,, | Performed by: INTERNAL MEDICINE

## 2020-09-29 PROCEDURE — 99214 OFFICE O/P EST MOD 30 MIN: CPT | Mod: S$GLB,,, | Performed by: INTERNAL MEDICINE

## 2020-09-29 PROCEDURE — 3078F DIAST BP <80 MM HG: CPT | Mod: CPTII,S$GLB,, | Performed by: INTERNAL MEDICINE

## 2020-09-29 PROCEDURE — 3008F BODY MASS INDEX DOCD: CPT | Mod: CPTII,S$GLB,, | Performed by: INTERNAL MEDICINE

## 2020-09-29 RX ORDER — DIETHYLPROPION HYDROCHLORIDE 75 MG/1
75 TABLET, EXTENDED RELEASE ORAL DAILY
Qty: 30 TABLET | Refills: 2 | Status: SHIPPED | OUTPATIENT
Start: 2020-09-29 | End: 2021-02-04 | Stop reason: SDUPTHER

## 2020-09-29 NOTE — PLAN OF CARE
[facility] Occupational Therapy  Initial Evaluation     Date: 9/29/2020  Name: Renea Mac  Clinic Number: [id number]    Therapy Diagnosis:   Encounter Diagnoses   Name Primary?    Impingement syndrome of left shoulder     Range of motion deficit     Shoulder weakness     Self-care deficit for dressing and grooming     Self-care deficit for bathing      Physician: Magdalena Davila,*    Physician Orders: Eval and Treat  Medical Diagnosis: Impingement syndrome of left shoulder  Surgical Procedure and Date: n/a  Evaluation Date: 09/29/2020  Insurance Authorization Period Expiration: 12/31/20  Plan of Care Certification Period: 9/29/20- 11/24/20  Date of Return to MD: no appt scheduled    Visit # / Visits authorized: 1 / 20  Time In:4:25 pm  Time Out: 5:22 pm  Total Billable Time: 40 minutes    Precautions:  Standard    Subjective     Involved Side: left  Dominant Side: Right  Date of Onset: 2019  Mechanism of Injury: no injury to left shoulder  History of Current Condition: Multiple injections into left shoulder with no relief  Surgical Procedure: n/a  Imaging: xray CLINICAL HISTORY:  Pain in left shoulder     TECHNIQUE:  Two or three views of the left shoulder were performed.  COMPARISON:  None  FINDINGS:  No fracture.  No malalignment.  No calcific tendinitis.  Preserved glenohumeral articulation.  Minimal spurring about the acromioclavicular articulation.  Impression:  No fracture, mild spurring about the acromioclavicular articulation  Electronically signed by: Arnulfo Garcia  Date:                                            02/13/2020    Previous Therapy: none for her left     Past Medical History/Physical Systems Review:   Renea Mac  has a past medical history of Acute pain of right knee, Chronic right shoulder pain, COVID-19 virus infection, Essential hypertension, GERD (gastroesophageal reflux disease), Incomplete tear of right rotator cuff, Osteoarthritis of right knee,  Rotator cuff tear, S/P Exploratory Laparotomy/Mass Resection/BSO, S/P laparoscopic sleeve gastrectomy, and Syncope and collapse.    Renea Mac  has a past surgical history that includes Tubal ligation; Hernia repair; Sleeve Gastroplasty (2014); Hysterectomy ();  section; Colposcopy; Colonoscopy (N/A, 2019); LAPAROTOMY, EXPLORATORY (N/A, 2019); Bilateral salpingo-oophorectomy (BSO) (Bilateral, 2019); Excision of pelvic mass (N/A, 2019); and Oophorectomy (Bilateral, 2019).    Renea has a current medication list which includes the following prescription(s): acetaminophen, calcium carbonate/vitamin d3, cholecalciferol (vitamin d3), diclofenac sodium, diethylpropion, docusate sodium, fluticasone propionate, hydrochlorothiazide, ketoconazole, losartan, ondansetron, polyethylene glycol, pyridoxine hcl (vitamin b6), and thiamine hcl.    Review of patient's allergies indicates:  No Known Allergies     Patient's Goals for Therapy: To be pain free with full motion.    Pain:  Functional Pain Scale Rating 0-10:   5-6/10 on average  4/10 at best  7/10 at worst  Location: anterior shoulder, upper trap  Description: achy  Aggravating Factors: reaching, behind back  Easing Factors: biofreeze, tylenol, voltaren gel    Occupation:   at [employer]   Working presently: employed  Duties: cashing/depositing clients money, lifting coins    Functional Limitations/Social History:    Previous functional status includes: Independent with all ADLs.    Current FunctionalStatus   Home/Living environment : lives with their family      Limitation of Functional Status as follows:   ADLs/IADLs:     - Feeding: I    - Bathing: painful use of left arm    - Dressing/Grooming: upper body dressing difficult, donning pants                          - lifting pots and pans is difficult    - Driving: unable to reach drive thru window     Leisure: draw and paint, likes to decorate      Objective      Sensation Test: Patient denies any numbness/tingling    Observation/Inspection   Left Right   Anatomic Symmetry normal normal   Bony normal normal   Supraspinatus normal normal   Infraspinatus normal normal   Rhomboid normal normal     Observation/Inspection:  elevated scapula and early movement of scapula with shoulder ROM    Range of Motion:      (L) UE (R) UE     AROM AROM Norm   Shoulder Flexion   0-180   Shoulder Flexion 90 145 180   Shoulder Abduction 90 145 0-180   Shoulder Extension 35 40 0-50   Shoulder Internal Rotation Lat hip  20 To mid back  65 0-90   Shoulder External Rotation 40 60 0-90   Horz Shoulder Adduction neutral 20 0-40     ROM Comments:   Pain at end range all motions    Painful Arc:   Patient demonstrates no painful arc in shoulder flexion or abduction    Strength  Shoulder Flexion RUE: 4+/5   Shoulder Extension RUE: 4+/5   Shoulder Abduction RUE:  4/5 slight pain   Shoulder Adduction RUE:  4+/5   Internal Rotation RUE: 4+/5   External Rotation RUE: 4/5 slight pain       Shoulder Flexion LUE: 3/5 slight pain   Shoulder Extension LUE: 4-/5   Shoulder Abduction LUE: 3/5  +pain   Shoulder Adduction LUE: 4/5   Internal Rotation LUE:  4/5   External Rotation LUE:  3/5 +pain         Pull Tests:  Abduction: not tested  Ext. Rotation: not tested    Special Tests:  Positive: Neer Impingement and Speed's Test  Negative:     Palpation: (for pain)     Positive: Posterior Subacromial Space, Infraspinatus Region and Bicipital Groove     Negative: Lateral Subacromial Space and Anterior Subacromial Space      Treatment     Ice applied to left shoulder while arm rested on table x 8 min after contraindications cleared.     Home Exercise Program/Education:  Issued HEP (see patient instructions in EMR) and educated on modality use for pain management . Exercises were reviewed and Renea was able to demonstrate them prior to the end of the session.   Pt received a written copy of exercises to perform at  home. Renea demonstrated good  understanding of the education provided.  Pt was advised to perform these exercises free of pain, and to stop performing them if pain occurs.    Patient/Family Education: role of OT, goals for OT, scheduling/cancellations - pt verbalized understanding. Discussed insurance limitations with patient.    Additional Education provided: shoulder structure and pathology of shoulder, avoidance of impingement position with activities and rest.     Assessment     Renea Mac is a 50 y.o. female referred to outpatient occupational therapy and presents with a medical diagnosis of Impingement syndrome of left shoulder, resulting in Decreased ROM, Decreased muscle strength, Increased pain and Joint Stiffness and demonstrates limitations as described in the chart below. Following medical record review it is determined that pt will benefit from occupational therapy services in order to maximize pain free and/or functional use of left shoulder. The following goals were discussed with the patient and patient is in agreement with them as to be addressed in the treatment plan. The patient's rehab potential is Good.     Anticipated barriers to occupational therapy: none noted  Pt has no cultural, educational or language barriers to learning provided.    Profile and History Assessment of Occupational Performance Level of Clinical Decision Making Complexity Score   Occupational Profile:   Renea Mac is a 50 y.o. female who lives with their family and is currently employed as . Renea Mac has difficulty with  bathing, grooming and dressing  housework/household chores  affecting his/her daily functional abilities. His/her main goal for therapy is To be pain free with full motion..     Comorbidities:   none noted    Medical and Therapy History Review:   Brief               Performance Deficits    Physical:  Joint Mobility  Muscle Power/Strength  Pain    Cognitive:  No  Deficits    Psychosocial:    No Deficits     Clinical Decision Making:  low    Assessment Process:  Problem-Focused Assessments    Modification/Need for Assistance:  Minimal-Moderate Modifications/Assistance    Intervention Selection:  Several Treatment Options       low  Based on PMHX, co morbidities , data from assessments and functional level of assistance required with task and clinical presentation directly impacting function.       The following goals were discussed with the patient and patient is in agreement with them as to be addressed in the treatment plan.     Goals:  Short term goals to be met in 4 weeks:(10/27/20)  1) Pt will be independent and report performing HEP to maximize (R) shoulder functional capacity.  2) Pt will increase shoulder AROM in all measured planes of motion by 10 degrees to A with daily task.  3) Pt will report use of home modalities for pain management.  4) Pt will report pain rating at worst 5 out of 10.  5) Pt will report interrupted sleep no more than four times a week    Long term goals met by d/c:  Pt will increase left shoulder ROM to perform all dressing tasks independently  Pt will increase left shoulder strength to 4/5 to allow for lifting and carrying household and work items  Pt will report pain rating 2 out of 10 with reaching tasks  Pt will sleep 5 days a week without disruption    Plan   Certification Period/Plan of care expiration: 9/29/2020 to 11/24/20.    Outpatient Occupational Therapy 2 times weekly for 8 weeks to include the following interventions: Manual therapy/joint mobilizations, Modalities for pain management, US 3 mhz, Therapeutic exercises/activities. and Strengthening.      ROHAN Mejia certify the need for these services furnished under this plan of treatment and while under my care    ____________________________________  Physician/Referring Practitioner    _______________  Date of Signature

## 2020-09-29 NOTE — PATIENT INSTRUCTIONS
Patient warned of common side effects of diethylpropion including anxiety, insomnia, palpitations and increased blood pressure. It was also explained that it is for short-term usage along with diet and exercise, and that stopping the medication without making lifestyle changes will result in regain of weight. Patient states understanding.    Weight loss medications are controlled substances.  They require routine follow up. Prescription or pills that are lost or destroyed will not be replaced.        Continue bariatric diet for life.     - To lose weight you want to cut 100% starchy carbohydrates out of your diet (bread, rice, pasta, potatoes, granola, flour, corn, peas, oatmeal, grits, tortillas, crackers, chips) and get  grams of protein.  Aim for 100 grams of protein and 800-1000 angela daily.    -[employer] Saúl (code 96176)      - No soda, sweet tea, juices, sports drinks or lemonade     -Exercise daily. - Add some type of resistance training 2-3 days a week. These can be body weight exercises, light weight or elastic bands. Conecte Link and [employer] are great sources for free work out plans and videos.         - Premier Protein (Chocolate, Bananas & Cream, Strawberries & Cream, Vanilla) [place] or [employer]co    - Syntrax Tokeneke from Vitamin Shoppe, www.bariatricadvantage.com, www.bariatricchoice.com. (LACTOSE FREE)    - remoceanro - Amazon.com (LACTOSE FREE)    - Veggetti Pro from Haozu.com, Synerscope, Bed Bath & Beyond    - www.pinterest.com (cauliflower, cloud bread, quest bar cookies, eggplant, zucchini, zucchini noodles, crustless quiche, no carb meals, taco lettuce boats)    - http://theworldaccordingtoroyalace.Legend [employer].com/      Required Vitamin/Mineral Supplements:    1. Multivitamins - one taken twice a day       2. Iron- 18 mg total daily (may be included in multivitamin)     3. Super B-complex with 50 mg Thiamine (Vitamin B1)- once daily  4. Calcium Citrate + Vitamin D- 500 mg three times per day  5. Vitamin B12- 500  mcg sublingual daily or monthly injections     Sleeve Gastrectomy: No swallowing large whole vitamins/minerals for 2 weeks after surgery  Gastric Bypass: No swallowing large whole vitamins/minerals for 1 month after surgery    *NO gummy multivitamins due to poor quality and sugar content.  *Do NOT take calcium citrate and Iron within 2 hours of each other due to poor absorption.  *Pills may be swallowed if the size of a No. 2 pencil eraser (or smaller). They may be cut to this size with a pill cutter and swallowed if tolerated. Compare to this size:        Suggested vitamins and where to find them:    [facility]:   o Flintstones Complete (chewables, not gummies) or Centrum Adult Multivitamins  o Super B-Complex tablets with 50 mg Thiamine  o Calcium Citrate petite tablets (NOT CALTRATE brand)  o Sublingual Vitamin B12    Order from www.bariatricadvantage.com  o Chewables-Complete OR Ultra multi formula w/ iron capsules  o Iron chewables  o Calcium Citrate Chewy bites-500 (chocolate, peanut butter and caramel flavored) OR unflavored powder mix OR tablets  o Sublingual Vitamin B12 (black cherry or peppermint flavored)    Order from www.celebratevitamins.com  o Multi-complete chewable OR capsules  o Calcium citrate: Celebrate soft chews OR Calcet creamy bites OR Calcium PLUS tablets  o B-12 sublingual quick melt  *Available for purchase in [facility]    [facility]:  o Flintstones complete   o Wellesse Liquid calcium Citrate  o B-1, 100mg tablet  o Sublingual B12  *Celebrate protein powders, snacks and vitamins are available for purchase as well.          [facility] Survival Guide  Tips to Stay on Track During COVID-19      DO DON'T   Keep up with your food log  Maintaining your caloric intake and diet quality is critical for achieving your health and weight loss goals.  Download the Saúl [employer] and use [facility] Code 23717 to track  food and fluid intake Feel Discouraged - You can do this!  There are a lot of changes happening in our world but don't let them discourage you. Focus on the future and remind yourself of all the work and effort you've put in so far.     Keep protein-rich foods stocked up  buy chicken and turkey in bulk and freeze them, keep dry or canned beans on hand, get the largest quantity of eggs available. Make sure you are getting your required protein intake (between  grams EACH DAY).   Drink fluid during meals or 30 minutes before/after eating  Your regular routine may have changed which may have caused some of your meal or snack times to change.  keep track of when you consume any liquid and time your meals accordingly. This will also help you make sure you are staying hydrated throughout the day   Continue with your protein drinks or bars  Order online or use grocery delivery service. Always make sure you order more WELL BEFORE you are running low, especially now when deliveries may take longer to arrive.  Remember to check to make sure your protein shakes or bars have 4 gms of sugar or less.     Keep table sugar around  You may be more tempted to add it to your drinks or food if it is visible and easily accessible.   Try new recipes  Use this time to experiment in the kitchen and find some different healthy dishes you enjoy - you can use the nutrition booklet to help guide you.  If you cannot find your copy, please download it from our website @ [email or web address]  Click here to download [facility]'s Nutrition Binder.   Add tough or crunchy foods back into your diet too quickly  Raw veggies are great snack foods but adding them in too quickly after surgery will cause pain and discomfort   Eat your meals slowly and intentionally  You shouldn't have to rush out to be anywhere so really take your time and aim for each meal to last around 20-30 minutes.    Drink sugary/bubbly drinks or alcohol    It may be tempting especially if you are surrounded by others without these limitations, but these beverages will prevent weight loss and cause gastric  pain/discomfort     Listen to your body - try to recognize when you feel full.  Learning your body's signals can be difficult but it is a key step in your weight loss journey. While you are is this process of working on this step, be sure portion out the recommended quantities of foods and meals/snacks to prevent overeating.   Eat your meals using electronics  This is especially difficult at home where your use of computers, phones, and TVs are pretty much unregulated. Designate a meal spot or spots where there is limited distractions and you can focus on your food - maybe your kitchen table or on an outside porch or deck.   Continue taking vitamins and minerals  Take them at the same time each day and keep a log of when you take your supplements to make sure you don't miss any. These supplements are essential for preventing malnutrition and other health problems that will deter your progress.   'Save' your appetite   You may feel like holding out from food as long as possible so you can eat a large meal later on, but your body needs energy throughout the day in order to properly fuel itself and keep you alert.  Try eating small meals throughout the day - use these meals as mini breaks from work or projects you are doing at home.   Stay active!  It is so important for both your physical and mental health that you get regular physical activity. Even if you can no longer physically go to the gym or to workout classes there are tons of online resources to keep you moving. Incorporate a specific exercise time into your daily home routine and keep a journal of your activity.   Order food delivery or take out   Most places are now offering delivery services, but it can still be difficult to find and choose healthy options. Choose  to do a grocery store  or delivery instead- cooking food at home is less expensive then eating out!   Review the resources you've been provided and keep in touch with your healthcare team.  Let them know if you are struggling or experiencing any problems - they are here to help!  Call us to schedule a telehealth visit at [phone] Isolate yourself   It may be called 'social distancing' but that does not mean we can't still connect with one another. Phone calls or group video chats are great ways to keep in touch while staying at home. Any method of getting regular social time with friends and family will help to remind you that you're not in this alone.     Grocery List    Items to Keep Stocked in Your Kitchen  PROTEINS (Lean)    Eggs  Beans (canned and/or dried)  Skinless chicken/turkey   Tuna/[place] (canned and/or pouch)  Tofu or Tempeh  Michael Veggie Burgers  Fish or shrimp (fresh or frozen)  Ground Beef (90% lean)  Steaks  Chobani Greek Yogurt  Cabot Cottage Cheese  Hummus  Low-fat cheeses (Laughing Cow, Baby Bell, mozzarella string cheese)  Fairlife Non-fat Milk    Vegetables (non-starchy)  *veggies can be fresh or frozen    Broccoli   Cauliflower   Carrots   Onions   Cabbage   Radishes   Zucchini   Okra   Greens   Peppers   Spinach   Turnips   Mushrooms   Tomatoes   Celery   Lettuce   Asparagus  Eggplant   Green Onions   Kale    Fruits  *Fruits can be fresh or frozen    Apples  Oranges  Pears  Kiwi  Melon  Berries  Peaches  Unsweetened Applesauce    *Avoid fruits canned In syrup  *Stick to 1-2 servings of fruit/day   Vitamins    Flintstones Complete  Chewables    Super B-Complex tablets with 50 mg Thiamine    Nature's Way liquid Calcium Citrate + Vit D     Sublingual Vitamin B12   Other    Sugar-free Popsicles    Sugar-free Jello    Crystal Lite    Low Fat Condiments     Decaf Coffee/Tea           Foods to Avoid Having Around the  House  Butter/Margarine Cookies Candy Chips  Pretzels Grits  Granola Popcorn Aguirre Corn  Bread Alcohol Soda  Pasta  Rice  Cake/Pie Sausage Potatoes Ice Cream                 Tricks to Prevent Emotional Eating During Quarantine      Keep 'trigger foods' out of the house   Keep yourself distracted with work, games, music, or whatever hobby you enjoy. This may be the time to try a new activity!   Try fighting stress with breathing techniques, yoga, meditation, or prayer   Mix up your meals with a variety of dishes   Keep up with your food diary   Call or video chat with a friend or family   Plan your meals for specific time and try for smaller meals throughout the day   Pre-portion all meals and snacks    Step outside for some fresh air or do a quick exercise activity to reset yourself    *Avoid negative thoughts about yourself - if you have a slip-up, you are not a failure. Forgive yourself and focus on learning from it so you can prevent it for the future              Ways to Stay Active While Staying Inside      [employer] Videos - free exercise and gym classes at any fitness level   Apps -tons of fitness apps are currently offering free trial periods and offer workouts that don't require equipment   Chores around the house, such as cleaning or gardening   Walk up and down the stairs   Video-chat with your regular workout lan and do an online class together   Make a playlist of your favorite fun songs and dance around - no need to worry about knowing any serious dance moves, just jump around get your heart rate up!    While you are limited to working out at home, you may find it easier to do short, mini workouts multiple times a day instead of all at once. Try to still get at least 30 minutes of physical activity in each day!   Physical Health = Mental Health    The health of both your mind and body are equally important -be sure you are taking the time to care for both. It is likely that the current  health concerns and quarantine mandates caused significant changes to your normal routine. Although this can feel overwhelming and seem difficult to manage, there are ways you can take to manage these feelings and keep your mental and physical health journey on track. Here are some tips for self-care during quarantine:     Meditate, take deep breaths - find any practice that will help you center yourself.   Move around your house throughout the day. Avoid staying in the same seat or room for too long and try to work in an area of your house that get lots of sunlight if possible.   Get fresh air for a bit every day - being outside is a great way to improve your mood! You don't necessarily have to go far from your house. You could even just hang out on your porch for a while or take a walk around the block.    Get good sleep and maintain a regular sleep schedule   Connect with others. While we aren't able to physically be with others right now it is still so important to socialize and interact with other people, even if it is being done remotely.    Keep yourself busy. Make a list of tasks that you want to complete around the house, start a new book, do some art projects, try journaling.

## 2020-10-22 ENCOUNTER — CLINICAL SUPPORT (OUTPATIENT)
Dept: REHABILITATION | Facility: HOSPITAL | Age: 50
End: 2020-10-22
Payer: COMMERCIAL

## 2020-10-22 DIAGNOSIS — R46.0 SELF-CARE DEFICIT FOR BATHING: ICD-10-CM

## 2020-10-22 DIAGNOSIS — R29.898 SHOULDER WEAKNESS: ICD-10-CM

## 2020-10-22 DIAGNOSIS — Z74.1 SELF-CARE DEFICIT FOR DRESSING AND GROOMING: ICD-10-CM

## 2020-10-22 DIAGNOSIS — M25.60 RANGE OF MOTION DEFICIT: Primary | ICD-10-CM

## 2020-10-22 PROCEDURE — 97035 APP MDLTY 1+ULTRASOUND EA 15: CPT | Mod: PN

## 2020-10-22 PROCEDURE — 97140 MANUAL THERAPY 1/> REGIONS: CPT | Mod: PN

## 2020-10-22 PROCEDURE — 97110 THERAPEUTIC EXERCISES: CPT | Mod: PN

## 2020-10-22 NOTE — PROGRESS NOTES
Occupational Therapy Treatment Note     Date: 10/22/2020  Name: Renea Mac  Clinic Number: [id number]    Therapy Diagnosis:   Encounter Diagnoses   Name Primary?    Range of motion deficit Yes    Shoulder weakness     Self-care deficit for dressing and grooming     Self-care deficit for bathing      Physician: Magdalena Davila,*    Physician Orders: Eval and Treat  Medical Diagnosis: Impingement syndrome of left shoulder  Surgical Procedure and Date: n/a  Evaluation Date: 09/29/2020  Insurance Authorization Period Expiration: 12/31/20  Plan of Care Certification Period: 9/29/20- 11/24/20  Date of Return to MD: no appt scheduled     Visit # / Visits authorized: 2 / 20  Time In: 1:20 pm  Time Out: 2:08 pm  Total Billable Time: 40 minutes     Precautions:  Standard    Subjective     Pt reports: I have been braiding a lot of hair and my arm is sore.   she was not compliant with home exercise program given last session.   Response to previous treatment: sore  Functional change: none noted    Pain: 5/10  Location: left shoulder      Objective     Renea received the following direct contact modalities after being cleared for contraindications for 8 minutes:  -Patient receives ultrasound  for pain control and decreased inflammation @ continuous duty cycle, 1.0 Mhz, applied to left anterior shoulder/bicep region, intensity = 1.0 w/cm2 for 8 minutes.    Renea received the following manual therapy techniques for 10 minutes:   -soft tissue management of bicep with trigger point release and massage     Renea received therapeutic exercises for 22 minutes including:  -Passive stretch of left shoulder in supine:           Flexion, ER, IR and h.adduction x 15 reps each  -self vertical flexion with dowel stretch x 10 reps  -doorway stretch for ER x 6 reps holding 5 seconds    Renea received the following supervised modalities after being cleared for contradictions for 8 minutes:   -ice application to left shoulder  post therapy for pain.      Home Exercises and Education Provided     Education provided:   - Progress towards goals   -pt encouraged to ice after activities that increase her shoulder pain.    Written Home Exercises Provided: Patient instructed to cont prior HEP.  Exercises were reviewed and Renea was able to demonstrate them prior to the end of the session.  Renea demonstrated good  understanding of the HEP provided.   .   See EMR under Patient Instructions for exercises provided 9/29/20.        Assessment     Pt would continue to benefit from skilled OT. Renea has performed activities which irritate her shoulder this week.  Improvement in her shoulder pain post therapy session.      Renea is progressing  towards her goals and there are no updates to goals at this time. Pt prognosis is Good.     Pt will continue to benefit from skilled outpatient occupational therapy to address the deficits listed in the problem list on initial evaluation provide pt/family education and to maximize pt's level of independence in the home and community environment.     Anticipated barriers to occupational therapy: none noted    Pt's spiritual, cultural and educational needs considered and pt agreeable to plan of care and goals.    Goals:  Short term goals to be met in 4 weeks:(10/27/20)  1) Pt will be independent and report performing HEP to maximize (R) shoulder functional capacity.  2) Pt will increase shoulder AROM in all measured planes of motion by 10 degrees to A with daily task.  3) Pt will report use of home modalities for pain management.  4) Pt will report pain rating at worst 5 out of 10.  5) Pt will report interrupted sleep no more than four times a week     Long term goals met by d/c:  Pt will increase left shoulder ROM to perform all dressing tasks independently  Pt will increase left shoulder strength to 4/5 to allow for lifting and carrying household and work items  Pt will report pain rating 2 out of 10 with  reaching tasks  Pt will sleep 5 days a week without disruption       Plan   Perform manual therapy and US as needed, progress exercises as tolerated.       ROHAN Mejia

## 2020-11-05 ENCOUNTER — DOCUMENTATION ONLY (OUTPATIENT)
Dept: REHABILITATION | Facility: HOSPITAL | Age: 50
End: 2020-11-05

## 2020-11-05 DIAGNOSIS — M25.60 RANGE OF MOTION DEFICIT: Primary | ICD-10-CM

## 2020-11-05 DIAGNOSIS — R46.0 SELF-CARE DEFICIT FOR BATHING: ICD-10-CM

## 2020-11-05 DIAGNOSIS — Z74.1 SELF-CARE DEFICIT FOR DRESSING AND GROOMING: ICD-10-CM

## 2020-11-05 DIAGNOSIS — R29.898 SHOULDER WEAKNESS: ICD-10-CM

## 2020-11-05 NOTE — PROGRESS NOTES
No Show Note/Documentation    Patient: Renea Mac  Date of Session: 11/5/2020  Diagnosis:   1. Range of motion deficit     2. Shoulder weakness     3. Self-care deficit for dressing and grooming     4. Self-care deficit for bathing       MRN: [id number]    Renea Mac did not attend her scheduled therapy appointment today. She did not call to cancel nor reschedule. This is the first appointment that Renea has not attended. Next appointment is scheduled for 11/12/20 and will follow up with patient at that time. No charges have been posted today.       ROHAN Mejia  11/5/2020

## 2020-11-11 NOTE — PROGRESS NOTES
Occupational Therapy Treatment Note     Date: 11/12/2020  Name: Renea Mac  Clinic Number: [id number]    Therapy Diagnosis:   Encounter Diagnoses   Name Primary?    Range of motion deficit Yes    Shoulder weakness     Self-care deficit for dressing and grooming     Self-care deficit for bathing      Physician: Magdalena Davila,*    Physician Orders: Eval and Treat  Medical Diagnosis: Impingement syndrome of left shoulder  Surgical Procedure and Date: n/a  Evaluation Date: 09/29/2020  Insurance Authorization Period Expiration: 12/31/20  Plan of Care Certification Period: 9/29/20- 11/24/20  Date of Return to MD: no appt scheduled     Visit # / Visits authorized: 3 / 20  Time In: 1:18  pm  Time Out: 2:04 pm  Total Billable Time: 39 minutes     Precautions:  Standard    Subjective     Pt reports:  No changed in my shoulder at all.   she was not compliant with home exercise program given last session.   Response to previous treatment: soreness  Functional change: non reported    Pain:  3/10  Location: left shoulder      Objective     Renea received the following direct contact modalities after being cleared for contraindications for 8 minutes:  -Patient receives ultrasound  for pain control and decreased inflammation @ continuous duty cycle, 1.0 Mhz, applied to left anterior shoulder/bicep region, intensity = 1.0 w/cm2 for 8 minutes.    Renea received the following manual therapy techniques for 20 minutes:   -soft tissue management of bicep with trigger point release and massage     Renea received therapeutic exercises for 11 minutes including:  Range of Motion:                                           9/29/20 11/12/20    (L) UE LUE (R) UE       AROM AROM AROM Norm   Shoulder Flexion 90 110 145 180   Shoulder Abduction 90 85 145 0-180   Shoulder Extension 35 35 40 0-50   Shoulder Internal Rotation Lat hip  20 Upper glut  25 To mid back  65 0-90   Shoulder External Rotation 40 60 60 0-90  A/o x4. VSS. RA. IVF heparin bridging with coumadin. Cardiac diet. Good intake. Up SBA walker to bathroom frequently. Denies pain. Plan to discharge to TCU tomorrow. CPAP on at night.      Jayden Shoulder Adduction neutral 10 20 0-40      Pulley stretch of IR end range hold x 10 reps  Doorway stretch for ER x 10 reps end range hold x 5 seconds    Renea received the following supervised modalities after being cleared for contradictions for 8 minutes:   -ice application to left shoulder post therapy for pain.      Home Exercises and Education Provided     Education provided:   - Progress towards goals   -pt encouraged to ice after activities that increase her shoulder pain.    Written Home Exercises Provided: Patient instructed to cont prior HEP.  Exercises were reviewed and Renea was able to demonstrate them prior to the end of the session.  Renea demonstrated good  understanding of the HEP provided.   .   See EMR under Patient Instructions for exercises provided 9/29/20.        Assessment     Pt would continue to benefit from skilled OT. Renea is demonstrating some improvements in her left shoulder motion but pain is persistent.  She is consistent with performance of her HEP.     Renea is progressing  towards her goals and there are no updates to goals at this time. Pt prognosis is Good.     Pt will continue to benefit from skilled outpatient occupational therapy to address the deficits listed in the problem list on initial evaluation provide pt/family education and to maximize pt's level of independence in the home and community environment.     Anticipated barriers to occupational therapy: none noted    Pt's spiritual, cultural and educational needs considered and pt agreeable to plan of care and goals.    Goals:  Short term goals to be met in 4 weeks:(10/27/20)  1) Pt will be independent and report performing HEP to maximize (R) shoulder functional capacity- met 11/12/20.  2) Pt will increase shoulder AROM in all measured planes of motion by 10 degrees to A with daily task- met with ER, adduction and flexion 11/12/20.  3) Pt will report use of home modalities for pain management -  continuous  4) Pt will report pain rating at worst 5 out of 10.- progressing  5) Pt will report interrupted sleep no more than four times a week     Long term goals met by d/c:  Pt will increase left shoulder ROM to perform all dressing tasks independently  Pt will increase left shoulder strength to 4/5 to allow for lifting and carrying household and work items  Pt will report pain rating 2 out of 10 with reaching tasks  Pt will sleep 5 days a week without disruption       Plan   Perform manual therapy and US as needed, progress exercises as tolerated.       ROHAN Mejia

## 2020-11-12 ENCOUNTER — CLINICAL SUPPORT (OUTPATIENT)
Dept: REHABILITATION | Facility: HOSPITAL | Age: 50
End: 2020-11-12
Payer: COMMERCIAL

## 2020-11-12 DIAGNOSIS — M25.60 RANGE OF MOTION DEFICIT: Primary | ICD-10-CM

## 2020-11-12 DIAGNOSIS — R46.0 SELF-CARE DEFICIT FOR BATHING: ICD-10-CM

## 2020-11-12 DIAGNOSIS — Z74.1 SELF-CARE DEFICIT FOR DRESSING AND GROOMING: ICD-10-CM

## 2020-11-12 DIAGNOSIS — R29.898 SHOULDER WEAKNESS: ICD-10-CM

## 2020-11-12 PROCEDURE — 97140 MANUAL THERAPY 1/> REGIONS: CPT | Mod: PN

## 2020-11-12 PROCEDURE — 97110 THERAPEUTIC EXERCISES: CPT | Mod: PN

## 2020-11-12 PROCEDURE — 97035 APP MDLTY 1+ULTRASOUND EA 15: CPT | Mod: PN

## 2020-11-19 ENCOUNTER — DOCUMENTATION ONLY (OUTPATIENT)
Dept: REHABILITATION | Facility: HOSPITAL | Age: 50
End: 2020-11-19

## 2020-11-19 DIAGNOSIS — M25.60 RANGE OF MOTION DEFICIT: Primary | ICD-10-CM

## 2020-11-19 DIAGNOSIS — Z74.1 SELF-CARE DEFICIT FOR DRESSING AND GROOMING: ICD-10-CM

## 2020-11-19 DIAGNOSIS — R29.898 SHOULDER WEAKNESS: ICD-10-CM

## 2020-11-19 DIAGNOSIS — R46.0 SELF-CARE DEFICIT FOR BATHING: ICD-10-CM

## 2020-11-19 NOTE — PROGRESS NOTES
Cancellation Note    Patient: Renea Mac  Date of Session: 11/19/2020  Diagnosis:   1. Range of motion deficit     2. Shoulder weakness     3. Self-care deficit for dressing and grooming     4. Self-care deficit for bathing       MRN: [id number]    Renea Mac cancelled her scheduled therapy appointment today secondary to work.  This is the second appointment that Renea has not attended. Next appointment is scheduled for next week and will follow up with patient at that time. No charges have been posted today.       ROHAN Mejia  11/19/2020

## 2020-11-25 NOTE — PROGRESS NOTES
Occupational Therapy Progress Note     Date: 11/27/2020  Name: Renea Mac  Clinic Number: [id number]    Therapy Diagnosis:   Encounter Diagnoses   Name Primary?    Range of motion deficit Yes    Shoulder weakness     Self-care deficit for dressing and grooming     Self-care deficit for bathing      Physician: Magdalena Davila,*    Physician Orders: Eval and Treat  Medical Diagnosis: Impingement syndrome of left shoulder  Surgical Procedure and Date: n/a  Evaluation Date: 09/29/2020  Insurance Authorization Period Expiration: 12/31/20  Plan of Care Certification Period: 9/29/20- 11/24/20  Date of Return to MD: no appt scheduled     Visit # / Visits authorized: 4 / 20  Time In: 7:22 am  Time Out: 8:00 am  Total Billable Time: 38 minutes     Precautions:  Standard    Subjective     Pt reports:  My arm has been sore since my last visit  she was not compliant with home exercise program given last session.   Response to previous treatment: has been painful  Functional change: none reported    Pain:  4/10  Location: left shoulder      Objective     Renea received the following direct contact modalities after being cleared for contraindications for 8 minutes:  -Patient receives ultrasound  for pain control and decreased inflammation @ continuous duty cycle, 1.0 Mhz, applied to left anterior shoulder/bicep region, intensity = 1.0 w/cm2 for 8 minutes.    Renea received the following manual therapy techniques for 30 minutes:   -IASTM using boat tool sweeping and stroking  -soft tissue management of bicep with trigger point release  -friction massage of proximal bicep tendon  -ice massage of bicep muscle belly and tendon.                                      9/29/20 11/12/20 11/26/20    (L) UE LUE LUE (R) UE       AROM AROM AROM AROM Norm   Shoulder Flexion 90 110 110 145 180   Shoulder Abduction 90 85 80 145 0-180       Home Exercises and Education Provided     Education provided:   -  Progress towards goals   -pt to try be consistent with stretching at home to help with the pain and limitations.  -pt to ice after activities that increase her shoulder pain.    Written Home Exercises Provided: Patient instructed to cont prior HEP.  Exercises were reviewed and Renea was able to demonstrate them prior to the end of the session.  Renea demonstrated good  understanding of the HEP provided.   .   See EMR under Patient Instructions for exercises provided 9/29/20.        Assessment     Pt would continue to benefit from skilled OT. Renea has slight decrease in pain post therapy session.  She has been inconsistent with performance of her HEP.  No changes in AROM noted and pain is still present.     Renea is progressing  towards her goals and there are no updates to goals at this time. Pt prognosis is Good.     Pt will continue to benefit from skilled outpatient occupational therapy to address the deficits listed in the problem list on initial evaluation provide pt/family education and to maximize pt's level of independence in the home and community environment.     Anticipated barriers to occupational therapy: none noted    Pt's spiritual, cultural and educational needs considered and pt agreeable to plan of care and goals.    Goals:  Short term goals to be met in 4 weeks:(10/27/20)  1) Pt will be independent and report performing HEP to maximize (R) shoulder functional capacity- met 11/12/20.  2) Pt will increase shoulder AROM in all measured planes of motion by 10 degrees to A with daily task- met with ER, adduction and flexion 11/12/20.  3) Pt will report use of home modalities for pain management - continuous  4) Pt will report pain rating at worst 5 out of 10.- progressing  5) Pt will report interrupted sleep no more than four times a week- not met     Long term goals met by d/c:  Pt will increase left shoulder ROM to perform all dressing tasks independently  Pt will increase left shoulder strength  to 4/5 to allow for lifting and carrying household and work items  Pt will report pain rating 2 out of 10 with reaching tasks  Pt will sleep 5 days a week without disruption       Plan   Perform manual therapy and US as needed, progress exercises as tolerated.       ROHAN Mejia

## 2020-11-27 ENCOUNTER — CLINICAL SUPPORT (OUTPATIENT)
Dept: REHABILITATION | Facility: HOSPITAL | Age: 50
End: 2020-11-27
Payer: COMMERCIAL

## 2020-11-27 DIAGNOSIS — R46.0 SELF-CARE DEFICIT FOR BATHING: ICD-10-CM

## 2020-11-27 DIAGNOSIS — R29.898 SHOULDER WEAKNESS: ICD-10-CM

## 2020-11-27 DIAGNOSIS — M25.60 RANGE OF MOTION DEFICIT: Primary | ICD-10-CM

## 2020-11-27 DIAGNOSIS — Z74.1 SELF-CARE DEFICIT FOR DRESSING AND GROOMING: ICD-10-CM

## 2020-11-27 PROCEDURE — 97035 APP MDLTY 1+ULTRASOUND EA 15: CPT | Mod: PN

## 2020-11-27 PROCEDURE — 97140 MANUAL THERAPY 1/> REGIONS: CPT | Mod: PN

## 2020-11-27 NOTE — PLAN OF CARE
Outpatient Therapy Updated Plan of Care     Visit Date: 11/27/2020  Name: Renea Mac  Clinic Number: [id number]    Therapy Diagnosis:   Encounter Diagnoses   Name Primary?    Range of motion deficit Yes    Shoulder weakness     Self-care deficit for dressing and grooming     Self-care deficit for bathing      Physician: Magdalena Davila,*    Physician Orders: Eval and Treat  Medical Diagnosis: Impingement syndrome of left shoulder  Evaluation Date: 09/29/2020    Total Visits Received: 4  Cancelled Visits: 1  No Show Visits: 1    Current Certification Period: 9/29/20- 11/24/20  Precautions:  standard  Visits from Evaluation Date:  3  Functional Level Prior to Evaluation:  Difficulty with self care tasks and drive through use, significant pain in shoulder    Subjective     Update: My arm has been sore since my last visit.     Objective     Update:                                       9/29/20 11/12/20 11/26/20    (L) UE LUE LUE (R) UE       AROM AROM AROM AROM Norm   Shoulder Flexion 90 110 110 145 180   Shoulder Abduction 90 85 80 145 0-180         Assessment     Update: Renea has slight decrease in pain post therapy session.  She has been inconsistent with performance of her HEP. NO chnages in AROM noted, pain still present    Short term goals to be met in 4 weeks:(10/27/20)  1) Pt will be independent and report performing HEP to maximize (R) shoulder functional capacity- met 11/12/20.  2) Pt will increase shoulder AROM in all measured planes of motion by 10 degrees to A with daily task- met with ER, adduction and flexion 11/12/20.  3) Pt will report use of home modalities for pain management - continuous  4) Pt will report pain rating at worst 5 out of 10.- progressing  5) Pt will report interrupted sleep no more than four times a week- not met     Long term goals met by d/c:  Pt will increase left shoulder ROM to perform all dressing tasks independently  Pt will increase left  shoulder strength to 4/5 to allow for lifting and carrying household and work items  Pt will report pain rating 2 out of 10 with reaching tasks  Pt will sleep 5 days a week without disruption    Long Term Goal Status:   continue per initial plan of care.  Reasons for Recertification of Therapy:   Pt has had limited access to therapy and only 4 visits in 2 months, small changes immediately post therapy sessions.    Plan     Updated Certification Period: 11/27/2020 to 1/22/21  Recommended Treatment Plan: 2 times per week for 8 weeks: Manual Therapy, Moist Heat/ Ice, Therapeutic Activites, Therapeutic Exercise, Ultrasound and strengthening  Other Recommendations:     ROHAN Mejia  11/27/2020      I CERTIFY THE NEED FOR THESE SERVICES FURNISHED UNDER THIS PLAN OF TREATMENT AND WHILE UNDER MY CARE    Physician's comments:        Physician's Signature: ___________________________________________________

## 2020-11-30 NOTE — PROGRESS NOTES
Occupational Therapy Treatment Note     Date: 12/1/2020  Name: Renea Mac  [facility] Number: [id number]    Therapy Diagnosis:   Encounter Diagnoses   Name Primary?    Range of motion deficit Yes    Shoulder weakness     Self-care deficit for dressing and grooming     Self-care deficit for bathing      Physician: Magdalena Davila,*    Physician Orders: Eval and Treat  Medical Diagnosis: Impingement syndrome of left shoulder  Surgical Procedure and Date: n/a  Evaluation Date: 09/29/2020  Insurance Authorization Period Expiration: 12/31/20  Plan of Care Certification Period: 11/27/2020 to 1/22/21  Date of Return to MD: no appt scheduled     Visit # / Visits authorized: 5/ 20  Time In: 5:45 pm  Time Out: 6:25 pm  Total Billable Time: 40 minutes     Precautions:  Standard    Subjective     Pt reports:  My arm is not as painful as last week   she was not compliant with home exercise program given last session.   Response to previous treatment: it was sore not painful- better  Functional change: none reported    Pain:  4+/10  Location: left shoulder      Objective     Renea received the following direct contact modalities after being cleared for contraindications for 8 minutes:  -Patient receives ultrasound  for pain control and decreased inflammation @ continuous duty cycle, 1.0 Mhz, applied to left anterior shoulder/bicep region, intensity = 1.0 w/cm2 for 8 minutes.    Renea received the following manual therapy techniques for 32 minutes:   -IASTM using momma bear sweeping and stroking  -soft tissue management of bicep with trigger point release  -bicep stretch x 5 reps with 5 second hold  -ice massage of bicep muscle belly and tendon.                                  Home Exercises and Education Provided     Education provided:   - Progress towards goals   -pt to try be consistent with stretching at home to help with the pain and limitations.  -pt to ice after activities that increase her shoulder  pain.    Written Home Exercises Provided: Patient instructed to cont prior HEP.  Exercises were reviewed and Renea was able to demonstrate them prior to the end of the session.  Renea demonstrated good  understanding of the HEP provided.   .   See EMR under Patient Instructions for exercises provided 9/29/20.        Assessment     Pt would continue to benefit from skilled OT. Renea with improvement in her pain from previous therapy visit.  Less tenderness noted with palpation although pain remains.  Renea is progressing  towards her goals and there are no updates to goals at this time. Pt prognosis is Good.     Pt will continue to benefit from skilled outpatient occupational therapy to address the deficits listed in the problem list on initial evaluation provide pt/family education and to maximize pt's level of independence in the home and community environment.     Anticipated barriers to occupational therapy: none noted    Pt's spiritual, cultural and educational needs considered and pt agreeable to plan of care and goals.    Goals:  Short term goals to be met in 4 weeks:(10/27/20)  1) Pt will be independent and report performing HEP to maximize (R) shoulder functional capacity- met 11/12/20.  2) Pt will increase shoulder AROM in all measured planes of motion by 10 degrees to A with daily task- met with ER, adduction and flexion 11/12/20.  3) Pt will report use of home modalities for pain management - continuous  4) Pt will report pain rating at worst 5 out of 10.- progressing  5) Pt will report interrupted sleep no more than four times a week- not met     Long term goals met by d/c:  Pt will increase left shoulder ROM to perform all dressing tasks independently  Pt will increase left shoulder strength to 4/5 to allow for lifting and carrying household and work items  Pt will report pain rating 2 out of 10 with reaching tasks  Pt will sleep 5 days a week without disruption       Plan   Perform manual therapy  and US as needed, progress exercises as tolerated.   Encouraged pt to f/u with ortho.      ROHAN Mejia

## 2020-12-01 ENCOUNTER — CLINICAL SUPPORT (OUTPATIENT)
Dept: REHABILITATION | Facility: HOSPITAL | Age: 50
End: 2020-12-01
Payer: COMMERCIAL

## 2020-12-01 DIAGNOSIS — M25.60 RANGE OF MOTION DEFICIT: Primary | ICD-10-CM

## 2020-12-01 DIAGNOSIS — R29.898 SHOULDER WEAKNESS: ICD-10-CM

## 2020-12-01 DIAGNOSIS — R46.0 SELF-CARE DEFICIT FOR BATHING: ICD-10-CM

## 2020-12-01 DIAGNOSIS — Z74.1 SELF-CARE DEFICIT FOR DRESSING AND GROOMING: ICD-10-CM

## 2020-12-01 PROCEDURE — 97140 MANUAL THERAPY 1/> REGIONS: CPT | Mod: PN

## 2020-12-01 PROCEDURE — 97035 APP MDLTY 1+ULTRASOUND EA 15: CPT | Mod: PN

## 2020-12-18 ENCOUNTER — OFFICE VISIT (OUTPATIENT)
Dept: URGENT CARE | Facility: CLINIC | Age: 50
End: 2020-12-18
Payer: COMMERCIAL

## 2020-12-18 VITALS
BODY MASS INDEX: 37.56 KG/M2 | OXYGEN SATURATION: 99 % | SYSTOLIC BLOOD PRESSURE: 139 MMHG | TEMPERATURE: 99 F | HEART RATE: 74 BPM | WEIGHT: 220 LBS | DIASTOLIC BLOOD PRESSURE: 89 MMHG | HEIGHT: 64 IN | RESPIRATION RATE: 15 BRPM

## 2020-12-18 DIAGNOSIS — M54.41 ACUTE RIGHT-SIDED LOW BACK PAIN WITH RIGHT-SIDED SCIATICA: Primary | ICD-10-CM

## 2020-12-18 PROCEDURE — 3008F BODY MASS INDEX DOCD: CPT | Mod: CPTII,S$GLB,, | Performed by: STUDENT IN AN ORGANIZED HEALTH CARE EDUCATION/TRAINING PROGRAM

## 2020-12-18 PROCEDURE — 96372 PR INJECTION,THERAP/PROPH/DIAG2ST, IM OR SUBCUT: ICD-10-PCS | Mod: S$GLB,,, | Performed by: STUDENT IN AN ORGANIZED HEALTH CARE EDUCATION/TRAINING PROGRAM

## 2020-12-18 PROCEDURE — 96372 THER/PROPH/DIAG INJ SC/IM: CPT | Mod: S$GLB,,, | Performed by: STUDENT IN AN ORGANIZED HEALTH CARE EDUCATION/TRAINING PROGRAM

## 2020-12-18 PROCEDURE — 99214 OFFICE O/P EST MOD 30 MIN: CPT | Mod: 25,S$GLB,, | Performed by: STUDENT IN AN ORGANIZED HEALTH CARE EDUCATION/TRAINING PROGRAM

## 2020-12-18 PROCEDURE — 3008F PR BODY MASS INDEX (BMI) DOCUMENTED: ICD-10-PCS | Mod: CPTII,S$GLB,, | Performed by: STUDENT IN AN ORGANIZED HEALTH CARE EDUCATION/TRAINING PROGRAM

## 2020-12-18 PROCEDURE — 99214 PR OFFICE/OUTPT VISIT, EST, LEVL IV, 30-39 MIN: ICD-10-PCS | Mod: 25,S$GLB,, | Performed by: STUDENT IN AN ORGANIZED HEALTH CARE EDUCATION/TRAINING PROGRAM

## 2020-12-18 RX ORDER — CYCLOBENZAPRINE HCL 5 MG
5-10 TABLET ORAL 3 TIMES DAILY PRN
Qty: 15 TABLET | Refills: 0 | Status: SHIPPED | OUTPATIENT
Start: 2020-12-18 | End: 2020-12-23

## 2020-12-18 RX ORDER — KETOROLAC TROMETHAMINE 30 MG/ML
30 INJECTION, SOLUTION INTRAMUSCULAR; INTRAVENOUS
Status: COMPLETED | OUTPATIENT
Start: 2020-12-18 | End: 2020-12-18

## 2020-12-18 RX ADMIN — KETOROLAC TROMETHAMINE 30 MG: 30 INJECTION, SOLUTION INTRAMUSCULAR; INTRAVENOUS at 03:12

## 2020-12-18 NOTE — PATIENT INSTRUCTIONS

## 2020-12-18 NOTE — PROGRESS NOTES
"Subjective:       Patient ID: Renea Mac is a 50 y.o. female.    Vitals:  height is 5' 4" (1.626 m) and weight is 99.8 kg (220 lb).     Chief Complaint: Back Pain    Pt presents for R sided LBP. Reports history of similar prior episodes with diagnosis of R sided sciatica. Pain radiates from R lower back down posterior thigh. Reports usually relieved with toradol IM but also states she cannot take oral NSAIDs due to gastric sleeve surgery. Denied f/c, trauma radiation below knee, urogenital complaints, LE numbness, LE weakness, anogenital numbness, or incontinence.    Back Pain  This is a recurrent problem. The current episode started today. The problem occurs constantly. The problem has been gradually worsening since onset. The pain is present in the sacro-iliac (lower back). The quality of the pain is described as shooting and aching. The pain radiates to the right thigh. The pain is at a severity of 7/10. The pain is severe. The pain is the same all the time. The symptoms are aggravated by standing, sitting and position. Pertinent negatives include no abdominal pain, bladder incontinence, bowel incontinence, chest pain, dysuria, fever, headaches, leg pain, numbness, pelvic pain, perianal numbness or weakness. Risk factors include obesity. Treatments tried: tylenol, biofreeze. The treatment provided mild relief.       Constitution: Negative for fatigue and fever.   Neck: Negative for neck pain.   Cardiovascular: Negative for chest pain.   Gastrointestinal: Negative for abdominal pain and bowel incontinence.   Genitourinary: Negative for dysuria, frequency, urgency, bladder incontinence, hematuria, vaginal discharge, vaginal bleeding and pelvic pain.   Musculoskeletal: Positive for back pain and muscle ache. Negative for trauma, muscle cramps and history of spine disorder.   Skin: Negative for color change, rash and lesion.   Neurological: Negative for coordination disturbances, headaches, numbness and " tingling.   Psychiatric/Behavioral: Negative for confusion, agitation and sleep disturbance.       Objective:      Physical Exam   Constitutional: She is oriented to person, place, and time. She appears well-developed. She does not appear ill. No distress. obesity  HENT:   Head: Normocephalic and atraumatic.   Ears:   Right Ear: Hearing and external ear normal.   Left Ear: Hearing and external ear normal.   Mouth/Throat: Mucous membranes are normal.   Eyes: Conjunctivae, EOM and lids are normal. Right eye exhibits no discharge. Left eye exhibits no discharge. No scleral icterus.   Neck: Normal range of motion. Neck supple.   Cardiovascular: Normal rate, regular rhythm and normal pulses.   Musculoskeletal: Normal range of motion.         General: Tenderness present. No deformity or signs of injury.      Comments: TTP in R lower SI region with +SLR without midline lumbar TTP   Neurological: She is alert and oriented to person, place, and time. She displays no weakness. No cranial nerve deficit (CN II-XII grossly intact) or sensory deficit. She exhibits normal muscle tone.   Skin: Skin is warm, dry, not pale and no rash. bruisingPsychiatric: Her speech is normal and behavior is normal. Judgment and thought content normal.   Nursing note and vitals reviewed.          Assessment:       1. Acute right-sided low back pain with right-sided sciatica        Plan:         Acute right-sided low back pain with right-sided sciatica  -     ketorolac injection 30 mg  -     cyclobenzaprine (FLEXERIL) 5 MG tablet; Take 1 to 2 tablets (5-10 mg total) by mouth 3 (three) times daily as needed (Back pain).  Dispense: 15 tablet; Refill: 0  - counseled on home care and OTC medications    Medications and diagnosis reviewed with patient, questions answered, and return precautions given.    Follow up in about 2 weeks (around 1/1/2021) for re-evaluation and further management with PCP, or sooner if worsening.    Milo Sinclair MD/MPH  [facility]  [facility]

## 2020-12-21 ENCOUNTER — PATIENT OUTREACH (OUTPATIENT)
Dept: ADMINISTRATIVE | Facility: OTHER | Age: 50
End: 2020-12-21

## 2021-01-06 ENCOUNTER — DOCUMENTATION ONLY (OUTPATIENT)
Dept: REHABILITATION | Facility: HOSPITAL | Age: 51
End: 2021-01-06

## 2021-01-07 ENCOUNTER — OFFICE VISIT (OUTPATIENT)
Dept: BARIATRICS | Facility: CLINIC | Age: 51
End: 2021-01-07
Payer: OTHER GOVERNMENT

## 2021-01-07 VITALS
DIASTOLIC BLOOD PRESSURE: 78 MMHG | SYSTOLIC BLOOD PRESSURE: 124 MMHG | HEIGHT: 64 IN | HEART RATE: 83 BPM | BODY MASS INDEX: 38.5 KG/M2 | WEIGHT: 225.5 LBS

## 2021-01-07 DIAGNOSIS — E66.01 CLASS 2 SEVERE OBESITY WITH BODY MASS INDEX (BMI) OF 35 TO 39.9 WITH SERIOUS COMORBIDITY: Primary | ICD-10-CM

## 2021-01-07 DIAGNOSIS — Z98.84 S/P LAPAROSCOPIC SLEEVE GASTRECTOMY: ICD-10-CM

## 2021-01-07 PROCEDURE — 99213 OFFICE O/P EST LOW 20 MIN: CPT | Mod: PBBFAC | Performed by: INTERNAL MEDICINE

## 2021-01-07 PROCEDURE — 99999 PR PBB SHADOW E&M-EST. PATIENT-LVL III: ICD-10-PCS | Mod: PBBFAC,,, | Performed by: INTERNAL MEDICINE

## 2021-01-07 PROCEDURE — 99999 PR PBB SHADOW E&M-EST. PATIENT-LVL III: CPT | Mod: PBBFAC,,, | Performed by: INTERNAL MEDICINE

## 2021-01-07 PROCEDURE — 99214 PR OFFICE/OUTPT VISIT, EST, LEVL IV, 30-39 MIN: ICD-10-PCS | Mod: S$GLB,,, | Performed by: INTERNAL MEDICINE

## 2021-01-07 PROCEDURE — 99214 OFFICE O/P EST MOD 30 MIN: CPT | Mod: S$GLB,,, | Performed by: INTERNAL MEDICINE

## 2021-01-07 RX ORDER — TOPIRAMATE 25 MG/1
25 TABLET ORAL 2 TIMES DAILY
Qty: 60 TABLET | Refills: 3 | Status: SHIPPED | OUTPATIENT
Start: 2021-01-07 | End: 2021-02-26

## 2021-02-04 ENCOUNTER — OFFICE VISIT (OUTPATIENT)
Dept: BARIATRICS | Facility: CLINIC | Age: 51
End: 2021-02-04
Payer: COMMERCIAL

## 2021-02-04 VITALS
OXYGEN SATURATION: 96 % | WEIGHT: 224.19 LBS | HEART RATE: 102 BPM | DIASTOLIC BLOOD PRESSURE: 82 MMHG | SYSTOLIC BLOOD PRESSURE: 124 MMHG | HEIGHT: 64 IN | BODY MASS INDEX: 38.27 KG/M2

## 2021-02-04 DIAGNOSIS — E66.01 CLASS 2 SEVERE OBESITY WITH BODY MASS INDEX (BMI) OF 35 TO 39.9 WITH SERIOUS COMORBIDITY: ICD-10-CM

## 2021-02-04 PROCEDURE — 99213 OFFICE O/P EST LOW 20 MIN: CPT | Mod: S$GLB,,, | Performed by: INTERNAL MEDICINE

## 2021-02-04 PROCEDURE — 99999 PR PBB SHADOW E&M-EST. PATIENT-LVL IV: CPT | Mod: PBBFAC,,, | Performed by: INTERNAL MEDICINE

## 2021-02-04 PROCEDURE — 99214 OFFICE O/P EST MOD 30 MIN: CPT | Mod: PBBFAC | Performed by: INTERNAL MEDICINE

## 2021-02-04 PROCEDURE — 99999 PR PBB SHADOW E&M-EST. PATIENT-LVL IV: ICD-10-PCS | Mod: PBBFAC,,, | Performed by: INTERNAL MEDICINE

## 2021-02-04 PROCEDURE — 99213 PR OFFICE/OUTPT VISIT, EST, LEVL III, 20-29 MIN: ICD-10-PCS | Mod: S$GLB,,, | Performed by: INTERNAL MEDICINE

## 2021-02-04 RX ORDER — DIETHYLPROPION HYDROCHLORIDE 75 MG/1
75 TABLET, EXTENDED RELEASE ORAL DAILY
Qty: 30 TABLET | Refills: 2 | Status: SHIPPED | OUTPATIENT
Start: 2021-02-04 | End: 2021-06-14 | Stop reason: SDUPTHER

## 2021-02-26 ENCOUNTER — HOSPITAL ENCOUNTER (OUTPATIENT)
Dept: RADIOLOGY | Facility: HOSPITAL | Age: 51
Discharge: HOME | End: 2021-02-26
Attending: NURSE PRACTITIONER
Payer: COMMERCIAL

## 2021-02-26 ENCOUNTER — OFFICE VISIT (OUTPATIENT)
Dept: INTERNAL MEDICINE | Facility: CLINIC | Age: 51
End: 2021-02-26
Payer: OTHER GOVERNMENT

## 2021-02-26 VITALS
WEIGHT: 224.19 LBS | HEIGHT: 64 IN | BODY MASS INDEX: 38.27 KG/M2 | SYSTOLIC BLOOD PRESSURE: 126 MMHG | OXYGEN SATURATION: 99 % | HEART RATE: 94 BPM | DIASTOLIC BLOOD PRESSURE: 84 MMHG

## 2021-02-26 DIAGNOSIS — M54.9 DORSALGIA, UNSPECIFIED: ICD-10-CM

## 2021-02-26 DIAGNOSIS — R10.31 RIGHT LOWER QUADRANT ABDOMINAL PAIN: ICD-10-CM

## 2021-02-26 DIAGNOSIS — M54.50 CHRONIC RIGHT-SIDED LOW BACK PAIN WITHOUT SCIATICA: Primary | ICD-10-CM

## 2021-02-26 DIAGNOSIS — G89.29 CHRONIC RIGHT-SIDED LOW BACK PAIN WITHOUT SCIATICA: Primary | ICD-10-CM

## 2021-02-26 DIAGNOSIS — G89.29 CHRONIC RIGHT-SIDED LOW BACK PAIN WITHOUT SCIATICA: ICD-10-CM

## 2021-02-26 DIAGNOSIS — M54.50 CHRONIC RIGHT-SIDED LOW BACK PAIN WITHOUT SCIATICA: ICD-10-CM

## 2021-02-26 PROCEDURE — 72110 X-RAY EXAM L-2 SPINE 4/>VWS: CPT | Mod: TC

## 2021-02-26 PROCEDURE — 72110 XR LUMBAR SPINE COMPLETE 5 VIEW: ICD-10-PCS | Mod: 26,,, | Performed by: RADIOLOGY

## 2021-02-26 PROCEDURE — 72110 X-RAY EXAM L-2 SPINE 4/>VWS: CPT | Mod: 26,,, | Performed by: RADIOLOGY

## 2021-02-26 PROCEDURE — 99999 PR PBB SHADOW E&M-EST. PATIENT-LVL V: ICD-10-PCS | Mod: PBBFAC,,, | Performed by: NURSE PRACTITIONER

## 2021-02-26 PROCEDURE — 99215 OFFICE O/P EST HI 40 MIN: CPT | Mod: PBBFAC,25 | Performed by: NURSE PRACTITIONER

## 2021-02-26 PROCEDURE — 81003 URINALYSIS AUTO W/O SCOPE: CPT

## 2021-02-26 PROCEDURE — 99214 OFFICE O/P EST MOD 30 MIN: CPT | Mod: S$GLB,,, | Performed by: NURSE PRACTITIONER

## 2021-02-26 PROCEDURE — 99214 PR OFFICE/OUTPT VISIT, EST, LEVL IV, 30-39 MIN: ICD-10-PCS | Mod: S$GLB,,, | Performed by: NURSE PRACTITIONER

## 2021-02-26 PROCEDURE — 99999 PR PBB SHADOW E&M-EST. PATIENT-LVL V: CPT | Mod: PBBFAC,,, | Performed by: NURSE PRACTITIONER

## 2021-02-26 RX ORDER — METHYLPREDNISOLONE 4 MG/1
TABLET ORAL
Qty: 21 TABLET | Refills: 0 | Status: SHIPPED | OUTPATIENT
Start: 2021-02-26 | End: 2021-03-19

## 2021-02-26 RX ORDER — METHOCARBAMOL 500 MG/1
500 TABLET, FILM COATED ORAL 3 TIMES DAILY PRN
Qty: 30 TABLET | Refills: 0 | Status: SHIPPED | OUTPATIENT
Start: 2021-02-26 | End: 2021-03-08

## 2021-02-27 LAB
BILIRUB UR QL STRIP: NEGATIVE
CLARITY UR REFRACT.AUTO: ABNORMAL
COLOR UR AUTO: YELLOW
GLUCOSE UR QL STRIP: NEGATIVE
HGB UR QL STRIP: NEGATIVE
KETONES UR QL STRIP: NEGATIVE
LEUKOCYTE ESTERASE UR QL STRIP: NEGATIVE
NITRITE UR QL STRIP: NEGATIVE
PH UR STRIP: 6 [PH] (ref 5–8)
PROT UR QL STRIP: NEGATIVE
SP GR UR STRIP: 1.02 (ref 1–1.03)
URN SPEC COLLECT METH UR: ABNORMAL

## 2021-03-25 ENCOUNTER — PATIENT OUTREACH (OUTPATIENT)
Dept: ADMINISTRATIVE | Facility: OTHER | Age: 51
End: 2021-03-25

## 2021-03-26 ENCOUNTER — PATIENT MESSAGE (OUTPATIENT)
Dept: ORTHOPEDICS | Facility: CLINIC | Age: 51
End: 2021-03-26

## 2021-03-29 ENCOUNTER — OFFICE VISIT (OUTPATIENT)
Dept: ORTHOPEDICS | Facility: CLINIC | Age: 51
End: 2021-03-29
Payer: OTHER GOVERNMENT

## 2021-03-29 ENCOUNTER — HOSPITAL ENCOUNTER (OUTPATIENT)
Dept: RADIOLOGY | Facility: HOSPITAL | Age: 51
Discharge: HOME | End: 2021-03-29
Attending: ORTHOPAEDIC SURGERY
Payer: COMMERCIAL

## 2021-03-29 VITALS — WEIGHT: 213.5 LBS | BODY MASS INDEX: 36.45 KG/M2 | HEIGHT: 64 IN

## 2021-03-29 DIAGNOSIS — G89.29 CHRONIC BILATERAL LOW BACK PAIN WITH RIGHT-SIDED SCIATICA: Primary | ICD-10-CM

## 2021-03-29 DIAGNOSIS — R10.31 RIGHT LOWER QUADRANT ABDOMINAL PAIN: ICD-10-CM

## 2021-03-29 DIAGNOSIS — M51.36 DDD (DEGENERATIVE DISC DISEASE), LUMBAR: ICD-10-CM

## 2021-03-29 DIAGNOSIS — M54.50 CHRONIC RIGHT-SIDED LOW BACK PAIN WITHOUT SCIATICA: ICD-10-CM

## 2021-03-29 DIAGNOSIS — M54.41 CHRONIC BILATERAL LOW BACK PAIN WITH RIGHT-SIDED SCIATICA: Primary | ICD-10-CM

## 2021-03-29 DIAGNOSIS — M54.9 DORSALGIA, UNSPECIFIED: ICD-10-CM

## 2021-03-29 DIAGNOSIS — G89.29 CHRONIC RIGHT-SIDED LOW BACK PAIN WITHOUT SCIATICA: ICD-10-CM

## 2021-03-29 PROCEDURE — 72120 XR LUMBAR SPINE AP AND LAT WITH FLEX/EXT: ICD-10-PCS | Mod: 26,,, | Performed by: RADIOLOGY

## 2021-03-29 PROCEDURE — 99213 PR OFFICE/OUTPT VISIT, EST, LEVL III, 20-29 MIN: ICD-10-PCS | Mod: S$GLB,,, | Performed by: ORTHOPAEDIC SURGERY

## 2021-03-29 PROCEDURE — 72120 X-RAY BEND ONLY L-S SPINE: CPT | Mod: 26,,, | Performed by: RADIOLOGY

## 2021-03-29 PROCEDURE — 99999 PR PBB SHADOW E&M-EST. PATIENT-LVL IV: ICD-10-PCS | Mod: PBBFAC,,, | Performed by: ORTHOPAEDIC SURGERY

## 2021-03-29 PROCEDURE — 72120 X-RAY BEND ONLY L-S SPINE: CPT | Mod: TC

## 2021-03-29 PROCEDURE — 99213 OFFICE O/P EST LOW 20 MIN: CPT | Mod: S$GLB,,, | Performed by: ORTHOPAEDIC SURGERY

## 2021-03-29 PROCEDURE — 72100 XR LUMBAR SPINE AP AND LAT WITH FLEX/EXT: ICD-10-PCS | Mod: 26,,, | Performed by: RADIOLOGY

## 2021-03-29 PROCEDURE — 72100 X-RAY EXAM L-S SPINE 2/3 VWS: CPT | Mod: 26,,, | Performed by: RADIOLOGY

## 2021-03-29 PROCEDURE — 99214 OFFICE O/P EST MOD 30 MIN: CPT | Mod: PBBFAC,25 | Performed by: ORTHOPAEDIC SURGERY

## 2021-03-29 PROCEDURE — 99999 PR PBB SHADOW E&M-EST. PATIENT-LVL IV: CPT | Mod: PBBFAC,,, | Performed by: ORTHOPAEDIC SURGERY

## 2021-03-31 ENCOUNTER — IMMUNIZATION (OUTPATIENT)
Dept: OBSTETRICS AND GYNECOLOGY | Facility: CLINIC | Age: 51
End: 2021-03-31
Payer: COMMERCIAL

## 2021-03-31 DIAGNOSIS — Z23 NEED FOR VACCINATION: Primary | ICD-10-CM

## 2021-03-31 PROCEDURE — 91300 COVID-19, MRNA, LNP-S, PF, 30 MCG/0.3 ML DOSE VACCINE: CPT | Mod: PBBFAC | Performed by: FAMILY MEDICINE

## 2021-04-21 ENCOUNTER — IMMUNIZATION (OUTPATIENT)
Dept: OBSTETRICS AND GYNECOLOGY | Facility: CLINIC | Age: 51
End: 2021-04-21
Payer: OTHER GOVERNMENT

## 2021-04-21 DIAGNOSIS — Z23 NEED FOR VACCINATION: Primary | ICD-10-CM

## 2021-04-21 PROCEDURE — 91300 COVID-19, MRNA, LNP-S, PF, 30 MCG/0.3 ML DOSE VACCINE: CPT | Mod: PBBFAC

## 2021-04-21 PROCEDURE — 0002A COVID-19, MRNA, LNP-S, PF, 30 MCG/0.3 ML DOSE VACCINE: CPT | Mod: PBBFAC

## 2021-04-30 ENCOUNTER — CLINICAL SUPPORT (OUTPATIENT)
Dept: REHABILITATION | Facility: HOSPITAL | Age: 51
End: 2021-04-30
Payer: COMMERCIAL

## 2021-04-30 DIAGNOSIS — G89.29 CHRONIC BILATERAL LOW BACK PAIN WITH RIGHT-SIDED SCIATICA: ICD-10-CM

## 2021-04-30 DIAGNOSIS — M62.81 WEAKNESS OF TRUNK MUSCULATURE: ICD-10-CM

## 2021-04-30 DIAGNOSIS — M54.41 CHRONIC BILATERAL LOW BACK PAIN WITH RIGHT-SIDED SCIATICA: ICD-10-CM

## 2021-04-30 DIAGNOSIS — M53.86 DECREASED RANGE OF MOTION OF LUMBAR SPINE: ICD-10-CM

## 2021-04-30 PROCEDURE — 97161 PT EVAL LOW COMPLEX 20 MIN: CPT | Mod: PN

## 2021-04-30 PROCEDURE — 97110 THERAPEUTIC EXERCISES: CPT | Mod: PN

## 2021-05-04 DIAGNOSIS — I10 ESSENTIAL HYPERTENSION: ICD-10-CM

## 2021-05-04 RX ORDER — HYDROCHLOROTHIAZIDE 25 MG/1
12.5 TABLET ORAL DAILY
Qty: 90 TABLET | Refills: 3 | OUTPATIENT
Start: 2021-05-04

## 2021-05-13 ENCOUNTER — CLINICAL SUPPORT (OUTPATIENT)
Dept: REHABILITATION | Facility: HOSPITAL | Age: 51
End: 2021-05-13
Payer: COMMERCIAL

## 2021-05-13 DIAGNOSIS — M53.86 DECREASED RANGE OF MOTION OF LUMBAR SPINE: ICD-10-CM

## 2021-05-13 DIAGNOSIS — M62.81 WEAKNESS OF TRUNK MUSCULATURE: ICD-10-CM

## 2021-05-13 PROCEDURE — 97110 THERAPEUTIC EXERCISES: CPT | Mod: PN

## 2021-05-27 ENCOUNTER — CLINICAL SUPPORT (OUTPATIENT)
Dept: REHABILITATION | Facility: HOSPITAL | Age: 51
End: 2021-05-27
Payer: COMMERCIAL

## 2021-05-27 DIAGNOSIS — M53.86 DECREASED RANGE OF MOTION OF LUMBAR SPINE: ICD-10-CM

## 2021-05-27 DIAGNOSIS — M62.81 WEAKNESS OF TRUNK MUSCULATURE: ICD-10-CM

## 2021-05-27 PROCEDURE — 97110 THERAPEUTIC EXERCISES: CPT | Mod: PN

## 2021-06-09 DIAGNOSIS — I10 ESSENTIAL HYPERTENSION: ICD-10-CM

## 2021-06-10 DIAGNOSIS — I10 ESSENTIAL HYPERTENSION: ICD-10-CM

## 2021-06-10 RX ORDER — LOSARTAN POTASSIUM 25 MG/1
25 TABLET ORAL DAILY
Qty: 90 TABLET | Refills: 3 | OUTPATIENT
Start: 2021-06-10 | End: 2022-06-10

## 2021-06-10 RX ORDER — HYDROCHLOROTHIAZIDE 25 MG/1
12.5 TABLET ORAL DAILY
Qty: 90 TABLET | Refills: 3 | OUTPATIENT
Start: 2021-06-10

## 2021-06-14 ENCOUNTER — OFFICE VISIT (OUTPATIENT)
Dept: BARIATRICS | Facility: CLINIC | Age: 51
End: 2021-06-14
Payer: OTHER GOVERNMENT

## 2021-06-14 VITALS
HEART RATE: 88 BPM | SYSTOLIC BLOOD PRESSURE: 132 MMHG | HEIGHT: 64 IN | DIASTOLIC BLOOD PRESSURE: 86 MMHG | OXYGEN SATURATION: 100 % | WEIGHT: 217.88 LBS | BODY MASS INDEX: 37.2 KG/M2

## 2021-06-14 DIAGNOSIS — E66.01 CLASS 2 SEVERE OBESITY WITH BODY MASS INDEX (BMI) OF 35 TO 39.9 WITH SERIOUS COMORBIDITY: Primary | ICD-10-CM

## 2021-06-14 PROCEDURE — 99214 OFFICE O/P EST MOD 30 MIN: CPT | Mod: PBBFAC | Performed by: INTERNAL MEDICINE

## 2021-06-14 PROCEDURE — 99214 OFFICE O/P EST MOD 30 MIN: CPT | Mod: S$GLB,,, | Performed by: INTERNAL MEDICINE

## 2021-06-14 PROCEDURE — 99999 PR PBB SHADOW E&M-EST. PATIENT-LVL IV: CPT | Mod: PBBFAC,,, | Performed by: INTERNAL MEDICINE

## 2021-06-14 PROCEDURE — 99999 PR PBB SHADOW E&M-EST. PATIENT-LVL IV: ICD-10-PCS | Mod: PBBFAC,,, | Performed by: INTERNAL MEDICINE

## 2021-06-14 PROCEDURE — 99214 PR OFFICE/OUTPT VISIT, EST, LEVL IV, 30-39 MIN: ICD-10-PCS | Mod: S$GLB,,, | Performed by: INTERNAL MEDICINE

## 2021-06-14 RX ORDER — PHENTERMINE HYDROCHLORIDE 37.5 MG/1
TABLET ORAL
Qty: 15 TABLET | Refills: 0 | Status: SHIPPED | OUTPATIENT
Start: 2021-06-14 | End: 2022-01-26

## 2021-06-14 RX ORDER — DIETHYLPROPION HYDROCHLORIDE 75 MG/1
75 TABLET, EXTENDED RELEASE ORAL DAILY
Qty: 30 TABLET | Refills: 1 | Status: SHIPPED | OUTPATIENT
Start: 2021-07-14 | End: 2022-01-26

## 2021-06-21 DIAGNOSIS — I10 ESSENTIAL HYPERTENSION: ICD-10-CM

## 2021-06-22 RX ORDER — LOSARTAN POTASSIUM 25 MG/1
25 TABLET ORAL DAILY
Qty: 90 TABLET | Refills: 3 | Status: SHIPPED | OUTPATIENT
Start: 2021-06-22 | End: 2022-07-15 | Stop reason: SDUPTHER

## 2021-06-24 ENCOUNTER — OFFICE VISIT (OUTPATIENT)
Dept: INTERNAL MEDICINE | Facility: CLINIC | Age: 51
End: 2021-06-24
Payer: OTHER GOVERNMENT

## 2021-06-24 VITALS
HEART RATE: 75 BPM | WEIGHT: 221.13 LBS | OXYGEN SATURATION: 98 % | BODY MASS INDEX: 37.75 KG/M2 | HEIGHT: 64 IN | DIASTOLIC BLOOD PRESSURE: 84 MMHG | SYSTOLIC BLOOD PRESSURE: 132 MMHG

## 2021-06-24 DIAGNOSIS — M17.11 OSTEOARTHRITIS OF RIGHT KNEE, UNSPECIFIED OSTEOARTHRITIS TYPE: ICD-10-CM

## 2021-06-24 DIAGNOSIS — I10 ESSENTIAL HYPERTENSION: Primary | ICD-10-CM

## 2021-06-24 DIAGNOSIS — G89.29 CHRONIC RIGHT SHOULDER PAIN: ICD-10-CM

## 2021-06-24 DIAGNOSIS — M25.511 CHRONIC RIGHT SHOULDER PAIN: ICD-10-CM

## 2021-06-24 DIAGNOSIS — B35.1 ONYCHOMYCOSIS: ICD-10-CM

## 2021-06-24 DIAGNOSIS — Z12.31 SCREENING MAMMOGRAM, ENCOUNTER FOR: ICD-10-CM

## 2021-06-24 PROCEDURE — 99214 PR OFFICE/OUTPT VISIT, EST, LEVL IV, 30-39 MIN: ICD-10-PCS | Mod: S$GLB,,, | Performed by: NURSE PRACTITIONER

## 2021-06-24 PROCEDURE — 99214 OFFICE O/P EST MOD 30 MIN: CPT | Mod: PBBFAC | Performed by: NURSE PRACTITIONER

## 2021-06-24 PROCEDURE — 99999 PR PBB SHADOW E&M-EST. PATIENT-LVL IV: CPT | Mod: PBBFAC,,, | Performed by: NURSE PRACTITIONER

## 2021-06-24 PROCEDURE — 99999 PR PBB SHADOW E&M-EST. PATIENT-LVL IV: ICD-10-PCS | Mod: PBBFAC,,, | Performed by: NURSE PRACTITIONER

## 2021-06-24 PROCEDURE — 99214 OFFICE O/P EST MOD 30 MIN: CPT | Mod: S$GLB,,, | Performed by: NURSE PRACTITIONER

## 2021-06-24 RX ORDER — HYDROCHLOROTHIAZIDE 25 MG/1
12.5 TABLET ORAL DAILY
Qty: 90 TABLET | Refills: 3 | Status: SHIPPED | OUTPATIENT
Start: 2021-06-24 | End: 2022-09-08 | Stop reason: SDUPTHER

## 2021-07-16 ENCOUNTER — NURSE TRIAGE (OUTPATIENT)
Dept: ADMINISTRATIVE | Facility: CLINIC | Age: 51
End: 2021-07-16

## 2021-07-16 ENCOUNTER — OFFICE VISIT (OUTPATIENT)
Dept: URGENT CARE | Facility: CLINIC | Age: 51
End: 2021-07-16
Payer: COMMERCIAL

## 2021-07-16 VITALS
TEMPERATURE: 98 F | HEIGHT: 64 IN | BODY MASS INDEX: 37.73 KG/M2 | RESPIRATION RATE: 15 BRPM | WEIGHT: 221 LBS | HEART RATE: 72 BPM | OXYGEN SATURATION: 96 % | DIASTOLIC BLOOD PRESSURE: 79 MMHG | SYSTOLIC BLOOD PRESSURE: 133 MMHG

## 2021-07-16 DIAGNOSIS — Z20.822 ENCOUNTER FOR LABORATORY TESTING FOR COVID-19 VIRUS: ICD-10-CM

## 2021-07-16 DIAGNOSIS — A04.9: Primary | ICD-10-CM

## 2021-07-16 LAB
BILIRUB UR QL STRIP: NEGATIVE
CTP QC/QA: YES
GLUCOSE UR QL STRIP: NEGATIVE
KETONES UR QL STRIP: NEGATIVE
LEUKOCYTE ESTERASE UR QL STRIP: NEGATIVE
PH, POC UA: 6.5 (ref 5–8)
POC BLOOD, URINE: NEGATIVE
POC NITRATES, URINE: NEGATIVE
PROT UR QL STRIP: NEGATIVE
SARS-COV-2 RDRP RESP QL NAA+PROBE: NEGATIVE
SP GR UR STRIP: 1.01 (ref 1–1.03)
UROBILINOGEN UR STRIP-ACNC: NORMAL (ref 0.1–1.1)

## 2021-07-16 PROCEDURE — U0002: ICD-10-PCS | Mod: QW,S$GLB,, | Performed by: STUDENT IN AN ORGANIZED HEALTH CARE EDUCATION/TRAINING PROGRAM

## 2021-07-16 PROCEDURE — 81003 URINALYSIS AUTO W/O SCOPE: CPT | Mod: QW,S$GLB,, | Performed by: STUDENT IN AN ORGANIZED HEALTH CARE EDUCATION/TRAINING PROGRAM

## 2021-07-16 PROCEDURE — 99214 OFFICE O/P EST MOD 30 MIN: CPT | Mod: 25,S$GLB,, | Performed by: STUDENT IN AN ORGANIZED HEALTH CARE EDUCATION/TRAINING PROGRAM

## 2021-07-16 PROCEDURE — 99214 PR OFFICE/OUTPT VISIT, EST, LEVL IV, 30-39 MIN: ICD-10-PCS | Mod: 25,S$GLB,, | Performed by: STUDENT IN AN ORGANIZED HEALTH CARE EDUCATION/TRAINING PROGRAM

## 2021-07-16 PROCEDURE — 81003 POCT URINALYSIS, DIPSTICK, AUTOMATED, W/O SCOPE: ICD-10-PCS | Mod: QW,S$GLB,, | Performed by: STUDENT IN AN ORGANIZED HEALTH CARE EDUCATION/TRAINING PROGRAM

## 2021-07-16 PROCEDURE — U0002 COVID-19 LAB TEST NON-CDC: HCPCS | Mod: QW,S$GLB,, | Performed by: STUDENT IN AN ORGANIZED HEALTH CARE EDUCATION/TRAINING PROGRAM

## 2021-07-16 RX ORDER — CIPROFLOXACIN 500 MG/1
500 TABLET ORAL EVERY 12 HOURS
Qty: 10 TABLET | Refills: 0 | Status: SHIPPED | OUTPATIENT
Start: 2021-07-16 | End: 2021-07-21

## 2021-07-16 RX ORDER — CIPROFLOXACIN 500 MG/1
500 TABLET ORAL EVERY 12 HOURS
Qty: 10 TABLET | Refills: 0 | Status: SHIPPED | OUTPATIENT
Start: 2021-07-16 | End: 2021-07-16 | Stop reason: SDUPTHER

## 2021-07-24 ENCOUNTER — HOSPITAL ENCOUNTER (OUTPATIENT)
Dept: RADIOLOGY | Facility: HOSPITAL | Age: 51
Discharge: HOME | End: 2021-07-24
Attending: NURSE PRACTITIONER
Payer: COMMERCIAL

## 2021-07-24 DIAGNOSIS — Z12.31 SCREENING MAMMOGRAM, ENCOUNTER FOR: ICD-10-CM

## 2021-07-24 PROCEDURE — 77067 SCR MAMMO BI INCL CAD: CPT | Mod: TC

## 2021-07-24 PROCEDURE — 77063 MAMMO DIGITAL SCREENING BILAT WITH TOMO: ICD-10-PCS | Mod: 26,,, | Performed by: RADIOLOGY

## 2021-07-24 PROCEDURE — 77067 SCR MAMMO BI INCL CAD: CPT | Mod: 26,,, | Performed by: RADIOLOGY

## 2021-07-24 PROCEDURE — 77067 MAMMO DIGITAL SCREENING BILAT WITH TOMO: ICD-10-PCS | Mod: 26,,, | Performed by: RADIOLOGY

## 2021-07-24 PROCEDURE — 77063 BREAST TOMOSYNTHESIS BI: CPT | Mod: 26,,, | Performed by: RADIOLOGY

## 2021-09-12 ENCOUNTER — OFFICE VISIT (OUTPATIENT)
Dept: URGENT CARE | Facility: CLINIC | Age: 51
End: 2021-09-12
Payer: COMMERCIAL

## 2021-09-12 VITALS
OXYGEN SATURATION: 97 % | WEIGHT: 221 LBS | TEMPERATURE: 99 F | SYSTOLIC BLOOD PRESSURE: 147 MMHG | RESPIRATION RATE: 14 BRPM | DIASTOLIC BLOOD PRESSURE: 95 MMHG | HEIGHT: 64 IN | HEART RATE: 76 BPM | BODY MASS INDEX: 37.73 KG/M2

## 2021-09-12 DIAGNOSIS — J02.9 SORE THROAT: ICD-10-CM

## 2021-09-12 DIAGNOSIS — J06.9 VIRAL URI WITH COUGH: Primary | ICD-10-CM

## 2021-09-12 LAB
CTP QC/QA: YES
SARS-COV-2 RDRP RESP QL NAA+PROBE: NEGATIVE

## 2021-09-12 PROCEDURE — 99214 OFFICE O/P EST MOD 30 MIN: CPT | Mod: S$GLB,CS,, | Performed by: NURSE PRACTITIONER

## 2021-09-12 PROCEDURE — 3008F BODY MASS INDEX DOCD: CPT | Mod: CPTII,S$GLB,, | Performed by: NURSE PRACTITIONER

## 2021-09-12 PROCEDURE — 3080F DIAST BP >= 90 MM HG: CPT | Mod: CPTII,S$GLB,, | Performed by: NURSE PRACTITIONER

## 2021-09-12 PROCEDURE — 1160F PR REVIEW ALL MEDS BY PRESCRIBER/CLIN PHARMACIST DOCUMENTED: ICD-10-PCS | Mod: CPTII,S$GLB,, | Performed by: NURSE PRACTITIONER

## 2021-09-12 PROCEDURE — 1159F MED LIST DOCD IN RCRD: CPT | Mod: CPTII,S$GLB,, | Performed by: NURSE PRACTITIONER

## 2021-09-12 PROCEDURE — U0002: ICD-10-PCS | Mod: QW,S$GLB,, | Performed by: NURSE PRACTITIONER

## 2021-09-12 PROCEDURE — 3077F SYST BP >= 140 MM HG: CPT | Mod: CPTII,S$GLB,, | Performed by: NURSE PRACTITIONER

## 2021-09-12 PROCEDURE — 4010F PR ACE/ARB THEARPY RXD/TAKEN: ICD-10-PCS | Mod: CPTII,S$GLB,, | Performed by: NURSE PRACTITIONER

## 2021-09-12 PROCEDURE — 3080F PR MOST RECENT DIASTOLIC BLOOD PRESSURE >= 90 MM HG: ICD-10-PCS | Mod: CPTII,S$GLB,, | Performed by: NURSE PRACTITIONER

## 2021-09-12 PROCEDURE — 4010F ACE/ARB THERAPY RXD/TAKEN: CPT | Mod: CPTII,S$GLB,, | Performed by: NURSE PRACTITIONER

## 2021-09-12 PROCEDURE — U0002 COVID-19 LAB TEST NON-CDC: HCPCS | Mod: QW,S$GLB,, | Performed by: NURSE PRACTITIONER

## 2021-09-12 PROCEDURE — 1159F PR MEDICATION LIST DOCUMENTED IN MEDICAL RECORD: ICD-10-PCS | Mod: CPTII,S$GLB,, | Performed by: NURSE PRACTITIONER

## 2021-09-12 PROCEDURE — 3008F PR BODY MASS INDEX (BMI) DOCUMENTED: ICD-10-PCS | Mod: CPTII,S$GLB,, | Performed by: NURSE PRACTITIONER

## 2021-09-12 PROCEDURE — 99214 PR OFFICE/OUTPT VISIT, EST, LEVL IV, 30-39 MIN: ICD-10-PCS | Mod: S$GLB,CS,, | Performed by: NURSE PRACTITIONER

## 2021-09-12 PROCEDURE — 1160F RVW MEDS BY RX/DR IN RCRD: CPT | Mod: CPTII,S$GLB,, | Performed by: NURSE PRACTITIONER

## 2021-09-12 PROCEDURE — 3077F PR MOST RECENT SYSTOLIC BLOOD PRESSURE >= 140 MM HG: ICD-10-PCS | Mod: CPTII,S$GLB,, | Performed by: NURSE PRACTITIONER

## 2021-09-12 RX ORDER — AZELASTINE 1 MG/ML
1 SPRAY, METERED NASAL 2 TIMES DAILY
Qty: 30 ML | Refills: 0 | OUTPATIENT
Start: 2021-09-12 | End: 2021-10-10

## 2021-09-12 RX ORDER — FLUTICASONE PROPIONATE 50 MCG
2 SPRAY, SUSPENSION (ML) NASAL DAILY
Qty: 9.9 ML | Refills: 0 | Status: SHIPPED | OUTPATIENT
Start: 2021-09-12 | End: 2021-09-12 | Stop reason: SDUPTHER

## 2021-10-10 ENCOUNTER — HOSPITAL ENCOUNTER (EMERGENCY)
Facility: HOSPITAL | Age: 51
Discharge: HOME | End: 2021-10-10
Attending: EMERGENCY MEDICINE
Payer: COMMERCIAL

## 2021-10-10 VITALS
TEMPERATURE: 98 F | BODY MASS INDEX: 36.88 KG/M2 | HEIGHT: 64 IN | WEIGHT: 216 LBS | DIASTOLIC BLOOD PRESSURE: 79 MMHG | SYSTOLIC BLOOD PRESSURE: 129 MMHG | OXYGEN SATURATION: 100 % | RESPIRATION RATE: 17 BRPM | HEART RATE: 89 BPM

## 2021-10-10 DIAGNOSIS — M54.31 SCIATICA OF RIGHT SIDE: Primary | ICD-10-CM

## 2021-10-10 LAB
BILIRUBIN, POC UA: NEGATIVE
BLOOD, POC UA: NEGATIVE
CLARITY, POC UA: CLEAR
COLOR, POC UA: YELLOW
GLUCOSE, POC UA: NEGATIVE
KETONES, POC UA: NEGATIVE
LEUKOCYTE EST, POC UA: NEGATIVE
NITRITE, POC UA: NEGATIVE
PH UR STRIP: 6.5 [PH]
PROTEIN, POC UA: NEGATIVE
SPECIFIC GRAVITY, POC UA: >=1.03
UROBILINOGEN, POC UA: 0.2 E.U./DL

## 2021-10-10 PROCEDURE — 81003 URINALYSIS AUTO W/O SCOPE: CPT | Mod: ER

## 2021-10-10 PROCEDURE — 63600175 PHARM REV CODE 636 W HCPCS: Mod: ER | Performed by: NURSE PRACTITIONER

## 2021-10-10 PROCEDURE — 99284 EMERGENCY DEPT VISIT MOD MDM: CPT | Mod: 25,ER

## 2021-10-10 PROCEDURE — 96372 THER/PROPH/DIAG INJ SC/IM: CPT | Mod: ER

## 2021-10-10 RX ORDER — DICLOFENAC SODIUM 10 MG/G
2 GEL TOPICAL 4 TIMES DAILY
Qty: 100 G | Refills: 0 | Status: SHIPPED | OUTPATIENT
Start: 2021-10-10 | End: 2022-10-07

## 2021-10-10 RX ORDER — CYCLOBENZAPRINE HCL 10 MG
10 TABLET ORAL 3 TIMES DAILY PRN
Qty: 15 TABLET | Refills: 0 | Status: SHIPPED | OUTPATIENT
Start: 2021-10-10 | End: 2021-10-15

## 2021-10-10 RX ORDER — DEXAMETHASONE SODIUM PHOSPHATE 4 MG/ML
8 INJECTION, SOLUTION INTRA-ARTICULAR; INTRALESIONAL; INTRAMUSCULAR; INTRAVENOUS; SOFT TISSUE
Status: COMPLETED | OUTPATIENT
Start: 2021-10-10 | End: 2021-10-10

## 2021-10-10 RX ORDER — DEXTROMETHORPHAN HYDROBROMIDE, GUAIFENESIN 5; 100 MG/5ML; MG/5ML
650 LIQUID ORAL EVERY 8 HOURS
Qty: 20 TABLET | Refills: 0 | Status: SHIPPED | OUTPATIENT
Start: 2021-10-10 | End: 2024-01-23

## 2021-10-10 RX ADMIN — DEXAMETHASONE SODIUM PHOSPHATE 8 MG: 4 INJECTION INTRA-ARTICULAR; INTRALESIONAL; INTRAMUSCULAR; INTRAVENOUS; SOFT TISSUE at 11:10

## 2021-10-22 ENCOUNTER — HOSPITAL ENCOUNTER (EMERGENCY)
Facility: HOSPITAL | Age: 51
Discharge: HOME | End: 2021-10-22
Attending: INTERNAL MEDICINE
Payer: COMMERCIAL

## 2021-10-22 VITALS
WEIGHT: 215 LBS | HEART RATE: 76 BPM | TEMPERATURE: 98 F | BODY MASS INDEX: 36.7 KG/M2 | DIASTOLIC BLOOD PRESSURE: 99 MMHG | OXYGEN SATURATION: 98 % | HEIGHT: 64 IN | RESPIRATION RATE: 18 BRPM | SYSTOLIC BLOOD PRESSURE: 148 MMHG

## 2021-10-22 DIAGNOSIS — G43.909 MIGRAINE WITHOUT STATUS MIGRAINOSUS, NOT INTRACTABLE, UNSPECIFIED MIGRAINE TYPE: Primary | ICD-10-CM

## 2021-10-22 PROCEDURE — 96372 THER/PROPH/DIAG INJ SC/IM: CPT | Mod: ER

## 2021-10-22 PROCEDURE — 63600175 PHARM REV CODE 636 W HCPCS: Mod: ER | Performed by: INTERNAL MEDICINE

## 2021-10-22 PROCEDURE — 25000003 PHARM REV CODE 250: Mod: ER | Performed by: INTERNAL MEDICINE

## 2021-10-22 PROCEDURE — 99284 EMERGENCY DEPT VISIT MOD MDM: CPT | Mod: 25,ER

## 2021-10-22 RX ORDER — BUTALBITAL, ACETAMINOPHEN AND CAFFEINE 50; 325; 40 MG/1; MG/1; MG/1
2 TABLET ORAL EVERY 8 HOURS PRN
Qty: 30 TABLET | Refills: 1 | Status: SHIPPED | OUTPATIENT
Start: 2021-10-22 | End: 2021-11-21

## 2021-10-22 RX ORDER — BUTALBITAL, ACETAMINOPHEN AND CAFFEINE 50; 325; 40 MG/1; MG/1; MG/1
2 TABLET ORAL
Status: COMPLETED | OUTPATIENT
Start: 2021-10-22 | End: 2021-10-22

## 2021-10-22 RX ORDER — PROMETHAZINE HYDROCHLORIDE 25 MG/ML
25 INJECTION, SOLUTION INTRAMUSCULAR; INTRAVENOUS
Status: COMPLETED | OUTPATIENT
Start: 2021-10-22 | End: 2021-10-22

## 2021-10-22 RX ADMIN — BUTALBITAL, ACETAMINOPHEN AND CAFFEINE 2 TABLET: 50; 325; 40 TABLET ORAL at 09:10

## 2021-10-22 RX ADMIN — PROMETHAZINE HYDROCHLORIDE 25 MG: 25 INJECTION INTRAMUSCULAR; INTRAVENOUS at 09:10

## 2021-10-28 ENCOUNTER — OFFICE VISIT (OUTPATIENT)
Dept: INTERNAL MEDICINE | Facility: CLINIC | Age: 51
End: 2021-10-28
Payer: COMMERCIAL

## 2021-10-28 VITALS
WEIGHT: 220.88 LBS | BODY MASS INDEX: 37.71 KG/M2 | HEIGHT: 64 IN | OXYGEN SATURATION: 98 % | HEART RATE: 91 BPM | SYSTOLIC BLOOD PRESSURE: 126 MMHG | DIASTOLIC BLOOD PRESSURE: 86 MMHG

## 2021-10-28 DIAGNOSIS — M53.86 DECREASED RANGE OF MOTION OF LUMBAR SPINE: ICD-10-CM

## 2021-10-28 DIAGNOSIS — G43.909 MIGRAINE WITHOUT STATUS MIGRAINOSUS, NOT INTRACTABLE, UNSPECIFIED MIGRAINE TYPE: ICD-10-CM

## 2021-10-28 DIAGNOSIS — G89.29 CHRONIC RIGHT SHOULDER PAIN: ICD-10-CM

## 2021-10-28 DIAGNOSIS — F32.A DEPRESSION, UNSPECIFIED DEPRESSION TYPE: ICD-10-CM

## 2021-10-28 DIAGNOSIS — M54.50 CHRONIC RIGHT-SIDED LOW BACK PAIN WITHOUT SCIATICA: ICD-10-CM

## 2021-10-28 DIAGNOSIS — F41.9 ANXIETY: Primary | ICD-10-CM

## 2021-10-28 DIAGNOSIS — G89.29 CHRONIC RIGHT-SIDED LOW BACK PAIN WITHOUT SCIATICA: ICD-10-CM

## 2021-10-28 DIAGNOSIS — M25.511 CHRONIC RIGHT SHOULDER PAIN: ICD-10-CM

## 2021-10-28 PROCEDURE — 99999 PR PBB SHADOW E&M-EST. PATIENT-LVL IV: CPT | Mod: PBBFAC,,, | Performed by: NURSE PRACTITIONER

## 2021-10-28 PROCEDURE — 99999 PR PBB SHADOW E&M-EST. PATIENT-LVL IV: ICD-10-PCS | Mod: PBBFAC,,, | Performed by: NURSE PRACTITIONER

## 2021-10-28 PROCEDURE — 99214 PR OFFICE/OUTPT VISIT, EST, LEVL IV, 30-39 MIN: ICD-10-PCS | Mod: S$GLB,,, | Performed by: NURSE PRACTITIONER

## 2021-10-28 PROCEDURE — 99214 OFFICE O/P EST MOD 30 MIN: CPT | Mod: S$GLB,,, | Performed by: NURSE PRACTITIONER

## 2021-10-28 PROCEDURE — 99214 OFFICE O/P EST MOD 30 MIN: CPT | Mod: PBBFAC | Performed by: NURSE PRACTITIONER

## 2021-10-28 RX ORDER — DULOXETIN HYDROCHLORIDE 30 MG/1
30 CAPSULE, DELAYED RELEASE ORAL DAILY
Qty: 30 CAPSULE | Refills: 11 | Status: SHIPPED | OUTPATIENT
Start: 2021-10-28 | End: 2022-10-07

## 2021-10-28 RX ORDER — DULOXETIN HYDROCHLORIDE 30 MG/1
30 CAPSULE, DELAYED RELEASE ORAL DAILY
Qty: 30 CAPSULE | Refills: 11 | Status: SHIPPED | OUTPATIENT
Start: 2021-10-28 | End: 2021-10-28 | Stop reason: SDUPTHER

## 2021-10-28 RX ORDER — CYCLOBENZAPRINE HCL 10 MG
TABLET ORAL
COMMUNITY
Start: 2021-10-10 | End: 2022-01-26

## 2021-11-10 ENCOUNTER — OFFICE VISIT (OUTPATIENT)
Dept: ORTHOPEDICS | Facility: CLINIC | Age: 51
End: 2021-11-10
Payer: COMMERCIAL

## 2021-11-10 VITALS — WEIGHT: 220.88 LBS | BODY MASS INDEX: 37.71 KG/M2 | HEIGHT: 64 IN

## 2021-11-10 DIAGNOSIS — M54.16 LUMBAR RADICULOPATHY: Primary | ICD-10-CM

## 2021-11-10 PROCEDURE — 99999 PR PBB SHADOW E&M-EST. PATIENT-LVL III: CPT | Mod: PBBFAC,,, | Performed by: ORTHOPAEDIC SURGERY

## 2021-11-10 PROCEDURE — 99999 PR PBB SHADOW E&M-EST. PATIENT-LVL III: ICD-10-PCS | Mod: PBBFAC,,, | Performed by: ORTHOPAEDIC SURGERY

## 2021-11-10 PROCEDURE — 99213 OFFICE O/P EST LOW 20 MIN: CPT | Mod: PBBFAC | Performed by: ORTHOPAEDIC SURGERY

## 2021-11-10 PROCEDURE — 99213 PR OFFICE/OUTPT VISIT, EST, LEVL III, 20-29 MIN: ICD-10-PCS | Mod: S$GLB,,, | Performed by: ORTHOPAEDIC SURGERY

## 2021-11-10 PROCEDURE — 99213 OFFICE O/P EST LOW 20 MIN: CPT | Mod: S$GLB,,, | Performed by: ORTHOPAEDIC SURGERY

## 2021-11-10 RX ORDER — METHYLPREDNISOLONE 4 MG/1
TABLET ORAL
Qty: 21 EACH | Refills: 0 | Status: SHIPPED | OUTPATIENT
Start: 2021-11-10 | End: 2021-12-01

## 2021-11-18 ENCOUNTER — HOSPITAL ENCOUNTER (OUTPATIENT)
Dept: RADIOLOGY | Facility: OTHER | Age: 51
Discharge: HOME | End: 2021-11-18
Attending: ORTHOPAEDIC SURGERY
Payer: COMMERCIAL

## 2021-11-18 DIAGNOSIS — M54.16 LUMBAR RADICULOPATHY: ICD-10-CM

## 2021-11-18 PROCEDURE — 72148 MRI LUMBAR SPINE WITHOUT CONTRAST: ICD-10-PCS | Mod: 26,,, | Performed by: RADIOLOGY

## 2021-11-18 PROCEDURE — 72148 MRI LUMBAR SPINE W/O DYE: CPT | Mod: TC

## 2021-11-18 PROCEDURE — 72148 MRI LUMBAR SPINE W/O DYE: CPT | Mod: 26,,, | Performed by: RADIOLOGY

## 2021-11-19 ENCOUNTER — TELEPHONE (OUTPATIENT)
Dept: ORTHOPEDICS | Facility: CLINIC | Age: 51
End: 2021-11-19
Payer: COMMERCIAL

## 2021-12-27 ENCOUNTER — LAB VISIT (OUTPATIENT)
Dept: PRIMARY CARE CLINIC | Facility: OTHER | Age: 51
End: 2021-12-27
Attending: INTERNAL MEDICINE
Payer: COMMERCIAL

## 2021-12-27 DIAGNOSIS — Z20.822 ENCOUNTER FOR LABORATORY TESTING FOR COVID-19 VIRUS: ICD-10-CM

## 2021-12-27 PROCEDURE — U0003 INFECTIOUS AGENT DETECTION BY NUCLEIC ACID (DNA OR RNA); SEVERE ACUTE RESPIRATORY SYNDROME CORONAVIRUS 2 (SARS-COV-2) (CORONAVIRUS DISEASE [COVID-19]), AMPLIFIED PROBE TECHNIQUE, MAKING USE OF HIGH THROUGHPUT TECHNOLOGIES AS DESCRIBED BY CMS-2020-01-R: HCPCS | Performed by: INTERNAL MEDICINE

## 2021-12-28 LAB
SARS-COV-2 RNA RESP QL NAA+PROBE: NOT DETECTED
SARS-COV-2- CYCLE NUMBER: NORMAL

## 2021-12-30 ENCOUNTER — IMMUNIZATION (OUTPATIENT)
Dept: PRIMARY CARE CLINIC | Facility: CLINIC | Age: 51
End: 2021-12-30
Payer: OTHER GOVERNMENT

## 2021-12-30 DIAGNOSIS — Z23 NEED FOR VACCINATION: Primary | ICD-10-CM

## 2021-12-30 PROCEDURE — 0004A COVID-19, MRNA, LNP-S, PF, 30 MCG/0.3 ML DOSE VACCINE: CPT | Mod: CV19,PBBFAC | Performed by: INTERNAL MEDICINE

## 2022-01-13 ENCOUNTER — PATIENT MESSAGE (OUTPATIENT)
Dept: ORTHOPEDICS | Facility: CLINIC | Age: 52
End: 2022-01-13

## 2022-01-25 ENCOUNTER — PATIENT OUTREACH (OUTPATIENT)
Dept: ADMINISTRATIVE | Facility: OTHER | Age: 52
End: 2022-01-25
Payer: COMMERCIAL

## 2022-01-25 ENCOUNTER — TELEPHONE (OUTPATIENT)
Dept: PAIN MEDICINE | Facility: CLINIC | Age: 52
End: 2022-01-25
Payer: COMMERCIAL

## 2022-01-25 NOTE — PROGRESS NOTES
Care Everywhere: updated  Immunization: updated  Health Maintenance: updated  Media Review:   Legacy Review:   DIS:  Order placed:   Upcoming appts:  EFAX:  Task Tickets:  Referrals:

## 2022-01-26 ENCOUNTER — OFFICE VISIT (OUTPATIENT)
Dept: PAIN MEDICINE | Facility: CLINIC | Age: 52
End: 2022-01-26
Payer: COMMERCIAL

## 2022-01-26 VITALS
WEIGHT: 224.63 LBS | HEART RATE: 78 BPM | HEIGHT: 64 IN | OXYGEN SATURATION: 100 % | DIASTOLIC BLOOD PRESSURE: 100 MMHG | SYSTOLIC BLOOD PRESSURE: 142 MMHG | BODY MASS INDEX: 38.35 KG/M2

## 2022-01-26 DIAGNOSIS — M79.7 FIBROMYALGIA: ICD-10-CM

## 2022-01-26 DIAGNOSIS — M79.651 PAIN OF RIGHT THIGH: Primary | ICD-10-CM

## 2022-01-26 PROCEDURE — 99214 OFFICE O/P EST MOD 30 MIN: CPT | Mod: PBBFAC | Performed by: ANESTHESIOLOGY

## 2022-01-26 PROCEDURE — 99204 PR OFFICE/OUTPT VISIT, NEW, LEVL IV, 45-59 MIN: ICD-10-PCS | Mod: S$GLB,,, | Performed by: ANESTHESIOLOGY

## 2022-01-26 PROCEDURE — 99999 PR PBB SHADOW E&M-EST. PATIENT-LVL IV: ICD-10-PCS | Mod: PBBFAC,,, | Performed by: ANESTHESIOLOGY

## 2022-01-26 PROCEDURE — 99999 PR PBB SHADOW E&M-EST. PATIENT-LVL IV: CPT | Mod: PBBFAC,,, | Performed by: ANESTHESIOLOGY

## 2022-01-26 PROCEDURE — 99204 OFFICE O/P NEW MOD 45 MIN: CPT | Mod: S$GLB,,, | Performed by: ANESTHESIOLOGY

## 2022-01-26 RX ORDER — CYCLOBENZAPRINE HCL 5 MG
5 TABLET ORAL 3 TIMES DAILY PRN
Qty: 30 TABLET | Refills: 1 | Status: SHIPPED | OUTPATIENT
Start: 2022-01-26 | End: 2022-10-07

## 2022-01-26 NOTE — PROGRESS NOTES
"PCP: Magdalena Davila NP    REFERRING PHYSICIAN: Isatu Cartagena,*    CHIEF COMPLAINT: Right thigh pain    Original HISTORY OF PRESENT ILLNESS: Renea Mac presents to the clinic for the evaluation of the above pain. The pain started around 2003 during second pregnancy.     She had an ED visit 10/10/2021 for low back pain radiating into her right leg, treated with IM decadron and discharged with tylenol, flexeril 10mg #15 and voltaren gel. She was referred by ortho COBY Cartagena who provided a medrol dosepack.    Original Pain Description:  The pain is located in the midline low back and radiates to the right low back, lateral hip, and lateral thigh. Does not cross the knee. The pain is described as intermittent and sharp. Flares experience about once per month. Exacerbating factors: Can't identify consistent triggers, can be from bending, twisting, standing from sitting. Mitigating factors laying down. Symptoms interfere with sleep, work, daily activities. 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 Patient denies saddle anesthesia, bowel and bladder incontinence, acute limb weakness, ataxia, and falls.    Original PAIN SCORES:  Best: Pain is 0  Worst: Pain is 10  Usually: Pain is 10  Current: Pain is 1    INTERVAL HISTORY:     6 weeks of Conservative therapy (PT/Chiro/Home Exercises with Dates)  Physical Therapy: Multiple rounds, most recently 2019, estimates around March-May.  No HEP.    Treatments / Medications: (Ice/Heat/NSAIDS/APAP/etc):  Flexeril 10mg: Sigificant Relief while in a flare, has ran out   IM Dex injection: Significant Relief   Medrol Dose pack: Significant Relief   Tylenol: Nightly for other MSK pain including shoulder pain   No NSAIDs due to gastric sleeve   Gabapentin: Cognitive sx including hallucinations  Cymbalta: "Felt like I was losing it" and "wasn't myself" at work. At the time, missed a fraudulent check and was placed on probation. Initially " prescribed for anxiety.   Robaxin - no help     Report:      Interventional Pain Procedures: (Previous injections)  - None     Past Medical History:   Diagnosis Date    Acute pain of right knee 2016    Chronic right shoulder pain 2016    COVID-19 virus infection 4/10/2020    Essential hypertension 2016    GERD (gastroesophageal reflux disease)     Incomplete tear of right rotator cuff 2016    Osteoarthritis of right knee 2016    Rotator cuff tear     S/P Exploratory Laparotomy/Mass Resection/BSO 2019 1. Soft tissue, abdominal wall, mass, resection: - Benign leiomyoma, 6.5 cm in greatest dimension, see comment. 2. Ovary and fallopian tube, left, left salpingo-oophorectomy: - Benign ovary with cystic corpus luteum, 2.7 cm in greatest dimension. - Benign fallopian tube with no significant histologic abnormality. 3. Fallopian tube and ovary, right, right salpingo-oophorectomy: - Benign o    S/P laparoscopic sleeve gastrectomy 2016    Syncope and collapse 2017     Past Surgical History:   Procedure Laterality Date    BILATERAL SALPINGO-OOPHORECTOMY (BSO) Bilateral 2019    Procedure: SALPINGO-OOPHORECTOMY, BILATERAL;  Surgeon: Petrona Porter MD;  Location: [facility] OR [address] [place] [address];  Service: OB/GYN;  Laterality: Bilateral;     SECTION      x1    COLONOSCOPY N/A 2019    Procedure: COLONOSCOPY;  Surgeon: Chris Bennett MD;  Location: [facility] (4TH FLR);  Service: Endoscopy;  Laterality: N/A;  Justo Camilo, or Donald if no availability in Dec. will schedule after CT scan results    COLPOSCOPY      EXCISION OF PELVIC MASS N/A 2019    Procedure: EXCISION, MASS, PELVIS;  Surgeon: Petrona Porter MD;  Location: [facility] OR [address] [place] [address];  Service: OB/GYN;  Laterality: N/A;    HERNIA REPAIR      HYSTERECTOMY      YANG, ovaries remain (fibroids0    LAPAROTOMY, EXPLORATORY N/A 2019    Procedure: LAPAROTOMY, EXPLORATORY;  Surgeon:  Petrona Porter MD;  Location: [address];  Service: OB/GYN;  Laterality: N/A;    OOPHORECTOMY Bilateral 01/31/2019    SLEEVE GASTROPLASTY  11/2014    TUBAL LIGATION       Social History     Socioeconomic History    Marital status:    Tobacco Use    Smoking status: Never Smoker    Smokeless tobacco: Never Used   Substance and Sexual Activity    Alcohol use: Yes     Comment: occasionally    Drug use: No    Sexual activity: Yes     Partners: Male     Family History   Problem Relation Age of Onset    Hypertension Mother     Heart disease Mother     Heart attack Mother     Rectal cancer Mother     Colon cancer Mother     Hypertension Father     Heart disease Father     Stroke Father     Heart attack Father     Hypertension Sister     Hypertension Brother     Cancer Brother     No Known Problems Daughter     No Known Problems Son     Esophageal cancer Neg Hx        Review of patient's allergies indicates:  No Known Allergies    Current Outpatient Medications   Medication Sig    acetaminophen (TYLENOL) 650 MG TbSR Take 1 tablet (650 mg total) by mouth every 8 (eight) hours.    calcium carbonate/vitamin D3 (VITAMIN D-3 ORAL) Take by mouth.    cholecalciferol, vitamin D3, (VITAMIN D3) 1,000 unit capsule Take 1,000 Units by mouth once daily.    cyclobenzaprine (FLEXERIL) 10 MG tablet     diclofenac sodium (VOLTAREN) 1 % Gel Apply 2 g topically 4 (four) times daily.    hydroCHLOROthiazide (HYDRODIURIL) 25 MG tablet Take 0.5 tablets (12.5 mg total) by mouth once daily.    losartan (COZAAR) 25 MG tablet Take 1 tablet (25 mg total) by mouth once daily.    pyridoxine HCl, vitamin B6, (PYRIDOXINE, VITAMIN B6, ORAL) Take by mouth.    thiamine HCl (VITAMIN B-1 ORAL) Take by mouth.    diethylpropion (TENUATE) 75 mg SR tablet Take 1 tablet (75 mg total) by mouth once daily. (Patient not taking: Reported on 10/28/2021)    DULoxetine (CYMBALTA) 30 MG capsule Take 1 capsule (30 mg total) by  "mouth once daily. (Patient not taking: Reported on 1/26/2022)    phentermine (ADIPEX-P) 37.5 mg tablet Take 1/2 tablet by mouth daily (Patient not taking: Reported on 10/28/2021)    terbinafine HCL (LAMISIL) 250 mg tablet Take 1 tablet every day by oral route as directed for 30 days. (Patient not taking: Reported on 10/28/2021)     No current facility-administered medications for this visit.       ROS:  GENERAL: No fever. No chills. No fatigue. Denies weight loss. +weight gain   HEENT: +Headaches  Denies vision change. Denies eye pain. Denies double vision. Denies ear pain.   CV: Denies chest pain.   PULM: Denies of shortness of breath.  GI: +Constipation Denies constipation. No diarrhea. No abdominal pain. Denies nausea. Denies vomiting. No blood in stool.  HEME: Denies bleeding problems.  : Denies urgency. No painful urination. No blood in urine.  MS: Joint stiffness. Denies joint swelling. Back Pain.  SKIN: Denies rash.   NEURO: Denies seizures. No weakness.  PSYCH:  Denies difficulty sleeping. Depression and Depression. No suicidal thoughts.       VITALS:   Vitals:    01/26/22 0813   BP: (!) 142/100   Pulse: 78   SpO2: 100%   Weight: 101.9 kg (224 lb 10.4 oz)   Height: 5' 4" (1.626 m)   PainSc:   1   PainLoc: Back         PHYSICAL EXAM:   GENERAL: Well appearing, in no acute distress, alert and oriented x3.  PSYCH:  Mood and affect appropriate.  SKIN: Skin color, texture, turgor normal, no rashes or lesions.  HEENT:  Normocephalic, atraumatic. Cranial nerves grossly intact.  PULM: No evidence of respiratory difficulty, symmetric chest rise.  GI:  Non-distended  BACK: Normal range of motion. Pain on end range of flexion, no pain with extension. No pain to palpation over the spinous processes. Tender over facet joints and lumbar paraspinals. Tender over BL SIJ, piriformis, and GTB, R>L. Straight leg raising in the supine position is negative to radicular pain. BRISEYDA negative for SI pain, did experience midline " low back pain. FADIR negative.   EXTREMITIES: No deformities, edema, or skin discoloration.   MUSCULOSKELETAL: Tender over BL humerus, shoulders, traps, and calves. Bilateral upper and lower extremity strength is normal and symmetric. No atrophy is noted.  NEURO: Sensation is equal and appropriate bilaterally. Bilateral upper and lower extremity coordination and muscle stretch reflexes are physiologic and symmetric. Plantar response are downgoing.   GAIT: normal.      LABS:  Reviewed      IMAGING:    MRI L Spine 11/18/21  FINDINGS:  Alignment: Normal.     Vertebrae: Normal marrow signal. No fracture.     Discs: Normal height and signal.     Cord: Normal.  Conus terminates at L1.     Degenerative findings:     T12-L1: Unremarkable     L1-L2: Unremarkable     L2-L3: Unremarkable     L3-L4: Unremarkable     L4-L5: Mild facet hypertrophy bilaterally.  No nerve root compression.     L5-S1: Minimal facet hypertrophy.  No nerve root compression.     Paraspinal muscles & soft tissues: Unremarkable.  Bilateral renal cysts.     Impression:   No canal stenosis or significant lumbar nerve root compression.    ASSESSMENT: 51 y.o. year old female with right leg pain, consistent with myalgia.      DISCUSSION: Ms. Mac has PMH of IBS, headaches, and an exam indicative of fibromyalgia. She comes to us with right lateral thigh pain since childbirth. It is intermittent in nature and responds to conservative therapy. Normal MRI.     PLAN:  1. Referred to PT  2. Continue Flexeril PRN, recommended trial of 5mg AM for less sedation and continuing 10mg PM while in a pain flare  3. Follow up in 2 months      I would like to thank Isatu Cartagena,* for the opportunity to assist in the care of this patient. We had a very nice visit and I look forward to continuing their care. Please let me know if I can be of further assistance.     Sumi Sweet MD  [facility] PGY2  01/26/2022

## 2022-02-17 ENCOUNTER — CLINICAL SUPPORT (OUTPATIENT)
Dept: REHABILITATION | Facility: HOSPITAL | Age: 52
End: 2022-02-17
Attending: ANESTHESIOLOGY
Payer: COMMERCIAL

## 2022-02-17 DIAGNOSIS — M79.651 PAIN OF RIGHT THIGH: ICD-10-CM

## 2022-02-17 PROCEDURE — 97161 PT EVAL LOW COMPLEX 20 MIN: CPT

## 2022-02-17 PROCEDURE — 97110 THERAPEUTIC EXERCISES: CPT

## 2022-02-18 PROBLEM — M79.651 PAIN OF RIGHT THIGH: Status: ACTIVE | Noted: 2022-02-18

## 2022-02-18 NOTE — PLAN OF CARE
[facility]  Physical Therapy Initial Evaluation    Name: Renea Mac  Clinic Number: [id number]    Therapy Diagnosis:   Encounter Diagnosis   Name Primary?    Pain of right thigh      Physician: Sumi Sweet MD    Physician Orders: PT Eval and Treat  Medical Diagnosis from Referral: M79.651 (ICD-10-CM) - Pain of right thigh   Evaluation Date: 2/17/2022  Authorization Period Expiration: 12/31/2022  Plan of Care Expiration: 4/17/2022  Visit # / Visits authorized: 1/ 1  FOTO: 1/10      Time In: 9:00  Time Out: 9:45  Total Billable Time: 45 minutes     Precautions: Standard, Standard    Subjective   Date of onset: Chronic  History of current condition - [facility] reports: Renea Mac presents to the clinic for the evaluation of the above pain. The pain started around 2003 during second pregnancy.      She had an ED visit 10/10/2021 for low back pain radiating into her right leg, treated with IM decadron and discharged with tylenol, flexeril 10mg #15 and voltaren gel. She was referred by ortho COBY Cartagena who provided a medrol dosepack.     Original Pain Description:  The pain is located in the midline low back and radiates to the right low back, lateral hip, and lateral thigh. Does not cross the knee. The pain is described as intermittent and sharp. Flares experience about once per month. Exacerbating factors: Can't identify consistent triggers, can be from bending, twisting, standing from sitting. Mitigating factors laying down. Symptoms interfere with sleep, work, daily activities. 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 Patient denies saddle anesthesia, bowel and bladder incontinence, acute limb weakness, ataxia, and falls.       Medical History:   Past Medical History:   Diagnosis Date    Acute pain of right knee 11/4/2016    Chronic right shoulder pain 11/4/2016    COVID-19 virus infection 4/10/2020    Essential hypertension 11/4/2016     GERD (gastroesophageal reflux disease)     Incomplete tear of right rotator cuff 2016    Osteoarthritis of right knee 2016    Rotator cuff tear     S/P Exploratory Laparotomy/Mass Resection/BSO 2019 1. Soft tissue, abdominal wall, mass, resection: - Benign leiomyoma, 6.5 cm in greatest dimension, see comment. 2. Ovary and fallopian tube, left, left salpingo-oophorectomy: - Benign ovary with cystic corpus luteum, 2.7 cm in greatest dimension. - Benign fallopian tube with no significant histologic abnormality. 3. Fallopian tube and ovary, right, right salpingo-oophorectomy: - Benign o    S/P laparoscopic sleeve gastrectomy 2016    Syncope and collapse 2017       Surgical History:   Renea Mac  has a past surgical history that includes Tubal ligation; Hernia repair; Sleeve Gastroplasty (2014); Hysterectomy ();  section; Colposcopy; Colonoscopy (N/A, 2019); LAPAROTOMY, EXPLORATORY (N/A, 2019); Bilateral salpingo-oophorectomy (BSO) (Bilateral, 2019); Excision of pelvic mass (N/A, 2019); and Oophorectomy (Bilateral, 2019).    Medications:   Renea has a current medication list which includes the following prescription(s): acetaminophen, calcium carbonate/vitamin d3, cholecalciferol (vitamin d3), cyclobenzaprine, diclofenac sodium, duloxetine, hydrochlorothiazide, losartan, pyridoxine hcl (vitamin b6), thiamine hcl, [DISCONTINUED] azelastine, [DISCONTINUED] ketoconazole, and [DISCONTINUED] nystatin.    Allergies:   Review of patient's allergies indicates:  No Known Allergies     Imaging, MRI studies    Prior Therapy: Yes  Social History:  lives with their family  Occupation: Banker  Prior Level of Function: INDP  Current Level of Function: INDP    Pain:   Current 2/10, worst 6/10, best 2/10   Location: right hip and back   Description: Aching, Dull and Throbbing  Aggravating Factors: Walking and Night Time  Easing Factors:  medication and rest    Pts goals: To relieve my hip pain    Objective     Range of Motion/Strength:   Hip Right Right Right Left Left Left Pain/Dysfunction with Movement    AROM PROM MMT AROM PROM MMT    Flexion WFL WNL 4/5 WFL WNL 4/5    Extension WFL WNL  WFL WNL  Pt performed fair bridge against gravity   Abduction WNL WNL 4/5 WNL WNL 4/5    External rotation WNL WNL 4/5 WNL WNL 4/5       Knee Right Right Right Left Left Left Pain/Dysfunction with Movement    AROM PROM MMT AROM PROM MMT    Flexion WNL WNL 4+/5 WNL WNL 4+/5    Extension WNL WNL 4+/5 WNL WNL 4+/5      Ankle Right Right Left Left Pain/Dysfunction with Movement    AROM MMT AROM MMT    Plantarflexion WNL 5/5 WNL 5/5    Dorsiflexion WNL 5/5 WNL 5/5        Flexibility: Decreased Hamstring Flexibility      Gait Analysis:Without AD             CMS Impairment/Limitation/Restriction for FOTO  Survey    Therapist reviewed FOTO scores for Renea Mac on 2/17/2022.   FOTO documents entered into EPIC - see Media section.    Limitation Score: 31%  Category: Mobility       TREATMENT   Treatment Time In: 9:35  Treatment Time Out: 9:45  Total Treatment time separate from Evaluation: 10 minutes    Renea received therapeutic exercises to develop strength, endurance, ROM, flexibility, posture and core stabilization for 10 minutes including:  LTRs  Piriformis Stretch  BRISEYDA Stretch  Clamshells    Home Exercises Provided and Patient Education Provided     Education provided:   - HEP    Written Home Exercises Provided: yes.  Exercises were reviewed and Renea was able to demonstrate them prior to the end of the session.  Renea demonstrated good  understanding of the education provided.     See EMR under Patient Instructions for exercises provided 2/17/2022.      Assessment   Renea is a 51 y.o. female referred to outpatient Physical Therapy with a medical diagnosis of chronic right hip pain. Pt presents with decreased LE ROM, decreased LE strength, decreased  functional mobility, and increased pain. Pt was educated on compliance with HEP and role of PT.    Pt prognosis is Good.   Pt will benefit from skilled outpatient Physical Therapy to address the deficits stated above and in the chart below, provide pt/family education, and to maximize pt's level of independence.     Plan of care discussed with patient: Yes  Pt's spiritual, cultural and educational needs considered and patient is agreeable to the plan of care and goals as stated below:     Anticipated Barriers for therapy: Chronicity of pain    Medical Necessity is demonstrated by the following  History  Co-morbidities and personal factors that may impact the plan of care Co-morbidities:   See Medical Hx    Personal Factors:   no deficits     low   Examination  Body Structures and Functions, activity limitations and participation restrictions that may impact the plan of care Body Regions:   back  lower extremities    Body Systems:    gross symmetry  ROM  strength  balance  gait  transfers    Participation Restrictions:   None    Activity limitations:   Learning and applying knowledge  no deficits    General Tasks and Commands  no deficits    Communication  no deficits    Mobility  lifting and carrying objects  walking    Self care  no deficits    Domestic Life  no deficits    Interactions/Relationships  no deficits    Life Areas  no deficits    Community and Social Life  community life  recreation and leisure         moderate   Clinical Presentation stable and uncomplicated low   Decision Making/ Complexity Score: low       Goals:  Short Term Goals (4 Weeks):   1. Pt will be compliant with HEP to supplement PT in restoring pain free function.  2. Pt will improve impaired LE MMTs by 1/2 grade  to improve strength for functional tasks  Long Term Goals (8 Weeks):  1. Pt will improve FOTO score to </= 31% limited to decrease perceived limitation with mobility  2. Pt will improve impaired LE MMTs by 1 grade to improve  strength for functional tasks.  3. Pt improve impaired hip ROM to WNL in all planes to improve mobility for normal movement patterns.     Plan   Plan of care Certification: 2/17/2022 to 4/17/2022.    Outpatient Physical Therapy 2 times weekly for 8 weeks to include the following interventions: Cervical/Lumbar Traction, Gait Training, Manual Therapy, Moist Heat/ Ice, Neuromuscular Re-ed, Patient Education, Self Care, Therapeutic Activities, Therapeutic Exercise and Ultrasound, ASTYM, Kinesiotaping PRN, Functional Dry Needling    Fady Figueroa, PT, DPT

## 2022-03-02 NOTE — PROGRESS NOTES
"[facility]   Physical Therapy Treatment Note     Name: Renea Mac  Clinic Number: [id number]    Therapy Diagnosis:   Encounter Diagnosis   Name Primary?    Pain of right thigh Yes     Physician: Sumi Sweet MD    Visit Date: 3/3/2022       Physician Orders: PT Eval and Treat  Medical Diagnosis from Referral: M79.651 (ICD-10-CM) - Pain of right thigh   Evaluation Date: 2/17/2022  Authorization Period Expiration: 12/31/2022  Plan of Care Expiration: 4/17/2022  Visit # / Visits authorized: 1/ 1, 1/20  FOTO: 1/10  PTA Visit #: 1/5     Precautions: Standard    Time In: 2:30  Time Out: 3:15   Total Billable Time: 45 minutes    Subjective     Pt reports: that her back is feeling good today, however is having some right shoulder pain. Pt states that about 3-4 times this week she woke up with "numbness" in the right side of her leg.   She was partially compliant with home exercise program.  Response to previous treatment: last session was initial eval   Functional change: none at this time     Pain: 0/10  Location: n/a    Objective     Renea received therapeutic exercises to develop strength, endurance, ROM, flexibility, posture and core stabilization for 37 minutes including:    Recumbent bike 5 min   Piriformis Stretch 3x30"  Supine Clamshells GTB 3x10   PPT 2x10 3"   TrA brace 3" 2x10   LTRs 2x10       BRISEYDA Stretch (held d/t shoulder pain)       manual therapy techniques:  were applied to the: RLE for 8 minutes, including:  STM c/ massage roller R ITB/gluteals       Home Exercises Provided and Patient Education Provided     Education provided:   - HEP    Written Home Exercises Provided: Patient instructed to cont prior HEP.  Exercises were reviewed and Renea was able to demonstrate them prior to the end of the session.  Renea demonstrated good  understanding of the education provided.     See EMR under Patient Instructions for exercises provided prior visit.    Assessment "     Initiated therex program following pt's initial evaluation. Pt with right shoulder pain that limited ability to perform planned interventions, such as pallof press and abdominal isometrics with swiss ball. Incorporated manual therapy to patient's program today in order to reduce muscular hypertonicity along right ITB and gluteals. Will continue to monitor pt's response to therapy sessions and modify as needed.     Renea Is progressing well towards her goals.   Pt prognosis is Good.     Pt will continue to benefit from skilled outpatient physical therapy to address the deficits listed in the problem list box on initial evaluation, provide pt/family education and to maximize pt's level of independence in the home and community environment.     Pt's spiritual, cultural and educational needs considered and pt agreeable to plan of care and goals.     Anticipated barriers to physical therapy: Chronicity of pain       Goals:   Short Term Goals (4 Weeks):   1. Pt will be compliant with HEP to supplement PT in restoring pain free function.  2. Pt will improve impaired LE MMTs by 1/2 grade  to improve strength for functional tasks  Long Term Goals (8 Weeks):  1. Pt will improve FOTO score to </= 31% limited to decrease perceived limitation with mobility  2. Pt will improve impaired LE MMTs by 1 grade to improve strength for functional tasks.  3. Pt improve impaired hip ROM to WNL in all planes to improve mobility for normal movement patterns.          Plan   Plan of care Certification: 2/17/2022 to 4/17/2022.       Continue with current POC.     Stephanie Membreno PTA

## 2022-03-03 ENCOUNTER — CLINICAL SUPPORT (OUTPATIENT)
Dept: REHABILITATION | Facility: HOSPITAL | Age: 52
End: 2022-03-03
Payer: COMMERCIAL

## 2022-03-03 DIAGNOSIS — M79.651 PAIN OF RIGHT THIGH: Primary | ICD-10-CM

## 2022-03-03 PROCEDURE — 97110 THERAPEUTIC EXERCISES: CPT | Mod: CQ

## 2022-03-03 PROCEDURE — 97140 MANUAL THERAPY 1/> REGIONS: CPT | Mod: CQ

## 2022-03-08 ENCOUNTER — DOCUMENTATION ONLY (OUTPATIENT)
Dept: REHABILITATION | Facility: HOSPITAL | Age: 52
End: 2022-03-08
Payer: COMMERCIAL

## 2022-03-08 NOTE — PROGRESS NOTES
Face to Face PTA Conference performed with Stephanie Membreno PTA and Fady Winters PTA regarding patient's current status, overall progress, and plan of care.    Fady Figueroa, PT    PT/PTA met face to face to discuss pt's treatment plan and progress towards established goals. Pt will be seen by a physical therapist minimally every 6th visit or every 30 days.    Stephanie Membreno PTA

## 2022-03-14 ENCOUNTER — HOSPITAL ENCOUNTER (OUTPATIENT)
Dept: RADIOLOGY | Facility: HOSPITAL | Age: 52
Discharge: HOME | End: 2022-03-14
Attending: PHYSICIAN ASSISTANT
Payer: COMMERCIAL

## 2022-03-14 ENCOUNTER — OFFICE VISIT (OUTPATIENT)
Dept: ORTHOPEDICS | Facility: CLINIC | Age: 52
End: 2022-03-14
Payer: COMMERCIAL

## 2022-03-14 VITALS — BODY MASS INDEX: 38.35 KG/M2 | WEIGHT: 224.63 LBS | HEIGHT: 64 IN

## 2022-03-14 DIAGNOSIS — M25.811 SHOULDER IMPINGEMENT, RIGHT: Primary | ICD-10-CM

## 2022-03-14 DIAGNOSIS — M25.511 RIGHT SHOULDER PAIN, UNSPECIFIED CHRONICITY: ICD-10-CM

## 2022-03-14 PROCEDURE — 73030 X-RAY EXAM OF SHOULDER: CPT | Mod: TC,RT

## 2022-03-14 PROCEDURE — 4010F ACE/ARB THERAPY RXD/TAKEN: CPT | Mod: CPTII,S$GLB,, | Performed by: PHYSICIAN ASSISTANT

## 2022-03-14 PROCEDURE — 20610 PR DRAIN/INJECT LARGE JOINT/BURSA: ICD-10-PCS | Mod: RT,S$GLB,, | Performed by: PHYSICIAN ASSISTANT

## 2022-03-14 PROCEDURE — 99999 PR PBB SHADOW E&M-EST. PATIENT-LVL IV: ICD-10-PCS | Mod: PBBFAC,,, | Performed by: PHYSICIAN ASSISTANT

## 2022-03-14 PROCEDURE — 1160F PR REVIEW ALL MEDS BY PRESCRIBER/CLIN PHARMACIST DOCUMENTED: ICD-10-PCS | Mod: CPTII,S$GLB,, | Performed by: PHYSICIAN ASSISTANT

## 2022-03-14 PROCEDURE — 1160F RVW MEDS BY RX/DR IN RCRD: CPT | Mod: CPTII,S$GLB,, | Performed by: PHYSICIAN ASSISTANT

## 2022-03-14 PROCEDURE — 4010F PR ACE/ARB THEARPY RXD/TAKEN: ICD-10-PCS | Mod: CPTII,S$GLB,, | Performed by: PHYSICIAN ASSISTANT

## 2022-03-14 PROCEDURE — 99213 PR OFFICE/OUTPT VISIT, EST, LEVL III, 20-29 MIN: ICD-10-PCS | Mod: 25,S$GLB,, | Performed by: PHYSICIAN ASSISTANT

## 2022-03-14 PROCEDURE — 73030 X-RAY EXAM OF SHOULDER: CPT | Mod: 26,RT,, | Performed by: RADIOLOGY

## 2022-03-14 PROCEDURE — 73030 XR SHOULDER COMPLETE 2 OR MORE VIEWS RIGHT: ICD-10-PCS | Mod: 26,RT,, | Performed by: RADIOLOGY

## 2022-03-14 PROCEDURE — 3008F PR BODY MASS INDEX (BMI) DOCUMENTED: ICD-10-PCS | Mod: CPTII,S$GLB,, | Performed by: PHYSICIAN ASSISTANT

## 2022-03-14 PROCEDURE — 99999 PR PBB SHADOW E&M-EST. PATIENT-LVL IV: CPT | Mod: PBBFAC,,, | Performed by: PHYSICIAN ASSISTANT

## 2022-03-14 PROCEDURE — 3008F BODY MASS INDEX DOCD: CPT | Mod: CPTII,S$GLB,, | Performed by: PHYSICIAN ASSISTANT

## 2022-03-14 PROCEDURE — 20610 DRAIN/INJ JOINT/BURSA W/O US: CPT | Mod: RT,S$GLB,, | Performed by: PHYSICIAN ASSISTANT

## 2022-03-14 PROCEDURE — 1159F MED LIST DOCD IN RCRD: CPT | Mod: CPTII,S$GLB,, | Performed by: PHYSICIAN ASSISTANT

## 2022-03-14 PROCEDURE — 1159F PR MEDICATION LIST DOCUMENTED IN MEDICAL RECORD: ICD-10-PCS | Mod: CPTII,S$GLB,, | Performed by: PHYSICIAN ASSISTANT

## 2022-03-14 PROCEDURE — 99213 OFFICE O/P EST LOW 20 MIN: CPT | Mod: 25,S$GLB,, | Performed by: PHYSICIAN ASSISTANT

## 2022-03-14 RX ADMIN — METHYLPREDNISOLONE ACETATE 80 MG: 80 INJECTION, SUSPENSION INTRA-ARTICULAR; INTRALESIONAL; INTRAMUSCULAR; SOFT TISSUE at 05:03

## 2022-03-15 NOTE — PROGRESS NOTES
Subjective:      Patient ID: Renea Mac is a 51 y.o. female.    Chief Complaint: Pain of the Right Shoulder    HPI  51 year old female presents with chief complaint of right shoulder pain x 2 weeks. She is RHD. She fell yesterday. Pain is worse when she reaches across her body. She also has pain when turning her neck. She reports limited ROM. Tylenol arthritis and muscle relaxer give mild relief. She cannot take nsaids due to h/o bariatric surgery. She reports neck pain. She had MRI right shoulder in 2019 that showed rotator cuff tendinosis with low-grade articular surface fraying, involving the supra and infraspinatus tendons, mild bursitis, and mild OA. She had CSI with good relief.   Review of Systems   Constitutional: Negative for chills, fever and night sweats.   Cardiovascular: Negative for chest pain.   Respiratory: Negative for cough and shortness of breath.    Hematologic/Lymphatic: Does not bruise/bleed easily.   Skin: Negative for color change.   Gastrointestinal: Negative for heartburn.   Genitourinary: Negative for dysuria.   Neurological: Negative for numbness and paresthesias.   Psychiatric/Behavioral: Negative for altered mental status.   Allergic/Immunologic: Negative for persistent infections.         Objective:            General    Vitals reviewed.  Constitutional: She is oriented to person, place, and time. She appears well-developed and well-nourished.   Cardiovascular: Normal rate.    Neurological: She is alert and oriented to person, place, and time.         Right Shoulder Exam     Range of Motion   Active abduction:  140 abnormal   Forward Flexion:  120 abnormal   External Rotation 0 degrees: normal   Internal rotation 0 degrees: normal     Tests & Signs   Ramos test: positive  Impingement: positive  Yergason's Test: negative  Speed's Test: negative    Other   Sensation: normal    Comments:  Positive empty can test.         X-ray was ordered and independently reviewed by me. Some  degenerative changes present.      Assessment:       Encounter Diagnosis   Name Primary?    Shoulder impingement, right Yes          Plan:       Discussed treatment options with patient. She would like CSI. Order placed for PT. If neck pain does not improve, she will consider seeing back/spine clinic. RTC prn.     PROCEDURE:  I have explained the risks, benefits, and alternatives of the procedure in detail.  The patient voices understanding and all questions have been answered.  The patient agrees to proceed as planned. So after I performed a sterile prep of the skin in the normal fashion the right shoulder is injected from the posterior approach using a 22 gauge needle with a combination of 4cc 1% plain lidocaine and 80 mg of depo medrol. The patient is cautioned and immediate relief of pain is secondary to the local anesthetic and will be temporary.  After the anesthetic wears off there may be a increase in pain that may last for a few hours or a few days and they should use ice to help alleviate this flair up of pain.

## 2022-03-17 ENCOUNTER — CLINICAL SUPPORT (OUTPATIENT)
Dept: REHABILITATION | Facility: HOSPITAL | Age: 52
End: 2022-03-17
Payer: COMMERCIAL

## 2022-03-17 DIAGNOSIS — M79.651 PAIN OF RIGHT THIGH: Primary | ICD-10-CM

## 2022-03-17 DIAGNOSIS — M25.811 SHOULDER IMPINGEMENT, RIGHT: ICD-10-CM

## 2022-03-17 PROCEDURE — 97140 MANUAL THERAPY 1/> REGIONS: CPT

## 2022-03-17 RX ORDER — METHYLPREDNISOLONE ACETATE 80 MG/ML
80 INJECTION, SUSPENSION INTRA-ARTICULAR; INTRALESIONAL; INTRAMUSCULAR; SOFT TISSUE
Status: COMPLETED | OUTPATIENT
Start: 2022-03-14 | End: 2022-03-14

## 2022-03-17 NOTE — PROGRESS NOTES
"[facility]   Physical Therapy Treatment Note     Name: Renea aMc  Clinic Number: [id number]    Therapy Diagnosis:   Encounter Diagnoses   Name Primary?    Shoulder impingement, right     Pain of right thigh Yes     Physician: Smui Sweet MD    Visit Date: 3/17/2022       Physician Orders: PT Eval and Treat  Medical Diagnosis from Referral: M79.651 (ICD-10-CM) - Pain of right thigh   Evaluation Date: 2/17/2022  Authorization Period Expiration: 12/31/2022  Plan of Care Expiration: 4/17/2022  Visit # / Visits authorized: 1/ 1, 3/20  FOTO: 1/10  PTA Visit #: 1/5     Precautions: Standard    Time In: 9:00  Time Out: 9:45   Total Billable Time: 45 minutes    Subjective     Pt reports: that her back is feeling good today, however is having some right shoulder pain. Pt states that about 3-4 times this week she woke up with "numbness" in the right side of her leg.   She was partially compliant with home exercise program.  Response to previous treatment: last session was initial eval   Functional change: none at this time     Pain: 0/10  Location: n/a    Objective     Renea received therapeutic exercises to develop strength, endurance, ROM, flexibility, posture and core stabilization for 0 minutes including:    Recumbent bike 5 min   Piriformis Stretch 3x30"  Supine Clamshells GTB 3x10   PPT 2x10 3"   TrA brace 3" 2x10   LTRs 2x10       BRISEYDA Stretch (held d/t shoulder pain)       manual therapy techniques:  were applied to the: right shoulder  for 45 minutes, including:  FDN and STM to R Pec Minor.  STM c/ massage roller R ITB/gluteals       Home Exercises Provided and Patient Education Provided     Education provided:   - HEP    Written Home Exercises Provided: Patient instructed to cont prior HEP.  Exercises were reviewed and Renea was able to demonstrate them prior to the end of the session.  Renea demonstrated good  understanding of the education provided.     See EMR under " Patient Instructions for exercises provided prior visit.    Assessment     Pt responded well to manual therapy on right shoulder and had noted improvement in pain. Continue to progress POC as tolerated.    Renea Is progressing well towards her goals.   Pt prognosis is Good.     Pt will continue to benefit from skilled outpatient physical therapy to address the deficits listed in the problem list box on initial evaluation, provide pt/family education and to maximize pt's level of independence in the home and community environment.     Pt's spiritual, cultural and educational needs considered and pt agreeable to plan of care and goals.     Anticipated barriers to physical therapy: Chronicity of pain       Goals:   Short Term Goals (4 Weeks):   1. Pt will be compliant with HEP to supplement PT in restoring pain free function.  2. Pt will improve impaired LE MMTs by 1/2 grade  to improve strength for functional tasks  Long Term Goals (8 Weeks):  1. Pt will improve FOTO score to </= 31% limited to decrease perceived limitation with mobility  2. Pt will improve impaired LE MMTs by 1 grade to improve strength for functional tasks.  3. Pt improve impaired hip ROM to WNL in all planes to improve mobility for normal movement patterns.          Plan   Plan of care Certification: 2/17/2022 to 4/17/2022.       Continue with current POC.     Fady Figueroa, PT

## 2022-03-24 ENCOUNTER — CLINICAL SUPPORT (OUTPATIENT)
Dept: REHABILITATION | Facility: HOSPITAL | Age: 52
End: 2022-03-24
Payer: COMMERCIAL

## 2022-03-24 DIAGNOSIS — M79.651 PAIN OF RIGHT THIGH: Primary | ICD-10-CM

## 2022-03-24 PROCEDURE — 97140 MANUAL THERAPY 1/> REGIONS: CPT

## 2022-03-24 PROCEDURE — 97110 THERAPEUTIC EXERCISES: CPT

## 2022-03-25 NOTE — PROGRESS NOTES
"[facility]   Physical Therapy Treatment Note     Name: Renea Mac  Clinic Number: [id number]    Therapy Diagnosis:   Encounter Diagnosis   Name Primary?    Pain of right thigh Yes     Physician: Sumi Sweet MD    Visit Date: 3/24/2022       Physician Orders: PT Eval and Treat  Medical Diagnosis from Referral: M79.651 (ICD-10-CM) - Pain of right thigh   Evaluation Date: 2/17/2022  Authorization Period Expiration: 12/31/2022  Plan of Care Expiration: 4/17/2022  Visit # / Visits authorized: 1/ 1, 3/20  FOTO: 1/10  PTA Visit #: 1/5     Precautions: Standard    Time In: 9:00  Time Out: 9:45   Total Billable Time: 45 minutes    Subjective     Pt reports: that her back is feeling good today, however is having some right shoulder pain, but states that dry needling helped her shoulder. She was partially compliant with home exercise program.  Response to previous treatment: last session was initial eval   Functional change: none at this time     Pain: 0/10  Location: n/a    Objective     Renea received therapeutic exercises to develop strength, endurance, ROM, flexibility, posture and core stabilization for 30 minutes including:    Recumbent bike 5 min   Piriformis Stretch 3x30"  Supine Clamshells GTB 3x10   PPT 2x10 3"   TrA brace 3" 2x10   LTRs 2x10   Doorway Stretch 3 x 30'  Bilateral Shoulder Rows RTB 3 x 10  Chaim Shoulder IR/ER RTB 3 x 10      BRISEYDA Stretch (held d/t shoulder pain)       manual therapy techniques:  were applied to the: right shoulder  for 15 minutes, including:  FDN and STM to R Pec Minor.  STM c/ massage roller R ITB/gluteals       Home Exercises Provided and Patient Education Provided     Education provided:   - HEP    Written Home Exercises Provided: Patient instructed to cont prior HEP.  Exercises were reviewed and Renea was able to demonstrate them prior to the end of the session.  Renea demonstrated good  understanding of the education provided.     See " EMR under Patient Instructions for exercises provided prior visit.    Assessment     Pt responded well to manual therapy on right shoulder and had noted improvement in pain. Continue to progress POC as tolerated.    Renea Is progressing well towards her goals.   Pt prognosis is Good.     Pt will continue to benefit from skilled outpatient physical therapy to address the deficits listed in the problem list box on initial evaluation, provide pt/family education and to maximize pt's level of independence in the home and community environment.     Pt's spiritual, cultural and educational needs considered and pt agreeable to plan of care and goals.     Anticipated barriers to physical therapy: Chronicity of pain       Goals:   Short Term Goals (4 Weeks):   1. Pt will be compliant with HEP to supplement PT in restoring pain free function.  2. Pt will improve impaired LE MMTs by 1/2 grade  to improve strength for functional tasks  Long Term Goals (8 Weeks):  1. Pt will improve FOTO score to </= 31% limited to decrease perceived limitation with mobility  2. Pt will improve impaired LE MMTs by 1 grade to improve strength for functional tasks.  3. Pt improve impaired hip ROM to WNL in all planes to improve mobility for normal movement patterns.          Plan   Plan of care Certification: 2/17/2022 to 4/17/2022.       Continue with current POC.     Fady Figueroa, PT

## 2022-03-28 ENCOUNTER — CLINICAL SUPPORT (OUTPATIENT)
Dept: REHABILITATION | Facility: HOSPITAL | Age: 52
End: 2022-03-28
Payer: COMMERCIAL

## 2022-03-28 DIAGNOSIS — M79.651 PAIN OF RIGHT THIGH: Primary | ICD-10-CM

## 2022-03-28 PROCEDURE — 97014 ELECTRIC STIMULATION THERAPY: CPT

## 2022-03-28 PROCEDURE — 97110 THERAPEUTIC EXERCISES: CPT | Mod: CQ

## 2022-03-28 PROCEDURE — 97140 MANUAL THERAPY 1/> REGIONS: CPT | Mod: CQ

## 2022-03-28 NOTE — PROGRESS NOTES
Functional Dry Needling Note  3/28/2022  Time in: 12:57pm  Time out: 1:07pm    Renea Mac received the following manual therapy techniques:   PT used semi-standardized FDN protocol to L pec minor with (3) 30mm size needles, with mechanical winding technique. Needled in situ  10 min. and E-stim used (continuous symmetrical biphasic waveform at Frequency of 3Hz).        Palpation Assessment to determine the necessity for Functional Dry Needling    Patient provided written and verbal consent to Functional Dry Needling       Written Handout on response to FDN provided: yes    Reneasa ELZA Mac demonstrated good  understanding of the education provided.   Patient verbalized consent to FDN: yes    Assessment     Patient demonstrated appropriate response to Functional Dry Needling.   Plan     Monitor response to Functional Dry Needling. Continue with Functional Dry Needling in POC as appropriate    Regina Foley, PT

## 2022-03-28 NOTE — PROGRESS NOTES
"[facility]   Physical Therapy Treatment Note     Name: Renea Mac  Clinic Number: [id number]    Therapy Diagnosis:   Encounter Diagnosis   Name Primary?    Pain of right thigh Yes     Physician: Sumi Sweet MD    Visit Date: 3/28/2022       Physician Orders: PT Eval and Treat  Medical Diagnosis from Referral: M79.651 (ICD-10-CM) - Pain of right thigh   Evaluation Date: 2/17/2022  Authorization Period Expiration: 12/31/2022  Plan of Care Expiration: 4/17/2022  Visit # / Visits authorized: 1/ 1, 4/20  FOTO: 3/10  PTA Visit #: 1/5     Precautions: Standard    Time In: 1:07 pm   Time Out: 1:40 pm   Total Billable Time: 33 minutes    Subjective     Pt reports: that she is feeling better and feels that the dry needling made a difference in her shoulder pain. Pt states that over the last couple of days she has gotten a "quick shocking" pain in the right side of her back.   Response to previous treatment: last session was initial eval   Functional change: none at this time     Pain: 0/10  Location: n/a    Objective     Renea received therapeutic exercises to develop strength, endurance, ROM, flexibility, posture and core stabilization for 9 minutes including:    Upright  bike 5 min   UBE 2'/2' fwd/bkwd     Not performed:   Piriformis Stretch 3x30"  Supine Clamshells GTB 3x10   PPT 2x10 3"   TrA brace 3" 2x10   LTRs 2x10   Doorway Stretch 3 x 30'  Bilateral Shoulder Rows RTB 3 x 10  Chaim Shoulder IR/ER RTB 3 x 10      BRISEYDA Stretch (held d/t shoulder pain)       manual therapy techniques:  were applied to the: right shoulder  for 24 minutes, including:  STM (R) lumbo-sacral paraspinals       Home Exercises Provided and Patient Education Provided     Education provided:   - HEP    Written Home Exercises Provided: Patient instructed to cont prior HEP.  Exercises were reviewed and Renea was able to demonstrate them prior to the end of the session.  Renea demonstrated good  " understanding of the education provided.     See EMR under Patient Instructions for exercises provided prior visit.    Assessment     Dry needling performed at the beginning of session by Regina Foley DPT; please see her note for details. Pt able to perform upright bike and UBE with no adverse effects this session. Due to pt's recent subjective reports of back pain, performed soft tissue mobilization. Pt with TTP over (R) SI joint, as well as hypertonicity noted throughout area. Following manual therapy, pt with no tenderness and palpable difference noted in tissue. Will continue to monitor pt's response to therapy and progress pt her tolerance and POC.     Renea Is progressing well towards her goals.   Pt prognosis is Good.     Pt will continue to benefit from skilled outpatient physical therapy to address the deficits listed in the problem list box on initial evaluation, provide pt/family education and to maximize pt's level of independence in the home and community environment.     Pt's spiritual, cultural and educational needs considered and pt agreeable to plan of care and goals.     Anticipated barriers to physical therapy: Chronicity of pain       Goals:   Short Term Goals (4 Weeks):   1. Pt will be compliant with HEP to supplement PT in restoring pain free function.  2. Pt will improve impaired LE MMTs by 1/2 grade  to improve strength for functional tasks  Long Term Goals (8 Weeks):  1. Pt will improve FOTO score to </= 31% limited to decrease perceived limitation with mobility  2. Pt will improve impaired LE MMTs by 1 grade to improve strength for functional tasks.  3. Pt improve impaired hip ROM to WNL in all planes to improve mobility for normal movement patterns.          Plan   Plan of care Certification: 2/17/2022 to 4/17/2022.       Continue with current POC.     Stephanie Membreno PTA

## 2022-04-06 ENCOUNTER — DOCUMENTATION ONLY (OUTPATIENT)
Dept: REHABILITATION | Facility: HOSPITAL | Age: 52
End: 2022-04-06
Payer: COMMERCIAL

## 2022-04-06 NOTE — PROGRESS NOTES
PT/PTA met face to face to discuss pt's treatment plan and progress towards established goals. Pt will be seen by a physical therapist minimally every 6th visit or every 30 days.    Stephanie Membreno PTA

## 2022-04-07 ENCOUNTER — CLINICAL SUPPORT (OUTPATIENT)
Dept: REHABILITATION | Facility: HOSPITAL | Age: 52
End: 2022-04-07
Payer: COMMERCIAL

## 2022-04-07 PROCEDURE — 97140 MANUAL THERAPY 1/> REGIONS: CPT

## 2022-04-07 PROCEDURE — 97110 THERAPEUTIC EXERCISES: CPT

## 2022-04-07 NOTE — PROGRESS NOTES
"[facility]   Physical Therapy Treatment Note     Name: Renea Mac  Clinic Number: [id number]    Therapy Diagnosis:   No diagnosis found.  Physician: Sumi Sweet MD    Visit Date: 4/7/2022       Physician Orders: PT Eval and Treat  Medical Diagnosis from Referral: M79.651 (ICD-10-CM) - Pain of right thigh   Evaluation Date: 2/17/2022  Authorization Period Expiration: 12/31/2022  Plan of Care Expiration: 4/17/2022  Visit # / Visits authorized: 1/ 1, 4/20  FOTO: 3/10  PTA Visit #: 1/5     Precautions: Standard    Time In: 09:00 pm   Time Out: 09:45 pm   Total Billable Time: 45 minutes    Subjective     Pt reports: that she is feeling better and feels that the dry needling made a difference in her shoulder pain. Pt states that over the last couple of days she has gotten a "quick shocking" pain in the right side of her back.   Response to previous treatment: last session was initial eval   Functional change: none at this time     Pain: 0/10  Location: n/a    Objective     Renea received therapeutic exercises to develop strength, endurance, ROM, flexibility, posture and core stabilization for 35 minutes including:    Upright  bike 5 min   UBE 2'/2' fwd/bkwd     Not performed:   Piriformis Stretch 3x30"  Supine Clamshells GTB 3x10   PPT 2x10 3"   TrA brace 3" 2x10   LTRs 2x10   Doorway Stretch 3 x 30'  Bilateral Shoulder Rows RTB 3 x 10  Chaim Shoulder IR/ER RTB 3 x 10      BRISEYDA Stretch (held d/t shoulder pain)       manual therapy techniques:  were applied to the: right shoulder  for 10 minutes, including:  STM (R) lumbo-sacral paraspinals       Home Exercises Provided and Patient Education Provided     Education provided:   - HEP    Written Home Exercises Provided: Patient instructed to cont prior HEP.  Exercises were reviewed and Renea was able to demonstrate them prior to the end of the session.  Renea demonstrated good  understanding of the education provided.     See EMR " under Patient Instructions for exercises provided prior visit.    Assessment   Pt able to perform upright bike and UBE with no adverse effects this session. Will continue to monitor pt's response to therapy and progress pt her tolerance and POC.     Renea Is progressing well towards her goals.   Pt prognosis is Good.     Pt will continue to benefit from skilled outpatient physical therapy to address the deficits listed in the problem list box on initial evaluation, provide pt/family education and to maximize pt's level of independence in the home and community environment.     Pt's spiritual, cultural and educational needs considered and pt agreeable to plan of care and goals.     Anticipated barriers to physical therapy: Chronicity of pain       Goals:   Short Term Goals (4 Weeks):   1. Pt will be compliant with HEP to supplement PT in restoring pain free function.  2. Pt will improve impaired LE MMTs by 1/2 grade  to improve strength for functional tasks  Long Term Goals (8 Weeks):  1. Pt will improve FOTO score to </= 31% limited to decrease perceived limitation with mobility  2. Pt will improve impaired LE MMTs by 1 grade to improve strength for functional tasks.  3. Pt improve impaired hip ROM to WNL in all planes to improve mobility for normal movement patterns.          Plan   Plan of care Certification: 2/17/2022 to 4/17/2022.       Continue with current POC.     Fady Figueroa, PT

## 2022-07-15 DIAGNOSIS — I10 ESSENTIAL HYPERTENSION: ICD-10-CM

## 2022-07-15 RX ORDER — LOSARTAN POTASSIUM 25 MG/1
25 TABLET ORAL DAILY
Qty: 90 TABLET | Refills: 1 | Status: SHIPPED | OUTPATIENT
Start: 2022-07-15 | End: 2023-01-09 | Stop reason: SDUPTHER

## 2022-07-21 ENCOUNTER — OFFICE VISIT (OUTPATIENT)
Dept: ORTHOPEDICS | Facility: CLINIC | Age: 52
End: 2022-07-21
Payer: COMMERCIAL

## 2022-07-21 VITALS — BODY MASS INDEX: 38.35 KG/M2 | WEIGHT: 224.63 LBS | HEIGHT: 64 IN

## 2022-07-21 DIAGNOSIS — M25.511 CHRONIC RIGHT SHOULDER PAIN: Primary | ICD-10-CM

## 2022-07-21 DIAGNOSIS — G89.29 CHRONIC RIGHT SHOULDER PAIN: Primary | ICD-10-CM

## 2022-07-21 PROCEDURE — 1160F RVW MEDS BY RX/DR IN RCRD: CPT | Mod: CPTII,S$GLB,, | Performed by: PHYSICIAN ASSISTANT

## 2022-07-21 PROCEDURE — 1159F PR MEDICATION LIST DOCUMENTED IN MEDICAL RECORD: ICD-10-PCS | Mod: CPTII,S$GLB,, | Performed by: PHYSICIAN ASSISTANT

## 2022-07-21 PROCEDURE — 4010F ACE/ARB THERAPY RXD/TAKEN: CPT | Mod: CPTII,S$GLB,, | Performed by: PHYSICIAN ASSISTANT

## 2022-07-21 PROCEDURE — 4010F PR ACE/ARB THEARPY RXD/TAKEN: ICD-10-PCS | Mod: CPTII,S$GLB,, | Performed by: PHYSICIAN ASSISTANT

## 2022-07-21 PROCEDURE — 99213 OFFICE O/P EST LOW 20 MIN: CPT | Mod: S$GLB,,, | Performed by: PHYSICIAN ASSISTANT

## 2022-07-21 PROCEDURE — 99999 PR PBB SHADOW E&M-EST. PATIENT-LVL III: CPT | Mod: PBBFAC,,, | Performed by: PHYSICIAN ASSISTANT

## 2022-07-21 PROCEDURE — 1159F MED LIST DOCD IN RCRD: CPT | Mod: CPTII,S$GLB,, | Performed by: PHYSICIAN ASSISTANT

## 2022-07-21 PROCEDURE — 3008F PR BODY MASS INDEX (BMI) DOCUMENTED: ICD-10-PCS | Mod: CPTII,S$GLB,, | Performed by: PHYSICIAN ASSISTANT

## 2022-07-21 PROCEDURE — 99213 PR OFFICE/OUTPT VISIT, EST, LEVL III, 20-29 MIN: ICD-10-PCS | Mod: S$GLB,,, | Performed by: PHYSICIAN ASSISTANT

## 2022-07-21 PROCEDURE — 99999 PR PBB SHADOW E&M-EST. PATIENT-LVL III: ICD-10-PCS | Mod: PBBFAC,,, | Performed by: PHYSICIAN ASSISTANT

## 2022-07-21 PROCEDURE — 1160F PR REVIEW ALL MEDS BY PRESCRIBER/CLIN PHARMACIST DOCUMENTED: ICD-10-PCS | Mod: CPTII,S$GLB,, | Performed by: PHYSICIAN ASSISTANT

## 2022-07-21 PROCEDURE — 3008F BODY MASS INDEX DOCD: CPT | Mod: CPTII,S$GLB,, | Performed by: PHYSICIAN ASSISTANT

## 2022-07-21 NOTE — PROGRESS NOTES
Subjective:      Patient ID: Renea Mac is a 52 y.o. female.    Chief Complaint: Pain of the Right Shoulder    HPI  52 year old female returns with chief complaint of worsening right shoulder pain. She has h/o tendinitis, bursitis, and OA at the shoulder. She received CSI in March with short relief. She has been doing PT/HEP with little relief. Pain is radiating down her arm. She has been wearing a shoulder strap. She uses Biofreeze with little relief. She takes flexeril at night. She cannot take nsaids due to h/o bariatric surgery. Pain is daily.   Review of Systems   Constitutional: Negative for chills, fever and night sweats.   Cardiovascular: Negative for chest pain.   Respiratory: Negative for cough and shortness of breath.    Hematologic/Lymphatic: Does not bruise/bleed easily.   Skin: Negative for color change.   Gastrointestinal: Negative for heartburn.   Genitourinary: Negative for dysuria.   Neurological: Negative for numbness and paresthesias.   Psychiatric/Behavioral: Negative for altered mental status.   Allergic/Immunologic: Negative for persistent infections.         Objective:            General    Vitals reviewed.  Constitutional: She is oriented to person, place, and time. She appears well-developed and well-nourished.   Cardiovascular: Normal rate.    Neurological: She is alert and oriented to person, place, and time.         Right Shoulder Exam     Range of Motion   Active abduction:  110 abnormal   Forward Flexion:  110 abnormal   External Rotation 0 degrees: abnormal   Internal rotation 0 degrees: abnormal     Tests & Signs   Ramos test: positive  Impingement: positive  Yergason's Test: positive  Speed's Test: negative    Other   Sensation: normal    Comments:  Positive empty can test.    Muscle Strength   Right Upper Extremity   Shoulder External Rotation: 4/5   Supraspinatus: 4/5   Biceps: 5/5         X-ray was independently reviewed by me. Some degenerative changes present.       Assessment:       Encounter Diagnosis   Name Primary?    Chronic right shoulder pain Yes          Plan:       MRI was ordered to r/o RCT. She will f/u with sports medicine after testing is done.

## 2022-07-26 ENCOUNTER — HOSPITAL ENCOUNTER (OUTPATIENT)
Dept: RADIOLOGY | Facility: HOSPITAL | Age: 52
Discharge: HOME | End: 2022-07-26
Attending: PHYSICIAN ASSISTANT
Payer: COMMERCIAL

## 2022-07-26 DIAGNOSIS — M25.511 CHRONIC RIGHT SHOULDER PAIN: ICD-10-CM

## 2022-07-26 DIAGNOSIS — G89.29 CHRONIC RIGHT SHOULDER PAIN: ICD-10-CM

## 2022-07-26 PROCEDURE — 73221 MRI SHOULDER WITHOUT CONTRAST RIGHT: ICD-10-PCS | Mod: 26,RT,, | Performed by: INTERNAL MEDICINE

## 2022-07-26 PROCEDURE — 73221 MRI JOINT UPR EXTREM W/O DYE: CPT | Mod: TC,PO,RT

## 2022-07-26 PROCEDURE — 73221 MRI JOINT UPR EXTREM W/O DYE: CPT | Mod: 26,RT,, | Performed by: INTERNAL MEDICINE

## 2022-08-01 ENCOUNTER — OFFICE VISIT (OUTPATIENT)
Dept: SPORTS MEDICINE | Facility: CLINIC | Age: 52
End: 2022-08-01
Payer: COMMERCIAL

## 2022-08-01 VITALS
BODY MASS INDEX: 39.67 KG/M2 | DIASTOLIC BLOOD PRESSURE: 94 MMHG | HEIGHT: 64 IN | SYSTOLIC BLOOD PRESSURE: 135 MMHG | WEIGHT: 232.38 LBS

## 2022-08-01 DIAGNOSIS — M75.01 ADHESIVE CAPSULITIS OF BOTH SHOULDERS: Primary | ICD-10-CM

## 2022-08-01 DIAGNOSIS — M75.02 ADHESIVE CAPSULITIS OF BOTH SHOULDERS: Primary | ICD-10-CM

## 2022-08-01 DIAGNOSIS — M67.819 TENDINOSIS OF ROTATOR CUFF: ICD-10-CM

## 2022-08-01 PROCEDURE — 99214 OFFICE O/P EST MOD 30 MIN: CPT | Mod: 25,S$GLB,, | Performed by: ORTHOPAEDIC SURGERY

## 2022-08-01 PROCEDURE — 3080F DIAST BP >= 90 MM HG: CPT | Mod: CPTII,S$GLB,, | Performed by: ORTHOPAEDIC SURGERY

## 2022-08-01 PROCEDURE — 1159F PR MEDICATION LIST DOCUMENTED IN MEDICAL RECORD: ICD-10-PCS | Mod: CPTII,S$GLB,, | Performed by: ORTHOPAEDIC SURGERY

## 2022-08-01 PROCEDURE — 20611 LARGE JOINT ASPIRATION/INJECTION: R GLENOHUMERAL: ICD-10-PCS | Mod: RT,S$GLB,, | Performed by: ORTHOPAEDIC SURGERY

## 2022-08-01 PROCEDURE — 99999 PR PBB SHADOW E&M-EST. PATIENT-LVL III: ICD-10-PCS | Mod: PBBFAC,,, | Performed by: ORTHOPAEDIC SURGERY

## 2022-08-01 PROCEDURE — 3075F PR MOST RECENT SYSTOLIC BLOOD PRESS GE 130-139MM HG: ICD-10-PCS | Mod: CPTII,S$GLB,, | Performed by: ORTHOPAEDIC SURGERY

## 2022-08-01 PROCEDURE — 3080F PR MOST RECENT DIASTOLIC BLOOD PRESSURE >= 90 MM HG: ICD-10-PCS | Mod: CPTII,S$GLB,, | Performed by: ORTHOPAEDIC SURGERY

## 2022-08-01 PROCEDURE — 1159F MED LIST DOCD IN RCRD: CPT | Mod: CPTII,S$GLB,, | Performed by: ORTHOPAEDIC SURGERY

## 2022-08-01 PROCEDURE — 99214 PR OFFICE/OUTPT VISIT, EST, LEVL IV, 30-39 MIN: ICD-10-PCS | Mod: 25,S$GLB,, | Performed by: ORTHOPAEDIC SURGERY

## 2022-08-01 PROCEDURE — 3008F PR BODY MASS INDEX (BMI) DOCUMENTED: ICD-10-PCS | Mod: CPTII,S$GLB,, | Performed by: ORTHOPAEDIC SURGERY

## 2022-08-01 PROCEDURE — 99999 PR PBB SHADOW E&M-EST. PATIENT-LVL III: CPT | Mod: PBBFAC,,, | Performed by: ORTHOPAEDIC SURGERY

## 2022-08-01 PROCEDURE — 4010F ACE/ARB THERAPY RXD/TAKEN: CPT | Mod: CPTII,S$GLB,, | Performed by: ORTHOPAEDIC SURGERY

## 2022-08-01 PROCEDURE — 20611 DRAIN/INJ JOINT/BURSA W/US: CPT | Mod: RT,S$GLB,, | Performed by: ORTHOPAEDIC SURGERY

## 2022-08-01 PROCEDURE — 4010F PR ACE/ARB THEARPY RXD/TAKEN: ICD-10-PCS | Mod: CPTII,S$GLB,, | Performed by: ORTHOPAEDIC SURGERY

## 2022-08-01 PROCEDURE — 3075F SYST BP GE 130 - 139MM HG: CPT | Mod: CPTII,S$GLB,, | Performed by: ORTHOPAEDIC SURGERY

## 2022-08-01 PROCEDURE — 3008F BODY MASS INDEX DOCD: CPT | Mod: CPTII,S$GLB,, | Performed by: ORTHOPAEDIC SURGERY

## 2022-08-01 RX ORDER — TRIAMCINOLONE ACETONIDE 40 MG/ML
40 INJECTION, SUSPENSION INTRA-ARTICULAR; INTRAMUSCULAR
Status: DISCONTINUED | OUTPATIENT
Start: 2022-08-01 | End: 2022-08-01 | Stop reason: HOSPADM

## 2022-08-01 RX ADMIN — TRIAMCINOLONE ACETONIDE 40 MG: 40 INJECTION, SUSPENSION INTRA-ARTICULAR; INTRAMUSCULAR at 08:08

## 2022-08-01 NOTE — PROGRESS NOTES
NEW PATIENT    HISTORY OF PRESENT ILLNESS   52 y.o. Female with a history of right shoulder pain who is referred today by Meg Tijerina PA-C. She is a  and a . She states she started having shoulder pain in 2015. She moved from [place] in 2016. She had a CSI before she moved and she reports relief. She has tried physical therapy since and has had 6 or more CSI since moving to [place]. She reports daily pain and would like to discuss different options.  She believes she is having a similar symptom in her left shoulder.  She did have some loss of motion in the right shoulder.  She occasion would feel radiating pain that would go toward her hand.    Is affecting ADLs.  Pain is 6/10 at it's worst.        PAST MEDICAL HISTORY    Past Medical History:   Diagnosis Date    Acute pain of right knee 11/4/2016    Chronic right shoulder pain 11/4/2016    COVID-19 virus infection 4/10/2020    Essential hypertension 11/4/2016    GERD (gastroesophageal reflux disease)     Incomplete tear of right rotator cuff 11/30/2016    Osteoarthritis of right knee 11/30/2016    Rotator cuff tear     S/P Exploratory Laparotomy/Mass Resection/BSO 1/31/2019 1- 1. Soft tissue, abdominal wall, mass, resection: - Benign leiomyoma, 6.5 cm in greatest dimension, see comment. 2. Ovary and fallopian tube, left, left salpingo-oophorectomy: - Benign ovary with cystic corpus luteum, 2.7 cm in greatest dimension. - Benign fallopian tube with no significant histologic abnormality. 3. 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/4/2016    Syncope and collapse 6/14/2017       PAST SURGICAL HISTORY     Past Surgical History:   Procedure Laterality Date    BILATERAL SALPINGO-OOPHORECTOMY (BSO) Bilateral 1/31/2019    Procedure: SALPINGO-OOPHORECTOMY, BILATERAL;  Surgeon: Petrona Porter MD;  Location: [facility] OR [address] [place] [address];  Service: OB/GYN;   Laterality: Bilateral;     SECTION      x1    COLONOSCOPY N/A 2019    Procedure: COLONOSCOPY;  Surgeon: Chris Bennett MD;  Location: [facility] (4TH FLR);  Service: Endoscopy;  Laterality: N/A;  Justo Camilo, or Donald if no availability in Dec. will schedule after CT scan results    COLPOSCOPY      EXCISION OF PELVIC MASS N/A 2019    Procedure: EXCISION, MASS, PELVIS;  Surgeon: Petrona Porter MD;  Location: [facility] OR 2ND FLR;  Service: OB/GYN;  Laterality: N/A;    HERNIA REPAIR      HYSTERECTOMY      YANG, ovaries remain (fibroids0    LAPAROTOMY, EXPLORATORY N/A 2019    Procedure: LAPAROTOMY, EXPLORATORY;  Surgeon: Petrona Porter MD;  Location: [facility] OR 2ND FLR;  Service: OB/GYN;  Laterality: N/A;    OOPHORECTOMY Bilateral 2019    SLEEVE GASTROPLASTY  2014    TUBAL LIGATION         FAMILY HISTORY    Family History   Problem Relation Age of Onset    Hypertension Mother     Heart disease Mother     Heart attack Mother     Rectal cancer Mother     Colon cancer Mother     Hypertension Father     Heart disease Father     Stroke Father     Heart attack Father     Hypertension Sister     Hypertension Brother     Cancer Brother     No Known Problems Daughter     No Known Problems Son     Esophageal cancer Neg Hx        SOCIAL HISTORY    Social History     Socioeconomic History    Marital status:    Tobacco Use    Smoking status: Never Smoker    Smokeless tobacco: Never Used   Substance and Sexual Activity    Alcohol use: Yes     Comment: occasionally    Drug use: No    Sexual activity: Yes     Partners: Male       MEDICATIONS      Current Outpatient Medications:     acetaminophen (TYLENOL) 650 MG TbSR, Take 1 tablet (650 mg total) by mouth every 8 (eight) hours., Disp: 20 tablet, Rfl: 0    calcium carbonate/vitamin D3 (VITAMIN D-3 ORAL), Take by mouth., Disp: , Rfl:     cholecalciferol, vitamin D3, (VITAMIN D3) 1,000 unit capsule, Take 1,000  "Units by mouth once daily., Disp: , Rfl:     cyclobenzaprine (FLEXERIL) 5 MG tablet, Take 1 tablet (5 mg total) by mouth 3 (three) times daily as needed for Muscle spasms., Disp: 30 tablet, Rfl: 1    diclofenac sodium (VOLTAREN) 1 % Gel, Apply 2 g topically 4 (four) times daily., Disp: 100 g, Rfl: 0    DULoxetine (CYMBALTA) 30 MG capsule, Take 1 capsule (30 mg total) by mouth once daily., Disp: 30 capsule, Rfl: 11    hydroCHLOROthiazide (HYDRODIURIL) 25 MG tablet, Take 0.5 tablets (12.5 mg total) by mouth once daily., Disp: 90 tablet, Rfl: 3    losartan (COZAAR) 25 MG tablet, Take 1 tablet (25 mg total) by mouth once daily., Disp: 90 tablet, Rfl: 1    pyridoxine HCl, vitamin B6, (PYRIDOXINE, VITAMIN B6, ORAL), Take by mouth., Disp: , Rfl:     thiamine HCl (VITAMIN B-1 ORAL), Take by mouth., Disp: , Rfl:     ALLERGIES     Review of patient's allergies indicates:  No Known Allergies      REVIEW OF SYSTEMS   Constitution: Negative. Negative for chills, fever and night sweats.   HENT: Negative for congestion and headaches.    Eyes: Negative for blurred vision, left vision loss and right vision loss.   Cardiovascular: Negative for chest pain and syncope.   Respiratory: Negative for cough and shortness of breath.    Endocrine: Negative for polydipsia, polyphagia and polyuria.   Hematologic/Lymphatic: Negative for bleeding problem. Does not bruise/bleed easily.   Skin: Negative for dry skin, itching and rash.   Musculoskeletal: Negative for falls. Positive for right shoulder pain  Gastrointestinal: Negative for abdominal pain and bowel incontinence.   Genitourinary: Negative for bladder incontinence and nocturia.   Neurological: Negative for disturbances in coordination, loss of balance and seizures.   Psychiatric/Behavioral: Negative for depression. The patient does not have insomnia.    Allergic/Immunologic: Negative for hives and persistent infections.     PHYSICAL EXAMINATION    Vitals: BP (!) 135/94   Ht 5' 4" " (1.626 m)   Wt 105.4 kg (232 lb 6.4 oz)   LMP 03/01/2011 (Exact Date)   BMI 39.89 kg/m²     General: The patient appears active and healthy with no apparent physical problems.  No disturbance of mood or affect is demonstrated. Alert and Oriented.    HEENT: Eyes normal, pupils equally round, nose normal.    Resp: Equal and symmetrical chest rises. No wheezing    CV: Regular rate    Neck: Supple; nonpainful range of motion.    Extremities: no cyanosis, clubbing, edema, or diffuse swelling.  Palpable pulses, good capillary refill of the digits.  No coolness, discoloration, edema or obvious varicosities.    Skin: no lesions noted.    Lymphatic: no detected adenopathy in the upper or lower extremities.    Neurologic: normal mental status, normal reflexes, normal gait and balance.  Patient is alert and oriented to person, place and time.  No flaccidity or spasticity is noted.  No motor or sensory deficits are noted.  Light touch is intact    Orthopaedic:     SHOULDER EXAM - RIGHT    Inspection:   Normal skin color and appearance with no scars.  No muscle atrophy noted.  No scapular winging.      Palpation: No tenderness of the acromioclavicular joint, lateral edge of the acromion, biceps tendon, trapezius muscle or scapulothoracic bursa.      ROM:      PROM:     FE - 170°    Abd/ER -  90°  Abd/IR -  45°   Add/ER -  45°     AROM:    FE - 170° *pain  degrees    Abd/ER -  90°  Abd/IR -  45°   Add/ER -  40°         Tests:     + Ramos, - Neer's, - Cross Arm Adduction, - Banning, - Yerguson, + Speed. - Belly Press,  + Jobes, - Lift Off    Stability: - sulcus, - apprehension, - relocation, - load and shift, - DLS      Motor:  Rotator cuff strength is 4/5 supraspinatus *pain, 5/5 infraspinatus, 4/5 subscapularis *pain. Biceps, triceps and deltoid strength is 5/5.      Neuro     Distally there are no paresthesias, and sensation is intact to light touch in the median, ulnar, and radial distributions.  Reflexes are 2/2  biceps, triceps and brachioradialis.        IMAGING    MRI Shoulder Without Contrast Right  Narrative: EXAMINATION:  MRI SHOULDER WITHOUT CONTRAST RIGHT    CLINICAL HISTORY:  Shoulder pain, rotator cuff disorder suspected, xray done;  Pain in right shoulder    TECHNIQUE:  Multiplanar multislice images are were performed through the right shoulder.    COMPARISON:  Shoulder radiographs 03/14/2022; MRI shoulder 09/03/2019    FINDINGS:  Coracoacromial arch: Moderate degenerative changes of the acromioclavicular joint with subacromial spur formation.  Trace fluid in the subacromial subdeltoid bursa.    Rotator cuff: Subscapularis is intact.  Supraspinatus tendinopathy, without discrete tear.  Infraspinatus and teres minor are intact.  No focal muscle atrophy.    Glenoid labrum: No displaced labral tear.    Biceps tendon: The biceps tendon long head is intact at the superior glenoid tubercle and is normally positioned in the biceps groove.    Bone: No fracture, osteonecrosis, or focal lesion.  There are subcortical cysts in the humeral head deep to the rotator cuff footprint.    Joint: No cartilage defect. No joint effusion, synovitis, or capsulitis.  Impression: Moderate acromioclavicular osteoarthritis with subacromial spur formation, mild subacromial subdeltoid bursitis, and rotator cuff tendinopathy.  No discrete rotator cuff tear.    Electronically signed by: Nasim Mcdonald  Date:    07/26/2022  Time:    13:59        IMPRESSION       ICD-10-CM ICD-9-CM   1. Adhesive capsulitis of both shoulders  M75.01 726.0    M75.02    2. Tendinosis of rotator cuff  M67.819 726.10       MEDICATIONS PRESCRIBED      1. None    RECOMMENDATIONS     1. Surgical versus non-surgical options discussed today.  I believe she does have some conflicting history and exam findings.  Her history and exam do match more of an adhesive capsulitis picture.  Although her MRI has a partial thickness articular sided rotator cuff tear and tendinosis, I do  not believe based on her history and physical that that is the primary driving force.  She has had multiple subacromial injections in the past.  I would like to perform a glenohumeral ultrasound-guided injection into the rotator interval to assess the effect of this injection compared to her previous injections.  I will see her back in 1 month.  I also advised her to quit physical therapy at this point.  2. CSI of right shoulder performed toady under ultrasound guidance into the glenohumeral joint/rotator interval.  3. RTC in 1 month    Large Joint Aspiration/Injection: R glenohumeral    Date/Time: 8/1/2022 8:45 AM  Performed by: Eliseo Bailon MD  Authorized by: Eliseo Bailon MD     Consent Done?:  Yes (Verbal)  Indications:  Pain  Site marked: the procedure site was marked    Timeout: prior to procedure the correct patient, procedure, and site was verified    Prep: patient was prepped and draped in usual sterile fashion      Local anesthesia used?: Yes    Local anesthetic:  Co-phenylcaine spray (0.2% Naropin)  Anesthetic total (ml):  4      Details:  Needle Size:  22 G  Ultrasonic Guidance for needle placement?: Yes (Ultrasound guidance was used for needle localization.  Images were saved and stored for documentation.  Dynamic visualization of the needle was continuous throughout the procedure and maintained accurate placement.)    Images are saved and documented.  Approach:  Posterior  Location:  Shoulder  Site:  R glenohumeral  Medications:  40 mg triamcinolone acetonide 40 mg/mL  Medications comment:  5 mL LIDOcaine HCL 10 mg/ml (1%) 10 mg/mL (1 %)  Patient tolerance:  Patient tolerated the procedure well with no immediate complications          All questions were answered, pt will contact us for questions or concerns in the interim.

## 2022-08-03 ENCOUNTER — PATIENT MESSAGE (OUTPATIENT)
Dept: BARIATRICS | Facility: CLINIC | Age: 52
End: 2022-08-03
Payer: COMMERCIAL

## 2022-08-11 ENCOUNTER — TELEPHONE (OUTPATIENT)
Dept: INTERNAL MEDICINE | Facility: CLINIC | Age: 52
End: 2022-08-11
Payer: COMMERCIAL

## 2022-08-11 DIAGNOSIS — Z12.31 SCREENING MAMMOGRAM, ENCOUNTER FOR: Primary | ICD-10-CM

## 2022-09-02 ENCOUNTER — OFFICE VISIT (OUTPATIENT)
Dept: SPORTS MEDICINE | Facility: CLINIC | Age: 52
End: 2022-09-02
Payer: COMMERCIAL

## 2022-09-02 VITALS
SYSTOLIC BLOOD PRESSURE: 127 MMHG | HEIGHT: 64 IN | BODY MASS INDEX: 39.78 KG/M2 | WEIGHT: 233 LBS | DIASTOLIC BLOOD PRESSURE: 86 MMHG

## 2022-09-02 DIAGNOSIS — M75.01 ADHESIVE CAPSULITIS OF BOTH SHOULDERS: Primary | ICD-10-CM

## 2022-09-02 DIAGNOSIS — M75.02 ADHESIVE CAPSULITIS OF BOTH SHOULDERS: Primary | ICD-10-CM

## 2022-09-02 PROCEDURE — 3074F SYST BP LT 130 MM HG: CPT | Mod: CPTII,S$GLB,, | Performed by: ORTHOPAEDIC SURGERY

## 2022-09-02 PROCEDURE — 99999 PR PBB SHADOW E&M-EST. PATIENT-LVL III: ICD-10-PCS | Mod: PBBFAC,,, | Performed by: ORTHOPAEDIC SURGERY

## 2022-09-02 PROCEDURE — 4010F ACE/ARB THERAPY RXD/TAKEN: CPT | Mod: CPTII,S$GLB,, | Performed by: ORTHOPAEDIC SURGERY

## 2022-09-02 PROCEDURE — 3008F PR BODY MASS INDEX (BMI) DOCUMENTED: ICD-10-PCS | Mod: CPTII,S$GLB,, | Performed by: ORTHOPAEDIC SURGERY

## 2022-09-02 PROCEDURE — 1159F MED LIST DOCD IN RCRD: CPT | Mod: CPTII,S$GLB,, | Performed by: ORTHOPAEDIC SURGERY

## 2022-09-02 PROCEDURE — 4010F PR ACE/ARB THEARPY RXD/TAKEN: ICD-10-PCS | Mod: CPTII,S$GLB,, | Performed by: ORTHOPAEDIC SURGERY

## 2022-09-02 PROCEDURE — 99999 PR PBB SHADOW E&M-EST. PATIENT-LVL III: CPT | Mod: PBBFAC,,, | Performed by: ORTHOPAEDIC SURGERY

## 2022-09-02 PROCEDURE — 3074F PR MOST RECENT SYSTOLIC BLOOD PRESSURE < 130 MM HG: ICD-10-PCS | Mod: CPTII,S$GLB,, | Performed by: ORTHOPAEDIC SURGERY

## 2022-09-02 PROCEDURE — 99214 OFFICE O/P EST MOD 30 MIN: CPT | Mod: S$GLB,,, | Performed by: ORTHOPAEDIC SURGERY

## 2022-09-02 PROCEDURE — 3079F PR MOST RECENT DIASTOLIC BLOOD PRESSURE 80-89 MM HG: ICD-10-PCS | Mod: CPTII,S$GLB,, | Performed by: ORTHOPAEDIC SURGERY

## 2022-09-02 PROCEDURE — 3079F DIAST BP 80-89 MM HG: CPT | Mod: CPTII,S$GLB,, | Performed by: ORTHOPAEDIC SURGERY

## 2022-09-02 PROCEDURE — 3008F BODY MASS INDEX DOCD: CPT | Mod: CPTII,S$GLB,, | Performed by: ORTHOPAEDIC SURGERY

## 2022-09-02 PROCEDURE — 99214 PR OFFICE/OUTPT VISIT, EST, LEVL IV, 30-39 MIN: ICD-10-PCS | Mod: S$GLB,,, | Performed by: ORTHOPAEDIC SURGERY

## 2022-09-02 PROCEDURE — 1159F PR MEDICATION LIST DOCUMENTED IN MEDICAL RECORD: ICD-10-PCS | Mod: CPTII,S$GLB,, | Performed by: ORTHOPAEDIC SURGERY

## 2022-09-02 NOTE — PROGRESS NOTES
ESTABLISHED PATIENT    History 9/2/2022:  Renea returns to clinic today for follow-up of right shoulder adhesive capsulitis. She states she has gained ROM and has decreased pain since last visit. She was given a CSI at last visit.    History 8/1/2022:   52 y.o. Female with a history of right shoulder pain who is referred today by Meg Tijerina PA-C. She is a  and a . She states she started having shoulder pain in 2015. She moved from [place] in 2016. She had a CSI before she moved and she reports relief. She has tried physical therapy since and has had 6 or more CSI since moving to [place]. She reports daily pain and would like to discuss different options.  She believes she is having a similar symptom in her left shoulder.  She did have some loss of motion in the right shoulder.  She occasion would feel radiating pain that would go toward her hand.    Is affecting ADLs.  Pain is 6/10 at it's worst.      REVIEW OF SYSTEMS   Constitution: Negative. Negative for chills, fever and night sweats.   HENT: Negative for congestion and headaches.    Eyes: Negative for blurred vision, left vision loss and right vision loss.   Cardiovascular: Negative for chest pain and syncope.   Respiratory: Negative for cough and shortness of breath.    Endocrine: Negative for polydipsia, polyphagia and polyuria.   Hematologic/Lymphatic: Negative for bleeding problem. Does not bruise/bleed easily.   Skin: Negative for dry skin, itching and rash.   Musculoskeletal: Negative for falls. Positive for right shoulder pain  Gastrointestinal: Negative for abdominal pain and bowel incontinence.   Genitourinary: Negative for bladder incontinence and nocturia.   Neurological: Negative for disturbances in coordination, loss of balance and seizures.   Psychiatric/Behavioral: Negative for depression. The patient does not have insomnia.    Allergic/Immunologic: Negative for hives and persistent infections.     PHYSICAL  "EXAMINATION    Vitals: /86   Ht 5' 4" (1.626 m)   Wt 105.7 kg (233 lb)   LMP 03/01/2011 (Exact Date)   BMI 39.99 kg/m²     General: The patient appears active and healthy with no apparent physical problems.  No disturbance of mood or affect is demonstrated. Alert and Oriented.    HEENT: Eyes normal, pupils equally round, nose normal.    Resp: Equal and symmetrical chest rises. No wheezing    CV: Regular rate    Neck: Supple; nonpainful range of motion.    Extremities: no cyanosis, clubbing, edema, or diffuse swelling.  Palpable pulses, good capillary refill of the digits.  No coolness, discoloration, edema or obvious varicosities.    Skin: no lesions noted.    Lymphatic: no detected adenopathy in the upper or lower extremities.    Neurologic: normal mental status, normal reflexes, normal gait and balance.  Patient is alert and oriented to person, place and time.  No flaccidity or spasticity is noted.  No motor or sensory deficits are noted.  Light touch is intact    Orthopaedic:     SHOULDER EXAM - RIGHT    Inspection:   Normal skin color and appearance with no scars.  No muscle atrophy noted.  No scapular winging.      Palpation: No tenderness of the acromioclavicular joint, lateral edge of the acromion, biceps tendon, trapezius muscle or scapulothoracic bursa.      ROM:      PROM:     FE - 130°    Abd/ER -  90°  Abd/IR -  45°   Add/ER -  45°     AROM:    FE - 130° *pain  degrees    Abd/ER -  90°  Abd/IR -  45°   Add/ER -  40°         Tests:     + Ramos, - Neer's, - Cross Arm Adduction, - Waller, - Yerguson, + Speed. - Belly Press,  + Jobes, - Lift Off    Stability: - sulcus, - apprehension, - relocation, - load and shift, - DLS      Motor:  Rotator cuff strength is 4/5 supraspinatus *pain, 5/5 infraspinatus, 4/5 subscapularis *pain. Biceps, triceps and deltoid strength is 5/5.      Neuro     Distally there are no paresthesias, and sensation is intact to light touch in the median, ulnar, and " radial distributions.  Reflexes are 2/2 biceps, triceps and brachioradialis.        IMAGING    MRI Shoulder Without Contrast Right  Narrative: EXAMINATION:  MRI SHOULDER WITHOUT CONTRAST RIGHT    CLINICAL HISTORY:  Shoulder pain, rotator cuff disorder suspected, xray done;  Pain in right shoulder    TECHNIQUE:  Multiplanar multislice images are were performed through the right shoulder.    COMPARISON:  Shoulder radiographs 03/14/2022; MRI shoulder 09/03/2019    FINDINGS:  Coracoacromial arch: Moderate degenerative changes of the acromioclavicular joint with subacromial spur formation.  Trace fluid in the subacromial subdeltoid bursa.    Rotator cuff: Subscapularis is intact.  Supraspinatus tendinopathy, without discrete tear.  Infraspinatus and teres minor are intact.  No focal muscle atrophy.    Glenoid labrum: No displaced labral tear.    Biceps tendon: The biceps tendon long head is intact at the superior glenoid tubercle and is normally positioned in the biceps groove.    Bone: No fracture, osteonecrosis, or focal lesion.  There are subcortical cysts in the humeral head deep to the rotator cuff footprint.    Joint: No cartilage defect. No joint effusion, synovitis, or capsulitis.  Impression: Moderate acromioclavicular osteoarthritis with subacromial spur formation, mild subacromial subdeltoid bursitis, and rotator cuff tendinopathy.  No discrete rotator cuff tear.    Electronically signed by: Nasim Mcdonald  Date:    07/26/2022  Time:    13:59        IMPRESSION       ICD-10-CM ICD-9-CM   1. Adhesive capsulitis of both shoulders  M75.01 726.0    M75.02          MEDICATIONS PRESCRIBED      None    RECOMMENDATIONS     RTC in 2 months for possible repeat right shoulder glenohumeral CSI  We discussed again her MRI and physical exam findings which have a component of both rotator cuff pathology as well as the adhesive capsulitis.  I do believe at this point the adhesive capsulitis is still the primary pain generator for  her and we will continue to try to treat this nonsurgical as she progresses through the freezing phase of a frozen shoulder.      All questions were answered, pt will contact us for questions or concerns in the interim.

## 2022-09-08 DIAGNOSIS — I10 ESSENTIAL HYPERTENSION: ICD-10-CM

## 2022-09-08 RX ORDER — HYDROCHLOROTHIAZIDE 25 MG/1
12.5 TABLET ORAL DAILY
Qty: 90 TABLET | Refills: 3 | Status: SHIPPED | OUTPATIENT
Start: 2022-09-08 | End: 2023-10-25 | Stop reason: SDUPTHER

## 2022-09-21 ENCOUNTER — HOSPITAL ENCOUNTER (OUTPATIENT)
Dept: RADIOLOGY | Facility: OTHER | Age: 52
Discharge: HOME | End: 2022-09-21
Attending: NURSE PRACTITIONER
Payer: COMMERCIAL

## 2022-09-21 DIAGNOSIS — Z12.31 SCREENING MAMMOGRAM, ENCOUNTER FOR: ICD-10-CM

## 2022-09-21 PROCEDURE — 77063 BREAST TOMOSYNTHESIS BI: CPT | Mod: 26,,, | Performed by: RADIOLOGY

## 2022-09-21 PROCEDURE — 77067 SCR MAMMO BI INCL CAD: CPT | Mod: 26,,, | Performed by: RADIOLOGY

## 2022-09-21 PROCEDURE — 77063 MAMMO DIGITAL SCREENING BILAT WITH TOMO: ICD-10-PCS | Mod: 26,,, | Performed by: RADIOLOGY

## 2022-09-21 PROCEDURE — 77067 SCR MAMMO BI INCL CAD: CPT | Mod: TC

## 2022-09-21 PROCEDURE — 77063 BREAST TOMOSYNTHESIS BI: CPT | Mod: TC

## 2022-09-21 PROCEDURE — 77067 MAMMO DIGITAL SCREENING BILAT WITH TOMO: ICD-10-PCS | Mod: 26,,, | Performed by: RADIOLOGY

## 2022-10-06 ENCOUNTER — PATIENT MESSAGE (OUTPATIENT)
Dept: BARIATRICS | Facility: CLINIC | Age: 52
End: 2022-10-06
Payer: COMMERCIAL

## 2022-10-07 ENCOUNTER — HOSPITAL ENCOUNTER (EMERGENCY)
Facility: HOSPITAL | Age: 52
Discharge: LEFT BEFORE DISCHARGE | End: 2022-10-07
Attending: EMERGENCY MEDICINE
Payer: COMMERCIAL

## 2022-10-07 VITALS
WEIGHT: 225 LBS | BODY MASS INDEX: 38.41 KG/M2 | TEMPERATURE: 98 F | OXYGEN SATURATION: 98 % | DIASTOLIC BLOOD PRESSURE: 91 MMHG | SYSTOLIC BLOOD PRESSURE: 147 MMHG | RESPIRATION RATE: 16 BRPM | HEART RATE: 67 BPM | HEIGHT: 64 IN

## 2022-10-07 DIAGNOSIS — R10.9 ABDOMINAL CRAMPS: ICD-10-CM

## 2022-10-07 LAB
ALBUMIN SERPL-MCNC: 3.2 G/DL (ref 3.3–5.5)
ALBUMIN SERPL-MCNC: 3.3 G/DL (ref 3.3–5.5)
ALLENS TEST: ABNORMAL
ALP SERPL-CCNC: 142 U/L (ref 42–141)
ALP SERPL-CCNC: 156 U/L (ref 42–141)
BILIRUB SERPL-MCNC: 0.3 MG/DL (ref 0.2–1.6)
BILIRUB SERPL-MCNC: 0.4 MG/DL (ref 0.2–1.6)
BILIRUBIN, POC UA: NEGATIVE
BLOOD, POC UA: ABNORMAL
BUN SERPL-MCNC: 23 MG/DL (ref 7–22)
CALCIUM SERPL-MCNC: 9.6 MG/DL (ref 8–10.3)
CHLORIDE SERPL-SCNC: 108 MMOL/L (ref 98–108)
CLARITY, POC UA: CLEAR
COLOR, POC UA: YELLOW
CREAT SERPL-MCNC: 0.8 MG/DL (ref 0.6–1.2)
GLUCOSE SERPL-MCNC: 122 MG/DL (ref 73–118)
GLUCOSE, POC UA: NEGATIVE
HCO3 UR-SCNC: 28.2 MMOL/L (ref 24–28)
KETONES, POC UA: NEGATIVE
LDH SERPL L TO P-CCNC: 1.67 MMOL/L (ref 0.5–2.2)
LEUKOCYTE EST, POC UA: NEGATIVE
NITRITE, POC UA: NEGATIVE
PCO2 BLDA: 44.6 MMHG (ref 35–45)
PH SMN: 7.41 [PH] (ref 7.35–7.45)
PH UR STRIP: 6 [PH]
PO2 BLDA: 42 MMHG (ref 40–60)
POC ALT (SGPT): 15 U/L (ref 10–47)
POC ALT (SGPT): 21 U/L (ref 10–47)
POC AMYLASE: 68 U/L (ref 14–97)
POC AST (SGOT): 23 U/L (ref 11–38)
POC AST (SGOT): 25 U/L (ref 11–38)
POC BE: 3 MMOL/L
POC GGT: 15 U/L (ref 5–65)
POC SATURATED O2: 77 % (ref 95–100)
POC TCO2: 28 MMOL/L (ref 18–33)
POC TCO2: 30 MMOL/L (ref 24–29)
POTASSIUM BLD-SCNC: 4 MMOL/L (ref 3.6–5.1)
PROTEIN, POC UA: NEGATIVE
PROTEIN, POC: 7.2 G/DL (ref 6.4–8.1)
PROTEIN, POC: 7.2 G/DL (ref 6.4–8.1)
SAMPLE: ABNORMAL
SITE: ABNORMAL
SODIUM BLD-SCNC: 146 MMOL/L (ref 128–145)
SPECIFIC GRAVITY, POC UA: >=1.03
UROBILINOGEN, POC UA: 0.2 E.U./DL

## 2022-10-07 PROCEDURE — 99285 EMERGENCY DEPT VISIT HI MDM: CPT | Mod: 25,ER

## 2022-10-07 PROCEDURE — 80053 COMPREHEN METABOLIC PANEL: CPT | Mod: ER

## 2022-10-07 PROCEDURE — 93005 ELECTROCARDIOGRAM TRACING: CPT | Mod: ER

## 2022-10-07 PROCEDURE — 93010 EKG 12-LEAD: ICD-10-PCS | Mod: ,,, | Performed by: INTERNAL MEDICINE

## 2022-10-07 PROCEDURE — 81003 URINALYSIS AUTO W/O SCOPE: CPT | Mod: ER

## 2022-10-07 PROCEDURE — 82803 BLOOD GASES ANY COMBINATION: CPT | Mod: ER

## 2022-10-07 PROCEDURE — 93010 ELECTROCARDIOGRAM REPORT: CPT | Mod: ,,, | Performed by: INTERNAL MEDICINE

## 2022-10-07 PROCEDURE — 25500020 PHARM REV CODE 255: Mod: ER | Performed by: EMERGENCY MEDICINE

## 2022-10-07 PROCEDURE — 85025 COMPLETE CBC W/AUTO DIFF WBC: CPT | Mod: ER

## 2022-10-07 PROCEDURE — 82150 ASSAY OF AMYLASE: CPT | Mod: ER

## 2022-10-07 RX ADMIN — IOHEXOL 100 ML: 350 INJECTION, SOLUTION INTRAVENOUS at 02:10

## 2022-10-07 NOTE — ED PROVIDER NOTES
Encounter Date: 10/7/2022       History     Chief Complaint   Patient presents with    Abdominal Pain     Pt presents to ER with c/o lower abdominal pain that started a couple hours ago described as a really bad cramping.  She denies any nausea, vomiting or diarrhea.       52 y.o. female with hypertension, GERD, and others presents emergency department complaining of sudden onset acute, intense, suprapubic pressure and nausea that began just prior to arrival.  30 minutes prior to onset of symptoms, patient reports eating a meal.  She states symptoms resolved spontaneously after about an hour.  She denies vomiting, diarrhea, constipation, dysuria, frequency, hematuria, vaginal discharge, bright red blood per rectum, melena, flank pain, back pain, chest pain, shortness of breath or fever.  She denies taking medication for symptoms prior to arrival.  She denies having similar symptoms in the past.  All symptoms are currently resolved.    Pertinent surgical history includes:  , tubal ligation, pelvic mass excision, hysterectomy, BSO, gastric sleeve,    The history is provided by the patient.   Review of patient's allergies indicates:  No Known Allergies  Past Medical History:   Diagnosis Date    Acute pain of right knee 2016    Chronic right shoulder pain 2016    COVID-19 virus infection 4/10/2020    Essential hypertension 2016    GERD (gastroesophageal reflux disease)     Incomplete tear of right rotator cuff 2016    Osteoarthritis of right knee 2016    Rotator cuff tear     S/P Exploratory Laparotomy/Mass Resection/BSO 2019 1. Soft tissue, abdominal wall, mass, resection: - Benign leiomyoma, 6.5 cm in greatest dimension, see comment. 2. Ovary and fallopian tube, left, left salpingo-oophorectomy: - Benign ovary with cystic corpus luteum, 2.7 cm in greatest dimension. - Benign fallopian tube with no significant histologic abnormality. 3. Fallopian tube and ovary,  right, right salpingo-oophorectomy: - Benign o    S/P laparoscopic sleeve gastrectomy 2016    Syncope and collapse 2017     Past Surgical History:   Procedure Laterality Date    BILATERAL SALPINGO-OOPHORECTOMY (BSO) Bilateral 2019    Procedure: SALPINGO-OOPHORECTOMY, BILATERAL;  Surgeon: Petrona Porter MD;  [facility];  Service: OB/GYN;  Laterality: Bilateral;     SECTION      x1    COLONOSCOPY N/A 2019    Procedure: COLONOSCOPY;  Surgeon: Chris Bennett MD;  Location: [facility] ENDO (4TH FLR);  Service: Endoscopy;  Laterality: N/A;  Justo Camilo, or Donald if no availability in Dec. will schedule after CT scan results    COLPOSCOPY      EXCISION OF PELVIC MASS N/A 2019    Procedure: EXCISION, MASS, PELVIS;  Surgeon: Petrona Porter MD;  Location: [facility] OR 2ND FLR;  Service: OB/GYN;  Laterality: N/A;    HERNIA REPAIR      HYSTERECTOMY      YANG, ovaries remain (fibroids0    LAPAROTOMY, EXPLORATORY N/A 2019    Procedure: LAPAROTOMY, EXPLORATORY;  Surgeon: Petrona Porter MD;  [facility];  Service: OB/GYN;  Laterality: N/A;    OOPHORECTOMY Bilateral 2019    SLEEVE GASTROPLASTY  2014    TUBAL LIGATION       Family History   Problem Relation Age of Onset    Hypertension Mother     Heart disease Mother     Heart attack Mother     Rectal cancer Mother     Colon cancer Mother     Hypertension Father     Heart disease Father     Stroke Father     Heart attack Father     Hypertension Sister     No Known Problems Daughter     Breast cancer Maternal Aunt     Hypertension Brother     Cancer Brother     No Known Problems Son     Esophageal cancer Neg Hx      Social History     Tobacco Use    Smoking status: Never    Smokeless tobacco: Never   Substance Use Topics    Alcohol use: Yes     Comment: occasionally    Drug use: No     Review of Systems   Constitutional:  Negative for activity change, appetite change and fever.   Respiratory:  Negative  for cough and shortness of breath.    Cardiovascular:  Negative for chest pain.   Gastrointestinal:  Positive for abdominal pain and nausea. Negative for blood in stool, constipation, diarrhea and vomiting.   Genitourinary:  Negative for dysuria, flank pain, frequency, hematuria, vaginal bleeding and vaginal discharge.   Musculoskeletal:  Negative for back pain and gait problem.   All other systems reviewed and are negative.    Physical Exam     Initial Vitals [10/07/22 0012]   BP Pulse Resp Temp SpO2   (!) 139/97 81 20 97.6 °F (36.4 °C) 99 %      MAP       --         Physical Exam    Nursing note and vitals reviewed.  Constitutional: She appears well-developed and well-nourished. She is not diaphoretic. She is Obese . She is cooperative.  Non-toxic appearance. No distress.   HENT:   Head: Normocephalic and atraumatic.   Eyes: Conjunctivae are normal. Pupils are equal, round, and reactive to light. No scleral icterus.   Neck: Phonation normal. Neck supple. No stridor present.   Normal range of motion.  Cardiovascular:  Normal rate and intact distal pulses.           Pulmonary/Chest: No accessory muscle usage. No tachypnea. No respiratory distress.   Abdominal: Abdomen is soft and protuberant. Bowel sounds are normal. She exhibits distension. She exhibits no fluid wave. There is no abdominal tenderness.   No right CVA tenderness.  No left CVA tenderness. There is no rebound, no guarding, no tenderness at McBurney's point and negative Collier's sign.   Musculoskeletal:         General: No tenderness. Normal range of motion.      Cervical back: Normal range of motion and neck supple.     Neurological: She is alert and oriented to person, place, and time. She has normal strength. Gait normal. GCS eye subscore is 4. GCS verbal subscore is 5. GCS motor subscore is 6.   Skin: Skin is warm. Capillary refill takes less than 2 seconds.   Psychiatric: She has a normal mood and affect.       ED Course   Procedures  Labs Reviewed    POCT URINALYSIS W/O SCOPE - Abnormal; Notable for the following components:       Result Value    Spec Grav UA >=1.030 (*)     Blood, UA Trace-intact (*)     All other components within normal limits   ISTAT PROCEDURE - Abnormal; Notable for the following components:    POC HCO3 28.2 (*)     POC SATURATED O2 77 (*)     POC TCO2 30 (*)     All other components within normal limits   POCT LIVER PANEL - Abnormal; Notable for the following components:    Alkaline Phosphatase,  (*)     All other components within normal limits   POCT CMP - Abnormal; Notable for the following components:    Alkaline Phosphatase,  (*)     POC BUN 23 (*)     POC Glucose 122 (*)     POC Sodium 146 (*)     All other components within normal limits   POCT URINALYSIS W/O SCOPE   POCT CBC   POCT CMP   POCT LIVER PANEL     EKG Readings: (Independently Interpreted)   Initial Reading: No STEMI. Rhythm: Normal Sinus Rhythm. Heart Rate: 75. Ectopy: No Ectopy. Conduction: Normal. ST Segments: Normal ST Segments. T Waves: Normal. Axis: Normal. Clinical Impression: Normal Sinus Rhythm     Imaging Results              CT Abdomen Pelvis With Contrast (In process)  Result time 10/07/22 02:50:02                     Medications   iohexoL (OMNIPAQUE 350) injection 100 mL (100 mLs Intravenous Given 10/7/22 0209)     Medical Decision Making:   Clinical Tests:   Lab Tests: Ordered and Reviewed  Radiological Study: Ordered and Reviewed  Medical Tests: Reviewed and Ordered  ED Management:  Patient left ED AMA prior to results of CTAP since her symptoms were resolved.                       Labs Reviewed      Admission on 10/07/2022   Component Date Value Ref Range Status    Glucose, UA 10/07/2022 Negative   Final    Bilirubin, UA 10/07/2022 Negative   Final    Ketones, UA 10/07/2022 Negative   Final    Spec Grav UA 10/07/2022 >=1.030 (>)   Final    Blood, UA 10/07/2022 Trace-intact (A)   Final    PH, UA 10/07/2022 6.0   Final    Protein, UA  10/07/2022 Negative   Final    Urobilinogen, UA 10/07/2022 0.2  E.U./dL Final    Nitrite, UA 10/07/2022 Negative   Final    Leukocytes, UA 10/07/2022 Negative   Final    Color, UA 10/07/2022 Yellow   Final    Clarity, UA 10/07/2022 Clear   Final    POC PH 10/07/2022 7.409  7.35 - 7.45 Final    POC PCO2 10/07/2022 44.6  35 - 45 mmHg Final    POC PO2 10/07/2022 42  40 - 60 mmHg Final    POC HCO3 10/07/2022 28.2 (H)  24 - 28 mmol/L Final    POC BE 10/07/2022 3  -2 to 2 mmol/L Final    POC SATURATED O2 10/07/2022 77 (L)  95 - 100 % Final    POC Lactate 10/07/2022 1.67  0.5 - 2.2 mmol/L Final    POC TCO2 10/07/2022 30 (H)  24 - 29 mmol/L Final    Sample 10/07/2022 VENOUS   Final    Site 10/07/2022 Other   Final    Allens Test 10/07/2022 N/A   Final    Albumin, POC 10/07/2022 3.3  3.3 - 5.5 g/dL Final    Alkaline Phosphatase, POC 10/07/2022 142 (H)  42 - 141 U/L Final    ALT (SGPT), POC 10/07/2022 21  10 - 47 U/L Final    Amylase, POC 10/07/2022 68  14 - 97 U/L Final    AST (SGOT), POC 10/07/2022 25  11 - 38 U/L Final    POC GGT 10/07/2022 15  5 - 65 U/L Final    Bilirubin, POC 10/07/2022 0.3  0.2 - 1.6 mg/dL Final    Protein, POC 10/07/2022 7.2  6.4 - 8.1 g/dL Final    Albumin, POC 10/07/2022 3.2  3.3 - 5.5 g/dL Final    Alkaline Phosphatase, POC 10/07/2022 156 (H)  42 - 141 U/L Final    ALT (SGPT), POC 10/07/2022 15  10 - 47 U/L Final    AST (SGOT), POC 10/07/2022 23  11 - 38 U/L Final    POC BUN 10/07/2022 23 (H)  7 - 22 mg/dL Final    Calcium, POC 10/07/2022 9.6  8.0 - 10.3 mg/dL Final    POC Chloride 10/07/2022 108  98 - 108 mmol/L Final    POC Creatinine 10/07/2022 0.8  0.6 - 1.2 mg/dL Final    POC Glucose 10/07/2022 122 (H)  73 - 118 mg/dL Final    POC Potassium 10/07/2022 4.0  3.6 - 5.1 mmol/L Final    POC Sodium 10/07/2022 146 (H)  128 - 145 mmol/L Final    Bilirubin, POC 10/07/2022 0.4  0.2 - 1.6 mg/dL Final    POC TCO2 10/07/2022 28  18 - 33 mmol/L Final    Protein, POC 10/07/2022 7.2  6.4 - 8.1 g/dL Final         Imaging Reviewed    Imaging Results              CT Abdomen Pelvis With Contrast (Final result)  Result time 10/07/22 03:45:05      Final result by Ness Mccormick MD (10/07/22 03:45:05)                   Impression:      1. Postoperative change of sleeve gastrectomy.  Slight wall thickening of the gastric remnant which could relate to nondistention although correlation for symptoms of gastritis advised.  2. Circumferential bladder wall thickening which can be seen with nondistention or cystitis.  Correlation with urinalysis recommended.  3. The subcentimeter hepatic hypodensities and subcentimeter right renal cortical hypodensity too small to definitively characterize.  Left renal cyst.  4. 3 mm right lower lobe pulmonary nodule.  For a solid nodule <6 mm, Fleischner Society 2017 guidelines recommend no routine follow up for a low risk patient, or follow-up with non-contrast chest CT at 12 months in a high risk patient.  5. Hysterectomy.  6. Additional findings as above.      Electronically signed by: Ness Mccormick MD  Date:    10/07/2022  Time:    03:45               Narrative:    EXAMINATION:  CT ABDOMEN PELVIS WITH CONTRAST    CLINICAL HISTORY:  LLQ abdominal pain;    TECHNIQUE:  Low dose axial images, sagittal and coronal reformations were obtained from the lung bases to the pubic symphysis following the IV administration of 100 mL of Omnipaque 350 .  No oral contrast.    COMPARISON:  CT abdomen pelvis 12/07/2018    FINDINGS:  There is a an incompletely visualized 3 mm right lower lobe pulmonary nodule.  There is dependent bibasilar atelectasis with more focal left lower lobe platelike atelectatic change.  There is a small calcified left lower lobe pulmonary granuloma.  No pleural fluid.  The visualized portions of the heart and pericardium are within normal limits.    The liver is demonstrates a few subcentimeter hypodensities too small to definitively characterize.  Liver is hypoattenuating which can be  seen in the setting of hepatic steatosis.  Correlation with LFTs advised.  No calcified stones are identified in the gallbladder lumen.  No significant intra or extrahepatic biliary ductal dilatation.  The spleen, pancreas and adrenal glands are unremarkable.    The kidneys are normal in size and location enhance symmetrically.  There is a 1.5 cm left renal cortical hypodensity, likely a cyst.  There is a subcentimeter right renal cortical hypodensity too small to definitively characterize.  No evidence of hydronephrosis.  There is circumferential bladder wall thickening which can be seen with nondistention or cystitis.  Correlation with urinalysis is advised.  Uterus is surgically absent.    The abdominal aorta is nonaneurysmal.  There are shotty periaortic lymph nodes present.    There is postoperative change of sleeve gastrectomy.  There is a small hiatal hernia.  Questionable wall thickening of the gastric remnant which could relate to nondistention although correlation for symptoms of gastritis advised.  The visualized loops of large and small bowel demonstrate no evidence of obstruction or inflammatory change.  The appendix is unremarkable.  There is no ascites, free intraperitoneal air or portal venous gas.    The visualized osseous structures are intact.  There is a small fat containing umbilical hernia.  There is an additional fat containing ventral wall abdominal hernia present.                                      Medications given in ED    Medications   iohexoL (OMNIPAQUE 350) injection 100 mL (100 mLs Intravenous Given 10/7/22 0201)       Note was created using voice recognition software. Note may have occasional typographical errors that may not have been identified and edited despite good sanjiv initial review prior to signing.    Clinical Impression:   Final diagnoses:  [R10.9] Abdominal cramps               Taz Cruz MD  10/07/22 2420

## 2022-10-10 ENCOUNTER — OFFICE VISIT (OUTPATIENT)
Dept: SPORTS MEDICINE | Facility: CLINIC | Age: 52
End: 2022-10-10
Payer: COMMERCIAL

## 2022-10-10 VITALS
BODY MASS INDEX: 38.41 KG/M2 | HEIGHT: 64 IN | WEIGHT: 225 LBS | HEART RATE: 90 BPM | DIASTOLIC BLOOD PRESSURE: 68 MMHG | SYSTOLIC BLOOD PRESSURE: 125 MMHG

## 2022-10-10 DIAGNOSIS — M75.111 NONTRAUMATIC INCOMPLETE TEAR OF RIGHT ROTATOR CUFF: ICD-10-CM

## 2022-10-10 DIAGNOSIS — M67.911 TENDINOPATHY OF ROTATOR CUFF, RIGHT: Primary | ICD-10-CM

## 2022-10-10 PROCEDURE — 3008F PR BODY MASS INDEX (BMI) DOCUMENTED: ICD-10-PCS | Mod: CPTII,S$GLB,, | Performed by: ORTHOPAEDIC SURGERY

## 2022-10-10 PROCEDURE — 4010F PR ACE/ARB THEARPY RXD/TAKEN: ICD-10-PCS | Mod: CPTII,S$GLB,, | Performed by: ORTHOPAEDIC SURGERY

## 2022-10-10 PROCEDURE — 99999 PR PBB SHADOW E&M-EST. PATIENT-LVL III: ICD-10-PCS | Mod: PBBFAC,,, | Performed by: ORTHOPAEDIC SURGERY

## 2022-10-10 PROCEDURE — 3008F BODY MASS INDEX DOCD: CPT | Mod: CPTII,S$GLB,, | Performed by: ORTHOPAEDIC SURGERY

## 2022-10-10 PROCEDURE — 99214 OFFICE O/P EST MOD 30 MIN: CPT | Mod: S$GLB,,, | Performed by: ORTHOPAEDIC SURGERY

## 2022-10-10 PROCEDURE — 4010F ACE/ARB THERAPY RXD/TAKEN: CPT | Mod: CPTII,S$GLB,, | Performed by: ORTHOPAEDIC SURGERY

## 2022-10-10 PROCEDURE — 99214 PR OFFICE/OUTPT VISIT, EST, LEVL IV, 30-39 MIN: ICD-10-PCS | Mod: S$GLB,,, | Performed by: ORTHOPAEDIC SURGERY

## 2022-10-10 PROCEDURE — 3078F DIAST BP <80 MM HG: CPT | Mod: CPTII,S$GLB,, | Performed by: ORTHOPAEDIC SURGERY

## 2022-10-10 PROCEDURE — 1159F PR MEDICATION LIST DOCUMENTED IN MEDICAL RECORD: ICD-10-PCS | Mod: CPTII,S$GLB,, | Performed by: ORTHOPAEDIC SURGERY

## 2022-10-10 PROCEDURE — 3074F SYST BP LT 130 MM HG: CPT | Mod: CPTII,S$GLB,, | Performed by: ORTHOPAEDIC SURGERY

## 2022-10-10 PROCEDURE — 3078F PR MOST RECENT DIASTOLIC BLOOD PRESSURE < 80 MM HG: ICD-10-PCS | Mod: CPTII,S$GLB,, | Performed by: ORTHOPAEDIC SURGERY

## 2022-10-10 PROCEDURE — 99999 PR PBB SHADOW E&M-EST. PATIENT-LVL III: CPT | Mod: PBBFAC,,, | Performed by: ORTHOPAEDIC SURGERY

## 2022-10-10 PROCEDURE — 3074F PR MOST RECENT SYSTOLIC BLOOD PRESSURE < 130 MM HG: ICD-10-PCS | Mod: CPTII,S$GLB,, | Performed by: ORTHOPAEDIC SURGERY

## 2022-10-10 PROCEDURE — 1159F MED LIST DOCD IN RCRD: CPT | Mod: CPTII,S$GLB,, | Performed by: ORTHOPAEDIC SURGERY

## 2022-10-10 NOTE — PROGRESS NOTES
ESTABLISHED PATIENT    History 10/10/2022:   Renea returns to clinic today for follow up evaluation of her right shoulder pain. She continues to appreciate decreased range of motion and pain at the shoulder. She is interested in discussing surgical options at this time.  She believes she has received 8 corticosteroid injections into her shoulder and wants to finally proceed with surgical intervention.  She states she does not want to keep getting injections into the shoulder.  She states they do help and provide relief however the pain always returns a few months after the injection.    History 9/2/2022:  Renea returns to clinic today for follow-up of right shoulder adhesive capsulitis. She states she has gained ROM and has decreased pain since last visit. She was given a right glenohumeral joint CSI at last visit.    History 8/1/2022:   52 y.o. Female with a history of right shoulder pain who is referred today by Meg Tijerina PA-C. She is a  and a . She states she started having shoulder pain in 2015. She moved from [place] in 2016. She had a CSI before she moved and she reports relief. She has tried physical therapy since and has had 6 or more CSI since moving to [place]. She reports daily pain and would like to discuss different options.  She believes she is having a similar symptom in her left shoulder.  She did have some loss of motion in the right shoulder.  She occasion would feel radiating pain that would go toward her hand.    Is affecting ADLs.  Pain is 6/10 at it's worst.      REVIEW OF SYSTEMS   Constitution: Negative. Negative for chills, fever and night sweats.   HENT: Negative for congestion and headaches.    Eyes: Negative for blurred vision, left vision loss and right vision loss.   Cardiovascular: Negative for chest pain and syncope.   Respiratory: Negative for cough and shortness of breath.    Endocrine: Negative for polydipsia, polyphagia and polyuria.  "  Hematologic/Lymphatic: Negative for bleeding problem. Does not bruise/bleed easily.   Skin: Negative for dry skin, itching and rash.   Musculoskeletal: Negative for falls. Positive for right shoulder pain  Gastrointestinal: Negative for abdominal pain and bowel incontinence.   Genitourinary: Negative for bladder incontinence and nocturia.   Neurological: Negative for disturbances in coordination, loss of balance and seizures.   Psychiatric/Behavioral: Negative for depression. The patient does not have insomnia.    Allergic/Immunologic: Negative for hives and persistent infections.     PHYSICAL EXAMINATION    Vitals: /68   Pulse 90   Ht 5' 4" (1.626 m)   Wt 102.1 kg (225 lb)   LMP 03/01/2011 (Exact Date)   BMI 38.62 kg/m²     General: The patient appears active and healthy with no apparent physical problems.  No disturbance of mood or affect is demonstrated. Alert and Oriented.    HEENT: Eyes normal, pupils equally round, nose normal.    Resp: Equal and symmetrical chest rises. No wheezing    CV: Regular rate    Neck: Supple; nonpainful range of motion.    Extremities: no cyanosis, clubbing, edema, or diffuse swelling.  Palpable pulses, good capillary refill of the digits.  No coolness, discoloration, edema or obvious varicosities.    Skin: no lesions noted.    Lymphatic: no detected adenopathy in the upper or lower extremities.    Neurologic: normal mental status, normal reflexes, normal gait and balance.  Patient is alert and oriented to person, place and time.  No flaccidity or spasticity is noted.  No motor or sensory deficits are noted.  Light touch is intact    Orthopaedic:     SHOULDER EXAM - RIGHT    Inspection:   Normal skin color and appearance with no scars.  No muscle atrophy noted.  No scapular winging.      Palpation: No tenderness of the acromioclavicular joint, lateral edge of the acromion, biceps tendon, trapezius muscle or scapulothoracic bursa.      ROM:      PROM:     FE - 150°    " Abd/ER -  90°  Abd/IR -  45°   Add/ER -  45°     AROM:    FE - 150° *pain  degrees    Abd/ER -  90°  Abd/IR -  45°   Add/ER -  40°         Tests:     + Ramos, - Neer's, - Cross Arm Adduction, - Rover, - Yerguson, + Speed. - Belly Press,  + Jobes, - Lift Off    Stability: - sulcus, - apprehension, - relocation, - load and shift, - DLS      Motor:  Rotator cuff strength is 4/5 supraspinatus *pain, 5/5 infraspinatus, 4/5 subscapularis *pain. Biceps, triceps and deltoid strength is 5/5.      Neuro     Distally there are no paresthesias, and sensation is intact to light touch in the median, ulnar, and radial distributions.  Reflexes are 2/2 biceps, triceps and brachioradialis.        IMAGING    I reviewed a previous MRI which demonstrates high-grade tendinopathy of the entire supraspinatus tendon extending into the infraspinatus with an area of a partial thickness articular sided rotator cuff tear with cystic changes noted of the greater tuberosity.      IMPRESSION       ICD-10-CM ICD-9-CM   1. Tendinopathy of rotator cuff, right  M67.911 727.9   2. Nontraumatic incomplete tear of right rotator cuff  M75.111 726.13           MEDICATIONS PRESCRIBED      None    RECOMMENDATIONS     We discussed surgical versus non-surgical treatment options and at this time she would like to pursue surgical intervention.  Considering she has failed extensive nonsurgical treatment for many years, I advised her that a diagnostic arthroscopy with a arthroscopic debridement versus arthroscopic rotator cuff repair would be indicated at this point.  She has failed 8 subacromial and 1 glenohumeral corticosteroid injection.  Right shoulder arthroscopic rotator cuff repair, extensive debridement, subacromial decompression and any other necessary or indicated procedure  Obtain pre-op clearance from PCP   Pre-op appointment with Mateo Gordon PA-C      All questions were answered, pt will contact us for questions or concerns in the  interim.

## 2022-10-13 DIAGNOSIS — M67.911 TENDINOPATHY OF ROTATOR CUFF, RIGHT: ICD-10-CM

## 2022-10-13 DIAGNOSIS — M75.111 NONTRAUMATIC INCOMPLETE TEAR OF RIGHT ROTATOR CUFF: Primary | ICD-10-CM

## 2022-10-24 ENCOUNTER — TELEPHONE (OUTPATIENT)
Dept: INTERNAL MEDICINE | Facility: CLINIC | Age: 52
End: 2022-10-24
Payer: COMMERCIAL

## 2022-10-24 NOTE — TELEPHONE ENCOUNTER
Attempted to call pt to reschedule appt  Left message to call back  Will also send portal message  Appointment moved to Dr Farah at 245 on 10/25

## 2022-10-25 ENCOUNTER — OFFICE VISIT (OUTPATIENT)
Dept: INTERNAL MEDICINE | Facility: CLINIC | Age: 52
End: 2022-10-25
Payer: COMMERCIAL

## 2022-10-25 ENCOUNTER — LAB VISIT (OUTPATIENT)
Dept: LAB | Facility: HOSPITAL | Age: 52
End: 2022-10-25
Payer: COMMERCIAL

## 2022-10-25 VITALS
SYSTOLIC BLOOD PRESSURE: 116 MMHG | HEIGHT: 64 IN | OXYGEN SATURATION: 97 % | BODY MASS INDEX: 40.24 KG/M2 | DIASTOLIC BLOOD PRESSURE: 88 MMHG | WEIGHT: 235.69 LBS | HEART RATE: 70 BPM

## 2022-10-25 DIAGNOSIS — R91.1 LUNG NODULE SEEN ON IMAGING STUDY: ICD-10-CM

## 2022-10-25 DIAGNOSIS — Z01.810 PREOP CARDIOVASCULAR EXAM: ICD-10-CM

## 2022-10-25 DIAGNOSIS — R74.8 ELEVATED ALKALINE PHOSPHATASE LEVEL: ICD-10-CM

## 2022-10-25 DIAGNOSIS — R74.8 ELEVATED ALKALINE PHOSPHATASE LEVEL: Primary | ICD-10-CM

## 2022-10-25 LAB
ALBUMIN SERPL BCP-MCNC: 3.8 G/DL (ref 3.5–5.2)
ALP SERPL-CCNC: 128 U/L (ref 55–135)
ALT SERPL W/O P-5'-P-CCNC: 13 U/L (ref 10–44)
AST SERPL-CCNC: 16 U/L (ref 10–40)
BILIRUB DIRECT SERPL-MCNC: 0.2 MG/DL (ref 0.1–0.3)
BILIRUB SERPL-MCNC: 0.3 MG/DL (ref 0.1–1)
PROT SERPL-MCNC: 8.1 G/DL (ref 6–8.4)

## 2022-10-25 PROCEDURE — 99213 OFFICE O/P EST LOW 20 MIN: CPT | Mod: S$GLB,,, | Performed by: STUDENT IN AN ORGANIZED HEALTH CARE EDUCATION/TRAINING PROGRAM

## 2022-10-25 PROCEDURE — 3079F DIAST BP 80-89 MM HG: CPT | Mod: CPTII,S$GLB,, | Performed by: STUDENT IN AN ORGANIZED HEALTH CARE EDUCATION/TRAINING PROGRAM

## 2022-10-25 PROCEDURE — 99999 PR PBB SHADOW E&M-EST. PATIENT-LVL IV: CPT | Mod: PBBFAC,,, | Performed by: STUDENT IN AN ORGANIZED HEALTH CARE EDUCATION/TRAINING PROGRAM

## 2022-10-25 PROCEDURE — 80076 HEPATIC FUNCTION PANEL: CPT | Performed by: STUDENT IN AN ORGANIZED HEALTH CARE EDUCATION/TRAINING PROGRAM

## 2022-10-25 PROCEDURE — 4010F PR ACE/ARB THEARPY RXD/TAKEN: ICD-10-PCS | Mod: CPTII,S$GLB,, | Performed by: STUDENT IN AN ORGANIZED HEALTH CARE EDUCATION/TRAINING PROGRAM

## 2022-10-25 PROCEDURE — 3074F SYST BP LT 130 MM HG: CPT | Mod: CPTII,S$GLB,, | Performed by: STUDENT IN AN ORGANIZED HEALTH CARE EDUCATION/TRAINING PROGRAM

## 2022-10-25 PROCEDURE — 99999 PR PBB SHADOW E&M-EST. PATIENT-LVL IV: ICD-10-PCS | Mod: PBBFAC,,, | Performed by: STUDENT IN AN ORGANIZED HEALTH CARE EDUCATION/TRAINING PROGRAM

## 2022-10-25 PROCEDURE — 3079F PR MOST RECENT DIASTOLIC BLOOD PRESSURE 80-89 MM HG: ICD-10-PCS | Mod: CPTII,S$GLB,, | Performed by: STUDENT IN AN ORGANIZED HEALTH CARE EDUCATION/TRAINING PROGRAM

## 2022-10-25 PROCEDURE — 99213 PR OFFICE/OUTPT VISIT, EST, LEVL III, 20-29 MIN: ICD-10-PCS | Mod: S$GLB,,, | Performed by: STUDENT IN AN ORGANIZED HEALTH CARE EDUCATION/TRAINING PROGRAM

## 2022-10-25 PROCEDURE — 36415 COLL VENOUS BLD VENIPUNCTURE: CPT | Performed by: STUDENT IN AN ORGANIZED HEALTH CARE EDUCATION/TRAINING PROGRAM

## 2022-10-25 PROCEDURE — 4010F ACE/ARB THERAPY RXD/TAKEN: CPT | Mod: CPTII,S$GLB,, | Performed by: STUDENT IN AN ORGANIZED HEALTH CARE EDUCATION/TRAINING PROGRAM

## 2022-10-25 PROCEDURE — 3074F PR MOST RECENT SYSTOLIC BLOOD PRESSURE < 130 MM HG: ICD-10-PCS | Mod: CPTII,S$GLB,, | Performed by: STUDENT IN AN ORGANIZED HEALTH CARE EDUCATION/TRAINING PROGRAM

## 2022-10-25 NOTE — PROGRESS NOTES
Subjective     Chief Complaint: preop    History of Present Illness:  Ms. Renea Mac is a 52 y.o. female with migraines, HTN, s/p ex lap and BSO for removal of mass (benign leiomyoma) (1/31/19) , s/p gastric sleeve and chornic lower back and right shoulder pain who presents for preop clearance for diagnostic arthroscopy with a arthroscopic debridement versus arthroscopic rotator cuff repair with Dr Eliseo Bailon (sports medicine).  Has been dealing with right shoulder pain/decreased range of motion since at least 2015, has received 8 subacromial and 1 glenohumeral CSI.      Has pre-op appointment with sports medicine on 10/28, surgery scheduled on 11/3.    Was seen in ED earlier this month for abdominal pain, attributed to gastritis, which self resolved.  Today denies fever, chills, night sweats, recent weight loss, headache, chest pain, cough, shortness of breath, abdominal pain, nausea, vomiting, diarrhea, dysuria, urinary frequency.    Review of Systems   Constitutional:  Negative for fever.   HENT:  Negative for sore throat.    Eyes:  Negative for blurred vision.   Respiratory:  Negative for cough.    Cardiovascular:  Negative for chest pain.   Gastrointestinal:  Negative for abdominal pain.   Genitourinary:  Negative for dysuria.   Musculoskeletal:  Negative for back pain.   Neurological:  Negative for headaches.   Endo/Heme/Allergies:  Does not bruise/bleed easily.   Psychiatric/Behavioral:  Negative for depression.      PAST HISTORY:     Past Medical History:   Diagnosis Date    Acute pain of right knee 11/4/2016    Chronic right shoulder pain 11/4/2016    COVID-19 virus infection 4/10/2020    Essential hypertension 11/4/2016    GERD (gastroesophageal reflux disease)     Incomplete tear of right rotator cuff 11/30/2016    Osteoarthritis of right knee 11/30/2016    Rotator cuff tear     S/P Exploratory Laparotomy/Mass Resection/BSO 1/31/2019 1- 1. Soft tissue, abdominal wall, mass, resection: -  Benign leiomyoma, 6.5 cm in greatest dimension, see comment. 2. Ovary and fallopian tube, left, left salpingo-oophorectomy: - Benign ovary with cystic corpus luteum, 2.7 cm in greatest dimension. - Benign fallopian tube with no significant histologic abnormality. 3. Fallopian tube and ovary, right, right salpingo-oophorectomy: - Benign o    S/P laparoscopic sleeve gastrectomy 2016    Syncope and collapse 2017       Past Surgical History:   Procedure Laterality Date    BILATERAL SALPINGO-OOPHORECTOMY (BSO) Bilateral 2019    Procedure: SALPINGO-OOPHORECTOMY, BILATERAL;  Surgeon: Petrona Porter MD;  Location: [facility] OR 2ND FLR;  Service: OB/GYN;  Laterality: Bilateral;     SECTION      x1    COLONOSCOPY N/A 2019    Procedure: COLONOSCOPY;  Surgeon: Chris Bennett MD;  Location: [facility] (4TH FLR);  Service: Endoscopy;  Laterality: N/A;  Justo Camilo, or Donald if no availability in Dec. will schedule after CT scan results    COLPOSCOPY      EXCISION OF PELVIC MASS N/A 2019    Procedure: EXCISION, MASS, PELVIS;  Surgeon: Petrona Porter MD;  Location: [facility] OR 2ND FLR;  Service: OB/GYN;  Laterality: N/A;    HERNIA REPAIR      HYSTERECTOMY      YANG, ovaries remain (fibroids0    LAPAROTOMY, EXPLORATORY N/A 2019    Procedure: LAPAROTOMY, EXPLORATORY;  Surgeon: Petrona Porter MD;  Location: [facility] OR 2ND FLR;  Service: OB/GYN;  Laterality: N/A;    OOPHORECTOMY Bilateral 2019    SLEEVE GASTROPLASTY  2014    TUBAL LIGATION         Family History   Problem Relation Age of Onset    Hypertension Mother     Heart disease Mother     Heart attack Mother     Rectal cancer Mother     Colon cancer Mother     Hypertension Father     Heart disease Father     Stroke Father     Heart attack Father     Hypertension Sister     No Known Problems Daughter     Breast cancer Maternal Aunt     Hypertension Brother     Cancer Brother     No Known Problems Son     Esophageal cancer Neg Hx   "      Social History     Socioeconomic History    Marital status:    Tobacco Use    Smoking status: Never    Smokeless tobacco: Never   Substance and Sexual Activity    Alcohol use: Yes     Comment: occasionally    Drug use: No    Sexual activity: Yes     Partners: Male       MEDICATIONS & ALLERGIES:     Current Outpatient Medications on File Prior to Visit   Medication Sig    acetaminophen (TYLENOL) 650 MG TbSR Take 1 tablet (650 mg total) by mouth every 8 (eight) hours.    hydroCHLOROthiazide (HYDRODIURIL) 25 MG tablet Take 1/2 tablet (12.5 mg total) by mouth once daily.    losartan (COZAAR) 25 MG tablet Take 1 tablet (25 mg total) by mouth once daily.    [DISCONTINUED] azelastine (ASTELIN) 137 mcg (0.1 %) nasal spray 1 spray (137 mcg total) by Nasal route 2 (two) times daily. for 7 days    [DISCONTINUED] ketoconazole (NIZORAL) 2 % cream Apply topically once daily.    [DISCONTINUED] nystatin (MYCOSTATIN) ointment APPLY TO THE AFFECTED AREA(S) BY TOPICAL ROUTE 2 TIMES PER DAY     No current facility-administered medications on file prior to visit.       Review of patient's allergies indicates:  No Known Allergies    OBJECTIVE:     Vital Signs:  Vitals:    10/25/22 1447   BP: 116/88   BP Location: Right arm   Patient Position: Sitting   BP Method: Large (Manual)   Pulse: 70   SpO2: 97%   Weight: 106.9 kg (235 lb 10.8 oz)   Height: 5' 4" (1.626 m)       Body mass index is 40.45 kg/m².     Physical Exam:  General:  Well developed, well nourished, no acute distress  Head: Normocephalic, atraumatic  Eyes: PERRL, EOMI, clear sclera  Throat: No posterior pharyngeal erythema or exudate, no tonsillar exudate  Neck: supple, normal ROM, no thyromegaly   CVS:  RRR, S1 and S2 normal, no murmurs, rubs, gallops, 2+ peripheral pulses  Resp:  Lungs clear to auscultation, no wheezes, rales, rhonchi, cough  GI:  Abdomen soft, non-tender, non-distended, normoactive bowel sounds  MSK:  No muscle atrophy, cyanosis, peripheral " edema   Skin:  No rashes, ulcers, erythema  Neuro:  CNII-XII grossly intact, no focal deficits noted  Psych:  Appropriate mood and affect, normal judgement    Laboratory  Lab Results   Component Value Date    WBC 6.44 09/17/2020    HGB 13.4 09/17/2020    HCT 43.3 09/17/2020    MCV 84 09/17/2020     09/17/2020     @[id number](GLU,NA,K,Cl,CO2,BUN,Creatinine,Calcium,MG)@  No results found for: INR, PROTIME  No results found for: HGBA1C  No results for input(s): POCTGLUCOSE in the last 72 hours.    Diagnostic Results:  Labs: Reviewed  CT: Reviewed    ASSESSMENT & PLAN:   Ms. Renea Mac is a 52 y.o. female who presents today for preop clearance for R shoulder arthroscopy vs rotator cuff repair    Surgical Risk Assessment:    Active Cardiac Issues:  Active decompensated heart failure? No   Unstable angina?  No   Significant uncontrolled arrhythmias? No   Severe valvular heart disease-Aortic or Mitral Stenosis? No   Recent MI or coronary revascularization < 30 days? No     Cardiac Risk Factors:  High risk surgery? No   History of CAD/ischemic heart disease? No   History of cerebrovascular disease? No   History of compensated heart failure? No   Type 2 diabetes requiring insulin? No   Serum Creatinine > 2? No   Total cardiac risk factors 0     Functional mets >4    < 4 METs -unable to walk > 2 blocks on level ground without stopping due to symptoms  - eating, dressing, toileting, walking indoors, light housework. POOR   > 4 METs -climbing > 1 flight of stairs without stopping  -walking up hill > 1-2 blocks  -scrubbing floors  -moving furniture  - golf, bowling, dancing or tennis  -running short distance MODERATE to EXCELLENT     Perioperative Risk Assessment:  RCRI Score 0, Class I risk with 3.9% risk of cardiopulmonary complications  Patient is at low risk for perioperative cardiopulmonary complications.      Renea was seen today for pre-op exam.    Diagnoses and all orders for this visit:    Elevated  alkaline phosphatase level  Will repeat hep panel, previous ALP elevated (isolated, no previous elevation).   -     HEPATIC FUNCTION PANEL; Future    Preop cardiovascular exam  See above assessment     Lung nodule seen on imaging study  3 mm RLL pulmonary nodule incidentally seen on CT imaging, by Fleischner Society guidelines, either no routine follow for low risk patient vs 12 month CT for high risk patient.  Advised patient follow up with PCP.     RTC in prn    Case discussed with Dr Krysta COFFMAN  Internal Medicine PGY3  [facility]  [address] [place] [id number] [phone]    I have discussed A/P with Dr Farah  and agree with plan of action.  Daisy Leonardo.

## 2022-10-27 ENCOUNTER — PATIENT MESSAGE (OUTPATIENT)
Dept: PREADMISSION TESTING | Facility: HOSPITAL | Age: 52
End: 2022-10-27
Payer: COMMERCIAL

## 2022-10-27 NOTE — ANESTHESIA PAT ROS NOTE
10/27/2022  Renea Mac is a 52 y.o., female.      Pre-op Assessment    I have reviewed the Patient Summary Reports.       I have reviewed the Medications.     Review of Systems  Anesthesia Hx:  No problems with previous Anesthesia   History of prior surgery of interest to airway management or planning:  Previous anesthesia: General Ex Lap with BSO 2019 with general anesthesia.  Procedure performed at an [facility].      Airway issues documented on chart review include GETA, easy direct laryngoscopy, mask, easy , view on direct laryngoscopy Grade I    Denies Family Hx of Anesthesia complications.    Denies Personal Hx of Anesthesia complications.                    Social:  Non-Smoker       Cardiovascular:     Hypertension              ECG has been reviewed.                          Hepatic/GI:     GERD, well controlled             Musculoskeletal:  Arthritis   Right Knee Osteoarthritis  Chronic pain right shoulder            Endocrine:  Denies Diabetes.         Morbid Obesity / BMI > 40       Past Surgical History:   Procedure Laterality Date    BILATERAL SALPINGO-OOPHORECTOMY (BSO) Bilateral 2019    Procedure: SALPINGO-OOPHORECTOMY, BILATERAL;  Surgeon: Petrona Porter MD;  Location: [facility] OR [address] [place] [address];  Service: OB/GYN;  Laterality: Bilateral;     SECTION      x1    COLONOSCOPY N/A 2019    Procedure: COLONOSCOPY;  Surgeon: Chris Bennett MD;  Location: [facility] (4TH FLR);  Service: Endoscopy;  Laterality: N/A;  Justo Camilo, or Donald if no availability in Dec. will schedule after CT scan results    COLPOSCOPY      EXCISION OF PELVIC MASS N/A 2019    Procedure: EXCISION, MASS, PELVIS;  Surgeon: Petrona Porter MD;  Location: [facility] OR [address] [place] [address];  Service: OB/GYN;  Laterality: N/A;    HERNIA REPAIR      HYSTERECTOMY      YANG, ovaries remain (fibroids0    LAPAROTOMY,  EXPLORATORY N/A 1/31/2019    Procedure: LAPAROTOMY, EXPLORATORY;  Surgeon: Petrona Porter MD;  Location: [facility] OR [address] [place] [address];  Service: OB/GYN;  Laterality: N/A;    OOPHORECTOMY Bilateral 01/31/2019    SLEEVE GASTROPLASTY  11/2014    TUBAL LIGATION         Planned Procedure: Procedure(s) (LRB):  REPAIR, ROTATOR CUFF, ARTHROSCOPIC (Right)  DECOMPRESSION, SHOULDER, ARTHROSCOPIC (Right)  DEBRIDEMENT, SHOULDER, ARTHROSCOPIC (Right)  Requested Anesthesia Type:General  Surgeon: Eliseo Bailon MD  Service: Orthopedics  Known or anticipated Date of Surgery:11/3/2022    Surgeon notes: reviewed      Triage considerations:     The patient has no apparent active cardiac condition (No unstable coronary Syndrome such as severe unstable angina or recent [<1 month] myocardial infarction, decompensated CHF, severe valvular   disease or significant arrhythmia)    Previous anesthesia records:GETA, Easy airway, Easy intubation, and No problems    Prior General anesthesia 1/31/2019- Ex Lap BSO:  Method of Intubation: Direct laryngoscopy; Inserted by: CRNA; Airway Device: Endotracheal Tube; Mask Ventilation: Easy; Intubated: Postinduction; Blade: Rudd #2; Airway Device Size: 7.5; Style: Cuffed; Cuff Inflation: Minimal occlusive pressure; Inflation Amount: 4; Placement Verified By: Auscultation, Capnometry, ETT Condensation; Grade: Grade I; Complicating Factors: Large/Floppy epiglottis; Intubation Findings: Positive EtCO2, Bilateral breath sounds, Atraumatic/Condition of teeth unchanged;  Depth of Insertion: 21; Securment: Lips; Complications: None; Breath Sounds: Equal Bilateral; Insertion Attempts: 1    Last PCP note: within 1 month , within [facility]  Seen for surgery clearance. Patient is optimized              Instructions given. (See in Nurse's note)    EKG 10/7/2022:    Vent. Rate : 075 BPM     Atrial Rate : 075 BPM      P-R Int : 168 ms          QRS Dur : 084 ms       QT Int : 388 ms       P-R-T Axes : 057 069 055 degrees      QTc  Int : 433 ms     Normal sinus rhythm   Normal ECG   When compared with ECG of 09-JUN-2020 16:51,   No significant change was found   Confirmed by Fady Garcia MD ([id number]) on 10/7/2022 2:15:55 PM    Echo 6/14/2017:  TEST DESCRIPTION     Aorta: The aortic root is normal in size, measuring 2.3 cm at sinotubular junction and 2.6 cm at Sinuses of Valsalva. The proximal ascending aorta is normal in size, measuring 2.5 cm across.     Left Atrium: The left atrial volume index is normal, measuring 19.39 cc/m2.     Left Ventricle: The left ventricle is normal in size, with an end-diastolic diameter of 3.9 cm, and an end-systolic diameter of 2.7 cm. LV wall thickness is normal, with the septum and the posterior wall each measuring 1.0 cm across. Relative wall   thickness was increased at 0.51, and the LV mass index was 69.2 g/m2 consistent with concentric remodeling. There are no regional wall motion abnormalities. Left ventricular systolic function appears normal. Visually estimated ejection fraction is   60-65%. The LV Doppler derived stroke volume equals 63.0 ccs.     Diastolic indices: E wave velocity 0.6 m/s, E/A ratio 1.2,  msec., E/e' ratio(avg) 4. Diastolic function is normal.     Right Atrium: The right atrium is normal in size, measuring 4.4 cm in length and 3.2 cm in width in the apical view.     Right Ventricle: The right ventricle is normal in size measuring 2.7 cm at the base in the apical right ventricle-focused view. Global right ventricular systolic function appears normal. Tricuspid annular plane systolic excursion (TAPSE) is 1.8 cm.     Aortic Valve:  The aortic valve is normal in structure.     Mitral Valve:  The mitral valve is normal in structure.     Intracavitary: There is no evidence of pericardial effusion, intracavity mass, thrombi, or vegetation.     This document has been electronically    SIGNED BY: Phong Cole MD On: 06/16/2017 12:05    Ht:  5'4  Wt:  235 lb  BMI:   40.45  Vaccinated

## 2022-10-28 ENCOUNTER — OFFICE VISIT (OUTPATIENT)
Dept: SPORTS MEDICINE | Facility: CLINIC | Age: 52
End: 2022-10-28
Payer: COMMERCIAL

## 2022-10-28 VITALS
DIASTOLIC BLOOD PRESSURE: 87 MMHG | TEMPERATURE: 98 F | BODY MASS INDEX: 40.8 KG/M2 | WEIGHT: 237.69 LBS | HEART RATE: 69 BPM | SYSTOLIC BLOOD PRESSURE: 135 MMHG

## 2022-10-28 DIAGNOSIS — M75.111 NONTRAUMATIC INCOMPLETE TEAR OF RIGHT ROTATOR CUFF: Primary | ICD-10-CM

## 2022-10-28 DIAGNOSIS — M67.911 TENDINOPATHY OF ROTATOR CUFF, RIGHT: ICD-10-CM

## 2022-10-28 PROCEDURE — 99999 PR PBB SHADOW E&M-EST. PATIENT-LVL IV: CPT | Mod: PBBFAC,,, | Performed by: PHYSICIAN ASSISTANT

## 2022-10-28 PROCEDURE — 1160F PR REVIEW ALL MEDS BY PRESCRIBER/CLIN PHARMACIST DOCUMENTED: ICD-10-PCS | Mod: CPTII,S$GLB,, | Performed by: PHYSICIAN ASSISTANT

## 2022-10-28 PROCEDURE — 4010F PR ACE/ARB THEARPY RXD/TAKEN: ICD-10-PCS | Mod: CPTII,S$GLB,, | Performed by: PHYSICIAN ASSISTANT

## 2022-10-28 PROCEDURE — 1159F PR MEDICATION LIST DOCUMENTED IN MEDICAL RECORD: ICD-10-PCS | Mod: CPTII,S$GLB,, | Performed by: PHYSICIAN ASSISTANT

## 2022-10-28 PROCEDURE — 1159F MED LIST DOCD IN RCRD: CPT | Mod: CPTII,S$GLB,, | Performed by: PHYSICIAN ASSISTANT

## 2022-10-28 PROCEDURE — 99999 PR PBB SHADOW E&M-EST. PATIENT-LVL IV: ICD-10-PCS | Mod: PBBFAC,,, | Performed by: PHYSICIAN ASSISTANT

## 2022-10-28 PROCEDURE — 1160F RVW MEDS BY RX/DR IN RCRD: CPT | Mod: CPTII,S$GLB,, | Performed by: PHYSICIAN ASSISTANT

## 2022-10-28 PROCEDURE — 3079F PR MOST RECENT DIASTOLIC BLOOD PRESSURE 80-89 MM HG: ICD-10-PCS | Mod: CPTII,S$GLB,, | Performed by: PHYSICIAN ASSISTANT

## 2022-10-28 PROCEDURE — 3075F SYST BP GE 130 - 139MM HG: CPT | Mod: CPTII,S$GLB,, | Performed by: PHYSICIAN ASSISTANT

## 2022-10-28 PROCEDURE — 99499 UNLISTED E&M SERVICE: CPT | Mod: S$GLB,,, | Performed by: PHYSICIAN ASSISTANT

## 2022-10-28 PROCEDURE — 99499 NO LOS: ICD-10-PCS | Mod: S$GLB,,, | Performed by: PHYSICIAN ASSISTANT

## 2022-10-28 PROCEDURE — 3079F DIAST BP 80-89 MM HG: CPT | Mod: CPTII,S$GLB,, | Performed by: PHYSICIAN ASSISTANT

## 2022-10-28 PROCEDURE — 3075F PR MOST RECENT SYSTOLIC BLOOD PRESS GE 130-139MM HG: ICD-10-PCS | Mod: CPTII,S$GLB,, | Performed by: PHYSICIAN ASSISTANT

## 2022-10-28 PROCEDURE — 4010F ACE/ARB THERAPY RXD/TAKEN: CPT | Mod: CPTII,S$GLB,, | Performed by: PHYSICIAN ASSISTANT

## 2022-10-28 RX ORDER — MUPIROCIN 20 MG/G
OINTMENT TOPICAL
Status: CANCELLED | OUTPATIENT
Start: 2022-10-28

## 2022-10-28 RX ORDER — HYDROCODONE BITARTRATE AND ACETAMINOPHEN 7.5; 325 MG/1; MG/1
1 TABLET ORAL EVERY 4 HOURS PRN
Qty: 20 TABLET | Refills: 0 | Status: SHIPPED | OUTPATIENT
Start: 2022-10-28 | End: 2023-07-25

## 2022-10-28 NOTE — H&P
H&P    History 10/28/2022:  Renea returns here today for preoperative evaluation of her right shoulder.  She is scheduled to undergo a shoulder arthroscopy with possible rotator cuff repair, subacromial decompression, debridement, and all other indicated procedures on November 3, 2022.  She was seen by Dr. Farah and Dr. Amaro on 10/25/2022 for preoperative clearance.  They state:    Perioperative Risk Assessment:  RCRI Score 0, Class I risk with 3.9% risk of cardiopulmonary complications  Patient is at low risk for perioperative cardiopulmonary complications.    History 10/10/2022:   Renea returns to clinic today for follow up evaluation of her right shoulder pain. She continues to appreciate decreased range of motion and pain at the shoulder. She is interested in discussing surgical options at this time.  She believes she has received 8 corticosteroid injections into her shoulder and wants to finally proceed with surgical intervention.  She states she does not want to keep getting injections into the shoulder.  She states they do help and provide relief however the pain always returns a few months after the injection.    History 9/2/2022:  Renea returns to clinic today for follow-up of right shoulder adhesive capsulitis. She states she has gained ROM and has decreased pain since last visit. She was given a right glenohumeral joint CSI at last visit.    History 8/1/2022:   52 y.o. Female with a history of right shoulder pain who is referred today by Meg Tijerina PA-C. She is a  and a . She states she started having shoulder pain in 2015. She moved from [place] in 2016. She had a CSI before she moved and she reports relief. She has tried physical therapy since and has had 6 or more CSI since moving to [place]. She reports daily pain and would like to discuss different options.  She believes she is having a similar symptom in her left shoulder.  She did have some loss of motion in  the right shoulder.  She occasion would feel radiating pain that would go toward her hand.    Is affecting ADLs.  Pain is 6/10 at it's worst.      REVIEW OF SYSTEMS   Constitution: Negative. Negative for chills, fever and night sweats.   HENT: Negative for congestion and headaches.    Eyes: Negative for blurred vision, left vision loss and right vision loss.   Cardiovascular: Negative for chest pain and syncope.   Respiratory: Negative for cough and shortness of breath.    Endocrine: Negative for polydipsia, polyphagia and polyuria.   Hematologic/Lymphatic: Negative for bleeding problem. Does not bruise/bleed easily.   Skin: Negative for dry skin, itching and rash.   Musculoskeletal: Negative for falls. Positive for right shoulder pain  Gastrointestinal: Negative for abdominal pain and bowel incontinence.   Genitourinary: Negative for bladder incontinence and nocturia.   Neurological: Negative for disturbances in coordination, loss of balance and seizures.   Psychiatric/Behavioral: Negative for depression. The patient does not have insomnia.    Allergic/Immunologic: Negative for hives and persistent infections.     PHYSICAL EXAMINATION    Vitals: /87   Pulse 69   Temp 97.9 °F (36.6 °C)   Wt 107.8 kg (237 lb 11.2 oz)   LMP 03/01/2011 (Exact Date)   BMI 40.80 kg/m²     General: The patient appears active and healthy with no apparent physical problems.  No disturbance of mood or affect is demonstrated. Alert and Oriented.    HEENT: Eyes normal, pupils equally round, nose normal.    Resp: Equal and symmetrical chest rises. No wheezing    CV: Regular rate    Neck: Supple; nonpainful range of motion.    Extremities: no cyanosis, clubbing, edema, or diffuse swelling.  Palpable pulses, good capillary refill of the digits.  No coolness, discoloration, edema or obvious varicosities.    Skin: no lesions noted.    Lymphatic: no detected adenopathy in the upper or lower extremities.    Neurologic: normal mental  status, normal reflexes, normal gait and balance.  Patient is alert and oriented to person, place and time.  No flaccidity or spasticity is noted.  No motor or sensory deficits are noted.  Light touch is intact    Orthopaedic:     SHOULDER EXAM - RIGHT    Inspection:   Normal skin color and appearance with no scars.  No muscle atrophy noted.  No scapular winging.      Palpation: No tenderness of the acromioclavicular joint, lateral edge of the acromion, biceps tendon, trapezius muscle or scapulothoracic bursa.      ROM:      PROM:     FE - 150°    Abd/ER -  90°  Abd/IR -  45°   Add/ER -  45°     AROM:    FE - 150° *pain  degrees    Abd/ER -  90°  Abd/IR -  45°   Add/ER -  40°         Tests:     + Ramos, - Neer's, - Cross Arm Adduction, - Gallup, - Yerguson, + Speed. - Belly Press,  + Jobes, - Lift Off    Stability: - sulcus, - apprehension, - relocation, - load and shift, - DLS      Motor:  Rotator cuff strength is 4/5 supraspinatus *pain, 5/5 infraspinatus, 4/5 subscapularis *pain. Biceps, triceps and deltoid strength is 5/5.      Neuro     Distally there are no paresthesias, and sensation is intact to light touch in the median, ulnar, and radial distributions.  Reflexes are 2/2 biceps, triceps and brachioradialis.        IMAGING    I reviewed a previous MRI which demonstrates high-grade tendinopathy of the entire supraspinatus tendon extending into the infraspinatus with an area of a partial thickness articular sided rotator cuff tear with cystic changes noted of the greater tuberosity.      IMPRESSION       ICD-10-CM ICD-9-CM   1. Nontraumatic incomplete tear of right rotator cuff  M75.111 726.13   2. Tendinopathy of rotator cuff, right  M67.911 727.9         MEDICATIONS PRESCRIBED      Hydrocodone 7.5/325mg    RECOMMENDATIONS     The patient elected to proceed with operative intervention after all risks, benefits, and alternatives were discussed with the patient.  The risks of surgery include bleeding,  scarring, injuries to nerves, arteries and veins, need for additional surgeries, blood clots, infections, and the risk of anesthesia.  I personally obtained informed consent from the patient and documented full history and physical, which has been placed on the chart.      All questions were answered, pt will contact us for questions or concerns in the interim.

## 2022-10-28 NOTE — PROGRESS NOTES
PRE-OP    History 10/28/2022:  Renea returns here today for preoperative evaluation of her right shoulder.  She is scheduled to undergo a shoulder arthroscopy with possible rotator cuff repair, subacromial decompression, debridement, and all other indicated procedures on November 3, 2022.  She was seen by Dr. Farah and Dr. Amaro on 10/25/2022 for preoperative clearance.  They state:    Perioperative Risk Assessment:  RCRI Score 0, Class I risk with 3.9% risk of cardiopulmonary complications  Patient is at low risk for perioperative cardiopulmonary complications.    History 10/10/2022:   Renea returns to clinic today for follow up evaluation of her right shoulder pain. She continues to appreciate decreased range of motion and pain at the shoulder. She is interested in discussing surgical options at this time.  She believes she has received 8 corticosteroid injections into her shoulder and wants to finally proceed with surgical intervention.  She states she does not want to keep getting injections into the shoulder.  She states they do help and provide relief however the pain always returns a few months after the injection.    History 9/2/2022:  Renea returns to clinic today for follow-up of right shoulder adhesive capsulitis. She states she has gained ROM and has decreased pain since last visit. She was given a right glenohumeral joint CSI at last visit.    History 8/1/2022:   52 y.o. Female with a history of right shoulder pain who is referred today by Meg Tijerina PA-C. She is a  and a . She states she started having shoulder pain in 2015. She moved from [place] in 2016. She had a CSI before she moved and she reports relief. She has tried physical therapy since and has had 6 or more CSI since moving to [place]. She reports daily pain and would like to discuss different options.  She believes she is having a similar symptom in her left shoulder.  She did have some loss of motion in  the right shoulder.  She occasion would feel radiating pain that would go toward her hand.    Is affecting ADLs.  Pain is 6/10 at it's worst.      REVIEW OF SYSTEMS   Constitution: Negative. Negative for chills, fever and night sweats.   HENT: Negative for congestion and headaches.    Eyes: Negative for blurred vision, left vision loss and right vision loss.   Cardiovascular: Negative for chest pain and syncope.   Respiratory: Negative for cough and shortness of breath.    Endocrine: Negative for polydipsia, polyphagia and polyuria.   Hematologic/Lymphatic: Negative for bleeding problem. Does not bruise/bleed easily.   Skin: Negative for dry skin, itching and rash.   Musculoskeletal: Negative for falls. Positive for right shoulder pain  Gastrointestinal: Negative for abdominal pain and bowel incontinence.   Genitourinary: Negative for bladder incontinence and nocturia.   Neurological: Negative for disturbances in coordination, loss of balance and seizures.   Psychiatric/Behavioral: Negative for depression. The patient does not have insomnia.    Allergic/Immunologic: Negative for hives and persistent infections.     PHYSICAL EXAMINATION    Vitals: /87   Pulse 69   Temp 97.9 °F (36.6 °C)   Wt 107.8 kg (237 lb 11.2 oz)   LMP 03/01/2011 (Exact Date)   BMI 40.80 kg/m²     General: The patient appears active and healthy with no apparent physical problems.  No disturbance of mood or affect is demonstrated. Alert and Oriented.    HEENT: Eyes normal, pupils equally round, nose normal.    Resp: Equal and symmetrical chest rises. No wheezing    CV: Regular rate    Neck: Supple; nonpainful range of motion.    Extremities: no cyanosis, clubbing, edema, or diffuse swelling.  Palpable pulses, good capillary refill of the digits.  No coolness, discoloration, edema or obvious varicosities.    Skin: no lesions noted.    Lymphatic: no detected adenopathy in the upper or lower extremities.    Neurologic: normal mental  status, normal reflexes, normal gait and balance.  Patient is alert and oriented to person, place and time.  No flaccidity or spasticity is noted.  No motor or sensory deficits are noted.  Light touch is intact    Orthopaedic:     SHOULDER EXAM - RIGHT    Inspection:   Normal skin color and appearance with no scars.  No muscle atrophy noted.  No scapular winging.      Palpation: No tenderness of the acromioclavicular joint, lateral edge of the acromion, biceps tendon, trapezius muscle or scapulothoracic bursa.      ROM:      PROM:     FE - 150°    Abd/ER -  90°  Abd/IR -  45°   Add/ER -  45°     AROM:    FE - 150° *pain  degrees    Abd/ER -  90°  Abd/IR -  45°   Add/ER -  40°         Tests:     + Ramos, - Neer's, - Cross Arm Adduction, - [place], - [place], + Speed. - Belly Press,  + Jobes, - Lift Off    Stability: - sulcus, - apprehension, - relocation, - load and shift, - DLS      Motor:  Rotator cuff strength is 4/5 supraspinatus *pain, 5/5 infraspinatus, 4/5 subscapularis *pain. Biceps, triceps and deltoid strength is 5/5.      Neuro     Distally there are no paresthesias, and sensation is intact to light touch in the median, ulnar, and radial distributions.  Reflexes are 2/2 biceps, triceps and brachioradialis.        IMAGING    I reviewed a previous MRI which demonstrates high-grade tendinopathy of the entire supraspinatus tendon extending into the infraspinatus with an area of a partial thickness articular sided rotator cuff tear with cystic changes noted of the greater tuberosity.      IMPRESSION       ICD-10-CM ICD-9-CM   1. Nontraumatic incomplete tear of right rotator cuff  M75.111 726.13   2. Tendinopathy of rotator cuff, right  M67.911 727.9         MEDICATIONS PRESCRIBED      Hydrocodone 7.5/325mg    RECOMMENDATIONS     The patient elected to proceed with operative intervention after all risks, benefits, and alternatives were discussed with the patient.  The risks of surgery include bleeding,  scarring, injuries to nerves, arteries and veins, need for additional surgeries, blood clots, infections, and the risk of anesthesia.  I personally obtained informed consent from the patient and documented full history and physical, which has been placed on the chart.      All questions were answered, pt will contact us for questions or concerns in the interim.

## 2022-10-28 NOTE — H&P (VIEW-ONLY)
H&P    History 10/28/2022:  Renea returns here today for preoperative evaluation of her right shoulder.  She is scheduled to undergo a shoulder arthroscopy with possible rotator cuff repair, subacromial decompression, debridement, and all other indicated procedures on November 3, 2022.  She was seen by Dr. Farah and Dr. Amaro on 10/25/2022 for preoperative clearance.  They state:    Perioperative Risk Assessment:  RCRI Score 0, Class I risk with 3.9% risk of cardiopulmonary complications  Patient is at low risk for perioperative cardiopulmonary complications.    History 10/10/2022:   Renea returns to clinic today for follow up evaluation of her right shoulder pain. She continues to appreciate decreased range of motion and pain at the shoulder. She is interested in discussing surgical options at this time.  She believes she has received 8 corticosteroid injections into her shoulder and wants to finally proceed with surgical intervention.  She states she does not want to keep getting injections into the shoulder.  She states they do help and provide relief however the pain always returns a few months after the injection.    History 9/2/2022:  Renea returns to clinic today for follow-up of right shoulder adhesive capsulitis. She states she has gained ROM and has decreased pain since last visit. She was given a right glenohumeral joint CSI at last visit.    History 8/1/2022:   52 y.o. Female with a history of right shoulder pain who is referred today by Meg Tijerina PA-C. She is a  and a . She states she started having shoulder pain in 2015. She moved from [place] in 2016. She had a CSI before she moved and she reports relief. She has tried physical therapy since and has had 6 or more CSI since moving to [place]. She reports daily pain and would like to discuss different options.  She believes she is having a similar symptom in her left shoulder.  She did have some loss of motion in  the right shoulder.  She occasion would feel radiating pain that would go toward her hand.    Is affecting ADLs.  Pain is 6/10 at it's worst.      REVIEW OF SYSTEMS   Constitution: Negative. Negative for chills, fever and night sweats.   HENT: Negative for congestion and headaches.    Eyes: Negative for blurred vision, left vision loss and right vision loss.   Cardiovascular: Negative for chest pain and syncope.   Respiratory: Negative for cough and shortness of breath.    Endocrine: Negative for polydipsia, polyphagia and polyuria.   Hematologic/Lymphatic: Negative for bleeding problem. Does not bruise/bleed easily.   Skin: Negative for dry skin, itching and rash.   Musculoskeletal: Negative for falls. Positive for right shoulder pain  Gastrointestinal: Negative for abdominal pain and bowel incontinence.   Genitourinary: Negative for bladder incontinence and nocturia.   Neurological: Negative for disturbances in coordination, loss of balance and seizures.   Psychiatric/Behavioral: Negative for depression. The patient does not have insomnia.    Allergic/Immunologic: Negative for hives and persistent infections.     PHYSICAL EXAMINATION    Vitals: /87   Pulse 69   Temp 97.9 °F (36.6 °C)   Wt 107.8 kg (237 lb 11.2 oz)   LMP 03/01/2011 (Exact Date)   BMI 40.80 kg/m²     General: The patient appears active and healthy with no apparent physical problems.  No disturbance of mood or affect is demonstrated. Alert and Oriented.    HEENT: Eyes normal, pupils equally round, nose normal.    Resp: Equal and symmetrical chest rises. No wheezing    CV: Regular rate    Neck: Supple; nonpainful range of motion.    Extremities: no cyanosis, clubbing, edema, or diffuse swelling.  Palpable pulses, good capillary refill of the digits.  No coolness, discoloration, edema or obvious varicosities.    Skin: no lesions noted.    Lymphatic: no detected adenopathy in the upper or lower extremities.    Neurologic: normal mental  status, normal reflexes, normal gait and balance.  Patient is alert and oriented to person, place and time.  No flaccidity or spasticity is noted.  No motor or sensory deficits are noted.  Light touch is intact    Orthopaedic:     SHOULDER EXAM - RIGHT    Inspection:   Normal skin color and appearance with no scars.  No muscle atrophy noted.  No scapular winging.      Palpation: No tenderness of the acromioclavicular joint, lateral edge of the acromion, biceps tendon, trapezius muscle or scapulothoracic bursa.      ROM:      PROM:     FE - 150°    Abd/ER -  90°  Abd/IR -  45°   Add/ER -  45°     AROM:    FE - 150° *pain  degrees    Abd/ER -  90°  Abd/IR -  45°   Add/ER -  40°         Tests:     + Ramos, - Neer's, - Cross Arm Adduction, - [place], - [place], + Speed. - Belly Press,  + Jobes, - Lift Off    Stability: - sulcus, - apprehension, - relocation, - load and shift, - DLS      Motor:  Rotator cuff strength is 4/5 supraspinatus *pain, 5/5 infraspinatus, 4/5 subscapularis *pain. Biceps, triceps and deltoid strength is 5/5.      Neuro     Distally there are no paresthesias, and sensation is intact to light touch in the median, ulnar, and radial distributions.  Reflexes are 2/2 biceps, triceps and brachioradialis.        IMAGING    I reviewed a previous MRI which demonstrates high-grade tendinopathy of the entire supraspinatus tendon extending into the infraspinatus with an area of a partial thickness articular sided rotator cuff tear with cystic changes noted of the greater tuberosity.      IMPRESSION       ICD-10-CM ICD-9-CM   1. Nontraumatic incomplete tear of right rotator cuff  M75.111 726.13   2. Tendinopathy of rotator cuff, right  M67.911 727.9         MEDICATIONS PRESCRIBED      Hydrocodone 7.5/325mg    RECOMMENDATIONS     The patient elected to proceed with operative intervention after all risks, benefits, and alternatives were discussed with the patient.  The risks of surgery include bleeding,  scarring, injuries to nerves, arteries and veins, need for additional surgeries, blood clots, infections, and the risk of anesthesia.  I personally obtained informed consent from the patient and documented full history and physical, which has been placed on the chart.      All questions were answered, pt will contact us for questions or concerns in the interim.

## 2022-11-02 ENCOUNTER — ANESTHESIA EVENT (OUTPATIENT)
Dept: SURGERY | Facility: HOSPITAL | Age: 52
End: 2022-11-02
Payer: COMMERCIAL

## 2022-11-02 ENCOUNTER — PATIENT MESSAGE (OUTPATIENT)
Dept: SPORTS MEDICINE | Facility: CLINIC | Age: 52
End: 2022-11-02
Payer: COMMERCIAL

## 2022-11-03 ENCOUNTER — HOSPITAL ENCOUNTER (OUTPATIENT)
Facility: HOSPITAL | Age: 52
Discharge: HOME | End: 2022-11-03
Attending: ORTHOPAEDIC SURGERY | Admitting: ORTHOPAEDIC SURGERY
Payer: COMMERCIAL

## 2022-11-03 ENCOUNTER — ANESTHESIA (OUTPATIENT)
Dept: SURGERY | Facility: HOSPITAL | Age: 52
End: 2022-11-03
Payer: COMMERCIAL

## 2022-11-03 VITALS
TEMPERATURE: 98 F | BODY MASS INDEX: 39.27 KG/M2 | OXYGEN SATURATION: 97 % | DIASTOLIC BLOOD PRESSURE: 68 MMHG | SYSTOLIC BLOOD PRESSURE: 118 MMHG | HEART RATE: 63 BPM | HEIGHT: 64 IN | RESPIRATION RATE: 19 BRPM | WEIGHT: 230 LBS

## 2022-11-03 DIAGNOSIS — M75.111 NONTRAUMATIC INCOMPLETE TEAR OF RIGHT ROTATOR CUFF: ICD-10-CM

## 2022-11-03 DIAGNOSIS — M25.511 CHRONIC RIGHT SHOULDER PAIN: Primary | ICD-10-CM

## 2022-11-03 DIAGNOSIS — G89.29 CHRONIC RIGHT SHOULDER PAIN: Primary | ICD-10-CM

## 2022-11-03 PROBLEM — M67.911 TENDINOPATHY OF ROTATOR CUFF, RIGHT: Status: ACTIVE | Noted: 2022-11-03

## 2022-11-03 PROCEDURE — 76942 ECHO GUIDE FOR BIOPSY: CPT | Performed by: ANESTHESIOLOGY

## 2022-11-03 PROCEDURE — D9220A PRA ANESTHESIA: Mod: ANES,,, | Performed by: ANESTHESIOLOGY

## 2022-11-03 PROCEDURE — 29827 SHO ARTHRS SRG RT8TR CUF RPR: CPT | Mod: AS,RT,, | Performed by: PHYSICIAN ASSISTANT

## 2022-11-03 PROCEDURE — 29826 SHO ARTHRS SRG DECOMPRESSION: CPT | Mod: AS,RT,, | Performed by: PHYSICIAN ASSISTANT

## 2022-11-03 PROCEDURE — 25000003 PHARM REV CODE 250: Performed by: PHYSICIAN ASSISTANT

## 2022-11-03 PROCEDURE — D9220A PRA ANESTHESIA: Mod: CRNA,,, | Performed by: NURSE ANESTHETIST, CERTIFIED REGISTERED

## 2022-11-03 PROCEDURE — 27100025 HC TUBING, SET FLUID WARMER: Performed by: ANESTHESIOLOGY

## 2022-11-03 PROCEDURE — 29827 PR SHLDR ARTHROSCOP,SURG,W/ROTAT CUFF REPR: ICD-10-PCS | Mod: AS,RT,, | Performed by: PHYSICIAN ASSISTANT

## 2022-11-03 PROCEDURE — 27201423 OPTIME MED/SURG SUP & DEVICES STERILE SUPPLY: Performed by: ORTHOPAEDIC SURGERY

## 2022-11-03 PROCEDURE — 36000711: Performed by: ORTHOPAEDIC SURGERY

## 2022-11-03 PROCEDURE — 76942 ECHO GUIDE FOR BIOPSY: CPT | Mod: 26,,, | Performed by: ANESTHESIOLOGY

## 2022-11-03 PROCEDURE — 63600175 PHARM REV CODE 636 W HCPCS: Performed by: NURSE ANESTHETIST, CERTIFIED REGISTERED

## 2022-11-03 PROCEDURE — 63600175 PHARM REV CODE 636 W HCPCS: Performed by: ORTHOPAEDIC SURGERY

## 2022-11-03 PROCEDURE — 71000033 HC RECOVERY, INTIAL HOUR: Performed by: ORTHOPAEDIC SURGERY

## 2022-11-03 PROCEDURE — 71000039 HC RECOVERY, EACH ADD'L HOUR: Performed by: ORTHOPAEDIC SURGERY

## 2022-11-03 PROCEDURE — D9220A PRA ANESTHESIA: ICD-10-PCS | Mod: CRNA,,, | Performed by: NURSE ANESTHETIST, CERTIFIED REGISTERED

## 2022-11-03 PROCEDURE — 63600175 PHARM REV CODE 636 W HCPCS: Performed by: ANESTHESIOLOGY

## 2022-11-03 PROCEDURE — 29827 SHO ARTHRS SRG RT8TR CUF RPR: CPT | Mod: RT,,, | Performed by: ORTHOPAEDIC SURGERY

## 2022-11-03 PROCEDURE — 36000710: Performed by: ORTHOPAEDIC SURGERY

## 2022-11-03 PROCEDURE — 64416 NJX AA&/STRD BRCH PL NFS IMG: CPT | Mod: 59,RT,, | Performed by: ANESTHESIOLOGY

## 2022-11-03 PROCEDURE — 25000003 PHARM REV CODE 250: Performed by: NURSE ANESTHETIST, CERTIFIED REGISTERED

## 2022-11-03 PROCEDURE — 76942 PR U/S GUIDANCE FOR NEEDLE GUIDANCE: ICD-10-PCS | Mod: 26,,, | Performed by: ANESTHESIOLOGY

## 2022-11-03 PROCEDURE — 94761 N-INVAS EAR/PLS OXIMETRY MLT: CPT

## 2022-11-03 PROCEDURE — 37000009 HC ANESTHESIA EA ADD 15 MINS: Performed by: ORTHOPAEDIC SURGERY

## 2022-11-03 PROCEDURE — 64416 PR NERVE BLOCK INJ, ANES/STEROID, BRACHIAL PLEXUS, CONT INFUSION, INCL IMAG GUIDANCE: ICD-10-PCS | Mod: 59,RT,, | Performed by: ANESTHESIOLOGY

## 2022-11-03 PROCEDURE — 25000003 PHARM REV CODE 250: Performed by: ANESTHESIOLOGY

## 2022-11-03 PROCEDURE — 29826 PR SHLDR ARTHROSCOP,PART ACROMIOPLAS: ICD-10-PCS | Mod: AS,RT,, | Performed by: PHYSICIAN ASSISTANT

## 2022-11-03 PROCEDURE — 37000008 HC ANESTHESIA 1ST 15 MINUTES: Performed by: ORTHOPAEDIC SURGERY

## 2022-11-03 PROCEDURE — D9220A PRA ANESTHESIA: ICD-10-PCS | Mod: ANES,,, | Performed by: ANESTHESIOLOGY

## 2022-11-03 PROCEDURE — C1713 ANCHOR/SCREW BN/BN,TIS/BN: HCPCS | Performed by: ORTHOPAEDIC SURGERY

## 2022-11-03 PROCEDURE — 29827 PR SHLDR ARTHROSCOP,SURG,W/ROTAT CUFF REPR: ICD-10-PCS | Mod: RT,,, | Performed by: ORTHOPAEDIC SURGERY

## 2022-11-03 PROCEDURE — 29826 PR SHLDR ARTHROSCOP,PART ACROMIOPLAS: ICD-10-PCS | Mod: RT,,, | Performed by: ORTHOPAEDIC SURGERY

## 2022-11-03 PROCEDURE — 29826 SHO ARTHRS SRG DECOMPRESSION: CPT | Mod: RT,,, | Performed by: ORTHOPAEDIC SURGERY

## 2022-11-03 PROCEDURE — 99900035 HC TECH TIME PER 15 MIN (STAT)

## 2022-11-03 PROCEDURE — 71000015 HC POSTOP RECOV 1ST HR: Performed by: ORTHOPAEDIC SURGERY

## 2022-11-03 DEVICE — ANCHOR PEEK #2 SUT 4.75X19.1MM: Type: IMPLANTABLE DEVICE | Site: SHOULDER | Status: FUNCTIONAL

## 2022-11-03 DEVICE — IMPLANTABLE DEVICE: Type: IMPLANTABLE DEVICE | Site: SHOULDER | Status: FUNCTIONAL

## 2022-11-03 RX ORDER — CHOLECALCIFEROL (VITAMIN D3) 25 MCG
1000 TABLET ORAL DAILY
COMMUNITY

## 2022-11-03 RX ORDER — MIDAZOLAM HYDROCHLORIDE 1 MG/ML
INJECTION, SOLUTION INTRAMUSCULAR; INTRAVENOUS
Status: DISCONTINUED | OUTPATIENT
Start: 2022-11-03 | End: 2022-11-03

## 2022-11-03 RX ORDER — MUPIROCIN 20 MG/G
OINTMENT TOPICAL
Status: DISCONTINUED | OUTPATIENT
Start: 2022-11-03 | End: 2022-11-03 | Stop reason: HOSPADM

## 2022-11-03 RX ORDER — CELECOXIB 200 MG/1
400 CAPSULE ORAL ONCE
Status: DISCONTINUED | OUTPATIENT
Start: 2022-11-03 | End: 2022-11-03 | Stop reason: HOSPADM

## 2022-11-03 RX ORDER — KETAMINE HCL IN 0.9 % NACL 50 MG/5 ML
SYRINGE (ML) INTRAVENOUS
Status: DISCONTINUED | OUTPATIENT
Start: 2022-11-03 | End: 2022-11-03

## 2022-11-03 RX ORDER — DEXAMETHASONE SODIUM PHOSPHATE 4 MG/ML
INJECTION, SOLUTION INTRA-ARTICULAR; INTRALESIONAL; INTRAMUSCULAR; INTRAVENOUS; SOFT TISSUE
Status: DISCONTINUED | OUTPATIENT
Start: 2022-11-03 | End: 2022-11-03

## 2022-11-03 RX ORDER — ROPIVACAINE HYDROCHLORIDE 5 MG/ML
INJECTION, SOLUTION EPIDURAL; INFILTRATION; PERINEURAL
Status: COMPLETED | OUTPATIENT
Start: 2022-11-03 | End: 2022-11-03

## 2022-11-03 RX ORDER — CEFAZOLIN SODIUM 1 G/3ML
INJECTION, POWDER, FOR SOLUTION INTRAMUSCULAR; INTRAVENOUS
Status: DISCONTINUED | OUTPATIENT
Start: 2022-11-03 | End: 2022-11-03

## 2022-11-03 RX ORDER — ACETAMINOPHEN 500 MG
1000 TABLET ORAL ONCE
Status: COMPLETED | OUTPATIENT
Start: 2022-11-03 | End: 2022-11-03

## 2022-11-03 RX ORDER — ROPIVACAINE HYDROCHLORIDE 2 MG/ML
INJECTION, SOLUTION EPIDURAL; INFILTRATION; PERINEURAL CONTINUOUS
Status: DISCONTINUED | OUTPATIENT
Start: 2022-11-03 | End: 2022-11-03 | Stop reason: HOSPADM

## 2022-11-03 RX ORDER — PROPOFOL 10 MG/ML
VIAL (ML) INTRAVENOUS
Status: DISCONTINUED | OUTPATIENT
Start: 2022-11-03 | End: 2022-11-03

## 2022-11-03 RX ORDER — LIDOCAINE HYDROCHLORIDE 20 MG/ML
INJECTION INTRAVENOUS
Status: DISCONTINUED | OUTPATIENT
Start: 2022-11-03 | End: 2022-11-03

## 2022-11-03 RX ORDER — FENTANYL CITRATE 50 UG/ML
25-200 INJECTION, SOLUTION INTRAMUSCULAR; INTRAVENOUS
Status: DISCONTINUED | OUTPATIENT
Start: 2022-11-03 | End: 2022-11-03 | Stop reason: HOSPADM

## 2022-11-03 RX ORDER — VITAMIN B COMPLEX
1 CAPSULE ORAL DAILY
COMMUNITY

## 2022-11-03 RX ORDER — ONDANSETRON 2 MG/ML
INJECTION INTRAMUSCULAR; INTRAVENOUS
Status: DISCONTINUED | OUTPATIENT
Start: 2022-11-03 | End: 2022-11-03

## 2022-11-03 RX ORDER — FAMOTIDINE 10 MG/ML
INJECTION INTRAVENOUS
Status: DISCONTINUED | OUTPATIENT
Start: 2022-11-03 | End: 2022-11-03

## 2022-11-03 RX ORDER — ROCURONIUM BROMIDE 10 MG/ML
INJECTION, SOLUTION INTRAVENOUS
Status: DISCONTINUED | OUTPATIENT
Start: 2022-11-03 | End: 2022-11-03

## 2022-11-03 RX ORDER — HALOPERIDOL 5 MG/ML
0.5 INJECTION INTRAMUSCULAR EVERY 10 MIN PRN
Status: COMPLETED | OUTPATIENT
Start: 2022-11-03 | End: 2022-11-03

## 2022-11-03 RX ORDER — CEFAZOLIN SODIUM 1 G/3ML
2 INJECTION, POWDER, FOR SOLUTION INTRAMUSCULAR; INTRAVENOUS
Status: DISCONTINUED | OUTPATIENT
Start: 2022-11-03 | End: 2022-11-03 | Stop reason: HOSPADM

## 2022-11-03 RX ORDER — MIDAZOLAM HYDROCHLORIDE 1 MG/ML
.5-4 INJECTION INTRAMUSCULAR; INTRAVENOUS
Status: DISCONTINUED | OUTPATIENT
Start: 2022-11-03 | End: 2022-11-03 | Stop reason: HOSPADM

## 2022-11-03 RX ORDER — EPINEPHRINE 1 MG/ML
INJECTION, SOLUTION, CONCENTRATE INTRAVENOUS
Status: DISCONTINUED | OUTPATIENT
Start: 2022-11-03 | End: 2022-11-03 | Stop reason: HOSPADM

## 2022-11-03 RX ORDER — SODIUM CHLORIDE 0.9 % (FLUSH) 0.9 %
3 SYRINGE (ML) INJECTION
Status: DISCONTINUED | OUTPATIENT
Start: 2022-11-03 | End: 2022-11-03 | Stop reason: HOSPADM

## 2022-11-03 RX ORDER — NEOSTIGMINE METHYLSULFATE 0.5 MG/ML
INJECTION, SOLUTION INTRAVENOUS
Status: DISCONTINUED | OUTPATIENT
Start: 2022-11-03 | End: 2022-11-03

## 2022-11-03 RX ADMIN — CEFAZOLIN 2 G: 330 INJECTION, POWDER, FOR SOLUTION INTRAMUSCULAR; INTRAVENOUS at 12:11

## 2022-11-03 RX ADMIN — ACETAMINOPHEN 1000 MG: 500 TABLET ORAL at 09:11

## 2022-11-03 RX ADMIN — NEOSTIGMINE METHYLSULFATE 5 MG: 0.5 INJECTION, SOLUTION INTRAVENOUS at 01:11

## 2022-11-03 RX ADMIN — ROCURONIUM BROMIDE 50 MG: 10 INJECTION INTRAVENOUS at 11:11

## 2022-11-03 RX ADMIN — FENTANYL CITRATE 100 MCG: 50 INJECTION INTRAMUSCULAR; INTRAVENOUS at 10:11

## 2022-11-03 RX ADMIN — DEXAMETHASONE SODIUM PHOSPHATE 8 MG: 4 INJECTION, SOLUTION INTRAMUSCULAR; INTRAVENOUS at 12:11

## 2022-11-03 RX ADMIN — PROPOFOL 30 MG: 10 INJECTION, EMULSION INTRAVENOUS at 01:11

## 2022-11-03 RX ADMIN — HALOPERIDOL LACTATE 0.5 MG: 5 INJECTION, SOLUTION INTRAMUSCULAR at 03:11

## 2022-11-03 RX ADMIN — MIDAZOLAM HYDROCHLORIDE 2 MG: 1 INJECTION, SOLUTION INTRAMUSCULAR; INTRAVENOUS at 11:11

## 2022-11-03 RX ADMIN — Medication: at 02:11

## 2022-11-03 RX ADMIN — ONDANSETRON 4 MG: 2 INJECTION INTRAMUSCULAR; INTRAVENOUS at 12:11

## 2022-11-03 RX ADMIN — MUPIROCIN: 20 OINTMENT TOPICAL at 09:11

## 2022-11-03 RX ADMIN — LIDOCAINE HYDROCHLORIDE 100 MG: 20 INJECTION INTRAVENOUS at 11:11

## 2022-11-03 RX ADMIN — ROPIVACAINE HYDROCHLORIDE 10 ML: 5 INJECTION EPIDURAL; INFILTRATION; PERINEURAL at 10:11

## 2022-11-03 RX ADMIN — FAMOTIDINE 20 MG: 10 INJECTION, SOLUTION INTRAVENOUS at 12:11

## 2022-11-03 RX ADMIN — GLYCOPYRROLATE 0.6 MG: 0.2 INJECTION, SOLUTION INTRAMUSCULAR; INTRAVITREAL at 01:11

## 2022-11-03 RX ADMIN — MIDAZOLAM HYDROCHLORIDE 2 MG: 1 INJECTION, SOLUTION INTRAMUSCULAR; INTRAVENOUS at 10:11

## 2022-11-03 RX ADMIN — PROPOFOL 200 MG: 10 INJECTION, EMULSION INTRAVENOUS at 11:11

## 2022-11-03 RX ADMIN — SODIUM CHLORIDE, SODIUM GLUCONATE, SODIUM ACETATE, POTASSIUM CHLORIDE, MAGNESIUM CHLORIDE, SODIUM PHOSPHATE, DIBASIC, AND POTASSIUM PHOSPHATE: .53; .5; .37; .037; .03; .012; .00082 INJECTION, SOLUTION INTRAVENOUS at 01:11

## 2022-11-03 RX ADMIN — Medication 20 MG: at 11:11

## 2022-11-03 RX ADMIN — SODIUM CHLORIDE: 0.9 INJECTION, SOLUTION INTRAVENOUS at 10:11

## 2022-11-03 NOTE — PATIENT INSTRUCTIONS
POST-OPERATIVE DISCHARGE INSTRUCTIONS    Diet: Ice chips, clear liquids, and then diet as tolerated.  Drink plenty of liquids.  Ice the area at least three times a day (20 minutes per session).    Elevate the extremity above the level of the heart to help reduce swelling.  Pain medication can be taken every four to six hours as needed.  It is helpful to take pain medication prior to physical therapy.  Any activity that requires precise thinking or accuracy should be avoided for a minimum of 72 hours after surgery and while on narcotic pain medication.  This includes operating machinery and/or driving a vehicle.  Remain non-weightbearing and wear the sling until further notice.  All sutures/staples will be removed approximately 14 days from the time of surgery. Leave steri-strips (skin tapes) in place until sutures are removed.  If skin glue is used instead of stitches, do not apply any ointments or solutions to the incision.  Keep the incision dry.  The skin glue will peel off in 3-4 weeks.  Dressing change directions, unless otherwise instructed:     ? Shoulder scope:  Remove dressings 48 hours after surgery and start using waterproof Band-Aids over the incision sites.      All casts, splints, braces, slings, crutches, abduction pillows, etc. are to be worn as instructed.    Judson Hose are often used following knee surgery to help with swelling.  They can be removed for 2-3 hours daily as needed.  If the hose become uncomfortable after a few days, switch to an Ace Wrap if desired.  Keep the incision dry for 10-14 days.  A waterproof dressing ([facility] or [facility]) can be used to shower.  No bath, pool, or hot tub until instructed.  You should notify our office if you notice any of the following:  A temperature greater than 101 F  Severe increased pain  Excessive drainage or redness of the incision  Calf swelling and pain  Chest pain  Your post-op follow up appointment will be approximately 14 days from the time of  surgery.  If you do not have an appointment scheduled, please call our office and schedule within 1-2 days.   14.         If you have any problems or questions, please call our office at [phone].

## 2022-11-03 NOTE — ANESTHESIA PROCEDURE NOTES
Right interscalene catheter    Patient location during procedure: pre-op   Block not for primary anesthetic.  Reason for block: at surgeon's request and post-op pain management   Post-op Pain Location: Right shoulder pain   Start time: 11/3/2022 10:25 AM  Timeout: 11/3/2022 10:25 AM   End time: 11/3/2022 10:40 AM    Staffing  Authorizing Provider: Donell Ponce MD  Performing Provider: Donell Ponce MD    Preanesthetic Checklist  Completed: patient identified, IV checked, site marked, risks and benefits discussed, surgical consent, monitors and equipment checked, pre-op evaluation and timeout performed  Peripheral Block  Patient position: sitting  Prep: ChloraPrep and site prepped and draped  Patient monitoring: heart rate, cardiac monitor, continuous pulse ox, continuous capnometry and frequent blood pressure checks  Block type: interscalene  Laterality: right  Injection technique: continuous  Needle  Needle type: Tuohy   Needle gauge: 18 G  Needle length: 2 in  Needle localization: anatomical landmarks and ultrasound guidance  Catheter type: non-stimulating  Catheter size: 20 G  Test dose: lidocaine 1.5% with Epi 1-to-200,000 and negative   -ultrasound image captured on disc.  Assessment  Injection assessment: negative aspiration, negative parasthesia and local visualized surrounding nerve  Paresthesia pain: none  Heart rate change: no  Slow fractionated injection: yes  Pain Tolerance: comfortable throughout block and no complaints  Medications:    Medications: ropivacaine (NAROPIN) injection 0.5% - Perineural   10 mL - 11/3/2022 10:40:00 AM    Additional Notes  VSS.  [facility] RN monitoring vitals throughout procedure.  Patient tolerated procedure well.     20 mL ropivacaine 0.25% w/ epi 1:200k

## 2022-11-03 NOTE — PLAN OF CARE
Nursing pre-op complete. Pt calm, will continue to monitor. Spouse visiting at bedside. Belongings placed in locker #4, 1 bag.

## 2022-11-03 NOTE — BRIEF OP NOTE
[facility]  Brief Operative Note    Surgery Date: 11/3/2022     Surgeon(s) and Role:     * Eliseo Bailon MD - Primary    Assisting Provider:     * Mateo Gordon PA-C - First Assist    No resident or fellow was available throughout the entire procedure as a result it was medically necessary for Mateo Gordon PA-C to perform first assistant duties.    Pre-Operative Diagnosis: Tendinopathy of rotator cuff, right [M67.911]  Nontraumatic incomplete tear of right rotator cuff [M75.111]    Post-Operative Diagnosis: Post-Op Diagnosis Codes:     * Tendinopathy of rotator cuff, right [M67.911]     * Nontraumatic incomplete tear of right rotator cuff [M75.111]    Procedure(s) (LRB):  REPAIR, ROTATOR CUFF, ARTHROSCOPIC - POLAR CARE (Right)  DECOMPRESSION, SHOULDER, ARTHROSCOPIC (Right)  DEBRIDEMENT, SHOULDER, ARTHROSCOPIC (Right)    Anesthesia: General    Operative Findings: See Op note.    Estimated Blood Loss: * No values recorded between 11/3/2022 12:48 PM and 11/3/2022  1:48 PM *         Specimens:   Specimen (24h ago, onward)      None              Discharge Note    OUTCOME: Patient tolerated treatment/procedure well without complication and is now ready for discharge.    DISPOSITION: Home or Self Care    FINAL DIAGNOSIS:  Post-Op Diagnosis Codes:     * Tendinopathy of rotator cuff, right [M67.911]     * Nontraumatic incomplete tear of right rotator cuff [M75.111]    FOLLOWUP: In clinic    DISCHARGE INSTRUCTIONS: See attached.

## 2022-11-03 NOTE — PLAN OF CARE
VSS. Patient able to tolerate oral liquids. Patient denies c/o pain. Patient/family received home medication per bedside delivery. Dressing intact. Polar care intact, power adapter placed with patient belongings. Thigh TEDs intact. Patient instructed not to wear SAGE hose without wearing closed-back shoes or  socks due to increased risk of falls, verbalized understanding.  No distress noted. Patient states she is ready for discharge. Discharge instructions AND NIMBUS pump instructions reviewed with patient and family, verbalized understanding. IV discontinued with catheter tip intact. Staff and family at bedside to help patient dress. Patient voided before d/c. Patient wheeled to lobby via staff.

## 2022-11-03 NOTE — ANESTHESIA POSTPROCEDURE EVALUATION
Anesthesia Post Evaluation    Patient: Renea Mac    Procedure(s) Performed: Procedure(s) (LRB):  REPAIR, ROTATOR CUFF, ARTHROSCOPIC - POLAR CARE (Right)  DECOMPRESSION, SHOULDER, ARTHROSCOPIC (Right)  DEBRIDEMENT, SHOULDER, ARTHROSCOPIC (Right)    Final Anesthesia Type: general      Patient location during evaluation: PACU  Patient participation: Yes- Able to Participate  Level of consciousness: awake and alert  Post-procedure vital signs: reviewed and stable  Pain management: adequate  Airway patency: patent    PONV status at discharge: No PONV  Anesthetic complications: no      Cardiovascular status: blood pressure returned to baseline  Respiratory status: unassisted  Hydration status: euvolemic  Follow-up not needed.          Vitals Value Taken Time   /68 11/03/22 1517   Temp 36.5 °C (97.7 °F) 11/03/22 1500   Pulse 64 11/03/22 1526   Resp 16 11/03/22 1525   SpO2 96 % 11/03/22 1526   Vitals shown include unvalidated device data.      Event Time   Out of Recovery 15:15:00         Pain/Nomeí Score: Pain Rating Prior to Med Admin: 0 (11/3/2022  2:13 PM)  Pain Rating Post Med Admin: 0 (11/3/2022 10:40 AM)  Noemí Score: 10 (11/3/2022  3:00 PM)

## 2022-11-03 NOTE — TRANSFER OF CARE
"Anesthesia Transfer of Care Note    Patient: Renea Mac    Procedure(s) Performed: Procedure(s) (LRB):  REPAIR, ROTATOR CUFF, ARTHROSCOPIC - POLAR CARE (Right)  DECOMPRESSION, SHOULDER, ARTHROSCOPIC (Right)  DEBRIDEMENT, SHOULDER, ARTHROSCOPIC (Right)    Patient location: PACU    Anesthesia Type: general    Transport from OR: Transported from OR on 100% O2 by closed face mask with adequate spontaneous ventilation    Post pain: adequate analgesia    Post assessment: no apparent anesthetic complications and tolerated procedure well    Post vital signs: stable    Level of consciousness: sedated and responds to stimulation    Nausea/Vomiting: no nausea/vomiting    Complications: none    Transfer of care protocol was followed      Last vitals:   Visit Vitals  /72 (BP Location: Left arm, Patient Position: Lying)   Pulse 75   Temp 36.6 °C (97.9 °F) (Oral)   Resp 18   Ht 5' 4" (1.626 m)   Wt 104.3 kg (230 lb)   LMP 03/01/2011 (Exact Date)   SpO2 99%   Breastfeeding No   BMI 39.48 kg/m²     "

## 2022-11-03 NOTE — ANESTHESIA PROCEDURE NOTES
Intubation    Date/Time: 11/3/2022 12:04 PM  Performed by: Susan Tineo CRNA  Authorized by: Donell Ponce MD     Intubation:     Induction:  Intravenous    Intubated:  Postinduction    Mask Ventilation:  Easy mask    Attempts:  1    Attempted By:  CRNA    Method of Intubation:  Direct    Blade:  Rudd 2    Laryngeal View Grade: Grade I - full view of cords      Difficult Airway Encountered?: No      Complications:  None    Airway Device:  Oral endotracheal tube    Airway Device Size:  7.0    Style/Cuff Inflation:  Cuffed    Inflation Amount (mL):  7    Tube secured:  21    Secured at:  The lips    Placement Verified By:  Capnometry    Complicating Factors:  None

## 2022-11-03 NOTE — ANESTHESIA PREPROCEDURE EVALUATION
11/03/2022  Renea Mac is a 52 y.o., female.      Pre-op Assessment    I have reviewed the Patient Summary Reports.     I have reviewed the Nursing Notes. I have reviewed the NPO Status.   I have reviewed the Medications.   Steroids Taken In Past Year:     Review of Systems  Anesthesia Hx:  No problems with previous Anesthesia  History of prior surgery of interest to airway management or planning: Previous anesthesia: MAC  1/21/19: Colonscopy with MAC.  Denies Family Hx of Anesthesia complications.   Denies Personal Hx of Anesthesia complications.   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 Denies Tobacco Use. Alcohol Use: Pt consumes occasional,    EENT/Dental:   Denies Throat Symptoms  Jaw Problems:  Clicking (TMJ)   Cardiovascular:   Exercise tolerance: good Hypertension, well controlled Hx Syncope  Hypokalemia: addressed by PCP Functional Capacity good / => 4 METS, can walk up two flight of stairs: denies CP/SOB  Denies Coronary Artery Disease.  Denies Deep Venous Thrombosis (DVT)  Hypertension , Recent typical clinic B/P of 141/85 @ POC visit    Pulmonary:   Solid pulmonary nodule- left lower lobe per CT 12/2018 Denies Asthma.  Denies Chronic Obstructive Pulmonary Disease (COPD).    Renal/:  Renal Symptoms/Infections/Stones: incontinence.    Hepatic/GI:   Denies GERD.  Esophageal / Stomach Disorders Gerd (no meds) Controlled by s/p Gastric Surgery.  Denies Liver Disease    Musculoskeletal:  Joint Disease:  Arthritis, Osteoarthritis    Neurological:  Osteoarthritis  Denies Seizure Disorder  Denies CVA - Cerebrovasular Accident  Denies TIA - Transient Ischemic Attack    Endocrine:  Denies Diabetes  Denies Thyroid Disease        Physical Exam  General: Well nourished, Cooperative and Alert    Airway:  Mallampati: II   Mouth Opening: Normal  Neck ROM: Normal  ROM    Chest/Lungs:  Clear to auscultation        Anesthesia Plan  Type of Anesthesia, risks & benefits discussed:    Anesthesia Type: Gen ETT, Regional, MAC  Intra-op Monitoring Plan: Standard ASA Monitors  Post Op Pain Control Plan: multimodal analgesia, IV/PO Opioids PRN and peripheral nerve block  Induction:  IV  Informed Consent: Informed consent signed with the Patient and all parties understand the risks and agree with anesthesia plan.  All questions answered.   ASA Score: 2    Ready For Surgery From Anesthesia Perspective.     .

## 2022-11-03 NOTE — OP NOTE
Date of Procedure: 11/3/2022    Surgeon(s) and Role:     * Eliseo Bailon MD - Primary    Assistant:   Mateo Gordon PA-C    No resident or fellow was available throughout the entire procedure as a result it was medically necessary for Mateo Gordon PA-C to perform first assist duties.      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 Right shoulder Arthroscopic rotator cuff repair CPT - 72508    2. Right shoulder Arthroscopic extensive debridement CPT - 71175    3. Right shoulder Arthroscopic subacromial decompression and bursectomy CPT - 79954      Anesthesia: General    Complications: None    Implants:   Implant Name Type Inv. Item Serial No.  Lot No. LRB No. Used Action   ANCHOR PEEK #2 SUT 4.75X19.1MM - [id number]  ANCHOR PEEK #2 SUT 4.75X19.1MM  [employer] [id number] Right 2 Implanted   PEEK SwivelLock Suture [place] Double Loaded    [employer] [id number] Right 1 Implanted   PEEK SwivelLock Suture [place] Double Loaded    [employer] [id number] Right 1 Implanted       Arthroscopic Findings:   Glenohumeral joint  Areas of grade 2 chondromalacia noted of the humeral head.  No articular lesions of the glenoid.  Superior labral tearing and anterior labral tearing.  This appeared more degenerative in nature.  Biceps labral complex appeared to be intact.  High-grade articular sided rotator cuff tear involving supraspinatus with an intralesional tear extending posteriorly toward the infraspinatus.  Upper border the subscapularis was intact.    Subacromial space  Very low-grade partial-thickness  tear involving the supraspinatus.  This was not well visualized from the bursal side.  Type 2 acromion with evidence of subacromial impingement with witness marks on the CA ligament in the anterior lateral edge of the acromion.    Exam Under Anesthesia:  Right shoulder:  Symmetric range of motion of the right upper extremity as compared to the left upper extremity.  No evidence of instability or loss of range of motion as compared to the left shoulder.    Indication for Procedure:  Patient is a 52-year-old female who has received 8 corticosteroid injections into her shoulder and wants to finally proceed with surgical intervention.  She states she does not want to keep getting injections into the shoulder.  She states they do help and provide relief however the pain always returns a few months after the injection.  She has also tried a course of physical therapy and anti-inflammatories.  History, physical and imaging were consistent with a high-grade articular sided rotator cuff tear with an intralesional component.  Risks, benefits alternatives were discussed with the patient.  She elected proceed with surgical intervention.    Surgery in Detail:  The patient was marked identified in the holding room.  She was brought back to the operating room and placed in the lateral decubitus position.  After general endotracheal anesthesia was administered the right upper extremity was prepped and draped in usual sterile fashion for a shoulder arthroscopy. The body was secured and well padded in a bean bag. Preoperative antibiotics were given within 1 hour the procedure.  A time-out was taken prior starting.  A posterior portal was created after insufflation of the joint with sterile saline.  A diagnostic arthroscopy was performed with the above-stated findings.    Arthroscopic Extensive Debridement  Rozina were brought in though a rotator interval portal and debridement was done of the superior labrum in the anterior labral  fraying.  We debrided the undersurface of the supraspinatus tear and identified a large intralesional tear with extension posteriorly to the infra spinatus.  Evaluation of the biceps-labral complex demonstrated intact biceps labral complex.  Rozina were also used to debride the undersurface fraying of the rotator cuff and to prepare the area of the footprint on the greater tuberosity.  We marked the area of the high-grade articular sided tear with a PDS suture for later identification on the bursal side.    Arthroscopic Subacromial Decompression  We then moved to the subacromial space and a lateral portal was created. A complete bursectomy was done for both visualization and removal of the pathologic bursa.  We then demarcated the borders of the acromion.  This was done using our vapor.  We then brought in a 5.5 mm bur and converted the type 2 acromion into a type 1 acromion.      Arthroscopic Rotator Cuff Repair  We then prepared the greater tuberosity using a [place] blade and are vapor to clear the footprint/ A  5.5 bur was used to remove any tissue and to provide a biological bed for healing.  We then placed 2 medial row anchors with double loaded 4.75 mm peek SwiveLock anchors.  These were passed in horizontal mattress Fashions from anterior to posterior.  They were then tied with arthroscopic knot techniques from posterior to anterior.  They were then brought over to 2 lateral row knotless 4.75 mm peek SwiveLock anchors for a transosseous equivalent double row rotator cuff repair. Arthroscope were then removed from the joint and the incisions were closed with 3-0 nylon sutures in simple interrupted fashion.  Adaptic, bacitracin, 4x4s and ABDs were then used.  The patient was then placed in an arc brace at his side and extubated and taken recovery.    Post-Op Plan:  Type 2 rotator cuff repair protocol    Attestation: I was present and scrubbed for the entire procedure.

## 2022-11-04 ENCOUNTER — TELEPHONE (OUTPATIENT)
Dept: SPORTS MEDICINE | Facility: CLINIC | Age: 52
End: 2022-11-04
Payer: COMMERCIAL

## 2022-11-04 NOTE — TELEPHONE ENCOUNTER
"Spoke with patient's  regarding ice machine. Advised for them to call the number on the ice machine as most of the surgery staff will be leaving for the day. I told him I would check back in on Monday. He expressed understanding and was thankful.    Concepcion Beckwith, MS, OTC [facility]    ----- Message from Stephanie Diallo sent at 11/4/2022  2:26 PM CDT -----  Regarding: Requesting a call  Contact: Aram - spouse  Name of Who is Calling: Aram - spouse           What is the request in detail: he states he is requesting a call back in regards  to getting  a larger " ice flo " for shoulder .   Please advise         Can the clinic reply by [facility]: No           [facility][phone]    "

## 2022-11-04 NOTE — OPERATIVE NOTE ADDENDUM
Certification of Assistant at Surgery       Surgery Date: 11/3/2022     Participating Surgeons:  Surgeon(s) and Role:     * Eliseo Bailon MD - Primary    Assistant:   Mateo Gordon PA-C    No resident or fellow was available throughout the entire procedure as a result it was medically necessary for Mateo Gordon PA-C to perform first assist duties.      Procedures:  Procedure(s) (LRB):  REPAIR, ROTATOR CUFF, ARTHROSCOPIC - POLAR CARE (Right)  DECOMPRESSION, SHOULDER, ARTHROSCOPIC (Right)  DEBRIDEMENT, SHOULDER, ARTHROSCOPIC (Right)    Assistant Surgeon's Certification of Necessity:  I understand that section 1842 (b) (6) (d) of the Social Security Act generally prohibits Medicare Part B reasonable charge payment for the services of assistants at surgery in teaching hospitals when qualified residents are available to furnish such services. I certify that the services for which payment is claimed were medically necessary, and that no qualified resident was available to perform the services. I further understand that these services are subject to post-payment review by the Medicare carrier.      Mateo Gordon PA-C    11/04/2022  8:11 AM

## 2022-11-04 NOTE — ANESTHESIA POST-OP PAIN MANAGEMENT
"Acute Pain Service Progress Note    11/4/2022 1148    Patient returned call to anesthesia regarding [facility] infusion follow up. Reports that her pain has been well controlled with the infusion pump. Denies signs of local anesthetic toxicity. PNC dressing remains clean, dry, and intact. All questions answered. Reminded patient that the infusion should be discontinued and PNC removed whenever the "infusion complete" alert is seen on the display screen or by POD 5 (11/8/22) at 12pm, whichever comes first. Encouraged patient to call the [facility] 24/7 support line at [phone] or the [facility] line at [phone] for any [facility] related questions/issues. Verbalizes understanding. Will continue to follow up.        "

## 2022-11-04 NOTE — ANESTHESIA POST-OP PAIN MANAGEMENT
Acute Pain Service Progress Note    11/4/2022 1125    Unable to reach patient for [facility] follow up. Voicemail left on patient's cell number encouraging to contact anesthesia at [phone] at [phone] or [facility] 24/7 support line at [phone] for any questions or concerns about the nerve block or infusion pump. Will continue to follow up.

## 2022-11-07 ENCOUNTER — PATIENT MESSAGE (OUTPATIENT)
Dept: SPORTS MEDICINE | Facility: CLINIC | Age: 52
End: 2022-11-07
Payer: COMMERCIAL

## 2022-11-07 ENCOUNTER — CLINICAL SUPPORT (OUTPATIENT)
Dept: REHABILITATION | Facility: HOSPITAL | Age: 52
End: 2022-11-07
Payer: COMMERCIAL

## 2022-11-07 DIAGNOSIS — M25.611 DECREASED ROM OF RIGHT SHOULDER: Primary | ICD-10-CM

## 2022-11-07 DIAGNOSIS — M67.911 TENDINOPATHY OF ROTATOR CUFF, RIGHT: ICD-10-CM

## 2022-11-07 DIAGNOSIS — M75.111 NONTRAUMATIC INCOMPLETE TEAR OF RIGHT ROTATOR CUFF: ICD-10-CM

## 2022-11-07 DIAGNOSIS — Z98.890 S/P ROTATOR CUFF REPAIR: Primary | ICD-10-CM

## 2022-11-07 PROCEDURE — 97161 PT EVAL LOW COMPLEX 20 MIN: CPT

## 2022-11-07 RX ORDER — TRAMADOL HYDROCHLORIDE 50 MG/1
50 TABLET ORAL EVERY 6 HOURS PRN
Qty: 20 TABLET | Refills: 0 | Status: SHIPPED | OUTPATIENT
Start: 2022-11-07 | End: 2023-07-25

## 2022-11-07 NOTE — PLAN OF CARE
[facility]  Physical Therapy Initial Evaluation    Name: Renea Mac  [facility] Number: [id number]    Therapy Diagnosis:   Encounter Diagnoses   Name Primary?    Nontraumatic incomplete tear of right rotator cuff     Tendinopathy of rotator cuff, right      Physician: Mateo Gordon PA-C    Physician Orders: PT Eval and Treat  Medical Diagnosis from Referral: Nontraumatic incomplete tear of right rotator cuff [M75.111], Tendinopathy of rotator cuff, right [M67.911]  Evaluation Date: 11/7/2022  Authorization Period Expiration: 12/7/22  Plan of Care Expiration: 3/20/23  Progress Note Due: 11/1/22  Visit # / Visits authorized: 1/ 1    Time In: 10:00 AM   Time Out: 11:00 AM   Total Billable Time: 60 minutes    DOS: 11/3/22  S/p: R infraspinatus repair   Post-Op Precautions: Standard       Precautions: Standard    Subjective     Date of onset: 2015   History of current condition - Renea reports: she has been dealing with R shoulder pain for years and has had 8 previous corticosteroid injections to manage shoulder pain. She underwent rotator cuff surgery because she did not want to continue to get injections and underwent surgery 11/3/22 with Dr. Bailon.      Imaging Impression:  Moderate acromioclavicular osteoarthritis with subacromial spur formation, mild subacromial subdeltoid bursitis, and rotator cuff tendinopathy.  No discrete rotator cuff tear.    Prior Falls: none noted     Prior Therapy: underwent some for same issue   Exercise Routine/Sport Participation: none  Social History: Lives at home with    Occupation: Works in banking   Prior Level of Function: Pain with ADL's and reaching overhead   Current Level of Function: unable to use R arm d/t sling precautions     Pain:  Current 4/10, worst 9/10, best 3/10   Location: right shoulder   Description: Sharp  Aggravating Factors: Overhead positions previous to surgery   Easing Factors: pain medication and ice    Pts goals:  return to ADL's and work without pain      Medical History:   Past Medical History:   Diagnosis Date    Acute pain of right knee 2016    Chronic right shoulder pain 2016    COVID-19 virus infection 4/10/2020    Essential hypertension 2016    GERD (gastroesophageal reflux disease)     Incomplete tear of right rotator cuff 2016    Osteoarthritis of right knee 2016    Rotator cuff tear     S/P Exploratory Laparotomy/Mass Resection/BSO 2019 1. Soft tissue, abdominal wall, mass, resection: - Benign leiomyoma, 6.5 cm in greatest dimension, see comment. 2. Ovary and fallopian tube, left, left salpingo-oophorectomy: - Benign ovary with cystic corpus luteum, 2.7 cm in greatest dimension. - Benign fallopian tube with no significant histologic abnormality. 3. Fallopian tube and ovary, right, right salpingo-oophorectomy: - Benign o    S/P laparoscopic sleeve gastrectomy 2016    Syncope and collapse 2017       Surgical History:   Renea Mac  has a past surgical history that includes Tubal ligation; Hernia repair; Sleeve Gastroplasty (2014); Hysterectomy ();  section; Colposcopy; Colonoscopy (N/A, 2019); LAPAROTOMY, EXPLORATORY (N/A, 2019); Bilateral salpingo-oophorectomy (BSO) (Bilateral, 2019); Excision of pelvic mass (N/A, 2019); Oophorectomy (Bilateral, 2019); Arthroscopic repair of rotator cuff of shoulder (Right, 11/3/2022); Arthroscopic decompression of shoulder (Right, 11/3/2022); and Arthroscopic debridement of shoulder (Right, 11/3/2022).    Medications:   Renea has a current medication list which includes the following prescription(s): acetaminophen, b complex vitamins, hydrochlorothiazide, hydrocodone-acetaminophen, losartan, multivitamin, tramadol, vitamin d, [DISCONTINUED] azelastine, [DISCONTINUED] ketoconazole, and [DISCONTINUED] nystatin.    Allergies:   Review of patient's allergies indicates:  No Known  Allergies     Objective     Observation: Presents with R shoulder in sling, wet to dry bandages in place, no larger bandage in place     Posture: not assessed d/t sling positioning        Shoulder Passive Range of Motion within Protcol Limits:     Not assessed d/t high pain levels today      Right Left   Flexion   x x   ER at 0   x x   ER at 90   x x   IR   x x     Elbow Active Range of Motion within Protcol Limits   Right Left   Flexion   150 NT   Extension   0 NT   Supination x x   Pronation x x     Cervical Active Range of Motion: Not assessed     Wrist Active Range of Motion : Not formally assessed but not limited       Palpation: painful over incisions and general shoulder area       Sensation: Intact      Pulses: Intact       Special Tests: Not performed today due to post-op status     Strength: Not assessed today due to post-op status.     Joint Mobility: Not assessed today due to post-op status.        CMS Impairment/Limitation/Restriction for FOTO Functional Intake Survey    Therapist reviewed FOTO scores for Renea Mac on 11/7/2022.   FOTO documents entered into [employer] - see Media section.    Limitation Score: 16%  Category: Self Care       Treatment     Treatment Time In: 10:45 AM   Treatment Time Out: 11:00 AM   Total Treatment time separate from Evaluation: 15 minutes    Renea received the following:     Therapeutic exercises to develop strength, endurance, ROM, flexibility, and core stabilization for 15 minutes including:  Sling adjustment   Scap Retractions   Elbow flexion and extension     Home Exercises and Patient Education Provided     Education provided:   - Sling wear and lifting restrictions were reviewed  - Prognosis, activity modification, goals for therapy, role of therapy for care, exercises/HEP    Written Home Exercises Provided: Scap retractions, elbow flexion/extension, ball squeezes.  Exercises were reviewed and Renea was able to demonstrate them prior to the end of the session.    Pt received a written copy of exercises to perform at home. Renea demonstrated good  understanding of the education provided.     See EMR under patient instructions for exercises given.     Assessment     Renea is a 52 y.o. female referred to outpatient Physical Therapy s/p R shoulder decompression and infraspinatus repair on 11/3/22. The patient demonstrates normal post operative restrictions in shoulder AROM/PROM, underlying strength deficits, pain, difficulty sleeping and decreased ability to independently complete ADLs and IADLs decreasing quality. Pt will benefit from skilled outpatient Physical Therapy to address the deficits stated above and in the chart below, provide pt/family education, and to maximize pt's level of independence. Pt prognosis is Good.     Plan of care discussed with patient: Yes  Pt's spiritual, cultural and educational needs considered and patient is agreeable to the plan of care and goals as stated below:       Anticipated Barriers for therapy: None      Medical Necessity is demonstrated by the following  History  Co-morbidities and personal factors that may impact the plan of care Co-morbidities:   HTN    Personal Factors:   no deficits     low   Examination  Body Structures and Functions, activity limitations and participation restrictions that may impact the plan of care Body Regions:   upper extremities    Body Systems:    ROM  strength    Participation Restrictions:   Work and ADL's    Activity limitations:   Learning and applying knowledge  No deficit    General Tasks and Commands  No deficit    Communication  No deficit    Mobility  lifting and carrying objects    Self care  Dressing   Grooming     Domestic Life  Cooking   Cleaning     Interactions/Relationships  No deficit    Life Areas  Recreational activities to A with health and wellness     Community and Social Life  No deficit          low   Clinical Presentation stable and uncomplicated low   Decision Making/ Complexity  Score: low     Goals:  Short Term Goals: 6 weeks  1. Pt will be compliant with HEP 50% of prescribed amount.   2. The pt to demo improvement in R shoulder PROM to by 80% of the L shoulder  3.  The pt to demo tolerance to being out of the sling for 24 hours with pain <2/10     Long Term Goals: 24 weeks   Pt will be compliant with % of prescribed amount.   Pt will score at least 59 on FOTO Functional Intake Survey   The pt to improvement in AROM of R shoulder within 80% of L shoulder to improve tolerance to OH movement   The pt to  Demo at least 4+/5 of RTC muscle testing to demo improvement in tolerance to activity  The pt to tolerate lifting 10# overhead to improve tolerance to ADLs and work related activities   The pt will report full participation in ADLs and IADLs without restrictions related to R Shoulder.    Plan   Plan of care Certification: 11/7/2022 to 3/20/23.    Outpatient Physical Therapy 1 times weekly for 32 weeks to include the following interventions: Manual Therapy, Moist Heat/ Ice, Neuromuscular Re-ed, Patient Education, Therapeutic Activites, and Therapeutic Exercise.     Nabil Hernandez, PT , DPT

## 2022-11-07 NOTE — ADDENDUM NOTE
Addendum  created 11/07/22 1228 by Hector Meneses MD    Clinical Note Signed, Intraprocedure Event edited

## 2022-11-07 NOTE — CARE UPDATE
11/07/2022     Patient called anesthesia call line to report that nimbus pump was leaking from leur lock attachment. She was instructed to call the support line for issues. Patient called back after pump was paused through the support line. She reports that her pump was leaking overnight and saturated the tape on the line. She reports that her pain is tolerable. She is currently post op day 4. She was advised to remove the catheter and call back if she has any issues.     Milo Escobar MD  Department of Anesthesiology  [facility]  11/07/2022 8:42 AM

## 2022-11-07 NOTE — ANESTHESIA POST-OP PAIN MANAGEMENT
11/07/2022      Patient called anesthesia call line at [facility] to report that nimbus pump was leaking from leur lock attachment. She was instructed to call the support line for issues. Patient called back after pump was paused through the support line.     She reports that her pump was leaking overnight and saturated the tape on the line. She reports that her pain is tolerable. She is currently post op day 4. She was advised to remove the catheter and call back if she has any issues.      Milo Escobar MD  Department of Anesthesiology  [facility]  11/07/2022 8:42 AM

## 2022-11-07 NOTE — ANESTHESIA POST-OP PAIN MANAGEMENT
11/07/2022    I called Renea Mac in regards to the PNC and Nimbus pump.  She removed the catheter yesterday without any complications.  No erythema or swelling at the insertion site. I encouraged them to call if they have any further questions or concerns.    Hector Meneses MD   Fellow  Regional Anesthesiology and Acute Pain Medicine  [facility]

## 2022-11-14 ENCOUNTER — PATIENT MESSAGE (OUTPATIENT)
Dept: REHABILITATION | Facility: HOSPITAL | Age: 52
End: 2022-11-14

## 2022-11-14 ENCOUNTER — CLINICAL SUPPORT (OUTPATIENT)
Dept: REHABILITATION | Facility: HOSPITAL | Age: 52
End: 2022-11-14
Payer: COMMERCIAL

## 2022-11-14 DIAGNOSIS — M25.511 CHRONIC RIGHT SHOULDER PAIN: Primary | ICD-10-CM

## 2022-11-14 DIAGNOSIS — M25.611 DECREASED ROM OF RIGHT SHOULDER: ICD-10-CM

## 2022-11-14 DIAGNOSIS — G89.29 CHRONIC RIGHT SHOULDER PAIN: Primary | ICD-10-CM

## 2022-11-14 PROCEDURE — 97140 MANUAL THERAPY 1/> REGIONS: CPT

## 2022-11-14 NOTE — PROGRESS NOTES
[facility]   Physical Therapy Treatment Note     [id number]    Therapy Diagnosis: No diagnosis found.  Physician: Mateo Gordon PA-C    Visit Date: 11/14/2022    Physician Orders: PT Eval and Treat  Medical Diagnosis from Referral: Nontraumatic incomplete tear of right rotator cuff [M75.111], Tendinopathy of rotator cuff, right [M67.911]  Evaluation Date: 11/7/2022  Authorization Period Expiration: 12/7/22  Plan of Care Expiration: 3/20/23  Progress Note Due: 11/1/22  Visit # / Visits authorized: 1/ 20 (+ eval)      Time In: 10:00 AM   Time Out: 10:35 AM   Total Billable Time: 35 minutes     DOS: 11/3/22  S/p: R infraspinatus repair   Post-Op Precautions: Standard      Precautions: Standard    FOTO 1st: 11/7/22  FOTO 3rd:  FOTO 10th:      SUBJECTIVE     Pt reports: Has been completing scap squeezes and elbow motion but elbow has felt tight in extension and flexion. She is still unsure if her sling is fit correctly.     She was compliant with home exercise program.  Response to previous treatment: first f/u   Functional change: first f/u     Pain: 4/10  Location: right shoulder      OBJECTIVE     Objective Measures updated at progress report unless specified.     Treatment       Renea received the treatments listed below:      Therapeutic exercises to develop strength, endurance, ROM, and flexibility for 00 minutes including:      Manual therapy techniques: Joint mobilizations, Manual traction, Myofacial release, and Soft tissue Mobilization were applied to the: R shoulder for 35 minutes, including:  Skilled PROM within surgical precautions   Elbow flexion and extension PROM   Grade 1-2 oscillations to decrease muscle guarding decrease pain   Sling adjustment     Therapeutic activities to improve functional performance for 00  minutes, including:      Neuromuscular re-education activities to improve: Balance, Coordination, Sense, and  Proprioception for 00 minutes. The following activities were included:           Patient Education and Home Exercises     Home Exercises Provided and Patient Education Provided     Education provided:   - Continue with current HEP, continue max protection phase of treatment     Written Home Exercises Provided: Patient instructed to cont prior HEP. Exercises were reviewed and Renea was able to demonstrate them prior to the end of the session.  Renea demonstrated good  understanding of the education provided. See EMR under Patient Instructions for exercises provided during therapy sessions    ASSESSMENT     Renea demonstrated improved pain levels from last session but did show some stiffness in elbow flexion and extension likely d/t her not completing her flexion and extension exercises to full ROM at home. We discussed correct performance and potential modifications. She was able to demonstrate ER to neutral in the scapular plane with some discomfort.     Renea Is progressing well towards her goals.     Pt prognosis is Good.     Pt will continue to benefit from skilled outpatient physical therapy to address the deficits listed in the problem list box on initial evaluation, provide pt/family education and to maximize pt's level of independence in the home and community environment.     Pt's spiritual, cultural and educational needs considered and pt agreeable to plan of care and goals.     Anticipated barriers to physical therapy: none    Goals:   Short Term Goals: 6 weeks  1. Pt will be compliant with HEP 50% of prescribed amount.   2. The pt to demo improvement in R shoulder PROM to by 80% of the L shoulder  3.  The pt to demo tolerance to being out of the sling for 24 hours with pain <2/10     Long Term Goals: 24 weeks   Pt will be compliant with % of prescribed amount.   Pt will score at least 59 on FOTO Functional Intake Survey   The pt to improvement in AROM of R shoulder within 80% of L shoulder to  improve tolerance to OH movement   The pt to  Demo at least 4+/5 of RTC muscle testing to demo improvement in tolerance to activity  The pt to tolerate lifting 10# overhead to improve tolerance to ADLs and work related activities   The pt will report full participation in ADLs and IADLs without restrictions related to R Shoulder.      PLAN     Plan of care Certification: 11/7/2022 to 3/20/23.     Outpatient Physical Therapy 1 times weekly for 32 weeks to include the following interventions: Manual Therapy, Moist Heat/ Ice, Neuromuscular Re-ed, Patient Education, Therapeutic Activites, and Therapeutic Exercise.     Nabil Hernandez, PT, DPT

## 2022-11-16 ENCOUNTER — OFFICE VISIT (OUTPATIENT)
Dept: SPORTS MEDICINE | Facility: CLINIC | Age: 52
End: 2022-11-16
Payer: COMMERCIAL

## 2022-11-16 VITALS — BODY MASS INDEX: 40.12 KG/M2 | WEIGHT: 235 LBS | HEIGHT: 64 IN

## 2022-11-16 DIAGNOSIS — Z98.890 S/P RIGHT ROTATOR CUFF REPAIR: Primary | ICD-10-CM

## 2022-11-16 PROCEDURE — 99999 PR PBB SHADOW E&M-EST. PATIENT-LVL III: CPT | Mod: PBBFAC,,, | Performed by: ORTHOPAEDIC SURGERY

## 2022-11-16 PROCEDURE — 99024 POSTOP FOLLOW-UP VISIT: CPT | Mod: S$GLB,,, | Performed by: ORTHOPAEDIC SURGERY

## 2022-11-16 PROCEDURE — 3008F BODY MASS INDEX DOCD: CPT | Mod: CPTII,S$GLB,, | Performed by: ORTHOPAEDIC SURGERY

## 2022-11-16 PROCEDURE — 99999 PR PBB SHADOW E&M-EST. PATIENT-LVL III: ICD-10-PCS | Mod: PBBFAC,,, | Performed by: ORTHOPAEDIC SURGERY

## 2022-11-16 PROCEDURE — 1159F MED LIST DOCD IN RCRD: CPT | Mod: CPTII,S$GLB,, | Performed by: ORTHOPAEDIC SURGERY

## 2022-11-16 PROCEDURE — 4010F ACE/ARB THERAPY RXD/TAKEN: CPT | Mod: CPTII,S$GLB,, | Performed by: ORTHOPAEDIC SURGERY

## 2022-11-16 PROCEDURE — 4010F PR ACE/ARB THEARPY RXD/TAKEN: ICD-10-PCS | Mod: CPTII,S$GLB,, | Performed by: ORTHOPAEDIC SURGERY

## 2022-11-16 PROCEDURE — 3008F PR BODY MASS INDEX (BMI) DOCUMENTED: ICD-10-PCS | Mod: CPTII,S$GLB,, | Performed by: ORTHOPAEDIC SURGERY

## 2022-11-16 PROCEDURE — 99024 PR POST-OP FOLLOW-UP VISIT: ICD-10-PCS | Mod: S$GLB,,, | Performed by: ORTHOPAEDIC SURGERY

## 2022-11-16 PROCEDURE — 1159F PR MEDICATION LIST DOCUMENTED IN MEDICAL RECORD: ICD-10-PCS | Mod: CPTII,S$GLB,, | Performed by: ORTHOPAEDIC SURGERY

## 2022-11-16 NOTE — PROGRESS NOTES
"POST-OPERATIVE EXAMINATION    52 y.o. Female who returns for follow after surgery. She is 2 weeks s/p    Procedures Performed:  1. Right shoulder Arthroscopic rotator cuff repair CPT - 58054     2. Right shoulder Arthroscopic extensive debridement CPT - 77661     3. Right shoulder Arthroscopic subacromial decompression and bursectomy CPT - 66325     She is doing well without any issues.       PHYSICAL EXAMINATION:  Ht 5' 4" (1.626 m)   Wt 106.6 kg (235 lb)   LMP 03/01/2011 (Exact Date)   BMI 40.34 kg/m²   General: Well-developed well-nourished 52 y.o. femalein no acute distress   Cardiovascular: Regular rhythm   Lungs: No labored breathing or wheezing appreciated   Neuro: Alert and oriented ×3   Psychiatric: well oriented to person, place and time, demonstrates normal mood and affect   Skin: No rashes, lesions or ulcers, normal temperature, turgor, and texture on involved extremity    ORTHOPEDIC EXAM:  Normal post-operative swelling  Normal post-operative scarring  Strength: WNL  ROM: Not tested today  Tests: None today    ASSESSMENT:      ICD-10-CM ICD-9-CM   1. S/P right rotator cuff repair  Z98.890 V45.89       PLAN:       Sutures removed today  Continue physical therapy  RTC in 1 month with Mateo Gordon PA-C.  She will then follow up with me 2 months after the appointment with Mateo.            "

## 2022-11-21 ENCOUNTER — CLINICAL SUPPORT (OUTPATIENT)
Dept: REHABILITATION | Facility: HOSPITAL | Age: 52
End: 2022-11-21
Payer: COMMERCIAL

## 2022-11-21 DIAGNOSIS — R29.898 IMPAIRED STRENGTH OF SHOULDER MUSCLES: ICD-10-CM

## 2022-11-21 DIAGNOSIS — M25.611 DECREASED ROM OF RIGHT SHOULDER: Primary | ICD-10-CM

## 2022-11-21 PROCEDURE — 97140 MANUAL THERAPY 1/> REGIONS: CPT

## 2022-11-21 PROCEDURE — 97110 THERAPEUTIC EXERCISES: CPT

## 2022-11-21 NOTE — PROGRESS NOTES
[facility]   Physical Therapy Treatment Note     [id number]    Therapy Diagnosis: No diagnosis found.  Physician: Mateo Gordon PA-C    Visit Date: 11/21/2022    Physician Orders: PT Eval and Treat  Medical Diagnosis from Referral: Nontraumatic incomplete tear of right rotator cuff [M75.111], Tendinopathy of rotator cuff, right [M67.911]  Evaluation Date: 11/7/2022  Authorization Period Expiration: 12/7/22  Plan of Care Expiration: 3/20/23  Progress Note Due: 11/1/22  Visit # / Visits authorized: 2 / 20 (+ eval)      Time In: 10:00 AM   Time Out: 10:55 AM   Total Billable Time: 55 minutes     DOS: 11/3/22  S/p: R infraspinatus repair   Post-Op Precautions: Standard   Type 2 Rotator Cuff Protocol per Dr. Bailon      Precautions: Standard    FOTO 1st: 11/7/22  FOTO 3rd:  FOTO 10th:      SUBJECTIVE     Pt reports: Elbow has still been feeling stiff.     She was compliant with home exercise program.  Response to previous treatment: first f/u   Functional change: first f/u     Pain: 4/10  Location: right shoulder      OBJECTIVE     Objective Measures updated at progress report unless specified.     Treatment       Renea received the treatments listed below:      Therapeutic exercises to develop strength, endurance, ROM, and flexibility for 25 minutes including:  Supine ER w/cane in scapular plane 2 x 10   Pendulum Hang   Short Lever Pulleys 1 x 10       Manual therapy techniques: Joint mobilizations, Manual traction, Myofacial release, and Soft tissue Mobilization were applied to the: R shoulder for 30 minutes, including:  Skilled PROM within surgical precautions   Elbow flexion and extension PROM   Shoulder Flexion PROM in side lying   ER PROM in scapular plane   Grade 1-2 oscillations to decrease muscle guarding decrease pain   Sling adjustment     Therapeutic activities to improve functional performance for 00  minutes,  including:      Neuromuscular re-education activities to improve: Balance, Coordination, Sense, and Proprioception for 00 minutes. The following activities were included:           Patient Education and Home Exercises     Home Exercises Provided and Patient Education Provided     Education provided:   - Continue with current HEP, continue max protection phase of treatment     Written Home Exercises Provided: Patient instructed to cont prior HEP. Exercises were reviewed and Renea was able to demonstrate them prior to the end of the session.  Renea demonstrated good  understanding of the education provided. See EMR under Patient Instructions for exercises provided during therapy sessions    ASSESSMENT     Renea was able to tolerate progression of shoulder PROM exercises today in side-lying and we trialed a few different versions of flexion PROM for her to do at home. We also introduced supine ER with cane. Pt education provided to increase frequency of elbow flexion/extension and start doing passive pendulum walkouts for flexion and supine ER with elbow supported within surgical precautions.     Renea Is progressing well towards her goals.     Pt prognosis is Good.     Pt will continue to benefit from skilled outpatient physical therapy to address the deficits listed in the problem list box on initial evaluation, provide pt/family education and to maximize pt's level of independence in the home and community environment.     Pt's spiritual, cultural and educational needs considered and pt agreeable to plan of care and goals.     Anticipated barriers to physical therapy: none    Goals:   Short Term Goals: 6 weeks  1. Pt will be compliant with HEP 50% of prescribed amount.   2. The pt to demo improvement in R shoulder PROM to by 80% of the L shoulder  3.  The pt to demo tolerance to being out of the sling for 24 hours with pain <2/10     Long Term Goals: 24 weeks   Pt will be compliant with % of prescribed  amount.   Pt will score at least 59 on FOTO Functional Intake Survey   The pt to improvement in AROM of R shoulder within 80% of L shoulder to improve tolerance to OH movement   The pt to  Demo at least 4+/5 of RTC muscle testing to demo improvement in tolerance to activity  The pt to tolerate lifting 10# overhead to improve tolerance to ADLs and work related activities   The pt will report full participation in ADLs and IADLs without restrictions related to R Shoulder.      PLAN     Plan of care Certification: 11/7/2022 to 3/20/23.     Outpatient Physical Therapy 1 times weekly for 32 weeks to include the following interventions: Manual Therapy, Moist Heat/ Ice, Neuromuscular Re-ed, Patient Education, Therapeutic Activites, and Therapeutic Exercise.     Nabil Hernandez, PT, DPT

## 2022-11-28 ENCOUNTER — CLINICAL SUPPORT (OUTPATIENT)
Dept: REHABILITATION | Facility: HOSPITAL | Age: 52
End: 2022-11-28
Payer: COMMERCIAL

## 2022-11-28 ENCOUNTER — PATIENT MESSAGE (OUTPATIENT)
Dept: REHABILITATION | Facility: HOSPITAL | Age: 52
End: 2022-11-28

## 2022-11-28 DIAGNOSIS — M25.611 DECREASED ROM OF RIGHT SHOULDER: Primary | ICD-10-CM

## 2022-11-28 DIAGNOSIS — R29.898 IMPAIRED STRENGTH OF SHOULDER MUSCLES: ICD-10-CM

## 2022-11-28 PROCEDURE — 97140 MANUAL THERAPY 1/> REGIONS: CPT

## 2022-11-28 PROCEDURE — 97110 THERAPEUTIC EXERCISES: CPT

## 2022-11-28 NOTE — PROGRESS NOTES
[facility]   Physical Therapy Treatment Note     Name: Renea Mac  Clinic Number: [id number]    Therapy Diagnosis: No diagnosis found.  Physician: Mateo Gordon PA-C    Visit Date: 11/28/2022    Physician Orders: PT Eval and Treat  Medical Diagnosis from Referral: Nontraumatic incomplete tear of right rotator cuff [M75.111], Tendinopathy of rotator cuff, right [M67.911]  Evaluation Date: 11/7/2022  Authorization Period Expiration: 12/7/22  Plan of Care Expiration: 3/20/23  Progress Note Due: 11/1/22  Visit # / Visits authorized: 3 / 20 (+ eval)      Time In: 10:05 AM   Time Out: 11:00 AM   Total Billable Time: 45 minutes     DOS: 11/3/22  S/p: R infraspinatus repair   Post-Op Precautions: Standard   Type 2 Rotator Cuff Protocol per Dr. Bailon      Precautions: Standard    FOTO 1st: 11/7/22  FOTO 3rd:  FOTO 10th:      SUBJECTIVE     Pt reports: Had some pain over the Thanksgiving holiday d/t family bumping/hugging shoulder and has been flared up since then.     She was compliant with home exercise program.  Response to previous treatment: first f/u   Functional change: first f/u     Pain: 4/10  Location: right shoulder      OBJECTIVE     Objective Measures updated at progress report unless specified.     Treatment       Renea received the treatments listed below:      Therapeutic exercises to develop strength, endurance, ROM, and flexibility for 15 minutes including:  Supine ER w/cane in scapular plane 3 x 15 w/towel under spine   Pendulum Leans 3 x 10     Ice to shoulder x 10' post session       Manual therapy techniques: Joint mobilizations, Manual traction, Myofacial release, and Soft tissue Mobilization were applied to the: R shoulder for 30 minutes, including:  Skilled PROM within surgical precautions   Elbow flexion and extension PROM   Shoulder Flexion PROM in side lying   ER PROM in scapular plane   Grade 1-2 oscillations to decrease muscle guarding decrease pain    Inferior [place] Grade 2  Posterior GH Glide Grade 2     Therapeutic activities to improve functional performance for 00  minutes, including:      Neuromuscular re-education activities to improve: Balance, Coordination, Sense, and Proprioception for 00 minutes. The following activities were included:           Patient Education and Home Exercises     Home Exercises Provided and Patient Education Provided     Education provided:   - Continue with current HEP, continue max protection phase of treatment     Written Home Exercises Provided: Patient instructed to cont prior HEP. Exercises were reviewed and Renea was able to demonstrate them prior to the end of the session.  Renea demonstrated good  understanding of the education provided. See EMR under Patient Instructions for exercises provided during therapy sessions    ASSESSMENT     Renea was able to increase flexion ROM to 100 degrees and ER ROM to 45 degrees with arm at 30 degrees of abduction. She mentioned that she will not be able to get a ride to her appointments starting next week and asked about driving if she can't get a ride. Pt education performed regarding not driving until she can safely d/c from sling. Pt demonstrated good understanding of education provided.     Renea Is progressing well towards her goals.     Pt prognosis is Good.     Pt will continue to benefit from skilled outpatient physical therapy to address the deficits listed in the problem list box on initial evaluation, provide pt/family education and to maximize pt's level of independence in the home and community environment.     Pt's spiritual, cultural and educational needs considered and pt agreeable to plan of care and goals.     Anticipated barriers to physical therapy: none    Goals:   Short Term Goals: 6 weeks  1. Pt will be compliant with HEP 50% of prescribed amount.   2. The pt to demo improvement in R shoulder PROM to by 80% of the L shoulder  3.  The pt to demo tolerance to  being out of the sling for 24 hours with pain <2/10     Long Term Goals: 24 weeks   Pt will be compliant with % of prescribed amount.   Pt will score at least 59 on FOTO Functional Intake Survey   The pt to improvement in AROM of R shoulder within 80% of L shoulder to improve tolerance to OH movement   The pt to  Demo at least 4+/5 of RTC muscle testing to demo improvement in tolerance to activity  The pt to tolerate lifting 10# overhead to improve tolerance to ADLs and work related activities   The pt will report full participation in ADLs and IADLs without restrictions related to R Shoulder.      PLAN     Plan of care Certification: 11/7/2022 to 3/20/23.     Outpatient Physical Therapy 1 times weekly for 32 weeks to include the following interventions: Manual Therapy, Moist Heat/ Ice, Neuromuscular Re-ed, Patient Education, Therapeutic Activites, and Therapeutic Exercise.     Nabil Hernandez, PT, DPT

## 2022-12-05 NOTE — PATIENT INSTRUCTIONS
Taking Your Blood Pressure  Blood pressure is the force of blood against the artery wall as it moves from the heart through the blood vessels. You can take your own blood pressure reading using a digital monitor. Take your readings the same each time, using the same arm. Take readings as often as your healthcare provider instructs.  About blood pressure monitors  Blood pressure monitors are designed for certain ages and cases. You can find monitors for older adults, for pregnant women, and for children. Make sure the one you choose is the right one for your age and situation.  The American Heart Association recommends an automatic cuff monitor that fits on your upper arm (bicep). The cuff should fit your arm size. A cuff thats too large or too small will not give an accurate reading. Measure around your upper arm to find your size.  Monitors that attach to your finger or wrist are not as accurate as monitors for your upper arm.  Ask your healthcare provider for help in choosing a monitor. Bring your monitor to your next provider visit if you need help in using it the correct way.  The steps below are general instructions for using an automatic digital monitor.  Step 1. Relax    · Take your blood pressure at the same time every day, such as in the morning or evening, or at the time your healthcare provider recommends.  · Wait at least a half-hour after smoking, eating, or exercising. Don't drink coffee, tea, soda, or other caffeinated beverages before checking your blood pressure.  · Sit comfortably at a table with both feet on the floor. Do not cross your legs or feet. Place the monitor near you.  · Rest for a few minutes before you begin.  Step 2. Wrap the cuff    · Place your arm on the table, palm up. Your arm should be at the level of your heart. Wrap the cuff around your upper arm, just above your elbow. Its best done on bare skin, not over clothing. Most cuffs will indicate where the brachial artery (the  Follow-up Progress Note      Patient Name: Shayna Castanon   SOHAILB: 1959   AGE:63 year old   Date: 12/05/22     CC:   Chief Complaint   Patient presents with   • Surgical Followup     Post op bilateral KXL  and right eye Intacs 01/2022. Wears contact lenses and glasses daily.        No past medical history on file.     No past surgical history on file.     Medications:   Current Medications    GANCICLOVIR (ZIRGAN) 0.15 % OPHTHALMIC GEL    Apply 1 drop to eye 4 times daily. 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    PREDNISOLONE ACETATE (PRED FORTE) 1 % OPHTHALMIC SUSPENSION    Place 1 drop into both eyes 4 times daily.    VALACYCLOVIR (VALTREX) 500 MG TABLET    500 mg 2 times daily.        Va OD  [x]sc []cc   20/50     PH 20/40          Va OS []sc []cc 20/40       PH 20/30      [x] Pentacam done:              Right   Left    NL AB  NL AB   [] [] Epi-ingrowth [] []   [] [] Interface debris [] []   [] [] Haze [] []   [] [] DLK [] []   [] [] Striae [] []   [] [] SPK [] []   [] [x] Other  [] [x]              Physical Exam Intacts in perfect order,  3+C.S. cataract in the right eye. +PSC    Diagnosis:  Post Intacs right eye and bilateral kxl       Plan: Return in 6 months in my private practice [facility].                                                                                                                                               blood vessel in the middle of the arm at the inner side of the elbow) should line up with the cuff. Look in your monitor's instruction booklet for an illustration. You can also bring your cuff to your healthcare provider and have them show you how to correctly place the cuff.  Step 3. Inflate the cuff    · Push the button that starts the pump.  · The cuff will tighten, then loosen.  · The numbers will change. When they stop changing, your blood pressure reading will appear.  · Take 2 or 3 readings one minute apart.  Step 4. Write down the results of each reading    · Write down your blood pressure numbers for each reading. Note the date and time. Keep your results in one place, such as a notebook. Even if your monitor has a built-in memory, keep a hard copy of the readings.  · Remove the cuff from your arm. Turn off the machine.  · Bring your blood pressure records with your healthcare providers at each visit.  · If you start a new blood pressure medicine, note the day you started the new medicine. Also note the day if you change the dose of your medicine. This information goes on your blood pressure recording sheet. This will help your healthcare provider monitor how well the medicine changes are working.  · Ask your healthcare provider what numbers should prompt you to call him or her. Also ask what numbers should prompt you to get help right away.  Date Last Reviewed: 11/1/2016 © 2000-2017 [employer]. [address] [place] [address]. All rights reserved. This information is not intended as a substitute for professional medical care. Always follow your healthcare professional's instructions.        Discharge Instructions for High Blood Pressure (Hypertension)  You have been diagnosed with high blood pressure (also called hypertension). This means the force of blood against your artery walls is too strong. It also means your heart is working hard to move blood. High blood pressure usually  "has no symptoms, but over time, it can damage your heart, blood vessels, eyes, kidneys, and other organs. With help from your doctor, you can manage your blood pressure and protect your health.  Taking medicine  · Learn to take your own blood pressure. Keep a record of your results. Ask your doctor which readings mean that you need medical attention.  · Take your blood pressure medicine exactly as directed. Dont skip doses. Missing doses can cause your blood pressure to get out of control.  · If you do miss a dose (or doses) check with your healthcare provider about what to do.  · Avoid medicine that contain heart stimulants, including over-the-counter drugs. Check for warnings about high blood pressure on the label. Ask the pharmacist before purchasing something you haven't used before  · Check with your doctor or pharmacist before taking a decongestant. Some decongestants can worsen high blood pressure.  Lifestyle changes  · Maintain a healthy weight. Get help to lose any extra pounds.  · Cut back on salt.  ¨ Limit canned, dried, packaged, and fast foods.  ¨ Dont add salt to your food at the table.  ¨ Season foods with herbs instead of salt when you cook.  ¨ Request no added salt when you go to a restaurant.  ¨ The American Heart Associations (AHA) "ideal" sodium intake recommendation is 1,500 milligrams per day.  However, since American's eat so much salt, the AHA says a positive change can occur by cutting back to even 2,400 milligrams of sodium a day.   · Follow the DASH (Dietary Approaches to Stop Hypertension) eating plan. This plan recommends vegetables, fruits, whole gains, and other heart healthy foods.  · Begin an exercise program. Ask your doctor how to get started. The American Heart Association recommends aerobic exercise 3 to 4 times a week for an average of 40 minutes at a time, with your doctor's approval. Simple activities like walking or gardening can help.  · Break the smoking habit. Enroll in " a stop-smoking program to improve your chances of success. Ask your healthcare provider about programs and medicines to help you stop smoking.  · Limit drinks that contain caffeine (coffee, black or green tea, cola) to 2 per day.  · Never take stimulants such as amphetamines or cocaine; these drugs can be deadly for someone with high blood pressure.  · Control your stress. Learn stress-management techniques.  · Limit alcohol to no more than 1 drink a day for women and 2 drinks a day for men.  Follow-up care  Make a follow-up appointment as directed by our staff.     When to seek medical care  Call your doctor immediately or seek emergency care if you have any of the following:  · Chest pain or shortness of breath (call 911)  · Moderate to severe headache  · Weakness in the muscles of your face, arms, or legs  · Trouble speaking  · Extreme drowsiness  · Confusion  · Fainting or dizziness  · Pulsating or rushing sound in your ears  · Unexplained nosebleed  · Weakness, tingling, or numbness of your face, arms, or legs  · Change in vision  · Blood pressure measured at home that is greater than 180/110   Date Last Reviewed: 4/27/2016  © 9917-8871 [employer]. [address] [place] [address]. All rights reserved. This information is not intended as a substitute for professional medical care. Always follow your healthcare professional's instructions.        Controlling High Blood Pressure  High blood pressure (hypertension) is often called the silent killer. This is because many people who have it dont know it. High blood pressure is defined as 140/90 mm Hg or higher. Know your blood pressure and remember to check it regularly. Doing so can save your life. Here are some things you can do to help control your blood pressure.    Choose heart-healthy foods  · Select low-salt, low-fat foods. Limit sodium intake to 2,400 mg per day or the amount suggested by your healthcare provider.  · Limit canned,  dried, cured, packaged, and fast foods. These can contain a lot of salt.  · Eat 8 to 10 servings of fruits and vegetables every day.  · Choose lean meats, fish, or chicken.  · Eat whole-grain pasta, brown rice, and beans.  · Eat 2 to 3 servings of low-fat or fat-free dairy products.  · Ask your doctor about the DASH eating plan. This plan helps reduce blood pressure.  · When you go to a restaurant, ask that your meal be prepared with no added salt.  Maintain a healthy weight  · Ask your healthcare provider how many calories to eat a day. Then stick to that number.  · Ask your healthcare provider what weight range is healthiest for you. If you are overweight, a weight loss of only 3% to 5% of your body weight can help lower blood pressure. Generally, a good weight loss goal is to lose 10% of your body weight in a year.  · Limit snacks and sweets.  · Get regular exercise.  Get up and get active  · Choose activities you enjoy. Find ones you can do with friends or family. This includes bicycling, dancing, walking, and jogging.  · Park farther away from building entrances.  · Use stairs instead of the elevator.  · When you can, walk or bike instead of driving.  · [place] leaves, garden, or do household repairs.  · Be active at a moderate to vigorous level of physical activity for at least 40 minutes for a minimum of 3 to 4 days a week.   Manage stress  · Make time to relax and enjoy life. Find time to laugh.  · Communicate your concerns with your loved ones and your healthcare provider.  · Visit with family and friends, and keep up with hobbies.  Limit alcohol and quit smoking  · Men should have no more than 2 drinks per day.  · Women should have no more than 1 drink per day.  · Talk with your healthcare provider about quitting smoking. Smoking significantly increases your risk for heart disease and stroke. Ask your healthcare provider about community smoking cessation programs and other options.  Medicines  If lifestyle  changes arent enough, your healthcare provider may prescribe high blood pressure medicine. Take all medicines as prescribed. If you have any questions about your medicines, ask your healthcare provider before stopping or changing them.   Date Last Reviewed: 4/27/2016  © 0115-3196 [employer]. [address] [place] [address]. All rights reserved. This information is not intended as a substitute for professional medical care. Always follow your healthcare professional's instructions.      -Follow up with your primary care doctor.  -See above education on hypertension.  -If you start to experience chest pain or shortness of breath go to the ER.  Please follow up with your Primary care provider within 2-5 days if your signs and symptoms have not resolved or worsen.     If your condition worsens or fails to improve we recommend that you receive another evaluation at the emergency room immediately or contact your primary medical clinic to discuss your concerns.   You must understand that you have received an Urgent Care treatment only and that you may be released before all of your medical problems are known or treated. You, the patient, will arrange for follow up care as instructed.

## 2022-12-07 ENCOUNTER — CLINICAL SUPPORT (OUTPATIENT)
Dept: REHABILITATION | Facility: HOSPITAL | Age: 52
End: 2022-12-07
Payer: COMMERCIAL

## 2022-12-07 DIAGNOSIS — R29.898 SHOULDER WEAKNESS: ICD-10-CM

## 2022-12-07 DIAGNOSIS — M25.611 DECREASED ROM OF RIGHT SHOULDER: Primary | ICD-10-CM

## 2022-12-07 PROCEDURE — 97140 MANUAL THERAPY 1/> REGIONS: CPT

## 2022-12-07 PROCEDURE — 97110 THERAPEUTIC EXERCISES: CPT

## 2022-12-07 NOTE — PROGRESS NOTES
[facility]   Physical Therapy Treatment Note     [id number]    Therapy Diagnosis:   Encounter Diagnoses   Name Primary?    Decreased ROM of right shoulder Yes    Shoulder weakness      Physician: Mateo Gordon PA-C    Visit Date: 12/7/2022    Physician Orders: PT Eval and Treat  Medical Diagnosis from Referral: Nontraumatic incomplete tear of right rotator cuff [M75.111], Tendinopathy of rotator cuff, right [M67.911]  Evaluation Date: 11/7/2022  Authorization Period Expiration: 12/7/22  Plan of Care Expiration: 3/20/23  Progress Note Due: 11/1/22  Visit # / Visits authorized: 4 / 20 (+ eval)      Time In: 1:00 PM   Time Out:  1:55 PM   Total Billable Time: 55 minutes     DOS: 11/3/22  S/p: R infraspinatus repair   Post-Op Precautions: Standard   Type 2 Rotator Cuff Protocol per Dr. Bailon      Precautions: Standard    FOTO 1st: 11/7/22  FOTO 3rd:  FOTO 10th:      SUBJECTIVE     Pt reports: Shoulder feels better than last time after Thanksgiving holiday when family was bumping her shoulder. Still a little unsure on performance of supine ER exercise. Pendulum leans are bothering her back.     She was compliant with home exercise program.  Response to previous treatment: improved shoulder ER ROM   Functional change: first f/u     Pain: 4/10  Location: right shoulder      OBJECTIVE     12/7/22  End of session   Flexion 100 deg   ER @ side: 45 deg   ER @ 45 deg abduction: 30 deg   ER @ 90 deg abduction: 35 deg     Treatment       Renea received the treatments listed below:      Therapeutic exercises to develop strength, endurance, ROM, and flexibility for 25 minutes including:  Supine ER w/cane in scapular plane 3 x 15 w/towel under spine   Table Slides towards flexion   Elbow flexion and extension over half foam roller in supine     Ice to shoulder x 10' post session       Manual therapy techniques: Joint mobilizations, Manual traction, Myofacial  release, and Soft tissue Mobilization were applied to the: R shoulder for 30 minutes, including:  Skilled PROM within surgical precautions   Elbow flexion and extension PROM   Shoulder Flexion PROM in side lying   ER PROM in scapular plane   Grade 1-2 oscillations to decrease muscle guarding decrease pain   Inferior [place] Grade 2  Posterior GH Glide Grade 2     Therapeutic activities to improve functional performance for 00  minutes, including:      Neuromuscular re-education activities to improve: Balance, Coordination, Sense, and Proprioception for 00 minutes. The following activities were included:           Patient Education and Home Exercises     Home Exercises Provided and Patient Education Provided     Education provided:   - Continue with current HEP, continue max protection phase of treatment     Written Home Exercises Provided: Patient instructed to cont prior HEP. Exercises were reviewed and Renea was able to demonstrate them prior to the end of the session.  Renea demonstrated good  understanding of the education provided. See EMR under Patient Instructions for exercises provided during therapy sessions    ASSESSMENT     Renea responded well to table slide exercise for flexion and was able to attain 100 deg of flexion with minimal shoulder pinching or pain. She showed some limitation in ER at side compared to 90 deg of abduction but demonstrated better performance of ER exercise with cane to continue progressing ROM.     Renea Is progressing well towards her goals.     Pt prognosis is Good.     Pt will continue to benefit from skilled outpatient physical therapy to address the deficits listed in the problem list box on initial evaluation, provide pt/family education and to maximize pt's level of independence in the home and community environment.     Pt's spiritual, cultural and educational needs considered and pt agreeable to plan of care and goals.     Anticipated barriers to physical therapy:  none    Goals:   Short Term Goals: 6 weeks  1. Pt will be compliant with HEP 50% of prescribed amount.   2. The pt to demo improvement in R shoulder PROM to by 80% of the L shoulder  3.  The pt to demo tolerance to being out of the sling for 24 hours with pain <2/10     Long Term Goals: 24 weeks   Pt will be compliant with % of prescribed amount.   Pt will score at least 59 on FOTO Functional Intake Survey   The pt to improvement in AROM of R shoulder within 80% of L shoulder to improve tolerance to OH movement   The pt to  Demo at least 4+/5 of RTC muscle testing to demo improvement in tolerance to activity  The pt to tolerate lifting 10# overhead to improve tolerance to ADLs and work related activities   The pt will report full participation in ADLs and IADLs without restrictions related to R Shoulder.      PLAN     Plan of care Certification: 11/7/2022 to 3/20/23.     Outpatient Physical Therapy 1 times weekly for 32 weeks to include the following interventions: Manual Therapy, Moist Heat/ Ice, Neuromuscular Re-ed, Patient Education, Therapeutic Activites, and Therapeutic Exercise.     Nabil Hernandez, PT, DPT

## 2022-12-12 ENCOUNTER — CLINICAL SUPPORT (OUTPATIENT)
Dept: REHABILITATION | Facility: HOSPITAL | Age: 52
End: 2022-12-12
Payer: COMMERCIAL

## 2022-12-12 DIAGNOSIS — M25.611 DECREASED ROM OF RIGHT SHOULDER: Primary | ICD-10-CM

## 2022-12-12 PROCEDURE — 97140 MANUAL THERAPY 1/> REGIONS: CPT

## 2022-12-12 PROCEDURE — 97110 THERAPEUTIC EXERCISES: CPT

## 2022-12-12 NOTE — PROGRESS NOTES
[facility]   Physical Therapy Treatment Note     Name: Renea Mac  Clinic Number: [id number]    Therapy Diagnosis:   No diagnosis found.    Physician: Mateo Gordon PA-C    Visit Date: 12/12/2022    Physician Orders: PT Eval and Treat  Medical Diagnosis from Referral: Nontraumatic incomplete tear of right rotator cuff [M75.111], Tendinopathy of rotator cuff, right [M67.911]  Evaluation Date: 11/7/2022  Authorization Period Expiration: 12/7/22  Plan of Care Expiration: 3/20/23  Progress Note Due: 11/1/22  Visit # / Visits authorized: 5 / 20 (+ eval)      Time In: 10:00 AM   Time Out: 11:00 AM   Total Billable Time: 50 minutes     DOS: 11/3/22  S/p: R infraspinatus repair   Post-Op Precautions: Standard   Type 2 Rotator Cuff Protocol per Dr. Bailon      Precautions: Standard    FOTO 1st: 11/7/22  FOTO 3rd:  FOTO 10th:      SUBJECTIVE     Pt reports: Table slides have been feeling better for shoulder flexion than pendulum rock backs. Still doesn't enjoy doing the ER cane exercise but has been performing them at home still.     She was compliant with home exercise program.  Response to previous treatment: improved shoulder ER ROM   Functional change: first f/u     Pain: 4/10  Location: right shoulder      OBJECTIVE     12/12/22  End of session PROM  Flexion: 105  ER @ side: 50 deg   ER @ 45 deg abduction: 40 deg   ER @ 90 deg abduction: 35 deg     12/7/22  End of session   Flexion 100 deg   ER @ side: 45 deg   ER @ 45 deg abduction: 30 deg   ER @ 90 deg abduction: 35 deg     Treatment       Renea received the treatments listed below:      Therapeutic exercises to develop strength, endurance, ROM, and flexibility for 30 minutes including:  Supine ER w/cane in scapular plane 3 x 15 w/towel under spine   Table Slides towards flexion   Elbow flexion and extension over half foam roller in supine   ER Pain free isometrics at wall 5 x :10  LLLD Prop in abduction w/heat pack x 6'   Rhythmic  stabilization in 30 deg abduction 4 x :30       Manual therapy techniques: Joint mobilizations, Manual traction, Myofacial release, and Soft tissue Mobilization were applied to the: R shoulder for 20 minutes, including:  Skilled PROM within surgical precautions   Elbow flexion and extension PROM   Shoulder Flexion PROM in side lying   ER PROM in scapular plane   Grade 1-2 oscillations to decrease muscle guarding decrease pain   Inferior [place] Grade 2  Posterior GH Glide Grade 2     Therapeutic activities to improve functional performance for 00  minutes, including:      Neuromuscular re-education activities to improve: Balance, Coordination, Sense, and Proprioception for 00 minutes. The following activities were included:           Patient Education and Home Exercises     Home Exercises Provided and Patient Education Provided     Education provided:   - Continue with current HEP, continue max protection phase of treatment     Written Home Exercises Provided: Patient instructed to cont prior HEP. Exercises were reviewed and Renea was able to demonstrate them prior to the end of the session.  Renea demonstrated good  understanding of the education provided. See EMR under Patient Instructions for exercises provided during therapy sessions    ASSESSMENT     Renea still demonstrates some stiffness into flexion with pain in the anterolateral shoulder during passive flexion. We discussed proper scapular positioning to minimize pinching sensation. Her ER continues to improve slowly and is able to tolerate introduction of isometric exercises in pain free ranges and effort. We discussed her trying to perform ROM more frequently to attain early phase ROM goals and minimize stiffness to which she agreed.     Renea Is progressing well towards her goals.     Pt prognosis is Good.     Pt will continue to benefit from skilled outpatient physical therapy to address the deficits listed in the problem list box on initial  evaluation, provide pt/family education and to maximize pt's level of independence in the home and community environment.     Pt's spiritual, cultural and educational needs considered and pt agreeable to plan of care and goals.     Anticipated barriers to physical therapy: none    Goals:   Short Term Goals: 6 weeks  1. Pt will be compliant with HEP 50% of prescribed amount.   2. The pt to demo improvement in R shoulder PROM to by 80% of the L shoulder  3.  The pt to demo tolerance to being out of the sling for 24 hours with pain <2/10     Long Term Goals: 24 weeks   Pt will be compliant with % of prescribed amount.   Pt will score at least 59 on FOTO Functional Intake Survey   The pt to improvement in AROM of R shoulder within 80% of L shoulder to improve tolerance to OH movement   The pt to  Demo at least 4+/5 of C muscle testing to demo improvement in tolerance to activity  The pt to tolerate lifting 10# overhead to improve tolerance to ADLs and work related activities   The pt will report full participation in ADLs and IADLs without restrictions related to R Shoulder.      PLAN     Plan of care Certification: 11/7/2022 to 3/20/23.     Outpatient Physical Therapy 1 times weekly for 32 weeks to include the following interventions: Manual Therapy, Moist Heat/ Ice, Neuromuscular Re-ed, Patient Education, Therapeutic Activites, and Therapeutic Exercise.     Nabil Hernandez, PT, DPT

## 2022-12-16 ENCOUNTER — OFFICE VISIT (OUTPATIENT)
Dept: SPORTS MEDICINE | Facility: CLINIC | Age: 52
End: 2022-12-16
Payer: COMMERCIAL

## 2022-12-16 VITALS
DIASTOLIC BLOOD PRESSURE: 86 MMHG | HEIGHT: 64 IN | BODY MASS INDEX: 40.12 KG/M2 | SYSTOLIC BLOOD PRESSURE: 136 MMHG | WEIGHT: 235 LBS | HEART RATE: 97 BPM

## 2022-12-16 DIAGNOSIS — Z98.890 S/P RIGHT ROTATOR CUFF REPAIR: Primary | ICD-10-CM

## 2022-12-16 PROCEDURE — 4010F PR ACE/ARB THEARPY RXD/TAKEN: ICD-10-PCS | Mod: CPTII,S$GLB,, | Performed by: ORTHOPAEDIC SURGERY

## 2022-12-16 PROCEDURE — 3008F PR BODY MASS INDEX (BMI) DOCUMENTED: ICD-10-PCS | Mod: CPTII,S$GLB,, | Performed by: ORTHOPAEDIC SURGERY

## 2022-12-16 PROCEDURE — 99024 POSTOP FOLLOW-UP VISIT: CPT | Mod: S$GLB,,, | Performed by: ORTHOPAEDIC SURGERY

## 2022-12-16 PROCEDURE — 4010F ACE/ARB THERAPY RXD/TAKEN: CPT | Mod: CPTII,S$GLB,, | Performed by: ORTHOPAEDIC SURGERY

## 2022-12-16 PROCEDURE — 99999 PR PBB SHADOW E&M-EST. PATIENT-LVL III: ICD-10-PCS | Mod: PBBFAC,,, | Performed by: ORTHOPAEDIC SURGERY

## 2022-12-16 PROCEDURE — 99024 PR POST-OP FOLLOW-UP VISIT: ICD-10-PCS | Mod: S$GLB,,, | Performed by: ORTHOPAEDIC SURGERY

## 2022-12-16 PROCEDURE — 3079F DIAST BP 80-89 MM HG: CPT | Mod: CPTII,S$GLB,, | Performed by: ORTHOPAEDIC SURGERY

## 2022-12-16 PROCEDURE — 1159F MED LIST DOCD IN RCRD: CPT | Mod: CPTII,S$GLB,, | Performed by: ORTHOPAEDIC SURGERY

## 2022-12-16 PROCEDURE — 1159F PR MEDICATION LIST DOCUMENTED IN MEDICAL RECORD: ICD-10-PCS | Mod: CPTII,S$GLB,, | Performed by: ORTHOPAEDIC SURGERY

## 2022-12-16 PROCEDURE — 3075F SYST BP GE 130 - 139MM HG: CPT | Mod: CPTII,S$GLB,, | Performed by: ORTHOPAEDIC SURGERY

## 2022-12-16 PROCEDURE — 3008F BODY MASS INDEX DOCD: CPT | Mod: CPTII,S$GLB,, | Performed by: ORTHOPAEDIC SURGERY

## 2022-12-16 PROCEDURE — 3079F PR MOST RECENT DIASTOLIC BLOOD PRESSURE 80-89 MM HG: ICD-10-PCS | Mod: CPTII,S$GLB,, | Performed by: ORTHOPAEDIC SURGERY

## 2022-12-16 PROCEDURE — 99999 PR PBB SHADOW E&M-EST. PATIENT-LVL III: CPT | Mod: PBBFAC,,, | Performed by: ORTHOPAEDIC SURGERY

## 2022-12-16 PROCEDURE — 3075F PR MOST RECENT SYSTOLIC BLOOD PRESS GE 130-139MM HG: ICD-10-PCS | Mod: CPTII,S$GLB,, | Performed by: ORTHOPAEDIC SURGERY

## 2022-12-16 NOTE — PROGRESS NOTES
"POST-OPERATIVE EXAMINATION    52 y.o. Female who returns for follow after surgery. She is 6 weeks s/p    Procedures Performed:  1. Right shoulder Arthroscopic rotator cuff repair CPT - 63179     2. Right shoulder Arthroscopic extensive debridement CPT - 34347     3. Right shoulder Arthroscopic subacromial decompression and bursectomy CPT - 92268     She is doing well without any issues.       PHYSICAL EXAMINATION:  /86   Pulse 97   Ht 5' 4" (1.626 m)   Wt 106.6 kg (235 lb)   LMP 03/01/2011 (Exact Date)   BMI 40.34 kg/m²   General: Well-developed well-nourished 52 y.o. femalein no acute distress   Cardiovascular: Regular rhythm   Lungs: No labored breathing or wheezing appreciated   Neuro: Alert and oriented ×3   Psychiatric: well oriented to person, place and time, demonstrates normal mood and affect   Skin: No rashes, lesions or ulcers, normal temperature, turgor, and texture on involved extremity    ORTHOPEDIC EXAM:  Normal post-operative swelling  Normal post-operative scarring  Strength: WNL  ROM: FE-90 ; ER/Abd-30 ; IR/Abd-10 ; ER/Add-30  Tests: None today    ASSESSMENT:      ICD-10-CM ICD-9-CM   1. S/P right rotator cuff repair  Z98.890 V45.89         PLAN:       Continue physical therapy  Patient may discontinue ARC sling  RTC in 2 months              "

## 2022-12-17 ENCOUNTER — OFFICE VISIT (OUTPATIENT)
Dept: URGENT CARE | Facility: CLINIC | Age: 52
End: 2022-12-17
Payer: COMMERCIAL

## 2022-12-17 VITALS
SYSTOLIC BLOOD PRESSURE: 160 MMHG | WEIGHT: 235 LBS | RESPIRATION RATE: 18 BRPM | DIASTOLIC BLOOD PRESSURE: 94 MMHG | OXYGEN SATURATION: 97 % | HEART RATE: 96 BPM | TEMPERATURE: 99 F | BODY MASS INDEX: 40.34 KG/M2

## 2022-12-17 DIAGNOSIS — U07.1 COVID-19: Primary | ICD-10-CM

## 2022-12-17 DIAGNOSIS — R05.9 COUGH, UNSPECIFIED TYPE: ICD-10-CM

## 2022-12-17 LAB
CTP QC/QA: YES
SARS-COV-2 AG RESP QL IA.RAPID: POSITIVE

## 2022-12-17 PROCEDURE — 3008F PR BODY MASS INDEX (BMI) DOCUMENTED: ICD-10-PCS | Mod: CPTII,S$GLB,, | Performed by: FAMILY MEDICINE

## 2022-12-17 PROCEDURE — 87811 SARS CORONAVIRUS 2 ANTIGEN POCT, MANUAL READ: ICD-10-PCS | Mod: QW,S$GLB,, | Performed by: FAMILY MEDICINE

## 2022-12-17 PROCEDURE — 1159F PR MEDICATION LIST DOCUMENTED IN MEDICAL RECORD: ICD-10-PCS | Mod: CPTII,S$GLB,, | Performed by: FAMILY MEDICINE

## 2022-12-17 PROCEDURE — 3077F PR MOST RECENT SYSTOLIC BLOOD PRESSURE >= 140 MM HG: ICD-10-PCS | Mod: CPTII,S$GLB,, | Performed by: FAMILY MEDICINE

## 2022-12-17 PROCEDURE — 87811 SARS-COV-2 COVID19 W/OPTIC: CPT | Mod: QW,S$GLB,, | Performed by: FAMILY MEDICINE

## 2022-12-17 PROCEDURE — 3080F DIAST BP >= 90 MM HG: CPT | Mod: CPTII,S$GLB,, | Performed by: FAMILY MEDICINE

## 2022-12-17 PROCEDURE — 4010F PR ACE/ARB THEARPY RXD/TAKEN: ICD-10-PCS | Mod: CPTII,S$GLB,, | Performed by: FAMILY MEDICINE

## 2022-12-17 PROCEDURE — 1159F MED LIST DOCD IN RCRD: CPT | Mod: CPTII,S$GLB,, | Performed by: FAMILY MEDICINE

## 2022-12-17 PROCEDURE — 4010F ACE/ARB THERAPY RXD/TAKEN: CPT | Mod: CPTII,S$GLB,, | Performed by: FAMILY MEDICINE

## 2022-12-17 PROCEDURE — 3008F BODY MASS INDEX DOCD: CPT | Mod: CPTII,S$GLB,, | Performed by: FAMILY MEDICINE

## 2022-12-17 PROCEDURE — 3080F PR MOST RECENT DIASTOLIC BLOOD PRESSURE >= 90 MM HG: ICD-10-PCS | Mod: CPTII,S$GLB,, | Performed by: FAMILY MEDICINE

## 2022-12-17 PROCEDURE — 99214 PR OFFICE/OUTPT VISIT, EST, LEVL IV, 30-39 MIN: ICD-10-PCS | Mod: S$GLB,,, | Performed by: FAMILY MEDICINE

## 2022-12-17 PROCEDURE — 3077F SYST BP >= 140 MM HG: CPT | Mod: CPTII,S$GLB,, | Performed by: FAMILY MEDICINE

## 2022-12-17 PROCEDURE — 1160F PR REVIEW ALL MEDS BY PRESCRIBER/CLIN PHARMACIST DOCUMENTED: ICD-10-PCS | Mod: CPTII,S$GLB,, | Performed by: FAMILY MEDICINE

## 2022-12-17 PROCEDURE — 1160F RVW MEDS BY RX/DR IN RCRD: CPT | Mod: CPTII,S$GLB,, | Performed by: FAMILY MEDICINE

## 2022-12-17 PROCEDURE — 99214 OFFICE O/P EST MOD 30 MIN: CPT | Mod: S$GLB,,, | Performed by: FAMILY MEDICINE

## 2022-12-17 RX ORDER — FLUTICASONE PROPIONATE 50 MCG
1 SPRAY, SUSPENSION (ML) NASAL DAILY
Qty: 16 G | Refills: 0 | Status: SHIPPED | OUTPATIENT
Start: 2022-12-17 | End: 2024-01-23

## 2022-12-17 RX ORDER — CETIRIZINE HYDROCHLORIDE 10 MG/1
10 TABLET ORAL DAILY
Qty: 30 TABLET | Refills: 0 | Status: SHIPPED | OUTPATIENT
Start: 2022-12-17 | End: 2024-01-23

## 2022-12-17 RX ORDER — BENZONATATE 200 MG/1
200 CAPSULE ORAL 3 TIMES DAILY PRN
Qty: 30 CAPSULE | Refills: 0 | Status: SHIPPED | OUTPATIENT
Start: 2022-12-17 | End: 2022-12-27

## 2022-12-17 RX ORDER — GUAIFENESIN 600 MG/1
1200 TABLET, EXTENDED RELEASE ORAL 2 TIMES DAILY
Qty: 40 TABLET | Refills: 0 | Status: SHIPPED | OUTPATIENT
Start: 2022-12-17 | End: 2022-12-27

## 2022-12-17 NOTE — PROGRESS NOTES
Subjective:       Patient ID: Renea Mac is a 52 y.o. female.    Vitals:  weight is 106.6 kg (235 lb). Her temperature is 98.7 °F (37.1 °C). Her blood pressure is 160/94 (abnormal) and her pulse is 96. Her respiration is 18 and oxygen saturation is 97%.     Chief Complaint: Cough    Pt states she has been having a dry cough for three days , pt has been taking OTC cough suppressants , Pt had a positive at home Covid test this A.M.    Provider note begins below:  Past Medical History:  11/4/2016: Acute pain of right knee  11/4/2016: Chronic right shoulder pain  4/10/2020: COVID-19 virus infection  11/4/2016: Essential hypertension  No date: GERD (gastroesophageal reflux disease)  11/30/2016: Incomplete tear of right rotator cuff  11/30/2016: Osteoarthritis of right knee  No date: Rotator cuff tear  1/31/2019: S/P Exploratory Laparotomy/Mass Resection/BSO      Comment:  1- 1. Soft tissue, abdominal wall, mass,                resection: - Benign leiomyoma, 6.5 cm in greatest                dimension, see comment. 2. Ovary and fallopian tube,                left, left salpingo-oophorectomy: - Benign ovary with                cystic corpus luteum, 2.7 cm in greatest dimension. -                Benign fallopian tube with no significant histologic                abnormality. 3. Fallopian tube and ovary, right, right                salpingo-oophorectomy: - Benign o  11/4/2016: S/P laparoscopic sleeve gastrectomy  6/14/2017: Syncope and collapse   Pt with hx of htn takes tylenol and hctz and losartan currently in pt for rotator cuff repair early nov. She c/o body aches, congestion, cough since Thursday. She denies any known sick contacts. Denies any kidney problem. Temp 99 Thursday, 101 today, having chills. No cp or SOB. No GI related symptoms, including, N/v/D or constipation. No anosmia or ageusia.      Cough  This is a new problem. The problem has been unchanged. The problem occurs constantly. The cough is  Non-productive. Associated symptoms include ear congestion, a fever, headaches, myalgias, nasal congestion, postnasal drip, rhinorrhea and a sore throat. Pertinent negatives include no chest pain, chills, ear pain, heartburn, hemoptysis, rash, shortness of breath, sweats, weight loss or wheezing. She has tried OTC cough suppressant for the symptoms. The treatment provided mild relief. There is no history of asthma, bronchiectasis, bronchitis, COPD, emphysema, environmental allergies or pneumonia.     Constitution: Positive for fever. Negative for activity change, appetite change, chills, sweating and fatigue.   HENT:  Positive for congestion, postnasal drip and sore throat. Negative for ear pain, nosebleeds, foreign body in nose, sinus pain and sinus pressure.    Neck: Negative for neck pain and neck stiffness.   Cardiovascular:  Negative for chest pain, leg swelling and palpitations.   Respiratory:  Positive for cough. Negative for chest tightness, sputum production, bloody sputum, COPD, shortness of breath, stridor, wheezing and asthma.    Gastrointestinal:  Negative for heartburn.   Musculoskeletal:  Positive for muscle ache.   Skin:  Negative for rash.   Allergic/Immunologic: Negative for environmental allergies and asthma.   Neurological:  Positive for headaches.     Objective:      Physical Exam   Constitutional: She is oriented to person, place, and time. She appears well-developed.  Non-toxic appearance. She does not appear ill. No distress.      Comments:Rue in brace     HENT:   Head: Normocephalic and atraumatic.   Ears:   Right Ear: External ear normal.   Left Ear: External ear normal.   Nose: Nose normal.   Mouth/Throat: Oropharynx is clear and moist.   Eyes: Conjunctivae, EOM and lids are normal. Pupils are equal, round, and reactive to light.   Neck: Trachea normal and phonation normal. Neck supple.   Pulmonary/Chest: Effort normal and breath sounds normal.   Musculoskeletal: Normal range of motion.          General: Normal range of motion.   Neurological: She is alert and oriented to person, place, and time.   Skin: Skin is warm, dry, intact and not diaphoretic.   Psychiatric: Her speech is normal and behavior is normal. Judgment and thought content normal.   Nursing note and vitals reviewed.      Assessment:       1. COVID-19    2. Cough, unspecified type        Results for orders placed or performed in visit on 12/17/22   SARS Coronavirus 2 Antigen, POCT Manual Read   Result Value Ref Range    SARS Coronavirus 2 Antigen Positive (A) Negative     Acceptable Yes       Plan:       COVID positive, medications reviewed, she wishes to take Paxlovid, reviewed risk versus benefits and potential side effects, she wishes to proceed with treatment.  She says she only takes losartan and hydrochlorothiazide.  Return to work note provided.  Vital signs stable, lungs clear to auscultation bilaterally.    Discussed results/diagnosis/plan with patient in clinic. Strict precautions given to patient to monitor for worsening signs and symptoms. Advised to follow up with PCP or specialist.    Explained side effects of medications prescribed with patient and informed him/her to discontinue use if he/she has any side effects and to inform UC or PCP if this occurs. All questions answered. Strict ED verses clinic return precautions stressed and given in depth. Advised if symptoms worsens of fail to improve he/she should go to the Emergency Room. Discharge and follow-up instructions given verbally/printed with the patient who expressed understanding and willingness to comply with my recommendations. Patient voiced understanding and in agreement with current treatment plan. Patient exits the exam room in no acute distress. Conversant and engaged during discharge discussion, verbalized understanding.      COVID-19  -     guaiFENesin (MUCINEX) 600 mg 12 hr tablet; Take 2 tablets (1,200 mg total) by mouth 2 (two) times daily.  for 10 days  Dispense: 40 tablet; Refill: 0  -     benzonatate (TESSALON) 200 MG capsule; Take 1 capsule (200 mg total) by mouth 3 (three) times daily as needed.  Dispense: 30 capsule; Refill: 0  -     fluticasone propionate (FLONASE) 50 mcg/actuation nasal spray; 1 spray (50 mcg total) by Each Nostril route once daily.  Dispense: 16 g; Refill: 0  -     cetirizine (ZYRTEC) 10 MG tablet; Take 1 tablet (10 mg total) by mouth once daily.  Dispense: 30 tablet; Refill: 0  -     nirmatrelvir-ritonavir 300 mg (150 mg x 2)-100 mg copackaged tablets (EUA); Take 3 tablets by mouth 2 (two) times daily for 5 days. Each dose contains 2 nirmatrelvir (pink tablets) and 1 ritonavir (white tablet). Take all 3 tablets together  Dispense: 30 tablet; Refill: 0    Cough, unspecified type  -     SARS Coronavirus 2 Antigen, POCT Manual Read  -     guaiFENesin (MUCINEX) 600 mg 12 hr tablet; Take 2 tablets (1,200 mg total) by mouth 2 (two) times daily. for 10 days  Dispense: 40 tablet; Refill: 0  -     benzonatate (TESSALON) 200 MG capsule; Take 1 capsule (200 mg total) by mouth 3 (three) times daily as needed.  Dispense: 30 capsule; Refill: 0  -     fluticasone propionate (FLONASE) 50 mcg/actuation nasal spray; 1 spray (50 mcg total) by Each Nostril route once daily.  Dispense: 16 g; Refill: 0  -     cetirizine (ZYRTEC) 10 MG tablet; Take 1 tablet (10 mg total) by mouth once daily.  Dispense: 30 tablet; Refill: 0           Medical Decision Making:   Clinical Tests:   Lab Tests: Ordered and Reviewed  Urgent Care Management:  Patient evaluated in the  and has remained stable on room air.  Vital signs do not indicate sepsis/severe infection, hypoxia, tachypnea, or respiratory distress, and in my professional opinion the benefits of hospital admission/observation DO NOT outweigh the risks of hospital-associated exposures, and the patient is well enough for discharge home with supportive care. The patient was counseled appropriately and  provided with instructions for social isolation/home quarantine, hand washing, and symptomatic treatment. The patient was also given a work excuse, if needed. return precautions, including worsening symptoms or severe dyspnea, were discussed with the patient, who expressed understanding and is comfortable with plan at this time.    Additional MDM:     Heart Failure Score:   COPD = No    Patient Instructions   General Discharge Instructions   PLEASE READ YOUR DISCHARGE INSTRUCTIONS ENTIRELY AS IT CONTAINS IMPORTANT INFORMATION.  If you were prescribed a narcotic or controlled medication, do not drive or operate heavy equipment or machinery while taking these medications.  If you were prescribed antibiotics, please take them to completion.  You must understand that you've received an Urgent Care treatment only and that you may be released before all your medical problems are known or treated. You, the patient, will arrange for follow up care as instructed.    OVER THE COUNTER RECOMMENDATIONS/SUGGESTIONS.    Make sure to stay well hydrated.    Use Nasal Saline to mechanically move any post nasal drip from your eustachian tube or from the back of your throat.    Use warm salt water gargles to ease your throat pain. Warm salt water gargles as needed for sore throat- 1/2 tsp salt to 1 cup warm water, gargle as desired.    Use an antihistamine such as Claritin, Zyrtec or Allegra to dry you out.    Use pseudoephedrine (behind the counter) to decongest. Pseudoephedrine 30 mg up to 240 mg /day. It can raise your blood pressure and give you palpitations.    Use mucinex (guaifenesin) to break up mucous up to 2400mg/day to loosen any mucous.    The mucinex DM pill has a cough suppressant that can be sedating. It can be used at night to stop the tickle at the back of your throat.    You can use Mucinex D (it has guaifenesin and a high dose of pseudoephedrine) in the mornings to help decongest.    Use Afrin in each nare for no  longer than 3 days, as it is addictive. It can also dry out your mucous membranes and cause elevated blood pressure. This is especially useful if you are flying.    Use Flonase 1-2 sprays/nostril per day. It is a local acting steroid nasal spray, if you develop a bloody nose, stop using the medication immediately.    Sometimes Nyquil at night is beneficial to help you get some rest, however it is sedating and it does have an antihistamine, and tylenol.    Honey is a natural cough suppressant that can be used.    Tylenol up to 4,000 mg a day is safe for short periods and can be used for body aches, pain, and fever. However in high doses and prolonged use it can cause liver irritation.    Ibuprofen is a non-steroidal anti-inflammatory that can be used for body aches, pain, and fever.However it can also cause stomach irritation if over used.     Follow up with your PCP or specialty clinic as instructed in the next 2-3 days if not improved or as needed. You can call [phone] to schedule an appointment with appropriate provider.      If you condition worsens, we recommend that you receive another evaluation at the emergency room immediately or contact your primary medical clinic's after hours call service to discuss your concerns.      Please return here or go to the Emergency Department for any concerns or worsening condition.   You can also call [phone] to schedule an appointment with the appropriate provider.    Please return here or go to the Emergency Department for any concerns or worsening of condition.    Thank you for choosing [facility]!    Our goal in the Urgent Care is to always provide outstanding medical care. You may receive a survey by mail or e-mail in the next week regarding your experience today. We would greatly appreciate you completing and returning the survey. Your feedback provides us with a way to recognize our staff who provide very good care, and it helps us learn how to  improve when your experience was below our aspiration of excellence.      We appreciate you trusting us with your medical care. We hope you feel better soon. We will be happy to take care of you for all of your future medical needs.    Sincerely,    ANTHONY Smith  POSITIVE COVID TEST    You have tested positive for COVID-19 today.           ISOLATION    If you tested positive and do not have symptoms, you must isolate for 5 days starting on the day of the positive test. I         If you tested positive and have symptoms, you must isolate for 5 days starting on the day of the first symptoms,  not the day of the positive test.         This is the most important part, both the CDC and the [facility] emphasize that you do not test out of isolation.         After 5 days, if your symptoms have improved and you have not had fever on day 5, you can return to the community on day 6- NO TESTING REQUIRED!          In fact, we do not retest if you were positive in the last 90 days.         After your 5 days of isolation are completed, the CDC recommends strict mask use for the first 5 days that you come out of isolation.      When to Seek Emergency Medical Attention  Look for emergency warning signs* for COVID-19. If someone is showing any of these signs, seek emergency medical care immediately:    Trouble breathing  Persistent pain or pressure in the chest  New confusion  Inability to wake or stay awake  Pale, gray, or blue-colored skin, lips, or nail beds, depending on skin tone  *This list is not all possible symptoms. Please call your medical provider for any other symptoms that are severe or concerning to you.    Call 911 or call ahead to your local emergency facility: Notify the  that you are seeking care for someone who has or may have COVID-19.       For up to date guidelines please visit www.cdc.gov  If you have any questions you may call [facility] line [phone]    What advice should be  given to patients with known or presumed COVID-19 managed at home?    For most patients with COVID-19 who are managed at home, we advise the following:    ?Supportive care with antipyretics/analgesics (eg, acetaminophen) and hydration    ?Close contact with their health care provider    ?Monitoring for clinical worsening, particularly the development of new or worsening dyspnea, which should prompt clinical evaluation and possible hospitalization    ?Separation from other household members, including pets (eg, staying in a separate room when possible and wearing a mask when in the same room)    ?Frequent hand washing for all family members    ?Frequent disinfection of commonly touched surfaces    Some patients with cough or dyspnea may experience symptomatic improvement with self-proning (resting in the prone rather than the supine position)    All other care is generally supportive, similar to that advised for other acute viral illnesses:    ?We advise that patients stay well hydrated, particularly those patients with sustained or higher fevers, in whom insensible fluid losses may be significant.    ?Cough that is persistent, interferes with sleep, or causes discomfort can be managed with an over-the-counter cough medication (eg, dextromethorphan) or prescription benzonatate, 100 to 200 mg orally three times daily as needed.    ?We advise rest as needed during the acute illness; for patients without hypoxia, frequent repositioning and ambulation is encouraged. In addition, we encourage all patients to advance activity as soon as tolerated during recovery.      Additional instructions:  Separate yourself from other people and animals in your home.  Call ahead before visiting your doctor.  Wear a facemask when around others.  Cover your coughs and sneezes.  Wash your hands often with soap and water; hand  can be used, too.  Avoid sharing personal household items.  Wipe down surfaces used daily.  Monitor your  symptoms. Seek prompt medical attention if your illness is worsening (e.g., difficulty breathing).   Before seeking care, call your healthcare provider.  If you have a medical emergency and need to call 911, notify the dispatch personnel that you have, or are being evaluated for COVID-19. If possible, put on a facemask before emergency medical services arrive.      Contact Tracing for patients who have been vaccinated and agree to sequencing.    As one of the next steps, you will receive a call or text from the [place] Department of Health ([facility]) COVID-19 Tracing Team. See the contact information below so you know not to ignore the health departments call. It is important that you contact them back immediately so they can help.      Contact Tracer Number:  [phone]  Caller ID for most carriers: [facility]     What is contact tracing?  Contact tracing is a process that helps identify everyone who has been in close contact with an infected person. Contact tracers let those people know they may have been exposed and guide them on next steps. Confidentiality is important for everyone; no one will be told who may have exposed them to the virus.  Your involvement is important. The more we know about where and how this virus is spreading, the better chance we have at stopping it from spreading further.  What does exposure mean?  Exposure means you have been within 6 feet for more than 15 minutes with a person who has or had COVID-19.  What kind of questions do the contact tracers ask?  A contact tracer will confirm your basic contact information including name, address, phone number, and next of kin, as well as asking about any symptoms you may have had. Theyll also ask you how you think you may have gotten sick, such as places where you may have been exposed to the virus, and people you were with. Those names will never be shared with anyone outside of that call, and will only be used to help trace and stop  the spread of the virus.   I have privacy concerns. How will the state use my information?  Your privacy about your health is important. All calls are completed using call centers that use the appropriate health privacy protection measures (HIPAA compliance), meaning that your patient information is safe. No one will ever ask you any questions related to immigration status. Your health comes first.   Do I have to participate?  You do not have to participate, but we strongly encourage you to. Contact tracing can help us catch and control new outbreaks as theyre developing to keep your friends and family safe.   What if I dont hear from anyone?  If you dont receive a call within 24 hours, you can call the number above right away to inquire about your status. That line is open from 8:00 am - 8:00 p.m., 7 days a week.  Contact tracing saves lives! Together, we have the power to beat this virus and keep our loved ones and neighbors safe.    For more information see CDC link below.      https://www.cdc.gov/coronavirus/2019-ncov/hcp/guidance-prevent-spread.html#precautions        Sources:  [facility], [place] Department [facility] and \[facility] [place] [facility]\""

## 2022-12-17 NOTE — LETTER
[address], [address] ? [place] [address] ? Phone [phone] ? Fax [phone]           Return to Work/School    Patient: Renea Mac  YOB: 1970   Date: 12/17/2022      To Whom It May Concern:     Renea Mac was in contact with/seen in my office on 12/17/2022. COVID-19 is present in our communities across the [facility]. Not all patients are eligible or appropriate to be tested. In this situation, your employee meets the following criteria:     Renea Mac has met the criteria for COVID-19 testing and has a POSITIVE result. She can return to work once they are asymptomatic for 24 hours without the use of fever reducing medications AND at least five days from the start of symptoms (or from the first positive result if they have no symptoms).      If you have any questions or concerns, or if I can be of further assistance, please do not hesitate to contact me.     Sincerely,    Shelia Ivey NP

## 2022-12-17 NOTE — PATIENT INSTRUCTIONS
General Discharge Instructions   PLEASE READ YOUR DISCHARGE INSTRUCTIONS ENTIRELY AS IT CONTAINS IMPORTANT INFORMATION.  If you were prescribed a narcotic or controlled medication, do not drive or operate heavy equipment or machinery while taking these medications.  If you were prescribed antibiotics, please take them to completion.  You must understand that you've received an Urgent Care treatment only and that you may be released before all your medical problems are known or treated. You, the patient, will arrange for follow up care as instructed.    OVER THE COUNTER RECOMMENDATIONS/SUGGESTIONS.    Make sure to stay well hydrated.    Use Nasal Saline to mechanically move any post nasal drip from your eustachian tube or from the back of your throat.    Use warm salt water gargles to ease your throat pain. Warm salt water gargles as needed for sore throat- 1/2 tsp salt to 1 cup warm water, gargle as desired.    Use an antihistamine such as Claritin, Zyrtec or Allegra to dry you out.    Use pseudoephedrine (behind the counter) to decongest. Pseudoephedrine 30 mg up to 240 mg /day. It can raise your blood pressure and give you palpitations.    Use mucinex (guaifenesin) to break up mucous up to 2400mg/day to loosen any mucous.    The mucinex DM pill has a cough suppressant that can be sedating. It can be used at night to stop the tickle at the back of your throat.    You can use Mucinex D (it has guaifenesin and a high dose of pseudoephedrine) in the mornings to help decongest.    Use Afrin in each nare for no longer than 3 days, as it is addictive. It can also dry out your mucous membranes and cause elevated blood pressure. This is especially useful if you are flying.    Use Flonase 1-2 sprays/nostril per day. It is a local acting steroid nasal spray, if you develop a bloody nose, stop using the medication immediately.    Sometimes Nyquil at night is beneficial to help you get some rest, however it is sedating and it  does have an antihistamine, and tylenol.    Honey is a natural cough suppressant that can be used.    Tylenol up to 4,000 mg a day is safe for short periods and can be used for body aches, pain, and fever. However in high doses and prolonged use it can cause liver irritation.    Ibuprofen is a non-steroidal anti-inflammatory that can be used for body aches, pain, and fever.However it can also cause stomach irritation if over used.     Follow up with your PCP or specialty clinic as instructed in the next 2-3 days if not improved or as needed. You can call [phone] to schedule an appointment with appropriate provider.      If you condition worsens, we recommend that you receive another evaluation at the emergency room immediately or contact your primary medical clinic's after hours call service to discuss your concerns.      Please return here or go to the Emergency Department for any concerns or worsening condition.   You can also call [phone] to schedule an appointment with the appropriate provider.    Please return here or go to the Emergency Department for any concerns or worsening of condition.    Thank you for choosing [facility]!    Our goal in the Urgent Care is to always provide outstanding medical care. You may receive a survey by mail or e-mail in the next week regarding your experience today. We would greatly appreciate you completing and returning the survey. Your feedback provides us with a way to recognize our staff who provide very good care, and it helps us learn how to improve when your experience was below our aspiration of excellence.      We appreciate you trusting us with your medical care. We hope you feel better soon. We will be happy to take care of you for all of your future medical needs.    Sincerely,    ANTHONY Smith  POSITIVE COVID TEST    You have tested positive for COVID-19 today.           ISOLATION    If you tested positive and do not have symptoms, you  must isolate for 5 days starting on the day of the positive test. I         If you tested positive and have symptoms, you must isolate for 5 days starting on the day of the first symptoms,  not the day of the positive test.         This is the most important part, both the CDC and the [facility] emphasize that you do not test out of isolation.         After 5 days, if your symptoms have improved and you have not had fever on day 5, you can return to the community on day 6- NO TESTING REQUIRED!          In fact, we do not retest if you were positive in the last 90 days.         After your 5 days of isolation are completed, the CDC recommends strict mask use for the first 5 days that you come out of isolation.      When to Seek Emergency Medical Attention  Look for emergency warning signs* for COVID-19. If someone is showing any of these signs, seek emergency medical care immediately:    Trouble breathing  Persistent pain or pressure in the chest  New confusion  Inability to wake or stay awake  Pale, gray, or blue-colored skin, lips, or nail beds, depending on skin tone  *This list is not all possible symptoms. Please call your medical provider for any other symptoms that are severe or concerning to you.    Call 911 or call ahead to your local emergency facility: Notify the  that you are seeking care for someone who has or may have COVID-19.       For up to date guidelines please visit www.cdc.gov  If you have any questions you may call [facility] line [phone]    What advice should be given to patients with known or presumed COVID-19 managed at home?    For most patients with COVID-19 who are managed at home, we advise the following:    ?Supportive care with antipyretics/analgesics (eg, acetaminophen) and hydration    ?Close contact with their health care provider    ?Monitoring for clinical worsening, particularly the development of new or worsening dyspnea, which should prompt clinical  evaluation and possible hospitalization    ?Separation from other household members, including pets (eg, staying in a separate room when possible and wearing a mask when in the same room)    ?Frequent hand washing for all family members    ?Frequent disinfection of commonly touched surfaces    Some patients with cough or dyspnea may experience symptomatic improvement with self-proning (resting in the prone rather than the supine position)    All other care is generally supportive, similar to that advised for other acute viral illnesses:    ?We advise that patients stay well hydrated, particularly those patients with sustained or higher fevers, in whom insensible fluid losses may be significant.    ?Cough that is persistent, interferes with sleep, or causes discomfort can be managed with an over-the-counter cough medication (eg, dextromethorphan) or prescription benzonatate, 100 to 200 mg orally three times daily as needed.    ?We advise rest as needed during the acute illness; for patients without hypoxia, frequent repositioning and ambulation is encouraged. In addition, we encourage all patients to advance activity as soon as tolerated during recovery.      Additional instructions:  Separate yourself from other people and animals in your home.  Call ahead before visiting your doctor.  Wear a facemask when around others.  Cover your coughs and sneezes.  Wash your hands often with soap and water; hand  can be used, too.  Avoid sharing personal household items.  Wipe down surfaces used daily.  Monitor your symptoms. Seek prompt medical attention if your illness is worsening (e.g., difficulty breathing).   Before seeking care, call your healthcare provider.  If you have a medical emergency and need to call 911, notify the dispatch personnel that you have, or are being evaluated for COVID-19. If possible, put on a facemask before emergency medical services arrive.      Contact Tracing for patients who have been  vaccinated and agree to sequencing.    As one of the next steps, you will receive a call or text from the [place] Department of Health ([facility]) COVID-19 Tracing Team. See the contact information below so you know not to ignore the health departments call. It is important that you contact them back immediately so they can help.      Contact Tracer Number:  [phone]  Caller ID for most carriers: [place] Dept Health     What is contact tracing?  Contact tracing is a process that helps identify everyone who has been in close contact with an infected person. Contact tracers let those people know they may have been exposed and guide them on next steps. Confidentiality is important for everyone; no one will be told who may have exposed them to the virus.  Your involvement is important. The more we know about where and how this virus is spreading, the better chance we have at stopping it from spreading further.  What does exposure mean?  Exposure means you have been within 6 feet for more than 15 minutes with a person who has or had COVID-19.  What kind of questions do the contact tracers ask?  A contact tracer will confirm your basic contact information including name, address, phone number, and next of kin, as well as asking about any symptoms you may have had. Theyll also ask you how you think you may have gotten sick, such as places where you may have been exposed to the virus, and people you were with. Those names will never be shared with anyone outside of that call, and will only be used to help trace and stop the spread of the virus.   I have privacy concerns. How will the state use my information?  Your privacy about your health is important. All calls are completed using call centers that use the appropriate health privacy protection measures (HIPAA compliance), meaning that your patient information is safe. No one will ever ask you any questions related to immigration status. Your health comes first.   Do I  have to participate?  You do not have to participate, but we strongly encourage you to. Contact tracing can help us catch and control new outbreaks as theyre developing to keep your friends and family safe.   What if I dont hear from anyone?  If you dont receive a call within 24 hours, you can call the number above right away to inquire about your status. That line is open from 8:00 am - 8:00 p.m., 7 days a week.  Contact tracing saves lives! Together, we have the power to beat this virus and keep our loved ones and neighbors safe.    For more information see CDC link below.      https://www.cdc.gov/coronavirus/2019-ncov/hcp/guidance-prevent-spread.html#precautions        Sources:  CDC, [place] [facility] and [facility]

## 2022-12-22 ENCOUNTER — PATIENT MESSAGE (OUTPATIENT)
Dept: SPORTS MEDICINE | Facility: CLINIC | Age: 52
End: 2022-12-22
Payer: COMMERCIAL

## 2022-12-29 ENCOUNTER — CLINICAL SUPPORT (OUTPATIENT)
Dept: REHABILITATION | Facility: HOSPITAL | Age: 52
End: 2022-12-29
Payer: COMMERCIAL

## 2022-12-29 DIAGNOSIS — M25.611 DECREASED ROM OF RIGHT SHOULDER: Primary | ICD-10-CM

## 2022-12-29 DIAGNOSIS — R29.898 SHOULDER WEAKNESS: ICD-10-CM

## 2022-12-29 PROCEDURE — 97140 MANUAL THERAPY 1/> REGIONS: CPT

## 2022-12-29 PROCEDURE — 97110 THERAPEUTIC EXERCISES: CPT

## 2022-12-29 NOTE — PROGRESS NOTES
"[facility]   Physical Therapy Treatment Note     Name: Renea Mendezock  Clinic Number: [id number]    Therapy Diagnosis:   Encounter Diagnoses   Name Primary?    Decreased ROM of right shoulder Yes    Shoulder weakness        Physician: Mateo Gordon PA-C    Visit Date: 12/29/2022    Physician Orders: PT Eval and Treat  Medical Diagnosis from Referral: Nontraumatic incomplete tear of right rotator cuff [M75.111], Tendinopathy of rotator cuff, right [M67.911]  Evaluation Date: 11/7/2022  Authorization Period Expiration: 12/7/22  Plan of Care Expiration: 3/20/23  Progress Note Due: 11/1/22  Visit # / Visits authorized: 6 / 20 (+ eval)      Time In: 11:00 AM   Time Out: 12:00 AM   Total Billable Time: 60 minutes     DOS: 11/3/22  S/p: R infraspinatus repair   Post-Op Precautions: Standard   Type 2 Rotator Cuff Protocol per Dr. Bailon      Precautions: Standard    FOTO 1st: 11/7/22  FOTO 3rd:  FOTO 10th:      SUBJECTIVE     Pt reports: She saw Dr. Bailon for her 6 week f/u and was told to continue working on ER and flexion. She feels that her shoulder "gets stuck" when she tries to do ER exercises. Has been spending time out of the sling and feeling minimal discomfort. Had COVID last week and missed last session d/t stomach bug but is feeling somewhat better now.     She was compliant with home exercise program.  Response to previous treatment: improved shoulder ER ROM   Functional change: first f/u     Pain: 4/10  Location: right shoulder      OBJECTIVE     12/29/22  End of session PROM   Flexion: 115  ER @ side: 40 deg     12/12/22  End of session PROM  Flexion: 105  ER @ side: 50 deg   ER @ 45 deg abduction: 40 deg   ER @ 90 deg abduction: 35 deg         Treatment       Renea received the treatments listed below:      Therapeutic exercises to develop strength, endurance, ROM, and flexibility for 15 minutes including:  Supine ER w/cane in scapular plane 3 x 15   Supine Cane Flexion 3 x " 10   Pulleys x 6' for forward flexion with cueing out of anterior scapular tilt   Rhythmic stabilization in 30 deg abduction 4 x :30   Seated ER [place] forward       Manual therapy techniques: Joint mobilizations, Manual traction, Myofacial release, and Soft tissue Mobilization were applied to the: R shoulder for 45 minutes, including:  Skilled PROM within surgical precautions   ER w/centering glide   Manual scapular setting and PNF   Inferior GHJ Glide Grade 2-3  Posterior GHJ Glide Grade 2-3     Therapeutic activities to improve functional performance for 00  minutes, including:      Neuromuscular re-education activities to improve: Balance, Coordination, Sense, and Proprioception for 00 minutes. The following activities were included:           Patient Education and Home Exercises     Home Exercises Provided and Patient Education Provided     Education provided:   - Continue with current HEP, continue max protection phase of treatment     Written Home Exercises Provided: Patient instructed to cont prior HEP. Exercises were reviewed and Renea was able to demonstrate them prior to the end of the session.  Renea demonstrated good  understanding of the education provided. See EMR under Patient Instructions for exercises provided during therapy sessions    ASSESSMENT     Renea continues to demonstrate ROM restrictions d/t pain and muscle guarding. She was able to attain better flexion ROM and her ER improved with addition of centering manual glide to the GHJ. We worked through some different ways she can still work on flexion at home and ER ROM. She still shows increased anterior scapular tipping that improves with cueing and SL scapular mobility and strength exercises. We discussed trying to increase visit frequency but she expressed concern with costs and insurance coverage.     Renea Is progressing well towards her goals.     Pt prognosis is Good.     Pt will continue to benefit from skilled outpatient physical  therapy to address the deficits listed in the problem list box on initial evaluation, provide pt/family education and to maximize pt's level of independence in the home and community environment.     Pt's spiritual, cultural and educational needs considered and pt agreeable to plan of care and goals.     Anticipated barriers to physical therapy: none    Goals:   Short Term Goals: 6 weeks  1. Pt will be compliant with HEP 50% of prescribed amount.   2. The pt to demo improvement in R shoulder PROM to by 80% of the L shoulder  3.  The pt to demo tolerance to being out of the sling for 24 hours with pain <2/10     Long Term Goals: 24 weeks   Pt will be compliant with % of prescribed amount.   Pt will score at least 59 on FOTO Functional Intake Survey   The pt to improvement in AROM of R shoulder within 80% of L shoulder to improve tolerance to OH movement   The pt to  Demo at least 4+/5 of RTC muscle testing to demo improvement in tolerance to activity  The pt to tolerate lifting 10# overhead to improve tolerance to ADLs and work related activities   The pt will report full participation in ADLs and IADLs without restrictions related to R Shoulder.      PLAN     Plan of care Certification: 11/7/2022 to 3/20/23.     Outpatient Physical Therapy 1 times weekly for 32 weeks to include the following interventions: Manual Therapy, Moist Heat/ Ice, Neuromuscular Re-ed, Patient Education, Therapeutic Activites, and Therapeutic Exercise.     Nabil Hernandez, PT, DPT

## 2023-01-03 ENCOUNTER — PATIENT MESSAGE (OUTPATIENT)
Dept: SPORTS MEDICINE | Facility: CLINIC | Age: 53
End: 2023-01-03
Payer: COMMERCIAL

## 2023-01-03 ENCOUNTER — TELEPHONE (OUTPATIENT)
Dept: SPORTS MEDICINE | Facility: CLINIC | Age: 53
End: 2023-01-03
Payer: COMMERCIAL

## 2023-01-03 ENCOUNTER — CLINICAL SUPPORT (OUTPATIENT)
Dept: REHABILITATION | Facility: HOSPITAL | Age: 53
End: 2023-01-03
Payer: COMMERCIAL

## 2023-01-03 DIAGNOSIS — M25.611 DECREASED ROM OF RIGHT SHOULDER: ICD-10-CM

## 2023-01-03 DIAGNOSIS — R29.898 SHOULDER WEAKNESS: Primary | ICD-10-CM

## 2023-01-03 PROCEDURE — 97140 MANUAL THERAPY 1/> REGIONS: CPT

## 2023-01-03 PROCEDURE — 97110 THERAPEUTIC EXERCISES: CPT

## 2023-01-03 NOTE — PROGRESS NOTES
[facility]   Physical Therapy Treatment Note     Name: Renea Mac  Clinic Number: [id number]    Therapy Diagnosis:   Encounter Diagnoses   Name Primary?    Shoulder weakness Yes    Decreased ROM of right shoulder        Physician: Mateo Gordon PA-C    Visit Date: 1/3/2023    Physician Orders: PT Eval and Treat  Medical Diagnosis from Referral: Nontraumatic incomplete tear of right rotator cuff [M75.111], Tendinopathy of rotator cuff, right [M67.911]  Evaluation Date: 11/7/2022  Authorization Period Expiration: 12/7/22  Plan of Care Expiration: 3/20/23  Progress Note Due: 11/1/22  Visit # / Visits authorized: 6 / 20 (+ eval)      Time In: 10:00 AM   Time Out: 11:00 AM   Total Billable Time: 60 minutes     DOS: 11/3/22  S/p: R infraspinatus repair   Post-Op Precautions: Standard   Type 2 Rotator Cuff Protocol per Dr. Bailon      Precautions: Standard    FOTO 1st: 11/7/22  FOTO 3rd:  FOTO 10th:      SUBJECTIVE     Pt reports: She feels like her shoulder had been swollen since surgery up until last week when she says it has started to feel better. She has been trying to use the arm more.     She was compliant with home exercise program.  Response to previous treatment: improved shoulder ER ROM   Functional change: first f/u     Pain: 4/10  Location: right shoulder      OBJECTIVE     1/3/23  End of session PROM  Flexion: 115   ER @ side: 55 deg     12/29/22  End of session PROM   Flexion: 115  ER @ side: 40 deg     12/12/22  End of session PROM  Flexion: 105  ER @ side: 50 deg   ER @ 45 deg abduction: 40 deg   ER @ 90 deg abduction: 35 deg         Treatment       Renea received the treatments listed below:      Therapeutic exercises to develop strength, endurance, ROM, and flexibility for 15 minutes including:  LLLD Inferior [place] Hot Pack + 3# x 5'   LLLD ER Prop x 3' x 2# x 2   Pulley's into flexion x 5'   Supine Cane ER x 30       Manual therapy techniques: Joint mobilizations,  Manual traction, Myofacial release, and Soft tissue Mobilization were applied to the: R shoulder for 45 minutes, including:  Skilled PROM within surgical precautions   ER w/centering glide   Thoracic Supine Grade V   Supine First Rib inferior glide grade 2-3   Supine First Rib MET x 5   Inferior GHJ [place] Grade 2-3  Posterior GHJ Glide Grade 2-3     Therapeutic activities to improve functional performance for 00  minutes, including:      Neuromuscular re-education activities to improve: Balance, Coordination, Sense, and Proprioception for 00 minutes. The following activities were included:           Patient Education and Home Exercises     Home Exercises Provided and Patient Education Provided     Education provided:   - Continue with current HEP, continue max protection phase of treatment     Written Home Exercises Provided: Patient instructed to cont prior HEP. Exercises were reviewed and Renea was able to demonstrate them prior to the end of the session.  Renea demonstrated good  understanding of the education provided. See EMR under Patient Instructions for exercises provided during therapy sessions    ASSESSMENT     Renea continues to demonstrate ROM restriction into flexion and ER which improved with manual therapy and LLLD stretching. She reported some neck and radiating pain during PROM motions that improved with first rib inferior glides and supine thoracic manipulation. We discussed increasing visit frequency to more aggressively work on ROM goals to which she agreed.     Renea Is progressing well towards her goals.     Pt prognosis is Good.     Pt will continue to benefit from skilled outpatient physical therapy to address the deficits listed in the problem list box on initial evaluation, provide pt/family education and to maximize pt's level of independence in the home and community environment.     Pt's spiritual, cultural and educational needs considered and pt agreeable to plan of care and  goals.     Anticipated barriers to physical therapy: none    Goals:   Short Term Goals: 6 weeks  1. Pt will be compliant with HEP 50% of prescribed amount.   2. The pt to demo improvement in R shoulder PROM to by 80% of the L shoulder  3.  The pt to demo tolerance to being out of the sling for 24 hours with pain <2/10     Long Term Goals: 24 weeks   Pt will be compliant with % of prescribed amount.   Pt will score at least 59 on FOTO Functional Intake Survey   The pt to improvement in AROM of R shoulder within 80% of L shoulder to improve tolerance to OH movement   The pt to  Demo at least 4+/5 of RTC muscle testing to demo improvement in tolerance to activity  The pt to tolerate lifting 10# overhead to improve tolerance to ADLs and work related activities   The pt will report full participation in ADLs and IADLs without restrictions related to R Shoulder.      PLAN     Plan of care Certification: 11/7/2022 to 3/20/23.     Outpatient Physical Therapy 1 times weekly for 32 weeks to include the following interventions: Manual Therapy, Moist Heat/ Ice, Neuromuscular Re-ed, Patient Education, Therapeutic Activites, and Therapeutic Exercise.     Nabil Hernandez, PT, DPT

## 2023-01-03 NOTE — TELEPHONE ENCOUNTER
Spoke to the patient. I have not received any forms yet.    Radha Amedee   Athletic   [facility]

## 2023-01-04 ENCOUNTER — PATIENT MESSAGE (OUTPATIENT)
Dept: SPORTS MEDICINE | Facility: CLINIC | Age: 53
End: 2023-01-04
Payer: COMMERCIAL

## 2023-01-05 ENCOUNTER — CLINICAL SUPPORT (OUTPATIENT)
Dept: REHABILITATION | Facility: HOSPITAL | Age: 53
End: 2023-01-05
Payer: COMMERCIAL

## 2023-01-05 DIAGNOSIS — M25.611 DECREASED ROM OF RIGHT SHOULDER: ICD-10-CM

## 2023-01-05 DIAGNOSIS — R29.898 SHOULDER WEAKNESS: Primary | ICD-10-CM

## 2023-01-05 PROCEDURE — 97110 THERAPEUTIC EXERCISES: CPT

## 2023-01-05 PROCEDURE — 97140 MANUAL THERAPY 1/> REGIONS: CPT

## 2023-01-05 NOTE — PROGRESS NOTES
[facility]   Physical Therapy Treatment Note     Name: Renea Mac  [facility] Number: [id number]    Therapy Diagnosis:   Encounter Diagnoses   Name Primary?    Shoulder weakness Yes    Decreased ROM of right shoulder        Physician: Mateo Gordon PA-C    Visit Date: 1/5/2023    Physician Orders: PT Eval and Treat  Medical Diagnosis from Referral: Nontraumatic incomplete tear of right rotator cuff [M75.111], Tendinopathy of rotator cuff, right [M67.911]  Evaluation Date: 11/7/2022  Authorization Period Expiration: 12/7/22  Plan of Care Expiration: 3/20/23  Progress Note Due: 11/1/22  Visit # / Visits authorized: 2/20      Time In: 9:55 AM   Time Out: 10:55 AM   Total Billable Time: 60 minutes     DOS: 11/3/22  S/p: R infraspinatus repair   Post-Op Precautions: Standard   Type 2 Rotator Cuff Protocol per Dr. Bailon      Precautions: Standard    FOTO 1st: 11/7/22  FOTO 3rd:  FOTO 10th:      SUBJECTIVE     Pt reports: She felt very sore yesterday after PT session Tuesday and had to take a pain pill to sleep last night, she feel like this is d/t LLLD stretching. She feels better today but still a little soreness.     She was compliant with home exercise program.  Response to previous treatment: improved shoulder ER ROM   Functional change: first f/u     Pain: 4/10  Location: right shoulder      OBJECTIVE     1/5/23  End of Session PROM   Flexion: 140 in supine   ER @ side: 60 deg     1/3/23  End of session PROM  Flexion: 115   ER @ side: 55 deg     12/29/22  End of session PROM   Flexion: 115  ER @ side: 40 deg           Treatment       Renea received the treatments listed below:      Therapeutic exercises to develop strength, endurance, ROM, and flexibility for 15 minutes including:  LLLD Inferior [place] 5#   LLLD ER Prop x 5' x 2#   Pulley's into flexion x 5'   SL ER 3 x 20 WOA       Manual therapy techniques: Joint mobilizations, Manual traction, Myofacial release, and Soft tissue  Mobilization were applied to the: R shoulder for 45 minutes, including:  Skilled PROM within surgical precautions   ER w/centering glide   Thoracic Supine Grade V   Supine First Rib inferior glide grade 2-3   Supine First Rib MET x 5   Inferior GHJ [place] Grade 2-3  Posterior GHJ Glide Grade 2-3     Therapeutic activities to improve functional performance for 00  minutes, including:      Neuromuscular re-education activities to improve: Balance, Coordination, Sense, and Proprioception for 00 minutes. The following activities were included:           Patient Education and Home Exercises     Home Exercises Provided and Patient Education Provided     Education provided:   - Continue with current HEP, continue max protection phase of treatment     Written Home Exercises Provided: Patient instructed to cont prior HEP. Exercises were reviewed and Renea was able to demonstrate them prior to the end of the session.  Renea demonstrated good  understanding of the education provided. See EMR under Patient Instructions for exercises provided during therapy sessions    ASSESSMENT     Renea demonstrated some increased soreness in R shoulder after PT on Tuesday but demonstrated improved ER and flexion PROM and AROM today. She tolerated LLLD stretching today with decreased soreness or guarding and was able to attain 140 degrees of flexion and ~60 deg of ER at side.     Renea Is progressing well towards her goals.     Pt prognosis is Good.     Pt will continue to benefit from skilled outpatient physical therapy to address the deficits listed in the problem list box on initial evaluation, provide pt/family education and to maximize pt's level of independence in the home and community environment.     Pt's spiritual, cultural and educational needs considered and pt agreeable to plan of care and goals.     Anticipated barriers to physical therapy: none    Goals:   Short Term Goals: 6 weeks  1. Pt will be compliant with HEP 50% of  prescribed amount.   2. The pt to demo improvement in R shoulder PROM to by 80% of the L shoulder  3.  The pt to demo tolerance to being out of the sling for 24 hours with pain <2/10     Long Term Goals: 24 weeks   Pt will be compliant with % of prescribed amount.   Pt will score at least 59 on FOTO Functional Intake Survey   The pt to improvement in AROM of R shoulder within 80% of L shoulder to improve tolerance to OH movement   The pt to  Demo at least 4+/5 of RTC muscle testing to demo improvement in tolerance to activity  The pt to tolerate lifting 10# overhead to improve tolerance to ADLs and work related activities   The pt will report full participation in ADLs and IADLs without restrictions related to R Shoulder.      PLAN     Plan of care Certification: 11/7/2022 to 3/20/23.     Outpatient Physical Therapy 1 times weekly for 32 weeks to include the following interventions: Manual Therapy, Moist Heat/ Ice, Neuromuscular Re-ed, Patient Education, Therapeutic Activites, and Therapeutic Exercise.     Nabil Hernandez, PT, DPT

## 2023-01-06 ENCOUNTER — PATIENT MESSAGE (OUTPATIENT)
Dept: SPORTS MEDICINE | Facility: CLINIC | Age: 53
End: 2023-01-06
Payer: COMMERCIAL

## 2023-01-09 DIAGNOSIS — I10 ESSENTIAL HYPERTENSION: ICD-10-CM

## 2023-01-09 RX ORDER — LOSARTAN POTASSIUM 25 MG/1
25 TABLET ORAL DAILY
Qty: 90 TABLET | Refills: 0 | Status: SHIPPED | OUTPATIENT
Start: 2023-01-09 | End: 2023-04-22 | Stop reason: SDUPTHER

## 2023-01-11 ENCOUNTER — CLINICAL SUPPORT (OUTPATIENT)
Dept: REHABILITATION | Facility: HOSPITAL | Age: 53
End: 2023-01-11
Payer: COMMERCIAL

## 2023-01-11 DIAGNOSIS — R29.898 SHOULDER WEAKNESS: Primary | ICD-10-CM

## 2023-01-11 DIAGNOSIS — M25.611 DECREASED ROM OF RIGHT SHOULDER: ICD-10-CM

## 2023-01-11 PROCEDURE — 97112 NEUROMUSCULAR REEDUCATION: CPT

## 2023-01-11 PROCEDURE — 97140 MANUAL THERAPY 1/> REGIONS: CPT

## 2023-01-11 PROCEDURE — 97110 THERAPEUTIC EXERCISES: CPT

## 2023-01-11 NOTE — PROGRESS NOTES
[facility]   Physical Therapy Treatment Note     [id number]    Therapy Diagnosis:   Encounter Diagnoses   Name Primary?    Shoulder weakness Yes    Decreased ROM of right shoulder        Physician: Mateo Gordon PA-C    Visit Date: 1/11/2023    Physician Orders: PT Eval and Treat  Medical Diagnosis from Referral: Nontraumatic incomplete tear of right rotator cuff [M75.111], Tendinopathy of rotator cuff, right [M67.911]  Evaluation Date: 11/7/2022  Authorization Period Expiration: 12/7/22  Plan of Care Expiration: 3/20/23  Progress Note Due: 11/1/22  Visit # / Visits authorized: 3/20      Time In: 10:00 AM   Time Out: 11:00 AM   Total Billable Time: 60 minutes     DOS: 11/3/22  S/p: R infraspinatus repair   Post-Op Precautions: Standard   Type 2 Rotator Cuff Protocol per Dr. Bailon      Precautions: Standard    FOTO 1st: 11/7/22  FOTO 3rd:  FOTO 10th:      SUBJECTIVE     Pt reports: she bought a set of ankle weights to do LLLD ER Prop but is unsure if she's doing it correctly.     She was compliant with home exercise program.  Response to previous treatment: improved shoulder ER ROM   Functional change: first f/u     Pain: 4/10  Location: right shoulder      OBJECTIVE     PROM  Flexion: 148       Treatment       Renea received the treatments listed below:      Therapeutic exercises to develop strength, endurance, ROM, and flexibility for 9 minutes including:    LLLD ER Prop x 1.5# x 5'   Supine Cane Flexion   Supine Cane ER       Manual therapy techniques: Joint mobilizations, Manual traction, Myofacial release, and Soft tissue Mobilization were applied to the: R shoulder for 33 minutes, including:  Skilled PROM within surgical precautions   ER w/centering glide   Inferior GHJ Glide Grade 2-3  Posterior GHJ Glide Grade 2-3     Therapeutic activities to improve functional performance for 00  minutes, including:      Neuromuscular re-education  activities to improve: Balance, Coordination, Sense, and Proprioception for 18 minutes. The following activities were included:  SL ER 3 x 20 WOA   ER Walkout 3 x 8 Tan TB   Standing Scaption in front of mirror     Patient Education and Home Exercises     Home Exercises Provided and Patient Education Provided     Education provided:   - Continue with current HEP, continue max protection phase of treatment     Written Home Exercises Provided: Patient instructed to cont prior HEP. Exercises were reviewed and Renea was able to demonstrate them prior to the end of the session.  Renea demonstrated good  understanding of the education provided. See EMR under Patient Instructions for exercises provided during therapy sessions    ASSESSMENT     Renea demonstrated improved ER and flexion PROM today and also showed improving forward elevation to 90 degrees with decreased shoulder shrug sign. She noted that she has to travel this weekend for a  and may not be able to perform HEP as often over the weekend.     Renea Is progressing well towards her goals.     Pt prognosis is Good.     Pt will continue to benefit from skilled outpatient physical therapy to address the deficits listed in the problem list box on initial evaluation, provide pt/family education and to maximize pt's level of independence in the home and community environment.     Pt's spiritual, cultural and educational needs considered and pt agreeable to plan of care and goals.     Anticipated barriers to physical therapy: none    Goals:   Short Term Goals: 6 weeks  1. Pt will be compliant with HEP 50% of prescribed amount.   2. The pt to demo improvement in R shoulder PROM to by 80% of the L shoulder  3.  The pt to demo tolerance to being out of the sling for 24 hours with pain <2/10     Long Term Goals: 24 weeks   Pt will be compliant with % of prescribed amount.   Pt will score at least 59 on FOTO Functional Intake Survey   The pt to improvement  in AROM of R shoulder within 80% of L shoulder to improve tolerance to OH movement   The pt to  Demo at least 4+/5 of RTC muscle testing to demo improvement in tolerance to activity  The pt to tolerate lifting 10# overhead to improve tolerance to ADLs and work related activities   The pt will report full participation in ADLs and IADLs without restrictions related to R Shoulder.      PLAN     Plan of care Certification: 11/7/2022 to 3/20/23.     Outpatient Physical Therapy 1 times weekly for 32 weeks to include the following interventions: Manual Therapy, Moist Heat/ Ice, Neuromuscular Re-ed, Patient Education, Therapeutic Activites, and Therapeutic Exercise.     Nabil Hernandez, PT, DPT

## 2023-01-17 ENCOUNTER — PATIENT MESSAGE (OUTPATIENT)
Dept: SPORTS MEDICINE | Facility: CLINIC | Age: 53
End: 2023-01-17
Payer: COMMERCIAL

## 2023-01-17 ENCOUNTER — CLINICAL SUPPORT (OUTPATIENT)
Dept: REHABILITATION | Facility: HOSPITAL | Age: 53
End: 2023-01-17
Payer: COMMERCIAL

## 2023-01-17 DIAGNOSIS — R29.898 SHOULDER WEAKNESS: Primary | ICD-10-CM

## 2023-01-17 DIAGNOSIS — M25.611 DECREASED ROM OF RIGHT SHOULDER: ICD-10-CM

## 2023-01-17 PROCEDURE — 97110 THERAPEUTIC EXERCISES: CPT

## 2023-01-17 PROCEDURE — 97140 MANUAL THERAPY 1/> REGIONS: CPT

## 2023-01-17 PROCEDURE — 97112 NEUROMUSCULAR REEDUCATION: CPT

## 2023-01-17 NOTE — PROGRESS NOTES
[facility]   Physical Therapy Treatment Note     [id number]    Therapy Diagnosis:   Encounter Diagnoses   Name Primary?    Shoulder weakness Yes    Decreased ROM of right shoulder        Physician: Mateo Gordon PA-C    Visit Date: 1/17/2023    Physician Orders: PT Eval and Treat  Medical Diagnosis from Referral: Nontraumatic incomplete tear of right rotator cuff [M75.111], Tendinopathy of rotator cuff, right [M67.911]  Evaluation Date: 11/7/2022  Authorization Period Expiration: 12/7/22  Plan of Care Expiration: 3/20/23  Progress Note Due: 11/1/22  Visit # / Visits authorized: 4/20      Time In: 10:10 AM   Time Out: 11:00 AM   Total Billable Time: 50 minutes     DOS: 11/3/22  S/p: R infraspinatus repair   Post-Op Precautions: Standard   Type 2 Rotator Cuff Protocol per Dr. Bailon      Precautions: Standard    FOTO 1st: 11/7/22  FOTO 3rd:  FOTO 10th:      SUBJECTIVE     Pt reports: some soreness in the shoulder this weekend in which she traveled a lot. Did some ER propping yesterday when she got home from being out of town.     She was compliant with home exercise program.  Response to previous treatment: improved shoulder ER ROM   Functional change: first f/u     Pain: 4/10  Location: right shoulder      OBJECTIVE     No objective measurements taken today       Treatment       Renea received the treatments listed below:      Therapeutic exercises to develop strength, endurance, ROM, and flexibility for 12 minutes including:    LLLD ER Prop x 2# x 5'   Inferior [place] Prop at 90 deg abduction 7# x 5'     Manual therapy techniques: Joint mobilizations, Manual traction, Myofacial release, and Soft tissue Mobilization were applied to the: R shoulder for 15 minutes, including:  Skilled PROM within surgical precautions   ER w/centering glide   Inferior GHJ Glide Grade 2-3  Posterior GHJ Glide Grade 2-3     Therapeutic activities to improve functional  performance for 00  minutes, including:      Neuromuscular re-education activities to improve: Balance, Coordination, Sense, and Proprioception for 23 minutes. The following activities were included:  Standing Banded ER 3 x 12 TTB   Supine Handcuffs x 30   Standing Handcuffs x 30     Patient Education and Home Exercises     Home Exercises Provided and Patient Education Provided     Education provided:   - Continue with current HEP, continue max protection phase of treatment     Written Home Exercises Provided: Patient instructed to cont prior HEP. Exercises were reviewed and Renea was able to demonstrate them prior to the end of the session.  Renea demonstrated good  understanding of the education provided. See EMR under Patient Instructions for exercises provided during therapy sessions    ASSESSMENT     Renea continues to demonstrate slowly improving ER and flexion PROM and is able to elevate arms in scaption to 90 degrees with minimal shoulder shrugging. We discussed adding banded handcuffs and standing ER strengthening exercises to HEP. Will continue to progress ROM in all directions as tolerated.     Renea Is progressing well towards her goals.     Pt prognosis is Good.     Pt will continue to benefit from skilled outpatient physical therapy to address the deficits listed in the problem list box on initial evaluation, provide pt/family education and to maximize pt's level of independence in the home and community environment.     Pt's spiritual, cultural and educational needs considered and pt agreeable to plan of care and goals.     Anticipated barriers to physical therapy: none    Goals:   Short Term Goals: 6 weeks  1. Pt will be compliant with HEP 50% of prescribed amount.   2. The pt to demo improvement in R shoulder PROM to by 80% of the L shoulder  3.  The pt to demo tolerance to being out of the sling for 24 hours with pain <2/10     Long Term Goals: 24 weeks   Pt will be compliant with % of  prescribed amount.   Pt will score at least 59 on FOTO Functional Intake Survey   The pt to improvement in AROM of R shoulder within 80% of L shoulder to improve tolerance to OH movement   The pt to  Demo at least 4+/5 of RTC muscle testing to demo improvement in tolerance to activity  The pt to tolerate lifting 10# overhead to improve tolerance to ADLs and work related activities   The pt will report full participation in ADLs and IADLs without restrictions related to R Shoulder.      PLAN     Plan of care Certification: 11/7/2022 to 3/20/23.     Outpatient Physical Therapy 1 times weekly for 32 weeks to include the following interventions: Manual Therapy, Moist Heat/ Ice, Neuromuscular Re-ed, Patient Education, Therapeutic Activites, and Therapeutic Exercise.     Nabil Hernandez, PT, DPT

## 2023-01-23 ENCOUNTER — TELEPHONE (OUTPATIENT)
Dept: SPORTS MEDICINE | Facility: CLINIC | Age: 53
End: 2023-01-23
Payer: COMMERCIAL

## 2023-01-23 NOTE — TELEPHONE ENCOUNTER
Received a request from  [employer] for patients medical records. The request was sent to our medical record department.    ATTN: Release of Information   [facility] [address] [place], [place], [address]   Phone: [phone]   Fax: [phone]     Radha Amedee   Athlet[employer]   [facility]    
30

## 2023-01-31 ENCOUNTER — CLINICAL SUPPORT (OUTPATIENT)
Dept: REHABILITATION | Facility: HOSPITAL | Age: 53
End: 2023-01-31
Payer: COMMERCIAL

## 2023-01-31 DIAGNOSIS — M25.611 DECREASED ROM OF RIGHT SHOULDER: ICD-10-CM

## 2023-01-31 DIAGNOSIS — R29.898 SHOULDER WEAKNESS: Primary | ICD-10-CM

## 2023-01-31 PROCEDURE — 97140 MANUAL THERAPY 1/> REGIONS: CPT

## 2023-01-31 PROCEDURE — 97110 THERAPEUTIC EXERCISES: CPT

## 2023-01-31 PROCEDURE — 97112 NEUROMUSCULAR REEDUCATION: CPT
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
## 2023-02-02 ENCOUNTER — CLINICAL SUPPORT (OUTPATIENT)
Dept: REHABILITATION | Facility: HOSPITAL | Age: 53
End: 2023-02-02
Payer: COMMERCIAL

## 2023-02-02 DIAGNOSIS — R29.898 SHOULDER WEAKNESS: Primary | ICD-10-CM

## 2023-02-02 DIAGNOSIS — M25.611 DECREASED ROM OF RIGHT SHOULDER: ICD-10-CM

## 2023-02-02 PROCEDURE — 97112 NEUROMUSCULAR REEDUCATION: CPT

## 2023-02-02 PROCEDURE — 97110 THERAPEUTIC EXERCISES: CPT

## 2023-02-02 PROCEDURE — 97140 MANUAL THERAPY 1/> REGIONS: CPT

## 2023-02-03 NOTE — PROGRESS NOTES
[facility]   Physical Therapy Treatment Note     [id number]    Therapy Diagnosis:   Encounter Diagnoses   Name Primary?    Shoulder weakness Yes    Decreased ROM of right shoulder        Physician: Mateo Gordon PA-C    Visit Date: 2/2/2023    Physician Orders: PT Eval and Treat  Medical Diagnosis from Referral: Nontraumatic incomplete tear of right rotator cuff [M75.111], Tendinopathy of rotator cuff, right [M67.911]  Evaluation Date: 11/7/2022  Authorization Period Expiration: 12/7/22  Plan of Care Expiration: 3/20/23  Progress Note Due: 11/1/22  Visit # / Visits authorized: 6 / 20      Time In: 1:00 PM   Time Out: 2:15 PM   Total Billable Time: 58 minutes     DOS: 11/3/22  S/p: R infraspinatus repair   Post-Op Precautions: Standard   Type 2 Rotator Cuff Protocol per Dr. Bailon      Precautions: Standard    FOTO 1st: 11/7/22  FOTO 3rd:  FOTO 10th:      SUBJECTIVE     Pt reports: purchased a wrist brace because her forearm and wrist were bothering her so much during ER exercises and at rest.     She was compliant with home exercise program.  Response to previous treatment: improved shoulder ER ROM   Functional change: first f/u     Pain: 4/10  Location: right shoulder      OBJECTIVE     PROM   Flexion: 160 degrees  ER: 66 degrees   IR: 0 degrees   Abduction: 90 degrees       Treatment       Renea received the treatments listed below:      Therapeutic exercises to develop strength, endurance, ROM, and flexibility for 28 minutes including:    Pulleys x 5 min   UBE x 4'/4'  LLLD Inferior Prop 5' x 5#   LLLD ER Prop 5' x 2#   LLLD Elbow extension prop, no weight     Manual therapy techniques: Joint mobilizations, Manual traction, Myofacial release, and Soft tissue Mobilization were applied to the: R shoulder for 20 minutes, including:  Skilled PROM within surgical precautions   ER w/centering glide   Inferior GHJ Glide Grade 2-3  Posterior GHJ Glide  Grade 2-3   Ulnohumeral gapping grade 2-3   Medial ulnar glide grade 2-3 MWM with extension     Therapeutic activities to improve functional performance for 00  minutes, including:      Neuromuscular re-education activities to improve: Balance, Coordination, Sense, and Proprioception for 12 minutes. The following activities were included:  Supine Cane Flexion x 30   Supine Cane ER x 30   Standing banded ER 3 x 10 OTB     Patient Education and Home Exercises     Home Exercises Provided and Patient Education Provided     Education provided:   - Continue with current HEP, continue max protection phase of treatment     Written Home Exercises Provided: Patient instructed to cont prior HEP. Exercises were reviewed and Renea was able to demonstrate them prior to the end of the session.  Renea demonstrated good  understanding of the education provided. See EMR under Patient Instructions for exercises provided during therapy sessions    ASSESSMENT     Renea demonstrated continued limitation in GH joint mobility today that improved mildly with LLLD stretching and manual therapy. During LLLD ER prop she noted that she didn't feel any sensation in her shoulder, only in her elbow and forearm. On assessment, her elbow joint demonstrated restriction, especially into extension that improved with manual therapy. After elbow manual therapy, she was able to perform LLLD ER stretch with appropriate shoulder sensation. Plan to continue to address GH joint restriction in future sessions.     Renea Is progressing well towards her goals.     Pt prognosis is Good.     Pt will continue to benefit from skilled outpatient physical therapy to address the deficits listed in the problem list box on initial evaluation, provide pt/family education and to maximize pt's level of independence in the home and community environment.     Pt's spiritual, cultural and educational needs considered and pt agreeable to plan of care and goals.      Anticipated barriers to physical therapy: none    Goals:   Short Term Goals: 6 weeks  1. Pt will be compliant with HEP 50% of prescribed amount.   2. The pt to demo improvement in R shoulder PROM to by 80% of the L shoulder  3.  The pt to demo tolerance to being out of the sling for 24 hours with pain <2/10     Long Term Goals: 24 weeks   Pt will be compliant with % of prescribed amount.   Pt will score at least 59 on FOTO Functional Intake Survey   The pt to improvement in AROM of R shoulder within 80% of L shoulder to improve tolerance to OH movement   The pt to  Demo at least 4+/5 of RTC muscle testing to demo improvement in tolerance to activity  The pt to tolerate lifting 10# overhead to improve tolerance to ADLs and work related activities   The pt will report full participation in ADLs and IADLs without restrictions related to R Shoulder.      PLAN     Plan of care Certification: 11/7/2022 to 3/20/23.     Outpatient Physical Therapy 1 times weekly for 32 weeks to include the following interventions: Manual Therapy, Moist Heat/ Ice, Neuromuscular Re-ed, Patient Education, Therapeutic Activites, and Therapeutic Exercise.     Nabil Hernandez, PT, DPT

## 2023-02-05 ENCOUNTER — PATIENT MESSAGE (OUTPATIENT)
Dept: INTERNAL MEDICINE | Facility: CLINIC | Age: 53
End: 2023-02-05
Payer: COMMERCIAL

## 2023-02-05 DIAGNOSIS — F32.A DEPRESSION, UNSPECIFIED DEPRESSION TYPE: ICD-10-CM

## 2023-02-05 DIAGNOSIS — F41.9 ANXIETY: ICD-10-CM

## 2023-02-06 RX ORDER — DULOXETIN HYDROCHLORIDE 30 MG/1
30 CAPSULE, DELAYED RELEASE ORAL DAILY
Qty: 30 CAPSULE | Refills: 0 | Status: SHIPPED | OUTPATIENT
Start: 2023-02-06 | End: 2023-07-25

## 2023-02-07 ENCOUNTER — CLINICAL SUPPORT (OUTPATIENT)
Dept: REHABILITATION | Facility: HOSPITAL | Age: 53
End: 2023-02-07
Payer: COMMERCIAL

## 2023-02-07 DIAGNOSIS — R29.898 SHOULDER WEAKNESS: Primary | ICD-10-CM

## 2023-02-07 DIAGNOSIS — M25.611 DECREASED ROM OF RIGHT SHOULDER: ICD-10-CM

## 2023-02-07 PROCEDURE — 97112 NEUROMUSCULAR REEDUCATION: CPT

## 2023-02-07 PROCEDURE — 97110 THERAPEUTIC EXERCISES: CPT

## 2023-02-07 PROCEDURE — 97140 MANUAL THERAPY 1/> REGIONS: CPT

## 2023-02-07 NOTE — PROGRESS NOTES
[facility]   Physical Therapy Treatment Note     Name: Renea Mac  [facility] Number: [id number]    Therapy Diagnosis:   No diagnosis found.      Physician: Mateo Gordon PA-C    Visit Date: 2/7/2023    Physician Orders: PT Eval and Treat  Medical Diagnosis from Referral: Nontraumatic incomplete tear of right rotator cuff [M75.111], Tendinopathy of rotator cuff, right [M67.911]  Evaluation Date: 11/7/2022  Authorization Period Expiration: 12/7/22  Plan of Care Expiration: 3/20/23  Progress Note Due: 11/1/22  Visit # / Visits authorized: 7 / 20      Time In: 10:00 PM   Time Out: 11:00 PM   Total Billable Time: 50 minutes     DOS: 11/3/22  S/p: R infraspinatus repair   Post-Op Precautions: Standard   Type 2 Rotator Cuff Protocol per Dr. Bailon      Precautions: Standard    FOTO 1st: 11/7/22  FOTO 3rd:  FOTO 10th:      SUBJECTIVE     Pt reports: wrist and elbow has not been bothering her nearly as much since last session, has been performing HEP as instructed.     She was compliant with home exercise program.  Response to previous treatment: improved shoulder ER ROM   Functional change: first f/u     Pain: 4/10  Location: right shoulder      OBJECTIVE     PROM   Flexion: 160 degrees  ER: 70 degrees   IR: 15 degrees   Abduction: 95 degrees       Treatment       Renea received the treatments listed below:      Therapeutic exercises to develop strength, endurance, ROM, and flexibility for 18 minutes including:  Gentle Sleeper Stretch 5 x :30/:30   UBE x 4'/4'  LLLD Inferior Prop 5' x 5#   LLLD ER Prop 5' x 2#   Wall Slides x 20       Manual therapy techniques: Joint mobilizations, Manual traction, Myofacial release, and Soft tissue Mobilization were applied to the: R shoulder for 18 minutes, including:  Skilled PROM within surgical precautions   ER w/centering glide   Inferior GHJ Glide Grade 2-3  Posterior GHJ Glide Grade 2-3   Ulnohumeral gapping grade 2-3   Medial ulnar glide grade 2-3  MWM with extension   Manual IR     Therapeutic activities to improve functional performance for 00  minutes, including:      Neuromuscular re-education activities to improve: Balance, Coordination, Sense, and Proprioception for 14 minutes. The following activities were included:  Supine Cane ER x 30   Sidelying ER 1# 3 x 10   Prone Row 3 x 10 x :03     Patient Education and Home Exercises     Home Exercises Provided and Patient Education Provided     Education provided:   - Continue with current HEP, continue max protection phase of treatment     Written Home Exercises Provided: Patient instructed to cont prior HEP. Exercises were reviewed and Renea was able to demonstrate them prior to the end of the session.  Renea demonstrated good  understanding of the education provided. See EMR under Patient Instructions for exercises provided during therapy sessions    ASSESSMENT     Renea demonstrates continued slow but steady progress in R GHJ mobility and functional mobility in flexion, ER and IR ROM. Detailed HEP provided and reviewed end of session to which pt showed good understanding. Inbasket message sent to Dr. Bailon regarding current progress ahead of her f/u next week.     Renea Is progressing well towards her goals.     Pt prognosis is Good.     Pt will continue to benefit from skilled outpatient physical therapy to address the deficits listed in the problem list box on initial evaluation, provide pt/family education and to maximize pt's level of independence in the home and community environment.     Pt's spiritual, cultural and educational needs considered and pt agreeable to plan of care and goals.     Anticipated barriers to physical therapy: none    Goals:   Short Term Goals: 6 weeks  1. Pt will be compliant with HEP 50% of prescribed amount.   2. The pt to demo improvement in R shoulder PROM to by 80% of the L shoulder  3.  The pt to demo tolerance to being out of the sling for 24 hours with pain  <2/10     Long Term Goals: 24 weeks   Pt will be compliant with % of prescribed amount.   Pt will score at least 59 on FOTO Functional Intake Survey   The pt to improvement in AROM of R shoulder within 80% of L shoulder to improve tolerance to OH movement   The pt to  Demo at least 4+/5 of RTC muscle testing to demo improvement in tolerance to activity  The pt to tolerate lifting 10# overhead to improve tolerance to ADLs and work related activities   The pt will report full participation in ADLs and IADLs without restrictions related to R Shoulder.      PLAN     Plan of care Certification: 11/7/2022 to 3/20/23.     Outpatient Physical Therapy 1 times weekly for 32 weeks to include the following interventions: Manual Therapy, Moist Heat/ Ice, Neuromuscular Re-ed, Patient Education, Therapeutic Activites, and Therapeutic Exercise.     Nabil Hernandez, PT, DPT

## 2023-02-17 ENCOUNTER — OFFICE VISIT (OUTPATIENT)
Dept: SPORTS MEDICINE | Facility: CLINIC | Age: 53
End: 2023-02-17
Payer: COMMERCIAL

## 2023-02-17 VITALS
DIASTOLIC BLOOD PRESSURE: 84 MMHG | WEIGHT: 235 LBS | BODY MASS INDEX: 40.12 KG/M2 | HEIGHT: 64 IN | HEART RATE: 76 BPM | SYSTOLIC BLOOD PRESSURE: 135 MMHG

## 2023-02-17 DIAGNOSIS — Z98.890 S/P RIGHT ROTATOR CUFF REPAIR: Primary | ICD-10-CM

## 2023-02-17 PROCEDURE — 3075F SYST BP GE 130 - 139MM HG: CPT | Mod: CPTII,S$GLB,, | Performed by: ORTHOPAEDIC SURGERY

## 2023-02-17 PROCEDURE — 4010F ACE/ARB THERAPY RXD/TAKEN: CPT | Mod: CPTII,S$GLB,, | Performed by: ORTHOPAEDIC SURGERY

## 2023-02-17 PROCEDURE — 3008F PR BODY MASS INDEX (BMI) DOCUMENTED: ICD-10-PCS | Mod: CPTII,S$GLB,, | Performed by: ORTHOPAEDIC SURGERY

## 2023-02-17 PROCEDURE — 1159F MED LIST DOCD IN RCRD: CPT | Mod: CPTII,S$GLB,, | Performed by: ORTHOPAEDIC SURGERY

## 2023-02-17 PROCEDURE — 1159F PR MEDICATION LIST DOCUMENTED IN MEDICAL RECORD: ICD-10-PCS | Mod: CPTII,S$GLB,, | Performed by: ORTHOPAEDIC SURGERY

## 2023-02-17 PROCEDURE — 99213 OFFICE O/P EST LOW 20 MIN: CPT | Mod: S$GLB,,, | Performed by: ORTHOPAEDIC SURGERY

## 2023-02-17 PROCEDURE — 3079F DIAST BP 80-89 MM HG: CPT | Mod: CPTII,S$GLB,, | Performed by: ORTHOPAEDIC SURGERY

## 2023-02-17 PROCEDURE — 4010F PR ACE/ARB THEARPY RXD/TAKEN: ICD-10-PCS | Mod: CPTII,S$GLB,, | Performed by: ORTHOPAEDIC SURGERY

## 2023-02-17 PROCEDURE — 99213 PR OFFICE/OUTPT VISIT, EST, LEVL III, 20-29 MIN: ICD-10-PCS | Mod: S$GLB,,, | Performed by: ORTHOPAEDIC SURGERY

## 2023-02-17 PROCEDURE — 3079F PR MOST RECENT DIASTOLIC BLOOD PRESSURE 80-89 MM HG: ICD-10-PCS | Mod: CPTII,S$GLB,, | Performed by: ORTHOPAEDIC SURGERY

## 2023-02-17 PROCEDURE — 3008F BODY MASS INDEX DOCD: CPT | Mod: CPTII,S$GLB,, | Performed by: ORTHOPAEDIC SURGERY

## 2023-02-17 PROCEDURE — 3075F PR MOST RECENT SYSTOLIC BLOOD PRESS GE 130-139MM HG: ICD-10-PCS | Mod: CPTII,S$GLB,, | Performed by: ORTHOPAEDIC SURGERY

## 2023-02-17 PROCEDURE — 99999 PR PBB SHADOW E&M-EST. PATIENT-LVL III: ICD-10-PCS | Mod: PBBFAC,,, | Performed by: ORTHOPAEDIC SURGERY

## 2023-02-17 PROCEDURE — 99999 PR PBB SHADOW E&M-EST. PATIENT-LVL III: CPT | Mod: PBBFAC,,, | Performed by: ORTHOPAEDIC SURGERY

## 2023-02-17 NOTE — LETTER
[facility] [address]  [address]  [facility] [address]  Phone: [phone]  Fax: [phone] February 17, 2023     Patient: Renea Mac   YOB: 1970   Date of Visit: 2/17/2023       To Whom It May Concern:    Renea may return to work with restrictions of no overhead movement and no lifting heavier than 15lbs with the right arm.    If you have any questions or concerns, please don't hesitate to contact my office.    Sincerely,          Eliseo Bailon MD

## 2023-02-17 NOTE — PROGRESS NOTES
"POST-OPERATIVE EXAMINATION    52 y.o. Female who returns for follow after surgery. She is 3.5 months s/p    Procedures Performed:  1. Right shoulder Arthroscopic rotator cuff repair CPT - 30496     2. Right shoulder Arthroscopic extensive debridement CPT - 87684     3. Right shoulder Arthroscopic subacromial decompression and bursectomy CPT - 78713     She is doing well without any issues.       PHYSICAL EXAMINATION:  /84   Pulse 76   Ht 5' 4" (1.626 m)   Wt 106.6 kg (235 lb)   LMP 03/01/2011 (Exact Date)   BMI 40.34 kg/m²   General: Well-developed well-nourished 52 y.o. femalein no acute distress   Cardiovascular: Regular rhythm   Lungs: No labored breathing or wheezing appreciated   Neuro: Alert and oriented ×3   Psychiatric: well oriented to person, place and time, demonstrates normal mood and affect   Skin: No rashes, lesions or ulcers, normal temperature, turgor, and texture on involved extremity    ORTHOPEDIC EXAM:  Normal post-operative swelling  Normal post-operative scarring  Strength: WNL  ROM: FE-110 ; ER/Abd-45 ; IR/Abd-10 ; ER/Add-30  Tests: Negative Zayda's, Negative Speed's    ASSESSMENT:      ICD-10-CM ICD-9-CM   1. S/P right rotator cuff repair  Z98.890 V45.89       PLAN:       Continue physical therapy  RTC in 6 weeks  I advised her that she is continue with a home program in addition to her physical therapy.  She is slightly behind in her range of motion goals.                "

## 2023-02-22 ENCOUNTER — CLINICAL SUPPORT (OUTPATIENT)
Dept: REHABILITATION | Facility: HOSPITAL | Age: 53
End: 2023-02-22
Payer: COMMERCIAL

## 2023-02-22 ENCOUNTER — PATIENT MESSAGE (OUTPATIENT)
Dept: SPORTS MEDICINE | Facility: CLINIC | Age: 53
End: 2023-02-22
Payer: COMMERCIAL

## 2023-02-22 ENCOUNTER — PATIENT MESSAGE (OUTPATIENT)
Dept: BARIATRICS | Facility: CLINIC | Age: 53
End: 2023-02-22
Payer: COMMERCIAL

## 2023-02-22 DIAGNOSIS — R29.898 SHOULDER WEAKNESS: Primary | ICD-10-CM

## 2023-02-22 DIAGNOSIS — M25.611 DECREASED ROM OF RIGHT SHOULDER: ICD-10-CM

## 2023-02-22 PROCEDURE — 97110 THERAPEUTIC EXERCISES: CPT

## 2023-02-22 PROCEDURE — 97140 MANUAL THERAPY 1/> REGIONS: CPT

## 2023-02-22 PROCEDURE — 97112 NEUROMUSCULAR REEDUCATION: CPT

## 2023-02-22 NOTE — PROGRESS NOTES
[facility]   Physical Therapy Treatment Note     [id number]    Therapy Diagnosis:   Encounter Diagnoses   Name Primary?    Shoulder weakness Yes    Decreased ROM of right shoulder          Physician: Mateo Gordon PA-C    Visit Date: 2/22/2023    Physician Orders: PT Eval and Treat  Medical Diagnosis from Referral: Nontraumatic incomplete tear of right rotator cuff [M75.111], Tendinopathy of rotator cuff, right [M67.911]  Evaluation Date: 11/7/2022  Authorization Period Expiration: 12/7/22  Plan of Care Expiration: 3/20/23  Progress Note Due: 11/1/22  Visit # / Visits authorized: 8 / 20      Time In: 10:00 PM   Time Out: 11:00 PM   Total Billable Time: 60 minutes     DOS: 11/3/22  S/p: R infraspinatus repair   Post-Op Precautions: Standard   Type 2 Rotator Cuff Protocol per Dr. Bailon      Precautions: Standard    FOTO 1st: 11/7/22  FOTO 3rd:  FOTO 10th:      SUBJECTIVE     Pt reports: has not been performing HEP much recently because her daughter just had a baby and her son had to go to the ER yesterday. Shoulder has been feeling stiff.     She was compliant with home exercise program.  Response to previous treatment: improved shoulder ER ROM   Functional change: first f/u     Pain: 4/10  Location: right shoulder      OBJECTIVE     PROM   Flexion: 160 degrees  ER: 70 degrees   IR: 15 degrees   Abduction: 95 degrees       Treatment       Renea received the treatments listed below:      Therapeutic exercises to develop strength, endurance, ROM, and flexibility for 23 minutes including:  Gentle Sleeper Stretch 5 x :30/:30   Pulleys x 5'   LLLD Inferior Prop 5' x 5#   Shoulder Extension Liftoff 3 x 15 w/dowel   PT education       Manual therapy techniques: Joint mobilizations, Manual traction, Myofacial release, and Soft tissue Mobilization were applied to the: R shoulder for 18 minutes, including:  Skilled PROM within surgical precautions   ER  w/centering glide   Inferior GHJ Glide Grade 2-3  Posterior GHJ Glide Grade 2-3   Ulnohumeral gapping grade 2-3   Medial ulnar glide grade 2-3 MWM with extension   Manual IR grade 1-2     Therapeutic activities to improve functional performance for 00  minutes, including:      Neuromuscular re-education activities to improve: Balance, Coordination, Sense, and Proprioception for 19 minutes. The following activities were included:  Single Arm Press to OH 2# 3 x 15   Band IR OTB 3 x 15   Prone Scaption 3 x 10   Sidelying ER 1# 3 x 10       Patient Education and Home Exercises     Home Exercises Provided and Patient Education Provided     Education provided:   - Continue with current HEP, continue max protection phase of treatment     Written Home Exercises Provided: Patient instructed to cont prior HEP. Exercises were reviewed and Renea was able to demonstrate them prior to the end of the session.  Renea demonstrated good  understanding of the education provided. See EMR under Patient Instructions for exercises provided during therapy sessions    ASSESSMENT     Renea continues to demonstrate stiffness into ER and forward elevation that improved with LLLD stretching and manual therapy. Plan to progress ROM and functional strength into ER, elevation and FIR as tolerated.     Renea Is progressing well towards her goals.     Pt prognosis is Good.     Pt will continue to benefit from skilled outpatient physical therapy to address the deficits listed in the problem list box on initial evaluation, provide pt/family education and to maximize pt's level of independence in the home and community environment.     Pt's spiritual, cultural and educational needs considered and pt agreeable to plan of care and goals.     Anticipated barriers to physical therapy: none    Goals:   Short Term Goals: 6 weeks  1. Pt will be compliant with HEP 50% of prescribed amount.   2. The pt to demo improvement in R shoulder PROM to by 80% of the  L shoulder  3.  The pt to demo tolerance to being out of the sling for 24 hours with pain <2/10     Long Term Goals: 24 weeks   Pt will be compliant with % of prescribed amount.   Pt will score at least 59 on FOTO Functional Intake Survey   The pt to improvement in AROM of R shoulder within 80% of L shoulder to improve tolerance to OH movement   The pt to  Demo at least 4+/5 of RTC muscle testing to demo improvement in tolerance to activity  The pt to tolerate lifting 10# overhead to improve tolerance to ADLs and work related activities   The pt will report full participation in ADLs and IADLs without restrictions related to R Shoulder.      PLAN     Plan of care Certification: 11/7/2022 to 3/20/23.     Outpatient Physical Therapy 1 times weekly for 32 weeks to include the following interventions: Manual Therapy, Moist Heat/ Ice, Neuromuscular Re-ed, Patient Education, Therapeutic Activites, and Therapeutic Exercise.     Nabil Hernandez, PT, DPT

## 2023-02-24 ENCOUNTER — TELEPHONE (OUTPATIENT)
Dept: SPORTS MEDICINE | Facility: CLINIC | Age: 53
End: 2023-02-24
Payer: COMMERCIAL

## 2023-02-24 NOTE — TELEPHONE ENCOUNTER
Request for medical records was sent to [facility] Release of Information department on 2/22/23.    ATTN: Release of Information   [facility] [address] [place], [place], [address]   Phone: [phone]   Fax: [phone]     Radha Amedee   Athletic   [facility] [place]

## 2023-02-24 NOTE — TELEPHONE ENCOUNTER
----- Message from Min Whitman MA sent at 2/23/2023  4:51 PM CST -----    ----- Message -----  From: Sylvia Hooper  Sent: 2/23/2023   4:34 PM CST  To: [facility] Staff    Efrain calling to inform office they received disability  form but needs medical records for pt as soon as possible     [phone]  [id number] ref#

## 2023-03-01 ENCOUNTER — CLINICAL SUPPORT (OUTPATIENT)
Dept: REHABILITATION | Facility: HOSPITAL | Age: 53
End: 2023-03-01
Payer: COMMERCIAL

## 2023-03-01 DIAGNOSIS — R29.898 SHOULDER WEAKNESS: Primary | ICD-10-CM

## 2023-03-01 DIAGNOSIS — M25.611 DECREASED ROM OF RIGHT SHOULDER: ICD-10-CM

## 2023-03-01 PROCEDURE — 97110 THERAPEUTIC EXERCISES: CPT

## 2023-03-01 PROCEDURE — 97140 MANUAL THERAPY 1/> REGIONS: CPT

## 2023-03-01 PROCEDURE — 97112 NEUROMUSCULAR REEDUCATION: CPT

## 2023-03-01 NOTE — PROGRESS NOTES
[facility]   Physical Therapy Treatment Note     [id number]    Therapy Diagnosis:   Encounter Diagnoses   Name Primary?    Shoulder weakness Yes    Decreased ROM of right shoulder          Physician: Mateo Gordon PA-C    Visit Date: 3/1/2023    Physician Orders: PT Eval and Treat  Medical Diagnosis from Referral: Nontraumatic incomplete tear of right rotator cuff [M75.111], Tendinopathy of rotator cuff, right [M67.911]  Evaluation Date: 11/7/2022  Authorization Period Expiration: 12/7/22  Plan of Care Expiration: 3/20/23  Progress Note Due: 11/1/22  Visit # / Visits authorized: 9 / 20      Time In: 9:05 AM   Time Out: 10:00 AM    Total Billable Time: 55 minutes     DOS: 11/3/22  S/p: R infraspinatus repair   Post-Op Precautions: Standard   Type 2 Rotator Cuff Protocol per Dr. Bailon      Precautions: Standard    FOTO 1st: 11/7/22  FOTO 3rd:  FOTO 10th:      SUBJECTIVE     Pt reports: missed last PT session because it was her anniversary and  surprised her with a trip. She returned to work on Monday and notes some increased stiffness but has been performing HEP at work as much as possible.     She was compliant with home exercise program.  Response to previous treatment: improved shoulder ER ROM   Functional change: first f/u     Pain: 4/10  Location: right shoulder      OBJECTIVE     PROM   Flexion: 160 degrees  ER: 70 degrees   IR: 15 degrees   Abduction: 95 degrees       Treatment       Renea received the treatments listed below:      Therapeutic exercises to develop strength, endurance, ROM, and flexibility for 23 minutes including:  Gentle Sleeper Stretch 5 x :30/:30   Arm Bike x 4/4' to improve tissue extensibility and functional endurance   Standing Shoulder Extension YTB 3 x 15   Prone Row 3 x 15 @ 2#     NP  LLLD Inferior Prop 5' x 5#   PT education       Manual therapy techniques: Joint mobilizations, Manual traction, Myofacial  release, and Soft tissue Mobilization were applied to the: R shoulder for 13 minutes, including:  Skilled PROM within surgical precautions   ER w/centering glide   Inferior GHJ Glide Grade 2-3  Posterior GHJ Glide Grade 2-3   Manual IR grade 1-2     Therapeutic activities to improve functional performance for 00  minutes, including:      Neuromuscular re-education activities to improve: Balance, Coordination, Sense, and Proprioception for 19 minutes. The following activities were included:  Prone Scaption 5 x 7   Side Lying DB ER x 40 @ 2#   Side Lying Cane Flexion x 30     NP  Single Arm Press to OH 2# 3 x 15   Band IR OTB 3 x 15       Patient Education and Home Exercises     Home Exercises Provided and Patient Education Provided     Education provided:   - Continue with current HEP, continue max protection phase of treatment     Written Home Exercises Provided: Patient instructed to cont prior HEP. Exercises were reviewed and Renea was able to demonstrate them prior to the end of the session.  Renea demonstrated good  understanding of the education provided. See EMR under Patient Instructions for exercises provided during therapy sessions    ASSESSMENT     Renea continues to demonstrate stiffness into ER and forward elevation that improved with LLLD stretching and manual therapy. Plan to progress ROM and functional strength into ER, elevation and FIR as tolerated.     Renea Is progressing well towards her goals.     Pt prognosis is Good.     Pt will continue to benefit from skilled outpatient physical therapy to address the deficits listed in the problem list box on initial evaluation, provide pt/family education and to maximize pt's level of independence in the home and community environment.     Pt's spiritual, cultural and educational needs considered and pt agreeable to plan of care and goals.     Anticipated barriers to physical therapy: none    Goals:   Short Term Goals: 6 weeks  1. Pt will be compliant  with HEP 50% of prescribed amount.   2. The pt to demo improvement in R shoulder PROM to by 80% of the L shoulder  3.  The pt to demo tolerance to being out of the sling for 24 hours with pain <2/10     Long Term Goals: 24 weeks   Pt will be compliant with % of prescribed amount.   Pt will score at least 59 on FOTO Functional Intake Survey   The pt to improvement in AROM of R shoulder within 80% of L shoulder to improve tolerance to OH movement   The pt to  Demo at least 4+/5 of RTC muscle testing to demo improvement in tolerance to activity  The pt to tolerate lifting 10# overhead to improve tolerance to ADLs and work related activities   The pt will report full participation in ADLs and IADLs without restrictions related to R Shoulder.      PLAN     Plan of care Certification: 11/7/2022 to 3/20/23.     Outpatient Physical Therapy 1 times weekly for 32 weeks to include the following interventions: Manual Therapy, Moist Heat/ Ice, Neuromuscular Re-ed, Patient Education, Therapeutic Activites, and Therapeutic Exercise.     Nabil Hernandez, PT, DPT

## 2023-03-02 ENCOUNTER — CLINICAL SUPPORT (OUTPATIENT)
Dept: REHABILITATION | Facility: HOSPITAL | Age: 53
End: 2023-03-02
Payer: COMMERCIAL

## 2023-03-02 DIAGNOSIS — R29.898 SHOULDER WEAKNESS: Primary | ICD-10-CM

## 2023-03-02 DIAGNOSIS — M25.611 DECREASED ROM OF RIGHT SHOULDER: ICD-10-CM

## 2023-03-02 PROCEDURE — 97140 MANUAL THERAPY 1/> REGIONS: CPT

## 2023-03-02 PROCEDURE — 97112 NEUROMUSCULAR REEDUCATION: CPT

## 2023-03-02 PROCEDURE — 97110 THERAPEUTIC EXERCISES: CPT

## 2023-03-02 NOTE — PROGRESS NOTES
[facility]   Physical Therapy Treatment Note     [id number]    Therapy Diagnosis:   Encounter Diagnoses   Name Primary?    Shoulder weakness Yes    Decreased ROM of right shoulder          Physician: Mateo Gordon PA-C    Visit Date: 3/2/2023    Physician Orders: PT Eval and Treat  Medical Diagnosis from Referral: Nontraumatic incomplete tear of right rotator cuff [M75.111], Tendinopathy of rotator cuff, right [M67.911]  Evaluation Date: 11/7/2022  Authorization Period Expiration: 12/7/22  Plan of Care Expiration: 3/20/23  Progress Note Due: 11/1/22  Visit # / Visits authorized: 9 / 20      Time In: 10:00 AM   Time Out: 10:56 AM    Total Billable Time: 56 minutes     DOS: 11/3/22  S/p: R infraspinatus repair   Post-Op Precautions: Standard   Type 2 Rotator Cuff Protocol per Dr. Bailon      Precautions: Standard    FOTO 1st: 11/7/22  FOTO 3rd:  FOTO 10th:      SUBJECTIVE     Pt reports: shoulder feels fairly loose, some anterior chest soreness since going back to work and using mouse and keyboard a lot.     She was compliant with home exercise program.  Response to previous treatment: improved shoulder ER ROM   Functional change: first f/u     Pain: 4/10  Location: right shoulder      OBJECTIVE     PROM   Flexion: 160 degrees  ER: 70 degrees   IR: 15 degrees   Abduction: 95 degrees       Treatment       Renea received the treatments listed below:      Therapeutic exercises to develop strength, endurance, ROM, and flexibility for 25 minutes including:  Gentle Sleeper Stretch 5 x :30/:30   Pulley's x 5:00   Standing Shoulder Extension YTB 3 x 15   Supine Serratus Punch 1# 5 x 10   Pt education     NP  LLLD Inferior Prop 5' x 5#   PT education       Manual therapy techniques: Joint mobilizations, Manual traction, Myofacial release, and Soft tissue Mobilization were applied to the: R shoulder for 13 minutes, including:  Skilled PROM within surgical  precautions   ER w/centering glide   Posterior GHJ Glide Grade 2-3     NP  Inferior GHJ [place] Grade 2-3  Manual IR grade 1-2     Therapeutic activities to improve functional performance for 00  minutes, including:      Neuromuscular re-education activities to improve: Balance, Coordination, Sense, and Proprioception for 18 minutes. The following activities were included:  Wall Slide towards end range x 30   Standing Banded ER OTB 3 x 8   Side Lying Cane Flexion x 30     NP  Single Arm Press to OH 2# 3 x 15   Band IR OTB 3 x 15       Patient Education and Home Exercises     Home Exercises Provided and Patient Education Provided     Education provided:   - Continue with current HEP, continue max protection phase of treatment     Written Home Exercises Provided: Patient instructed to cont prior HEP. Exercises were reviewed and Renea was able to demonstrate them prior to the end of the session.  Renea demonstrated good  understanding of the education provided. See EMR under Patient Instructions for exercises provided during therapy sessions    ASSESSMENT     Renea shows improving active forward elevation but continued stiffness into ER @ 20 and 90 degrees abduction that improves with manual therapy and LLLD stretching. Plan to progress AFE and ER ROM and strength as tolerated.     Renea Is progressing well towards her goals.     Pt prognosis is Good.     Pt will continue to benefit from skilled outpatient physical therapy to address the deficits listed in the problem list box on initial evaluation, provide pt/family education and to maximize pt's level of independence in the home and community environment.     Pt's spiritual, cultural and educational needs considered and pt agreeable to plan of care and goals.     Anticipated barriers to physical therapy: none    Goals:   Short Term Goals: 6 weeks  1. Pt will be compliant with HEP 50% of prescribed amount.   2. The pt to demo improvement in R shoulder PROM to by 80%  of the L shoulder  3.  The pt to demo tolerance to being out of the sling for 24 hours with pain <2/10     Long Term Goals: 24 weeks   Pt will be compliant with % of prescribed amount.   Pt will score at least 59 on FOTO Functional Intake Survey   The pt to improvement in AROM of R shoulder within 80% of L shoulder to improve tolerance to OH movement   The pt to  Demo at least 4+/5 of RTC muscle testing to demo improvement in tolerance to activity  The pt to tolerate lifting 10# overhead to improve tolerance to ADLs and work related activities   The pt will report full participation in ADLs and IADLs without restrictions related to R Shoulder.      PLAN     Plan of care Certification: 11/7/2022 to 3/20/23.     Outpatient Physical Therapy 1 times weekly for 32 weeks to include the following interventions: Manual Therapy, Moist Heat/ Ice, Neuromuscular Re-ed, Patient Education, Therapeutic Activites, and Therapeutic Exercise.     Nabil Hernandez, PT, DPT

## 2023-03-07 ENCOUNTER — CLINICAL SUPPORT (OUTPATIENT)
Dept: REHABILITATION | Facility: HOSPITAL | Age: 53
End: 2023-03-07
Payer: COMMERCIAL

## 2023-03-07 DIAGNOSIS — R29.898 SHOULDER WEAKNESS: Primary | ICD-10-CM

## 2023-03-07 DIAGNOSIS — M25.611 DECREASED ROM OF RIGHT SHOULDER: ICD-10-CM

## 2023-03-07 PROCEDURE — 97112 NEUROMUSCULAR REEDUCATION: CPT

## 2023-03-07 PROCEDURE — 97110 THERAPEUTIC EXERCISES: CPT

## 2023-03-07 PROCEDURE — 97140 MANUAL THERAPY 1/> REGIONS: CPT

## 2023-03-07 NOTE — PROGRESS NOTES
[facility]   Physical Therapy Treatment Note     [id number]    Therapy Diagnosis:   Encounter Diagnoses   Name Primary?    Shoulder weakness Yes    Decreased ROM of right shoulder          Physician: Mateo Gordon PA-C    Visit Date: 3/7/2023    Physician Orders: PT Eval and Treat  Medical Diagnosis from Referral: Nontraumatic incomplete tear of right rotator cuff [M75.111], Tendinopathy of rotator cuff, right [M67.911]  Evaluation Date: 11/7/2022  Authorization Period Expiration: 12/7/22  Plan of Care Expiration: 3/20/23  Progress Note Due: 11/1/22  Visit # / Visits authorized: 11 / 20      Time In: 7:00 AM   Time Out: 8:00 AM    Total Billable Time: 60 minutes     DOS: 11/3/22  S/p: R infraspinatus repair   Post-Op Precautions: Standard   Type 2 Rotator Cuff Protocol per Dr. 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      She was compliant with home exercise program.  Response to previous treatment: improved shoulder ER ROM   Functional change: first f/u     Pain: 4/10  Location: right shoulder      OBJECTIVE     PROM   Flexion: 160 degrees  ER: 70 degrees   IR: 15 degrees   Abduction: 95 degrees       Treatment       Renea received the treatments listed below:      Therapeutic exercises to develop strength, endurance, ROM, and flexibility for 29 minutes including:  LLLD ER Prop x 5:00 2#   Supine Cane ER at 20 deg  Supine Cane ER at 90 deg   Sidelying ER 1# 3 x 15  Pulley's x 5:00   Supine Serratus Punch 1# 5 x 10   Pt education     NP  LLLD Inferior Prop 5' x 5#   PT education       Manual therapy techniques: Joint mobilizations, Manual traction, Myofacial release, and Soft tissue Mobilization were applied to  the: R shoulder for 13 minutes, including:  Skilled PROM within surgical precautions   ER w/centering glide   Posterior GHJ Glide Grade 2-3     NP  Inferior GHJ [place] Grade 2-3  Manual IR grade 1-2     Therapeutic activities to improve functional performance for 00  minutes, including:      Neuromuscular re-education activities to improve: Balance, Coordination, Sense, and Proprioception for 18 minutes. The following activities were included:  Prone Flexion off Table   Side Lying Cane Flexion x 30     NP  Single Arm Press to OH 2# 3 x 15   Band IR OTB 3 x 15       Patient Education and Home Exercises     Home Exercises Provided and Patient Education Provided     Education provided:   - Continue with current HEP, continue max protection phase of treatment     Written Home Exercises Provided: Patient instructed to cont prior HEP. Exercises were reviewed and Renea was able to demonstrate them prior to the end of the session.  Renea demonstrated good  understanding of the education provided. See EMR under Patient Instructions for exercises provided during therapy sessions    ASSESSMENT     Renea shows improving active forward elevation but continued stiffness into ER @ 20 and 90 degrees abduction that improves with manual therapy and LLLD stretching. Plan to progress AFE and ER ROM and strength as tolerated.     Renea Is progressing well towards her goals.     Pt prognosis is Good.     Pt will continue to benefit from skilled outpatient physical therapy to address the deficits listed in the problem list box on initial evaluation, provide pt/family education and to maximize pt's level of independence in the home and community environment.     Pt's spiritual, cultural and educational needs considered and pt agreeable to plan of care and goals.     Anticipated barriers to physical therapy: none    Goals:   Short Term Goals: 6 weeks  1. Pt will be compliant with HEP 50% of prescribed amount.   2. The pt to demo  improvement in R shoulder PROM to by 80% of the L shoulder  3.  The pt to demo tolerance to being out of the sling for 24 hours with pain <2/10     Long Term Goals: 24 weeks   Pt will be compliant with % of prescribed amount.   Pt will score at least 59 on FOTO Functional Intake Survey   The pt to improvement in AROM of R shoulder within 80% of L shoulder to improve tolerance to OH movement   The pt to  Demo at least 4+/5 of RTC muscle testing to demo improvement in tolerance to activity  The pt to tolerate lifting 10# overhead to improve tolerance to ADLs and work related activities   The pt will report full participation in ADLs and IADLs without restrictions related to R Shoulder.      PLAN     Plan of care Certification: 11/7/2022 to 3/20/23.     Outpatient Physical Therapy 1 times weekly for 32 weeks to include the following interventions: Manual Therapy, Moist Heat/ Ice, Neuromuscular Re-ed, Patient Education, Therapeutic Activites, and Therapeutic Exercise.     Nabil Hernandez, PT, DPT

## 2023-03-14 ENCOUNTER — CLINICAL SUPPORT (OUTPATIENT)
Dept: REHABILITATION | Facility: HOSPITAL | Age: 53
End: 2023-03-14
Payer: COMMERCIAL

## 2023-03-14 DIAGNOSIS — M25.611 DECREASED ROM OF RIGHT SHOULDER: ICD-10-CM

## 2023-03-14 DIAGNOSIS — R29.898 SHOULDER WEAKNESS: Primary | ICD-10-CM

## 2023-03-14 PROCEDURE — 97140 MANUAL THERAPY 1/> REGIONS: CPT

## 2023-03-14 PROCEDURE — 97110 THERAPEUTIC EXERCISES: CPT

## 2023-03-14 NOTE — PROGRESS NOTES
[facility]   Physical Therapy Treatment Note     Name: Renea Mac  [facility] Number: [id number]    Therapy Diagnosis:   Encounter Diagnoses   Name Primary?    Shoulder weakness Yes    Decreased ROM of right shoulder          Physician: Mateo Gordon PA-C    Visit Date: 3/14/2023    Physician Orders: PT Eval and Treat  Medical Diagnosis from Referral: Nontraumatic incomplete tear of right rotator cuff [M75.111], Tendinopathy of rotator cuff, right [M67.911]  Evaluation Date: 11/7/2022  Authorization Period Expiration: 12/7/22  Plan of Care Expiration: 3/20/23  Progress Note Due: 11/1/22  Visit # / Visits authorized: 12 / 20      Time In: 7:00 AM   Time Out: 8:00 AM    Total Billable Time: 60 minutes     DOS: 11/3/22  S/p: R infraspinatus repair   Post-Op Precautions: Standard   Type 2 Rotator Cuff Protocol per Dr. Bailon      Precautions: Standard    FOTO 1st: 11/7/22  FOTO 3rd:  FOTO 10th:      SUBJECTIVE     Pt reports: shoulder was feeling better but she hit her funny bone at work yesterday which shot up into her shoulder momentarily so she iced and rested yesterday. Has not completed any HEP in the past week d/t going back to work and being busy.     She was compliant with home exercise program.  Response to previous treatment: improved shoulder ER ROM   Functional change: first f/u     Pain: 4/10  Location: right shoulder      OBJECTIVE     PROM   Flexion: 160 degrees  ER: 70 degrees   IR: 15 degrees   Abduction: 95 degrees       Treatment       Renea received the treatments listed below:      Therapeutic exercises to develop strength, endurance, ROM, and flexibility for 47 minutes including:  LLLD ER Prop x 5:00 2#   LLLD IR Prop x 5:00 2#  Sidelying ER 2# 3 x 15  Pulley's x 5:00   Supine Serratus Punch 2# 5 x 10   Pt education   No Money's YTB 3 x 10   IR Walkout at 90 degrees flexion 3 x 10     NP  LLLD Inferior Prop 5' x 5#   PT education       Manual therapy techniques:  Joint mobilizations, Manual traction, Myofacial release, and Soft tissue Mobilization were applied to the: R shoulder for 13 minutes, including:  Skilled PROM within surgical precautions   ER w/centering glide   Posterior GHJ Glide Grade 2-3     NP  Inferior GHJ [place] Grade 2-3  Manual IR grade 1-2     Therapeutic activities to improve functional performance for 00  minutes, including:      Neuromuscular re-education activities to improve: Balance, Coordination, Sense, and Proprioception for 00 minutes. The following activities were included:      NP  Single Arm Press to OH 2# 3 x 15   Band IR OTB 3 x 15   Prone Flexion off Table   Side Lying Cane Flexion x 30       Patient Education and Home Exercises     Home Exercises Provided and Patient Education Provided     Education provided:   - Continue with current HEP, continue max protection phase of treatment     Written Home Exercises Provided: Patient instructed to cont prior HEP. Exercises were reviewed and Renea was able to demonstrate them prior to the end of the session.  Renea demonstrated good  understanding of the education provided. See EMR under Patient Instructions for exercises provided during therapy sessions    ASSESSMENT     Renea shows similar function and ROM to last session after not performing HEP as instructed. Was given exercises that can be done at work with minimal equipment and instructed to perform these to improve function while at work.     Renea Is progressing well towards her goals.     Pt prognosis is Good.     Pt will continue to benefit from skilled outpatient physical therapy to address the deficits listed in the problem list box on initial evaluation, provide pt/family education and to maximize pt's level of independence in the home and community environment.     Pt's spiritual, cultural and educational needs considered and pt agreeable to plan of care and goals.     Anticipated barriers to physical therapy: none    Goals:   Short  Term Goals: 6 weeks  1. Pt will be compliant with HEP 50% of prescribed amount.   2. The pt to demo improvement in R shoulder PROM to by 80% of the L shoulder  3.  The pt to demo tolerance to being out of the sling for 24 hours with pain <2/10     Long Term Goals: 24 weeks   Pt will be compliant with % of prescribed amount.   Pt will score at least 59 on FOTO Functional Intake Survey   The pt to improvement in AROM of R shoulder within 80% of L shoulder to improve tolerance to OH movement   The pt to  Demo at least 4+/5 of C muscle testing to demo improvement in tolerance to activity  The pt to tolerate lifting 10# overhead to improve tolerance to ADLs and work related activities   The pt will report full participation in ADLs and IADLs without restrictions related to R Shoulder.      PLAN     Plan of care Certification: 11/7/2022 to 3/20/23.     Outpatient Physical Therapy 1 times weekly for 32 weeks to include the following interventions: Manual Therapy, Moist Heat/ Ice, Neuromuscular Re-ed, Patient Education, Therapeutic Activites, and Therapeutic Exercise.     Nabil Hernandez, PT, DPT

## 2023-03-16 ENCOUNTER — CLINICAL SUPPORT (OUTPATIENT)
Dept: REHABILITATION | Facility: HOSPITAL | Age: 53
End: 2023-03-16
Payer: COMMERCIAL

## 2023-03-16 DIAGNOSIS — M25.611 DECREASED ROM OF RIGHT SHOULDER: ICD-10-CM

## 2023-03-16 DIAGNOSIS — R29.898 SHOULDER WEAKNESS: Primary | ICD-10-CM

## 2023-03-16 PROCEDURE — 97110 THERAPEUTIC EXERCISES: CPT

## 2023-03-16 PROCEDURE — 97112 NEUROMUSCULAR REEDUCATION: CPT

## 2023-03-16 PROCEDURE — 97140 MANUAL THERAPY 1/> REGIONS: CPT

## 2023-03-16 NOTE — PROGRESS NOTES
[facility]   Physical Therapy Treatment Note     Name: Renea Mac  [facility] Number: [id number]    Therapy Diagnosis:   No diagnosis found.        Physician: Mateo Gordon PA-C    Visit Date: 3/16/2023    Physician Orders: PT Eval and Treat  Medical Diagnosis from Referral: Nontraumatic incomplete tear of right rotator cuff [M75.111], Tendinopathy of rotator cuff, right [M67.911]  Evaluation Date: 11/7/2022  Authorization Period Expiration: 12/7/22  Plan of Care Expiration: 3/20/23  Progress Note Due: 11/1/22  Visit # / Visits authorized: 13 / 20      Time In: 7:00 AM   Time Out: 8:00 AM    Total Billable Time: 60 minutes     DOS: 11/3/22  S/p: R infraspinatus repair   Post-Op Precautions: Standard   Type 2 Rotator Cuff Protocol per Dr. Bailon      Precautions: Standard    FOTO 1st: 11/7/22  FOTO 3rd:  FOTO 10th:      SUBJECTIVE     Pt reports: shoulder feels stiff with propping exercises at home; however, feels as though she has gained progress regarding her flexion ROM with wall slides. Pt stated that her shoulder has not affected her at work in the past week, as she has not been tasked with activities that require much reaching. Pt did note that she had a very tough weekend with events unrelated to her shoulder.     She was compliant with home exercise program.  Response to previous treatment: improved shoulder ER ROM   Functional change: first f/u     Pain: 4/10  Location: right shoulder      OBJECTIVE     PROM   Flexion: 160 degrees  ER: 70 degrees   IR: 15 degrees   Abduction: 95 degrees       Treatment       Renea received the treatments listed below:      Therapeutic exercises to develop strength, endurance, ROM, and flexibility for 38 minutes including:  Pulley's x 5:00   Side-lying flexion 3 x 10  Supine Serratus Punch 2# 5 x 15   ER Prop 2# x 5:00  ER on bolster@90 degrees ABD 1# 3 x 10  Pt education     NP  LLLD Inferior Prop 5' x 5#   Standing Shoulder Extension YTB 3  x 15   Gentle Sleeper Stretch 5 x :30/:30   PT education       Manual therapy techniques: Joint mobilizations, Manual traction, Myofacial release, and Soft tissue Mobilization were applied to the: R shoulder for 13 minutes, including:  Skilled PROM within surgical precautions   Inferior GHJ [place] Grade 2-3  Posterior GHJ Glide Grade 2-3     NP  ER w/centering glide   Manual IR grade 1-2     Therapeutic activities to improve functional performance for 00  minutes, including:      Neuromuscular re-education activities to improve: Balance, Coordination, Sense, and Proprioception for 9 minutes. The following activities were included:  Wall Slide towards end range x 30       NP  Single Arm Press to OH 2# 3 x 15   Band IR OTB 3 x 15   Standing Banded ER OTB 3 x 8   Side Lying Cane Flexion x 30       Patient Education and Home Exercises     Home Exercises Provided and Patient Education Provided     Education provided:   - Continue with current HEP, continue max protection phase of treatment     Written Home Exercises Provided: Patient instructed to cont prior HEP. Exercises were reviewed and Renea was able to demonstrate them prior to the end of the session.  Renea demonstrated good  understanding of the education provided. See EMR under Patient Instructions for exercises provided during therapy sessions    ASSESSMENT     Renea demonstrated an improvement in shoulder flexion and ER following manual techniques and instructive strengthening exercises. She required continual scapular upward rotation cuing with shoulder flexion activities. Plan to progress AFE and ER ROM and strength as tolerated.     Renea Is progressing well towards her goals.     Pt prognosis is Good.     Pt will continue to benefit from skilled outpatient physical therapy to address the deficits listed in the problem list box on initial evaluation, provide pt/family education and to maximize pt's level of independence in the home and community  environment.     Pt's spiritual, cultural and educational needs considered and pt agreeable to plan of care and goals.     Anticipated barriers to physical therapy: none    Goals:   Short Term Goals: 6 weeks  1. Pt will be compliant with HEP 50% of prescribed amount.   2. The pt to demo improvement in R shoulder PROM to by 80% of the L shoulder  3.  The pt to demo tolerance to being out of the sling for 24 hours with pain <2/10     Long Term Goals: 24 weeks   Pt will be compliant with % of prescribed amount.   Pt will score at least 59 on FOTO Functional Intake Survey   The pt to improvement in AROM of R shoulder within 80% of L shoulder to improve tolerance to OH movement   The pt to  Demo at least 4+/5 of RTC muscle testing to demo improvement in tolerance to activity  The pt to tolerate lifting 10# overhead to improve tolerance to ADLs and work related activities   The pt will report full participation in ADLs and IADLs without restrictions related to R Shoulder.      PLAN     Plan of care Certification: 11/7/2022 to 3/20/23.     Outpatient Physical Therapy 1 times weekly for 32 weeks to include the following interventions: Manual Therapy, Moist Heat/ Ice, Neuromuscular Re-ed, Patient Education, Therapeutic Activites, and Therapeutic Exercise.     Nabil Hernandez, PT, DPT

## 2023-03-21 ENCOUNTER — CLINICAL SUPPORT (OUTPATIENT)
Dept: REHABILITATION | Facility: HOSPITAL | Age: 53
End: 2023-03-21
Payer: COMMERCIAL

## 2023-03-21 DIAGNOSIS — M25.611 DECREASED ROM OF RIGHT SHOULDER: ICD-10-CM

## 2023-03-21 DIAGNOSIS — R29.898 SHOULDER WEAKNESS: Primary | ICD-10-CM

## 2023-03-21 PROCEDURE — 97140 MANUAL THERAPY 1/> REGIONS: CPT

## 2023-03-21 PROCEDURE — 97110 THERAPEUTIC EXERCISES: CPT

## 2023-03-21 PROCEDURE — 97112 NEUROMUSCULAR REEDUCATION: CPT

## 2023-03-23 NOTE — PROGRESS NOTES
"[facility]   Physical Therapy Treatment Note     Name: Renea Mendezock  Clinic Number: [id number]    Therapy Diagnosis:   Encounter Diagnoses   Name Primary?    Shoulder weakness Yes    Decreased ROM of right shoulder            Physician: Mateo Gordon PA-C    Visit Date: 3/21/2023    Physician Orders: PT Eval and Treat  Medical Diagnosis from Referral: Nontraumatic incomplete tear of right rotator cuff [M75.111], Tendinopathy of rotator cuff, right [M67.911]  Evaluation Date: 11/7/2022  Authorization Period Expiration: 12/7/22  Plan of Care Expiration: 3/20/23  Progress Note Due: 11/1/22  Visit # / Visits authorized: 14 / 20      Time In: 9:00 AM   Time Out: 10:00 AM    Total Billable Time: 60 minutes     DOS: 11/3/22  S/p: R infraspinatus repair   Post-Op Precautions: Standard   Type 2 Rotator Cuff Protocol per Dr. Bailon      Precautions: Standard    FOTO 1st: 11/7/22  FOTO 3rd:  FOTO 10th:      SUBJECTIVE     Pt reports: shoulder feels better as she performs exercises more consistently during work. Notes that her coworkers tell her that her wall slides are "crooked."     She was compliant with home exercise program.  Response to previous treatment: improved shoulder ER ROM   Functional change: first f/u     Pain: 4/10  Location: right shoulder      OBJECTIVE     PROM   Flexion: 160 degrees  ER: 70 degrees   IR: 15 degrees   Abduction: 95 degrees       Treatment       Renea received the treatments listed below:      Therapeutic exercises to develop strength, endurance, ROM, and flexibility for 38 minutes including:  Pulley's x 5:00   Serratus wall slides x 30   ER Prop 2# x 5:00  ER on bolster@90 degrees ABD 1# 3 x 10  Pt education     Manual therapy techniques: Joint mobilizations, Manual traction, Myofacial release, and Soft tissue Mobilization were applied to the: R shoulder for 13 minutes, including:  Skilled PROM within surgical precautions   Inferior GHJ Glide Grade " 2-3  Posterior GHJ Glide Grade 2-3   Lat Hold Relax     NP  ER w/centering glide   Manual IR grade 1-2     Therapeutic activities to improve functional performance for 00  minutes, including:      Neuromuscular re-education activities to improve: Balance, Coordination, Sense, and Proprioception for 9 minutes. The following activities were included:  Wall Slide towards end range x 30   Supine Handcuffs 3 x 15 YTB       Patient Education and Home Exercises     Home Exercises Provided and Patient Education Provided     Education provided:   - Continue with current HEP, continue max protection phase of treatment     Written Home Exercises Provided: Patient instructed to cont prior HEP. Exercises were reviewed and Renea was able to demonstrate them prior to the end of the session.  Renea demonstrated good  understanding of the education provided. See EMR under Patient Instructions for exercises provided during therapy sessions    ASSESSMENT     Renea demonstrated much improved GHJ mobility today and gradually improving ER and AFE ROM. Some limitations in lat length today that improved with manual therapy and contributed to improved wall slide performance with less internal rotation above 90 degrees. Plan to continue to progress AFE, ER strength and IR ROM as tolerated.     Renea Is progressing well towards her goals.     Pt prognosis is Good.     Pt will continue to benefit from skilled outpatient physical therapy to address the deficits listed in the problem list box on initial evaluation, provide pt/family education and to maximize pt's level of independence in the home and community environment.     Pt's spiritual, cultural and educational needs considered and pt agreeable to plan of care and goals.     Anticipated barriers to physical therapy: none    Goals:   Short Term Goals: 6 weeks  1. Pt will be compliant with HEP 50% of prescribed amount.   2. The pt to demo improvement in R shoulder PROM to by 80% of the L  shoulder  3.  The pt to demo tolerance to being out of the sling for 24 hours with pain <2/10     Long Term Goals: 24 weeks   Pt will be compliant with % of prescribed amount.   Pt will score at least 59 on FOTO Functional Intake Survey   The pt to improvement in AROM of R shoulder within 80% of L shoulder to improve tolerance to OH movement   The pt to  Demo at least 4+/5 of RTC muscle testing to demo improvement in tolerance to activity  The pt to tolerate lifting 10# overhead to improve tolerance to ADLs and work related activities   The pt will report full participation in ADLs and IADLs without restrictions related to R Shoulder.      PLAN     Plan of care Certification: 11/7/2022 to 3/20/23.     Outpatient Physical Therapy 1 times weekly for 32 weeks to include the following interventions: Manual Therapy, Moist Heat/ Ice, Neuromuscular Re-ed, Patient Education, Therapeutic Activites, and Therapeutic Exercise.     Nabil Hernandez, PT, DPT

## 2023-03-31 NOTE — PROGRESS NOTES
POST-OPERATIVE EXAMINATION    53 y.o. Female who returns for follow after surgery. She is 5 months s/p    Procedures Performed:  1. Right shoulder Arthroscopic rotator cuff repair CPT - 41028     2. Right shoulder Arthroscopic extensive debridement CPT - 96930     3. Right shoulder Arthroscopic subacromial decompression and bursectomy CPT - 58257     She is doing well without any issues.       PHYSICAL EXAMINATION:  LMP 03/01/2011 (Exact Date)   General: Well-developed well-nourished 53 y.o. femalein no acute distress   Cardiovascular: Regular rhythm   Lungs: No labored breathing or wheezing appreciated   Neuro: Alert and oriented ×3   Psychiatric: well oriented to person, place and time, demonstrates normal mood and affect   Skin: No rashes, lesions or ulcers, normal temperature, turgor, and texture on involved extremity    ORTHOPEDIC EXAM:  Normal post-operative swelling  Normal post-operative scarring  Strength: WNL  ROM: FE-150 ; ER/Abd-45 ; IR/Abd-10 ; ER/Add-30   Tests: Negative Zayda's, Negative Speed's  Rotator cuff strength 5/5    ASSESSMENT:      ICD-10-CM ICD-9-CM   1. S/P right rotator cuff repair  Z98.890 V45.89       PLAN:       Continue physical therapy  RTC in 6 weeks  I advised her to continue on her passive range of motion exercises.  I believe she just a little bit behind getting her mobility back.  I do not think this is quite adhesive capsulitis status post shoulder scope.  I think she will be a be able to obtain her motion back with some more aggressive physical therapy

## 2023-04-05 ENCOUNTER — OFFICE VISIT (OUTPATIENT)
Dept: SPORTS MEDICINE | Facility: CLINIC | Age: 53
End: 2023-04-05
Payer: COMMERCIAL

## 2023-04-05 ENCOUNTER — CLINICAL SUPPORT (OUTPATIENT)
Dept: REHABILITATION | Facility: HOSPITAL | Age: 53
End: 2023-04-05
Payer: COMMERCIAL

## 2023-04-05 VITALS
SYSTOLIC BLOOD PRESSURE: 142 MMHG | DIASTOLIC BLOOD PRESSURE: 86 MMHG | BODY MASS INDEX: 40.97 KG/M2 | HEART RATE: 85 BPM | WEIGHT: 240 LBS | HEIGHT: 64 IN

## 2023-04-05 DIAGNOSIS — Z98.890 S/P RIGHT ROTATOR CUFF REPAIR: Primary | ICD-10-CM

## 2023-04-05 DIAGNOSIS — R29.898 SHOULDER WEAKNESS: Primary | ICD-10-CM

## 2023-04-05 DIAGNOSIS — M25.611 DECREASED ROM OF RIGHT SHOULDER: ICD-10-CM

## 2023-04-05 PROCEDURE — 3079F DIAST BP 80-89 MM HG: CPT | Mod: CPTII,S$GLB,, | Performed by: ORTHOPAEDIC SURGERY

## 2023-04-05 PROCEDURE — 99213 OFFICE O/P EST LOW 20 MIN: CPT | Mod: S$GLB,,, | Performed by: ORTHOPAEDIC SURGERY

## 2023-04-05 PROCEDURE — 3079F PR MOST RECENT DIASTOLIC BLOOD PRESSURE 80-89 MM HG: ICD-10-PCS | Mod: CPTII,S$GLB,, | Performed by: ORTHOPAEDIC SURGERY

## 2023-04-05 PROCEDURE — 4010F PR ACE/ARB THEARPY RXD/TAKEN: ICD-10-PCS | Mod: CPTII,S$GLB,, | Performed by: ORTHOPAEDIC SURGERY

## 2023-04-05 PROCEDURE — 99999 PR PBB SHADOW E&M-EST. PATIENT-LVL IV: ICD-10-PCS | Mod: PBBFAC,,, | Performed by: ORTHOPAEDIC SURGERY

## 2023-04-05 PROCEDURE — 3077F PR MOST RECENT SYSTOLIC BLOOD PRESSURE >= 140 MM HG: ICD-10-PCS | Mod: CPTII,S$GLB,, | Performed by: ORTHOPAEDIC SURGERY

## 2023-04-05 PROCEDURE — 3008F PR BODY MASS INDEX (BMI) DOCUMENTED: ICD-10-PCS | Mod: CPTII,S$GLB,, | Performed by: ORTHOPAEDIC SURGERY

## 2023-04-05 PROCEDURE — 1159F PR MEDICATION LIST DOCUMENTED IN MEDICAL RECORD: ICD-10-PCS | Mod: CPTII,S$GLB,, | Performed by: ORTHOPAEDIC SURGERY

## 2023-04-05 PROCEDURE — 97140 MANUAL THERAPY 1/> REGIONS: CPT

## 2023-04-05 PROCEDURE — 99213 PR OFFICE/OUTPT VISIT, EST, LEVL III, 20-29 MIN: ICD-10-PCS | Mod: S$GLB,,, | Performed by: ORTHOPAEDIC SURGERY

## 2023-04-05 PROCEDURE — 3008F BODY MASS INDEX DOCD: CPT | Mod: CPTII,S$GLB,, | Performed by: ORTHOPAEDIC SURGERY

## 2023-04-05 PROCEDURE — 99999 PR PBB SHADOW E&M-EST. PATIENT-LVL IV: CPT | Mod: PBBFAC,,, | Performed by: ORTHOPAEDIC SURGERY

## 2023-04-05 PROCEDURE — 4010F ACE/ARB THERAPY RXD/TAKEN: CPT | Mod: CPTII,S$GLB,, | Performed by: ORTHOPAEDIC SURGERY

## 2023-04-05 PROCEDURE — 1159F MED LIST DOCD IN RCRD: CPT | Mod: CPTII,S$GLB,, | Performed by: ORTHOPAEDIC SURGERY

## 2023-04-05 PROCEDURE — 97530 THERAPEUTIC ACTIVITIES: CPT

## 2023-04-05 PROCEDURE — 3077F SYST BP >= 140 MM HG: CPT | Mod: CPTII,S$GLB,, | Performed by: ORTHOPAEDIC SURGERY

## 2023-04-05 PROCEDURE — 97110 THERAPEUTIC EXERCISES: CPT

## 2023-04-05 NOTE — PROGRESS NOTES
[facility]   Physical Therapy Treatment Note     Name: Renea Mac  Clinic Number: [id number]    Therapy Diagnosis:   Encounter Diagnoses   Name Primary?    Shoulder weakness Yes    Decreased ROM of right shoulder        Physician: Mateo Gordon PA-C    Visit Date: 4/5/2023    Physician Orders: PT Eval and Treat  Medical Diagnosis from Referral: Nontraumatic incomplete tear of right rotator cuff [M75.111], Tendinopathy of rotator cuff, right [M67.911]  Evaluation Date: 11/7/2022  Authorization Period Expiration: 12/7/22  Plan of Care Expiration: 3/20/23  Progress Note Due: 11/1/22  Visit # / Visits authorized: 15 / 20      Time In: 7:00 AM   Time Out: 8:00 AM    Total Billable Time: 60 minutes     DOS: 11/3/22  S/p: R infraspinatus repair   Post-Op Precautions: Standard   Type 2 Rotator Cuff Protocol per Dr. Bailon      Precautions: Standard    FOTO 1st: 11/7/22  FOTO 3rd:  FOTO 10th:      SUBJECTIVE     Pt reports: Pt fell on March 26th in the kitchen. She stated that she attempted to land on her unaffected shoulder, but ended up landing on her right elbow and wrist. She stated that she had not been diligent with HEP following the fall, as she was afraid of damaging her shoulder. Pt did not attend PT last week due to transportation issues.     She was compliant with home exercise program.  Response to previous treatment: improved shoulder ER ROM   Functional change: first f/u     Pain: 4/10  Location: right shoulder      OBJECTIVE     PROM   Flexion: 160 degrees  ER: 70 degrees   IR: 15 degrees   Abduction: 95 degrees       Treatment       Renea received the treatments listed below:      Therapeutic exercises to develop strength, endurance, ROM, and flexibility for 23 minutes including:  Pulley's x 5:00   ER Prop 2# x 5:00  ER on bolster@90 degrees ABD 1# 3 x 10  Pt education   Serratus wall slides 3x5 (unable to tolerate)    Manual therapy techniques: Joint mobilizations,  Manual traction, Myofacial release, and Soft tissue Mobilization were applied to the: R shoulder for 31 minutes, including:  Shoulder re-assessment  Skilled PROM within surgical precautions   Inferior GHJ [place] Grade 2-3  Posterior GHJ Glide Grade 2-3   Lat Hold Relax     NP  ER w/centering glide   Manual IR grade 1-2     Therapeutic activities to improve functional performance for 00  minutes, including:      Neuromuscular re-education activities to improve: Balance, Coordination, Sense, and Proprioception for 6 minutes. The following activities were included:  Wall Slide towards end range x 30     NP:  Supine Handcuffs 3 x 15 YTB       Patient Education and Home Exercises     Home Exercises Provided and Patient Education Provided     Education provided:   - Continue with current HEP, continue max protection phase of treatment     Written Home Exercises Provided: Patient instructed to cont prior HEP. Exercises were reviewed and Renea was able to demonstrate them prior to the end of the session.  Renea demonstrated good  understanding of the education provided. See EMR under Patient Instructions for exercises provided during therapy sessions    ASSESSMENT     Renea demo's increase in R Shoulder pain secondary to a fall 1.5 weeks ago. She does not show any alarming loss of active range of motion or significant increase in R shoulder stiffness. She does show continued slow progression in active and passive ROM in R shoulder. Rounded with COBY Toro regarding today's findings ahead of her f/u w/Dr. Bailon this afternoon. Pt education provided regarding improving compliance with HEP to improve function.     Renea Is progressing well towards her goals.     Pt prognosis is Good.     Pt will continue to benefit from skilled outpatient physical therapy to address the deficits listed in the problem list box on initial evaluation, provide pt/family education and to maximize pt's level of independence in the home and  community environment.     Pt's spiritual, cultural and educational needs considered and pt agreeable to plan of care and goals.     Anticipated barriers to physical therapy: none    Goals:   Short Term Goals: 6 weeks  1. Pt will be compliant with HEP 50% of prescribed amount.   2. The pt to demo improvement in R shoulder PROM to by 80% of the L shoulder  3.  The pt to demo tolerance to being out of the sling for 24 hours with pain <2/10     Long Term Goals: 24 weeks   Pt will be compliant with % of prescribed amount.   Pt will score at least 59 on FOTO Functional Intake Survey   The pt to improvement in AROM of R shoulder within 80% of L shoulder to improve tolerance to OH movement   The pt to  Demo at least 4+/5 of RTC muscle testing to demo improvement in tolerance to activity  The pt to tolerate lifting 10# overhead to improve tolerance to ADLs and work related activities   The pt will report full participation in ADLs and IADLs without restrictions related to R Shoulder.      PLAN     Plan of care Certification: 11/7/2022 to 3/20/23.     Outpatient Physical Therapy 1 times weekly for 32 weeks to include the following interventions: Manual Therapy, Moist Heat/ Ice, Neuromuscular Re-ed, Patient Education, Therapeutic Activites, and Therapeutic Exercise.     Santiago Moya, SPT   Nabil Hernandez, PT, DPT

## 2023-04-14 ENCOUNTER — CLINICAL SUPPORT (OUTPATIENT)
Dept: REHABILITATION | Facility: HOSPITAL | Age: 53
End: 2023-04-14
Payer: COMMERCIAL

## 2023-04-14 ENCOUNTER — PATIENT MESSAGE (OUTPATIENT)
Dept: SPORTS MEDICINE | Facility: CLINIC | Age: 53
End: 2023-04-14
Payer: COMMERCIAL

## 2023-04-14 DIAGNOSIS — M25.611 DECREASED ROM OF RIGHT SHOULDER: ICD-10-CM

## 2023-04-14 DIAGNOSIS — R29.898 SHOULDER WEAKNESS: Primary | ICD-10-CM

## 2023-04-14 PROCEDURE — 97110 THERAPEUTIC EXERCISES: CPT

## 2023-04-14 PROCEDURE — 97140 MANUAL THERAPY 1/> REGIONS: CPT

## 2023-04-14 PROCEDURE — 97112 NEUROMUSCULAR REEDUCATION: CPT

## 2023-04-14 NOTE — PROGRESS NOTES
[facility]   Physical Therapy Treatment Note     [id number]    Therapy Diagnosis:   Encounter Diagnoses   Name Primary?    Shoulder weakness Yes    Decreased ROM of right shoulder        Physician: Mateo Gordon PA-C    Visit Date: 4/14/2023    Physician Orders: PT Eval and Treat  Medical Diagnosis from Referral: Nontraumatic incomplete tear of right rotator cuff [M75.111], Tendinopathy of rotator cuff, right [M67.911]  Evaluation Date: 11/7/2022  Authorization Period Expiration: 12/7/22  Plan of Care Expiration: 3/20/23  Progress Note Due: 11/1/22  Visit # / Visits authorized: 16 / 20      Time In: 8:00 AM   Time Out: 9:00 AM   Total Billable Time: 60 minutes     DOS: 11/3/22  S/p: R infraspinatus repair   Post-Op Precautions: Standard   Type 2 Rotator Cuff Protocol per Dr. Bailon      Precautions: Standard    FOTO 1st: 11/7/22  FOTO 3rd:  FOTO 10th:      SUBJECTIVE     Pt reports: Pt had f/u with [employer] who wants her to focus more on passive ROM.     She was compliant with home exercise program.  Response to previous treatment: improved shoulder ER ROM   Functional change: first f/u     Pain: 4/10  Location: right shoulder      OBJECTIVE     PROM   Flexion: 160 degrees  ER: 70 degrees   IR: 15 degrees   Abduction: 95 degrees       Treatment       Renea received the treatments listed below:      Therapeutic exercises to develop strength, endurance, ROM, and flexibility for 23 minutes including:  Jose's x 5:00   Inferior [place] Prop x 5:00 4# + heat   ER Prop x 5:00 @ 2#   Wall Slide for Flexion ROM 3 x 10     NP  ER Prop 2# x 5:00  ER on bolster@90 degrees ABD 1# 3 x 10  Pt education   Serratus wall slides 3x5 (unable to tolerate)    Manual therapy techniques: Joint mobilizations, Manual traction, Myofacial release, and Soft tissue Mobilization were applied to the: R shoulder for 27 minutes, including:  Shoulder re-assessment  Skilled PROM  within surgical precautions   Inferior GHJ [place] Grade 3-4  Posterior GHJ Glide Grade 3-4   Manual Lat Stretch   Manual IR Stretch       NP  ER w/centering glide   Manual IR grade 1-2     Therapeutic activities to improve functional performance for 00  minutes, including:      Neuromuscular re-education activities to improve: Balance, Coordination, Sense, and Proprioception for 10 minutes. The following activities were included:  Straight Arm Lat PD Eccentric 3 x 5   Supine Handcuff 3 x 8 with pulses overhead YTB     NP:  Supine Handcuffs 3 x 15 YTB   Wall Slide towards end range x 30       Patient Education and Home Exercises     Home Exercises Provided and Patient Education Provided     Education provided:   - Continue with current HEP, continue max protection phase of treatment     Written Home Exercises Provided: Patient instructed to cont prior HEP. Exercises were reviewed and Renea was able to demonstrate them prior to the end of the session.  Renea demonstrated good  understanding of the education provided. See EMR under Patient Instructions for exercises provided during therapy sessions    ASSESSMENT     Renea demo's improve passive flexion ROM by end of session and responded especially well to eccentric cable raises and was instructed to perform these at the gym when she goes. Plan to continue working on PROM into flexion and ER as per MD instructions.     Renea Is progressing well towards her goals.     Pt prognosis is Good.     Pt will continue to benefit from skilled outpatient physical therapy to address the deficits listed in the problem list box on initial evaluation, provide pt/family education and to maximize pt's level of independence in the home and community environment.     Pt's spiritual, cultural and educational needs considered and pt agreeable to plan of care and goals.     Anticipated barriers to physical therapy: none    Goals:   Short Term Goals: 6 weeks  1. Pt will be compliant with  HEP 50% of prescribed amount.   2. The pt to demo improvement in R shoulder PROM to by 80% of the L shoulder  3.  The pt to demo tolerance to being out of the sling for 24 hours with pain <2/10     Long Term Goals: 24 weeks   Pt will be compliant with % of prescribed amount.   Pt will score at least 59 on FOTO Functional Intake Survey   The pt to improvement in AROM of R shoulder within 80% of L shoulder to improve tolerance to OH movement   The pt to  Demo at least 4+/5 of RTC muscle testing to demo improvement in tolerance to activity  The pt to tolerate lifting 10# overhead to improve tolerance to ADLs and work related activities   The pt will report full participation in ADLs and IADLs without restrictions related to R Shoulder.      PLAN     Plan of care Certification: 11/7/2022 to 3/20/23.     Outpatient Physical Therapy 1 times weekly for 32 weeks to include the following interventions: Manual Therapy, Moist Heat/ Ice, Neuromuscular Re-ed, Patient Education, Therapeutic Activites, and Therapeutic Exercise.     aNbil Hernandez, PT, DPT

## 2023-04-19 ENCOUNTER — CLINICAL SUPPORT (OUTPATIENT)
Dept: REHABILITATION | Facility: HOSPITAL | Age: 53
End: 2023-04-19
Payer: COMMERCIAL

## 2023-04-19 DIAGNOSIS — R29.898 SHOULDER WEAKNESS: Primary | ICD-10-CM

## 2023-04-19 DIAGNOSIS — M25.611 DECREASED ROM OF RIGHT SHOULDER: ICD-10-CM

## 2023-04-19 PROCEDURE — 97110 THERAPEUTIC EXERCISES: CPT

## 2023-04-19 PROCEDURE — 97112 NEUROMUSCULAR REEDUCATION: CPT

## 2023-04-19 PROCEDURE — 97140 MANUAL THERAPY 1/> REGIONS: CPT

## 2023-04-19 NOTE — PROGRESS NOTES
[facility]   Physical Therapy Treatment Note     Name: Renea Mac  [facility] Number: [id number]    Therapy Diagnosis:   No diagnosis found.      Physician: Mateo Gordon PA-C    Visit Date: 4/19/2023    Physician Orders: PT Eval and Treat  Medical Diagnosis from Referral: Nontraumatic incomplete tear of right rotator cuff [M75.111], Tendinopathy of rotator cuff, right [M67.911]  Evaluation Date: 11/7/2022  Authorization Period Expiration: 12/7/22  Plan of Care Expiration: 3/20/23  Progress Note Due: 11/1/22  Visit # / Visits authorized: 17 / 20      Time In: 7:00 AM   Time Out: 8:00 AM   Total Billable Time: 60 minutes     DOS: 11/3/22  S/p: R infraspinatus repair   Post-Op Precautions: Standard   Type 2 Rotator Cuff Protocol per Dr. Bailon      Precautions: Standard    FOTO 1st: 11/7/22  FOTO 3rd:  FOTO 10th:      SUBJECTIVE     Pt reports: Shoulder feels no different from last session. Continues to feel stiff.    She was compliant with home exercise program.  Response to previous treatment: improved shoulder ER ROM   Functional change: first f/u     Pain: 4/10  Location: right shoulder      OBJECTIVE     PROM   Flexion: 160 degrees  ER: 70 degrees   IR: 15 degrees   Abduction: 95 degrees       Treatment       Renea received the treatments listed below:      Therapeutic exercises to develop strength, endurance, ROM, and flexibility for 26 minutes including:  Jose's x 5:00   Inferior [place] Prop x 5:00 5# + heat   ER Prop x 5:00 @ 2#   Wall Slide for Flexion ROM 3 x 10     NP  ER on bolster@90 degrees ABD 1# 3 x 10  Pt education   Serratus wall slides 3x5 (unable to tolerate)    Manual therapy techniques: Joint mobilizations, Manual traction, Myofacial release, and Soft tissue Mobilization were applied to the: R shoulder for 18 minutes, including:  Shoulder re-assessment  Skilled PROM within surgical precautions   Inferior GHJ [place] Grade 3-4  Posterior GHJ Glide Grade 3-4    Manual Lat Stretch   ER w/centering glide     NP  Manual IR Stretch   Manual IR grade 1-2     Therapeutic activities to improve functional performance for 00  minutes, including:      Neuromuscular re-education activities to improve: Balance, Coordination, Sense, and Proprioception for 16 minutes. The following activities were included:  Straight Arm Lat PD Eccentric 3 x 8   Standing Handcuff 3 x 8 with pulses overhead YTB     NP:  Supine Handcuffs 3 x 15 YTB   Wall Slide towards end range x 30       Patient Education and Home Exercises     Home Exercises Provided and Patient Education Provided     Education provided:   - Continue with current HEP, continue max protection phase of treatment     Written Home Exercises Provided: Patient instructed to cont prior HEP. Exercises were reviewed and Renea was able to demonstrate them prior to the end of the session.  Renea demonstrated good  understanding of the education provided. See EMR under Patient Instructions for exercises provided during therapy sessions    ASSESSMENT     Renea's shoulder flexion and ER ROM improved following manual therapy and ROM exercises today. Pt demonstrated symmetrical shoulder flexion for the first time since before surgery without pain. Renea is indicated for continued emphasis on shoulder ROM, specifically IR.     Renea Is progressing well towards her goals.     Pt prognosis is Good.     Pt will continue to benefit from skilled outpatient physical therapy to address the deficits listed in the problem list box on initial evaluation, provide pt/family education and to maximize pt's level of independence in the home and community environment.     Pt's spiritual, cultural and educational needs considered and pt agreeable to plan of care and goals.     Anticipated barriers to physical therapy: none    Goals:   Short Term Goals: 6 weeks  1. Pt will be compliant with HEP 50% of prescribed amount.   2. The pt to demo improvement in R  shoulder PROM to by 80% of the L shoulder  3.  The pt to demo tolerance to being out of the sling for 24 hours with pain <2/10     Long Term Goals: 24 weeks   Pt will be compliant with % of prescribed amount.   Pt will score at least 59 on FOTO Functional Intake Survey   The pt to improvement in AROM of R shoulder within 80% of L shoulder to improve tolerance to OH movement   The pt to  Demo at least 4+/5 of RTC muscle testing to demo improvement in tolerance to activity  The pt to tolerate lifting 10# overhead to improve tolerance to ADLs and work related activities   The pt will report full participation in ADLs and IADLs without restrictions related to R Shoulder.      PLAN     Plan of care Certification: 11/7/2022 to 3/20/23.     Outpatient Physical Therapy 1 times weekly for 32 weeks to include the following interventions: Manual Therapy, Moist Heat/ Ice, Neuromuscular Re-ed, Patient Education, Therapeutic Activites, and Therapeutic Exercise.     Santiago Moya, SPT,

## 2023-04-22 DIAGNOSIS — I10 ESSENTIAL HYPERTENSION: ICD-10-CM

## 2023-04-24 RX ORDER — LOSARTAN POTASSIUM 25 MG/1
25 TABLET ORAL DAILY
Qty: 90 TABLET | Refills: 0 | Status: SHIPPED | OUTPATIENT
Start: 2023-04-24 | End: 2023-07-06 | Stop reason: SDUPTHER

## 2023-04-26 ENCOUNTER — CLINICAL SUPPORT (OUTPATIENT)
Dept: REHABILITATION | Facility: HOSPITAL | Age: 53
End: 2023-04-26
Payer: COMMERCIAL

## 2023-04-26 DIAGNOSIS — M25.611 DECREASED ROM OF RIGHT SHOULDER: ICD-10-CM

## 2023-04-26 DIAGNOSIS — R29.898 SHOULDER WEAKNESS: Primary | ICD-10-CM

## 2023-04-26 PROCEDURE — 97110 THERAPEUTIC EXERCISES: CPT

## 2023-04-26 PROCEDURE — 97140 MANUAL THERAPY 1/> REGIONS: CPT

## 2023-04-26 PROCEDURE — 97112 NEUROMUSCULAR REEDUCATION: CPT

## 2023-04-26 NOTE — PROGRESS NOTES
[facility]   Physical Therapy Treatment Note     [id number]    Therapy Diagnosis:   Encounter Diagnoses   Name Primary?    Shoulder weakness Yes    Decreased ROM of right shoulder          Physician: Mateo Gordon PA-C    Visit Date: 4/26/2023    Physician Orders: PT Eval and Treat  Medical Diagnosis from Referral: Nontraumatic incomplete tear of right rotator cuff [M75.111], Tendinopathy of rotator cuff, right [M67.911]  Evaluation Date: 11/7/2022  Authorization Period Expiration: 12/7/22  Plan of Care Expiration: 3/20/23  Progress Note Due: 11/1/22  Visit # / Visits authorized: 18 / 32      Time In: 7:05 AM   Time Out: 8:10 AM   Total Billable Time: 65 minutes     DOS: 11/3/22  S/p: R infraspinatus repair   Post-Op Precautions: Standard   Type 2 Rotator Cuff Protocol per Dr. Bailon      Precautions: Standard          SUBJECTIVE     Pt reports: Shoulder feels okay, has not been good about doing her HEP, has been to the gym twice in the past week.     She was compliant with home exercise program.  Response to previous treatment: improved shoulder ER ROM   Functional change: first f/u     Pain: 4/10  Location: right shoulder      OBJECTIVE     DOS: 11/3/22  Post-Op Day: 24 weeks + 6 days     PROM   Flexion: 160 degrees  ER: 70 degrees   IR: 15 degrees   Abduction: 95 degrees       Treatment       Renea received the treatments listed below:      Therapeutic exercises to develop strength, endurance, ROM, and flexibility for 25 minutes including:  UBE x 3'/3' to improve tissue extensibility   Inferior [place] Prop x 5:00 5#   ER Prop x 5:00 @ 2#   A1. Single Arm Serratus punch + walkback on angled table 3 x 5   A2. No [place] YTB 3 x 10       Manual therapy techniques: Joint mobilizations, Manual traction, Myofacial release, and Soft tissue Mobilization were applied to the: R shoulder for 24 minutes, including:  Shoulder re-assessment  Skilled PROM  within surgical precautions   Inferior GHJ [place] Grade 3-4  Posterior GHJ Glide Grade 3-4   Manual Lat Stretch   ER w/centering glide   Manual ER stretch @ 90/90   Manual IR stretch @ 90/90     Therapeutic activities to improve functional performance for 00  minutes, including:      Neuromuscular re-education activities to improve: Balance, Coordination, Sense, and Proprioception for 16 minutes. The following activities were included:  A1. Straight Arm Lat PD Eccentric 3 x 5 @ 13#    A2. Standing Handcuff 3 x 5 with pulses overhead YTB   Seated ER @ 90/90 0# x 30     NP:  Supine Handcuffs 3 x 15 YTB   Wall Slide towards end range x 30       Patient Education and Home Exercises     Home Exercises Provided and Patient Education Provided     Education provided:   - Continue with current HEP, continue max protection phase of treatment     Written Home Exercises Provided: Patient instructed to cont prior HEP. Exercises were reviewed and Renea was able to demonstrate them prior to the end of the session.  Renea demonstrated good  understanding of the education provided. See EMR under Patient Instructions for exercises provided during therapy sessions    ASSESSMENT     Renea demo's slow improvement in shoulder flexion ROM to ~5 degrees shy of unaffected side during active forward elevation. She notes continued functional improvement including being able to sleep on her stomach with arms overhead. Still with limitations in inferior glide, posterior glide of GHJ and lat tightness that improves with manual therapy, LLLD stretching and eccentric contractions.     Renea Is progressing well towards her goals.     Pt prognosis is Good.     Pt will continue to benefit from skilled outpatient physical therapy to address the deficits listed in the problem list box on initial evaluation, provide pt/family education and to maximize pt's level of independence in the home and community environment.     Pt's spiritual, cultural and  educational needs considered and pt agreeable to plan of care and goals.     Anticipated barriers to physical therapy: none    Goals:   Short Term Goals: 6 weeks  1. Pt will be compliant with HEP 50% of prescribed amount.   2. The pt to demo improvement in R shoulder PROM to by 80% of the L shoulder  3.  The pt to demo tolerance to being out of the sling for 24 hours with pain <2/10     Long Term Goals: 24 weeks   Pt will be compliant with % of prescribed amount.   Pt will score at least 59 on FOTO Functional Intake Survey   The pt to improvement in AROM of R shoulder within 80% of L shoulder to improve tolerance to OH movement   The pt to  Demo at least 4+/5 of C muscle testing to demo improvement in tolerance to activity  The pt to tolerate lifting 10# overhead to improve tolerance to ADLs and work related activities   The pt will report full participation in ADLs and IADLs without restrictions related to R Shoulder.      PLAN     Plan of care Certification: 11/7/2022 to 3/20/23.     Outpatient Physical Therapy 1 times weekly for 32 weeks to include the following interventions: Manual Therapy, Moist Heat/ Ice, Neuromuscular Re-ed, Patient Education, Therapeutic Activites, and Therapeutic Exercise.     Nabil Hernandez, PT, DPT

## 2023-05-17 ENCOUNTER — OFFICE VISIT (OUTPATIENT)
Dept: SPORTS MEDICINE | Facility: CLINIC | Age: 53
End: 2023-05-17
Payer: COMMERCIAL

## 2023-05-17 ENCOUNTER — PATIENT MESSAGE (OUTPATIENT)
Dept: SPORTS MEDICINE | Facility: CLINIC | Age: 53
End: 2023-05-17

## 2023-05-17 VITALS
WEIGHT: 240 LBS | HEIGHT: 64 IN | BODY MASS INDEX: 40.97 KG/M2 | DIASTOLIC BLOOD PRESSURE: 90 MMHG | SYSTOLIC BLOOD PRESSURE: 136 MMHG | HEART RATE: 84 BPM

## 2023-05-17 DIAGNOSIS — Z98.890 S/P RIGHT ROTATOR CUFF REPAIR: Primary | ICD-10-CM

## 2023-05-17 PROCEDURE — 3080F PR MOST RECENT DIASTOLIC BLOOD PRESSURE >= 90 MM HG: ICD-10-PCS | Mod: CPTII,S$GLB,, | Performed by: ORTHOPAEDIC SURGERY

## 2023-05-17 PROCEDURE — 3075F SYST BP GE 130 - 139MM HG: CPT | Mod: CPTII,S$GLB,, | Performed by: ORTHOPAEDIC SURGERY

## 2023-05-17 PROCEDURE — 3075F PR MOST RECENT SYSTOLIC BLOOD PRESS GE 130-139MM HG: ICD-10-PCS | Mod: CPTII,S$GLB,, | Performed by: ORTHOPAEDIC SURGERY

## 2023-05-17 PROCEDURE — 99213 OFFICE O/P EST LOW 20 MIN: CPT | Mod: S$GLB,,, | Performed by: ORTHOPAEDIC SURGERY

## 2023-05-17 PROCEDURE — 99999 PR PBB SHADOW E&M-EST. PATIENT-LVL III: ICD-10-PCS | Mod: PBBFAC,,, | Performed by: ORTHOPAEDIC SURGERY

## 2023-05-17 PROCEDURE — 4010F ACE/ARB THERAPY RXD/TAKEN: CPT | Mod: CPTII,S$GLB,, | Performed by: ORTHOPAEDIC SURGERY

## 2023-05-17 PROCEDURE — 99999 PR PBB SHADOW E&M-EST. PATIENT-LVL III: CPT | Mod: PBBFAC,,, | Performed by: ORTHOPAEDIC SURGERY

## 2023-05-17 PROCEDURE — 99213 PR OFFICE/OUTPT VISIT, EST, LEVL III, 20-29 MIN: ICD-10-PCS | Mod: S$GLB,,, | Performed by: ORTHOPAEDIC SURGERY

## 2023-05-17 PROCEDURE — 3080F DIAST BP >= 90 MM HG: CPT | Mod: CPTII,S$GLB,, | Performed by: ORTHOPAEDIC SURGERY

## 2023-05-17 PROCEDURE — 3008F BODY MASS INDEX DOCD: CPT | Mod: CPTII,S$GLB,, | Performed by: ORTHOPAEDIC SURGERY

## 2023-05-17 PROCEDURE — 4010F PR ACE/ARB THEARPY RXD/TAKEN: ICD-10-PCS | Mod: CPTII,S$GLB,, | Performed by: ORTHOPAEDIC SURGERY

## 2023-05-17 PROCEDURE — 3008F PR BODY MASS INDEX (BMI) DOCUMENTED: ICD-10-PCS | Mod: CPTII,S$GLB,, | Performed by: ORTHOPAEDIC SURGERY

## 2023-05-17 NOTE — PROGRESS NOTES
"POST-OPERATIVE EXAMINATION    53 y.o. Female who returns for follow after surgery. She is 6.5 months s/p    Procedures Performed:  1. Right shoulder Arthroscopic rotator cuff repair CPT - 24538     2. Right shoulder Arthroscopic extensive debridement CPT - 71651     3. Right shoulder Arthroscopic subacromial decompression and bursectomy CPT - 30081     She is doing well without any issues.       PHYSICAL EXAMINATION:  BP (!) 136/90   Pulse 84   Ht 5' 4" (1.626 m)   Wt 108.9 kg (240 lb)   LMP 03/01/2011 (Exact Date)   BMI 41.20 kg/m²   General: Well-developed well-nourished 53 y.o. femalein no acute distress   Cardiovascular: Regular rhythm   Lungs: No labored breathing or wheezing appreciated   Neuro: Alert and oriented ×3   Psychiatric: well oriented to person, place and time, demonstrates normal mood and affect   Skin: No rashes, lesions or ulcers, normal temperature, turgor, and texture on involved extremity    ORTHOPEDIC EXAM:  Normal post-operative swelling  Normal post-operative scarring  Strength: WNL  ROM: FE-160 ; ER/Abd-70 ; IR/Abd-20 ; ER/Add-45  Tests: Negative Zayda's, Negative Speed's  Rotator cuff strength 5/5    ASSESSMENT:      ICD-10-CM ICD-9-CM   1. S/P right rotator cuff repair  Z98.890 V45.89       PLAN:       Continue HEP  Continue home stretching program  RTC in 3 months  I advised her to continue passive range of motion program.  She is still slightly behind in terms of range of motion from her other shoulder.  She has full strength and I am not concerned about healing of her rotator cuff.  I believe this is just a therapy issue to obtain her last bit of motion essentially in all planes.  She is lacking a little bit in all planes compared to her left upper extremity.  She did state that it was becoming expensive to go to physical therapy which required her to reduce her visits.                    "

## 2023-07-06 DIAGNOSIS — I10 ESSENTIAL HYPERTENSION: ICD-10-CM

## 2023-07-06 RX ORDER — LOSARTAN POTASSIUM 25 MG/1
25 TABLET ORAL DAILY
Qty: 30 TABLET | Refills: 0 | Status: SHIPPED | OUTPATIENT
Start: 2023-07-06 | End: 2023-08-15 | Stop reason: SDUPTHER

## 2023-07-06 NOTE — TELEPHONE ENCOUNTER
Called pt 3x no answer. Left detailed vm stating pt will need to schedule a annual before anymore refills can be given.

## 2023-07-09 ENCOUNTER — HOSPITAL ENCOUNTER (EMERGENCY)
Facility: HOSPITAL | Age: 53
Discharge: HOME | End: 2023-07-10
Attending: STUDENT IN AN ORGANIZED HEALTH CARE EDUCATION/TRAINING PROGRAM
Payer: COMMERCIAL

## 2023-07-09 DIAGNOSIS — R10.30 LOWER ABDOMINAL PAIN: Primary | ICD-10-CM

## 2023-07-09 LAB
ALBUMIN SERPL BCP-MCNC: 3.7 G/DL (ref 3.5–5.2)
ALP SERPL-CCNC: 127 U/L (ref 55–135)
ALT SERPL W/O P-5'-P-CCNC: 13 U/L (ref 10–44)
ANION GAP SERPL CALC-SCNC: 13 MMOL/L (ref 8–16)
AST SERPL-CCNC: 14 U/L (ref 10–40)
BASOPHILS # BLD AUTO: 0.03 K/UL (ref 0–0.2)
BASOPHILS NFR BLD: 0.3 % (ref 0–1.9)
BILIRUB SERPL-MCNC: 0.2 MG/DL (ref 0.1–1)
BILIRUB UR QL STRIP: NEGATIVE
BUN SERPL-MCNC: 16 MG/DL (ref 6–20)
BUN SERPL-MCNC: 17 MG/DL (ref 6–30)
CALCIUM SERPL-MCNC: 9.6 MG/DL (ref 8.7–10.5)
CHLORIDE SERPL-SCNC: 106 MMOL/L (ref 95–110)
CHLORIDE SERPL-SCNC: 106 MMOL/L (ref 95–110)
CLARITY UR REFRACT.AUTO: CLEAR
CO2 SERPL-SCNC: 23 MMOL/L (ref 23–29)
COLOR UR AUTO: YELLOW
CREAT SERPL-MCNC: 0.5 MG/DL (ref 0.5–1.4)
CREAT SERPL-MCNC: 0.7 MG/DL (ref 0.5–1.4)
DIFFERENTIAL METHOD: ABNORMAL
EOSINOPHIL # BLD AUTO: 0.1 K/UL (ref 0–0.5)
EOSINOPHIL NFR BLD: 1.3 % (ref 0–8)
ERYTHROCYTE [DISTWIDTH] IN BLOOD BY AUTOMATED COUNT: 14.3 % (ref 11.5–14.5)
EST. GFR  (NO RACE VARIABLE): >60 ML/MIN/1.73 M^2
GLUCOSE SERPL-MCNC: 100 MG/DL (ref 70–110)
GLUCOSE SERPL-MCNC: 104 MG/DL (ref 70–110)
GLUCOSE UR QL STRIP: NEGATIVE
HCT VFR BLD AUTO: 39.4 % (ref 37–48.5)
HCT VFR BLD CALC: 41 %PCV (ref 36–54)
HGB BLD-MCNC: 13 G/DL (ref 12–16)
HGB UR QL STRIP: NEGATIVE
IMM GRANULOCYTES # BLD AUTO: 0.01 K/UL (ref 0–0.04)
IMM GRANULOCYTES NFR BLD AUTO: 0.1 % (ref 0–0.5)
KETONES UR QL STRIP: NEGATIVE
LEUKOCYTE ESTERASE UR QL STRIP: NEGATIVE
LIPASE SERPL-CCNC: 42 U/L (ref 4–60)
LYMPHOCYTES # BLD AUTO: 5.7 K/UL (ref 1–4.8)
LYMPHOCYTES NFR BLD: 59.1 % (ref 18–48)
MCH RBC QN AUTO: 26.4 PG (ref 27–31)
MCHC RBC AUTO-ENTMCNC: 33 G/DL (ref 32–36)
MCV RBC AUTO: 80 FL (ref 82–98)
MONOCYTES # BLD AUTO: 0.5 K/UL (ref 0.3–1)
MONOCYTES NFR BLD: 4.7 % (ref 4–15)
NEUTROPHILS # BLD AUTO: 3.3 K/UL (ref 1.8–7.7)
NEUTROPHILS NFR BLD: 34.5 % (ref 38–73)
NITRITE UR QL STRIP: NEGATIVE
NRBC BLD-RTO: 0 /100 WBC
PH UR STRIP: 7 [PH] (ref 5–8)
PLATELET # BLD AUTO: 276 K/UL (ref 150–450)
PMV BLD AUTO: 11.3 FL (ref 9.2–12.9)
POC IONIZED CALCIUM: 1.18 MMOL/L (ref 1.06–1.42)
POC TCO2 (MEASURED): 23 MMOL/L (ref 23–29)
POTASSIUM BLD-SCNC: 3.5 MMOL/L (ref 3.5–5.1)
POTASSIUM SERPL-SCNC: 3.5 MMOL/L (ref 3.5–5.1)
PROT SERPL-MCNC: 8.2 G/DL (ref 6–8.4)
PROT UR QL STRIP: NEGATIVE
RBC # BLD AUTO: 4.93 M/UL (ref 4–5.4)
SAMPLE: NORMAL
SODIUM BLD-SCNC: 141 MMOL/L (ref 136–145)
SODIUM SERPL-SCNC: 142 MMOL/L (ref 136–145)
SP GR UR STRIP: 1.02 (ref 1–1.03)
URN SPEC COLLECT METH UR: NORMAL
WBC # BLD AUTO: 9.58 K/UL (ref 3.9–12.7)

## 2023-07-09 PROCEDURE — 63600175 PHARM REV CODE 636 W HCPCS: Performed by: PHYSICIAN ASSISTANT

## 2023-07-09 PROCEDURE — 96374 THER/PROPH/DIAG INJ IV PUSH: CPT

## 2023-07-09 PROCEDURE — 82308 ASSAY OF CALCITONIN: CPT | Mod: 91

## 2023-07-09 PROCEDURE — 80047 BASIC METABLC PNL IONIZED CA: CPT

## 2023-07-09 PROCEDURE — 80053 COMPREHEN METABOLIC PANEL: CPT | Performed by: PHYSICIAN ASSISTANT

## 2023-07-09 PROCEDURE — 83690 ASSAY OF LIPASE: CPT | Performed by: PHYSICIAN ASSISTANT

## 2023-07-09 PROCEDURE — 81003 URINALYSIS AUTO W/O SCOPE: CPT | Performed by: PHYSICIAN ASSISTANT

## 2023-07-09 PROCEDURE — 99285 EMERGENCY DEPT VISIT HI MDM: CPT | Mod: 25

## 2023-07-09 PROCEDURE — 85025 COMPLETE CBC W/AUTO DIFF WBC: CPT | Performed by: PHYSICIAN ASSISTANT

## 2023-07-09 RX ORDER — MORPHINE SULFATE 4 MG/ML
4 INJECTION, SOLUTION INTRAMUSCULAR; INTRAVENOUS
Status: COMPLETED | OUTPATIENT
Start: 2023-07-09 | End: 2023-07-09

## 2023-07-09 RX ADMIN — MORPHINE SULFATE 4 MG: 4 INJECTION INTRAVENOUS at 10:07

## 2023-07-10 VITALS
BODY MASS INDEX: 41.2 KG/M2 | WEIGHT: 240 LBS | RESPIRATION RATE: 15 BRPM | SYSTOLIC BLOOD PRESSURE: 130 MMHG | OXYGEN SATURATION: 96 % | DIASTOLIC BLOOD PRESSURE: 82 MMHG | HEART RATE: 60 BPM | TEMPERATURE: 98 F

## 2023-07-10 PROCEDURE — 25500020 PHARM REV CODE 255: Performed by: STUDENT IN AN ORGANIZED HEALTH CARE EDUCATION/TRAINING PROGRAM

## 2023-07-10 RX ADMIN — IOHEXOL 100 ML: 350 INJECTION, SOLUTION INTRAVENOUS at 12:07

## 2023-07-10 NOTE — ED NOTES
I-STAT Chem-8+ Results:   Value Reference Range   Sodium 141 136-145 mmol/L   Potassium  3.5 3.5-5.1 mmol/L   Chloride 106  mmol/L   Ionized Calcium 1.18 1.06-1.42 mmol/L   CO2 (measured) 23 23-29 mmol/L   Glucose 104  mg/dL   BUN 17 6-30 mg/dL   Creatinine 0.5 0.5-1.4 mg/dL   Hematocrit 41 36-54%

## 2023-07-10 NOTE — DISCHARGE INSTRUCTIONS
You were seen in the emergency department for abdominal pain.  Your urine was negative for infection.  Your lab work was normal today.  The CT scan did not show any emergent pathology.  Please stay hydrated you may increase your MiraLax dosage constipation.  We recommend following up with her PCP.  If you develop any new or worsening symptoms return to the ED sooner.

## 2023-07-10 NOTE — ED TRIAGE NOTES
Renea Mac, a 53 y.o. female presents to the ED w/ complaint of lower abdominal cramping, similar to menstrual cramps    Triage note:  Chief Complaint   Patient presents with    Abdominal Pain     Lower abd pain for past few hours, states feels similar to menstrual cramps Hysterectomy in 2011. Denies N/V/D     Review of patient's allergies indicates:  No Known Allergies  Past Medical History:   Diagnosis Date    Acute pain of right knee 11/4/2016    Chronic right shoulder pain 11/4/2016    COVID-19 virus infection 4/10/2020    Essential hypertension 11/4/2016    GERD (gastroesophageal reflux disease)     Incomplete tear of right rotator cuff 11/30/2016    Osteoarthritis of right knee 11/30/2016    Rotator cuff tear     S/P Exploratory Laparotomy/Mass Resection/BSO 1/31/2019 1- 1. Soft tissue, abdominal wall, mass, resection: - Benign leiomyoma, 6.5 cm in greatest dimension, see comment. 2. Ovary and fallopian tube, left, left salpingo-oophorectomy: - Benign ovary with cystic corpus luteum, 2.7 cm in greatest dimension. - Benign fallopian tube with no significant histologic abnormality. 3. 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

## 2023-07-10 NOTE — ED PROVIDER NOTES
Encounter Date: 7/9/2023       History     Chief Complaint   Patient presents with    Abdominal Pain     Lower abd pain for past few hours, states feels similar to menstrual cramps Hysterectomy in 2011. Denies N/V/D     53-year-old female with past medical history hypertension, GERD, hysterectomy presents to the emergency department for lower abdominal pain.  Reports 2 hours ago began having sudden onset sharp pain to her suprapubic region that waxes and wanes in intensity.  Denies any dysuria, hematuria, vaginal bleeding, constipation, diarrhea, fevers/chills or nausea vomiting.  Did not take anything for pain prior to arrival.    The history is provided by the patient.   Review of patient's allergies indicates:  No Known Allergies  Past Medical History:   Diagnosis Date    Acute pain of right knee 11/4/2016    Chronic right shoulder pain 11/4/2016    COVID-19 virus infection 4/10/2020    Essential hypertension 11/4/2016    GERD (gastroesophageal reflux disease)     Incomplete tear of right rotator cuff 11/30/2016    Osteoarthritis of right knee 11/30/2016    Rotator cuff tear     S/P Exploratory Laparotomy/Mass Resection/BSO 1/31/2019 1- 1. Soft tissue, abdominal wall, mass, resection: - Benign leiomyoma, 6.5 cm in greatest dimension, see comment. 2. Ovary and fallopian tube, left, left salpingo-oophorectomy: - Benign ovary with cystic corpus luteum, 2.7 cm in greatest dimension. - Benign fallopian tube with no significant histologic abnormality. 3. 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    Syncope and collapse 6/14/2017     Past Surgical History:   Procedure Laterality Date    ARTHROSCOPIC DEBRIDEMENT OF SHOULDER Right 11/3/2022    Procedure: DEBRIDEMENT, SHOULDER, ARTHROSCOPIC;  Surgeon: Eliseo Bailon MD;  Location: [facility];  Service: Orthopedics;  Laterality: Right;    ARTHROSCOPIC DECOMPRESSION OF SHOULDER Right  11/3/2022    Procedure: DECOMPRESSION, SHOULDER, ARTHROSCOPIC;  Surgeon: Eliseo Bailon MD;  Location: [facility] OR;  Service: Orthopedics;  Laterality: Right;    ARTHROSCOPIC REPAIR OF ROTATOR CUFF OF SHOULDER Right 11/3/2022    Procedure: REPAIR, ROTATOR CUFF, ARTHROSCOPIC - POLAR CARE;  Surgeon: Eliseo Bailon MD;  Location: [facility] OR;  Service: Orthopedics;  Laterality: Right;  Regional Block    BILATERAL SALPINGO-OOPHORECTOMY (BSO) Bilateral 2019    Procedure: SALPINGO-OOPHORECTOMY, BILATERAL;  Surgeon: Petrona Porter MD;  Location: [facility] OR 2ND FLR;  Service: OB/GYN;  Laterality: Bilateral;     SECTION      x1    COLONOSCOPY N/A 2019    Procedure: COLONOSCOPY;  Surgeon: Chris Bennett MD;  Location: [facility] ENDO (4TH FLR);  Service: Endoscopy;  Laterality: N/A;  Justo Camilo, or Donald if no availability in Dec. will schedule after CT scan results    COLPOSCOPY      EXCISION OF PELVIC MASS N/A 2019    Procedure: EXCISION, MASS, PELVIS;  Surgeon: Petrona Porter MD;  Location: [facility] OR 2ND FLR;  Service: OB/GYN;  Laterality: N/A;    HERNIA REPAIR      HYSTERECTOMY      YANG, ovaries remain (fibroids0    LAPAROTOMY, EXPLORATORY N/A 2019    Procedure: LAPAROTOMY, EXPLORATORY;  Surgeon: Petrona Porter MD;  Location: [facility] OR 2ND FLR;  Service: OB/GYN;  Laterality: N/A;    OOPHORECTOMY Bilateral 2019    SLEEVE GASTROPLASTY  2014    TUBAL LIGATION       Family History   Problem Relation Age of Onset    Hypertension Mother     Heart disease Mother     Heart attack Mother     Rectal cancer Mother     Colon cancer Mother     Hypertension Father     Heart disease Father     Stroke Father     Heart attack Father     Hypertension Sister     No Known Problems Daughter     Breast cancer Maternal Aunt     Hypertension Brother     Cancer Brother     No Known Problems Son     Esophageal cancer Neg Hx      Social History     Tobacco Use    Smoking status: Never    Smokeless tobacco:  Never   Substance Use Topics    Alcohol use: Yes     Comment: occasionally    Drug use: No     Review of Systems   Constitutional:  Negative for chills and fever.   HENT:  Negative for sore throat.    Respiratory:  Negative for cough and shortness of breath.    Cardiovascular:  Negative for chest pain.   Gastrointestinal:  Positive for abdominal pain. Negative for nausea and vomiting.   Genitourinary:  Negative for difficulty urinating, dysuria and vaginal bleeding.   Musculoskeletal: Negative.    Skin: Negative.    Neurological:  Negative for weakness.   Psychiatric/Behavioral:  Negative for confusion.      Physical Exam     Initial Vitals [07/09/23 2143]   BP Pulse Resp Temp SpO2   (!) 172/89 89 20 98 °F (36.7 °C) 97 %      MAP       --         Physical Exam    Nursing note and vitals reviewed.  Constitutional: She appears well-developed and well-nourished.   Tearful, uncomfortable-appearing   HENT:   Head: Normocephalic and atraumatic.   Eyes: Conjunctivae are normal. Pupils are equal, round, and reactive to light.   Neck: Neck supple.   Normal range of motion.  Cardiovascular:  Normal rate.           Pulmonary/Chest: Breath sounds normal.   Abdominal: Abdomen is soft. Bowel sounds are normal. She exhibits no distension and no mass. There is abdominal tenderness (Across the lower abdomen worse in the suprapubic region). There is no rebound and no guarding.   Musculoskeletal:         General: Normal range of motion.      Cervical back: Normal range of motion and neck supple.     Neurological: She is alert and oriented to person, place, and time. GCS score is 15. GCS eye subscore is 4. GCS verbal subscore is 5. GCS motor subscore is 6.   Skin: Skin is warm and dry. Capillary refill takes less than 2 seconds.   Psychiatric: She has a normal mood and affect.       ED Course   Procedures  Labs Reviewed   CBC W/ AUTO DIFFERENTIAL - Abnormal; Notable for the following components:       Result Value    MCV 80 (*)     MCH  26.4 (*)     Lymph # 5.7 (*)     Gran % 34.5 (*)     Lymph % 59.1 (*)     All other components within normal limits   COMPREHENSIVE METABOLIC PANEL   LIPASE   URINALYSIS, REFLEX TO URINE CULTURE    Narrative:     Specimen Source->Urine   ISTAT PROCEDURE   ISTAT CHEM8          Imaging Results              CT Abdomen Pelvis With Contrast (Final result)  Result time 07/10/23 01:26:56      Final result by Shyam Leon MD (07/10/23 01:26:56)                   Impression:      Postoperative changes of sleeve gastrectomy with a small hiatal hernia and mild haziness of the distal esophagus at that level.  Correlation for reflux esophagitis recommended.    Additional stable findings as above.    Electronically signed by resident: Thony Jackson  Date:    07/10/2023  Time:    00:58    Electronically signed by: Shyam Leon  Date:    07/10/2023  Time:    01:26               Narrative:    EXAMINATION:  CT ABDOMEN PELVIS WITH CONTRAST    CLINICAL HISTORY:  LLQ abdominal pain;    TECHNIQUE:  Low dose axial images, sagittal and coronal reformations were obtained from the lung bases to the pubic symphysis following the IV administration of 100 mL of Omnipaque 350 and the oral administration of 30 mL of Omnipaque 350.    COMPARISON:  CT abdomen pelvis 10/07/2022    FINDINGS:  Heart: Normal in size. No pericardial effusion.    Lung Bases: Bibasilar bandlike opacities, atelectasis or scar.  No consolidation or pleural effusion.  Stable micronodule in the posterior segment right lower lobe (series 2, image 1).    Liver: Normal in size and attenuation, with no focal hepatic lesions.    Gallbladder: No calcified gallstones.    Bile Ducts: No evidence of dilated ducts.    Pancreas: No mass or peripancreatic fat stranding.    Spleen: Normal size and attenuation.    Adrenals: Unremarkable. No focal lesions.    Kidneys/ Ureters: Normal enhancement.  Normal size and location.  Stable bilateral renal cysts.  Subcentimeter cortical  hypodensity in the inferior pole the right kidney, too small to adequately characterize.  No mass or hydronephrosis.  The ureters are unremarkable.    Bladder: No evidence of wall thickening.    Reproductive organs: The uterus is surgically absent.  The ovaries are inconspicuous or perhaps also both surgically absent.    GI Tract/Mesentery: Mild haziness of the distal esophagus with a small hiatal hernia.  Postoperative changes of sleeve gastrectomy.  The stomach is nondistended.  No evidence of bowel obstruction.  Appendix appears within normal limits.  No evidence of bowel inflammation.    Peritoneal Space: No ascites. No free air.    Retroperitoneum: No significant adenopathy.    Abdominal wall: Fat containing supraumbilical ventral abdominal hernia.  Small fat containing umbilical hernia.  Postoperative changes in the infraumbilical midline abdomen.    Vasculature: No significant atherosclerosis or aneurysm.    Bones: No acute fracture. No osseous destructive lesions.                                       Medications   morphine injection 4 mg (4 mg Intravenous Given 7/9/23 2225)   iohexoL (OMNIPAQUE 350) injection 100 mL (100 mLs Intravenous Given 7/10/23 0051)     Medical Decision Making:   History:   Old Medical Records: I decided to obtain old medical records.  Initial Assessment:   53-year-old female presents ED for suprapubic abdominal pain which started just prior to arrival.  Differential Diagnosis:   UTI, appendicitis, diverticulitis, hernia, PID  Clinical Tests:   Lab Tests: Ordered and Reviewed  Radiological Study: Ordered and Reviewed  ED Management:  Patient tender to the lower abdomen on arrival.  CT abdomen pelvis was obtained which did not show any emergent pathology.  CBC and CMP unremarkable.  Negative doubt pancreatitis.  UA negative for infection.  Patient denies any vaginal discharge, vaginal bleeding and pelvic exam was offered however patient deferred pelvic.  Did consider PID however  slightly given the lack of symptoms.  Patient is well-appearing and repeat abdominal exam is benign.  She is reassuring vitals.  Patient reports close follow-up with her PCP this week advised her to continue with her appointment.  There was some stool noted on the CT which may reflect some mild constipation and she is taking daily MiraLax and we discussed a bowel regiment.  Precautions given.                        Clinical Impression:   Final diagnoses:  [R10.30] Lower abdominal pain (Primary)        ED Disposition Condition    Discharge Stable          ED Prescriptions    None       Follow-up Information       Follow up With Specialties Details Why Contact Info    Magdalena Davila NP Family Medicine Schedule an appointment as soon as possible for a visit   [address]  [place] [address]  [phone]               Yaron Garg PA-C  07/10/23 0140

## 2023-07-25 ENCOUNTER — LAB VISIT (OUTPATIENT)
Dept: LAB | Facility: HOSPITAL | Age: 53
End: 2023-07-25
Payer: COMMERCIAL

## 2023-07-25 ENCOUNTER — OFFICE VISIT (OUTPATIENT)
Dept: INTERNAL MEDICINE | Facility: CLINIC | Age: 53
End: 2023-07-25
Payer: COMMERCIAL

## 2023-07-25 VITALS
BODY MASS INDEX: 40.46 KG/M2 | HEART RATE: 87 BPM | DIASTOLIC BLOOD PRESSURE: 80 MMHG | WEIGHT: 237 LBS | OXYGEN SATURATION: 98 % | SYSTOLIC BLOOD PRESSURE: 130 MMHG | HEIGHT: 64 IN

## 2023-07-25 DIAGNOSIS — Z12.31 SCREENING MAMMOGRAM, ENCOUNTER FOR: ICD-10-CM

## 2023-07-25 DIAGNOSIS — R91.8 ABNORMAL CT LUNG SCREENING: ICD-10-CM

## 2023-07-25 DIAGNOSIS — I10 ESSENTIAL HYPERTENSION: ICD-10-CM

## 2023-07-25 DIAGNOSIS — G43.909 MIGRAINE WITHOUT STATUS MIGRAINOSUS, NOT INTRACTABLE, UNSPECIFIED MIGRAINE TYPE: ICD-10-CM

## 2023-07-25 DIAGNOSIS — E66.01 CLASS 3 SEVERE OBESITY WITH BODY MASS INDEX (BMI) OF 40.0 TO 44.9 IN ADULT, UNSPECIFIED OBESITY TYPE, UNSPECIFIED WHETHER SERIOUS COMORBIDITY PRESENT: ICD-10-CM

## 2023-07-25 DIAGNOSIS — Z00.00 ANNUAL PHYSICAL EXAM: ICD-10-CM

## 2023-07-25 DIAGNOSIS — Z98.890 S/P EXPLORATORY LAPAROTOMY: ICD-10-CM

## 2023-07-25 DIAGNOSIS — Z00.00 ANNUAL PHYSICAL EXAM: Primary | ICD-10-CM

## 2023-07-25 DIAGNOSIS — Z98.84 S/P LAPAROSCOPIC SLEEVE GASTRECTOMY: ICD-10-CM

## 2023-07-25 DIAGNOSIS — R91.1 SOLITARY PULMONARY NODULE: ICD-10-CM

## 2023-07-25 PROBLEM — E66.813 CLASS 3 SEVERE OBESITY WITH BODY MASS INDEX (BMI) OF 40.0 TO 44.9 IN ADULT, UNSPECIFIED OBESITY TYPE, UNSPECIFIED WHETHER SERIOUS COMORBIDITY PRESENT: Status: ACTIVE | Noted: 2023-07-25

## 2023-07-25 LAB
25(OH)D3+25(OH)D2 SERPL-MCNC: 48 NG/ML (ref 30–96)
ALBUMIN SERPL BCP-MCNC: 3.7 G/DL (ref 3.5–5.2)
ALP SERPL-CCNC: 110 U/L (ref 55–135)
ALT SERPL W/O P-5'-P-CCNC: 10 U/L (ref 10–44)
ANION GAP SERPL CALC-SCNC: 12 MMOL/L (ref 8–16)
AST SERPL-CCNC: 15 U/L (ref 10–40)
BASOPHILS # BLD AUTO: 0.02 K/UL (ref 0–0.2)
BASOPHILS NFR BLD: 0.3 % (ref 0–1.9)
BILIRUB SERPL-MCNC: 0.2 MG/DL (ref 0.1–1)
BUN SERPL-MCNC: 15 MG/DL (ref 6–20)
CALCIUM SERPL-MCNC: 9.6 MG/DL (ref 8.7–10.5)
CHLORIDE SERPL-SCNC: 105 MMOL/L (ref 95–110)
CHOLEST SERPL-MCNC: 174 MG/DL (ref 120–199)
CHOLEST/HDLC SERPL: 2.4 {RATIO} (ref 2–5)
CO2 SERPL-SCNC: 24 MMOL/L (ref 23–29)
CREAT SERPL-MCNC: 0.7 MG/DL (ref 0.5–1.4)
DIFFERENTIAL METHOD: ABNORMAL
EOSINOPHIL # BLD AUTO: 0.1 K/UL (ref 0–0.5)
EOSINOPHIL NFR BLD: 1 % (ref 0–8)
ERYTHROCYTE [DISTWIDTH] IN BLOOD BY AUTOMATED COUNT: 14.2 % (ref 11.5–14.5)
EST. GFR  (NO RACE VARIABLE): >60 ML/MIN/1.73 M^2
ESTIMATED AVG GLUCOSE: 114 MG/DL (ref 68–131)
FERRITIN SERPL-MCNC: 83 NG/ML (ref 20–300)
GLUCOSE SERPL-MCNC: 94 MG/DL (ref 70–110)
HBA1C MFR BLD: 5.6 % (ref 4–5.6)
HCT VFR BLD AUTO: 41.7 % (ref 37–48.5)
HDLC SERPL-MCNC: 74 MG/DL (ref 40–75)
HDLC SERPL: 42.5 % (ref 20–50)
HGB BLD-MCNC: 12.9 G/DL (ref 12–16)
IMM GRANULOCYTES # BLD AUTO: 0.02 K/UL (ref 0–0.04)
IMM GRANULOCYTES NFR BLD AUTO: 0.3 % (ref 0–0.5)
IRON SERPL-MCNC: 55 UG/DL (ref 30–160)
LDLC SERPL CALC-MCNC: 90.4 MG/DL (ref 63–159)
LYMPHOCYTES # BLD AUTO: 3.1 K/UL (ref 1–4.8)
LYMPHOCYTES NFR BLD: 45.6 % (ref 18–48)
MCH RBC QN AUTO: 25.7 PG (ref 27–31)
MCHC RBC AUTO-ENTMCNC: 30.9 G/DL (ref 32–36)
MCV RBC AUTO: 83 FL (ref 82–98)
MONOCYTES # BLD AUTO: 0.4 K/UL (ref 0.3–1)
MONOCYTES NFR BLD: 5.7 % (ref 4–15)
NEUTROPHILS # BLD AUTO: 3.2 K/UL (ref 1.8–7.7)
NEUTROPHILS NFR BLD: 47.1 % (ref 38–73)
NONHDLC SERPL-MCNC: 100 MG/DL
NRBC BLD-RTO: 0 /100 WBC
PLATELET # BLD AUTO: 253 K/UL (ref 150–450)
PMV BLD AUTO: 12.5 FL (ref 9.2–12.9)
POTASSIUM SERPL-SCNC: 3.4 MMOL/L (ref 3.5–5.1)
PROT SERPL-MCNC: 8.1 G/DL (ref 6–8.4)
RBC # BLD AUTO: 5.01 M/UL (ref 4–5.4)
SATURATED IRON: 17 % (ref 20–50)
SODIUM SERPL-SCNC: 141 MMOL/L (ref 136–145)
TOTAL IRON BINDING CAPACITY: 332 UG/DL (ref 250–450)
TRANSFERRIN SERPL-MCNC: 224 MG/DL (ref 200–375)
TRIGL SERPL-MCNC: 48 MG/DL (ref 30–150)
TSH SERPL DL<=0.005 MIU/L-ACNC: 2.27 UIU/ML (ref 0.4–4)
WBC # BLD AUTO: 6.87 K/UL (ref 3.9–12.7)

## 2023-07-25 PROCEDURE — 1159F MED LIST DOCD IN RCRD: CPT | Mod: CPTII,S$GLB,, | Performed by: NURSE PRACTITIONER

## 2023-07-25 PROCEDURE — 99396 PR PREVENTIVE VISIT,EST,40-64: ICD-10-PCS | Mod: S$GLB,,, | Performed by: NURSE PRACTITIONER

## 2023-07-25 PROCEDURE — 99999 PR PBB SHADOW E&M-EST. PATIENT-LVL IV: ICD-10-PCS | Mod: PBBFAC,,, | Performed by: NURSE PRACTITIONER

## 2023-07-25 PROCEDURE — 82607 VITAMIN B-12: CPT | Performed by: NURSE PRACTITIONER

## 2023-07-25 PROCEDURE — 4010F PR ACE/ARB THEARPY RXD/TAKEN: ICD-10-PCS | Mod: CPTII,S$GLB,, | Performed by: NURSE PRACTITIONER

## 2023-07-25 PROCEDURE — 3008F BODY MASS INDEX DOCD: CPT | Mod: CPTII,S$GLB,, | Performed by: NURSE PRACTITIONER

## 2023-07-25 PROCEDURE — 99999 PR PBB SHADOW E&M-EST. PATIENT-LVL IV: CPT | Mod: PBBFAC,,, | Performed by: NURSE PRACTITIONER

## 2023-07-25 PROCEDURE — 3075F PR MOST RECENT SYSTOLIC BLOOD PRESS GE 130-139MM HG: ICD-10-PCS | Mod: CPTII,S$GLB,, | Performed by: NURSE PRACTITIONER

## 2023-07-25 PROCEDURE — 3075F SYST BP GE 130 - 139MM HG: CPT | Mod: CPTII,S$GLB,, | Performed by: NURSE PRACTITIONER

## 2023-07-25 PROCEDURE — 82306 VITAMIN D 25 HYDROXY: CPT | Performed by: NURSE PRACTITIONER

## 2023-07-25 PROCEDURE — 80053 COMPREHEN METABOLIC PANEL: CPT | Performed by: NURSE PRACTITIONER

## 2023-07-25 PROCEDURE — 85025 COMPLETE CBC W/AUTO DIFF WBC: CPT | Performed by: NURSE PRACTITIONER

## 2023-07-25 PROCEDURE — 99396 PREV VISIT EST AGE 40-64: CPT | Mod: S$GLB,,, | Performed by: NURSE PRACTITIONER

## 2023-07-25 PROCEDURE — 84443 ASSAY THYROID STIM HORMONE: CPT | Performed by: NURSE PRACTITIONER

## 2023-07-25 PROCEDURE — 36415 COLL VENOUS BLD VENIPUNCTURE: CPT | Performed by: NURSE PRACTITIONER

## 2023-07-25 PROCEDURE — 83036 HEMOGLOBIN GLYCOSYLATED A1C: CPT | Performed by: NURSE PRACTITIONER

## 2023-07-25 PROCEDURE — 84466 ASSAY OF TRANSFERRIN: CPT | Performed by: NURSE PRACTITIONER

## 2023-07-25 PROCEDURE — 80061 LIPID PANEL: CPT | Performed by: NURSE PRACTITIONER

## 2023-07-25 PROCEDURE — 3008F PR BODY MASS INDEX (BMI) DOCUMENTED: ICD-10-PCS | Mod: CPTII,S$GLB,, | Performed by: NURSE PRACTITIONER

## 2023-07-25 PROCEDURE — 1159F PR MEDICATION LIST DOCUMENTED IN MEDICAL RECORD: ICD-10-PCS | Mod: CPTII,S$GLB,, | Performed by: NURSE PRACTITIONER

## 2023-07-25 PROCEDURE — 3079F DIAST BP 80-89 MM HG: CPT | Mod: CPTII,S$GLB,, | Performed by: NURSE PRACTITIONER

## 2023-07-25 PROCEDURE — 3079F PR MOST RECENT DIASTOLIC BLOOD PRESSURE 80-89 MM HG: ICD-10-PCS | Mod: CPTII,S$GLB,, | Performed by: NURSE PRACTITIONER

## 2023-07-25 PROCEDURE — 4010F ACE/ARB THERAPY RXD/TAKEN: CPT | Mod: CPTII,S$GLB,, | Performed by: NURSE PRACTITIONER

## 2023-07-25 PROCEDURE — 82728 ASSAY OF FERRITIN: CPT | Performed by: NURSE PRACTITIONER

## 2023-07-25 NOTE — PROGRESS NOTES
Internal Medicine Annual Exam       CHIEF COMPLAINT     The patient, Renea Mac, who is a 53 y.o. female with migraines, HTN, s/p ex lap and BSO for removal of mass (benign leiomyoma) (1/31/19) , s/p gastric sleeve and chornic lower back and right shoulder pain presents for an annual exam.    HPI   Here for annual - last seen by me 10/28/2021    HTN- taking HCTZ and losartan     S/p right shoulder rotator cuff repair 11/2022  Completed PT last month     Incidental 3mm RLL seen on CT imaging 10/2022  3 mm RLL pulmonary nodule incidentally seen on CT imaging, by Fleischner Society guidelines, either no routine follow for low risk patient vs 12 month CT for high risk patient    Seen in ED 7/9/2023 for lower abd pain   CT abd pelvis-   Abdominal wall: Fat containing supraumbilical ventral abdominal hernia.  Small fat containing umbilical hernia.  Postoperative changes in the infraumbilical midline abdomen.  CMP and CBC stable   Impression:        Postoperative changes of sleeve gastrectomy with a small hiatal hernia and mild haziness of the distal esophagus at that level.  Correlation for reflux esophagitis recommended.     Additional stable findings as above.      Also having constipation - was taking miralax for this but it caused diarrhea   Also tried benefiber and metamucil     HM-  CRCS- 1/21/219  MMG-9/2022  Shingles-UTD  Tdap-2010      Past Medical History:  Past Medical History:   Diagnosis Date    Acute pain of right knee 11/4/2016    Chronic right shoulder pain 11/4/2016    COVID-19 virus infection 4/10/2020    Essential hypertension 11/4/2016    GERD (gastroesophageal reflux disease)     Incomplete tear of right rotator cuff 11/30/2016    Osteoarthritis of right knee 11/30/2016    Rotator cuff tear     S/P Exploratory Laparotomy/Mass Resection/BSO 1/31/2019 1- 1. Soft tissue, abdominal wall, mass, resection: - Benign leiomyoma, 6.5 cm in greatest dimension, see comment. 2. Ovary and fallopian  tube, left, left salpingo-oophorectomy: - Benign ovary with cystic corpus luteum, 2.7 cm in greatest dimension. - Benign fallopian tube with no significant histologic abnormality. 3. Fallopian tube and ovary, right, right salpingo-oophorectomy: - Benign o    S/P laparoscopic sleeve gastrectomy 2016    Syncope and collapse 2017       Past Surgical History:   Procedure Laterality Date    ARTHROSCOPIC DEBRIDEMENT OF SHOULDER Right 11/3/2022    Procedure: DEBRIDEMENT, SHOULDER, ARTHROSCOPIC;  Surgeon: Eliseo Bailon MD;  Location: [facility] OR;  Service: Orthopedics;  Laterality: Right;    ARTHROSCOPIC DECOMPRESSION OF SHOULDER Right 11/3/2022    Procedure: DECOMPRESSION, SHOULDER, ARTHROSCOPIC;  Surgeon: Eliseo Bailon MD;  Location: [facility] OR;  Service: Orthopedics;  Laterality: Right;    ARTHROSCOPIC REPAIR OF ROTATOR CUFF OF SHOULDER Right 11/3/2022    Procedure: REPAIR, ROTATOR CUFF, ARTHROSCOPIC - POLAR CARE;  Surgeon: Eliseo Bailon MD;  Location: [facility] OR;  Service: Orthopedics;  Laterality: Right;  Regional Block    BILATERAL SALPINGO-OOPHORECTOMY (BSO) Bilateral 2019    Procedure: SALPINGO-OOPHORECTOMY, BILATERAL;  Surgeon: Petrona Porter MD;  [facility];  Service: OB/GYN;  Laterality: Bilateral;     SECTION      x1    COLONOSCOPY N/A 2019    Procedure: COLONOSCOPY;  Surgeon: Chris Bennett MD;  Location: [facility] (4TH FLR);  Service: Endoscopy;  Laterality: N/A;  Justo Camilo, or Donald if no availability in Dec. will schedule after CT scan results    COLPOSCOPY      EXCISION OF PELVIC MASS N/A 2019    Procedure: EXCISION, MASS, PELVIS;  Surgeon: Petrona Porter MD;  Location: [facility] OR 2ND FLR;  Service: OB/GYN;  Laterality: N/A;    HERNIA REPAIR      HYSTERECTOMY      YANG, ovaries remain (fibroids0    LAPAROTOMY, EXPLORATORY N/A 2019    Procedure: LAPAROTOMY, EXPLORATORY;  Surgeon: Petrona Porter MD;  [facility];  Service:  OB/GYN;  Laterality: N/A;    OOPHORECTOMY Bilateral 01/31/2019    SLEEVE GASTROPLASTY  11/2014    TUBAL LIGATION          Family History   Problem Relation Age of Onset    Hypertension Mother     Heart disease Mother     Heart attack Mother     Rectal cancer Mother     Colon cancer Mother     Hypertension Father     Heart disease Father     Stroke Father     Heart attack Father     Hypertension Sister     No Known Problems Daughter     Breast cancer Maternal Aunt     Hypertension Brother     Cancer Brother     No Known Problems Son     Esophageal cancer Neg Hx         Social History     Socioeconomic History    Marital status:    Tobacco Use    Smoking status: Never    Smokeless tobacco: Never   Substance and Sexual Activity    Alcohol use: Yes     Comment: occasionally    Drug use: No    Sexual activity: Yes     Partners: Male     Social Determinants of Health     Financial Resource Strain: Unknown    Difficulty of Paying Living Expenses: Patient refused   Food Insecurity: Unknown    Worried About Running Out of Food in the Last Year: Patient refused    Ran Out of Food in the Last Year: Patient refused   Transportation Needs: No Transportation Needs    Lack of Transportation (Medical): No    Lack of Transportation (Non-Medical): No   Physical Activity: Inactive    Days of Exercise per Week: 1 day    Minutes of Exercise per Session: 0 min   Stress: No Stress Concern Present    Feeling of Stress : Only a little   Social Connections: Unknown    Frequency of Communication with Friends and Family: Patient refused    Frequency of Social Gatherings with Friends and Family: Patient refused    Active Member of Clubs or Organizations: Patient refused    Attends Club or Organization Meetings: Patient refused    Marital Status:    Housing Stability: Unknown    Unable to Pay for Housing in the Last Year: No    Unstable Housing in the Last Year: No        Social History     Tobacco Use   Smoking Status Never    Smokeless Tobacco Never        Allergies as of 07/25/2023    (No Known Allergies)          Home Medications:  Prior to Admission medications    Medication Sig Start Date End Date Taking? Authorizing Provider   acetaminophen (TYLENOL) 650 MG TbSR Take 1 tablet (650 mg total) by mouth every 8 (eight) hours. 10/10/21   KYRA Lopez   b complex vitamins capsule Take 1 capsule by mouth once daily.    Historical Provider   cetirizine (ZYRTEC) 10 MG tablet Take 1 tablet (10 mg total) by mouth once daily. 12/17/22 1/16/23  Shelia Ivey NP   DULoxetine (CYMBALTA) 30 MG capsule Take 1 capsule (30 mg total) by mouth once daily. 2/6/23 2/6/24  Jeni Bethea MD   fluticasone propionate (FLONASE) 50 mcg/actuation nasal spray 1 spray (50 mcg total) by Each Nostril route once daily. 12/17/22   Shelia Ivey NP   hydroCHLOROthiazide (HYDRODIURIL) 25 MG tablet Take 1/2 tablet (12.5 mg total) by mouth once daily. 9/8/22   Magdalena Davila NP   HYDROcodone-acetaminophen (NORCO) 7.5-325 mg per tablet Take 1 tablet by mouth every 4 (four) hours as needed for Pain.  Patient not taking: Reported on 12/16/2022 10/28/22   Mateo Gordon PA-C   losartan (COZAAR) 25 MG tablet Take 1 tablet (25 mg total) by mouth once daily. 7/6/23 7/5/24  Magdalena Davila NP   multivitamin capsule Take 1 capsule by mouth once daily.    Historical Provider   traMADoL (ULTRAM) 50 mg tablet Take 1 tablet (50 mg total) by mouth every 6 (six) hours as needed for Pain.  Patient not taking: Reported on 12/16/2022 11/7/22   Mateo Gordon PA-C   vitamin D (VITAMIN D3) 1000 units Tab Take 1,000 Units by mouth once daily.    Historical Provider   azelastine (ASTELIN) 137 mcg (0.1 %) nasal spray 1 spray (137 mcg total) by Nasal route 2 (two) times daily. for 7 days 9/12/21 10/10/21  Shar G. Sehon, NP   ketoconazole (NIZORAL) 2 % cream Apply topically once daily. 9/11/20 10/10/21  Magdalena Davila, LINWOOD   nystatin (MYCOSTATIN) ointment  "APPLY TO THE AFFECTED AREA(S) BY TOPICAL ROUTE 2 TIMES PER DAY 7/7/21 10/10/21         Review of Systems:  Review of Systems   Constitutional:  Negative for activity change and unexpected weight change.   HENT:  Negative for hearing loss, rhinorrhea and trouble swallowing.    Eyes:  Positive for visual disturbance. Negative for discharge.   Respiratory:  Negative for chest tightness and wheezing.    Cardiovascular:  Negative for chest pain and palpitations.   Gastrointestinal:  Positive for abdominal pain, constipation and diarrhea. Negative for blood in stool and vomiting.   Endocrine: Negative for polydipsia and polyuria.   Genitourinary:  Negative for difficulty urinating, dysuria, hematuria and menstrual problem.   Musculoskeletal:  Negative for arthralgias, joint swelling and neck pain.   Neurological:  Negative for weakness and headaches.   Psychiatric/Behavioral:  Negative for confusion and dysphoric mood.      Health Maintainence:   Immunizations:  Health Maintenance         Date Due Completion Date    Hepatitis C Screening Never done ---    Pneumococcal Vaccines (Age 0-64) (1 - PCV) Never done ---    HIV Screening Never done ---    Hemoglobin A1c (Diabetic Prevention Screening) Never done ---    TETANUS VACCINE 05/24/2020 5/24/2010    COVID-19 Vaccine (4 - Pfizer series) 02/24/2022 12/30/2021    Mammogram 09/21/2023 9/21/2022    Influenza Vaccine (1) 09/01/2023 10/15/2022    Lipid Panel 09/17/2025 9/17/2020    Colorectal Cancer Screening 01/21/2029 1/21/2019             PHYSICAL EXAM     /80   Pulse 87   Ht 5' 4" (1.626 m)   Wt 107.5 kg (236 lb 15.9 oz)   LMP 03/01/2011 (Exact Date)   SpO2 98%   BMI 40.68 kg/m²  Body mass index is 40.68 kg/m².    Physical Exam  Vitals reviewed.   Constitutional:       Appearance: She is well-developed.   HENT:      Head: Normocephalic.      Right Ear: External ear normal.      Left Ear: External ear normal.      Nose: Nose normal.      Mouth/Throat:      " Pharynx: No oropharyngeal exudate.   Eyes:      Pupils: Pupils are equal, round, and reactive to light.   Neck:      Thyroid: No thyromegaly.      Vascular: No JVD.      Trachea: No tracheal deviation.   Cardiovascular:      Rate and Rhythm: Normal rate and regular rhythm.      Heart sounds: Normal heart sounds. No murmur heard.    No friction rub. No gallop.   Pulmonary:      Effort: Pulmonary effort is normal. No respiratory distress.      Breath sounds: Normal breath sounds. No wheezing or rales.   Abdominal:      General: Bowel sounds are normal. There is no distension.      Palpations: Abdomen is soft.      Tenderness: There is no abdominal tenderness.   Musculoskeletal:         General: No tenderness. Normal range of motion.      Cervical back: Neck supple.   Lymphadenopathy:      Cervical: No cervical adenopathy.   Skin:     General: Skin is warm and dry.      Findings: No rash.   Neurological:      Mental Status: She is alert and oriented to person, place, and time.   Psychiatric:         Behavior: Behavior normal.       LABS     No results found for: LABA1C, HGBA1C  CMP  Sodium   Date Value Ref Range Status   07/09/2023 142 136 - 145 mmol/L Final     Potassium   Date Value Ref Range Status   07/09/2023 3.5 3.5 - 5.1 mmol/L Final     Chloride   Date Value Ref Range Status   07/09/2023 106 95 - 110 mmol/L Final     CO2   Date Value Ref Range Status   07/09/2023 23 23 - 29 mmol/L Final     Glucose   Date Value Ref Range Status   07/09/2023 100 70 - 110 mg/dL Final     BUN   Date Value Ref Range Status   07/09/2023 16 6 - 20 mg/dL Final     Creatinine   Date Value Ref Range Status   07/09/2023 0.7 0.5 - 1.4 mg/dL Final     Calcium   Date Value Ref Range Status   07/09/2023 9.6 8.7 - 10.5 mg/dL Final     Total Protein   Date Value Ref Range Status   07/09/2023 8.2 6.0 - 8.4 g/dL Final     Albumin   Date Value Ref Range Status   07/09/2023 3.7 3.5 - 5.2 g/dL Final     Total Bilirubin   Date Value Ref Range Status    07/09/2023 0.2 0.1 - 1.0 mg/dL Final     Comment:     For infants and newborns, interpretation of results should be based  on gestational age, weight and in agreement with clinical  observations.    Premature Infant recommended reference ranges:  Up to 24 hours.............<8.0 mg/dL  Up to 48 hours............<12.0 mg/dL  3-5 days..................<15.0 mg/dL  6-29 days.................<15.0 mg/dL       Alkaline Phosphatase   Date Value Ref Range Status   07/09/2023 127 55 - 135 U/L Final     AST   Date Value Ref Range Status   07/09/2023 14 10 - 40 U/L Final     ALT   Date Value Ref Range Status   07/09/2023 13 10 - 44 U/L Final     Anion Gap   Date Value Ref Range Status   07/09/2023 13 8 - 16 mmol/L Final     eGFR if    Date Value Ref Range Status   09/17/2020 >60.0 >60 mL/min/1.73 m^2 Final     eGFR if non    Date Value Ref Range Status   09/17/2020 >60.0 >60 mL/min/1.73 m^2 Final     Comment:     Calculation used to obtain the estimated glomerular filtration  rate (eGFR) is the CKD-EPI equation.        Lab Results   Component Value Date    WBC 9.58 07/09/2023    HGB 13.0 07/09/2023    HCT 41 07/09/2023    MCV 80 (L) 07/09/2023     07/09/2023     Lab Results   Component Value Date    CHOL 171 09/17/2020    CHOL 153 07/11/2017     Lab Results   Component Value Date    HDL 76 (H) 09/17/2020    HDL 64 07/11/2017     Lab Results   Component Value Date    LDLCALC 86.6 09/17/2020    LDLCALC 81.0 07/11/2017     Lab Results   Component Value Date    TRIG 42 09/17/2020    TRIG 40 07/11/2017     Lab Results   Component Value Date    CHOLHDL 44.4 09/17/2020    CHOLHDL 41.8 07/11/2017     Lab Results   Component Value Date    TSH 1.769 09/17/2020       ASSESSMENT/PLAN     Renea Mac is a 53 y.o. female    Annual physical exam- All age and gender related screenings discussed   -     CBC Auto Differential; Future; Expected date: 07/25/2023  -     Comprehensive Metabolic Panel;  Future; Expected date: 07/25/2023  -     Hemoglobin A1C; Future; Expected date: 07/25/2023  -     Lipid Panel; Future; Expected date: 07/25/2023  -     TSH; Future; Expected date: 07/25/2023  -     Ferritin; Future; Expected date: 07/25/2023  -     Iron and TIBC; Future; Expected date: 07/25/2023  -     Vitamin B12 Deficiency Panel; Future; Expected date: 07/25/2023  -     Vitamin D; Future; Expected date: 07/25/2023    Essential hypertension- at goal. Will cont current meds and low na diet   -     Comprehensive Metabolic Panel; Future; Expected date: 07/25/2023    Class 3 severe obesity with body mass index (BMI) of 40.0 to 44.9 in adult, unspecified obesity type, unspecified whether serious comorbidity present- stable. Will monitor. Advised healthy diet and exercise     Migraine without status migrainosus, not intractable, unspecified migraine type- stable. Will cont current meds.     S/P laparoscopic sleeve gastrectomy- stable. Will check labs and cont multivitamin   -     Ferritin; Future; Expected date: 07/25/2023  -     Iron and TIBC; Future; Expected date: 07/25/2023  -     Vitamin B12 Deficiency Panel; Future; Expected date: 07/25/2023  -     Vitamin D; Future; Expected date: 07/25/2023    S/P Exploratory Laparotomy/Mass Resection/BSO- stable.     Solitary pulmonary nodule- repeat Ct of the lungs 10/2023  -     CT Chest Without Contrast; Future; Expected date: 07/25/2023    Abnormal CT lung screening  -     CT Chest Without Contrast; Future; Expected date: 07/25/2023    Screening mammogram, encounter for- will repeat MMG 9/2023  -     Mammo Digital Screening Bilat w/ Benjamin; Future; Expected date: 07/25/2023           Follow up with PCP     Magdalena Chaudhary-Kannan HERNANDEZ, APRN, FNP-c   Department of Internal Medicine - [facility] kimberly  9:06 AM

## 2023-07-26 ENCOUNTER — TELEPHONE (OUTPATIENT)
Dept: INTERNAL MEDICINE | Facility: CLINIC | Age: 53
End: 2023-07-26
Payer: COMMERCIAL

## 2023-07-26 LAB — VIT B12 SERPL-MCNC: 904 NG/L (ref 180–914)

## 2023-07-26 NOTE — TELEPHONE ENCOUNTER
----- Message from Magdalena Davila NP sent at 7/26/2023  8:13 AM CDT -----  Please advise pt to start high K+ foods   Also cont iron supplement in multivitamin   Otherwise labs normal

## 2023-07-27 ENCOUNTER — TELEPHONE (OUTPATIENT)
Dept: INTERNAL MEDICINE | Facility: CLINIC | Age: 53
End: 2023-07-27
Payer: COMMERCIAL

## 2023-07-27 NOTE — TELEPHONE ENCOUNTER
Pt called the office back, informed her of recent blood work and to incorporate high K+ foods in her diet and cont multivitamin w/ iron. Pt expressed understanding w/ no further questions

## 2023-08-15 DIAGNOSIS — I10 ESSENTIAL HYPERTENSION: ICD-10-CM

## 2023-08-15 RX ORDER — LOSARTAN POTASSIUM 25 MG/1
25 TABLET ORAL DAILY
Qty: 30 TABLET | Refills: 3 | Status: SHIPPED | OUTPATIENT
Start: 2023-08-15 | End: 2023-12-28 | Stop reason: SDUPTHER

## 2023-08-16 ENCOUNTER — OFFICE VISIT (OUTPATIENT)
Dept: SPORTS MEDICINE | Facility: CLINIC | Age: 53
End: 2023-08-16
Payer: COMMERCIAL

## 2023-08-16 VITALS
HEIGHT: 64 IN | HEART RATE: 77 BPM | SYSTOLIC BLOOD PRESSURE: 123 MMHG | WEIGHT: 233.69 LBS | DIASTOLIC BLOOD PRESSURE: 79 MMHG | BODY MASS INDEX: 39.9 KG/M2

## 2023-08-16 DIAGNOSIS — Z98.890 S/P RIGHT ROTATOR CUFF REPAIR: Primary | ICD-10-CM

## 2023-08-16 PROCEDURE — 1159F MED LIST DOCD IN RCRD: CPT | Mod: CPTII,S$GLB,, | Performed by: ORTHOPAEDIC SURGERY

## 2023-08-16 PROCEDURE — 3074F SYST BP LT 130 MM HG: CPT | Mod: CPTII,S$GLB,, | Performed by: ORTHOPAEDIC SURGERY

## 2023-08-16 PROCEDURE — 4010F ACE/ARB THERAPY RXD/TAKEN: CPT | Mod: CPTII,S$GLB,, | Performed by: ORTHOPAEDIC SURGERY

## 2023-08-16 PROCEDURE — 3008F PR BODY MASS INDEX (BMI) DOCUMENTED: ICD-10-PCS | Mod: CPTII,S$GLB,, | Performed by: ORTHOPAEDIC SURGERY

## 2023-08-16 PROCEDURE — 3044F HG A1C LEVEL LT 7.0%: CPT | Mod: CPTII,S$GLB,, | Performed by: ORTHOPAEDIC SURGERY

## 2023-08-16 PROCEDURE — 3008F BODY MASS INDEX DOCD: CPT | Mod: CPTII,S$GLB,, | Performed by: ORTHOPAEDIC SURGERY

## 2023-08-16 PROCEDURE — 99999 PR PBB SHADOW E&M-EST. PATIENT-LVL III: ICD-10-PCS | Mod: PBBFAC,,, | Performed by: ORTHOPAEDIC SURGERY

## 2023-08-16 PROCEDURE — 3044F PR MOST RECENT HEMOGLOBIN A1C LEVEL <7.0%: ICD-10-PCS | Mod: CPTII,S$GLB,, | Performed by: ORTHOPAEDIC SURGERY

## 2023-08-16 PROCEDURE — 3078F DIAST BP <80 MM HG: CPT | Mod: CPTII,S$GLB,, | Performed by: ORTHOPAEDIC SURGERY

## 2023-08-16 PROCEDURE — 99213 PR OFFICE/OUTPT VISIT, EST, LEVL III, 20-29 MIN: ICD-10-PCS | Mod: S$GLB,,, | Performed by: ORTHOPAEDIC SURGERY

## 2023-08-16 PROCEDURE — 99213 OFFICE O/P EST LOW 20 MIN: CPT | Mod: S$GLB,,, | Performed by: ORTHOPAEDIC SURGERY

## 2023-08-16 PROCEDURE — 3074F PR MOST RECENT SYSTOLIC BLOOD PRESSURE < 130 MM HG: ICD-10-PCS | Mod: CPTII,S$GLB,, | Performed by: ORTHOPAEDIC SURGERY

## 2023-08-16 PROCEDURE — 99999 PR PBB SHADOW E&M-EST. PATIENT-LVL III: CPT | Mod: PBBFAC,,, | Performed by: ORTHOPAEDIC SURGERY

## 2023-08-16 PROCEDURE — 4010F PR ACE/ARB THEARPY RXD/TAKEN: ICD-10-PCS | Mod: CPTII,S$GLB,, | Performed by: ORTHOPAEDIC SURGERY

## 2023-08-16 PROCEDURE — 3078F PR MOST RECENT DIASTOLIC BLOOD PRESSURE < 80 MM HG: ICD-10-PCS | Mod: CPTII,S$GLB,, | Performed by: ORTHOPAEDIC SURGERY

## 2023-08-16 PROCEDURE — 1159F PR MEDICATION LIST DOCUMENTED IN MEDICAL RECORD: ICD-10-PCS | Mod: CPTII,S$GLB,, | Performed by: ORTHOPAEDIC SURGERY

## 2023-08-16 NOTE — PROGRESS NOTES
"POST-OPERATIVE EXAMINATION    53 y.o. Female who returns for follow after surgery. She is 9.5 months s/p    Procedures Performed:  1. Right shoulder Arthroscopic rotator cuff repair CPT - 27129     2. Right shoulder Arthroscopic extensive debridement CPT - 70043     3. Right shoulder Arthroscopic subacromial decompression and bursectomy CPT - 58002     She is doing well without any issues.       PHYSICAL EXAMINATION:  /79   Pulse 77   Ht 5' 4" (1.626 m)   Wt 106 kg (233 lb 11 oz)   LMP 03/01/2011 (Exact Date)   BMI 40.11 kg/m²   General: Well-developed well-nourished 53 y.o. femalein no acute distress   Cardiovascular: Regular rhythm   Lungs: No labored breathing or wheezing appreciated   Neuro: Alert and oriented ×3   Psychiatric: well oriented to person, place and time, demonstrates normal mood and affect   Skin: No rashes, lesions or ulcers, normal temperature, turgor, and texture on involved extremity    ORTHOPEDIC EXAM:  Normal post-operative swelling  Normal post-operative scarring  Strength: WNL  ROM: FE-160 ; ER/Abd-80 ; IR/Abd-20 ; ER/Add-70  Tests: Negative Zayda's, Negative Speed's  Rotator cuff strength 5/5    ASSESSMENT:      ICD-10-CM ICD-9-CM   1. S/P right rotator cuff repair  Z98.890 V45.89         PLAN:       Return to clinic as needed  Full clearance  Continue a home stretching program to work on internal rotation.  Internal rotation stretching exercises were given to the patient.                    "

## 2023-09-25 ENCOUNTER — HOSPITAL ENCOUNTER (OUTPATIENT)
Dept: RADIOLOGY | Facility: HOSPITAL | Age: 53
Discharge: HOME | End: 2023-09-25
Attending: NURSE PRACTITIONER
Payer: COMMERCIAL

## 2023-09-25 DIAGNOSIS — Z12.31 SCREENING MAMMOGRAM, ENCOUNTER FOR: ICD-10-CM

## 2023-09-25 PROCEDURE — 77063 MAMMO DIGITAL SCREENING BILAT WITH TOMO: ICD-10-PCS | Mod: 26,,, | Performed by: RADIOLOGY

## 2023-09-25 PROCEDURE — 77067 SCR MAMMO BI INCL CAD: CPT | Mod: TC

## 2023-09-25 PROCEDURE — 77067 MAMMO DIGITAL SCREENING BILAT WITH TOMO: ICD-10-PCS | Mod: 26,,, | Performed by: RADIOLOGY

## 2023-09-25 PROCEDURE — 77067 SCR MAMMO BI INCL CAD: CPT | Mod: 26,,, | Performed by: RADIOLOGY

## 2023-09-25 PROCEDURE — 77063 BREAST TOMOSYNTHESIS BI: CPT | Mod: 26,,, | Performed by: RADIOLOGY

## 2023-10-17 ENCOUNTER — HOSPITAL ENCOUNTER (OUTPATIENT)
Dept: RADIOLOGY | Facility: HOSPITAL | Age: 53
Discharge: HOME | End: 2023-10-17
Attending: NURSE PRACTITIONER
Payer: COMMERCIAL

## 2023-10-17 DIAGNOSIS — R91.8 ABNORMAL CT LUNG SCREENING: ICD-10-CM

## 2023-10-17 DIAGNOSIS — R91.1 SOLITARY PULMONARY NODULE: ICD-10-CM

## 2023-10-17 PROCEDURE — 71250 CT CHEST WITHOUT CONTRAST: ICD-10-PCS | Mod: 26,,, | Performed by: STUDENT IN AN ORGANIZED HEALTH CARE EDUCATION/TRAINING PROGRAM

## 2023-10-17 PROCEDURE — 71250 CT THORAX DX C-: CPT | Mod: 26,,, | Performed by: STUDENT IN AN ORGANIZED HEALTH CARE EDUCATION/TRAINING PROGRAM

## 2023-10-17 PROCEDURE — 71250 CT THORAX DX C-: CPT | Mod: TC

## 2023-10-20 DIAGNOSIS — R91.8 MULTIPLE LUNG NODULES: ICD-10-CM

## 2023-10-20 DIAGNOSIS — R91.8 ABNORMAL CT LUNG SCREENING: Primary | ICD-10-CM

## 2023-10-20 DIAGNOSIS — R93.89 ABNORMAL IMAGING OF THYROID: Primary | ICD-10-CM

## 2023-10-25 DIAGNOSIS — I10 ESSENTIAL HYPERTENSION: ICD-10-CM

## 2023-10-25 RX ORDER — HYDROCHLOROTHIAZIDE 25 MG/1
12.5 TABLET ORAL DAILY
Qty: 90 TABLET | Refills: 3 | Status: SHIPPED | OUTPATIENT
Start: 2023-10-25

## 2023-10-26 ENCOUNTER — HOSPITAL ENCOUNTER (OUTPATIENT)
Dept: RADIOLOGY | Facility: HOSPITAL | Age: 53
Discharge: HOME | End: 2023-10-26
Attending: NURSE PRACTITIONER
Payer: COMMERCIAL

## 2023-10-26 DIAGNOSIS — R93.89 ABNORMAL IMAGING OF THYROID: ICD-10-CM

## 2023-10-26 PROCEDURE — 76536 US EXAM OF HEAD AND NECK: CPT | Mod: 26,,, | Performed by: RADIOLOGY

## 2023-10-26 PROCEDURE — 76536 US EXAM OF HEAD AND NECK: CPT | Mod: TC

## 2023-10-26 PROCEDURE — 76536 US SOFT TISSUE HEAD NECK THYROID: ICD-10-PCS | Mod: 26,,, | Performed by: RADIOLOGY

## 2023-12-28 DIAGNOSIS — I10 ESSENTIAL HYPERTENSION: ICD-10-CM

## 2023-12-29 RX ORDER — LOSARTAN POTASSIUM 25 MG/1
25 TABLET ORAL DAILY
Qty: 30 TABLET | Refills: 3 | Status: SHIPPED | OUTPATIENT
Start: 2023-12-29 | End: 2024-12-28

## 2024-01-23 ENCOUNTER — HOSPITAL ENCOUNTER (OUTPATIENT)
Dept: RADIOLOGY | Facility: HOSPITAL | Age: 54
Discharge: HOME | End: 2024-01-23
Attending: INTERNAL MEDICINE
Payer: COMMERCIAL

## 2024-01-23 ENCOUNTER — OFFICE VISIT (OUTPATIENT)
Dept: INTERNAL MEDICINE | Facility: CLINIC | Age: 54
End: 2024-01-23
Payer: COMMERCIAL

## 2024-01-23 VITALS
HEART RATE: 52 BPM | DIASTOLIC BLOOD PRESSURE: 68 MMHG | OXYGEN SATURATION: 100 % | WEIGHT: 233.94 LBS | HEIGHT: 64 IN | SYSTOLIC BLOOD PRESSURE: 106 MMHG | BODY MASS INDEX: 39.94 KG/M2

## 2024-01-23 DIAGNOSIS — E66.01 CLASS 3 SEVERE OBESITY WITH BODY MASS INDEX (BMI) OF 40.0 TO 44.9 IN ADULT, UNSPECIFIED OBESITY TYPE, UNSPECIFIED WHETHER SERIOUS COMORBIDITY PRESENT: ICD-10-CM

## 2024-01-23 DIAGNOSIS — K59.00 CONSTIPATION, UNSPECIFIED CONSTIPATION TYPE: ICD-10-CM

## 2024-01-23 DIAGNOSIS — Z98.890 S/P EXPLORATORY LAPAROTOMY: ICD-10-CM

## 2024-01-23 DIAGNOSIS — R14.0 BLOATING: Primary | ICD-10-CM

## 2024-01-23 DIAGNOSIS — Z98.84 S/P LAPAROSCOPIC SLEEVE GASTRECTOMY: ICD-10-CM

## 2024-01-23 DIAGNOSIS — K42.9 UMBILICAL HERNIA WITHOUT OBSTRUCTION AND WITHOUT GANGRENE: ICD-10-CM

## 2024-01-23 DIAGNOSIS — I10 ESSENTIAL HYPERTENSION: ICD-10-CM

## 2024-01-23 DIAGNOSIS — R14.0 BLOATING: ICD-10-CM

## 2024-01-23 DIAGNOSIS — K43.9 VENTRAL HERNIA WITHOUT OBSTRUCTION OR GANGRENE: ICD-10-CM

## 2024-01-23 DIAGNOSIS — E04.2 MULTIPLE THYROID NODULES: ICD-10-CM

## 2024-01-23 DIAGNOSIS — K44.9 HIATAL HERNIA: ICD-10-CM

## 2024-01-23 PROBLEM — R46.0 SELF-CARE DEFICIT FOR BATHING: Status: RESOLVED | Noted: 2020-09-29 | Resolved: 2024-01-23

## 2024-01-23 PROBLEM — M62.81 WEAKNESS OF TRUNK MUSCULATURE: Status: RESOLVED | Noted: 2021-04-30 | Resolved: 2024-01-23

## 2024-01-23 PROBLEM — M25.611 DECREASED ROM OF RIGHT SHOULDER: Status: RESOLVED | Noted: 2022-12-07 | Resolved: 2024-01-23

## 2024-01-23 PROBLEM — M79.651 PAIN OF RIGHT THIGH: Status: RESOLVED | Noted: 2022-02-18 | Resolved: 2024-01-23

## 2024-01-23 PROBLEM — Z74.1 SELF-CARE DEFICIT FOR DRESSING AND GROOMING: Status: RESOLVED | Noted: 2020-09-29 | Resolved: 2024-01-23

## 2024-01-23 PROBLEM — M53.86 DECREASED RANGE OF MOTION OF LUMBAR SPINE: Status: RESOLVED | Noted: 2021-04-30 | Resolved: 2024-01-23

## 2024-01-23 PROBLEM — M25.60 RANGE OF MOTION DEFICIT: Status: RESOLVED | Noted: 2020-09-29 | Resolved: 2024-01-23

## 2024-01-23 PROBLEM — R29.898 SHOULDER WEAKNESS: Status: RESOLVED | Noted: 2020-09-29 | Resolved: 2024-01-23

## 2024-01-23 PROBLEM — U07.1 COVID-19 VIRUS INFECTION: Status: RESOLVED | Noted: 2020-04-10 | Resolved: 2024-01-23

## 2024-01-23 PROCEDURE — 3074F SYST BP LT 130 MM HG: CPT | Mod: CPTII,S$GLB,, | Performed by: INTERNAL MEDICINE

## 2024-01-23 PROCEDURE — 99999 PR PBB SHADOW E&M-EST. PATIENT-LVL V: CPT | Mod: PBBFAC,,, | Performed by: INTERNAL MEDICINE

## 2024-01-23 PROCEDURE — 1159F MED LIST DOCD IN RCRD: CPT | Mod: CPTII,S$GLB,, | Performed by: INTERNAL MEDICINE

## 2024-01-23 PROCEDURE — 3078F DIAST BP <80 MM HG: CPT | Mod: CPTII,S$GLB,, | Performed by: INTERNAL MEDICINE

## 2024-01-23 PROCEDURE — 99214 OFFICE O/P EST MOD 30 MIN: CPT | Mod: S$GLB,,, | Performed by: INTERNAL MEDICINE

## 2024-01-23 PROCEDURE — 3008F BODY MASS INDEX DOCD: CPT | Mod: CPTII,S$GLB,, | Performed by: INTERNAL MEDICINE

## 2024-01-23 NOTE — PROGRESS NOTES
Patient ID: Renea Mac is a 53 y.o. female.    Chief Complaint: GI Problem      Assessment:       1. Bloating    2. S/P laparoscopic sleeve gastrectomy    3. Class 3 severe obesity with body mass index (BMI) of 40.0 to 44.9 in adult, unspecified obesity type, unspecified whether serious comorbidity present    4. Essential hypertension    5. S/P Exploratory Laparotomy/Mass Resection/BSO    6. Multiple thyroid nodules: see u/s 2023, no need for further follow up    7. Umbilical hernia without obstruction and without gangrene    8. Ventral hernia without obstruction or gangrene    9. Hiatal hernia: see CT 2023    10. Constipation, unspecified constipation type          Plan:           1. Bloating  -     US Abdomen Complete; Future; Expected date: 01/23/2024    2. S/P laparoscopic sleeve gastrectomy    3. Class 3 severe obesity with body mass index (BMI) of 40.0 to 44.9 in adult, unspecified obesity type, unspecified whether serious comorbidity present    4. Essential hypertension    5. S/P Exploratory Laparotomy/Mass Resection/BSO  Overview:  1-  1. Soft tissue, abdominal wall, mass, resection:  - Benign leiomyoma, 6.5 cm in greatest dimension, see comment.  2. Ovary and fallopian tube, left, left salpingo-oophorectomy:  - Benign ovary with cystic corpus luteum, 2.7 cm in greatest dimension.  - Benign fallopian tube with no significant histologic abnormality.  3. Fallopian tube and ovary, right, right salpingo-oophorectomy:  - Benign ovary with no significant histologic abnormality.  - Benign fallopian tube with no significant histologic abnormality.      6. Multiple thyroid nodules: see u/s 2023, no need for further follow up    7. Umbilical hernia without obstruction and without gangrene    8. Ventral hernia without obstruction or gangrene    9. Hiatal hernia: see CT 2023    10. Constipation, unspecified constipation type  -     CBC Auto Differential; Future; Expected date: 01/23/2024  -      "Comprehensive Metabolic Panel; Future; Expected date: 01/23/2024  -     TSH; Future; Expected date: 01/23/2024  -     linaCLOtide (LINZESS) 145 mcg Cap capsule; Take 1 capsule (145 mcg total) by mouth before breakfast.  Dispense: 90 capsule; Refill: 12  -     Ambulatory referral/consult to Gastroenterology; Future; Expected date: 01/30/2024       Hydration, diet reviewed  Labs and review  Trial of Linzess  Gastro follow-up  Abdominal ultrasound, may need HIDA scan  Alarm symptoms and ED cautions reviewed, she is having none  I will review all studies and determine further tx depending on findings    Subjective:    appt    [facility] patient    Constipation x decades- even as a young woman had to have manual disempaction.    If she doesn't take anything she may have a BM once every couple of weeks, only hard lillian.  If she uses a lot of medication maybe has a BM once a week.    Has never sought care for this before.    Seen in ER a few weeks ago.    Every day she feels bloated.    Gassy all day.    Has tried Miralax, warm lemon water, castor oil, magnesium, senna- all to help her have a BM and they all cause gas.    Sur[place]) also [place] 2011 ([place]ids- [place]) then followed by oophorectomy in 2019:  "Exploratory laparotomy revealing mass of anterior abdominal wall which was successfully surgically incised and sent for frozen section. Additionally an uncomplicated bilateral salping-oophorectomy was performed without complication.     Description of the findings of the procedure:   1. Anterior abdominal wall mass  2. Pelvic findings consistent with history of prior TLH. Uterus surgically absent.   3. Normal bilateral ovaries without obvious anatomical abnormality"    Pathology showed benign leiomyoma of the abdominal wall.    Colonoscopy 2019 acceptable.    ZARA discussed.   has ZARA.  She does not sleep well, she may take tylenol arthritis for shoulder " and back pain.  5 hours most nights.  Tired most of the time.    Patient Active Problem List:     S/P laparoscopic sleeve gastrectomy     Essential hypertension     Osteoarthritis of right knee     S/P Exploratory Laparotomy/Mass Resection/BSO     Migraine without status migrainosus, not intractable     Tendinopathy of rotator cuff, right     Class 3 severe obesity with body mass index (BMI) of 40.0 to 44.9 in adult, unspecified obesity type, unspecified whether serious comorbidity present     Pulmonary nodule: see CT 2023, repeat 1 year     Multiple thyroid nodules: see u/s 2023, no need for further follow up     Umbilical hernia: see CT 2023     Ventral hernia: see CT 2023     Hiatal hernia: see CT 2023             Review of Systems   Constitutional:  Positive for fatigue.   HENT:  Negative for hearing loss.    Eyes:  Negative for visual disturbance.   Respiratory:  Negative for shortness of breath.    Cardiovascular:  Negative for chest pain.   Gastrointestinal:  Positive for abdominal distention and constipation. Negative for abdominal pain and diarrhea.   Genitourinary:  Negative for dysuria, frequency and vaginal bleeding.   Musculoskeletal:  Negative for joint swelling.   Skin:  Negative for rash.   Neurological:  Negative for weakness, light-headedness and headaches.   Psychiatric/Behavioral:  Negative for sleep disturbance.          Objective:      Physical Exam  Vitals and nursing note reviewed.   Constitutional:       Appearance: She is well-developed.   HENT:      Head: Normocephalic and atraumatic.      Right Ear: External ear normal.      Left Ear: External ear normal.      Nose: Nose normal.      Mouth/Throat:      Pharynx: No oropharyngeal exudate.   Eyes:      General: No scleral icterus.     Extraocular Movements: Extraocular movements intact.      Conjunctiva/sclera: Conjunctivae normal.   Neck:      Thyroid: Thyromegaly present.      Vascular: No JVD.   Cardiovascular:      Rate and Rhythm:  Normal rate and regular rhythm.      Heart sounds: Normal heart sounds. No murmur heard.     No gallop.   Pulmonary:      Effort: Pulmonary effort is normal. No respiratory distress.      Breath sounds: Normal breath sounds. No wheezing.   Abdominal:      General: Bowel sounds are normal. There is no distension.      Palpations: Abdomen is soft. There is no mass.      Tenderness: There is no abdominal tenderness. There is no guarding or rebound.   Musculoskeletal:         General: No tenderness. Normal range of motion.      Cervical back: Normal range of motion and neck supple.   Lymphadenopathy:      Cervical: No cervical adenopathy.   Skin:     General: Skin is warm.      Findings: No erythema or rash.   Neurological:      General: No focal deficit present.      Mental Status: She is alert and oriented to person, place, and time.      Cranial Nerves: No cranial nerve deficit.      Coordination: Coordination normal.   Psychiatric:         Behavior: Behavior normal.         Thought Content: Thought content normal.         Judgment: Judgment normal.             Health Maintenance Due   Topic Date Due    Hepatitis C Screening  Never done    Pneumococcal Vaccines (Age 0-64) (1 - PCV) Never done    HIV Screening  Never done    TETANUS VACCINE  05/24/2020    COVID-19 Vaccine (4 - 2023-24 season) 09/01/2023

## 2024-01-24 ENCOUNTER — HOSPITAL ENCOUNTER (OUTPATIENT)
Dept: RADIOLOGY | Facility: HOSPITAL | Age: 54
Discharge: HOME | End: 2024-01-24
Attending: INTERNAL MEDICINE
Payer: COMMERCIAL

## 2024-01-24 PROCEDURE — 76700 US EXAM ABDOM COMPLETE: CPT | Mod: 26,,, | Performed by: RADIOLOGY

## 2024-01-24 PROCEDURE — 76700 US EXAM ABDOM COMPLETE: CPT | Mod: TC

## 2024-01-25 PROBLEM — N28.1 RENAL CYST, ACQUIRED, LEFT: Status: ACTIVE | Noted: 2024-01-25

## 2024-01-30 ENCOUNTER — OFFICE VISIT (OUTPATIENT)
Dept: GASTROENTEROLOGY | Facility: CLINIC | Age: 54
End: 2024-01-30
Payer: COMMERCIAL

## 2024-01-30 ENCOUNTER — TELEPHONE (OUTPATIENT)
Dept: ENDOSCOPY | Facility: HOSPITAL | Age: 54
End: 2024-01-30
Payer: COMMERCIAL

## 2024-01-30 VITALS
DIASTOLIC BLOOD PRESSURE: 89 MMHG | WEIGHT: 231.94 LBS | HEIGHT: 64 IN | SYSTOLIC BLOOD PRESSURE: 135 MMHG | BODY MASS INDEX: 39.6 KG/M2 | HEART RATE: 71 BPM

## 2024-01-30 DIAGNOSIS — K59.00 CONSTIPATION, UNSPECIFIED CONSTIPATION TYPE: ICD-10-CM

## 2024-01-30 PROCEDURE — 99204 OFFICE O/P NEW MOD 45 MIN: CPT | Mod: S$GLB,,, | Performed by: INTERNAL MEDICINE

## 2024-01-30 PROCEDURE — 99999 PR PBB SHADOW E&M-EST. PATIENT-LVL IV: CPT | Mod: PBBFAC,,, | Performed by: INTERNAL MEDICINE

## 2024-01-30 PROCEDURE — 3075F SYST BP GE 130 - 139MM HG: CPT | Mod: CPTII,S$GLB,, | Performed by: INTERNAL MEDICINE

## 2024-01-30 PROCEDURE — 3008F BODY MASS INDEX DOCD: CPT | Mod: CPTII,S$GLB,, | Performed by: INTERNAL MEDICINE

## 2024-01-30 PROCEDURE — 4010F ACE/ARB THERAPY RXD/TAKEN: CPT | Mod: CPTII,S$GLB,, | Performed by: INTERNAL MEDICINE

## 2024-01-30 PROCEDURE — 3079F DIAST BP 80-89 MM HG: CPT | Mod: CPTII,S$GLB,, | Performed by: INTERNAL MEDICINE

## 2024-01-30 PROCEDURE — 1159F MED LIST DOCD IN RCRD: CPT | Mod: CPTII,S$GLB,, | Performed by: INTERNAL MEDICINE

## 2024-01-30 NOTE — PROGRESS NOTES
Reason for visit: Constipation    HPI:  is a 53 year old lady with chronic constipation. Has been dealing with constipation for decades. Medical history of HTN, Obesity, OA and Gastric sleeve. Recollects that even in her younger days she had to do manual disimpaction. Without a laxative she may have a bowel movement once every couple of weeks. Stool is usually hard lillian.  If she uses a lot of medication maybe has a BM once a week. Every day she feels bloated. Has tried Miralax, warm lemon water, castor oil, magnesium, senna- all to help her have a BM and they all cause bloating. Surgeries [place] around [place]); [place]- [place]) followed by oophorectomy in 2019. Exploratory laparotomy revealing mass of anterior abdominal wall which was successfully surgically incised and sent for frozen section. Additionally an uncomplicated bilateral salping-oophorectomy was performed without complication. Pathology showed benign leiomyoma of the abdominal wall.     Colonoscopy 2019 acceptable. Denies any dysphagia, odynophagia, nausea, vomiting, heartburn or acid regurgitation. No blood/ mucus in stool or unintentional weight loss. No melena or maroon stools. No recent changes in diet or medications. No family history of IBD or Celiac disease. Mother with colon cancer. No regular NSAIDs, alcohol or tobacco use. No recent antibiotic use, travels or sick contacts. No prior history of C.diff. Linzess 145 mcg was recently prescribed. Does not eat fiber in the diet on a regular basis. Drinks sufficient water.    Past medical, surgical, social and family history reviewed in epic    Medication allergies reviewed in epic    Review of systems:    Constitutional:  No fever, no chills, no weight loss, appetite is normal  Eyes:  No visual changes or red eyes  ENT:  No odynophagia or hoarseness of voice  Cardiovascular:  No angina or palpitation  Respiratory:  No shortness breath or  wheezing  Genitourinary:  No dysuria or frequency  Musculoskeletal:  No myalgias; has low back arthralgias  Skin:  No pruritus or eczema  Neurologic:  Chronic headaches, no seizures  Psychiatric:  No anxiety depression  Gastrointestinal:  See HPI    Physical exam:  Vitals see epic, awake, alert, oriented x3 in no acute distress    Neck:  Supple, no carotid bruit, no cervical adenopathy  Abdomen:  Obese, soft, nontender, nondistended, no masses palpable, no hepatosplenomegaly detected, bowel sounds are normal, no ascites clinically detectable  Eyes:  Conjunctivae anicteric, not injected  ENT:  Oral mucosa moist  Cardiovascular:  S1, S2 normal, no murmurs, no gallops, no abdominal bruits heard  Respiratory:  Bilateral air entry equal, no rhonchi, no crackles, normal effort  Skin:  No palmar erythema or spider angiomata  Neurologic:  No asterixis or tremors  Psychiatric:  Affect appropriate, proper judgment, proper insight, oriented to place and time  Lower extremities:  No pedal edema    Recent labs, prior imaging (CT scan July 2023 revealed postoperative changes of sleeve gastrectomy with a small hiatal hernia and mild haziness of the distal esophagus at that level.  Correlation for reflux esophagitis recommended.) and colonoscopy reports reviewed.      Impression: Chronic idiopathic constipation; Mother with CRC    Recommendations:     Continue Linzess 145 mcg daily  Start Citrucel 1 caplet daily with breakfast  Schedule Colonoscopy

## 2024-01-30 NOTE — TELEPHONE ENCOUNTER
"    Endo Case Request  Received: Today  Fuentes Kemp MD Piglia, Ashley, RN  Procedure: Colonoscopy    Diagnosis: Constipation    Procedure Timin-4 weeks    *If within 4 weeks selected, please maria antonia as high priority*    *If greater than 12 weeks, please select "4-12 weeks" and delay sending until 2 months prior to requested date*    Provider: Myself    Location: [facility]    Additional Scheduling Information: No scheduling concerns    Prep Specifications:Standard prep    Have you attached a patient to this message: Yes  "

## 2024-02-06 ENCOUNTER — TELEPHONE (OUTPATIENT)
Dept: ENDOSCOPY | Facility: HOSPITAL | Age: 54
End: 2024-02-06
Payer: COMMERCIAL

## 2024-02-06 ENCOUNTER — PATIENT MESSAGE (OUTPATIENT)
Dept: ENDOSCOPY | Facility: HOSPITAL | Age: 54
End: 2024-02-06
Payer: COMMERCIAL

## 2024-02-06 ENCOUNTER — PATIENT MESSAGE (OUTPATIENT)
Dept: GASTROENTEROLOGY | Facility: CLINIC | Age: 54
End: 2024-02-06
Payer: COMMERCIAL

## 2024-02-06 DIAGNOSIS — K59.00 CONSTIPATION, UNSPECIFIED CONSTIPATION TYPE: Primary | ICD-10-CM

## 2024-03-22 ENCOUNTER — ANESTHESIA EVENT (OUTPATIENT)
Dept: ENDOSCOPY | Facility: HOSPITAL | Age: 54
End: 2024-03-22
Payer: COMMERCIAL

## 2024-03-22 NOTE — ANESTHESIA PREPROCEDURE EVALUATION
03/22/2024  Renea Mac is a 53 y.o., female.  [facility]  Anesthesia Pre-Operative Evaluation       Patient Name: Renea Mac  YOB: 1970  MRN: [id number]  [facility]: [id number]      Code Status: Prior   Date of Procedure: 4/4/2024  Anesthesia: Choice Procedure: Procedure(s) (LRB):  COLONOSCOPY (N/A)  Pre-Operative Diagnosis: Constipation, unspecified constipation type [K59.00]  Proceduralist: Surgeon(s) and Role:     * Fuentes Kemp MD - Primary Nurse: (Unknown)      SUBJECTIVE:   Renea Mac is a 53 y.o. female who  has a past medical history of Acute pain of right knee (11/4/2016), Chronic right shoulder pain (11/4/2016), COVID-19 virus infection (4/10/2020), Essential hypertension (11/4/2016), GERD (gastroesophageal reflux disease), Incomplete tear of right rotator cuff (11/30/2016), Osteoarthritis of right knee (11/30/2016), Rotator cuff tear, S/P Exploratory Laparotomy/Mass Resection/BSO (1/31/2019), S/P laparoscopic sleeve gastrectomy (11/4/2016), and Syncope and collapse (6/14/2017)..     she has a current medication list which includes the following long-term medication(s): hydrochlorothiazide, losartan, [DISCONTINUED] azelastine, [DISCONTINUED] ketoconazole, and [DISCONTINUED] nystatin.     ALLERGIES:   Review of patient's allergies indicates:  No Known Allergies  LDA:          Lines/Drains/Airways       None                  Anesthesia Evaluation      Airway   TM distance: Normal  Neck ROM: Normal ROM  Dental      Pulmonary    Cardiovascular   (+) hypertension    Rate: Normal    Neuro/Psych    (+) neuromuscular disease, headaches    GI/Hepatic/Renal    (+) hiatal hernia, GERD    Endo/Other    (+) arthritis  Abdominal                     MEDICATIONS:     Antibiotics (From admission, onward)      None          VTE Risk Mitigation (From admission,  onward)      None              No current facility-administered medications for this encounter.     Current Outpatient Medications   Medication Sig Dispense Refill    b complex vitamins capsule Take 1 capsule by mouth once daily.      hydroCHLOROthiazide (HYDRODIURIL) 25 MG tablet Take 1/2 tablet (12.5 mg total) by mouth once daily. 90 tablet 3    linaCLOtide (LINZESS) 145 mcg Cap capsule Take 1 capsule (145 mcg total) by mouth before breakfast. 90 capsule 12    losartan (COZAAR) 25 MG tablet Take 1 tablet (25 mg total) by mouth once daily. 30 tablet 3    multivitamin capsule Take 1 capsule by mouth once daily.      vitamin D (VITAMIN D3) 1000 units Tab Take 1,000 Units by mouth once daily.            History:   There are no hospital problems to display for this patient.    Surgical History:    has a past surgical history that includes Tubal ligation; Hernia repair; Sleeve Gastroplasty (2014); Hysterectomy ();  section; Colposcopy; Colonoscopy (N/A, 2019); LAPAROTOMY, EXPLORATORY (N/A, 2019); Bilateral salpingo-oophorectomy (BSO) (Bilateral, 2019); Excision of pelvic mass (N/A, 2019); Oophorectomy (Bilateral, 2019); Arthroscopic repair of rotator cuff of shoulder (Right, 11/3/2022); Arthroscopic decompression of shoulder (Right, 11/3/2022); and Arthroscopic debridement of shoulder (Right, 11/3/2022).   Social History:    reports being sexually active and has had partner(s) who are male.  reports that she has never smoked. She has never used smokeless tobacco. She reports current alcohol use. She reports that she does not use drugs.     OBJECTIVE:     Vital Signs (Most Recent):    Vital Signs Range (Last 24H):  BP: ()/()   Arterial Line BP: ()/()        There is no height or weight on file to calculate BMI.   Wt Readings from Last 4 Encounters:   24 105.2 kg (231 lb 14.8 oz)   24 106.1 kg (233 lb 14.5 oz)   23 106 kg (233 lb 11 oz)   23 107.5 kg (236 lb  "15.9 oz)       Significant Labs:  Lab Results   Component Value Date    WBC 7.95 01/23/2024    HGB 13.0 01/23/2024    HCT 42.0 01/23/2024     01/23/2024     01/23/2024    K 4.4 01/23/2024     01/23/2024    CREATININE 0.7 01/23/2024    BUN 18 01/23/2024    CO2 29 01/23/2024    GLU 80 01/23/2024    CALCIUM 10.0 01/23/2024    MG 1.9 06/15/2017    ALKPHOS 122 01/23/2024    ALT 12 01/23/2024    AST 15 01/23/2024    ALBUMIN 3.7 01/23/2024    HGBA1C 5.6 07/25/2023     Patient's last menstrual period was 03/01/2011 (exact date).  No results found for this or any previous visit (from the past 72 hour(s)).    EKG:   Results for orders placed or performed during the hospital encounter of 10/07/22   EKG 12-lead    Collection Time: 10/07/22 12:24 AM    Narrative    Test Reason : R10.9,    Vent. Rate : 075 BPM     Atrial Rate : 075 BPM     P-R Int : 168 ms          QRS Dur : 084 ms      QT Int : 388 ms       P-R-T Axes : 057 069 055 degrees     QTc Int : 433 ms    Normal sinus rhythm  Normal ECG  When compared with ECG of 09-JUN-2020 16:51,  No significant change was found  Confirmed by Fady Garcia MD ([id number]) on 10/7/2022 2:15:55 PM    Referred By: [email or web address]   SELF           Confirmed By:Fady Garcia MD       TTE:  No results found for this or any previous visit.  No results found for: "EF"   Results for orders placed or performed during the hospital encounter of 06/13/17   2D echo with color flow doppler   Result Value Ref Range    EF + QEF 60 55 - 65    Diastolic Dysfunction No      THOMAS:  No results found for this or any previous visit.  Stress Test:  No results found for this or any previous visit.     LHC:  No results found for this or any previous visit.     PFT:  No results found for: "FEV1", "FVC", "GMP6YFO", "TLC", "DLCO"     ASSESSMENT/PLAN:        Pre-op Assessment    I have reviewed the Patient Summary Reports.     I have reviewed the Nursing Notes. I have reviewed the NPO Status.   I have " reviewed the Medications.     Review of Systems  Anesthesia Hx:  No problems with previous Anesthesia             Denies Family Hx of Anesthesia complications.    Denies Personal Hx of Anesthesia complications.                    Social:  Non-Smoker, Alcohol Use       Hematology/Oncology:  Hematology Normal   Oncology Normal                                   EENT/Dental:  EENT/Dental Normal           Cardiovascular:     Hypertension                                        Pulmonary:  Pulmonary Normal                       Renal/:  Renal/ Normal                 Hepatic/GI:    Hiatal Hernia, GERD             Musculoskeletal:  Arthritis               Neurological:    Neuromuscular Disease,  Headaches                                 Endocrine:  Endocrine Normal    Thyroid nodules      Obesity / BMI > 30  Dermatological:  Skin Normal    Psych:  Psychiatric Normal                    Physical Exam  General: Well nourished, Cooperative, Alert and Oriented    Airway:  Mouth Opening: Normal  TM Distance: Normal  Tongue: Normal  Neck ROM: Normal ROM    Chest/Lungs:  Clear to auscultation    Heart:  Rate: Normal    Abdomen:  Normal        Anesthesia Plan  Type of Anesthesia, risks & benefits discussed:    Anesthesia Type: Gen Natural Airway  Intra-op Monitoring Plan: Standard ASA Monitors  Post Op Pain Control Plan: multimodal analgesia  Induction:  IV  Informed Consent: Informed consent signed with the Patient and all parties understand the risks and agree with anesthesia plan.  All questions answered. Patient consented to blood products? No  ASA Score: 2  Day of Surgery Review of History & Physical: H&P Update referred to the surgeon/provider.    Ready For Surgery From Anesthesia Perspective.     .

## 2024-03-26 ENCOUNTER — TELEPHONE (OUTPATIENT)
Dept: ENDOSCOPY | Facility: HOSPITAL | Age: 54
End: 2024-03-26
Payer: COMMERCIAL

## 2024-04-04 ENCOUNTER — HOSPITAL ENCOUNTER (OUTPATIENT)
Facility: HOSPITAL | Age: 54
Discharge: HOME | End: 2024-04-04
Attending: INTERNAL MEDICINE | Admitting: INTERNAL MEDICINE
Payer: COMMERCIAL

## 2024-04-04 ENCOUNTER — ANESTHESIA (OUTPATIENT)
Dept: ENDOSCOPY | Facility: HOSPITAL | Age: 54
End: 2024-04-04
Payer: COMMERCIAL

## 2024-04-04 VITALS
HEART RATE: 80 BPM | SYSTOLIC BLOOD PRESSURE: 143 MMHG | BODY MASS INDEX: 39.27 KG/M2 | WEIGHT: 230 LBS | HEIGHT: 64 IN | RESPIRATION RATE: 19 BRPM | OXYGEN SATURATION: 99 % | TEMPERATURE: 98 F | DIASTOLIC BLOOD PRESSURE: 82 MMHG

## 2024-04-04 DIAGNOSIS — K59.09 CHRONIC CONSTIPATION: ICD-10-CM

## 2024-04-04 DIAGNOSIS — K59.00 CONSTIPATION, UNSPECIFIED CONSTIPATION TYPE: Primary | ICD-10-CM

## 2024-04-04 PROCEDURE — 63600175 PHARM REV CODE 636 W HCPCS: Performed by: NURSE ANESTHETIST, CERTIFIED REGISTERED

## 2024-04-04 PROCEDURE — D9220A PRA ANESTHESIA: Mod: 33,,, | Performed by: NURSE ANESTHETIST, CERTIFIED REGISTERED

## 2024-04-04 PROCEDURE — 37000008 HC ANESTHESIA 1ST 15 MINUTES: Performed by: INTERNAL MEDICINE

## 2024-04-04 PROCEDURE — 25000003 PHARM REV CODE 250: Performed by: NURSE ANESTHETIST, CERTIFIED REGISTERED

## 2024-04-04 PROCEDURE — 45385 COLONOSCOPY W/LESION REMOVAL: CPT | Mod: 33,,, | Performed by: INTERNAL MEDICINE

## 2024-04-04 PROCEDURE — 94761 N-INVAS EAR/PLS OXIMETRY MLT: CPT

## 2024-04-04 PROCEDURE — 99900035 HC TECH TIME PER 15 MIN (STAT)

## 2024-04-04 PROCEDURE — 27201089 HC SNARE, DISP (ANY): Performed by: INTERNAL MEDICINE

## 2024-04-04 PROCEDURE — 88305 TISSUE EXAM BY PATHOLOGIST: CPT | Performed by: PATHOLOGY

## 2024-04-04 PROCEDURE — 45385 COLONOSCOPY W/LESION REMOVAL: CPT | Mod: PT | Performed by: INTERNAL MEDICINE

## 2024-04-04 PROCEDURE — 37000009 HC ANESTHESIA EA ADD 15 MINS: Performed by: INTERNAL MEDICINE

## 2024-04-04 PROCEDURE — 88305 TISSUE EXAM BY PATHOLOGIST: CPT | Mod: 26,,, | Performed by: PATHOLOGY

## 2024-04-04 RX ORDER — SODIUM CHLORIDE 9 MG/ML
INJECTION, SOLUTION INTRAVENOUS CONTINUOUS
Status: DISCONTINUED | OUTPATIENT
Start: 2024-04-04 | End: 2024-04-04 | Stop reason: HOSPADM

## 2024-04-04 RX ORDER — PROPOFOL 10 MG/ML
VIAL (ML) INTRAVENOUS CONTINUOUS PRN
Status: DISCONTINUED | OUTPATIENT
Start: 2024-04-04 | End: 2024-04-04

## 2024-04-04 RX ORDER — LIDOCAINE HYDROCHLORIDE 20 MG/ML
INJECTION INTRAVENOUS
Status: DISCONTINUED | OUTPATIENT
Start: 2024-04-04 | End: 2024-04-04

## 2024-04-04 RX ORDER — PROPOFOL 10 MG/ML
VIAL (ML) INTRAVENOUS
Status: DISCONTINUED | OUTPATIENT
Start: 2024-04-04 | End: 2024-04-04

## 2024-04-04 RX ADMIN — PROPOFOL 30 MG: 10 INJECTION, EMULSION INTRAVENOUS at 09:04

## 2024-04-04 RX ADMIN — LIDOCAINE HYDROCHLORIDE 100 MG: 20 INJECTION INTRAVENOUS at 09:04

## 2024-04-04 RX ADMIN — PROPOFOL 200 MCG/KG/MIN: 10 INJECTION, EMULSION INTRAVENOUS at 09:04

## 2024-04-04 RX ADMIN — SODIUM CHLORIDE: 0.9 INJECTION, SOLUTION INTRAVENOUS at 07:04

## 2024-04-04 RX ADMIN — PROPOFOL 100 MG: 10 INJECTION, EMULSION INTRAVENOUS at 09:04

## 2024-04-04 RX ADMIN — GLYCOPYRROLATE 0.1 MG: 0.2 INJECTION, SOLUTION INTRAMUSCULAR; INTRAVENOUS at 09:04

## 2024-04-04 NOTE — H&P
Short Stay Endoscopy History and Physical    PCP - Magdalena Davila NP    Procedure - Colonoscopy  Sedation: GA  ASA - per anesthesia  Mallampati - per anesthesia  History of Anesthesia problems - no  Family history Anesthesia problems -  no     HPI:  This is a 54 y.o. female here for evaluation of : Constipation and Mother with CRC     Reflux - no  Dysphagia - no  Abdominal pain - no  Diarrhea - no    ROS:  Constitutional: No fevers, chills, No weight loss  ENT: No allergies  CV: No chest pain  Pulm: No cough, No shortness of breath  Ophtho: No vision changes  GI: see HPI  Medical History:  has a past medical history of Acute pain of right knee (2016), Chronic right shoulder pain (2016), COVID-19 virus infection (4/10/2020), Essential hypertension (2016), GERD (gastroesophageal reflux disease), Incomplete tear of right rotator cuff (2016), Osteoarthritis of right knee (2016), Rotator cuff tear, S/P Exploratory Laparotomy/Mass Resection/BSO (2019), S/P laparoscopic sleeve gastrectomy (2016), and Syncope and collapse (2017).    Surgical History:  has a past surgical history that includes Tubal ligation; Hernia repair; Sleeve Gastroplasty (2014); Hysterectomy ();  section; Colposcopy; Colonoscopy (N/A, 2019); LAPAROTOMY, EXPLORATORY (N/A, 2019); Bilateral salpingo-oophorectomy (BSO) (Bilateral, 2019); Excision of pelvic mass (N/A, 2019); Oophorectomy (Bilateral, 2019); Arthroscopic repair of rotator cuff of shoulder (Right, 11/3/2022); Arthroscopic decompression of shoulder (Right, 11/3/2022); and Arthroscopic debridement of shoulder (Right, 11/3/2022).    Family History: family history includes Breast cancer in her maternal aunt; Cancer in her brother and mother; Colon cancer in her mother; Heart attack in her father and mother; Heart disease in her father and mother; Hypertension in her brother, father, mother, and sister; No  Known Problems in her daughter and son; Rectal cancer in her mother; Stroke in her father.. Otherwise no colon cancer, inflammatory bowel disease, or GI malignancies.    Social History:  reports that she has never smoked. She has never used smokeless tobacco. She reports current alcohol use. She reports that she does not use drugs.    Review of patient's allergies indicates:  No Known Allergies    Medications:   Medications Prior to Admission   Medication Sig Dispense Refill Last Dose    b complex vitamins capsule Take 1 capsule by mouth once daily.   Past Week    hydroCHLOROthiazide (HYDRODIURIL) 25 MG tablet Take 1/2 tablet (12.5 mg total) by mouth once daily. 90 tablet 3 4/3/2024    linaCLOtide (LINZESS) 145 mcg Cap capsule Take 1 capsule (145 mcg total) by mouth before breakfast. 90 capsule 12 4/3/2024    losartan (COZAAR) 25 MG tablet Take 1 tablet (25 mg total) by mouth once daily. 30 tablet 3 4/3/2024    vitamin D (VITAMIN D3) 1000 units Tab Take 1,000 Units by mouth once daily.   Past Month    multivitamin capsule Take 1 capsule by mouth once daily.   4/2/2024       Objective Findings:    Vital Signs: Per nursing notes.    Physical Exam:  General Appearance: Well appearing in no acute distress  Head:   Normocephalic, without obvious abnormality  Eyes:    No scleral icterus  Airway: Open  Neck: No restriction in mobility  Lungs: CTA bilaterally in anterior and posterior fields, no wheezes, no crackles.  Heart:  Regular rate and rhythm, S1, S2 normal, no murmurs heard  Abdomen: Soft, non tender, non distended      Labs:  Lab Results   Component Value Date    WBC 7.95 01/23/2024    HGB 13.0 01/23/2024    HCT 42.0 01/23/2024     01/23/2024    CHOL 174 07/25/2023    TRIG 48 07/25/2023    HDL 74 07/25/2023    ALT 12 01/23/2024    AST 15 01/23/2024     01/23/2024    K 4.4 01/23/2024     01/23/2024    CREATININE 0.7 01/23/2024    BUN 18 01/23/2024    CO2 29 01/23/2024    TSH 2.433 01/23/2024     HGBA1C 5.6 07/25/2023         I have explained the risks and benefits of endoscopy procedures to the patient including but not limited to bleeding, perforation, infection, and death.    Thank you so much for allowing me to participate in the care of Renea Kemp MD

## 2024-04-04 NOTE — PLAN OF CARE
Pt in preop bay 8, VSS and IV inserted. Pt denies any open wounds on body or the use of any weight loss injections. Pt needs an updated H&P, procedural consents, an admit order and anesthesia consents, otherwise ready to roll.

## 2024-04-04 NOTE — PROVATION PATIENT INSTRUCTIONS
Discharge Summary/Instructions after an Endoscopic Procedure  Patient Name: Renea Mac  Patient MRN: [id number]  Patient YOB: 1970  Thursday, April 4, 2024  Fuentes Kemp MD  Dear patient,  As a result of recent federal legislation (The Federal Cures Act), you may   receive lab or pathology results from your procedure in your [facility]   account before your physician is able to contact you. Your physician or   their representative will relay the results to you with their   recommendations at their soonest availability.  Thank you,  RESTRICTIONS:  During your procedure today, you received medications for sedation.  These   medications may affect your judgment, balance and coordination.  Therefore,   for 24 hours, you have the following restrictions:   - DO NOT drive a car, operate machinery, make legal/financial decisions,   sign important papers or drink alcohol.    ACTIVITY:  Today: no heavy lifting, straining or running due to procedural   sedation/anesthesia.  The following day: return to full activity including work.  DIET:  Eat and drink normally unless instructed otherwise.     TREATMENT FOR COMMON SIDE EFFECTS:  - Mild abdominal pain, nausea, belching, bloating or excessive gas:  rest,   eat lightly and use a heating pad.  - Sore Throat: treat with throat lozenges and/or gargle with warm salt   water.  - Because air was used during the procedure, expelling large amounts of air   from your rectum or belching is normal.  - If a bowel prep was taken, you may not have a bowel movement for 1-3 days.    This is normal.  SYMPTOMS TO WATCH FOR AND REPORT TO YOUR PHYSICIAN:  1. Abdominal pain or bloating, other than gas cramps.  2. Chest pain.  3. Back pain.  4. Signs of infection such as: chills or fever occurring within 24 hours   after the procedure.  5. Rectal bleeding, which would show as bright red, maroon, or black stools.   (A tablespoon of blood from the rectum is not serious, especially  if   hemorrhoids are present.)  6. Vomiting.  7. Weakness or dizziness.  GO DIRECTLY TO THE NEAREST EMERGENCY ROOM IF YOU HAVE ANY OF THE FOLLOWING:      Difficulty breathing              Chills and/or fever over 101 F   Persistent vomiting and/or vomiting blood   Severe abdominal pain   Severe chest pain   Black, tarry stools   Bleeding- more than one tablespoon   Any other symptom or condition that you feel may need urgent attention  Your doctor recommends these additional instructions:  If any biopsies were taken, your doctors clinic will contact you in 1 to 2   weeks with any results.  - Patient has a contact number available for emergencies.  The signs and   symptoms of potential delayed complications were discussed with the   patient.  Return to normal activities tomorrow.  Written discharge   instructions were provided to the patient.   - Discharge patient to home.   - Resume previous diet.   - Continue present medications.   - Await pathology results.   - Repeat colonoscopy in 5 years for surveillance.   For questions, problems or results please call your physician - Fuentes Kemp MD at Work:  [phone].  [facility], EMERGENCY ROOM PHONE NUMBER: [phone]  IF A COMPLICATION OR EMERGENCY SITUATION ARISES AND YOU ARE UNABLE TO REACH   YOUR PHYSICIAN - GO DIRECTLY TO THE EMERGENCY ROOM.  Fuentes Kemp MD  4/4/2024 9:44:01 AM  This report has been verified and signed electronically.  Dear patient,  As a result of recent federal legislation (The Federal Cures Act), you may   receive lab or pathology results from your procedure in your [facility]   account before your physician is able to contact you. Your physician or   their representative will relay the results to you with their   recommendations at their soonest availability.  Thank you,  PROVATION

## 2024-04-04 NOTE — TRANSFER OF CARE
"Anesthesia Transfer of Care Note    Patient: Renea Mac    Procedure(s) Performed: Procedure(s) (LRB):  COLONOSCOPY (N/A)    Patient location: PACU    Anesthesia Type: general    Transport from OR: Transported from OR on room air with adequate spontaneous ventilation    Post pain: adequate analgesia    Post assessment: no apparent anesthetic complications and tolerated procedure well    Post vital signs: stable    Level of consciousness: responds to stimulation and sedated    Nausea/Vomiting: no nausea/vomiting    Complications: none    Transfer of care protocol was followed      Last vitals: Visit Vitals  BP (!) 140/82 (BP Location: Left arm, Patient Position: Lying)   Pulse 62   Temp 36.2 °C (97.2 °F) (Skin)   Resp 20   Ht 5' 4" (1.626 m)   Wt 104.3 kg (230 lb)   LMP 03/01/2011 (Exact Date)   SpO2 100%   Breastfeeding No   BMI 39.48 kg/m²     "

## 2024-04-04 NOTE — ANESTHESIA POSTPROCEDURE EVALUATION
Anesthesia Post Evaluation    Patient: Renea Mac    Procedure(s) Performed: Procedure(s) (LRB):  COLONOSCOPY (N/A)    Final Anesthesia Type: general      Patient location during evaluation: PACU  Patient participation: Yes- Able to Participate  Level of consciousness: awake  Post-procedure vital signs: reviewed and stable  Pain management: adequate  Airway patency: patent    PONV status at discharge: No PONV  Anesthetic complications: no      Cardiovascular status: blood pressure returned to baseline  Respiratory status: unassisted  Hydration status: euvolemic  Follow-up not needed.              Vitals Value Taken Time   /82 04/04/24 1009   Temp 36.7 °C (98 °F) 04/04/24 0949   Pulse 80 04/04/24 1009   Resp 19 04/04/24 1009   SpO2 99 % 04/04/24 1009         Event Time   Out of Recovery 10:00:03         Pain/Noemí Score: Noemí Score: 10 (4/4/2024 10:00 AM)

## 2024-04-08 LAB
FINAL PATHOLOGIC DIAGNOSIS: NORMAL
GROSS: NORMAL
Lab: NORMAL

## 2024-04-16 ENCOUNTER — TELEPHONE (OUTPATIENT)
Dept: GASTROENTEROLOGY | Facility: CLINIC | Age: 54
End: 2024-04-16
Payer: COMMERCIAL

## 2024-04-16 NOTE — TELEPHONE ENCOUNTER
Called patient, communicated results, patient expressed understanding, I advised patient that if she had any concerns or questions about procedure results or follow up to message Dr. Kemp via portal.

## 2024-04-16 NOTE — TELEPHONE ENCOUNTER
----- Message from Fuentes Kemp MD sent at 4/16/2024 11:36 AM CDT -----  Please call and notify patient, the colon polyp was benign.

## 2024-04-25 DIAGNOSIS — I10 ESSENTIAL HYPERTENSION: ICD-10-CM

## 2024-04-25 RX ORDER — LOSARTAN POTASSIUM 25 MG/1
25 TABLET ORAL DAILY
Qty: 30 TABLET | Refills: 3 | Status: SHIPPED | OUTPATIENT
Start: 2024-04-25 | End: 2025-04-25

## 2024-07-01 NOTE — PROGRESS NOTES
"  POST-OPERATIVE EXAMINATION    54 y.o. Female who returns for follow up for pain in her right shoulder. She is 11 months s/p procedure below. She states that a similar pain has returned in her right shoulder.  She states the pain radiates around her shoulder and into the area of her chest.  She feels it also in the back of the shoulder.  She denies any acute events.  She states it feels different than her previous surgery.  She denies numbness or tingling.  She denies any new medical issues.          Procedures Performed:8/16/2023  1. Right shoulder Arthroscopic rotator cuff repair CPT - 66469     2. Right shoulder Arthroscopic extensive debridement CPT - 72509     3. Right shoulder Arthroscopic subacromial decompression and bursectomy CPT - 80841          PHYSICAL EXAMINATION:  /86   Pulse 70   Ht 5' 4" (1.626 m)   Wt 104.3 kg (229 lb 15 oz)   LMP 03/01/2011 (Exact Date)   BMI 39.47 kg/m²   General: Well-developed well-nourished 54 y.o. femalein no acute distress   Cardiovascular: Regular rhythm   Lungs: No labored breathing or wheezing appreciated   Neuro: Alert and oriented ×3   Psychiatric: well oriented to person, place and time, demonstrates normal mood and affect   Skin: No rashes, lesions or ulcers, normal temperature, turgor, and texture on involved extremity    ORTHOPEDIC EXAM:  SHOULDER EXAM - RIGHT    Inspection:   Normal skin color and appearance with well-healed postsurgical scars.  No muscle atrophy noted.  No scapular winging.      Palpation: No tenderness of the acromioclavicular joint, lateral edge of the acromion, biceps tendon, trapezius muscle or scapulothoracic bursa.      ROM:      PROM:     FE - 150°    Abd/ER -  90°  Abd/IR -  45°   Add/ER -  60°     AROM:    FE - 150°    Abd/ER -  90°  Abd/IR -  45°   Add/ER -  60°         Tests:     - Ramos, - Neer's, - Cross Arm Adduction, - [place], - [place], - Speed. - Belly Press,  - Jobes, - Lift Off    Stability: - sulcus, - " apprehension, - relocation, - load and shift, - DLS      Motor:  Rotator cuff strength is 5/5 supraspinatus, 5/5 infraspinatus, 5/5 subscapularis. Biceps, triceps and deltoid strength is 5/5.      Neuro     Distally there are no paresthesias, and sensation is intact to light touch in the median, ulnar, and radial distributions.  Reflexes are 2/2 biceps, triceps and brachioradialis.      CERVICAL SPINE    Inspection:   No deformity of the cervical spine.      Palpation:    No tenderness to palpation.  No muscle spasm is evident.  Alignment appears anatomic.      ROM:             Range of motion is within normal limits.  No reproduction of symptoms with hyperextension, side bending or cervical spine   rotation.  No long tract signs.  Reflex, motor and sensory exam within normal limits in the bilateral upper extremities     Tests:  Positive Spurling's on the right with radiation of pain into the trapezius   Negative Lhermitte's Sign.       IMAGES:  X-ray Shoulder 2 or More Views Right  Narrative: EXAMINATION:  XR SHOULDER COMPLETE 2 OR MORE VIEWS RIGHT    CLINICAL HISTORY:  Pain in right shoulder    TECHNIQUE:  Two or three views of the right shoulder were performed.    COMPARISON:  March 2022    FINDINGS:  There is mild degenerative change of the acromioclavicular joint.  There is no evidence of fracture.  No dislocation is noted.  There are no abnormal soft tissue calcifications.  Osseous irregularity along the superolateral humeral head likely relates to underlying rotator cuff pathology.  Impression: As above    Electronically signed by: Jeannie Baltazar MD  Date:    07/08/2024  Time:    16:43        ASSESSMENT:      ICD-10-CM ICD-9-CM   1. Right shoulder pain, unspecified chronicity  M25.511 719.41   2. Cervical radiculopathy at C5  M54.12 723.4           PLAN:       Medrol Dosepak  Physical therapy  Return to clinic in 6 weeks for repeat evaluation with Mateo.  If symptoms are persistent we will consider cervical  spine imaging including x-rays and MRI of the cervical spine.  At this point I do not feel her exam is consistent with shoulder pathology.  Her rotator cuff exam is essentially normal.

## 2024-07-08 ENCOUNTER — OFFICE VISIT (OUTPATIENT)
Dept: SPORTS MEDICINE | Facility: CLINIC | Age: 54
End: 2024-07-08
Payer: COMMERCIAL

## 2024-07-08 ENCOUNTER — HOSPITAL ENCOUNTER (OUTPATIENT)
Dept: RADIOLOGY | Facility: HOSPITAL | Age: 54
Discharge: HOME | End: 2024-07-08
Attending: ORTHOPAEDIC SURGERY
Payer: COMMERCIAL

## 2024-07-08 VITALS
HEART RATE: 70 BPM | DIASTOLIC BLOOD PRESSURE: 86 MMHG | BODY MASS INDEX: 39.26 KG/M2 | SYSTOLIC BLOOD PRESSURE: 132 MMHG | WEIGHT: 229.94 LBS | HEIGHT: 64 IN

## 2024-07-08 DIAGNOSIS — M25.511 RIGHT SHOULDER PAIN, UNSPECIFIED CHRONICITY: ICD-10-CM

## 2024-07-08 DIAGNOSIS — M54.12 CERVICAL RADICULOPATHY AT C5: ICD-10-CM

## 2024-07-08 DIAGNOSIS — M25.511 RIGHT SHOULDER PAIN, UNSPECIFIED CHRONICITY: Primary | ICD-10-CM

## 2024-07-08 PROCEDURE — 4010F ACE/ARB THERAPY RXD/TAKEN: CPT | Mod: CPTII,S$GLB,, | Performed by: ORTHOPAEDIC SURGERY

## 2024-07-08 PROCEDURE — 3008F BODY MASS INDEX DOCD: CPT | Mod: CPTII,S$GLB,, | Performed by: ORTHOPAEDIC SURGERY

## 2024-07-08 PROCEDURE — 99999 PR PBB SHADOW E&M-EST. PATIENT-LVL III: CPT | Mod: PBBFAC,,, | Performed by: ORTHOPAEDIC SURGERY

## 2024-07-08 PROCEDURE — 3075F SYST BP GE 130 - 139MM HG: CPT | Mod: CPTII,S$GLB,, | Performed by: ORTHOPAEDIC SURGERY

## 2024-07-08 PROCEDURE — 3079F DIAST BP 80-89 MM HG: CPT | Mod: CPTII,S$GLB,, | Performed by: ORTHOPAEDIC SURGERY

## 2024-07-08 PROCEDURE — 99214 OFFICE O/P EST MOD 30 MIN: CPT | Mod: S$GLB,,, | Performed by: ORTHOPAEDIC SURGERY

## 2024-07-08 PROCEDURE — 73030 X-RAY EXAM OF SHOULDER: CPT | Mod: TC,RT

## 2024-07-08 PROCEDURE — 73030 X-RAY EXAM OF SHOULDER: CPT | Mod: 26,RT,, | Performed by: INTERNAL MEDICINE

## 2024-07-08 PROCEDURE — 1159F MED LIST DOCD IN RCRD: CPT | Mod: CPTII,S$GLB,, | Performed by: ORTHOPAEDIC SURGERY

## 2024-07-08 RX ORDER — METHYLPREDNISOLONE 4 MG/1
TABLET ORAL
Qty: 21 EACH | Refills: 0 | Status: SHIPPED | OUTPATIENT
Start: 2024-07-08

## 2024-07-09 ENCOUNTER — PATIENT MESSAGE (OUTPATIENT)
Dept: SPORTS MEDICINE | Facility: CLINIC | Age: 54
End: 2024-07-09
Payer: COMMERCIAL

## 2024-07-09 DIAGNOSIS — M25.511 RIGHT SHOULDER PAIN, UNSPECIFIED CHRONICITY: Primary | ICD-10-CM

## 2024-07-10 ENCOUNTER — CLINICAL SUPPORT (OUTPATIENT)
Dept: REHABILITATION | Facility: HOSPITAL | Age: 54
End: 2024-07-10
Attending: ORTHOPAEDIC SURGERY
Payer: COMMERCIAL

## 2024-07-10 DIAGNOSIS — M54.2 NECK PAIN: Primary | ICD-10-CM

## 2024-07-10 DIAGNOSIS — M25.511 RIGHT SHOULDER PAIN, UNSPECIFIED CHRONICITY: ICD-10-CM

## 2024-07-10 DIAGNOSIS — R29.3 ABNORMAL POSTURE: ICD-10-CM

## 2024-07-10 PROCEDURE — 97161 PT EVAL LOW COMPLEX 20 MIN: CPT

## 2024-07-10 PROCEDURE — 97140 MANUAL THERAPY 1/> REGIONS: CPT

## 2024-07-10 NOTE — PLAN OF CARE
[facility]  Physical Therapy Initial Evaluation    Name: Renea Mac  [facility] Number: [id number]    Therapy Diagnosis:   Encounter Diagnoses   Name Primary?    Right shoulder pain, unspecified chronicity     Neck pain Yes    Abnormal posture      Physician: Eliseo Bailon MD    Physician Orders: PT Eval and Treat  Medical Diagnosis from Referral: M25.511 (ICD-10-CM) - Right shoulder pain, unspecified chronicity   Evaluation Date: 7/10/2024  Authorization Period Expiration: 12/31/2024  Plan of Care Expiration: 10/10/2024  Visit # / Visits authorized: 1/1    FOTO: 58  FOTO 1st Follow-up: NA  FOTO 2nd Follow-up: NA    Time In: 0700  Time Out: 0800  Total Billable Time: 60 minutes    Precautions: Standard    Subjective     Date of onset: June 2024  History of current condition - Renea reports: ~1 month onset of R UT, R-sided neck, and R midscap pain. No true GIANFRANCO; feels like pain gradually progressed over the past few weeks. Stated that she has been dealing with more stress lately (unexpected home yudith, work life, etc) and that may have been contributory. Hx of R RC repair on the same side, but saw Dr. Bailon and showing no cuff deficiencies. Placed on Medrol dose pack which she is starting today.  Tightness generally through R UT and R-sided neck; occasionally will feel it into ANT R chest. Denies any N/T; denies any weakness in the R arm. Denies any myelopathic changes.  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     Imaging: see chart    Prior Therapy: Yes for post-op R shoulder  Social History: Lives with family  Occupation: Works at bank  Prior Level of Function: No issues  Current Level of Function: Issues with neck ROM and lifting activities    Pain:  Current 5/10, worst 7/10, best 2/10   Location: right side neck  Description: Aching, Dull, Tight, and Deep  Aggravating Factors: Sitting, Morning, Extension, Flexing, Lifting, and carrying  Easing  Factors: rest    Pts Goals: Improve overall neck ROM and lifting/carrying activities    Medical History:   Past Medical History:   Diagnosis Date    Acute pain of right knee 2016    Chronic right shoulder pain 2016    COVID-19 virus infection 4/10/2020    Essential hypertension 2016    GERD (gastroesophageal reflux disease)     Incomplete tear of right rotator cuff 2016    Osteoarthritis of right knee 2016    Rotator cuff tear     S/P Exploratory Laparotomy/Mass Resection/BSO 2019 1. Soft tissue, abdominal wall, mass, resection: - Benign leiomyoma, 6.5 cm in greatest dimension, see comment. 2. Ovary and fallopian tube, left, left salpingo-oophorectomy: - Benign ovary with cystic corpus luteum, 2.7 cm in greatest dimension. - Benign fallopian tube with no significant histologic abnormality. 3. Fallopian tube and ovary, right, right salpingo-oophorectomy: - Benign o    S/P laparoscopic sleeve gastrectomy 2016    Syncope and collapse 2017       Surgical History:   Renea Mac  has a past surgical history that includes Tubal ligation; Hernia repair; Sleeve Gastroplasty (2014); Hysterectomy ();  section; Colposcopy; Colonoscopy (N/A, 2019); LAPAROTOMY, EXPLORATORY (N/A, 2019); Bilateral salpingo-oophorectomy (BSO) (Bilateral, 2019); Excision of pelvic mass (N/A, 2019); Oophorectomy (Bilateral, 2019); Arthroscopic repair of rotator cuff of shoulder (Right, 11/3/2022); Arthroscopic decompression of shoulder (Right, 11/3/2022); Arthroscopic debridement of shoulder (Right, 11/3/2022); and Colonoscopy (N/A, 2024).    Medications:   Renea has a current medication list which includes the following prescription(s): b complex vitamins, hydrochlorothiazide, linaclotide, losartan, methylprednisolone, multivitamin, vitamin d, [DISCONTINUED] azelastine, [DISCONTINUED] ketoconazole, and [DISCONTINUED] nystatin.    Allergies:  "  Review of patient's allergies indicates:  No Known Allergies     Objective     Posture: rounded shoulders, forward head    Palpation: TTP upper traps, levator scapulae    Functional Tests:   DNF: 10"  Plank: NT  30" STS: NT    Reflexes:  Bicep (C5): 2+  Brachioradialis (C6): 2+  Tricep (C7): 2+    CERVICAL AROM Pain/Dysfunction with Movement   Flexion 45/45 P!   Extension 80/90 P!   Right side bending 34/40 P!   Left side bending 15/40 P!   Right rotation 80/90 P!   Left rotation 70/90 P!     Shoulder/Elbow/Wrist ROM Screen = WNL, no deficits    Cervical Special Tests:  Vertebral Artery Test -   Alar Ligament (Lateral Flexion) -   Transverse Ligament  -   Compression -   Distraction + relief   Spurlings Max Compression R: -  L: -       Neuro Screen:  Myotomes and Dermatomes unremarkable      UE MMT Right Left Pain/Dysfunction with Movement   Upper trapezius 3+/5 4+/5 P!   Middle trapezius 3+/5 4/5    Lower trapezius 3+/5 4/5 P!       Joint Mobility:   Cervical: Hypomobile C4-C6  Thoracic: Hypomobile T2-T8  Glenohumeral: normal    Upper Limb Neurotension Tests:  (-) Median N. - Anterior Interosseous N. (C5-7)   (-) Median N. - Musculocutaneous & Axillary N. (C5-7)   (-) Ulnar N. (C8-T1)  (-) Radial N. (C5-T1)     Intake Outcome Measure for FOTO Neck Survey    Therapist reviewed FOTO scores for Renea Mac on 7/10/2024.   FOTO documents entered into [facility] - see Media section.    Intake Score: 58%     Treatment     Treatment Time In: 0700  Treatment Time Out: 0800  Total Treatment time separate from Evaluation: 30 minutes    Renea received the treatments listed below:      Therapeutic exercises to develop strength, endurance, ROM, flexibility, posture, and core stabilization for 5 minutes including:  Access Code: [email or web address]  URL: [email or web address]  Date: 07/10/2024  Prepared by: Parish Bedolla    Exercises  - Seated Thoracic Lumbar Extension with Pectoralis Stretch  - 1 x daily - 7 x weekly - 3 sets - 10 " reps  - Sidelying Thoracic and Shoulder Rotation  - 1 x daily - 7 x weekly - 3 sets - 10 reps  - Shoulder External Rotation and Scapular Retraction with Resistance  - 1 x daily - 7 x weekly - 3 sets - 10 reps  - Standing Row with Anchored Resistance  - 1 x daily - 7 x weekly - 3 sets - 10 reps  - Seated Assisted Cervical Rotation with Towel  - 1 x daily - 7 x weekly - 3 sets - 10 reps    Manual therapy techniques: Joint mobilizations, Manual traction, Myofacial release, and Soft tissue Mobilization were applied to the: neck and R UT/Lev Scap for 20 minutes, including:  STM R UT/Lev Scap  R-sided C-sp gapping Mobs  Upper t-sp P/A Mobs    Neuromuscular re-education activities to improve: Balance, Coordination, Kinesthetic, Sense, Proprioception, and Posture for 0 minutes. The following activities were included:     Therapeutic activities to improve functional performance for 0 minutes, including:    Home Exercises and Patient Education Provided     Education provided:   - Compensatory tightness; DNF and periscapular strengthening  - Prognosis, activity modification, goals for therapy, role of therapy for care, exercises/HEP    Written Home Exercises Provided:  Exercises were reviewed and Renea was able to demonstrate them prior to the end of the session.   Pt received a written copy of exercises to perform at home. Renea demonstrated good understanding of the education provided.     See EMR under patient instructions for exercises given.     Assessment     Renea is a 54 y.o. female referred to outpatient Physical Therapy with R-sided compensatory UT and Lev Scap tightness, hypomobility upper t-sp and lower c-sp, all contributing to issues with overall R shoulder ROM. No adverse neural tension currently, but will continue to re-assess. Normalize neck ROM and work on periscapular strengthening to avoid future compensatory action.    Pt will benefit from skilled outpatient Physical Therapy to address the deficits  stated above and in the chart below, provide pt/family education, and to maximize pt's level of independence. Pt prognosis is Good.     Plan of care discussed with patient: Yes  Pt's spiritual, cultural and educational needs considered and patient is agreeable to the plan of care and goals as stated below:     Anticipated Barriers for therapy: Sedentary Job    Medical Necessity is demonstrated by the following:    History  Co-morbidities and personal factors that may impact the plan of care [x] LOW: no personal factors / co-morbidities  [] MODERATE: 1-2 personal factors / co-morbidities  [] HIGH: 3+ personal factors / co-morbidities    Moderate / High Support Documentation:   Co-morbidities affecting plan of care: None    Personal Factors:   No deficits     Examination  Body Structures and Functions, activity limitations and participation restrictions that may impact the plan of care [] LOW: addressing 1-2 elements  [x] MODERATE: 3+ elements  [] HIGH: 4+ elements (please support below)    Moderate / High Support Documentation:    Acute pain of right knee 11/4/2016    Chronic right shoulder pain 11/4/2016    COVID-19 virus infection 4/10/2020    Essential hypertension 11/4/2016    GERD (gastroesophageal reflux disease)     Incomplete tear of right rotator cuff 11/30/2016    Osteoarthritis of right knee 11/30/2016    Rotator cuff tear     S/P Exploratory Laparotomy/Mass Resection/BSO 1/31/2019 1- 1. Soft tissue, abdominal wall, mass, resection: - Benign leiomyoma, 6.5 cm in greatest dimension, see comment. 2. Ovary and fallopian tube, left, left salpingo-oophorectomy: - Benign ovary with cystic corpus luteum, 2.7 cm in greatest dimension. - Benign fallopian tube with no significant histologic abnormality. 3. 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    Syncope and collapse 6/14/2017        Clinical Presentation [x] LOW:  stable  [] MODERATE: Evolving  [] HIGH: Unstable     Decision Making/ Complexity Score: low       GOALS   Short Term Goals: 4 weeks  Pt will report decreased neck pain  < / =  3/10  to increase tolerance for ADL performance and recreational routine.  Pt will improve neck ROM to WFL in order to be able to perform ADLs without difficulty.  Pt will improve neck strength by 1/3 MMT grade to increase tolerance for ADL and work activities  Pt will demonstrate tolerance to HEP to improve independence with ADL's    Long Term Goals: 8 weeks  Pt will report decreased neck pain  < / =  2/10  to increase tolerance for ADL performance and recreational routine  Pt will improve neck ROM to WNL in order to be able to perform ADLs without difficulty  Pt will improve neck strength by 1/3 MMT grade to increase tolerance for ADL and work activities  Pt will report at CJ level (20-40% impaired) on FOTO Neck to demonstrate increase in LE function with every day tasks.     Plan   Plan of care Certification: 7/10/2024 to 10/10/2024.    Outpatient Physical Therapy 2 times weekly for 12 weeks to include the following interventions: Gait Training, Manual Therapy, Moist Heat/ Ice, Neuromuscular Re-ed, Patient Education, Therapeutic Activites, Therapeutic Exercise, and Functional Dry Needling with/or without Electrical Stimulation as needed.     Carlos Bedolla, PT , DPT, OCS  Board Certified in Orthopedic Physical Therapy

## 2024-07-10 NOTE — PROGRESS NOTES
[facility]  Physical Therapy Initial Evaluation    Name: Renea Mac  [facility] Number: [id number]    Therapy Diagnosis:   Encounter Diagnoses   Name Primary?    Right shoulder pain, unspecified chronicity     Neck pain Yes    Abnormal posture      Physician: Eliseo Bailon MD    Physician Orders: PT Eval and Treat  Medical Diagnosis from Referral: M25.511 (ICD-10-CM) - Right shoulder pain, unspecified chronicity   Evaluation Date: 7/10/2024  Authorization Period Expiration: 12/31/2024  Plan of Care Expiration: 10/10/2024  Visit # / Visits authorized: 1/1    FOTO: 58  FOTO 1st Follow-up: NA  FOTO 2nd Follow-up: NA    Time In: 0700  Time Out: 0800  Total Billable Time: 60 minutes    Precautions: Standard    Subjective     Date of onset: June 2024  History of current condition - Renea reports: ~1 month onset of R UT, R-sided neck, and R midscap pain. No true GIANFRANCO; feels like pain gradually progressed over the past few weeks. Stated that she has been dealing with more stress lately (unexpected home yudith, work life, etc) and that may have been contributory. Hx of R RC repair on the same side, but saw Dr. Bailon and showing no cuff deficiencies. Placed on Medrol dose pack which she is starting today.  Tightness generally through R UT and R-sided neck; occasionally will feel it into ANT R chest. Denies any N/T; denies any weakness in the R arm. Denies any myelopathic changes.  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     Imaging: see chart    Prior Therapy: Yes for post-op R shoulder  Social History: Lives with family  Occupation: Works at bank  Prior Level of Function: No issues  Current Level of Function: Issues with neck ROM and lifting activities    Pain:  Current 5/10, worst 7/10, best 2/10   Location: right side neck  Description: Aching, Dull, Tight, and Deep  Aggravating Factors: Sitting, Morning, Extension, Flexing, Lifting, and carrying  Easing  Factors: rest    Pts Goals: Improve overall neck ROM and lifting/carrying activities    Medical History:   Past Medical History:   Diagnosis Date    Acute pain of right knee 2016    Chronic right shoulder pain 2016    COVID-19 virus infection 4/10/2020    Essential hypertension 2016    GERD (gastroesophageal reflux disease)     Incomplete tear of right rotator cuff 2016    Osteoarthritis of right knee 2016    Rotator cuff tear     S/P Exploratory Laparotomy/Mass Resection/BSO 2019 1. Soft tissue, abdominal wall, mass, resection: - Benign leiomyoma, 6.5 cm in greatest dimension, see comment. 2. Ovary and fallopian tube, left, left salpingo-oophorectomy: - Benign ovary with cystic corpus luteum, 2.7 cm in greatest dimension. - Benign fallopian tube with no significant histologic abnormality. 3. Fallopian tube and ovary, right, right salpingo-oophorectomy: - Benign o    S/P laparoscopic sleeve gastrectomy 2016    Syncope and collapse 2017       Surgical History:   Renea Mac  has a past surgical history that includes Tubal ligation; Hernia repair; Sleeve Gastroplasty (2014); Hysterectomy ();  section; Colposcopy; Colonoscopy (N/A, 2019); LAPAROTOMY, EXPLORATORY (N/A, 2019); Bilateral salpingo-oophorectomy (BSO) (Bilateral, 2019); Excision of pelvic mass (N/A, 2019); Oophorectomy (Bilateral, 2019); Arthroscopic repair of rotator cuff of shoulder (Right, 11/3/2022); Arthroscopic decompression of shoulder (Right, 11/3/2022); Arthroscopic debridement of shoulder (Right, 11/3/2022); and Colonoscopy (N/A, 2024).    Medications:   Renea has a current medication list which includes the following prescription(s): b complex vitamins, hydrochlorothiazide, linaclotide, losartan, methylprednisolone, multivitamin, vitamin d, [DISCONTINUED] azelastine, [DISCONTINUED] ketoconazole, and [DISCONTINUED] nystatin.    Allergies:  "  Review of patient's allergies indicates:  No Known Allergies     Objective     Posture: rounded shoulders, forward head    Palpation: TTP upper traps, levator scapulae    Functional Tests:   DNF: 10"  Plank: NT  30" STS: NT    Reflexes:  Bicep (C5): 2+  Brachioradialis (C6): 2+  Tricep (C7): 2+    CERVICAL AROM Pain/Dysfunction with Movement   Flexion 45/45 P!   Extension 80/90 P!   Right side bending 34/40 P!   Left side bending 15/40 P!   Right rotation 80/90 P!   Left rotation 70/90 P!     Shoulder/Elbow/Wrist ROM Screen = WNL, no deficits    Cervical Special Tests:  Vertebral Artery Test -   Alar Ligament (Lateral Flexion) -   Transverse Ligament  -   Compression -   Distraction + relief   Spurlings Max Compression R: -  L: -       Neuro Screen:  Myotomes and Dermatomes unremarkable      UE MMT Right Left Pain/Dysfunction with Movement   Upper trapezius 3+/5 4+/5 P!   Middle trapezius 3+/5 4/5    Lower trapezius 3+/5 4/5 P!       Joint Mobility:   Cervical: Hypomobile C4-C6  Thoracic: Hypomobile T2-T8  Glenohumeral: normal    Upper Limb Neurotension Tests:  (-) Median N. - Anterior Interosseous N. (C5-7)   (-) Median N. - Musculocutaneous & Axillary N. (C5-7)   (-) Ulnar N. (C8-T1)  (-) Radial N. (C5-T1)     Intake Outcome Measure for FOTO Neck Survey    Therapist reviewed FOTO scores for Renea Mac on 7/10/2024.   FOTO documents entered into [employer] - see Media section.    Intake Score: 58%     Treatment     Treatment Time In: 0700  Treatment Time Out: 0800  Total Treatment time separate from Evaluation: 30 minutes    Renea received the treatments listed below:      Therapeutic exercises to develop strength, endurance, ROM, flexibility, posture, and core stabilization for 5 minutes including:  Access Code: [email or web address]  URL: [email or web address]  Date: 07/10/2024  Prepared by: Parish Bedolla    Exercises  - Seated Thoracic Lumbar Extension with Pectoralis Stretch  - 1 x daily - 7 x weekly - 3 sets - 10 " reps  - Sidelying Thoracic and Shoulder Rotation  - 1 x daily - 7 x weekly - 3 sets - 10 reps  - Shoulder External Rotation and Scapular Retraction with Resistance  - 1 x daily - 7 x weekly - 3 sets - 10 reps  - Standing Row with Anchored Resistance  - 1 x daily - 7 x weekly - 3 sets - 10 reps  - Seated Assisted Cervical Rotation with Towel  - 1 x daily - 7 x weekly - 3 sets - 10 reps    Manual therapy techniques: Joint mobilizations, Manual traction, Myofacial release, and Soft tissue Mobilization were applied to the: neck and R UT/Lev Scap for 20 minutes, including:  STM R UT/Lev Scap  R-sided C-sp gapping Mobs  Upper t-sp P/A Mobs    Neuromuscular re-education activities to improve: Balance, Coordination, Kinesthetic, Sense, Proprioception, and Posture for 0 minutes. The following activities were included:     Therapeutic activities to improve functional performance for 0 minutes, including:    Home Exercises and Patient Education Provided     Education provided:   - Compensatory tightness; DNF and periscapular strengthening  - Prognosis, activity modification, goals for therapy, role of therapy for care, exercises/HEP    Written Home Exercises Provided:  Exercises were reviewed and Renea was able to demonstrate them prior to the end of the session.   Pt received a written copy of exercises to perform at home. Renea demonstrated good understanding of the education provided.     See EMR under patient instructions for exercises given.     Assessment     Renea is a 54 y.o. female referred to outpatient Physical Therapy with R-sided compensatory UT and Lev Scap tightness, hypomobility upper t-sp and lower c-sp, all contributing to issues with overall R shoulder ROM. No adverse neural tension currently, but will continue to re-assess. Normalize neck ROM and work on periscapular strengthening to avoid future compensatory action.    Pt will benefit from skilled outpatient Physical Therapy to address the deficits  stated above and in the chart below, provide pt/family education, and to maximize pt's level of independence. Pt prognosis is Good.     Plan of care discussed with patient: Yes  Pt's spiritual, cultural and educational needs considered and patient is agreeable to the plan of care and goals as stated below:     Anticipated Barriers for therapy: Sedentary Job    Medical Necessity is demonstrated by the following:    History  Co-morbidities and personal factors that may impact the plan of care [x] LOW: no personal factors / co-morbidities  [] MODERATE: 1-2 personal factors / co-morbidities  [] HIGH: 3+ personal factors / co-morbidities    Moderate / High Support Documentation:   Co-morbidities affecting plan of care: None    Personal Factors:   No deficits     Examination  Body Structures and Functions, activity limitations and participation restrictions that may impact the plan of care [] LOW: addressing 1-2 elements  [x] MODERATE: 3+ elements  [] HIGH: 4+ elements (please support below)    Moderate / High Support Documentation:    Acute pain of right knee 11/4/2016    Chronic right shoulder pain 11/4/2016    COVID-19 virus infection 4/10/2020    Essential hypertension 11/4/2016    GERD (gastroesophageal reflux disease)     Incomplete tear of right rotator cuff 11/30/2016    Osteoarthritis of right knee 11/30/2016    Rotator cuff tear     S/P Exploratory Laparotomy/Mass Resection/BSO 1/31/2019 1- 1. Soft tissue, abdominal wall, mass, resection: - Benign leiomyoma, 6.5 cm in greatest dimension, see comment. 2. Ovary and fallopian tube, left, left salpingo-oophorectomy: - Benign ovary with cystic corpus luteum, 2.7 cm in greatest dimension. - Benign fallopian tube with no significant histologic abnormality. 3. 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    Syncope and collapse 6/14/2017        Clinical Presentation [x] LOW:  stable  [] MODERATE: Evolving  [] HIGH: Unstable     Decision Making/ Complexity Score: low       GOALS   Short Term Goals: 4 weeks  Pt will report decreased neck pain  < / =  3/10  to increase tolerance for ADL performance and recreational routine.  Pt will improve neck ROM to WFL in order to be able to perform ADLs without difficulty.  Pt will improve neck strength by 1/3 MMT grade to increase tolerance for ADL and work activities  Pt will demonstrate tolerance to HEP to improve independence with ADL's    Long Term Goals: 8 weeks  Pt will report decreased neck pain  < / =  2/10  to increase tolerance for ADL performance and recreational routine  Pt will improve neck ROM to WNL in order to be able to perform ADLs without difficulty  Pt will improve neck strength by 1/3 MMT grade to increase tolerance for ADL and work activities  Pt will report at CJ level (20-40% impaired) on FOTO Neck to demonstrate increase in LE function with every day tasks.     Plan   Plan of care Certification: 7/10/2024 to 10/10/2024.    Outpatient Physical Therapy 2 times weekly for 12 weeks to include the following interventions: Gait Training, Manual Therapy, Moist Heat/ Ice, Neuromuscular Re-ed, Patient Education, Therapeutic Activites, Therapeutic Exercise, and Functional Dry Needling with/or without Electrical Stimulation as needed.     Carlos Bedolla, PT , DPT, OCS  Board Certified in Orthopedic Physical Therapy

## 2024-07-17 ENCOUNTER — CLINICAL SUPPORT (OUTPATIENT)
Dept: REHABILITATION | Facility: HOSPITAL | Age: 54
End: 2024-07-17
Payer: COMMERCIAL

## 2024-07-17 DIAGNOSIS — M54.2 NECK PAIN: Primary | ICD-10-CM

## 2024-07-17 DIAGNOSIS — R29.3 ABNORMAL POSTURE: ICD-10-CM

## 2024-07-17 PROCEDURE — 97140 MANUAL THERAPY 1/> REGIONS: CPT

## 2024-07-17 PROCEDURE — 97112 NEUROMUSCULAR REEDUCATION: CPT

## 2024-07-17 PROCEDURE — 97530 THERAPEUTIC ACTIVITIES: CPT

## 2024-07-17 NOTE — PROGRESS NOTES
Physical Therapy Daily Treatment Note     Name: Renea Mac  Clinic Number: [id number]    Therapy Diagnosis:   Encounter Diagnoses   Name Primary?    Neck pain Yes    Abnormal posture      Physician: Eliseo Bailon MD    Visit Date: 7/17/2024    Physician Orders: PT Eval and Treat  Medical Diagnosis from Referral: M25.511 (ICD-10-CM) - Right shoulder pain, unspecified chronicity   Evaluation Date: 7/10/2024  Authorization Period Expiration: 12/31/2024  Plan of Care Expiration: 10/10/2024  Visit # / Visits authorized: 1/20 (+ EVAL)     FOTO: 58  FOTO 1st Follow-up: NA  FOTO 2nd Follow-up: NA     Time In: 1000  Time Out: 1100  Total Billable Time: 60 minutes     Precautions: Standard    Subjective     Pt reports mild improvement in neck pain since last session, but still present depending on activity.  She was compliant with home exercise program.  Response to previous treatment: Good  Functional change: improved neck ROM    Pain: 2/10  Location: right neck      Objective     Objective Measures updated at progress report unless specified    Treatment     Renea received the treatments listed below:    Access Code: [email or web address]     Therapeutic exercises to develop strength, endurance, ROM, flexibility, posture, and core stabilization for 0 minutes including:     Manual therapy techniques: Joint mobilizations, Manual traction, Myofacial release, and Soft tissue Mobilization were applied to the: neck and R UT/Lev Scap for 15 minutes, including:  STM R UT/Lev Scap  R-sided C-sp gapping Mobs  Upper t-sp P/A Mobs     Neuromuscular re-education activities to improve: Balance, Coordination, Kinesthetic, Sense, Proprioception, and Posture for 30 minutes. The following activities were included:   Open Books 3 x 15  Quad Child's Pose c SB 3 x 15 ea  Hinge Ext over chair 2 x 15  No-Money GTB 3 x 15  Rows GTB 3 x 15     Therapeutic activities to improve functional performance for 15 minutes, including:  UBE completed for 10' to  increase ROM, endurance, and decrease pain to improve tolerance to ADLs and age-related activities     Home Exercises and Patient Education Provided      Education provided:   - Compensatory tightness; DNF and periscapular strengthening  - Prognosis, activity modification, goals for therapy, role of therapy for care, exercises/HEP     Written Home Exercises Provided:  Exercises were reviewed and Renea was able to demonstrate them prior to the end of the session.   Pt received a written copy of exercises to perform at home. Renea demonstrated good understanding of the education provided.      See EMR under patient instructions for exercises given.     Assessment     Renea with first return since original session. Improved neck pain-free ROM since last session, but still having muscular tightness R UT. Worked in more dynamic t-sp mobility with emphasis on R scap medial border. Progressed periscapular endurance training and will look to offload compensatory muscle action.  Renea Is progressing well towards her goals.   Pt prognosis is Good.     Pt will continue to benefit from skilled outpatient physical therapy to address the deficits listed in the problem list box on initial evaluation, provide pt/family education and to maximize pt's level of independence in the home and community environment.     Pt's spiritual, cultural and educational needs considered and pt agreeable to plan of care and goals.    Anticipated barriers to physical therapy: Sedentary Job    GOALS   Short Term Goals: 4 weeks  Pt will report decreased neck pain  < / =  3/10  to increase tolerance for ADL performance and recreational routine.  Pt will improve neck ROM to WFL in order to be able to perform ADLs without difficulty.  Pt will improve neck strength by 1/3 MMT grade to increase tolerance for ADL and work activities  Pt will demonstrate tolerance to HEP to improve independence with ADL's     Long Term Goals: 8 weeks  Pt will report  decreased neck pain  < / =  2/10  to increase tolerance for ADL performance and recreational routine  Pt will improve neck ROM to WNL in order to be able to perform ADLs without difficulty  Pt will improve neck strength by 1/3 MMT grade to increase tolerance for ADL and work activities  Pt will report at CJ level (20-40% impaired) on FOTO Neck to demonstrate increase in LE function with every day tasks.     Plan     Continue per KISHAN Bedolla, PT, DPT  Board Certified in Orthopedic Physical Therapy

## 2024-07-18 ENCOUNTER — OFFICE VISIT (OUTPATIENT)
Dept: URGENT CARE | Facility: CLINIC | Age: 54
End: 2024-07-18
Payer: COMMERCIAL

## 2024-07-18 VITALS
BODY MASS INDEX: 39.6 KG/M2 | OXYGEN SATURATION: 98 % | RESPIRATION RATE: 16 BRPM | HEART RATE: 73 BPM | WEIGHT: 231.94 LBS | HEIGHT: 64 IN | TEMPERATURE: 98 F | SYSTOLIC BLOOD PRESSURE: 124 MMHG | DIASTOLIC BLOOD PRESSURE: 72 MMHG

## 2024-07-18 DIAGNOSIS — R35.0 URINARY FREQUENCY: ICD-10-CM

## 2024-07-18 DIAGNOSIS — R42 DIZZINESS: ICD-10-CM

## 2024-07-18 DIAGNOSIS — R11.0 NAUSEA: ICD-10-CM

## 2024-07-18 DIAGNOSIS — K52.9 GASTROENTERITIS: Primary | ICD-10-CM

## 2024-07-18 DIAGNOSIS — R68.83 CHILLS: ICD-10-CM

## 2024-07-18 LAB
BILIRUBIN, UA POC OHS: NEGATIVE
BLOOD, UA POC OHS: NEGATIVE
CLARITY, UA POC OHS: CLEAR
COLOR, UA POC OHS: YELLOW
CTP QC/QA: YES
CTP QC/QA: YES
GLUCOSE, UA POC OHS: NEGATIVE
KETONES, UA POC OHS: NEGATIVE
LEUKOCYTES, UA POC OHS: NEGATIVE
NITRITE, UA POC OHS: NEGATIVE
PH, UA POC OHS: 7
POC MOLECULAR INFLUENZA A AGN: NEGATIVE
POC MOLECULAR INFLUENZA B AGN: NEGATIVE
PROTEIN, UA POC OHS: NEGATIVE
SARS-COV-2 AG RESP QL IA.RAPID: NEGATIVE
SPECIFIC GRAVITY, UA POC OHS: 1.02
UROBILINOGEN, UA POC OHS: 0.2

## 2024-07-18 PROCEDURE — 81003 URINALYSIS AUTO W/O SCOPE: CPT | Mod: QW,S$GLB,, | Performed by: NURSE PRACTITIONER

## 2024-07-18 PROCEDURE — 87811 SARS-COV-2 COVID19 W/OPTIC: CPT | Mod: QW,S$GLB,, | Performed by: NURSE PRACTITIONER

## 2024-07-18 PROCEDURE — 87502 INFLUENZA DNA AMP PROBE: CPT | Mod: QW,S$GLB,, | Performed by: NURSE PRACTITIONER

## 2024-07-18 PROCEDURE — 99213 OFFICE O/P EST LOW 20 MIN: CPT | Mod: S$GLB,,, | Performed by: NURSE PRACTITIONER

## 2024-07-18 RX ORDER — ONDANSETRON 4 MG/1
4 TABLET, FILM COATED ORAL EVERY 12 HOURS PRN
Qty: 10 TABLET | Refills: 0 | Status: SHIPPED | OUTPATIENT
Start: 2024-07-18 | End: 2024-07-23

## 2024-07-18 NOTE — LETTER
July 18, 2024      [facility]  [address] [address], [address]  [place] [address]  Phone: [phone]  Fax: [phone]       Patient: Renea Mac   YOB: 1970  Date of Visit: 07/18/2024    To Whom It May Concern:    Windy Mac  was at [facility] on 07/18/2024. The patient may return to work on 07/20/2024 with no restrictions. If you have any questions or concerns, or if I can be of further assistance, please do not hesitate to contact me.    Sincerely,    Moe Kasper DNP FNP-C

## 2024-07-18 NOTE — PROGRESS NOTES
"Subjective:      Patient ID: Renea Mac is a 54 y.o. female.    Vitals:  height is 5' 4" (1.626 m) and weight is 105.2 kg (231 lb 14.8 oz). Her oral temperature is 98.3 °F (36.8 °C). Her blood pressure is 124/72 and her pulse is 73. Her respiration is 16 and oxygen saturation is 98%.     Chief Complaint: Diarrhea    55 y/o female presents with a complaint of diarrhea, dizziness, and nausea. She denies abdominal pain. History of YANG.  History of constipation, taking Linzess.  Denies fever, chills, chest pain, or palpitations.      Diarrhea   This is a new problem. The current episode started yesterday. The problem occurs less than 2 times per day. The problem has been unchanged. The stool consistency is described as Watery. The patient states that diarrhea does not awaken her from sleep. Pertinent negatives include no abdominal pain, bloating, chills, fever, headaches, increased  flatus, myalgias, sweats, URI or vomiting. Associated symptoms comments: nausea. Nothing aggravates the symptoms. There are no known risk factors. She has tried nothing for the symptoms. There is no history of bowel resection, inflammatory bowel disease, irritable bowel syndrome, malabsorption, a recent abdominal surgery or short gut syndrome. Bariatric surgery     Constitution: Negative for chills, fever and generalized weakness.   HENT:  Negative for congestion.    Cardiovascular:  Negative for chest pain and palpitations.   Gastrointestinal:  Positive for nausea and diarrhea. Negative for abdominal trauma, abdominal pain, abdominal bloating, history of abdominal surgery, vomiting, constipation, bright red blood in stool, dark colored stools, rectal bleeding, rectal pain, hemorrhoids, heartburn and bowel incontinence.   Genitourinary:  Positive for frequency. Negative for dysuria, urgency, flank pain, bladder incontinence, hematuria, history of kidney stones, vaginal pain, vaginal discharge, vaginal odor, genital sore and pelvic " pain.   Musculoskeletal:  Negative for muscle ache.   Skin:  Negative for rash.   Neurological:  Negative for headaches.      Objective:     Physical Exam   Constitutional: She is oriented to person, place, and time. She appears well-developed.   HENT:   Head: Normocephalic and atraumatic.   Ears:   Right Ear: External ear normal.   Left Ear: External ear normal.   Nose: Nose normal.   Mouth/Throat: Mucous membranes are normal.   Eyes: Conjunctivae and lids are normal.   Neck: Trachea normal. Neck supple.   Cardiovascular: Normal rate, regular rhythm and normal heart sounds.   Pulmonary/Chest: Effort normal and breath sounds normal. No respiratory distress.   Abdominal: Normal appearance and bowel sounds are normal. She exhibits no distension and no mass. Soft. There is no abdominal tenderness.   Musculoskeletal: Normal range of motion.         General: Normal range of motion.   Neurological: She is alert and oriented to person, place, and time. She has normal strength.   Skin: Skin is warm, dry, intact, not diaphoretic and not pale.   Psychiatric: Her speech is normal and behavior is normal. Judgment and thought content normal.   Nursing note and vitals reviewed.    Assessment:     1. Gastroenteritis    2. Nausea    3. Dizziness    4. Chills    5. Urinary frequency      Results for orders placed or performed in visit on 07/18/24   SARS Coronavirus 2 Antigen, POCT Manual Read   Result Value Ref Range    SARS Coronavirus 2 Antigen Negative Negative     Acceptable Yes    POCT Influenza A/B MOLECULAR   Result Value Ref Range    POC Molecular Influenza A Ag Negative Negative    POC Molecular Influenza B Ag Negative Negative     Acceptable Yes    POCT Urinalysis(Instrument)   Result Value Ref Range    Color, POC UA Yellow Yellow, Straw, Colorless    Clarity, POC UA Clear Clear    Glucose, POC UA Negative Negative    Bilirubin, POC UA Negative Negative    Ketones, POC UA Negative Negative     Spec Grav POC UA 1.025 1.005 - 1.030    Blood, POC UA Negative Negative    pH, POC UA 7.0 5.0 - 8.0    Protein, POC UA Negative Negative    Urobilinogen, POC UA 0.2 <=1.0    Nitrite, POC UA Negative Negative    WBC, POC UA Negative Negative      Plan:     Gastroenteritis    Nausea  -     Cancel: SARS Coronavirus 2 Antigen, POCT Manual Read  -     SARS Coronavirus 2 Antigen, POCT Manual Read  -     ondansetron (ZOFRAN) 4 MG tablet; Take 1 tablet (4 mg total) by mouth every 12 (twelve) hours as needed for Nausea.  Dispense: 10 tablet; Refill: 0    Dizziness  -     Cancel: SARS Coronavirus 2 Antigen, POCT Manual Read  -     SARS Coronavirus 2 Antigen, POCT Manual Read    Chills  -     POCT Influenza A/B MOLECULAR    Urinary frequency  -     POCT Urinalysis(Instrument)      Use gatorade/pedialyte/coconut water or rehydration packets to help stay hydrated. Vitamin water and plain water do not contain rehydrating electrolytes.  Increase clear liquids (water, gatorade, pedialyte, broths, jello, etc) Hold off on solids for 12-18 hours. Then advance to BRAT diet (banana, rice, applesauce, tea, toast/crackers), then advance further as tolerated.  Use Peptobismol to help alleviate your diarrhea symptoms. Avoid immodium.       Differential diagnosis included; but not limited to; aortic dissection, mesenteric ischemia, perforated bowel, SBO, ileus, appendicitis, cholecystitis, diverticulitis, nephrolithiasis, pancreatitis, gastroenteritis, colitis, biliary colic, GERD, PUD, constipation, UTI       You must understand that you've received an Urgent Care treatment only and that you may be released before all your medical problems are known or treated. You, the patient, will arrange for follow up care as instructed.  Follow up with your PCP or specialty clinic as directed in the next 1-2 weeks if not improved or as needed.  You can call [phone] to schedule an appointment with the appropriate provider.  If your condition  worsens we recommend that you receive another evaluation at the emergency room immediately or contact your primary medical clinics after hours call service to discuss your concerns.  Please return here or go to the Emergency Department for any concerns or worsening of condition.    If you were prescribed a narcotic or controlled medication, do not drive or operate heavy equipment or machinery while taking these medications.    Thank you for choosing [facility]!    Our goal in the Urgent Care is to always provide outstanding medical care. You may receive a survey by mail or e-mail in the next week regarding your experience today. We would greatly appreciate you completing and returning the survey. Your feedback provides us with a way to recognize our staff who provide very good care, and it helps us learn how to improve when your experience was below our aspiration of excellence.      We appreciate you trusting us with your medical care. We hope you feel better soon. We will be happy to take care of you for all of your future medical needs.   This note was prepared using voice-recognition software.  Although efforts are made to proofread the note, some errors may persist in the final document.     Sincerely,    Moe Kasper DNP, FNP-C

## 2024-07-18 NOTE — PATIENT INSTRUCTIONS
Use gatorade/pedialyte/coconut water or rehydration packets to help stay hydrated. Vitamin water and plain water do not contain rehydrating electrolytes.  Increase clear liquids (water, gatorade, pedialyte, broths, jello, etc) Hold off on solids for 12-18 hours. Then advance to BRAT diet (banana, rice, applesauce, tea, toast/crackers), then advance further as tolerated.  Use Peptobismol to help alleviate your diarrhea symptoms. Avoid immodium.      Differential diagnosis included; but not limited to; aortic dissection, mesenteric ischemia, perforated bowel, SBO, ileus, appendicitis, cholecystitis, diverticulitis, nephrolithiasis, pancreatitis, gastroenteritis, colitis, biliary colic, GERD, PUD, constipation, UTI    You must understand that you've received an Urgent Care treatment only and that you may be released before all your medical problems are known or treated. You, the patient, will arrange for follow up care as instructed.  Follow up with your PCP or specialty clinic as directed in the next 1-2 weeks if not improved or as needed.  You can call [phone] to schedule an appointment with the appropriate provider.  If your condition worsens we recommend that you receive another evaluation at the emergency room immediately or contact your primary medical clinics after hours call service to discuss your concerns.  Please return here or go to the Emergency Department for any concerns or worsening of condition.    If you were prescribed a narcotic or controlled medication, do not drive or operate heavy equipment or machinery while taking these medications.    Thank you for choosing [facility]!    Our goal in the Urgent Care is to always provide outstanding medical care. You may receive a survey by mail or e-mail in the next week regarding your experience today. We would greatly appreciate you completing and returning the survey. Your feedback provides us with a way to recognize our staff who provide  very good care, and it helps us learn how to improve when your experience was below our aspiration of excellence.      We appreciate you trusting us with your medical care. We hope you feel better soon. We will be happy to take care of you for all of your future medical needs.   This note was prepared using voice-recognition software.  Although efforts are made to proofread the note, some errors may persist in the final document.     Sincerely,    Moe Kasper DNP, FNP-C

## 2024-07-24 ENCOUNTER — CLINICAL SUPPORT (OUTPATIENT)
Dept: REHABILITATION | Facility: HOSPITAL | Age: 54
End: 2024-07-24
Payer: COMMERCIAL

## 2024-07-24 DIAGNOSIS — M54.2 NECK PAIN: Primary | ICD-10-CM

## 2024-07-24 DIAGNOSIS — R29.3 ABNORMAL POSTURE: ICD-10-CM

## 2024-07-24 PROCEDURE — 97530 THERAPEUTIC ACTIVITIES: CPT

## 2024-07-24 PROCEDURE — 97112 NEUROMUSCULAR REEDUCATION: CPT

## 2024-07-24 PROCEDURE — 97140 MANUAL THERAPY 1/> REGIONS: CPT

## 2024-07-24 NOTE — PROGRESS NOTES
Physical Therapy Daily Treatment Note     Name: Renea Mac  Clinic Number: [id number]    Therapy Diagnosis:   Encounter Diagnoses   Name Primary?    Neck pain Yes    Abnormal posture      Physician: Eliseo Bailon MD    Visit Date: 7/24/2024    Physician Orders: PT Eval and Treat  Medical Diagnosis from Referral: M25.511 (ICD-10-CM) - Right shoulder pain, unspecified chronicity   Evaluation Date: 7/10/2024  Authorization Period Expiration: 12/31/2024  Plan of Care Expiration: 10/10/2024  Visit # / Visits authorized: 2/20 (+ EVAL)     FOTO: 58  FOTO 1st Follow-up: NA  FOTO 2nd Follow-up: NA     Time In: 0900  Time Out: 1000  Total Billable Time: 60 minutes     Precautions: Standard    Subjective     Pt reports was sick most of last week, so has not been very active since that time.  She was compliant with home exercise program.  Response to previous treatment: Good  Functional change: improved neck ROM    Pain: 2/10  Location: right neck      Objective     Objective Measures updated at progress report unless specified    Treatment     Renea received the treatments listed below:    Access Code: [email or web address]     Therapeutic exercises to develop strength, endurance, ROM, flexibility, posture, and core stabilization for 0 minutes including:     Manual therapy techniques: Joint mobilizations, Manual traction, Myofacial release, and Soft tissue Mobilization were applied to the: neck and R UT/Lev Scap for 15 minutes, including:  STM R UT/Lev Scap  R-sided C-sp gapping Mobs  Upper t-sp P/A Mobs     Neuromuscular re-education activities to improve: Balance, Coordination, Kinesthetic, Sense, Proprioception, and Posture for 30 minutes. The following activities were included:   Open Books 3 x 15  Quad Child's Pose c SB 3 x 15 ea  Hinge Ext over chair 2 x 15  No-Money GTB 3 x 15  Rows GTB 3 x 15     Therapeutic activities to improve functional performance for 15 minutes, including:  UBE completed for 10' to increase ROM,  endurance, and decrease pain to improve tolerance to ADLs and age-related activities     Home Exercises and Patient Education Provided      Education provided:   - Compensatory tightness; DNF and periscapular strengthening  - Prognosis, activity modification, goals for therapy, role of therapy for care, exercises/HEP     Written Home Exercises Provided:  Exercises were reviewed and Renea was able to demonstrate them prior to the end of the session.   Pt received a written copy of exercises to perform at home. Renea demonstrated good understanding of the education provided.      See EMR under patient instructions for exercises given.     Assessment     Renea reported that she was ill over the past week, so has been relatively inactive. Overall, feeling better, but still has some tightness in the UT/Lev Scap region. Progressed periscapular endurance training to offload compensatory region.  Renea Is progressing well towards her goals.   Pt prognosis is Good.     Pt will continue to benefit from skilled outpatient physical therapy to address the deficits listed in the problem list box on initial evaluation, provide pt/family education and to maximize pt's level of independence in the home and community environment.     Pt's spiritual, cultural and educational needs considered and pt agreeable to plan of care and goals.    Anticipated barriers to physical therapy: Sedentary Job    GOALS   Short Term Goals: 4 weeks  Pt will report decreased neck pain  < / =  3/10  to increase tolerance for ADL performance and recreational routine.  Pt will improve neck ROM to WFL in order to be able to perform ADLs without difficulty.  Pt will improve neck strength by 1/3 MMT grade to increase tolerance for ADL and work activities  Pt will demonstrate tolerance to HEP to improve independence with ADL's     Long Term Goals: 8 weeks  Pt will report decreased neck pain  < / =  2/10  to increase tolerance for ADL performance and  recreational routine  Pt will improve neck ROM to WNL in order to be able to perform ADLs without difficulty  Pt will improve neck strength by 1/3 MMT grade to increase tolerance for ADL and work activities  Pt will report at CJ level (20-40% impaired) on FOTO Neck to demonstrate increase in LE function with every day tasks.     Plan     Continue per POC    Carlos Bdeolla, PT, DPT  Board Certified in Orthopedic Physical Therapy

## 2024-07-31 ENCOUNTER — CLINICAL SUPPORT (OUTPATIENT)
Dept: REHABILITATION | Facility: HOSPITAL | Age: 54
End: 2024-07-31
Payer: COMMERCIAL

## 2024-07-31 DIAGNOSIS — R29.3 ABNORMAL POSTURE: ICD-10-CM

## 2024-07-31 DIAGNOSIS — M54.2 NECK PAIN: Primary | ICD-10-CM

## 2024-07-31 PROCEDURE — 97112 NEUROMUSCULAR REEDUCATION: CPT

## 2024-07-31 PROCEDURE — 97140 MANUAL THERAPY 1/> REGIONS: CPT

## 2024-07-31 PROCEDURE — 97530 THERAPEUTIC ACTIVITIES: CPT

## 2024-07-31 NOTE — PROGRESS NOTES
Physical Therapy Daily Treatment Note     Name: Renea Mac  [facility] Number: [id number]    Therapy Diagnosis:   Encounter Diagnoses   Name Primary?    Neck pain Yes    Abnormal posture      Physician: Eliseo Bailon MD    Visit Date: 7/31/2024    Physician Orders: PT Eval and Treat  Medical Diagnosis from Referral: M25.511 (ICD-10-CM) - Right shoulder pain, unspecified chronicity   Evaluation Date: 7/10/2024  Authorization Period Expiration: 12/31/2024  Plan of Care Expiration: 10/10/2024  Visit # / Visits authorized: 3/20 (+ EVAL)     FOTO: 58  FOTO 1st Follow-up: NA  FOTO 2nd Follow-up: NA     Time In: 0800  Time Out: 0900  Total Billable Time: 60 minutes     Precautions: Standard    Subjective     Pt reports neck still feeling good overall. Only tightness in R UT  She was compliant with home exercise program.  Response to previous treatment: Good  Functional change: improved neck ROM    Pain: 2/10  Location: right neck      Objective     Objective Measures updated at progress report unless specified    Treatment     Renea received the treatments listed below:    Access Code: [email or web address]   PT Tech Extender utilized under supervision throughout tx session    Therapeutic exercises to develop strength, endurance, ROM, flexibility, posture, and core stabilization for 0 minutes including:     Manual therapy techniques: Joint mobilizations, Manual traction, Myofacial release, and Soft tissue Mobilization were applied to the: neck and R UT/Lev Scap for 15 minutes, including:  See EMR under MEDIA for written consent provided  Palpation Assessment to determine the necessity for Functional Dry Needling  Performed Dry Needling to the following areas: R UT. good  rhythmical contractions observed with estim to treated muscle groups    Education provided:   -Dry Needling response; potential soreness  Written Handout on response to FDN provided: Yes  STM R UT/Lev Scap  R-sided C-sp gapping Mobs  Upper t-sp P/A Mobs      Neuromuscular re-education activities to improve: Balance, Coordination, Kinesthetic, Sense, Proprioception, and Posture for 30 minutes. The following activities were included:   Open Books 3 x 15  Quad Child's Pose c SB 3 x 15 ea  Hinge Ext over chair 2 x 15  No-Money GTB 3 x 15  Rows GTB 3 x 15     Therapeutic activities to improve functional performance for 15 minutes, including:  UBE completed for 10' to increase ROM, endurance, and decrease pain to improve tolerance to ADLs and age-related activities     Home Exercises and Patient Education Provided      Education provided:   - Compensatory tightness; DNF and periscapular strengthening  - Prognosis, activity modification, goals for therapy, role of therapy for care, exercises/HEP     Written Home Exercises Provided:  Exercises were reviewed and Renea was able to demonstrate them prior to the end of the session.   Pt received a written copy of exercises to perform at home. Renea demonstrated good understanding of the education provided.      See EMR under patient instructions for exercises given.     Assessment     Renea overall with improved neck ROM; remains stiff throughout t-sp. Compensatory R UT/Lev Scap tightness. Worked in more DNF with biofeedback to improve posturing tolerance.  Renea Is progressing well towards her goals.   Pt prognosis is Good.     Pt will continue to benefit from skilled outpatient physical therapy to address the deficits listed in the problem list box on initial evaluation, provide pt/family education and to maximize pt's level of independence in the home and community environment.     Pt's spiritual, cultural and educational needs considered and pt agreeable to plan of care and goals.    Anticipated barriers to physical therapy: Sedentary Job    GOALS   Short Term Goals: 4 weeks  Pt will report decreased neck pain  < / =  3/10  to increase tolerance for ADL performance and recreational routine.  Pt will improve neck ROM to  WFL in order to be able to perform ADLs without difficulty.  Pt will improve neck strength by 1/3 MMT grade to increase tolerance for ADL and work activities  Pt will demonstrate tolerance to HEP to improve independence with ADL's     Long Term Goals: 8 weeks  Pt will report decreased neck pain  < / =  2/10  to increase tolerance for ADL performance and recreational routine  Pt will improve neck ROM to WNL in order to be able to perform ADLs without difficulty  Pt will improve neck strength by 1/3 MMT grade to increase tolerance for ADL and work activities  Pt will report at CJ level (20-40% impaired) on FOTO Neck to demonstrate increase in LE function with every day tasks.     Plan     Continue per POC    Carlos Bedolla, PT, DPT  Board Certified in Orthopedic Physical Therapy

## 2024-08-06 ENCOUNTER — PATIENT MESSAGE (OUTPATIENT)
Dept: REHABILITATION | Facility: HOSPITAL | Age: 54
End: 2024-08-06
Payer: COMMERCIAL

## 2024-08-10 ENCOUNTER — OFFICE VISIT (OUTPATIENT)
Dept: URGENT CARE | Facility: CLINIC | Age: 54
End: 2024-08-10
Payer: COMMERCIAL

## 2024-08-10 VITALS
BODY MASS INDEX: 39.44 KG/M2 | OXYGEN SATURATION: 97 % | WEIGHT: 231 LBS | HEIGHT: 64 IN | DIASTOLIC BLOOD PRESSURE: 80 MMHG | TEMPERATURE: 99 F | SYSTOLIC BLOOD PRESSURE: 125 MMHG | HEART RATE: 71 BPM | RESPIRATION RATE: 17 BRPM

## 2024-08-10 DIAGNOSIS — R07.0 PAIN IN THROAT: ICD-10-CM

## 2024-08-10 DIAGNOSIS — H66.92 LEFT OTITIS MEDIA, UNSPECIFIED OTITIS MEDIA TYPE: Primary | ICD-10-CM

## 2024-08-10 LAB
CTP QC/QA: YES
MOLECULAR STREP A: NEGATIVE

## 2024-08-10 PROCEDURE — 99213 OFFICE O/P EST LOW 20 MIN: CPT | Mod: ,,, | Performed by: PHYSICIAN ASSISTANT

## 2024-08-10 PROCEDURE — 87651 STREP A DNA AMP PROBE: CPT | Mod: QW,,, | Performed by: PHYSICIAN ASSISTANT

## 2024-08-10 RX ORDER — CETIRIZINE HYDROCHLORIDE 10 MG/1
10 TABLET ORAL DAILY
Qty: 14 TABLET | Refills: 0 | Status: SHIPPED | OUTPATIENT
Start: 2024-08-10 | End: 2024-08-24

## 2024-08-10 RX ORDER — FLUTICASONE PROPIONATE 50 MCG
2 SPRAY, SUSPENSION (ML) NASAL 2 TIMES DAILY
Qty: 16 G | Refills: 0 | Status: SHIPPED | OUTPATIENT
Start: 2024-08-10

## 2024-08-10 RX ORDER — AMOXICILLIN 875 MG/1
875 TABLET, FILM COATED ORAL EVERY 12 HOURS
Qty: 14 TABLET | Refills: 0 | Status: SHIPPED | OUTPATIENT
Start: 2024-08-10

## 2024-08-10 NOTE — PROGRESS NOTES
"Subjective:      Patient ID: Renea Mac is a 54 y.o. female.    Vitals:  height is 5' 4" (1.626 m) and weight is 104.8 kg (231 lb). Her oral temperature is 98.6 °F (37 °C). Her blood pressure is 125/80 and her pulse is 71. Her respiration is 17 and oxygen saturation is 97%.     Chief Complaint: Sore Throat    Patient presents with left ear pain and sore throat. She states that she only has ear pain and throat pain on left side . Patient denies cough and reports no congestion. No other symptoms reported . Onset 4 days ago .     Sore Throat   This is a new problem. Episode onset: 4 days ago. The problem has been unchanged. The pain is worse on the left side. There has been no fever. The pain is at a severity of 4/10. The pain is moderate. Pertinent negatives include no congestion, coughing, drooling, headaches or hoarse voice. Treatments tried: gargling. tylenol. The treatment provided mild relief.       HENT:  Positive for sore throat. Negative for drooling and congestion.    Respiratory:  Negative for cough.    Neurological:  Negative for headaches.      Objective:     Physical Exam   Constitutional: She is oriented to person, place, and time. She appears well-developed. She is cooperative.  Non-toxic appearance. She does not appear ill. No distress.   HENT:   Head: Normocephalic and atraumatic.   Ears:   Right Ear: Hearing, tympanic membrane, external ear and ear canal normal.   Left Ear: Hearing, external ear and ear canal normal. Tympanic membrane is injected and bulging.   Nose: Nose normal. No mucosal edema, rhinorrhea or nasal deformity. No epistaxis. Right sinus exhibits no maxillary sinus tenderness and no frontal sinus tenderness. Left sinus exhibits no maxillary sinus tenderness and no frontal sinus tenderness.   Mouth/Throat: Uvula is midline and mucous membranes are normal. No trismus in the jaw. Normal dentition. No uvula swelling. Posterior oropharyngeal erythema present. No oropharyngeal " exudate or posterior oropharyngeal edema.   Eyes: Conjunctivae and lids are normal. No scleral icterus.   Neck: Trachea normal and phonation normal. Neck supple. No edema present. No erythema present. No neck rigidity present.   Cardiovascular: Normal rate, regular rhythm, normal heart sounds and normal pulses.   Pulmonary/Chest: Effort normal and breath sounds normal. No respiratory distress. She has no decreased breath sounds. She has no rhonchi.   Abdominal: Normal appearance.   Musculoskeletal: Normal range of motion.         General: No deformity. Normal range of motion.   Neurological: She is alert and oriented to person, place, and time. She exhibits normal muscle tone. Coordination normal.   Skin: Skin is warm, dry, intact, not diaphoretic and not pale.   Psychiatric: Her speech is normal and behavior is normal. Judgment and thought content normal.   Nursing note and vitals reviewed.      Assessment:     1. Left otitis media, unspecified otitis media type    2. Pain in throat        Plan:       Left otitis media, unspecified otitis media type  -     cetirizine (ZYRTEC) 10 MG tablet; Take 1 tablet (10 mg total) by mouth once daily. for 14 days  Dispense: 14 tablet; Refill: 0  -     fluticasone propionate (FLONASE) 50 mcg/actuation nasal spray; 2 sprays (100 mcg total) by Each Nostril route 2 (two) times a day.  Dispense: 9.9 mL; Refill: 0  -     amoxicillin (AMOXIL) 875 MG tablet; Take 1 tablet (875 mg total) by mouth every 12 (twelve) hours.  Dispense: 14 tablet; Refill: 0    Pain in throat  -     POCT Strep A, Molecular      Results for orders placed or performed in visit on 08/10/24   POCT Strep A, Molecular   Result Value Ref Range    Molecular Strep A, POC Negative Negative     Acceptable Yes

## 2024-08-21 ENCOUNTER — OFFICE VISIT (OUTPATIENT)
Dept: BARIATRICS | Facility: CLINIC | Age: 54
End: 2024-08-21
Payer: COMMERCIAL

## 2024-08-21 VITALS
SYSTOLIC BLOOD PRESSURE: 120 MMHG | WEIGHT: 235.69 LBS | HEIGHT: 64 IN | BODY MASS INDEX: 40.24 KG/M2 | HEART RATE: 80 BPM | DIASTOLIC BLOOD PRESSURE: 84 MMHG | OXYGEN SATURATION: 99 %

## 2024-08-21 DIAGNOSIS — E66.01 CLASS 3 SEVERE OBESITY WITH BODY MASS INDEX (BMI) OF 40.0 TO 44.9 IN ADULT, UNSPECIFIED OBESITY TYPE, UNSPECIFIED WHETHER SERIOUS COMORBIDITY PRESENT: Primary | ICD-10-CM

## 2024-08-21 DIAGNOSIS — I10 ESSENTIAL HYPERTENSION: ICD-10-CM

## 2024-08-21 DIAGNOSIS — Z98.84 S/P LAPAROSCOPIC SLEEVE GASTRECTOMY: ICD-10-CM

## 2024-08-21 DIAGNOSIS — E04.2 MULTIPLE THYROID NODULES: ICD-10-CM

## 2024-08-21 PROCEDURE — 3008F BODY MASS INDEX DOCD: CPT | Mod: CPTII,S$GLB,, | Performed by: INTERNAL MEDICINE

## 2024-08-21 PROCEDURE — 3079F DIAST BP 80-89 MM HG: CPT | Mod: CPTII,S$GLB,, | Performed by: INTERNAL MEDICINE

## 2024-08-21 PROCEDURE — 99999 PR PBB SHADOW E&M-EST. PATIENT-LVL IV: CPT | Mod: PBBFAC,,, | Performed by: INTERNAL MEDICINE

## 2024-08-21 PROCEDURE — 99214 OFFICE O/P EST MOD 30 MIN: CPT | Mod: S$GLB,,, | Performed by: INTERNAL MEDICINE

## 2024-08-21 PROCEDURE — 1159F MED LIST DOCD IN RCRD: CPT | Mod: CPTII,S$GLB,, | Performed by: INTERNAL MEDICINE

## 2024-08-21 PROCEDURE — 1160F RVW MEDS BY RX/DR IN RCRD: CPT | Mod: CPTII,S$GLB,, | Performed by: INTERNAL MEDICINE

## 2024-08-21 PROCEDURE — 4010F ACE/ARB THERAPY RXD/TAKEN: CPT | Mod: CPTII,S$GLB,, | Performed by: INTERNAL MEDICINE

## 2024-08-21 PROCEDURE — 3074F SYST BP LT 130 MM HG: CPT | Mod: CPTII,S$GLB,, | Performed by: INTERNAL MEDICINE

## 2024-08-21 RX ORDER — SEMAGLUTIDE 0.5 MG/.5ML
0.5 INJECTION, SOLUTION SUBCUTANEOUS
Qty: 2 ML | Refills: 0 | Status: SHIPPED | OUTPATIENT
Start: 2024-09-21

## 2024-08-21 RX ORDER — SEMAGLUTIDE 1.7 MG/.75ML
1.7 INJECTION, SOLUTION SUBCUTANEOUS
Qty: 3 ML | Refills: 0 | Status: SHIPPED | OUTPATIENT
Start: 2024-11-19

## 2024-08-21 RX ORDER — SEMAGLUTIDE 1 MG/.5ML
1 INJECTION, SOLUTION SUBCUTANEOUS
Qty: 2 ML | Refills: 0 | Status: SHIPPED | OUTPATIENT
Start: 2024-10-21

## 2024-08-21 RX ORDER — SEMAGLUTIDE 0.25 MG/.5ML
0.25 INJECTION, SOLUTION SUBCUTANEOUS
Qty: 2 ML | Refills: 0 | Status: SHIPPED | OUTPATIENT
Start: 2024-08-21

## 2024-08-21 NOTE — PROGRESS NOTES
"Subjective:       Patient ID: Renea Mac is a 54 y.o. female.    Chief Complaint: Follow-up    Pt here today for follow-up. Last seen 2021. Has gained 11.8 lb   checked today.   Had been on some steroids and eating more. Ready to get back on track. Is doing some walking- 2 miles a day. Still working on eating. She feels she is doing better with her eating habits.     gastric sleeve (11/2014).     Prev med hx: topiramate made her very emotional and weepy. Has taken phentermine in the past. Started diethylpropion  . BP was OK.   She denies SE, as long as she takes it early enough.     BMR:1436  PBF: 52.7      Initial   BMR: 1424  PBF: 52.9%    Review of Systems   Constitutional: Negative for chills and fever.   Respiratory: Negative for apnea and shortness of breath.    Cardiovascular: Negative for chest pain and leg swelling.   Gastrointestinal: Positive for constipation. Negative for diarrhea.        + GERD   Genitourinary: Negative for difficulty urinating.        S/p hyst   Musculoskeletal: Positive for arthralgias and back pain.   Neurological: Positive for dizziness and syncope. Negative for light-headedness.   Psychiatric/Behavioral: Negative for dysphoric mood. The patient is not nervous/anxious.        Objective:     /84 (BP Location: Right arm, Patient Position: Sitting, BP Method: Large (Manual))   Pulse 80   Ht 5' 4" (1.626 m)   Wt 106.9 kg (235 lb 11.2 oz)   LMP 03/01/2011 (Exact Date)   SpO2 99%   BMI 40.46 kg/m²     Physical Exam   Constitutional: She is oriented to person, place, and time. She appears well-developed and well-nourished. No distress.   Obese     HENT:   Head: Normocephalic and atraumatic.   Eyes: EOM are normal. Pupils are equal, round, and reactive to light. No scleral icterus.   Neck: Normal range of motion. Neck supple.   Cardiovascular: Normal rate and normal heart sounds.    Pulmonary/Chest: Effort normal and breath sounds normal.   Musculoskeletal: Normal " range of motion. She exhibits no edema.   Neurological: She is alert and oriented to person, place, and time. No cranial nerve deficit.   Skin: Skin is warm and dry. No erythema.   Psychiatric: She has a normal mood and affect. Her behavior is normal. Judgment normal.   Vitals reviewed.      Assessment:       1. Class 3 severe obesity with body mass index (BMI) of 40.0 to 44.9 in adult, unspecified obesity type, unspecified whether serious comorbidity present    2. S/P laparoscopic sleeve gastrectomy    3. Multiple thyroid nodules: see u/s 2023, no need for further follow up    4. Essential hypertension          Plan:             Renea was seen today for follow-up.    Diagnoses and all orders for this visit:    Class 3 severe obesity with body mass index (BMI) of 40.0 to 44.9 in adult, unspecified obesity type, unspecified whether serious comorbidity present  -     semaglutide, weight loss, (WEGOVY) 0.25 mg/0.5 mL PnIj; Inject 0.25 mg into the skin every 7 days.  -     semaglutide, weight loss, (WEGOVY) 0.5 mg/0.5 mL PnIj; Inject 0.5 mg into the skin every 7 days.  -     semaglutide, weight loss, (WEGOVY) 1 mg/0.5 mL PnIj; Inject 1 mg into the skin every 7 days.  -     semaglutide, weight loss, (WEGOVY) 1.7 mg/0.75 mL PnIj; Inject 1.7 mg into the skin every 7 days.    S/P laparoscopic sleeve gastrectomy    Multiple thyroid nodules: see u/s 2023, no need for further follow up    Essential hypertension          Start Wegovy 0.25 mg once a week x 1 month. At the end of that month, the dose will continue to increase monthly.       Decrease portions as soon as you start wegovy. Avoid fried, rich or greasy foods.      Some nausea in the first 2 weeks is not unusual.     If you get pain across the upper abdomen and around to your back, please call the office.       [email or web address] to sign up for coupon card.     Continue bariatric diet for life.     - To lose weight you want to cut 100% starchy carbohydrates out of your  diet (bread, rice, pasta, potatoes, granola, flour, corn, peas, oatmeal, grits, tortillas, crackers, chips) and get  grams of protein.  Aim for 100 grams of protein daily.    - No soda, sweet tea, juices, sports drinks or lemonade     -Exercise daily    1000 angela sample menu and hurricane tips given.

## 2024-08-21 NOTE — PATIENT INSTRUCTIONS
Start Wegovy 0.25 mg once a week x 1 month. At the end of that month, the dose will continue to increase monthly.       Decrease portions as soon as you start wegovy. Avoid fried, rich or greasy foods.      Some nausea in the first 2 weeks is not unusual.     If you get pain across the upper abdomen and around to your back, please call the office.       [email or web address] to sign up for coupon card.         Continue bariatric diet for life. ArthaYantra (code [id number])      - To lose weight you want to cut 100% starchy carbohydrates out of your diet (bread, rice, pasta, potatoes, granola, flour, corn, peas, oatmeal, grits, tortillas, crackers, chips) and get  grams of protein.  Aim for 90 grams of protein and 1000 angela daily.    - No soda, sweet tea, juices, sports drinks or lemonade     -Exercise daily      5-Day Menu Plan: 800-1000 Calories    DAY 1     Breakfast  4 slices deli turkey  Small apple    Snack  ¼ cup almonds    Lunch  Lean hamburger gordo  1 cup green beans    Dinner  2 skinless chicken thighs  1 cup cooked carrots    Snack  SF jello    DAY 2    Breakfast  2 eggs with 2 tbsp salsa    Snack  2 slices deli turkey  ½ cup Peaches canned (in its own juice)    Lunch  [place]: Deli chicken and mustard   Pear cup (no sugar added)    Dinner  Baked fish  1 cup cooked yellow squash    Snack  SF popsicle            DAY 3    Breakfast   Protein drink: 1 scoop whey powder + 6oz soy milk    Snack  ¼ cup almonds    Lunch  Tuna Salad: 3oz canned tuna in water, 1 egg, and 1 tsp light herron)  Pineapple cup (no sugar added)    Dinner  Baked chicken breast  1 cup grilled eggplant    Snack  8oz Chocolate Soy Milk      DAY 4    Breakfast  2 eggs, turkey sausage gordo    Snack  4 slices deli turkey    Lunch    Snack  1/4 cup almonds  Peach cup (no sugar added)    Dinner  3oz baked pork chop  1 cup cooked green beans                    DAY 5    Breakfast  Protein drink: 1 scoop whey powder + 6oz soy milk    Snack   ¼ cup  almonds  1 apple    Lunch  1 cup Chicken salad: (3oz canned chicken in water, 1 egg, 1 tsp light herron)    Snack  4 slices deli turkey  Fruit cup (no sugar added)    Dinner  Omelet: 2 eggs, grilled shrimp, bell pepper and onion         Tips for preparing for hurricane season:    If you are on weight loss medications, please take your medication with you in case of evacuation. Injectable medications should be transported with an ice pack if unopened or room temperature if you have started to use them.   Hurricane supplies do not necessarily need to be junk food or high in carbs or sugar. Shelf stable and healthy choices to include in your supplies could include:  Canned/packets of tuna or chicken  Apples, oranges, banana, pears  Beef or turkey jerky  Sugar free pudding  Pickle, olives, dill relish (mix with the tuna or chicken!)  Low sodium or no salt added canned vegetables  If you get bread, get lite bread (40 calories a slice)  0 sugar sports drinks  Water  String cheese will be okay for a few days without refrigeration or in an ice chest.   Peanut or other nut butter.   Parmesan crisps  Pork skins  Protein shakes (20-30g protein, less than 5 g sugar)  Protein bars (15 or more g protein, less than 5 g sugar)  Don't forget disposable forks, spoons, plates in your supplies  If evacuated, manage stress by taking walks, reading or meditating.   If eating out make better choices when possible.   Salads with a lean protein and limited dressing, cheese or other toppings  Grilled chicken sandwich or burger without the bun.   Skip the fries!  Order water or unsweetened tea instead of soda  Grocery stores with a deli, salad/food bar can be a good quick and affordable option to replace fast food

## 2024-09-06 ENCOUNTER — TELEPHONE (OUTPATIENT)
Dept: BARIATRICS | Facility: CLINIC | Age: 54
End: 2024-09-06
Payer: COMMERCIAL

## 2024-09-06 NOTE — TELEPHONE ENCOUNTER
----- Message from Cira Johnson sent at 9/6/2024  8:18 AM CDT -----  Regarding: appt access  Contact: [phone]  Patient calling to schedule follow up visit. Pls call

## 2024-09-08 DIAGNOSIS — I10 ESSENTIAL HYPERTENSION: ICD-10-CM

## 2024-09-08 RX ORDER — LOSARTAN POTASSIUM 25 MG/1
25 TABLET ORAL DAILY
Qty: 30 TABLET | Refills: 3 | Status: CANCELLED | OUTPATIENT
Start: 2024-09-08 | End: 2025-09-08

## 2024-09-09 DIAGNOSIS — I10 ESSENTIAL HYPERTENSION: ICD-10-CM

## 2024-09-09 RX ORDER — LOSARTAN POTASSIUM 25 MG/1
25 TABLET ORAL DAILY
Qty: 30 TABLET | Refills: 3 | OUTPATIENT
Start: 2024-09-09 | End: 2025-09-09

## 2024-09-12 NOTE — ASSESSMENT & PLAN NOTE
- unclear etiology  - no concern for CVA but vasculitis vs. Demyelinating condition cannot be excluded, especially considering CT findings  - MRI in the morning  - ECHO     Satisfactory

## 2024-09-13 DIAGNOSIS — E66.01 CLASS 3 SEVERE OBESITY WITH BODY MASS INDEX (BMI) OF 40.0 TO 44.9 IN ADULT, UNSPECIFIED OBESITY TYPE, UNSPECIFIED WHETHER SERIOUS COMORBIDITY PRESENT: ICD-10-CM

## 2024-09-13 DIAGNOSIS — I10 ESSENTIAL HYPERTENSION: ICD-10-CM

## 2024-09-13 RX ORDER — SEMAGLUTIDE 0.25 MG/.5ML
0.25 INJECTION, SOLUTION SUBCUTANEOUS
Qty: 2 ML | Refills: 0 | OUTPATIENT
Start: 2024-09-13

## 2024-09-13 RX ORDER — LOSARTAN POTASSIUM 25 MG/1
25 TABLET ORAL DAILY
Qty: 30 TABLET | Refills: 3 | Status: SHIPPED | OUTPATIENT
Start: 2024-09-13 | End: 2025-09-13

## 2024-09-16 ENCOUNTER — OFFICE VISIT (OUTPATIENT)
Dept: URGENT CARE | Facility: CLINIC | Age: 54
End: 2024-09-16
Payer: COMMERCIAL

## 2024-09-16 VITALS
HEART RATE: 68 BPM | RESPIRATION RATE: 16 BRPM | SYSTOLIC BLOOD PRESSURE: 130 MMHG | HEIGHT: 64 IN | DIASTOLIC BLOOD PRESSURE: 84 MMHG | TEMPERATURE: 98 F | BODY MASS INDEX: 39.55 KG/M2 | OXYGEN SATURATION: 97 % | WEIGHT: 231.69 LBS

## 2024-09-16 DIAGNOSIS — J34.9 SINUS PROBLEM: ICD-10-CM

## 2024-09-16 DIAGNOSIS — H65.191 ACUTE MUCOID OTITIS MEDIA OF RIGHT EAR: Primary | ICD-10-CM

## 2024-09-16 DIAGNOSIS — I10 ESSENTIAL HYPERTENSION: ICD-10-CM

## 2024-09-16 LAB
CTP QC/QA: YES
SARS-COV-2 AG RESP QL IA.RAPID: NEGATIVE

## 2024-09-16 PROCEDURE — 99213 OFFICE O/P EST LOW 20 MIN: CPT | Mod: S$GLB,,, | Performed by: PHYSICIAN ASSISTANT

## 2024-09-16 PROCEDURE — 87811 SARS-COV-2 COVID19 W/OPTIC: CPT | Mod: QW,S$GLB,, | Performed by: PHYSICIAN ASSISTANT

## 2024-09-16 RX ORDER — PREDNISONE 20 MG/1
40 TABLET ORAL DAILY
Qty: 6 TABLET | Refills: 0 | Status: SHIPPED | OUTPATIENT
Start: 2024-09-16 | End: 2024-09-19

## 2024-09-16 RX ORDER — PREDNISONE 20 MG/1
40 TABLET ORAL
Status: COMPLETED | OUTPATIENT
Start: 2024-09-16 | End: 2024-09-16

## 2024-09-16 RX ORDER — AMOXICILLIN AND CLAVULANATE POTASSIUM 875; 125 MG/1; MG/1
1 TABLET, FILM COATED ORAL EVERY 12 HOURS
Qty: 14 TABLET | Refills: 0 | Status: SHIPPED | OUTPATIENT
Start: 2024-09-16 | End: 2024-09-23

## 2024-09-16 RX ADMIN — PREDNISONE 40 MG: 20 TABLET ORAL at 08:09

## 2024-09-16 NOTE — PROGRESS NOTES
"Subjective:      Patient ID: Renea Mac is a 54 y.o. female.    Vitals:  height is 5' 4" (1.626 m) and weight is 105.1 kg (231 lb 11.3 oz). Her oral temperature is 98.1 °F (36.7 °C). Her blood pressure is 130/84 and her pulse is 68. Her respiration is 16 and oxygen saturation is 97%.     Chief Complaint: Sore Throat    Patient presents with sore throat and congestion. Symptoms started yesterday.     Patient provider note starts here:  Patient presents to the clinic with complaints of a sore throat and right-sided ear pain since yesterday.  Reports that it is backing into the right ear it feels like.  She is scheduled to fly tomorrow to go see her brother who recently completed chemotherapy therefore, wants to ensure she does not have COVID but also wants to try to clear the ear before the flight.  She denies any fevers or chills. Took some Tylenol Cold and Flu with mild relief.     Sore Throat   This is a new problem. The current episode started yesterday. The problem has been unchanged. There has been no fever. The pain is at a severity of 3/10. Associated symptoms include congestion and ear pain. Pertinent negatives include no abdominal pain, diarrhea, ear discharge, neck pain or vomiting. She has tried nothing for the symptoms.       Constitution: Negative for fever.   HENT:  Positive for ear pain, congestion, postnasal drip and sore throat. Negative for ear discharge.    Neck: Negative for neck pain.   Cardiovascular:  Negative for chest pain, palpitations and sob on exertion.   Respiratory:  Negative for chest tightness and wheezing.    Gastrointestinal:  Negative for abdominal pain, vomiting and diarrhea.   Skin:  Negative for color change and wound.   Neurological:  Negative for numbness and tingling.      Objective:     Physical Exam   Constitutional: She is oriented to person, place, and time. She appears well-developed. She is cooperative.  Non-toxic appearance. She does not appear ill. No distress. "   HENT:   Head: Normocephalic and atraumatic.   Ears:   Right Ear: Hearing, external ear and ear canal normal.   Left Ear: Hearing, tympanic membrane, external ear and ear canal normal.      Comments: Right ear: There is a middle ear effusion noted with erythema to the inferior aspect of the TM. There is no mastoid TTP.   Nose: Nose normal. No mucosal edema, rhinorrhea or nasal deformity. No epistaxis. Right sinus exhibits no maxillary sinus tenderness and no frontal sinus tenderness. Left sinus exhibits no maxillary sinus tenderness and no frontal sinus tenderness.   Mouth/Throat: Uvula is midline and mucous membranes are normal. No trismus in the jaw. Normal dentition. No uvula swelling. Posterior oropharyngeal erythema present. No oropharyngeal exudate or posterior oropharyngeal edema.   Eyes: Conjunctivae and lids are normal. No scleral icterus.   Neck: Trachea normal and phonation normal. Neck supple. No edema present. No erythema present. No neck rigidity present.   Cardiovascular: Normal rate, regular rhythm, normal heart sounds and normal pulses.   Pulmonary/Chest: Effort normal and breath sounds normal. No respiratory distress. She has no decreased breath sounds. She has no wheezes. She has no rhonchi.   Abdominal: Normal appearance.   Musculoskeletal: Normal range of motion.         General: No deformity. Normal range of motion.   Lymphadenopathy:     She has cervical adenopathy.   Neurological: She is alert and oriented to person, place, and time. She exhibits normal muscle tone. Coordination normal.   Skin: Skin is warm, dry, intact, not diaphoretic and not pale.   Psychiatric: Her speech is normal and behavior is normal. Judgment and thought content normal.   Nursing note and vitals reviewed.      Assessment:     1. Acute mucoid otitis media of right ear    2. Sinus problem    3. Essential hypertension      Results for orders placed or performed in visit on 09/16/24   SARS Coronavirus 2 Antigen, POCT  Manual Read   Result Value Ref Range    SARS Coronavirus 2 Antigen Negative Negative     Acceptable Yes        Plan:       Acute mucoid otitis media of right ear  -     amoxicillin-clavulanate 875-125mg (AUGMENTIN) 875-125 mg per tablet; Take 1 tablet by mouth every 12 (twelve) hours. for 7 days  Dispense: 14 tablet; Refill: 0  -     predniSONE (DELTASONE) 20 MG tablet; Take 2 tablets (40 mg total) by mouth once daily. for 3 days  Dispense: 6 tablet; Refill: 0  -     predniSONE tablet 40 mg    Sinus problem  -     SARS Coronavirus 2 Antigen, POCT Manual Read    Essential hypertension          Medical Decision Making:   History:   Old Medical Records: I decided to obtain old medical records.  Clinical Tests:   Lab Tests: Ordered and Reviewed  Urgent Care Management:  A. Problem List:   -Acute: Acute mucoid otitis media of right ear    -Chronic: HTN  B. Differential diagnosis: viral vs bacterial URI, pharyngitis, otitis, COVID 19  C. Diagnostic Testing Ordered: COVID  D. Diagnostic Testing Considered: None  E. Independent Historians: None  F. Urgent Care Midlevel Independent Results Interpretation: COVID negative   G. Radiology:  H. Review of Previous Medical Records:  I. Home Medications Reviewed  J. Social Determinants of Health considered  K. Medical Decision Making and Disposition:   Patient presents with complaints of right-sided sore throat and ear pain since yesterday.  On exam, she is afebrile and nontoxic in appearance.  Middle ear effusion with some erythema is noted to the right tympanic membrane.  She is due to fly on a plane tomorrow.  Was recommended to start using Afrin nasal spray today, tomorrow and the day after flight.  Started on steroids prevent TM rupture at this time.  Advised close follow-up with primary care and strict ED precautions.  She verbalized understanding and agreed with this plan.         Patient Instructions   You have been prescribed a steroid today. Take the  prescription as directed. Steroids can increase blood sugar. You can also have the following when taking steroids: flushing, jitteriness, weight gain, fluid retention, bone weakening. If you develop any adverse symptoms, stop taking the medication immediately.    Thank you for choosing [facility]!     Our goal in the Urgent Care is to always provide outstanding medical care. You may receive a survey by mail or e-mail in the next week regarding your experience today. We would greatly appreciate you completing and returning the survey. Your feedback provides us with a way to recognize our staff who provide very good care, and it helps us learn how to improve when your experience was below our aspiration of excellence.       We appreciate you trusting us with your medical care. We hope you feel better soon. We will be happy to take care of you for all of your future medical needs.    You must understand that you've received an Urgent Care treatment only and that you may be released before all your medical problems are known or treated. You, the patient, will arrange for follow up care as instructed.      Follow up with your PCP or specialty clinic as instructed in the next 2-3 days if not improved or as needed. You can call [phone] to schedule an appointment with appropriate provider.      If you condition worsens, we recommend that you receive another evaluation at the emergency room immediately or contact your primary medical clinic's after hours call service to discuss your concerns.      Please return here or go to the Emergency Department for any concerns or worsening condition.

## 2024-09-16 NOTE — PATIENT INSTRUCTIONS
You have been prescribed a steroid today. Take the prescription as directed. Steroids can increase blood sugar. You can also have the following when taking steroids: flushing, jitteriness, weight gain, fluid retention, bone weakening. If you develop any adverse symptoms, stop taking the medication immediately.    Thank you for choosing [facility]!     Our goal in the Urgent Care is to always provide outstanding medical care. You may receive a survey by mail or e-mail in the next week regarding your experience today. We would greatly appreciate you completing and returning the survey. Your feedback provides us with a way to recognize our staff who provide very good care, and it helps us learn how to improve when your experience was below our aspiration of excellence.       We appreciate you trusting us with your medical care. We hope you feel better soon. We will be happy to take care of you for all of your future medical needs.    You must understand that you've received an Urgent Care treatment only and that you may be released before all your medical problems are known or treated. You, the patient, will arrange for follow up care as instructed.      Follow up with your PCP or specialty clinic as instructed in the next 2-3 days if not improved or as needed. You can call [phone] to schedule an appointment with appropriate provider.      If you condition worsens, we recommend that you receive another evaluation at the emergency room immediately or contact your primary medical clinic's after hours call service to discuss your concerns.      Please return here or go to the Emergency Department for any concerns or worsening condition.

## 2024-10-02 ENCOUNTER — HOSPITAL ENCOUNTER (OUTPATIENT)
Dept: RADIOLOGY | Facility: HOSPITAL | Age: 54
Discharge: HOME | End: 2024-10-02
Attending: NURSE PRACTITIONER
Payer: COMMERCIAL

## 2024-10-02 DIAGNOSIS — Z12.31 ENCOUNTER FOR SCREENING MAMMOGRAM FOR BREAST CANCER: ICD-10-CM

## 2024-10-02 PROCEDURE — 77067 SCR MAMMO BI INCL CAD: CPT | Mod: TC

## 2024-10-02 PROCEDURE — 77063 BREAST TOMOSYNTHESIS BI: CPT | Mod: 26,,, | Performed by: RADIOLOGY

## 2024-10-02 PROCEDURE — 77067 SCR MAMMO BI INCL CAD: CPT | Mod: 26,,, | Performed by: RADIOLOGY

## 2024-10-18 ENCOUNTER — HOSPITAL ENCOUNTER (OUTPATIENT)
Dept: RADIOLOGY | Facility: HOSPITAL | Age: 54
Discharge: HOME | End: 2024-10-18
Attending: NURSE PRACTITIONER
Payer: OTHER GOVERNMENT

## 2024-10-18 DIAGNOSIS — R91.8 ABNORMAL CT LUNG SCREENING: ICD-10-CM

## 2024-10-18 DIAGNOSIS — R91.8 MULTIPLE LUNG NODULES: ICD-10-CM

## 2024-10-18 PROCEDURE — 71250 CT THORAX DX C-: CPT | Mod: TC

## 2024-10-18 PROCEDURE — 71250 CT THORAX DX C-: CPT | Mod: 26,,, | Performed by: RADIOLOGY

## 2024-11-13 ENCOUNTER — PATIENT MESSAGE (OUTPATIENT)
Dept: INTERNAL MEDICINE | Facility: CLINIC | Age: 54
End: 2024-11-13
Payer: COMMERCIAL

## 2024-12-09 ENCOUNTER — PATIENT MESSAGE (OUTPATIENT)
Dept: BARIATRICS | Facility: CLINIC | Age: 54
End: 2024-12-09
Payer: COMMERCIAL

## 2024-12-10 DIAGNOSIS — E66.01 CLASS 3 SEVERE OBESITY WITH BODY MASS INDEX (BMI) OF 40.0 TO 44.9 IN ADULT, UNSPECIFIED OBESITY TYPE, UNSPECIFIED WHETHER SERIOUS COMORBIDITY PRESENT: ICD-10-CM

## 2024-12-10 DIAGNOSIS — E66.813 CLASS 3 SEVERE OBESITY WITH BODY MASS INDEX (BMI) OF 40.0 TO 44.9 IN ADULT, UNSPECIFIED OBESITY TYPE, UNSPECIFIED WHETHER SERIOUS COMORBIDITY PRESENT: ICD-10-CM

## 2024-12-10 RX ORDER — SEMAGLUTIDE 1.7 MG/.75ML
1.7 INJECTION, SOLUTION SUBCUTANEOUS
Qty: 3 ML | Refills: 0 | Status: CANCELLED | OUTPATIENT
Start: 2024-12-10

## 2024-12-10 RX ORDER — SEMAGLUTIDE 1.7 MG/.75ML
1.7 INJECTION, SOLUTION SUBCUTANEOUS
Qty: 3 ML | Refills: 0 | OUTPATIENT
Start: 2024-12-10

## 2024-12-11 DIAGNOSIS — E66.01 CLASS 3 SEVERE OBESITY WITH BODY MASS INDEX (BMI) OF 40.0 TO 44.9 IN ADULT, UNSPECIFIED OBESITY TYPE, UNSPECIFIED WHETHER SERIOUS COMORBIDITY PRESENT: ICD-10-CM

## 2024-12-11 DIAGNOSIS — E66.813 CLASS 3 SEVERE OBESITY WITH BODY MASS INDEX (BMI) OF 40.0 TO 44.9 IN ADULT, UNSPECIFIED OBESITY TYPE, UNSPECIFIED WHETHER SERIOUS COMORBIDITY PRESENT: ICD-10-CM

## 2024-12-11 RX ORDER — SEMAGLUTIDE 1.7 MG/.75ML
1.7 INJECTION, SOLUTION SUBCUTANEOUS
Qty: 3 ML | Refills: 0 | Status: SHIPPED | OUTPATIENT
Start: 2024-12-11

## 2024-12-23 DIAGNOSIS — I10 ESSENTIAL HYPERTENSION: ICD-10-CM

## 2024-12-23 RX ORDER — HYDROCHLOROTHIAZIDE 25 MG/1
12.5 TABLET ORAL DAILY
Qty: 45 TABLET | Refills: 0 | Status: SHIPPED | OUTPATIENT
Start: 2024-12-23

## 2025-01-08 ENCOUNTER — OFFICE VISIT (OUTPATIENT)
Dept: BARIATRICS | Facility: CLINIC | Age: 55
End: 2025-01-08
Payer: COMMERCIAL

## 2025-01-08 VITALS
DIASTOLIC BLOOD PRESSURE: 86 MMHG | HEIGHT: 64 IN | OXYGEN SATURATION: 99 % | BODY MASS INDEX: 35.9 KG/M2 | SYSTOLIC BLOOD PRESSURE: 123 MMHG | HEART RATE: 76 BPM | WEIGHT: 210.31 LBS

## 2025-01-08 DIAGNOSIS — E66.01 CLASS 3 SEVERE OBESITY WITH BODY MASS INDEX (BMI) OF 40.0 TO 44.9 IN ADULT, UNSPECIFIED OBESITY TYPE, UNSPECIFIED WHETHER SERIOUS COMORBIDITY PRESENT: Primary | ICD-10-CM

## 2025-01-08 DIAGNOSIS — Z98.84 S/P LAPAROSCOPIC SLEEVE GASTRECTOMY: ICD-10-CM

## 2025-01-08 DIAGNOSIS — E66.813 CLASS 3 SEVERE OBESITY WITH BODY MASS INDEX (BMI) OF 40.0 TO 44.9 IN ADULT, UNSPECIFIED OBESITY TYPE, UNSPECIFIED WHETHER SERIOUS COMORBIDITY PRESENT: Primary | ICD-10-CM

## 2025-01-08 DIAGNOSIS — E66.812 CLASS 2 SEVERE OBESITY WITH BODY MASS INDEX (BMI) OF 35 TO 39.9 WITH SERIOUS COMORBIDITY: ICD-10-CM

## 2025-01-08 DIAGNOSIS — E66.01 CLASS 2 SEVERE OBESITY WITH BODY MASS INDEX (BMI) OF 35 TO 39.9 WITH SERIOUS COMORBIDITY: ICD-10-CM

## 2025-01-08 PROCEDURE — 3079F DIAST BP 80-89 MM HG: CPT | Mod: CPTII,S$GLB,, | Performed by: INTERNAL MEDICINE

## 2025-01-08 PROCEDURE — 99214 OFFICE O/P EST MOD 30 MIN: CPT | Mod: S$GLB,,, | Performed by: INTERNAL MEDICINE

## 2025-01-08 PROCEDURE — 3008F BODY MASS INDEX DOCD: CPT | Mod: CPTII,S$GLB,, | Performed by: INTERNAL MEDICINE

## 2025-01-08 PROCEDURE — 1159F MED LIST DOCD IN RCRD: CPT | Mod: CPTII,S$GLB,, | Performed by: INTERNAL MEDICINE

## 2025-01-08 PROCEDURE — 3074F SYST BP LT 130 MM HG: CPT | Mod: CPTII,S$GLB,, | Performed by: INTERNAL MEDICINE

## 2025-01-08 PROCEDURE — 99999 PR PBB SHADOW E&M-EST. PATIENT-LVL IV: CPT | Mod: PBBFAC,,, | Performed by: INTERNAL MEDICINE

## 2025-01-08 RX ORDER — SEMAGLUTIDE 1.7 MG/.75ML
1.7 INJECTION, SOLUTION SUBCUTANEOUS
Qty: 9 ML | Refills: 1 | Status: SHIPPED | OUTPATIENT
Start: 2025-01-08

## 2025-01-08 NOTE — PROGRESS NOTES
"Subjective:       Patient ID: Renea Mac is a 54 y.o. female.    Chief Complaint: No chief complaint on file.    Pt here today for follow-up.. Has lost 25 lbs since last OV.       Was to get back on track with bariatric diet and started Wegovy, currently on 1.7 mg weekly. Denies SE. Appetite is down.   Still working on eating. She feels she is doing better with her eating habits.   Has been doing some walking. No resistance training.     gastric sleeve (11/2014).     Prev med hx: topiramate made her very emotional and weepy. Has taken phentermine in the past. Started diethylpropion  . BP was OK.   She denies SE, as long as she takes it early enough.    checked today.     Has lost 15.1 lbs (-2.2 lbs muscle. -11.4 lbs fat).     New BMR: 1369    New PBF: 51.5%        Initial   BMR: 1424  PBF: 52.9%    Review of Systems   Constitutional: Negative for chills and fever.   Respiratory: Negative for apnea and shortness of breath.    Cardiovascular: Negative for chest pain and leg swelling.   Gastrointestinal: Positive for constipation. Negative for diarrhea.        + GERD   Genitourinary: Negative for difficulty urinating.        S/p hyst   Musculoskeletal: Positive for arthralgias and back pain.   Neurological: Positive for dizziness and syncope. Negative for light-headedness.   Psychiatric/Behavioral: Negative for dysphoric mood. The patient is not nervous/anxious.        Objective:     /86   Pulse 76   Ht 5' 4" (1.626 m)   Wt 95.4 kg (210 lb 4.8 oz)   LMP 03/01/2011 (Exact Date)   SpO2 99%   BMI 36.10 kg/m²     Physical Exam   Constitutional: She is oriented to person, place, and time. She appears well-developed and well-nourished. No distress.   Obese     HENT:   Head: Normocephalic and atraumatic.   Eyes: EOM are normal. Pupils are equal, round, and reactive to light. No scleral icterus.   Neck: Normal range of motion. Neck supple.   Cardiovascular: Normal rate and normal heart sounds.  "   Pulmonary/Chest: Effort normal and breath sounds normal.   Musculoskeletal: Normal range of motion. She exhibits no edema.   Neurological: She is alert and oriented to person, place, and time. No cranial nerve deficit.   Skin: Skin is warm and dry. No erythema.   Psychiatric: She has a normal mood and affect. Her behavior is normal. Judgment normal.   Vitals reviewed.      Assessment:       1. Class 3 severe obesity with body mass index (BMI) of 40.0 to 44.9 in adult, unspecified obesity type, unspecified whether serious comorbidity present    2. Class 2 severe obesity with body mass index (BMI) of 35 to 39.9 with serious comorbidity    3. S/P laparoscopic sleeve gastrectomy            Plan:             Diagnoses and all orders for this visit:    Class 3 severe obesity with body mass index (BMI) of 40.0 to 44.9 in adult, unspecified obesity type, unspecified whether serious comorbidity present  Comments:  Original Dx on Wegovy Rx  Orders:  -     semaglutide, weight loss, (WEGOVY) 1.7 mg/0.75 mL PnIj; Inject 1.7 mg into the skin every 7 days.    Class 2 severe obesity with body mass index (BMI) of 35 to 39.9 with serious comorbidity    S/P laparoscopic sleeve gastrectomy        Has lost 10.7% TBW since starting Wegovy.     Start Wegovy 0.25 mg once a week x 1 month. At the end of that month, the dose will continue to increase monthly.       Decrease portions as soon as you start wegovy. Avoid fried, rich or greasy foods.      Some nausea in the first 2 weeks is not unusual.     If you get pain across the upper abdomen and around to your back, please call the office.       [email or web address] to sign up for coupon card.     Continue bariatric diet for life.     - To lose weight you want to cut 100% starchy carbohydrates out of your diet (bread, rice, pasta, potatoes, granola, flour, corn, peas, oatmeal, grits, tortillas, crackers, chips) and get  grams of protein.  Aim for 100 grams of protein daily.    - No soda,  sweet tea, juices, sports drinks or lemonade     -Exercise daily    Weight training handouts given.

## 2025-01-08 NOTE — Clinical Note
Please do Netta TENORIO for Wegovy. Will be for continuing tx. Pt was filling with her work insurance previously. Has lost 10.7% TBW. topiramate made her very emotional and weepy. Has taken phentermine and diethylpropion in the past.

## 2025-01-08 NOTE — PATIENT INSTRUCTIONS
Start Wegovy 1.7 mg once a week.       Decrease portions as soon as you start wegovy. Avoid fried, rich or greasy foods.      Some nausea in the first 2 weeks is not unusual.     If you get pain across the upper abdomen and around to your back, please call the office.       [email or web address] to sign up for coupon card.         Continue bariatric diet for life.     - To lose weight you want to cut 100% starchy carbohydrates out of your diet (bread, rice, pasta, potatoes, granola, flour, corn, peas, oatmeal, grits, tortillas, crackers, chips) and get  grams of protein.  Aim for 100 grams of protein daily.    - No soda, sweet tea, juices, sports drinks or lemonade     -Exercise daily      Sample Weight Training Plan ( adapted from [employer]):    1.Goblet Squat  How to:    Stand with feet hip-width apart and hold a weight vertically in front of chest, elbows pointing toward the floor.  Push hips back and bend knees to lower into a squat.  Drive through heels to stand back up to starting position. That's 1 rep. Complete three to five reps with a heavy weight.      2.Bent-Over Row  How to:    With a dumbbell in each hand and feet under hips, hinge at hips with knees slightly bent and arms just in front of legs.  Drive one elbow back toward hips, feeling shoulder blades squeeze together, pulling weight toward side body.  Slowly lower weight back down, then repeat with other arm. That's 1 rep. Complete three to five reps with a medium-heavy weight.        3.Lateral Lunge  How to:    Holding a weight at chest or dumbbells at each side, stand up straight with feet hip-width apart.  Take a large step to the right, sit hips back, and lower down until right knee is nearly parallel with the floor. Your left leg should be straight.  Return to start. That's 1 rep. Complete 10 reps on each side.      4.Chest Press  How to:    Lie flat on back, or on a bench, with feet flat on the ground. With a dumbbell in each  hand, extend arms directly over shoulders, palms facing toward feet.  Squeeze shoulder blades together and slowly bend elbows, lowering the weights out to the side, parallel with shoulders, until elbows form 90-degree angles.  Slowly drive the dumbbells back up to start, squeezing shoulder blades the entire time. Thats 1 rep. Complete three to five reps with a medium-heavy weight.      5.Split Stance Shoulder Press    How to:    Grab a pair of dumbbells or a resistance band. Stagger stance into a wide step, one foot forward and one back with hips squared, and hold the weights or band just above shoulders, elbows close to sides.  Leaning forward ever so slightly, bend both knees, and press through front heel while simultaneously lifting the weights or band to the emil, keeping elbows forward and arms in line with your ears.  Lower weights or band back to shoulders. That's 1 rep. Do 10 reps, alternating side for each set.        6.Alternating Reverse Lunge To Bicep Curl  How to:    Grab a pair of dumbbells and hold them at arm's length next to sides, palms facing each other. Stand tall with feet hip-width apart.  Step backward with right leg and lower body until front knee is bent 90 degrees. At the same time as you lunge, curl both dumbbells up to shoulders.  Lower the dumbbells as you return to the starting position. Step back with the other leg and repeat.That's 1 rep. Complete 12 reps with a medium weight.

## 2025-01-10 ENCOUNTER — TELEPHONE (OUTPATIENT)
Dept: BARIATRICS | Facility: CLINIC | Age: 55
End: 2025-01-10
Payer: COMMERCIAL

## 2025-01-10 NOTE — TELEPHONE ENCOUNTER
----- Message from Kimberlee Washburn MD sent at 1/8/2025  9:35 AM CST -----  Please do Netta PA for Wegovy. Will be for continuing tx. Pt was filling with her work insurance previously. Has lost 10.7% TBW.  topiramate made her very emotional and weepy. Has taken phentermine and diethylpropion in the past.

## 2025-01-10 NOTE — TELEPHONE ENCOUNTER
PA was completed and APPROVED for the medication Wegovy for the dates 12/11/2024 through 01/10/2026.

## 2025-01-18 DIAGNOSIS — I10 ESSENTIAL HYPERTENSION: ICD-10-CM

## 2025-01-18 RX ORDER — LOSARTAN POTASSIUM 25 MG/1
25 TABLET ORAL DAILY
Qty: 30 TABLET | Refills: 3 | OUTPATIENT
Start: 2025-01-18 | End: 2026-01-18

## 2025-01-19 DIAGNOSIS — I10 ESSENTIAL HYPERTENSION: ICD-10-CM

## 2025-01-19 RX ORDER — LOSARTAN POTASSIUM 25 MG/1
25 TABLET ORAL DAILY
Qty: 30 TABLET | Refills: 3 | Status: CANCELLED | OUTPATIENT
Start: 2025-01-19 | End: 2026-01-19

## 2025-01-20 ENCOUNTER — PATIENT MESSAGE (OUTPATIENT)
Dept: INTERNAL MEDICINE | Facility: CLINIC | Age: 55
End: 2025-01-20
Payer: COMMERCIAL

## 2025-01-20 DIAGNOSIS — I10 ESSENTIAL HYPERTENSION: ICD-10-CM

## 2025-01-21 ENCOUNTER — TELEPHONE (OUTPATIENT)
Dept: INTERNAL MEDICINE | Facility: CLINIC | Age: 55
End: 2025-01-21

## 2025-01-21 RX ORDER — LOSARTAN POTASSIUM 25 MG/1
25 TABLET ORAL DAILY
Qty: 30 TABLET | Refills: 0 | Status: SHIPPED | OUTPATIENT
Start: 2025-01-21 | End: 2026-01-21

## 2025-02-20 DIAGNOSIS — I10 ESSENTIAL HYPERTENSION: ICD-10-CM

## 2025-02-20 RX ORDER — LOSARTAN POTASSIUM 25 MG/1
25 TABLET ORAL DAILY
Qty: 30 TABLET | Refills: 0 | Status: CANCELLED | OUTPATIENT
Start: 2025-02-20 | End: 2026-02-20

## 2025-02-20 RX ORDER — LOSARTAN POTASSIUM 25 MG/1
25 TABLET ORAL DAILY
Qty: 30 TABLET | Refills: 0 | Status: SHIPPED | OUTPATIENT
Start: 2025-02-20 | End: 2026-02-20

## 2025-02-26 ENCOUNTER — OFFICE VISIT (OUTPATIENT)
Dept: INTERNAL MEDICINE | Facility: CLINIC | Age: 55
End: 2025-02-26
Payer: COMMERCIAL

## 2025-02-26 VITALS
SYSTOLIC BLOOD PRESSURE: 110 MMHG | OXYGEN SATURATION: 98 % | BODY MASS INDEX: 36.47 KG/M2 | HEIGHT: 64 IN | WEIGHT: 213.63 LBS | DIASTOLIC BLOOD PRESSURE: 84 MMHG | HEART RATE: 83 BPM

## 2025-02-26 DIAGNOSIS — I10 ESSENTIAL HYPERTENSION: ICD-10-CM

## 2025-02-26 DIAGNOSIS — M17.11 PRIMARY OSTEOARTHRITIS OF RIGHT KNEE: ICD-10-CM

## 2025-02-26 DIAGNOSIS — G43.009 MIGRAINE WITHOUT AURA AND WITHOUT STATUS MIGRAINOSUS, NOT INTRACTABLE: ICD-10-CM

## 2025-02-26 DIAGNOSIS — K59.00 CONSTIPATION, UNSPECIFIED CONSTIPATION TYPE: ICD-10-CM

## 2025-02-26 DIAGNOSIS — Z98.84 S/P LAPAROSCOPIC SLEEVE GASTRECTOMY: ICD-10-CM

## 2025-02-26 DIAGNOSIS — K43.6 VENTRAL HERNIA WITH OBSTRUCTION AND WITHOUT GANGRENE: ICD-10-CM

## 2025-02-26 DIAGNOSIS — Z00.00 ANNUAL PHYSICAL EXAM: Primary | ICD-10-CM

## 2025-02-26 DIAGNOSIS — E66.813 CLASS 3 SEVERE OBESITY WITH BODY MASS INDEX (BMI) OF 40.0 TO 44.9 IN ADULT, UNSPECIFIED OBESITY TYPE, UNSPECIFIED WHETHER SERIOUS COMORBIDITY PRESENT: ICD-10-CM

## 2025-02-26 DIAGNOSIS — K44.9 HIATAL HERNIA: ICD-10-CM

## 2025-02-26 DIAGNOSIS — K42.0 UMBILICAL HERNIA WITH OBSTRUCTION, WITHOUT GANGRENE: ICD-10-CM

## 2025-02-26 DIAGNOSIS — E66.01 CLASS 3 SEVERE OBESITY WITH BODY MASS INDEX (BMI) OF 40.0 TO 44.9 IN ADULT, UNSPECIFIED OBESITY TYPE, UNSPECIFIED WHETHER SERIOUS COMORBIDITY PRESENT: ICD-10-CM

## 2025-02-26 DIAGNOSIS — N28.1 RENAL CYST, ACQUIRED, LEFT: ICD-10-CM

## 2025-02-26 DIAGNOSIS — R91.1 SOLITARY PULMONARY NODULE: ICD-10-CM

## 2025-02-26 DIAGNOSIS — E04.2 MULTIPLE THYROID NODULES: ICD-10-CM

## 2025-02-26 DIAGNOSIS — Z98.890 S/P EXPLORATORY LAPAROTOMY: ICD-10-CM

## 2025-02-26 PROCEDURE — 4010F ACE/ARB THERAPY RXD/TAKEN: CPT | Mod: CPTII,S$GLB,, | Performed by: NURSE PRACTITIONER

## 2025-02-26 PROCEDURE — 99999 PR PBB SHADOW E&M-EST. PATIENT-LVL IV: CPT | Mod: PBBFAC,,, | Performed by: NURSE PRACTITIONER

## 2025-02-26 PROCEDURE — 3079F DIAST BP 80-89 MM HG: CPT | Mod: CPTII,S$GLB,, | Performed by: NURSE PRACTITIONER

## 2025-02-26 PROCEDURE — 3074F SYST BP LT 130 MM HG: CPT | Mod: CPTII,S$GLB,, | Performed by: NURSE PRACTITIONER

## 2025-02-26 PROCEDURE — 3008F BODY MASS INDEX DOCD: CPT | Mod: CPTII,S$GLB,, | Performed by: NURSE PRACTITIONER

## 2025-02-26 PROCEDURE — 99396 PREV VISIT EST AGE 40-64: CPT | Mod: S$GLB,,, | Performed by: NURSE PRACTITIONER

## 2025-02-26 PROCEDURE — 1159F MED LIST DOCD IN RCRD: CPT | Mod: CPTII,S$GLB,, | Performed by: NURSE PRACTITIONER

## 2025-02-26 RX ORDER — LOSARTAN POTASSIUM 25 MG/1
25 TABLET ORAL DAILY
Qty: 90 TABLET | Refills: 3 | Status: SHIPPED | OUTPATIENT
Start: 2025-02-26 | End: 2026-02-26

## 2025-02-26 RX ORDER — HYDROCHLOROTHIAZIDE 25 MG/1
12.5 TABLET ORAL DAILY
Qty: 45 TABLET | Refills: 3 | Status: SHIPPED | OUTPATIENT
Start: 2025-02-26

## 2025-02-26 NOTE — PROGRESS NOTES
"Internal Medicine Annual Exam       CHIEF COMPLAINT     The patient, Renea Mac, who is a 54 y.o. female with migraines, HTN, s/p ex lap and BSO for removal of mass (benign leiomyoma) (1/31/19) , s/p gastric sleeve and chornic lower back and right shoulder pain presents for an annual exam.    HPI   Here for annual - last seen 7/25/2023    Obesity - followed by bariatrics - last seen 1/8/2025   Has lost 25lbs   Was on wegovy - but insurance stopped covering - has been off wegovy x 1.5 months   "Was to get back on track with bariatric diet and started Wegovy, currently on 1.7 mg weekly. Denies SE. Appetite is down.   Still working on eating. She feels she is doing better with her eating habits.   Has been doing some walking. No resistance training.    gastric sleeve (11/2014).   Prev med hx: topiramate made her very emotional and weepy. Has taken phentermine in the past. Started diethylpropion  . BP was OK.   She denies SE, as long as she takes it early enough.    checked today.    Has lost 15.1 lbs (-2.2 lbs muscle. -11.4 lbs fat)."    HTN- taking HCTZ and losartan      S/p right shoulder rotator cuff repair 11/2022  Completed PT last month     Chronic constipation - was taking linzess - working well but had fecal incontinence episode. Stopped taking - no constipation   Still having occasional loose stools      Incidental 3mm RLL seen on CT imaging 10/2022  3 mm RLL pulmonary nodule incidentally seen on CT imaging, by Fleischner Society guidelines, either no routine follow for low risk patient vs 12 month CT for high risk patient  Repeat in 2024 -   No change in the tiny bilateral pulmonary nodules when compared to 10/17/2023. Given a year of stability, no further follow-up is indicated. No new nodules.      Seen in ED 7/9/2023 for lower abd pain   CT abd pelvis-   Abdominal wall: Fat containing supraumbilical ventral abdominal hernia.  Small fat containing umbilical hernia.  Postoperative changes in the " infraumbilical midline abdomen.  CMP and CBC stable        Impression:         Postoperative changes of sleeve gastrectomy with a small hiatal hernia and mild haziness of the distal esophagus at that level.  Correlation for reflux esophagitis recommended.     Additional stable findings as above.           HM-  CRCS- 4/4/2024  MMG-10/2024  Shingles-UTD  Tdap-2010      Past Medical History:  Past Medical History:   Diagnosis Date    Acute pain of right knee 11/4/2016    Chronic right shoulder pain 11/4/2016    COVID-19 virus infection 4/10/2020    Essential hypertension 11/4/2016    GERD (gastroesophageal reflux disease)     Incomplete tear of right rotator cuff 11/30/2016    Osteoarthritis of right knee 11/30/2016    Rotator cuff tear     S/P Exploratory Laparotomy/Mass Resection/BSO 1/31/2019 1- 1. Soft tissue, abdominal wall, mass, resection: - Benign leiomyoma, 6.5 cm in greatest dimension, see comment. 2. Ovary and fallopian tube, left, left salpingo-oophorectomy: - Benign ovary with cystic corpus luteum, 2.7 cm in greatest dimension. - Benign fallopian tube with no significant histologic abnormality. 3. 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    Syncope and collapse 6/14/2017       Past Surgical History:   Procedure Laterality Date    ARTHROSCOPIC DEBRIDEMENT OF SHOULDER Right 11/3/2022    Procedure: DEBRIDEMENT, SHOULDER, ARTHROSCOPIC;  Surgeon: Eliseo Bialon MD;  Location: [facility] OR;  Service: Orthopedics;  Laterality: Right;    ARTHROSCOPIC DECOMPRESSION OF SHOULDER Right 11/3/2022    Procedure: DECOMPRESSION, SHOULDER, ARTHROSCOPIC;  Surgeon: Eliseo Bailon MD;  Location: [facility] OR;  Service: Orthopedics;  Laterality: Right;    ARTHROSCOPIC REPAIR OF ROTATOR CUFF OF SHOULDER Right 11/3/2022    Procedure: REPAIR, ROTATOR CUFF, ARTHROSCOPIC - [facility];  Surgeon: Eliseo Bailon MD;  Location: [facility] OR;  Service:  Orthopedics;  Laterality: Right;  Regional Block    BILATERAL SALPINGO-OOPHORECTOMY (BSO) Bilateral 2019    Procedure: SALPINGO-OOPHORECTOMY, BILATERAL;  Surgeon: Petrona Porter MD;  [facility];  Service: OB/GYN;  Laterality: Bilateral;     SECTION      x1    COLONOSCOPY N/A 2019    Procedure: COLONOSCOPY;  Surgeon: Chris Bennett MD;  Location: [facility] ENDO (4TH FLR);  Service: Endoscopy;  Laterality: N/A;  Justo Camilo, or Donald if no availability in Dec. will schedule after CT scan results    COLONOSCOPY N/A 2024    Procedure: COLONOSCOPY;  Surgeon: Fuentes Kemp MD;  Location: [facility];  Service: Endoscopy;  Laterality: N/A;  3/26-  complete. [facility]    COLPOSCOPY      EXCISION OF PELVIC MASS N/A 2019    Procedure: EXCISION, MASS, PELVIS;  Surgeon: Petrona Porter MD;  [facility];  Service: OB/GYN;  Laterality: N/A;    HERNIA REPAIR      HYSTERECTOMY      YANG, ovaries remain (fibroids0    LAPAROTOMY, EXPLORATORY N/A 2019    Procedure: LAPAROTOMY, EXPLORATORY;  Surgeon: Petrona Porter MD;  [facility];  Service: OB/GYN;  Laterality: N/A;    OOPHORECTOMY Bilateral 2019    SLEEVE GASTROPLASTY  2014    TUBAL LIGATION          Family History   Problem Relation Name Age of Onset    Cancer Mother d 69         colon?    Hypertension Mother d 69     Heart disease Mother d 69     Heart attack Mother d 69     Rectal cancer Mother d 69     Colon cancer Mother d 69     Hypertension Father      Heart disease Father      Stroke Father      Heart attack Father      Hypertension Sister      Hypertension Brother      Cancer Brother          Eye    No Known Problems Daughter      No Known Problems Son      Breast cancer Maternal Aunt      Esophageal cancer Neg Hx          Social History[1]     Tobacco Use History[2]     Allergies as of 2025    (No Known Allergies)          Home Medications:  Prior to Admission  medications    Medication Sig Start Date End Date Taking? Authorizing Provider   b complex vitamins capsule Take 1 capsule by mouth once daily.    Provider, Historical   cetirizine (ZYRTEC) 10 MG tablet Take 1 tablet (10 mg total) by mouth once daily. for 14 days 8/10/24 8/24/24  Raj Kapoor PA-C   fluticasone propionate (FLONASE) 50 mcg/actuation nasal spray 2 sprays (100 mcg total) by Each Nostril route 2 (two) times a day. 8/10/24   Raj Kapoor PA-C   hydroCHLOROthiazide (HYDRODIURIL) 25 MG tablet Take 1/2 tablet (12.5 mg total) by mouth once daily. 12/23/24   Jeni Bethea MD   linaCLOtide (LINZESS) 145 mcg Cap capsule Take 1 capsule (145 mcg total) by mouth before breakfast. 1/23/24   Emely Bustillos MD   losartan (COZAAR) 25 MG tablet Take 1 tablet (25 mg total) by mouth once daily. 2/20/25 2/20/26  Magdalena Davila NP   multivitamin capsule Take 1 capsule by mouth once daily.    Provider, Historical   semaglutide, weight loss, (WEGOVY) 1.7 mg/0.75 mL PnIj Inject 1.7 mg into the skin every 7 days. 1/8/25   Kimberlee Washburn MD   vitamin D (VITAMIN D3) 1000 units Tab Take 1,000 Units by mouth once daily.    Provider, Historical   azelastine (ASTELIN) 137 mcg (0.1 %) nasal spray 1 spray (137 mcg total) by Nasal route 2 (two) times daily. for 7 days 9/12/21 10/10/21  Shar Atkinson NP   ketoconazole (NIZORAL) 2 % cream Apply topically once daily. 9/11/20 10/10/21  Magdalena Davila NP   nystatin (MYCOSTATIN) ointment APPLY TO THE AFFECTED AREA(S) BY TOPICAL ROUTE 2 TIMES PER DAY 7/7/21 10/10/21         Review of Systems:  Review of Systems   Constitutional:  Negative for chills, fatigue, fever and unexpected weight change.   HENT:  Negative for congestion, hearing loss, rhinorrhea and sinus pressure.    Eyes:  Negative for pain, redness and visual disturbance.   Respiratory:  Negative for cough and shortness of breath.    Cardiovascular:  Negative for chest pain and palpitations.  "  Gastrointestinal:  Positive for diarrhea. Negative for abdominal distention, abdominal pain, constipation, nausea and vomiting.   Endocrine: Negative for polydipsia, polyphagia and polyuria.   Genitourinary:  Negative for dysuria, frequency, menstrual problem, urgency and vaginal discharge.   Musculoskeletal:  Positive for back pain (lower back pain). Negative for gait problem and myalgias.   Skin:  Negative for color change and rash.   Allergic/Immunologic: Negative for environmental allergies and immunocompromised state.   Neurological:  Negative for dizziness, weakness, light-headedness and headaches.   Hematological:  Negative for adenopathy. Does not bruise/bleed easily.   Psychiatric/Behavioral:  Negative for confusion and sleep disturbance. The patient is not nervous/anxious.        Health Maintainence:   Immunizations:  Health Maintenance         Date Due Completion Date    Hepatitis C Screening Never done ---    HIV Screening Never done ---    TETANUS VACCINE 05/24/2020 5/24/2010    Pneumococcal Vaccines (Age 50+) (1 of 1 - PCV) Never done ---    COVID-19 Vaccine (4 - 2024-25 season) 09/01/2024 12/30/2021    Mammogram 10/02/2025 10/2/2024    Hemoglobin A1c (Diabetic Prevention Screening) 07/25/2026 7/25/2023    Lipid Panel 07/25/2028 7/25/2023    Colorectal Cancer Screening 04/04/2029 4/4/2024    RSV Vaccine (Age 60+ and Pregnant patients) (1 - 1-dose 75+ series) 03/28/2047 ---             PHYSICAL EXAM     /84 (BP Location: Right arm, Patient Position: Sitting)   Pulse 83   Ht 5' 4" (1.626 m)   Wt 96.9 kg (213 lb 10 oz)   LMP 03/01/2011 (Exact Date)   SpO2 98%   BMI 36.67 kg/m²  Body mass index is 36.67 kg/m².    Physical Exam  Vitals reviewed.   Constitutional:       Appearance: She is well-developed. She is obese.   HENT:      Head: Normocephalic.      Right Ear: External ear normal.      Left Ear: External ear normal.      Nose: Nose normal.      Mouth/Throat:      Pharynx: No " oropharyngeal exudate.   Eyes:      Pupils: Pupils are equal, round, and reactive to light.   Neck:      Thyroid: No thyromegaly.      Vascular: No JVD.      Trachea: No tracheal deviation.   Cardiovascular:      Rate and Rhythm: Normal rate and regular rhythm.      Heart sounds: Normal heart sounds. No murmur heard.     No friction rub. No gallop.   Pulmonary:      Effort: Pulmonary effort is normal. No respiratory distress.      Breath sounds: Normal breath sounds. No wheezing or rales.   Abdominal:      General: Bowel sounds are normal. There is no distension.      Palpations: Abdomen is soft.      Tenderness: There is no abdominal tenderness.   Musculoskeletal:         General: No tenderness. Normal range of motion.      Cervical back: Neck supple.   Lymphadenopathy:      Cervical: No cervical adenopathy.   Skin:     General: Skin is warm and dry.      Findings: No rash.   Neurological:      Mental Status: She is alert and oriented to person, place, and time.   Psychiatric:         Behavior: Behavior normal.         LABS     Lab Results   Component Value Date    HGBA1C 5.6 07/25/2023     CMP  Sodium   Date Value Ref Range Status   01/23/2024 141 136 - 145 mmol/L Final     Potassium   Date Value Ref Range Status   01/23/2024 4.4 3.5 - 5.1 mmol/L Final     Chloride   Date Value Ref Range Status   01/23/2024 106 95 - 110 mmol/L Final     CO2   Date Value Ref Range Status   01/23/2024 29 23 - 29 mmol/L Final     Glucose   Date Value Ref Range Status   01/23/2024 80 70 - 110 mg/dL Final     BUN   Date Value Ref Range Status   01/23/2024 18 6 - 20 mg/dL Final     Creatinine   Date Value Ref Range Status   01/23/2024 0.7 0.5 - 1.4 mg/dL Final     Calcium   Date Value Ref Range Status   01/23/2024 10.0 8.7 - 10.5 mg/dL Final     Total Protein   Date Value Ref Range Status   01/23/2024 8.1 6.0 - 8.4 g/dL Final     Albumin   Date Value Ref Range Status   01/23/2024 3.7 3.5 - 5.2 g/dL Final     Total Bilirubin   Date Value  Ref Range Status   01/23/2024 0.3 0.1 - 1.0 mg/dL Final     Comment:     For infants and newborns, interpretation of results should be based  on gestational age, weight and in agreement with clinical  observations.    Premature Infant recommended reference ranges:  Up to 24 hours.............<8.0 mg/dL  Up to 48 hours............<12.0 mg/dL  3-5 days..................<15.0 mg/dL  6-29 days.................<15.0 mg/dL       Alkaline Phosphatase   Date Value Ref Range Status   01/23/2024 122 55 - 135 U/L Final     AST   Date Value Ref Range Status   01/23/2024 15 10 - 40 U/L Final     ALT   Date Value Ref Range Status   01/23/2024 12 10 - 44 U/L Final     Anion Gap   Date Value Ref Range Status   01/23/2024 6 (L) 8 - 16 mmol/L Final     eGFR if    Date Value Ref Range Status   09/17/2020 >60.0 >60 mL/min/1.73 m^2 Final     eGFR if non    Date Value Ref Range Status   09/17/2020 >60.0 >60 mL/min/1.73 m^2 Final     Comment:     Calculation used to obtain the estimated glomerular filtration  rate (eGFR) is the CKD-EPI equation.        Lab Results   Component Value Date    WBC 7.95 01/23/2024    HGB 13.0 01/23/2024    HCT 42.0 01/23/2024    MCV 82 01/23/2024     01/23/2024     Lab Results   Component Value Date    CHOL 174 07/25/2023    CHOL 171 09/17/2020    CHOL 153 07/11/2017     Lab Results   Component Value Date    HDL 74 07/25/2023    HDL 76 (H) 09/17/2020    HDL 64 07/11/2017     Lab Results   Component Value Date    LDLCALC 90.4 07/25/2023    LDLCALC 86.6 09/17/2020    LDLCALC 81.0 07/11/2017     Lab Results   Component Value Date    TRIG 48 07/25/2023    TRIG 42 09/17/2020    TRIG 40 07/11/2017     Lab Results   Component Value Date    CHOLHDL 42.5 07/25/2023    CHOLHDL 44.4 09/17/2020    CHOLHDL 41.8 07/11/2017     Lab Results   Component Value Date    TSH 2.433 01/23/2024       ASSESSMENT/PLAN     Renea Mac is a 54 y.o. female    Annual physical exam- All age and  gender related screenings discussed   -     CBC Auto Differential; Future; Expected date: 02/26/2025  -     Comprehensive Metabolic Panel; Future; Expected date: 02/26/2025  -     Hemoglobin A1C; Future; Expected date: 02/26/2025  -     Lipid Panel; Future; Expected date: 02/26/2025  -     TSH; Future; Expected date: 02/26/2025  -     Vitamin D; Future; Expected date: 02/26/2025  -     Iron and TIBC; Future; Expected date: 02/26/2025  -     Ferritin; Future; Expected date: 02/26/2025  -     Vitamin B12 Deficiency Panel; Future; Expected date: 02/26/2025    Migraine without aura and without status migrainosus, not intractable- stable. Will monitor and start headache journal.     Pulmonary nodule: see CT 2023, repeat 2024 - stable. No need for further imaging     Essential hypertension- stable. Will cont losartan and HCTZ and cont low na diet   -     Comprehensive Metabolic Panel; Future; Expected date: 02/26/2025  -     hydroCHLOROthiazide (HYDRODIURIL) 25 MG tablet; Take 1/2 tablet (12.5 mg total) by mouth once daily.  Dispense: 45 tablet; Refill: 3  -     losartan (COZAAR) 25 MG tablet; Take 1 tablet (25 mg total) by mouth once daily.  Dispense: 90 tablet; Refill: 3    Renal cyst, acquired, left: see u/s 1/24- stable. Will monitor with u/s   -     Comprehensive Metabolic Panel; Future; Expected date: 02/26/2025    Class 3 severe obesity with body mass index (BMI) of 40.0 to 44.9 in adult, unspecified obesity type, unspecified whether serious comorbidity present- stable. Will cont healthy diet and exercise. Will f/u w/ bariatrics     Multiple thyroid nodules: see u/s 2023, no need for further follow up- stable. Will f/u with TFT   -     TSH; Future; Expected date: 02/26/2025    S/P laparoscopic sleeve gastrectomy- stable. Will cont healthy diet and exercise. Will f/u w/ bariatrics     Primary osteoarthritis of right knee- stable. Will cont tylenol as needed     Ventral hernia with obstruction and without gangrene-  stable. Will monitor     Umbilical hernia with obstruction, without gangrene- stable. Will monitor     S/P Exploratory Laparotomy/Mass Resection/BSO    Hiatal hernia: see CT 2023- stable. Will cont prevacid     Constipation, unspecified constipation type  -     linaCLOtide (LINZESS) 72 mcg Cap capsule; Take 1 capsule (72 mcg total) by mouth before breakfast.  Dispense: 90 capsule; Refill: 1           Follow up with PCP    Magdalena HERNANDEZ, APRN, FNP-c   Department of Internal Medicine - [facility]  3:24 PM         [1]   Social History  Socioeconomic History    Marital status:    Tobacco Use    Smoking status: Never     Passive exposure: Never    Smokeless tobacco: Never   Substance and Sexual Activity    Alcohol use: Yes     Comment: occasionally    Drug use: No    Sexual activity: Yes     Partners: Male     Social Drivers of Health     Financial Resource Strain: Low Risk  (8/20/2024)    Overall Financial Resource Strain (CARDIA)     Difficulty of Paying Living Expenses: Not hard at all   Food Insecurity: No Food Insecurity (8/20/2024)    Hunger Vital Sign     Worried About Running Out of Food in the Last Year: Never true     Ran Out of Food in the Last Year: Never true   Transportation Needs: No Transportation Needs (7/25/2023)    PRAPARE - Transportation     Lack of Transportation (Medical): No     Lack of Transportation (Non-Medical): No   Physical Activity: Insufficiently Active (8/20/2024)    Exercise Vital Sign     Days of Exercise per Week: 2 days     Minutes of Exercise per Session: 20 min   Stress: No Stress Concern Present (8/20/2024)    [employer] [place] [employer] - Occupational Stress Questionnaire     Feeling of Stress : Only a little   Housing Stability: Unknown (7/25/2023)    Housing Stability Vital Sign     Unable to Pay for Housing in the Last Year: No     Unstable Housing in the Last Year: No   [2]   Social History  Tobacco Use   Smoking Status Never     Passive exposure: Never   Smokeless Tobacco Never

## 2025-03-05 ENCOUNTER — LAB VISIT (OUTPATIENT)
Dept: LAB | Facility: HOSPITAL | Age: 55
End: 2025-03-05
Attending: NURSE PRACTITIONER
Payer: COMMERCIAL

## 2025-03-05 DIAGNOSIS — N28.1 RENAL CYST, ACQUIRED, LEFT: ICD-10-CM

## 2025-03-05 DIAGNOSIS — I10 ESSENTIAL HYPERTENSION: ICD-10-CM

## 2025-03-05 DIAGNOSIS — E04.2 MULTIPLE THYROID NODULES: ICD-10-CM

## 2025-03-05 DIAGNOSIS — Z00.00 ANNUAL PHYSICAL EXAM: ICD-10-CM

## 2025-03-05 LAB
25(OH)D3+25(OH)D2 SERPL-MCNC: 40 NG/ML (ref 30–96)
ALBUMIN SERPL BCP-MCNC: 3.3 G/DL (ref 3.5–5.2)
ALP SERPL-CCNC: 115 U/L (ref 40–150)
ALT SERPL W/O P-5'-P-CCNC: 13 U/L (ref 10–44)
ANION GAP SERPL CALC-SCNC: 7 MMOL/L (ref 8–16)
AST SERPL-CCNC: 62 U/L (ref 10–40)
BASOPHILS # BLD AUTO: 0.02 K/UL (ref 0–0.2)
BASOPHILS NFR BLD: 0.2 % (ref 0–1.9)
BILIRUB SERPL-MCNC: 0.3 MG/DL (ref 0.1–1)
BUN SERPL-MCNC: 16 MG/DL (ref 6–20)
CALCIUM SERPL-MCNC: 10.1 MG/DL (ref 8.7–10.5)
CHLORIDE SERPL-SCNC: 106 MMOL/L (ref 95–110)
CHOLEST SERPL-MCNC: 165 MG/DL (ref 120–199)
CHOLEST/HDLC SERPL: 2.2 {RATIO} (ref 2–5)
CO2 SERPL-SCNC: 29 MMOL/L (ref 23–29)
CREAT SERPL-MCNC: 0.7 MG/DL (ref 0.5–1.4)
DIFFERENTIAL METHOD BLD: ABNORMAL
EOSINOPHIL # BLD AUTO: 0.1 K/UL (ref 0–0.5)
EOSINOPHIL NFR BLD: 1 % (ref 0–8)
ERYTHROCYTE [DISTWIDTH] IN BLOOD BY AUTOMATED COUNT: 14.4 % (ref 11.5–14.5)
EST. GFR  (NO RACE VARIABLE): >60 ML/MIN/1.73 M^2
ESTIMATED AVG GLUCOSE: 108 MG/DL (ref 68–131)
FERRITIN SERPL-MCNC: 74 NG/ML (ref 20–300)
GLUCOSE SERPL-MCNC: 88 MG/DL (ref 70–110)
HBA1C MFR BLD: 5.4 % (ref 4–5.6)
HCT VFR BLD AUTO: 40.2 % (ref 37–48.5)
HDLC SERPL-MCNC: 76 MG/DL (ref 40–75)
HDLC SERPL: 46.1 % (ref 20–50)
HGB BLD-MCNC: 12.5 G/DL (ref 12–16)
IMM GRANULOCYTES # BLD AUTO: 0.01 K/UL (ref 0–0.04)
IMM GRANULOCYTES NFR BLD AUTO: 0.1 % (ref 0–0.5)
IRON SERPL-MCNC: 70 UG/DL (ref 30–160)
LDLC SERPL CALC-MCNC: 80.4 MG/DL (ref 63–159)
LYMPHOCYTES # BLD AUTO: 4.3 K/UL (ref 1–4.8)
LYMPHOCYTES NFR BLD: 52.8 % (ref 18–48)
MCH RBC QN AUTO: 26.3 PG (ref 27–31)
MCHC RBC AUTO-ENTMCNC: 31.1 G/DL (ref 32–36)
MCV RBC AUTO: 85 FL (ref 82–98)
MONOCYTES # BLD AUTO: 0.4 K/UL (ref 0.3–1)
MONOCYTES NFR BLD: 5.3 % (ref 4–15)
NEUTROPHILS # BLD AUTO: 3.3 K/UL (ref 1.8–7.7)
NEUTROPHILS NFR BLD: 40.6 % (ref 38–73)
NONHDLC SERPL-MCNC: 89 MG/DL
NRBC BLD-RTO: 0 /100 WBC
PLATELET # BLD AUTO: 258 K/UL (ref 150–450)
PMV BLD AUTO: 12 FL (ref 9.2–12.9)
POTASSIUM SERPL-SCNC: 4.3 MMOL/L (ref 3.5–5.1)
PROT SERPL-MCNC: 7.6 G/DL (ref 6–8.4)
RBC # BLD AUTO: 4.75 M/UL (ref 4–5.4)
SATURATED IRON: 20 % (ref 20–50)
SODIUM SERPL-SCNC: 142 MMOL/L (ref 136–145)
TOTAL IRON BINDING CAPACITY: 343 UG/DL (ref 250–450)
TRANSFERRIN SERPL-MCNC: 232 MG/DL (ref 200–375)
TRIGL SERPL-MCNC: 43 MG/DL (ref 30–150)
TSH SERPL DL<=0.005 MIU/L-ACNC: 3.66 UIU/ML (ref 0.4–4)
WBC # BLD AUTO: 8.05 K/UL (ref 3.9–12.7)

## 2025-03-05 PROCEDURE — 80053 COMPREHEN METABOLIC PANEL: CPT | Performed by: NURSE PRACTITIONER

## 2025-03-05 PROCEDURE — 83036 HEMOGLOBIN GLYCOSYLATED A1C: CPT | Performed by: NURSE PRACTITIONER

## 2025-03-05 PROCEDURE — 85025 COMPLETE CBC W/AUTO DIFF WBC: CPT | Performed by: NURSE PRACTITIONER

## 2025-03-05 PROCEDURE — 83540 ASSAY OF IRON: CPT | Performed by: NURSE PRACTITIONER

## 2025-03-05 PROCEDURE — 82728 ASSAY OF FERRITIN: CPT | Performed by: NURSE PRACTITIONER

## 2025-03-05 PROCEDURE — 80061 LIPID PANEL: CPT | Performed by: NURSE PRACTITIONER

## 2025-03-05 PROCEDURE — 84443 ASSAY THYROID STIM HORMONE: CPT | Performed by: NURSE PRACTITIONER

## 2025-03-05 PROCEDURE — 36415 COLL VENOUS BLD VENIPUNCTURE: CPT | Mod: PO | Performed by: NURSE PRACTITIONER

## 2025-03-05 PROCEDURE — 82306 VITAMIN D 25 HYDROXY: CPT | Performed by: NURSE PRACTITIONER

## 2025-03-05 PROCEDURE — 82607 VITAMIN B-12: CPT | Performed by: NURSE PRACTITIONER

## 2025-03-06 ENCOUNTER — RESULTS FOLLOW-UP (OUTPATIENT)
Dept: INTERNAL MEDICINE | Facility: CLINIC | Age: 55
End: 2025-03-06
Payer: COMMERCIAL

## 2025-03-06 DIAGNOSIS — R74.01 ELEVATED AST (SGOT): Primary | ICD-10-CM

## 2025-03-06 DIAGNOSIS — M17.11 PRIMARY OSTEOARTHRITIS OF RIGHT KNEE: ICD-10-CM

## 2025-03-06 DIAGNOSIS — M67.911 TENDINOPATHY OF ROTATOR CUFF, RIGHT: ICD-10-CM

## 2025-03-06 LAB — VIT B12 SERPL-MCNC: 830 NG/L (ref 180–914)

## 2025-03-07 RX ORDER — DICLOFENAC SODIUM 10 MG/G
2 GEL TOPICAL 4 TIMES DAILY
Qty: 400 G | Refills: 1 | Status: SHIPPED | OUTPATIENT
Start: 2025-03-07

## 2025-04-07 ENCOUNTER — LAB VISIT (OUTPATIENT)
Dept: LAB | Facility: HOSPITAL | Age: 55
End: 2025-04-07
Payer: COMMERCIAL

## 2025-04-07 DIAGNOSIS — R74.01 ELEVATED AST (SGOT): ICD-10-CM

## 2025-04-07 LAB
ALBUMIN SERPL BCP-MCNC: 3.3 G/DL (ref 3.5–5.2)
ALP SERPL-CCNC: 125 UNIT/L (ref 40–150)
ALT SERPL W/O P-5'-P-CCNC: 12 UNIT/L (ref 10–44)
AST SERPL-CCNC: 15 UNIT/L (ref 11–45)
BILIRUB DIRECT SERPL-MCNC: 0.1 MG/DL (ref 0.1–0.3)
BILIRUB SERPL-MCNC: 0.3 MG/DL (ref 0.1–1)
PROT SERPL-MCNC: 7.5 GM/DL (ref 6–8.4)

## 2025-04-07 PROCEDURE — 36415 COLL VENOUS BLD VENIPUNCTURE: CPT | Mod: PO

## 2025-04-07 PROCEDURE — 80076 HEPATIC FUNCTION PANEL: CPT

## 2025-04-08 ENCOUNTER — RESULTS FOLLOW-UP (OUTPATIENT)
Dept: INTERNAL MEDICINE | Facility: CLINIC | Age: 55
End: 2025-04-08

## 2025-04-16 ENCOUNTER — HOSPITAL ENCOUNTER (OUTPATIENT)
Dept: RADIOLOGY | Facility: HOSPITAL | Age: 55
Discharge: HOME | End: 2025-04-16
Attending: ORTHOPAEDIC SURGERY
Payer: COMMERCIAL

## 2025-04-16 ENCOUNTER — OFFICE VISIT (OUTPATIENT)
Dept: SPORTS MEDICINE | Facility: CLINIC | Age: 55
End: 2025-04-16
Payer: COMMERCIAL

## 2025-04-16 VITALS
SYSTOLIC BLOOD PRESSURE: 118 MMHG | HEIGHT: 64 IN | HEART RATE: 73 BPM | WEIGHT: 219 LBS | DIASTOLIC BLOOD PRESSURE: 81 MMHG | BODY MASS INDEX: 37.39 KG/M2

## 2025-04-16 DIAGNOSIS — M25.512 CHRONIC LEFT SHOULDER PAIN: ICD-10-CM

## 2025-04-16 DIAGNOSIS — M75.42 SHOULDER IMPINGEMENT SYNDROME, LEFT: ICD-10-CM

## 2025-04-16 DIAGNOSIS — G89.29 CHRONIC LEFT SHOULDER PAIN: ICD-10-CM

## 2025-04-16 DIAGNOSIS — M25.512 LEFT SHOULDER PAIN, UNSPECIFIED CHRONICITY: ICD-10-CM

## 2025-04-16 DIAGNOSIS — M25.512 LEFT SHOULDER PAIN, UNSPECIFIED CHRONICITY: Primary | ICD-10-CM

## 2025-04-16 DIAGNOSIS — M19.012 ARTHRITIS OF LEFT ACROMIOCLAVICULAR JOINT: ICD-10-CM

## 2025-04-16 PROBLEM — M75.102 NONTRAUMATIC TEAR OF LEFT ROTATOR CUFF: Status: ACTIVE | Noted: 2025-04-16

## 2025-04-16 PROCEDURE — 99204 OFFICE O/P NEW MOD 45 MIN: CPT | Mod: S$GLB,,, | Performed by: ORTHOPAEDIC SURGERY

## 2025-04-16 PROCEDURE — 3008F BODY MASS INDEX DOCD: CPT | Mod: CPTII,S$GLB,, | Performed by: ORTHOPAEDIC SURGERY

## 2025-04-16 PROCEDURE — 99999 PR PBB SHADOW E&M-EST. PATIENT-LVL IV: CPT | Mod: PBBFAC,,, | Performed by: ORTHOPAEDIC SURGERY

## 2025-04-16 PROCEDURE — 73030 X-RAY EXAM OF SHOULDER: CPT | Mod: 26,LT,, | Performed by: RADIOLOGY

## 2025-04-16 PROCEDURE — 1159F MED LIST DOCD IN RCRD: CPT | Mod: CPTII,S$GLB,, | Performed by: ORTHOPAEDIC SURGERY

## 2025-04-16 PROCEDURE — 73030 X-RAY EXAM OF SHOULDER: CPT | Mod: TC,LT

## 2025-04-16 PROCEDURE — 3079F DIAST BP 80-89 MM HG: CPT | Mod: CPTII,S$GLB,, | Performed by: ORTHOPAEDIC SURGERY

## 2025-04-16 PROCEDURE — 3044F HG A1C LEVEL LT 7.0%: CPT | Mod: CPTII,S$GLB,, | Performed by: ORTHOPAEDIC SURGERY

## 2025-04-16 PROCEDURE — 3074F SYST BP LT 130 MM HG: CPT | Mod: CPTII,S$GLB,, | Performed by: ORTHOPAEDIC SURGERY

## 2025-04-16 PROCEDURE — 4010F ACE/ARB THERAPY RXD/TAKEN: CPT | Mod: CPTII,S$GLB,, | Performed by: ORTHOPAEDIC SURGERY

## 2025-04-16 NOTE — PROGRESS NOTES
CC: left shoulder pain     55 y.o. Female RHD reports pain in the left shoulder for the past 3 weeks. She stated that the pain is severe and not responding to any conservative care. No antecedent trauma. 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     She reports that the pain is worse with overhead activity. It also bothers her at night.    Is affecting ADLs.     SANE: 80  VAS 5/10.  She underwent right shoulder rotator cuff repair 2 years ago - Dr. Bailon.     Review of Systems   Constitution: Negative. Negative for chills, fever and night sweats.   HENT: Negative for congestion and headaches.    Eyes: Negative for blurred vision, left vision loss and right vision loss.   Cardiovascular: Negative for chest pain and syncope.   Respiratory: Negative for cough and shortness of breath.    Endocrine: Negative for polydipsia, polyphagia and polyuria.   Hematologic/Lymphatic: Negative for bleeding problem. Does not bruise/bleed easily.   Skin: Negative for dry skin, itching and rash.   Musculoskeletal: Negative for falls and muscle weakness.   Gastrointestinal: Negative for abdominal pain and bowel incontinence.   Genitourinary: Negative for bladder incontinence and nocturia.   Neurological: Negative for disturbances in coordination, loss of balance and seizures.   Psychiatric/Behavioral: Negative for depression. The patient does not have insomnia.    Allergic/Immunologic: Negative for hives and persistent infections.     PAST MEDICAL HISTORY:   Past Medical History:   Diagnosis Date    Acute pain of right knee 11/4/2016    Chronic right shoulder pain 11/4/2016    COVID-19 virus infection 4/10/2020    Essential hypertension 11/4/2016    GERD (gastroesophageal reflux disease)     Incomplete tear of right rotator cuff 11/30/2016    Osteoarthritis of right knee 11/30/2016    Rotator cuff tear     S/P Exploratory Laparotomy/Mass Resection/BSO 1/31/2019 1- 1. Soft tissue, abdominal  wall, mass, resection: - Benign leiomyoma, 6.5 cm in greatest dimension, see comment. 2. Ovary and fallopian tube, left, left salpingo-oophorectomy: - Benign ovary with cystic corpus luteum, 2.7 cm in greatest dimension. - Benign fallopian tube with no significant histologic abnormality. 3. Fallopian tube and ovary, right, right salpingo-oophorectomy: - Benign o    S/P laparoscopic sleeve gastrectomy 2016    Syncope and collapse 2017     PAST SURGICAL HISTORY:   Past Surgical History:   Procedure Laterality Date    ARTHROSCOPIC DEBRIDEMENT OF SHOULDER Right 11/3/2022    Procedure: DEBRIDEMENT, SHOULDER, ARTHROSCOPIC;  Surgeon: Eliseo Bailon MD;  Location: [facility] OR;  Service: Orthopedics;  Laterality: Right;    ARTHROSCOPIC DECOMPRESSION OF SHOULDER Right 11/3/2022    Procedure: DECOMPRESSION, SHOULDER, ARTHROSCOPIC;  Surgeon: Eliseo Bailon MD;  Location: [facility] OR;  Service: Orthopedics;  Laterality: Right;    ARTHROSCOPIC REPAIR OF ROTATOR CUFF OF SHOULDER Right 11/3/2022    Procedure: REPAIR, ROTATOR CUFF, ARTHROSCOPIC - POLAR CARE;  Surgeon: Eliseo Bailon MD;  Location: [facility] OR;  Service: Orthopedics;  Laterality: Right;  Regional Block    BILATERAL SALPINGO-OOPHORECTOMY (BSO) Bilateral 2019    Procedure: SALPINGO-OOPHORECTOMY, BILATERAL;  Surgeon: Petrona Porter MD;  Location: [facility] OR 2ND FLR;  Service: OB/GYN;  Laterality: Bilateral;     SECTION      x1    COLONOSCOPY N/A 2019    Procedure: COLONOSCOPY;  Surgeon: Chris Bennett MD;  Location: [facility] ENDO (4TH FLR);  Service: Endoscopy;  Laterality: N/A;  Justo Camilo or Donald if no availability in Dec. will schedule after CT scan results    COLONOSCOPY N/A 2024    Procedure: COLONOSCOPY;  Surgeon: Fuentes Kemp MD;  Location: [facility] ENDOSCOPY;  Service: Endoscopy;  Laterality: N/A;  3/26- pc complete. [facility]    COLPOSCOPY      EXCISION OF PELVIC MASS N/A 2019    Procedure: EXCISION, MASS, PELVIS;  Surgeon:  "Petrona Porter MD;  [facility];  Service: OB/GYN;  Laterality: N/A;    HERNIA REPAIR      HYSTERECTOMY  2011    YANG, ovaries remain (fibroids0    LAPAROTOMY, EXPLORATORY N/A 1/31/2019    Procedure: LAPAROTOMY, EXPLORATORY;  Surgeon: Petrona Porter MD;  [facility];  Service: OB/GYN;  Laterality: N/A;    OOPHORECTOMY Bilateral 01/31/2019    SLEEVE GASTROPLASTY  11/2014    TUBAL LIGATION       FAMILY HISTORY:   Family History   Problem Relation Name Age of Onset    Cancer Mother d 69         colon?    Hypertension Mother d 69     Heart disease Mother d 69     Heart attack Mother d 69     Rectal cancer Mother d 69     Colon cancer Mother d 69     Hypertension Father      Heart disease Father      Stroke Father      Heart attack Father      Hypertension Sister      Hypertension Brother      Cancer Brother          Eye    No Known Problems Daughter      No Known Problems Son      Breast cancer Maternal Aunt      Esophageal cancer Neg Hx       SOCIAL HISTORY: Social History[1]    MEDICATIONS: Current Medications[2]  ALLERGIES: Review of patient's allergies indicates:  No Known Allergies    VITAL SIGNS: /81 (Patient Position: Sitting)   Pulse 73   Ht 5' 4" (1.626 m)   Wt 99.3 kg (219 lb 0.4 oz)   LMP 03/01/2011 (Exact Date)   BMI 37.60 kg/m²      PHYSICAL EXAMINATION:  General:  The patient is alert and oriented x 3.  Mood is pleasant.  Observation of ears, eyes and nose reveal no gross abnormalities.  No labored breathing observed.  Gait is coordinated. Patient can toe walk and heel walk without difficulty.      left SHOULDER / UPPER EXTREMITY EXAM    OBSERVATION:     Swelling  none  Deformity  none   Discoloration  none   Scapular winging none   Scars   none  Atrophy  none    TENDERNESS / CREPITUS (T/C):          T/C      T/C   Clavicle   -/-  SUPRAspinatus    -/-     AC Jt.    +/-  INFRAspinatus  -/-    SC Jt.    -/-  Deltoid    -/-      G. Tuberosity  -/-  LH BICEP " groove  +/-   Acromion:  -/-  Midline Neck   -/-     Scapular Spine -/-  Trapezium   -/-   SMA Scapula  -/-  GH jt. line - post  -/-     Scapulothoracic  -/-         ROM: (* = with pain)  Right shoulder   Left shoulder        AROM (PROM)   AROM (PROM)   FE    170° (175°)     170° (175°)     ER at 0°    60°  (65°)    60°  (65°)   ER at 90° ABD  90°  (90°)    90°  (90°)   IR at 90°  ABD   NA  (40°)     NA  (40°)      IR (spine level)   T10     T10    STRENGTH: (* = with pain) RIGHT SHOULDER  LEFT SHOULDER   SCAPTION at 0  5/5    5/5   SCAPTION at 30  5/5    5/5    IR    5/5    5/5   ER    5/5    5/5   BICEPS   5/5    5/5   Deltoid    5/5    5/5     SIGNS:  Painful side       NEER   +    OFRANCESS  +    MCDONOUGH   +    SPEEDS  +     DROP ARM   neg   BELLY PRESS neg   Superior escape none    LIFT-OFF  neg   X-Body ADD    neg    MOVING VALGUS neg        STABILITY TESTING    RIGHT SHOULDER   LEFT SHOULDER     Translation     Anterior  up face     up face    Posterior  up face    up face    Sulcus   < 10mm    < 10 mm     Signs   Apprehension   neg      neg       Relocation   no change     no change      Jerk test  neg     neg    EXTREMITY NEURO-VASCULAR EXAM    Sensation grossly intact to light touch all dermatomal regions.    DTR 2+ Biceps, Triceps, BR and Negative José Miguels sign   Grossly intact motor function at Elbow, Wrist and Hand   Distal pulses radial and ulnar 2+, brisk cap refill, symmetric.      NECK:  Painless FROM and spinous processes non-tender. Negative Spurlings sign.      OTHER FINDINGS:  + scapular dyskinesia    XRAYS reviewed and interpreted personally by me:  Shoulder trauma series left,  were obtained and reviewed  No convincing fracture or dislocation is noted. The osseous structures appear well mineralized and well aligned      ASSESSMENT:   left:  1. Shoulder pain, impingement   2.  Scapular dyskinesia    PLAN:    PT referral done (Parish Bedolla)    PT for scapular stabilization: Scapular  dyskinesia   Scapular stabilization - [place] protocol, 1-3x/week x 6-8 weeks with HEP    All questions were answered, pt will contact us for questions or concerns in the interim.    I made the decision to obtain old records of the patient including previous notes and imaging. New imaging was ordered today of the extremity or extremities evaluated. I independently reviewed and interpreted the radiographs and/or MRIs today as well as prior imaging.         [1]   Social History  Socioeconomic History    Marital status:    Tobacco Use    Smoking status: Never     Passive exposure: Never    Smokeless tobacco: Never   Substance and Sexual Activity    Alcohol use: Yes     Comment: occasionally    Drug use: No    Sexual activity: Yes     Partners: Male     Social Drivers of Health     Financial Resource Strain: Low Risk  (8/20/2024)    Overall Financial Resource Strain (CARDIA)     Difficulty of Paying Living Expenses: Not hard at all   Food Insecurity: No Food Insecurity (8/20/2024)    Hunger Vital Sign     Worried About Running Out of Food in the Last Year: Never true     Ran Out of Food in the Last Year: Never true   Transportation Needs: No Transportation Needs (7/25/2023)    PRAPARE - Transportation     Lack of Transportation (Medical): No     Lack of Transportation (Non-Medical): No   Physical Activity: Insufficiently Active (8/20/2024)    Exercise Vital Sign     Days of Exercise per Week: 2 days     Minutes of Exercise per Session: 20 min   Stress: No Stress Concern Present (8/20/2024)    [employer] [place] [employer] - Occupational Stress Questionnaire     Feeling of Stress : Only a little   Housing Stability: Unknown (7/25/2023)    Housing Stability Vital Sign     Unable to Pay for Housing in the Last Year: No     Unstable Housing in the Last Year: No   [2]   Current Outpatient Medications:     diclofenac sodium (VOLTAREN) 1 % Gel, Apply 2 grams topically 4 (four) times daily., Disp: 400 g, Rfl:  1    hydroCHLOROthiazide (HYDRODIURIL) 25 MG tablet, Take 1/2 tablet (12.5 mg total) by mouth once daily., Disp: 45 tablet, Rfl: 3    linaCLOtide (LINZESS) 72 mcg Cap capsule, Take 1 capsule (72 mcg total) by mouth before breakfast., Disp: 90 capsule, Rfl: 1    losartan (COZAAR) 25 MG tablet, Take 1 tablet (25 mg total) by mouth once daily., Disp: 90 tablet, Rfl: 3    multivitamin capsule, Take 1 capsule by mouth once daily., Disp: , Rfl:     vitamin D (VITAMIN D3) 1000 units Tab, Take 1,000 Units by mouth once daily., Disp: , Rfl:     b complex vitamins capsule, Take 1 capsule by mouth once daily. (Patient not taking: Reported on 4/16/2025), Disp: , Rfl:     cetirizine (ZYRTEC) 10 MG tablet, Take 1 tablet (10 mg total) by mouth once daily. for 14 days, Disp: 14 tablet, Rfl: 0    fluticasone propionate (FLONASE) 50 mcg/actuation nasal spray, 2 sprays (100 mcg total) by Each Nostril route 2 (two) times a day. (Patient not taking: Reported on 2/26/2025), Disp: 16 g, Rfl: 0    semaglutide, weight loss, (WEGOVY) 1.7 mg/0.75 mL PnIj, Inject 1.7 mg into the skin every 7 days. (Patient not taking: Reported on 2/26/2025), Disp: 9 mL, Rfl: 1

## 2025-04-29 ENCOUNTER — PATIENT MESSAGE (OUTPATIENT)
Dept: BARIATRICS | Facility: CLINIC | Age: 55
End: 2025-04-29
Payer: COMMERCIAL

## 2025-05-06 ENCOUNTER — CLINICAL SUPPORT (OUTPATIENT)
Dept: REHABILITATION | Facility: HOSPITAL | Age: 55
End: 2025-05-06
Attending: ORTHOPAEDIC SURGERY
Payer: OTHER GOVERNMENT

## 2025-05-06 DIAGNOSIS — M25.512 LEFT SHOULDER PAIN, UNSPECIFIED CHRONICITY: ICD-10-CM

## 2025-05-06 DIAGNOSIS — M19.012 ARTHRITIS OF LEFT ACROMIOCLAVICULAR JOINT: ICD-10-CM

## 2025-05-06 DIAGNOSIS — M75.42 SHOULDER IMPINGEMENT SYNDROME, LEFT: ICD-10-CM

## 2025-05-06 PROCEDURE — 97161 PT EVAL LOW COMPLEX 20 MIN: CPT | Performed by: PHYSICAL THERAPIST

## 2025-05-06 NOTE — PROGRESS NOTES
Physical Therapy Evaluation    Patient Name: Renea Mac  MRN: [id number]  YOB: 1970  Encounter Date: 5/6/2025    Therapy Diagnosis:   Encounter Diagnoses   Name Primary?    Left shoulder pain, unspecified chronicity     Shoulder impingement syndrome, left     Arthritis of left acromioclavicular joint      Physician: Paola Crabtree MD    Physician Orders: Eval and Treat  Medical Diagnosis: Left shoulder pain, unspecified chronicity  Shoulder impingement syndrome, left  Arthritis of left acromioclavicular joint    Visit # / Visits Authorized:  1 / 1  Insurance Authorization Period: 5/1/2025 to 7/30/2025  Date of Evaluation: 5/6/2025  Plan of Care Certification: 5/6/2025 to 11/6/2025     FOTO: 32  FOTO 1st Follow-up: NA  FOTO 2nd Follow-up: NA    Time In: 1400  Time Out: 1500  Total Billable Time: 60 minutes    Precautions: Standard    Subjective     Date of onset: April 2025  History of current condition - Renea reports: chronic Hx of neck pain, but relatively new onset of L shoulder pain over the course of the past month. Previously seen for neck pain, which had resolved, or was well-managed, up until the shoulder became an issue. 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  Denies any mechanical sx behavior. Denies any N/T.  Noted some generalized weakness with L shoulder and B  strength, but that has been present for a few months.    MD Interval Hx: 4/16  55 y.o. Female RHD reports pain in the left shoulder for the past 3 weeks. She stated that the pain is severe and not responding to any conservative care. No antecedent trauma. 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      She reports that the pain is worse with overhead activity. It also bothers her at night.    Imaging: see chart    Prior Therapy: Yes for neck  Social History: Lives with  family  Occupation: Bank  Prior Level of Function: No issues  Current Level of Function: Issues with OH lifting and carrying    Pain:  Current 6/10, worst 8/10, best 10   Location: left shoulder  Description: Aching, Dull, Tight, and Deep  Aggravating Factors: Bending, Morning, Extension, Flexing, and Lifting  Easing Factors: rest    Pts Goals: Improve tolerance to lifting and OH activity    Medical History:   Past Medical History:   Diagnosis Date    Acute pain of right knee 2016    Chronic right shoulder pain 2016    COVID-19 virus infection 4/10/2020    Essential hypertension 2016    GERD (gastroesophageal reflux disease)     Incomplete tear of right rotator cuff 2016    Osteoarthritis of right knee 2016    Rotator cuff tear     S/P Exploratory Laparotomy/Mass Resection/BSO 2019 1. Soft tissue, abdominal wall, mass, resection: - Benign leiomyoma, 6.5 cm in greatest dimension, see comment. 2. Ovary and fallopian tube, left, left salpingo-oophorectomy: - Benign ovary with cystic corpus luteum, 2.7 cm in greatest dimension. - Benign fallopian tube with no significant histologic abnormality. 3. Fallopian tube and ovary, right, right salpingo-oophorectomy: - Benign o    S/P laparoscopic sleeve gastrectomy 2016    Syncope and collapse 2017       Surgical History:   Renea Mac  has a past surgical history that includes Tubal ligation; Hernia repair; Sleeve Gastroplasty (2014); Hysterectomy ();  section; Colposcopy; Colonoscopy (N/A, 2019); LAPAROTOMY, EXPLORATORY (N/A, 2019); Bilateral salpingo-oophorectomy (BSO) (Bilateral, 2019); Excision of pelvic mass (N/A, 2019); Oophorectomy (Bilateral, 2019); Arthroscopic repair of rotator cuff of shoulder (Right, 11/3/2022); Arthroscopic decompression of shoulder (Right, 11/3/2022); Arthroscopic debridement of shoulder (Right, 11/3/2022); and Colonoscopy (N/A,  4/4/2024).    Medications:   Renea has a current medication list which includes the following prescription(s): b complex vitamins, cetirizine, diclofenac sodium, fluticasone propionate, hydrochlorothiazide, linaclotide, losartan, multivitamin, wegovy, vitamin d, [DISCONTINUED] azelastine, [DISCONTINUED] ketoconazole, and [DISCONTINUED] nystatin.    Allergies:   Review of patient's allergies indicates:  No Known Allergies     Objective     Observation: FHP; increased T-sp kyphosis    Active Range of Motion:   Shoulder Right Left   Flexion 175 165*   Abduction 170 155*   ER at 0 75 65*   * = pain     Strength:  Shoulder Right Left   Flexion 5/5 4/5   Abduction 5/5 4/5   ER 5/5 4/5   IR 5/5 4/5   Serratus Anterior 4/5 3+/5   Middle Trap 4/5 3+/5   Low Trap 4/5 3+/5     Special Tests:  Impingement Cluster:   Right Left   Hawkin's Robinsonndy - +   Painful Arc - +   Resisted ER - +       Palpation: CTJ tightness/stiffness    Intake Outcome Measure for FOTO Shoulder Survey    Therapist reviewed FOTO scores for Renea Mac on 5/6/2025.   FOTO documents entered into [employer] - see Media section.    Intake Score: See Media Section     Treatment     Treatment Time In: 1400  Treatment Time Out: 1500  Total Treatment time separate from Evaluation: 30 minutes    Renea received the treatments listed below:      Therapeutic exercises to develop strength, endurance, ROM, flexibility, posture, and core stabilization for 30 minutes including:  Access Code: [email or web address]  URL: [email or web address]  Date: 05/06/2025  Prepared by: Parish Bedolla    Exercises  - Seated Thoracic Lumbar Extension with Pectoralis Stretch  - 1 x daily - 7 x weekly - 3 sets - 10 reps  - Prone Chest Stretch on Chair  - 1 x daily - 7 x weekly - 3 sets - 10 reps  - Scapular Wall Slides  - 1 x daily - 7 x weekly - 3 sets - 10 reps  - Shoulder Flexion Serratus Activation with Resistance  - 1 x daily - 7 x weekly - 3 sets - 10 reps  - Shoulder PNF D2 with Resistance   - 1 x daily - 7 x weekly - 3 sets - 10 reps    Manual therapy techniques: Joint mobilizations, Manual traction, Myofacial release, and Soft tissue Mobilization were applied to the: neck and upper back for 15 minutes, including:  Upper t-sp P/A mobs  C-sp distraction    Neuromuscular re-education activities to improve: Balance, Coordination, Kinesthetic, Sense, Proprioception, and Posture for 0 minutes. The following activities were included:     Therapeutic activities to improve functional performance for 0 minutes, including:    Home Exercises and Patient Education Provided     Education provided:   - Shoulder Impingement  - Prognosis, activity modification, goals for therapy, role of therapy for care, exercises/HEP    Written Home Exercises Provided:  Exercises were reviewed and Renea was able to demonstrate them prior to the end of the session.   Pt received a written copy of exercises to perform at home. Renea demonstrated good understanding of the education provided.     See EMR under patient instructions for exercises given.     Assessment     Renea is a 55 y.o. female referred to outpatient Physical Therapy with L shoulder impingement. Current deficits include decreased L shoulder ROM, decreased periscapular strength, and t-sp stiffness. Deficits contributing to issues with OH lifting and carrying.    Pt will benefit from skilled outpatient Physical Therapy to address the deficits stated above and in the chart below, provide pt/family education, and to maximize pt's level of independence. Pt prognosis is Fair.     Plan of care discussed with patient: Yes  Pt's spiritual, cultural and educational needs considered and patient is agreeable to the plan of care and goals as stated below:     Anticipated Barriers for therapy: Chronicity of upper back and neck discomfort    Medical Necessity is demonstrated by the following:    History  Co-morbidities and personal factors that may impact the plan of care [x] LOW:  no personal factors / co-morbidities  [] MODERATE: 1-2 personal factors / co-morbidities  [] HIGH: 3+ personal factors / co-morbidities    Moderate / High Support Documentation:   Co-morbidities affecting plan of care: None    Personal Factors:   No deficits     Examination  Body Structures and Functions, activity limitations and participation restrictions that may impact the plan of care [] LOW: addressing 1-2 elements  [] MODERATE: 3+ elements  [x] HIGH: 4+ elements (please support below)    Moderate / High Support Documentation:    Acute pain of right knee 11/4/2016    Chronic right shoulder pain 11/4/2016    COVID-19 virus infection 4/10/2020    Essential hypertension 11/4/2016    GERD (gastroesophageal reflux disease)     Incomplete tear of right rotator cuff 11/30/2016    Osteoarthritis of right knee 11/30/2016    Rotator cuff tear     S/P Exploratory Laparotomy/Mass Resection/BSO 1/31/2019 1- 1. Soft tissue, abdominal wall, mass, resection: - Benign leiomyoma, 6.5 cm in greatest dimension, see comment. 2. Ovary and fallopian tube, left, left salpingo-oophorectomy: - Benign ovary with cystic corpus luteum, 2.7 cm in greatest dimension. - Benign fallopian tube with no significant histologic abnormality. 3. 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    Syncope and collapse 6/14/2017        Clinical Presentation [x] LOW: stable  [] MODERATE: Evolving  [] HIGH: Unstable     Decision Making/ Complexity Score: low       GOALS   Short Term Goals: 6 weeks  Pt will report decreased L shoulder pain  < / =  4/10  to increase tolerance for ADL performance and recreational routine.  Pt will improve L shoulder  ROM to WFL in order to be able to perform ADLs without difficulty.  Pt will improve L shoulder  strength by 1/3 MMT grade to increase tolerance for ADL and work activities  Pt will demonstrate tolerance to HEP to improve  independence with ADL's    Long Term Goals: 24 weeks  Pt will report decreased L shoulder  pain  < / =  2/10  to increase tolerance for ADL performance and recreational routine  Pt will improve L shoulder  ROM to WNL in order to be able to perform ADLs without difficulty  Pt will improve L shoulder  strength by 1/3 MMT grade to increase tolerance for ADL and work activities  Pt will report at CJ level (20-40% impaired) on FOTO Shoulder to demonstrate increase in LE function with every day tasks.     Plan   Plan of care Certification: 5/6/2025 to 11/6/2025.    Outpatient Physical Therapy 2 times weekly for 24 weeks to include the following interventions: Gait Training, Manual Therapy, Moist Heat/ Ice, Neuromuscular Re-ed, Patient Education, Therapeutic Activites, Therapeutic Exercise, and Functional Dry Needling with/or without Electrical Stimulation as needed.     Carlos Bedolla, PT, DPT, OCS  Board Certified in Orthopedic Physical Therapy

## 2025-05-07 ENCOUNTER — OFFICE VISIT (OUTPATIENT)
Dept: BARIATRICS | Facility: CLINIC | Age: 55
End: 2025-05-07
Payer: COMMERCIAL

## 2025-05-07 VITALS
SYSTOLIC BLOOD PRESSURE: 137 MMHG | BODY MASS INDEX: 37.37 KG/M2 | HEIGHT: 64 IN | HEART RATE: 61 BPM | OXYGEN SATURATION: 98 % | WEIGHT: 218.88 LBS | DIASTOLIC BLOOD PRESSURE: 80 MMHG

## 2025-05-07 DIAGNOSIS — E66.812 CLASS 2 SEVERE OBESITY WITH BODY MASS INDEX (BMI) OF 35 TO 39.9 WITH SERIOUS COMORBIDITY: Primary | ICD-10-CM

## 2025-05-07 DIAGNOSIS — Z98.84 S/P LAPAROSCOPIC SLEEVE GASTRECTOMY: ICD-10-CM

## 2025-05-07 DIAGNOSIS — E66.01 CLASS 2 SEVERE OBESITY WITH BODY MASS INDEX (BMI) OF 35 TO 39.9 WITH SERIOUS COMORBIDITY: Primary | ICD-10-CM

## 2025-05-07 PROCEDURE — 99999 PR PBB SHADOW E&M-EST. PATIENT-LVL IV: CPT | Mod: PBBFAC,,, | Performed by: INTERNAL MEDICINE

## 2025-05-07 PROCEDURE — 3008F BODY MASS INDEX DOCD: CPT | Mod: CPTII,S$GLB,, | Performed by: INTERNAL MEDICINE

## 2025-05-07 PROCEDURE — 3044F HG A1C LEVEL LT 7.0%: CPT | Mod: CPTII,S$GLB,, | Performed by: INTERNAL MEDICINE

## 2025-05-07 PROCEDURE — 4010F ACE/ARB THERAPY RXD/TAKEN: CPT | Mod: CPTII,S$GLB,, | Performed by: INTERNAL MEDICINE

## 2025-05-07 PROCEDURE — 1159F MED LIST DOCD IN RCRD: CPT | Mod: CPTII,S$GLB,, | Performed by: INTERNAL MEDICINE

## 2025-05-07 PROCEDURE — 3075F SYST BP GE 130 - 139MM HG: CPT | Mod: CPTII,S$GLB,, | Performed by: INTERNAL MEDICINE

## 2025-05-07 PROCEDURE — 99213 OFFICE O/P EST LOW 20 MIN: CPT | Mod: S$GLB,,, | Performed by: INTERNAL MEDICINE

## 2025-05-07 PROCEDURE — 3079F DIAST BP 80-89 MM HG: CPT | Mod: CPTII,S$GLB,, | Performed by: INTERNAL MEDICINE

## 2025-05-07 RX ORDER — SEMAGLUTIDE 0.25 MG/.5ML
0.25 INJECTION, SOLUTION SUBCUTANEOUS WEEKLY
Qty: 2 ML | Refills: 0 | Status: SHIPPED | OUTPATIENT
Start: 2025-05-07 | End: 2025-06-04

## 2025-05-07 RX ORDER — SEMAGLUTIDE 1 MG/.5ML
1 INJECTION, SOLUTION SUBCUTANEOUS WEEKLY
Qty: 2 ML | Refills: 0 | Status: SHIPPED | OUTPATIENT
Start: 2025-07-04 | End: 2025-08-01

## 2025-05-07 RX ORDER — SEMAGLUTIDE 1.7 MG/.75ML
1.7 INJECTION, SOLUTION SUBCUTANEOUS WEEKLY
Qty: 3 ML | Refills: 0 | Status: SHIPPED | OUTPATIENT
Start: 2025-08-02 | End: 2025-08-30

## 2025-05-07 RX ORDER — SEMAGLUTIDE 0.5 MG/.5ML
0.5 INJECTION, SOLUTION SUBCUTANEOUS WEEKLY
Qty: 2 ML | Refills: 0 | Status: SHIPPED | OUTPATIENT
Start: 2025-06-05 | End: 2025-07-03

## 2025-05-07 NOTE — PATIENT INSTRUCTIONS
"  Start Wegovy 0.25 mg once a week x 1 month. At the end of that month, the dose will continue to increase monthly.       Decrease portions as soon as you start wegovy. Avoid fried, rich or greasy foods.      Some nausea in the first 2 weeks is not unusual.     If you get pain across the upper abdomen and around to your back, please call the office.       [email or web address] to sign up for coupon card.     Continue bariatric diet for life.     - To lose weight you want to cut 100% starchy carbohydrates out of your diet (bread, rice, pasta, potatoes, granola, flour, corn, peas, oatmeal, grits, tortillas, crackers, chips) and get  grams of protein.  Aim for 100 grams of protein daily.    - No soda, sweet tea, juices, sports drinks or lemonade     -Exercise daily    Spring Recipes:    Fitzwilliam Shake:     Ingredients  ½ cup low fat cottage cheese  ¼ cup vanilla protein powder  1/8 tsp mint extract  2-3 packets of stevia or artificial sweetener of choice  5-10 ice cubes (more or less depending on how thick you would like it)  4 oz of water  2-3 drops of green food coloring OR a small handful of spinach to make it green    Put all ingredients in  and  until desired consistency.      "Unstuffed" Cabbage Rolls:    Ingredients:  1 1/2 to 2 pounds lean ground beef or turkey  1 large onion, chopped  1 clove garlic, minced  1 small cabbage, chopped  2 cans (14.5 ounces each) diced tomatoes  1 can (8 ounces) tomato sauce  ¼ - ½ cup water depending on desired consistency  1 teaspoon ground black pepper, salt to taste    Spray large skillet with nonstick cookspray. Heat pan on medium heat. Sauté the onions until tender, and then add the ground beef (or turkey) and cook until the meat is browned.    Add the garlic, cook an additional minute before adding the remaining ingredients. Cover, reduce the heat and simmer about 25 minutes (or until the cabbage is  fork tender)        Not so Pallavi's Pie:    Ingredients  2 " (or more) pounds of lean ground beef, or turkey  2 heads of cauliflower or 3 bags of frozen cauliflower, chopped  1 bag of frozen mixed veggies          (NO Corn, Peas or Potatoes!)  1-2 onions  1 egg  1 teaspoon each of basil, garlic powder, pepper, oregano and a little cayenne  4-5 sprays of I cant believe its not butter spray  4 ounces of fat free cream cheese (optional)  Low fat Cheese to top (optional)    Roast cauliflower in oven on 350* F for about 15-20 minutes, until browned and fork tender. (begin martinez meat while cauliflower is cooking). Place cooked cauliflower in . Add 4 ounces of fat free cream cheese, 4-5 sprays of I cant believe its not butter SPRAY, and spices and puree until creamy.     While cauliflower is roasting, brown meat in large skillet and season to taste. Set aside. Sauté diced onion in skillet until somewhat soft. Set aside with meat. Pour mixed veggies in the skillet to heat on low heat.     Mix the meat, onions, mixed veggies, raw egg and any additional seasonings and put in bottom of 9x13 baking dish. Spread mashed cauliflower mixture over it until smooth.    Bake at 350 for approximately 30 minutes. Add cheese and bake 5 additional minutes (optional). Serve warm (or reheat later).    Cro Pot Balsamic Pork Tenderloin:    Ingredients:  1 tsp dried thyme  1 tsp ground pepper  ½ tsp salt  3 tbsp dried minced onion  2 tsp dried basil  ¼ cup low sodium broth  ½ cup balsamic vinegar  2 cloves garlic, minced  2 lbs Pork Tenderloin    Mix first 5 ingredients together. Rub pork tenderloin with dry seasoning mixture.  In a large sauté pan, heat ¼ cup balsamic vinegar and garlic on medium heat. Add pork to sauté spence and sear, martinez all sides. Add low sodium broth, 1 tbsp at a time to keep tenderloin from sticking or use nonstick cookspray.     Transfer meat to crock pot. Pour remaining balsamic vinegar into sauté pan and continue  to stir for a few minutes to deglaze the pan.  Pour juices over the top of the tenderloin in crockpot. Cook on high for 3 hours or until meat thermometer says 170*. Let rest 5-10 minutes and then slice.       Side Dish: Steamed carrots w/ garlic and mary grace    Ingredients:  2 garlic cloves, minced  1 lb baby carrots  5-6 sprays of I cant believe its not butter SPRAY  1 tsp minced peeled fresh mary grace  1 tbsp chopped fresh cilantro  ½ tsp grated lime rind  1 tbsp fresh lime juice   ¼ tsp salt    Prepare garlic; let stand 10 minutes. Steam carrots, covered in pot, about 10 minutes or until tender.     In a nonstick skillet over medium heat, spray pan w/ I cant believe its not butter SPRAY. Add garlic and mary grace and cook 1 minute, stirring constantly. Remove from  heat; stir in carrots, cilantro and remaining ingredients.         Side Dish: Green Bean Almondine    Ingredients:  1 pound fresh green beans, trimmed  1 tbsp ana vinegar  1 ½ tsp water  1 tsp William Mustard  ¼ tsp salt  ¼ tsp freshly ground black pepper  1 tbsp sliced almonds, toasted    Cook green beans in boiling water for 4 minutes or until crisp-tender. Drain and plunge beans into ice water. Drain well. Pat beans dry w/ paper towel.     Combine vinegar, water, mustard, salt and pepper in a medium bowl. Add beans to vinegar mixture; toss to coat well. Sprinkle with toasted almonds.      How to Cook the Perfect Hard Boiled Egg:    Steam in water: Fill a large pot with 1 inch of water. Place steamer insert inside, cover, and bring to a boil over high heat. Add eggs to steamer basket, cover, and continue cooking 12 minute. If serving cold, immediately place eggs in a bowl of ice water and allow to cool for at least 15 minutes before peeling under cool running water. Store in the refrigerator for up to 5 days.    OR    Purchase Dash Go Rapid Egg Boiler: available @ [employer], [employer], [employer], [employer] for ~$15-$20      Classic Egg Salad Recipe:    Ingredients:  8 hard cooked eggs, peeled and  coarsely chopped  4-6 tbsp of low fat or fat free herron  2 tbsp celery, chopped  2 tsp nubia mustard  Dash of cayenne pepper (for spice)  Black pepper, salt to taste    In a medium bowl, combine herron, celery, mustard and cayenne pepper with chopped eggs. Season w/ pepper and salt. Serve over Lettuce leaves.     Get Creative! Add chopped turkey reeder and tomato and serve on lettuce leaves for an egg-cellent BLT egg salad or mix lean diced ham, low fat cheddar and tomatoes for  egg salad    Tuna Deviled Eggs:    Ingredients:  12 hard boiled eggs  1 can of tuna packed in water  1 rib celery, diced  ¼ cup low fat herron  1 tsp mustard  Garlic powder, black pepper to taste  Dash of paprika (for finish)    Cut eggs in half lengthwise. Remove yolk and mash in bowl. In separate bowl, mix tuna, celery, low fat herron, mustard and seasonings.  Stir in egg yolks until blended. Stuff eggs and sprinkle dash of paprika      Reeder Jalapeno Deviled Eggs:    Ingredients:  12 hard boiled eggs, peeled  ½ cup low fat herron  1 ½ tsp rice vinegar  ¾ tsp ground mustard  ½ packet splenda  2 jalapenos, seeded and chopped, 6 pieces turkey reeder, cooked crisp and crumbled  Dash of paprika (for finish)    Cut eggs in half lengthwise. Remove yolk and mash in bowl. Add herron, vinegar, spices and Splenda until well combined. Mix in jalapenos and reeder. Stuff eggs and sprinkle w/ dash of paprika      Sugar-Free High Protein Brownie Recipe:    Ingredients:  2 cups of pureed zucchini  4 oz fat free cream cheese  1 large egg  2 scoops chocolate protein powder  1 tbsp pure vanilla extract  1/3 cup unsweetened cocoa powder    ¼ tsp salt  2 tbsp chopped nuts  *8-9 packets of splenda or artificial sweetener of choice    Preheat oven to 350* F.     Line an 8x8 pan w/ parchment paper or spray with nonstick cookspray.     In a medium bowl, blend the fat free cream cheese with egg until creamy. Add chocolate protein powder and cocoa powder until creamy. Add  vanilla extract. Stir in pureed zucchini and salt.     The batter will be thick, but not dry. If batter seems dry, add 1-2 tbsp water. Fold in Nuts. Pour batter in prepared pan.     Bake for 30 minutes. Allow to cool completely. Cut into squares and chill to semi-set.     *best if eaten within 2 days but may last 3-4 days in fridge.         Lemon Whip:    Ingredients:  Small box of sugar-free vanilla instant pudding mix  1 small packet of crystal light lemonade mix  2 cups skim milk or fat free fairlife milk  Sugar-free, fat free cool whip     Make sugar-free pudding using 2 cups skim milk according to package. Stir in 1 small packet of crystal light lemonade mix.  Fold in a dollop of sugar-free, fat free cool whip and stir to combine.     Home-made Sugar-free Pudding Pops:    Ingredients:  1 small pkg of sugar-free instant pudding mix (flavor of choice!)  2 cups fat free milk     Beat ingredients with whisk for 2 minutes. Pour into 6 paper or plastic cups or into a popsicle mold. Insert wooden pop stick in center of each cup.     Freeze for 5 hours or until firm. Peel off paper or pull out of popsicle mold.     Variations: add sugar-free syrups to change up the flavor, such as sugar-free chocolate pudding + sugar free raspberry syrup = chocolate raspberry fudgesicle.

## 2025-05-07 NOTE — PROGRESS NOTES
"Subjective:       Patient ID: Renea Mac is a 55 y.o. female.    Chief Complaint: Follow-up    Pt here today for follow-up.. Has gained 9 lbs since last OV.       Was to get back on track with bariatric diet and started Wegovy, was on 1.7 mg weekly. Has not been on it since January 2/2 tier change. Denies SE. Appetite is up.   Still working on eating. She feels she is doing better with her eating habits.   Has been doing some walking. No resistance training.     gastric sleeve (11/2014).     Prev med hx: topiramate made her very emotional and weepy. Has taken phentermine in the past. Started diethylpropion  . BP was OK.   She denies SE, as long as she takes it early enough.    checked today.     Has lost 6.5 lbs (+0.4 lbs muscle. -7.2 lbs fat).     New BMR: 1369    New PBF: 51.5%        Initial   BMR: 1424  PBF: 52.9%    Review of Systems   Constitutional: Negative for chills and fever.   Respiratory: Negative for apnea and shortness of breath.    Cardiovascular: Negative for chest pain and leg swelling.   Gastrointestinal: Positive for constipation. Negative for diarrhea.        + GERD   Genitourinary: Negative for difficulty urinating.        S/p hyst   Musculoskeletal: Positive for arthralgias and back pain.   Neurological: Positive for dizziness and syncope. Negative for light-headedness.   Psychiatric/Behavioral: Negative for dysphoric mood. The patient is not nervous/anxious.        Objective:     /80   Pulse 61   Ht 5' 4" (1.626 m)   Wt 99.3 kg (218 lb 14.4 oz)   LMP 03/01/2011 (Exact Date)   SpO2 98%   BMI 37.57 kg/m²     Physical Exam   Constitutional: She is oriented to person, place, and time. She appears well-developed and well-nourished. No distress.   Obese     HENT:   Head: Normocephalic and atraumatic.   Eyes: EOM are normal. Pupils are equal, round, and reactive to light. No scleral icterus.   Neck: Normal range of motion. Neck supple.   Cardiovascular: Normal rate and normal " heart sounds.    Pulmonary/Chest: Effort normal and breath sounds normal.   Musculoskeletal: Normal range of motion. She exhibits no edema.   Neurological: She is alert and oriented to person, place, and time. No cranial nerve deficit.   Skin: Skin is warm and dry. No erythema.   Psychiatric: She has a normal mood and affect. Her behavior is normal. Judgment normal.   Vitals reviewed.      Assessment:       1. Class 2 severe obesity with body mass index (BMI) of 35 to 39.9 with serious comorbidity    2. S/P laparoscopic sleeve gastrectomy              Plan:             Renea was seen today for follow-up.    Diagnoses and all orders for this visit:    Class 2 severe obesity with body mass index (BMI) of 35 to 39.9 with serious comorbidity  -     semaglutide, weight loss, (WEGOVY) 0.25 mg/0.5 mL PnIj; Inject 0.25 mg into the skin once a week.  -     semaglutide, weight loss, (WEGOVY) 0.5 mg/0.5 mL PnIj; Inject 0.5 mg into the skin once a week.  -     semaglutide, weight loss, (WEGOVY) 1 mg/0.5 mL PnIj; Inject 1 mg into the skin once a week.  -     semaglutide, weight loss, (WEGOVY) 1.7 mg/0.75 mL PnIj; Inject 1.7 mg into the skin once a week.    S/P laparoscopic sleeve gastrectomy        Will try to get Wegovy via her [employer] insurance.     Had lost 10.7% TBW since starting Wegovy.     Start Wegovy 0.25 mg once a week x 1 month. At the end of that month, the dose will continue to increase monthly.       Decrease portions as soon as you start wegovy. Avoid fried, rich or greasy foods.      Some nausea in the first 2 weeks is not unusual.     If you get pain across the upper abdomen and around to your back, please call the office.       [email or web address] to sign up for coupon card.     Continue bariatric diet for life.     - To lose weight you want to cut 100% starchy carbohydrates out of your diet (bread, rice, pasta, potatoes, granola, flour, corn, peas, oatmeal, grits, tortillas, crackers, chips) and get  grams  of protein.  Aim for 100 grams of protein daily.    - No soda, sweet tea, juices, sports drinks or lemonade     -Exercise daily    Spring recipes given.

## 2025-05-12 ENCOUNTER — PATIENT MESSAGE (OUTPATIENT)
Dept: BARIATRICS | Facility: CLINIC | Age: 55
End: 2025-05-12
Payer: OTHER GOVERNMENT

## 2025-05-12 NOTE — TELEPHONE ENCOUNTER
PA for medication Wegovy has been completed and submitted to [employer] for review. Waiting on a determination of coverage, will notify the pt via portal msg.

## 2025-05-14 ENCOUNTER — CLINICAL SUPPORT (OUTPATIENT)
Dept: REHABILITATION | Facility: HOSPITAL | Age: 55
End: 2025-05-14
Payer: OTHER GOVERNMENT

## 2025-05-14 DIAGNOSIS — M25.512 CHRONIC LEFT SHOULDER PAIN: Primary | ICD-10-CM

## 2025-05-14 DIAGNOSIS — R29.3 ABNORMAL POSTURE: ICD-10-CM

## 2025-05-14 DIAGNOSIS — G89.29 CHRONIC LEFT SHOULDER PAIN: Primary | ICD-10-CM

## 2025-05-14 PROCEDURE — 97530 THERAPEUTIC ACTIVITIES: CPT | Performed by: PHYSICAL THERAPIST

## 2025-05-14 PROCEDURE — 97112 NEUROMUSCULAR REEDUCATION: CPT | Performed by: PHYSICAL THERAPIST

## 2025-05-14 PROCEDURE — 97140 MANUAL THERAPY 1/> REGIONS: CPT | Performed by: PHYSICAL THERAPIST

## 2025-05-14 NOTE — PROGRESS NOTES
Physical Therapy Visit    Patient Name: Renea Mac  MRN: [id number]  YOB: 1970  Encounter Date: 5/14/2025    Therapy Diagnosis:   Encounter Diagnoses   Name Primary?    Chronic left shoulder pain Yes    Abnormal posture      Physician: Paola Crabtree MD    Physician Orders: Eval and Treat  Medical Diagnosis: Left shoulder pain, unspecified chronicity  Shoulder impingement syndrome, left  Arthritis of left acromioclavicular joint    Visit # / Visits Authorized:  1 / 20  Insurance Authorization Period: 5/6/2025 to 12/31/2025  Date of Evaluation: 5/6/2025  Plan of Care Certification: 5/6/2025 to 11/6/2025      PT/PTA:     Number of PTA visits since last PT visit:   Time In: 0700   Time Out: 0800  Total Time (in minutes): 60   Total Billable Time (in minutes):      Subjective   Feeling better overall, but slept awkwardly last night and feeling some neck stiffness..  Pain reported as 2/10.      Objective    Observation: FHP; increased T-sp kyphosis     Active Range of Motion:   Shoulder Right Left   Flexion 175 165*   Abduction 170 155*   ER at 0 75 65*   * = pain       Treatment:  Access Code: [email or web address]   Renea received the treatments listed below:       Manual therapy techniques: Joint mobilizations, Manual traction, Myofacial release, and Soft tissue Mobilization were applied to the: neck and upper back for 15 minutes, including:  Upper t-sp P/A mobs  C-sp distraction     Neuromuscular re-education activities to improve: Balance, Coordination, Kinesthetic, Sense, Proprioception, and Posture for 30 minutes. The following activities were included:   SNAG towel assisted 3 x 10  Table t-sp extension walk-away 3 x 10  Standing W's 4 x 10  Long Lever banded ABD 4 x 10     Therapeutic activities to improve functional performance for 10 minutes, including:  UBE completed for 10' to increase ROM, endurance, and decrease pain to improve tolerance to ADLs and age-related activities    Time Entry(in  minutes):  Manual Therapy Time Entry: 15  Neuromuscular Re-Education Time Entry: 30  Therapeutic Activity Time Entry: 15    Assessment & Plan   Assessment: First return visit; overall improved thoracic motion, but remains stiff at CTJ. Balance between thoracic mobility interventions but emphasized need for periscapular strengthening/endurance with avoidance of forward head posturing. Continue active interventions.       Patient will continue to benefit from skilled outpatient physical therapy to address the deficits listed in the problem list box on initial evaluation, provide pt/family education and to maximize pt's level of independence in the home and community environment.     Patient's spiritual, cultural, and educational needs considered and patient agreeable to plan of care and goals.           Plan: Progress per POC    Goals:   Short Term Goals: 6 weeks  Pt will report decreased L shoulder pain  < / =  4/10  to increase tolerance for ADL performance and recreational routine.  Pt will improve L shoulder  ROM to WFL in order to be able to perform ADLs without difficulty.  Pt will improve L shoulder  strength by 1/3 MMT grade to increase tolerance for ADL and work activities  Pt will demonstrate tolerance to HEP to improve independence with ADL's     Long Term Goals: 24 weeks  Pt will report decreased L shoulder  pain  < / =  2/10  to increase tolerance for ADL performance and recreational routine  Pt will improve L shoulder  ROM to WNL in order to be able to perform ADLs without difficulty  Pt will improve L shoulder  strength by 1/3 MMT grade to increase tolerance for ADL and work activities  Pt will report at CJ level (20-40% impaired) on FOTO Shoulder to demonstrate increase in LE function with every day tasks.     Carlos Bedolla, PT, DPT

## 2025-05-15 ENCOUNTER — PATIENT MESSAGE (OUTPATIENT)
Dept: BARIATRICS | Facility: CLINIC | Age: 55
End: 2025-05-15
Payer: OTHER GOVERNMENT

## 2025-05-28 ENCOUNTER — CLINICAL SUPPORT (OUTPATIENT)
Dept: REHABILITATION | Facility: HOSPITAL | Age: 55
End: 2025-05-28
Payer: OTHER GOVERNMENT

## 2025-05-28 DIAGNOSIS — M25.512 CHRONIC LEFT SHOULDER PAIN: Primary | ICD-10-CM

## 2025-05-28 DIAGNOSIS — G89.29 CHRONIC LEFT SHOULDER PAIN: Primary | ICD-10-CM

## 2025-05-28 PROCEDURE — 97530 THERAPEUTIC ACTIVITIES: CPT | Performed by: PHYSICAL THERAPIST

## 2025-05-28 PROCEDURE — 97112 NEUROMUSCULAR REEDUCATION: CPT | Performed by: PHYSICAL THERAPIST

## 2025-05-31 NOTE — PROGRESS NOTES
Physical Therapy Visit    Patient Name: Renea Mac  MRN: [id number]  YOB: 1970  Encounter Date: 5/28/2025    Therapy Diagnosis:   Encounter Diagnosis   Name Primary?    Chronic left shoulder pain Yes       Physician: Paola Crabtree MD    Physician Orders: Eval and Treat  Medical Diagnosis: Left shoulder pain, unspecified chronicity  Shoulder impingement syndrome, left  Arthritis of left acromioclavicular joint    Visit # / Visits Authorized:  2 / 20  Insurance Authorization Period: 5/6/2025 to 12/31/2025  Date of Evaluation: 5/6/2025  Plan of Care Certification: 5/6/2025 to 11/6/2025      PT/PTA:     Number of PTA visits since last PT visit:   Time In: 0720   Time Out: 0820  Total Time (in minutes): 60   Total Billable Time (in minutes):      Subjective   Improved neck sx behavior, but still getting some clicking in the shoulder..  Pain reported as 1/10.      Objective    Observation: FHP; increased T-sp kyphosis     Active Range of Motion:   Shoulder Right Left   Flexion 175 165*   Abduction 170 155*   ER at 0 75 65*   * = pain       Treatment:  Access Code: [email or web address]   Renea received the treatments listed below:       Manual therapy techniques: Joint mobilizations, Manual traction, Myofacial release, and Soft tissue Mobilization were applied to the: neck and upper back for 5 minutes, including:  Upper t-sp P/A mobs  C-sp distraction     Neuromuscular re-education activities to improve: Balance, Coordination, Kinesthetic, Sense, Proprioception, and Posture for 30 minutes. The following activities were included:   SNAG towel assisted 3 x 10  Table t-sp extension walk-away 3 x 10  Standing W's 4 x 10  Long Lever banded ABD 4 x 10     Therapeutic activities to improve functional performance for 30 minutes, including:  UBE completed for 10' to increase ROM, endurance, and decrease pain to improve tolerance to ADLs and age-related activities  Land[employer] Press 2# PVC 4 x 10  Bent Over Rows 4 x 10      Time  Entry(in minutes):  Neuromuscular Re-Education Time Entry: 30  Therapeutic Activity Time Entry: 30    Assessment & Plan   Assessment: Continued to work on proper neck positioning with periscapular demands. Progressed to include more upright resistance tranining without compensatory activation.       Patient will continue to benefit from skilled outpatient physical therapy to address the deficits listed in the problem list box on initial evaluation, provide pt/family education and to maximize pt's level of independence in the home and community environment.     Patient's spiritual, cultural, and educational needs considered and patient agreeable to plan of care and goals.           Plan: Progress per POC    Goals:   Short Term Goals: 6 weeks  Pt will report decreased L shoulder pain  < / =  4/10  to increase tolerance for ADL performance and recreational routine.  Pt will improve L shoulder  ROM to WFL in order to be able to perform ADLs without difficulty.  Pt will improve L shoulder  strength by 1/3 MMT grade to increase tolerance for ADL and work activities  Pt will demonstrate tolerance to HEP to improve independence with ADL's     Long Term Goals: 24 weeks  Pt will report decreased L shoulder  pain  < / =  2/10  to increase tolerance for ADL performance and recreational routine  Pt will improve L shoulder  ROM to WNL in order to be able to perform ADLs without difficulty  Pt will improve L shoulder  strength by 1/3 MMT grade to increase tolerance for ADL and work activities  Pt will report at CJ level (20-40% impaired) on FOTO Shoulder to demonstrate increase in LE function with every day tasks.     Carlos Bedolla, PT, DPT

## 2025-06-03 ENCOUNTER — TELEPHONE (OUTPATIENT)
Dept: BARIATRICS | Facility: CLINIC | Age: 55
End: 2025-06-03
Payer: OTHER GOVERNMENT

## 2025-07-20 ENCOUNTER — OFFICE VISIT (OUTPATIENT)
Dept: URGENT CARE | Facility: CLINIC | Age: 55
End: 2025-07-20
Payer: OTHER GOVERNMENT

## 2025-07-20 VITALS
RESPIRATION RATE: 19 BRPM | DIASTOLIC BLOOD PRESSURE: 84 MMHG | WEIGHT: 218 LBS | SYSTOLIC BLOOD PRESSURE: 130 MMHG | HEART RATE: 108 BPM | BODY MASS INDEX: 37.22 KG/M2 | TEMPERATURE: 100 F | OXYGEN SATURATION: 97 % | HEIGHT: 64 IN

## 2025-07-20 DIAGNOSIS — U07.1 COVID-19: Primary | ICD-10-CM

## 2025-07-20 DIAGNOSIS — U07.1 COVID-19 VIRUS DETECTED: ICD-10-CM

## 2025-07-20 DIAGNOSIS — R05.9 COUGH, UNSPECIFIED TYPE: ICD-10-CM

## 2025-07-20 LAB
CTP QC/QA: YES
SARS-COV+SARS-COV-2 AG RESP QL IA.RAPID: POSITIVE

## 2025-07-20 PROCEDURE — 99214 OFFICE O/P EST MOD 30 MIN: CPT | Mod: S$GLB,,, | Performed by: PHYSICIAN ASSISTANT

## 2025-07-20 PROCEDURE — 87811 SARS-COV-2 COVID19 W/OPTIC: CPT | Mod: QW,S$GLB,, | Performed by: PHYSICIAN ASSISTANT

## 2025-07-20 RX ORDER — PROMETHAZINE HYDROCHLORIDE AND DEXTROMETHORPHAN HYDROBROMIDE 6.25; 15 MG/5ML; MG/5ML
5 SYRUP ORAL EVERY 4 HOURS PRN
Qty: 118 ML | Refills: 0 | Status: SHIPPED | OUTPATIENT
Start: 2025-07-20 | End: 2025-07-30

## 2025-07-20 NOTE — LETTER
July 20, 2025      [facility] [address] [address]  Phone: [phone]  Fax: [phone]       Patient: Renea Mac   YOB: 1970  Date of Visit: 07/20/2025    To Whom It May Concern:    Windy Mac  was at [facility] on 07/20/2025. She may return to school/work once she experiences improvement of symptoms and is fever free for 24 hours (without the use of fever-reducing medications). If you have any questions or concerns, or if I can be of further assistance, please do not hesitate to contact me.    Sincerely,    Kimberlee Glez PA-C

## 2025-07-20 NOTE — PROGRESS NOTES
"Subjective:      Patient ID: Renea Mac is a 55 y.o. female.    Vitals:  height is 5' 4" (1.626 m) and weight is 98.9 kg (218 lb). Her oral temperature is 99.6 °F (37.6 °C). Her blood pressure is 130/84 and her pulse is 108. Her respiration is 19 and oxygen saturation is 97%.     Chief Complaint: Cough    Pt is being seen due to a cough and scratchy throat. Pt confirms she did have fever and aches. Pt reports being on a cruise and returned on July 17th. Pt reports her symptoms occurred yesterday. Pt took tylenol and nightquil otc. Pt denies taking anything today. Minor relief.    Cough  This is a new problem. The current episode started yesterday. The problem has been gradually worsening. The problem occurs constantly. The cough is Non-productive. Associated symptoms include a fever, headaches and postnasal drip. Nothing aggravates the symptoms. Risk factors for lung disease include travel. She has tried OTC cough suppressant for the symptoms. The treatment provided mild relief.       Constitution: Positive for fever.   HENT:  Positive for postnasal drip.    Respiratory:  Positive for cough.    Neurological:  Positive for headaches.      Objective:     Physical Exam    Assessment:     1. COVID-19    2. Cough, unspecified type      Results for orders placed or performed in visit on 07/20/25   SARS Coronavirus 2 Antigen, POCT Manual Read    Collection Time: 07/20/25  3:52 PM   Result Value Ref Range    SARS Coronavirus 2 Antigen Positive (A) Negative, Presumptive Negative     Acceptable Yes        Plan:       COVID-19  -     nirmatrelvir-ritonavir 300 mg (150 mg x 2)-100 mg copackaged tablets (EUA); Take 3 tablets by mouth 2 (two) times daily for 5 days. Each dose contains 2 nirmatrelvir (pink tablets) and 1 ritonavir (white tablet). Take all 3 tablets together  Dispense: 30 tablet; Refill: 0  -     promethazine-dextromethorphan (PROMETHAZINE-DM) 6.25-15 mg/5 mL Syrp; Take 5 mLs by mouth every 4 " (four) hours as needed (Cough).  Dispense: 118 mL; Refill: 0    Cough, unspecified type  -     SARS Coronavirus 2 Antigen, POCT Manual Read          Medical Decision Making:   History:   Old Medical Records: I decided to obtain old medical records.  Old Records Summarized: records from clinic visits.  Clinical Tests:   Lab Tests: Ordered and Reviewed  Urgent Care Management:  A. Problem List:   -Acute: COVID 19   -Chronic: HTN, GERD  B. Differential diagnosis: viral vs bacterial URI, pharyngitis, otitis, COVID 19, influenza, pneumonia  C. Diagnostic Testing Ordered: COVID   D. Diagnostic Testing Considered: None  E. Independent Historians: None  F. Urgent Care Midlevel Independent Results Interpretation: COVID positive   G. Radiology:  H. Review of Previous Medical Records:  I. Home Medications Reviewed  J. Social Determinants of Health considered  K. Medical Decision Making and Disposition: Patient presents with complaints of flu like illness since yesterday. On exam, she is afebrile and nontoxic appearing. Lungs CTAB, vitals stable. COVID is positive. The risks and benefits of Paxlovid were discussed with the patient. Isolation requirements, symptomatic treatment and ED precautions discussed as well. She verbalized understanding and agreed with plan.   COVID risk score: 2           Patient Instructions   Preventing Spread of Respiratory Viruses When You're Sick    When you may have a respiratory virus...  Stay home and away from others *including people you live with who are not sick) if you have respiratory virus symptoms that are not better explained by another cause. These symptoms including fever, chills, fatigue, cough, runny nose, and headache.      * You can go back to your normal activities when, for at least 24 hours, both are true:     - Your symptoms are better overall AND     - You have not had a fever (and are not using fever-reducing medication)    * When you go back to your normal activities, take  added precaution over the next 5 days, such as taking additional steps for  air, hygiene, masks, physical distancing, and/or testing when you will be around other people indoors.   - Keep in mind that you may still be able to spread the virus that made you sick, even if you are feeling better. You are likely to be less contagious at this time, depending on factors like how long you were sick or how sick you were.   - If you develop a fever or you start to feel worse after you have gone back to normal activities, stay home and away from other again until, for at least 24 hours, both are true: your symptoms are improving overall, and you have not had a fever (and are not using fever-reducing medications). Then take added precaution for the next 5 days.       If you never had symptoms but tested positive for a respiratory virus...  You may be contagious. For the next 5 days: take added precautions, such as taking additional steps for  air, hygiene, masks, physical distancing, and/or testing when you will be around other people indoors. This is especially important to protect people with factors that increase their risk of severe illness from respiratory viruses.       How it works...  When you have a respiratory virus infection, you can spread it to others. How long someone can spread the virus depends on different factors, including how sick they are (severity) and how long their illness lasts (duration). This is not the same for everyone.     When, for at least 24 hours, your symptoms are getting better overall and you have not had a fever (and are not using fever-reducing medications), you are typically less contagious, but it still take more time for your body to fully get rid of the virus. During this time, you may still be able to spread the virus to others. Taking precautions for the next 5 days can help reduce this risk. After this 5-day period, you are typically much less likely to be contagious.  However, some people, especially people with weakened immune systems, can continue to spread the virus for longer periods of time.     Reference: About Preventing Spread of Respiratory Viruses When Youre Sick  Respiratory Illnesses  CDC

## 2025-08-20 ENCOUNTER — OFFICE VISIT (OUTPATIENT)
Dept: INTERNAL MEDICINE | Facility: CLINIC | Age: 55
End: 2025-08-20
Payer: COMMERCIAL

## 2025-08-20 ENCOUNTER — LAB VISIT (OUTPATIENT)
Dept: LAB | Facility: HOSPITAL | Age: 55
End: 2025-08-20
Payer: COMMERCIAL

## 2025-08-20 VITALS
HEART RATE: 81 BPM | DIASTOLIC BLOOD PRESSURE: 94 MMHG | HEIGHT: 64 IN | BODY MASS INDEX: 37.04 KG/M2 | OXYGEN SATURATION: 98 % | WEIGHT: 216.94 LBS | SYSTOLIC BLOOD PRESSURE: 122 MMHG

## 2025-08-20 DIAGNOSIS — R10.31 RIGHT LOWER QUADRANT PAIN: Primary | ICD-10-CM

## 2025-08-20 DIAGNOSIS — R10.31 RIGHT LOWER QUADRANT PAIN: ICD-10-CM

## 2025-08-20 DIAGNOSIS — K59.09 CHRONIC CONSTIPATION: ICD-10-CM

## 2025-08-20 DIAGNOSIS — I10 ESSENTIAL HYPERTENSION: ICD-10-CM

## 2025-08-20 LAB
ABSOLUTE EOSINOPHIL (OHS): 0.08 K/UL
ABSOLUTE MONOCYTE (OHS): 0.35 K/UL (ref 0.3–1)
ABSOLUTE NEUTROPHIL COUNT (OHS): 2.79 K/UL (ref 1.8–7.7)
ALBUMIN SERPL BCP-MCNC: 3.7 G/DL (ref 3.5–5.2)
ALP SERPL-CCNC: 92 UNIT/L (ref 40–150)
ALT SERPL W/O P-5'-P-CCNC: 14 UNIT/L (ref 0–55)
ANION GAP (OHS): 9 MMOL/L (ref 8–16)
AST SERPL-CCNC: 16 UNIT/L (ref 0–50)
BASOPHILS # BLD AUTO: 0.03 K/UL
BASOPHILS NFR BLD AUTO: 0.4 %
BILIRUB SERPL-MCNC: 0.3 MG/DL (ref 0.1–1)
BILIRUB UR QL STRIP.AUTO: NEGATIVE
BUN SERPL-MCNC: 12 MG/DL (ref 6–20)
CALCIUM SERPL-MCNC: 9.6 MG/DL (ref 8.7–10.5)
CHLORIDE SERPL-SCNC: 104 MMOL/L (ref 95–110)
CLARITY UR: CLEAR
CO2 SERPL-SCNC: 29 MMOL/L (ref 23–29)
COLOR UR AUTO: YELLOW
CREAT SERPL-MCNC: 0.6 MG/DL (ref 0.5–1.4)
ERYTHROCYTE [DISTWIDTH] IN BLOOD BY AUTOMATED COUNT: 15.3 % (ref 11.5–14.5)
GFR SERPLBLD CREATININE-BSD FMLA CKD-EPI: >60 ML/MIN/1.73/M2
GLUCOSE SERPL-MCNC: 73 MG/DL (ref 70–110)
GLUCOSE UR QL STRIP: NEGATIVE
HCT VFR BLD AUTO: 39.7 % (ref 37–48.5)
HGB BLD-MCNC: 12.6 GM/DL (ref 12–16)
HGB UR QL STRIP: NEGATIVE
IMM GRANULOCYTES # BLD AUTO: 0.01 K/UL (ref 0–0.04)
IMM GRANULOCYTES NFR BLD AUTO: 0.1 % (ref 0–0.5)
KETONES UR QL STRIP: NEGATIVE
LEUKOCYTE ESTERASE UR QL STRIP: NEGATIVE
LYMPHOCYTES # BLD AUTO: 3.54 K/UL (ref 1–4.8)
MCH RBC QN AUTO: 25.5 PG (ref 27–31)
MCHC RBC AUTO-ENTMCNC: 31.7 G/DL (ref 32–36)
MCV RBC AUTO: 80 FL (ref 82–98)
NITRITE UR QL STRIP: NEGATIVE
NUCLEATED RBC (/100WBC) (OHS): 0 /100 WBC
PH UR STRIP: 8 [PH]
PLATELET # BLD AUTO: 221 K/UL (ref 150–450)
PMV BLD AUTO: 12 FL (ref 9.2–12.9)
POTASSIUM SERPL-SCNC: 3.2 MMOL/L (ref 3.5–5.1)
PROT SERPL-MCNC: 7.7 GM/DL (ref 6–8.4)
PROT UR QL STRIP: NEGATIVE
RBC # BLD AUTO: 4.95 M/UL (ref 4–5.4)
RELATIVE EOSINOPHIL (OHS): 1.2 %
RELATIVE LYMPHOCYTE (OHS): 52.1 % (ref 18–48)
RELATIVE MONOCYTE (OHS): 5.1 % (ref 4–15)
RELATIVE NEUTROPHIL (OHS): 41.1 % (ref 38–73)
SODIUM SERPL-SCNC: 142 MMOL/L (ref 136–145)
SP GR UR STRIP: 1.02
UROBILINOGEN UR STRIP-ACNC: NEGATIVE EU/DL
WBC # BLD AUTO: 6.8 K/UL (ref 3.9–12.7)

## 2025-08-20 PROCEDURE — 1159F MED LIST DOCD IN RCRD: CPT | Mod: CPTII,S$GLB,, | Performed by: PHYSICIAN ASSISTANT

## 2025-08-20 PROCEDURE — 99214 OFFICE O/P EST MOD 30 MIN: CPT | Mod: S$GLB,,, | Performed by: PHYSICIAN ASSISTANT

## 2025-08-20 PROCEDURE — 3080F DIAST BP >= 90 MM HG: CPT | Mod: CPTII,S$GLB,, | Performed by: PHYSICIAN ASSISTANT

## 2025-08-20 PROCEDURE — 3008F BODY MASS INDEX DOCD: CPT | Mod: CPTII,S$GLB,, | Performed by: PHYSICIAN ASSISTANT

## 2025-08-20 PROCEDURE — 4010F ACE/ARB THERAPY RXD/TAKEN: CPT | Mod: CPTII,S$GLB,, | Performed by: PHYSICIAN ASSISTANT

## 2025-08-20 PROCEDURE — 3044F HG A1C LEVEL LT 7.0%: CPT | Mod: CPTII,S$GLB,, | Performed by: PHYSICIAN ASSISTANT

## 2025-08-20 PROCEDURE — 36415 COLL VENOUS BLD VENIPUNCTURE: CPT

## 2025-08-20 PROCEDURE — 99999 PR PBB SHADOW E&M-EST. PATIENT-LVL V: CPT | Mod: PBBFAC,,, | Performed by: PHYSICIAN ASSISTANT

## 2025-08-20 PROCEDURE — 3074F SYST BP LT 130 MM HG: CPT | Mod: CPTII,S$GLB,, | Performed by: PHYSICIAN ASSISTANT

## 2025-08-20 PROCEDURE — 82040 ASSAY OF SERUM ALBUMIN: CPT

## 2025-08-20 PROCEDURE — 1160F RVW MEDS BY RX/DR IN RCRD: CPT | Mod: CPTII,S$GLB,, | Performed by: PHYSICIAN ASSISTANT

## 2025-08-20 PROCEDURE — 81003 URINALYSIS AUTO W/O SCOPE: CPT

## 2025-08-20 PROCEDURE — 85025 COMPLETE CBC W/AUTO DIFF WBC: CPT

## 2025-08-21 LAB — HOLD SPECIMEN: NORMAL

## 2025-08-25 ENCOUNTER — RESULTS FOLLOW-UP (OUTPATIENT)
Dept: INTERNAL MEDICINE | Facility: CLINIC | Age: 55
End: 2025-08-25
Payer: COMMERCIAL

## 2025-08-25 DIAGNOSIS — E87.6 HYPOKALEMIA: Primary | ICD-10-CM

## 2025-08-25 RX ORDER — POTASSIUM CHLORIDE 20 MEQ/1
20 TABLET, EXTENDED RELEASE ORAL DAILY
Qty: 3 TABLET | Refills: 0 | Status: SHIPPED | OUTPATIENT
Start: 2025-08-25 | End: 2025-08-28

## 2025-08-27 ENCOUNTER — HOSPITAL ENCOUNTER (OUTPATIENT)
Dept: RADIOLOGY | Facility: HOSPITAL | Age: 55
Discharge: HOME | End: 2025-08-27
Attending: PHYSICIAN ASSISTANT
Payer: COMMERCIAL

## 2025-08-27 ENCOUNTER — OFFICE VISIT (OUTPATIENT)
Dept: BARIATRICS | Facility: CLINIC | Age: 55
End: 2025-08-27
Payer: COMMERCIAL

## 2025-08-27 VITALS
HEIGHT: 64 IN | BODY MASS INDEX: 36.14 KG/M2 | DIASTOLIC BLOOD PRESSURE: 89 MMHG | SYSTOLIC BLOOD PRESSURE: 140 MMHG | HEART RATE: 75 BPM | WEIGHT: 211.69 LBS | OXYGEN SATURATION: 98 %

## 2025-08-27 DIAGNOSIS — E66.01 CLASS 2 SEVERE OBESITY WITH BODY MASS INDEX (BMI) OF 35 TO 39.9 WITH SERIOUS COMORBIDITY: ICD-10-CM

## 2025-08-27 DIAGNOSIS — E66.812 CLASS 2 SEVERE OBESITY WITH BODY MASS INDEX (BMI) OF 35 TO 39.9 WITH SERIOUS COMORBIDITY: ICD-10-CM

## 2025-08-27 DIAGNOSIS — R10.31 RIGHT LOWER QUADRANT PAIN: ICD-10-CM

## 2025-08-27 PROCEDURE — 3079F DIAST BP 80-89 MM HG: CPT | Mod: CPTII,S$GLB,, | Performed by: INTERNAL MEDICINE

## 2025-08-27 PROCEDURE — 1160F RVW MEDS BY RX/DR IN RCRD: CPT | Mod: CPTII,S$GLB,, | Performed by: INTERNAL MEDICINE

## 2025-08-27 PROCEDURE — 3077F SYST BP >= 140 MM HG: CPT | Mod: CPTII,S$GLB,, | Performed by: INTERNAL MEDICINE

## 2025-08-27 PROCEDURE — 99213 OFFICE O/P EST LOW 20 MIN: CPT | Mod: S$GLB,,, | Performed by: INTERNAL MEDICINE

## 2025-08-27 PROCEDURE — 74177 CT ABD & PELVIS W/CONTRAST: CPT | Mod: 26,,, | Performed by: RADIOLOGY

## 2025-08-27 PROCEDURE — 1159F MED LIST DOCD IN RCRD: CPT | Mod: CPTII,S$GLB,, | Performed by: INTERNAL MEDICINE

## 2025-08-27 PROCEDURE — 4010F ACE/ARB THERAPY RXD/TAKEN: CPT | Mod: CPTII,S$GLB,, | Performed by: INTERNAL MEDICINE

## 2025-08-27 PROCEDURE — 74177 CT ABD & PELVIS W/CONTRAST: CPT | Mod: TC

## 2025-08-27 PROCEDURE — 25500020 PHARM REV CODE 255: Performed by: PHYSICIAN ASSISTANT

## 2025-08-27 PROCEDURE — 3044F HG A1C LEVEL LT 7.0%: CPT | Mod: CPTII,S$GLB,, | Performed by: INTERNAL MEDICINE

## 2025-08-27 PROCEDURE — 3008F BODY MASS INDEX DOCD: CPT | Mod: CPTII,S$GLB,, | Performed by: INTERNAL MEDICINE

## 2025-08-27 PROCEDURE — 99999 PR PBB SHADOW E&M-EST. PATIENT-LVL V: CPT | Mod: PBBFAC,,, | Performed by: INTERNAL MEDICINE

## 2025-08-27 RX ORDER — SEMAGLUTIDE 1.7 MG/.75ML
1.7 INJECTION, SOLUTION SUBCUTANEOUS WEEKLY
Qty: 9 ML | Refills: 1 | Status: SHIPPED | OUTPATIENT
Start: 2025-08-27

## 2025-08-27 RX ADMIN — IOHEXOL 100 ML: 350 INJECTION, SOLUTION INTRAVENOUS at 01:08

## (undated) DEVICE — ADHESIVE DERMABOND ADVANCED

## (undated) DEVICE — KIT TRACTION SHLDR ST DISP LF

## (undated) DEVICE — GOWN SURGICAL X-LARGE

## (undated) DEVICE — DRESSING N ADH OIL EMUL 3X8

## (undated) DEVICE — ELECTRODE REM PLYHSV RETURN 9

## (undated) DEVICE — SEE MEDLINE ITEM 157181

## (undated) DEVICE — GOWN ECLIPSE REINF LV4 XLNG XL

## (undated) DEVICE — SUT 1 36IN PDS II VIO MONO

## (undated) DEVICE — TAPE SURG MEDIPORE 6X72IN

## (undated) DEVICE — SUT ETHILON 3-0 PS2 18 BLK

## (undated) DEVICE — GLOVE PROTEXIS LTX MICRO 8

## (undated) DEVICE — SET EXTENSION STERILE 30IN

## (undated) DEVICE — SUT CTD VICRYL 0 VIL BR/CT

## (undated) DEVICE — APPLICATOR CHLORAPREP ORN 26ML

## (undated) DEVICE — CLIPPER BLADE MOD 4406 (CAREF)

## (undated) DEVICE — WARMER DRAPE STERILE LF

## (undated) DEVICE — PAD DERMAPROX THCK 11X15X1IN

## (undated) DEVICE — SEE MEDLINE ITEM 146417

## (undated) DEVICE — SET INFLOW TUBE ARTHSCP

## (undated) DEVICE — TUBING SUC UNIV W/CONN 12FT

## (undated) DEVICE — DRAPE THREE-QTR REINF 53X77IN

## (undated) DEVICE — GLOVE PROTEXIS LIGHT BROWN 8.5

## (undated) DEVICE — SOL 9P NACL IRR PIC IL

## (undated) DEVICE — NDL SUTURE FLEXIBLE

## (undated) DEVICE — DRESSING ABSRBNT ISLAND 3.6X8

## (undated) DEVICE — DRAPE INCISE IOBAN 2 23X17IN

## (undated) DEVICE — GREAT WHITE STEALTH

## (undated) DEVICE — ELECTRODE COOLPULSE 90 W/HAND

## (undated) DEVICE — TRAY FOLEY 16FR INFECTION CONT

## (undated) DEVICE — Device

## (undated) DEVICE — BLADE BANANA 279MM 11IN DISP

## (undated) DEVICE — SYR 30CC LUER LOCK

## (undated) DEVICE — ELECTRODE EXTENDED BLADE

## (undated) DEVICE — DRAPE STERI-DRAPE 1000 17X11IN

## (undated) DEVICE — NDL 20GX1-1/2IN IB

## (undated) DEVICE — PAD ABD 8X10 STERILE

## (undated) DEVICE — DRESSING ADHESIVE ISLAND 3 X 6

## (undated) DEVICE — SET EXT W/2 VLVE PORTS 40

## (undated) DEVICE — NDL SPINAL 18GX3.5 SPINOCAN

## (undated) DEVICE — SYR 10CC LUER LOCK

## (undated) DEVICE — LUBRICANT SURGILUBE 2 OZ

## (undated) DEVICE — SUT MCRYL PLUS 4-0 PS2 27IN

## (undated) DEVICE — SEE MEDLINE ITEM 152622

## (undated) DEVICE — CONNECTOR TUBING STR 5 IN 1

## (undated) DEVICE — PAD SHOULDER CARE POLAR

## (undated) DEVICE — GAUZE SPONGE 4X4 12PLY

## (undated) DEVICE — GOWN ECLIPSE REINF LVL4 TWL XL

## (undated) DEVICE — SEE MEDLINE ITEM 156902

## (undated) DEVICE — SUT 0 18IN COATED VICRYL V

## (undated) DEVICE — DRAPE STERI INSTRUMENT 1018

## (undated) DEVICE — DRAPE STERI U-SHAPED 47X51IN

## (undated) DEVICE — SEE MEDLINE ITEM 154981

## (undated) DEVICE — LEGGINGS 48X31 INCH

## (undated) DEVICE — SUT 1 48IN PDS II VIO MONO

## (undated) DEVICE — SEE MEDLINE ITEM 157148

## (undated) DEVICE — TOWEL OR DISP STRL BLUE 4/PK

## (undated) DEVICE — BNDG COFLEX FOAM LF2 ST 6X5YD

## (undated) DEVICE — CANNULA PASSPORT 8 MM X 3CM

## (undated) DEVICE — LOOP VESSEL YELLOW MAXI

## (undated) DEVICE — SUT CTD VICRYL 0 UND BR CT

## (undated) DEVICE — SPONGE LAP 18X18 PREWASHED

## (undated) DEVICE — SUT 0 54IN COATED VICRYL U

## (undated) DEVICE — NDL 22GA X1 1/2 REG BEVEL

## (undated) DEVICE — SOL IRR NACL .9% 3000ML